# Patient Record
Sex: MALE | Race: WHITE | NOT HISPANIC OR LATINO | Employment: OTHER | URBAN - NONMETROPOLITAN AREA
[De-identification: names, ages, dates, MRNs, and addresses within clinical notes are randomized per-mention and may not be internally consistent; named-entity substitution may affect disease eponyms.]

---

## 2020-04-21 ENCOUNTER — HISTORICAL (OUTPATIENT)
Dept: ADMINISTRATIVE | Facility: HOSPITAL | Age: 60
End: 2020-04-21

## 2020-04-21 LAB
APPEARANCE, UA: CLEAR
BACTERIA SPEC CULT: NEGATIVE /HPF
BILIRUB UR QL STRIP: NEGATIVE MG/DL
BUDDING YEAST: NORMAL /HPF
CASTS, URINE MICROSCOPIC: NEGATIVE /LPF
COLOR UR: YELLOW
EPITHELIAL, URINE MICROSCOPIC: NEGATIVE /HPF
GLUCOSE (UA): NEGATIVE MG/DL
HGB UR QL STRIP: NEGATIVE ERY/UL
KETONES UR QL STRIP: 15 MG/DL
LEUKOCYTE ESTERASE UR QL STRIP: NEGATIVE LEU/UL
NITRITE UR QL STRIP: NEGATIVE MG/DL
PH UR STRIP: 6.5 [PH] (ref 5–7.5)
PROT UR QL STRIP: NEGATIVE MG/DL
RBC #/AREA URNS HPF: NEGATIVE /HPF
SP GR UR STRIP: 1 (ref 1–1.03)
SPERM, URINE MICROSCOPIC: NORMAL /HPF
TYPE OF SPECIMEN  (UA): NORMAL
UNCLASSIFIED CRYSTALS, UA: NORMAL /HPF
UROBILINOGEN UR STRIP-ACNC: NORMAL EU/L
WBC #/AREA URNS HPF: NEGATIVE /HPF

## 2020-07-20 ENCOUNTER — HISTORICAL (OUTPATIENT)
Dept: ADMINISTRATIVE | Facility: HOSPITAL | Age: 60
End: 2020-07-20

## 2020-07-20 LAB
ALBUMIN SERPL BCP-MCNC: 2.3 G/DL (ref 3.5–5)
ALBUMIN/GLOB SERPL ELPH: 0.6 {RATIO} (ref 1.5–2.2)
ALCOHOL (ETHANOL), BLOOD: 9 MG/DL (ref 0–3)
ALP SERPL-CCNC: 509 U/L (ref 50–136)
ALT SERPL W P-5'-P-CCNC: 186 U/L (ref 16–61)
ANION GAP SERPL CALC-SCNC: 16.8 MEQ/L (ref 10–20)
APTT PPP: 25 SEC (ref 23–30.4)
AST SERPL-CCNC: 651 U/L (ref 15–37)
BASOPHILS NFR BLD: 0.1 10 (ref 0–0.1)
BASOPHILS NFR BLD: 0.9 % (ref 0–1.5)
BILIRUB SERPL-MCNC: 7.78 MG/DL (ref 0.2–1)
BUN SERPL-MCNC: 13 MG/DL (ref 7–18)
CALCIUM SERPL-MCNC: 9 MG/DL (ref 8.5–10.1)
CHLORIDE SERPL-SCNC: 94 MMOL/L (ref 98–107)
CO2 SERPL-SCNC: 24 MMOL/L (ref 22–32)
CREAT SERPL-MCNC: 0.6 MG/DL (ref 0.7–1.3)
EGFR: 148 ML/MIN/1.73M
EOSINOPHIL NFR BLD: 0.1 10 (ref 0–0.7)
EOSINOPHIL NFR BLD: 1 % (ref 0–7)
ERYTHROCYTE [DISTWIDTH] IN BLOOD BY AUTOMATED COUNT: 14.6 % (ref 11.5–14.5)
GLOBULIN: 3.7 G/DL (ref 2.3–3.5)
GLUCOSE SERPL-MCNC: 96 MG/DL (ref 70–99)
GRAN #: 8.92 10 (ref 2–7.5)
GRAN%: 0.6 %
GRAN%: 82.1 % (ref 50–80)
HCT VFR BLD AUTO: 32.2 % (ref 43.5–53.7)
HGB BLD-MCNC: 10.6 G/DL (ref 14.1–18.1)
IMMATURE GRANULOCYTES #: 0.06 10
INR PPP: 1.1 (ref 0.9–1.2)
LIPASE SERPL-CCNC: 235 U/L (ref 73–393)
LYMPH #: 1 10 (ref 1–3.5)
LYMPH%: 9.4 % (ref 12–50)
MAGNESIUM SERPL-MCNC: 1.37 MG/DL (ref 1.8–2.4)
MCH RBC QN AUTO: 36.6 PG (ref 27–31)
MCHC RBC AUTO-ENTMCNC: 32.9 G% (ref 32–35)
MCV RBC AUTO: 111 FL (ref 80–97)
MONO #: 0.7 10 (ref 0–0.8)
MONO%: 6 % (ref 0–12)
OSMOC: 263 MOSM/KG (ref 275–295)
PMV BLD AUTO: 10.7 FL (ref 7.4–10.4)
PMV BLD AUTO: 228 10 (ref 142–424)
POTASSIUM SERPL-SCNC: 3.8 MMOL/L (ref 3.5–5.1)
PROT SERPL-MCNC: 6 G/DL (ref 6.4–8.2)
PROTHROMBIN TIME: 11.5 SEC (ref 9.8–12.4)
RBC # BLD AUTO: 2.9 M/UL (ref 4.69–6.13)
SODIUM BLD-SCNC: 131 MMOL/L (ref 136–145)
WBC # BLD AUTO: 10.9 10 (ref 4–10.2)

## 2020-07-29 ENCOUNTER — HOSPITAL ENCOUNTER (EMERGENCY)
Facility: HOSPITAL | Age: 60
Discharge: ANOTHER HOSPITAL | End: 2020-07-30
Attending: EMERGENCY MEDICINE
Payer: MEDICAID

## 2020-07-29 DIAGNOSIS — R17 JAUNDICE: ICD-10-CM

## 2020-07-29 DIAGNOSIS — I95.9 HYPOTENSION, UNSPECIFIED HYPOTENSION TYPE: ICD-10-CM

## 2020-07-29 DIAGNOSIS — R55 SYNCOPE: ICD-10-CM

## 2020-07-29 DIAGNOSIS — K70.10 ACUTE ALCOHOLIC HEPATITIS: Primary | ICD-10-CM

## 2020-07-29 DIAGNOSIS — E87.20 LACTIC ACIDOSIS: ICD-10-CM

## 2020-07-29 LAB
ALBUMIN SERPL BCP-MCNC: 1.7 G/DL (ref 3.5–5.2)
ALP SERPL-CCNC: 587 U/L (ref 55–135)
ALT SERPL W/O P-5'-P-CCNC: 156 U/L (ref 10–44)
AMMONIA PLAS-SCNC: <10 UMOL/L (ref 10–50)
ANION GAP SERPL CALC-SCNC: 19 MMOL/L (ref 8–16)
APTT BLDCRRT: 31.4 SEC (ref 21–32)
AST SERPL-CCNC: 627 U/L (ref 10–40)
BASOPHILS NFR BLD: 0 % (ref 0–1.9)
BILIRUB SERPL-MCNC: 10.4 MG/DL (ref 0.1–1)
BUN SERPL-MCNC: 27 MG/DL (ref 6–20)
CALCIUM SERPL-MCNC: 7.2 MG/DL (ref 8.7–10.5)
CHLORIDE SERPL-SCNC: 83 MMOL/L (ref 95–110)
CO2 SERPL-SCNC: 22 MMOL/L (ref 23–29)
CREAT SERPL-MCNC: 3.2 MG/DL (ref 0.5–1.4)
DIFFERENTIAL METHOD: ABNORMAL
EOSINOPHIL NFR BLD: 0 % (ref 0–8)
ERYTHROCYTE [DISTWIDTH] IN BLOOD BY AUTOMATED COUNT: 16.3 % (ref 11.5–14.5)
EST. GFR  (AFRICAN AMERICAN): 23.6 ML/MIN/1.73 M^2
EST. GFR  (NON AFRICAN AMERICAN): 20.4 ML/MIN/1.73 M^2
ETHANOL SERPL-MCNC: 317 MG/DL
GLUCOSE SERPL-MCNC: 86 MG/DL (ref 70–110)
HCT VFR BLD AUTO: 29.1 % (ref 40–54)
HGB BLD-MCNC: 10.1 G/DL (ref 14–18)
IMM GRANULOCYTES # BLD AUTO: ABNORMAL K/UL (ref 0–0.04)
IMM GRANULOCYTES NFR BLD AUTO: ABNORMAL % (ref 0–0.5)
INR PPP: 1.1 (ref 0.8–1.2)
LACTATE SERPL-SCNC: 4.5 MMOL/L (ref 0.5–2.2)
LYMPHOCYTES NFR BLD: 8 % (ref 18–48)
MCH RBC QN AUTO: 36.9 PG (ref 27–31)
MCHC RBC AUTO-ENTMCNC: 34.7 G/DL (ref 32–36)
MCV RBC AUTO: 106 FL (ref 82–98)
MONOCYTES NFR BLD: 5 % (ref 4–15)
NEUTROPHILS NFR BLD: 62 % (ref 38–73)
NEUTS BAND NFR BLD MANUAL: 25 %
NRBC BLD-RTO: 0 /100 WBC
PLATELET # BLD AUTO: 266 K/UL (ref 150–350)
PMV BLD AUTO: 10.6 FL (ref 9.2–12.9)
POTASSIUM SERPL-SCNC: 3.8 MMOL/L (ref 3.5–5.1)
PROT SERPL-MCNC: 5 G/DL (ref 6–8.4)
PROTHROMBIN TIME: 11.7 SEC (ref 9–12.5)
RBC # BLD AUTO: 2.74 M/UL (ref 4.6–6.2)
SARS-COV-2 RDRP RESP QL NAA+PROBE: NEGATIVE
SODIUM SERPL-SCNC: 124 MMOL/L (ref 136–145)
TROPONIN I SERPL DL<=0.01 NG/ML-MCNC: <0.02 NG/ML (ref 0–0.03)
WBC # BLD AUTO: 16.92 K/UL (ref 3.9–12.7)

## 2020-07-29 PROCEDURE — 82140 ASSAY OF AMMONIA: CPT

## 2020-07-29 PROCEDURE — 27000221 HC OXYGEN, UP TO 24 HOURS

## 2020-07-29 PROCEDURE — 94760 N-INVAS EAR/PLS OXIMETRY 1: CPT

## 2020-07-29 PROCEDURE — 25000003 PHARM REV CODE 250: Performed by: EMERGENCY MEDICINE

## 2020-07-29 PROCEDURE — 80053 COMPREHEN METABOLIC PANEL: CPT

## 2020-07-29 PROCEDURE — 99900031 HC PATIENT EDUCATION (STAT)

## 2020-07-29 PROCEDURE — U0002 COVID-19 LAB TEST NON-CDC: HCPCS

## 2020-07-29 PROCEDURE — 83605 ASSAY OF LACTIC ACID: CPT

## 2020-07-29 PROCEDURE — 87040 BLOOD CULTURE FOR BACTERIA: CPT | Mod: 59

## 2020-07-29 PROCEDURE — 36415 COLL VENOUS BLD VENIPUNCTURE: CPT

## 2020-07-29 PROCEDURE — 85610 PROTHROMBIN TIME: CPT

## 2020-07-29 PROCEDURE — 80320 DRUG SCREEN QUANTALCOHOLS: CPT

## 2020-07-29 PROCEDURE — 96361 HYDRATE IV INFUSION ADD-ON: CPT

## 2020-07-29 PROCEDURE — 84484 ASSAY OF TROPONIN QUANT: CPT

## 2020-07-29 PROCEDURE — 85007 BL SMEAR W/DIFF WBC COUNT: CPT

## 2020-07-29 PROCEDURE — 81000 URINALYSIS NONAUTO W/SCOPE: CPT

## 2020-07-29 PROCEDURE — 85027 COMPLETE CBC AUTOMATED: CPT

## 2020-07-29 PROCEDURE — 85730 THROMBOPLASTIN TIME PARTIAL: CPT

## 2020-07-29 PROCEDURE — 99285 EMERGENCY DEPT VISIT HI MDM: CPT | Mod: 25

## 2020-07-29 PROCEDURE — 99900029 HC O2 SETUP (STAT)

## 2020-07-29 PROCEDURE — 83690 ASSAY OF LIPASE: CPT

## 2020-07-29 RX ORDER — ALPRAZOLAM 1 MG/1
1 TABLET ORAL EVERY 8 HOURS PRN
Status: ON HOLD | COMMUNITY
End: 2020-08-03 | Stop reason: CLARIF

## 2020-07-29 RX ORDER — NOREPINEPHRINE BITARTRATE/D5W 4MG/250ML
0.05 PLASTIC BAG, INJECTION (ML) INTRAVENOUS CONTINUOUS
Status: DISCONTINUED | OUTPATIENT
Start: 2020-07-29 | End: 2020-07-30 | Stop reason: HOSPADM

## 2020-07-29 RX ORDER — LISINOPRIL 20 MG/1
20 TABLET ORAL DAILY
Status: ON HOLD | COMMUNITY
End: 2020-08-09 | Stop reason: HOSPADM

## 2020-07-29 RX ADMIN — SODIUM CHLORIDE 1820 ML: 9 INJECTION, SOLUTION INTRAVENOUS at 10:07

## 2020-07-29 RX ADMIN — SODIUM CHLORIDE 1000 ML: 0.9 INJECTION, SOLUTION INTRAVENOUS at 08:07

## 2020-07-30 ENCOUNTER — HOSPITAL ENCOUNTER (INPATIENT)
Facility: HOSPITAL | Age: 60
LOS: 10 days | Discharge: HOME | DRG: 432 | End: 2020-08-09
Attending: STUDENT IN AN ORGANIZED HEALTH CARE EDUCATION/TRAINING PROGRAM | Admitting: STUDENT IN AN ORGANIZED HEALTH CARE EDUCATION/TRAINING PROGRAM
Payer: MEDICAID

## 2020-07-30 VITALS
HEART RATE: 101 BPM | SYSTOLIC BLOOD PRESSURE: 105 MMHG | TEMPERATURE: 99 F | RESPIRATION RATE: 20 BRPM | WEIGHT: 208 LBS | DIASTOLIC BLOOD PRESSURE: 82 MMHG | OXYGEN SATURATION: 100 %

## 2020-07-30 DIAGNOSIS — K76.82 ACUTE HEPATIC ENCEPHALOPATHY: ICD-10-CM

## 2020-07-30 DIAGNOSIS — D53.9 MACROCYTIC ANEMIA: ICD-10-CM

## 2020-07-30 DIAGNOSIS — I95.9 HYPOTENSION, UNSPECIFIED HYPOTENSION TYPE: ICD-10-CM

## 2020-07-30 DIAGNOSIS — K65.2 SBP (SPONTANEOUS BACTERIAL PERITONITIS): ICD-10-CM

## 2020-07-30 DIAGNOSIS — F10.20 SEVERE ALCOHOL DEPENDENCE: ICD-10-CM

## 2020-07-30 DIAGNOSIS — I10 ESSENTIAL HYPERTENSION: Chronic | ICD-10-CM

## 2020-07-30 DIAGNOSIS — E87.1 HYPONATREMIA: ICD-10-CM

## 2020-07-30 DIAGNOSIS — K70.10 ACUTE ALCOHOLIC HEPATITIS: Primary | ICD-10-CM

## 2020-07-30 DIAGNOSIS — R60.1 ANASARCA: ICD-10-CM

## 2020-07-30 DIAGNOSIS — F10.20 ALCOHOL USE DISORDER, SEVERE, DEPENDENCE: ICD-10-CM

## 2020-07-30 PROBLEM — F10.10 ETOH ABUSE: Status: ACTIVE | Noted: 2020-07-30

## 2020-07-30 PROBLEM — F41.9 ANXIETY: Status: ACTIVE | Noted: 2020-07-30

## 2020-07-30 PROBLEM — K76.7 HEPATORENAL SYNDROME: Status: ACTIVE | Noted: 2020-07-30

## 2020-07-30 LAB
ABO + RH BLD: NORMAL
ALBUMIN SERPL BCP-MCNC: 1.6 G/DL (ref 3.5–5.2)
ALBUMIN SERPL BCP-MCNC: 3.7 G/DL (ref 3.5–5.2)
ALP SERPL-CCNC: 421 U/L (ref 55–135)
ALP SERPL-CCNC: 537 U/L (ref 55–135)
ALT SERPL W/O P-5'-P-CCNC: 108 U/L (ref 10–44)
ALT SERPL W/O P-5'-P-CCNC: 143 U/L (ref 10–44)
AMORPH CRY UR QL COMP ASSIST: ABNORMAL
AMORPH CRY UR QL COMP ASSIST: ABNORMAL
AMPHET+METHAMPHET UR QL: NEGATIVE
ANION GAP SERPL CALC-SCNC: 19 MMOL/L (ref 8–16)
ANION GAP SERPL CALC-SCNC: 23 MMOL/L (ref 8–16)
ANISOCYTOSIS BLD QL SMEAR: SLIGHT
APTT BLDCRRT: 41 SEC (ref 21–32)
APTT BLDCRRT: 47.4 SEC (ref 21–32)
AST SERPL-CCNC: 384 U/L (ref 10–40)
AST SERPL-CCNC: 552 U/L (ref 10–40)
BACTERIA #/AREA URNS AUTO: ABNORMAL /HPF
BACTERIA #/AREA URNS HPF: NEGATIVE /HPF
BARBITURATES UR QL SCN>200 NG/ML: NEGATIVE
BASO STIPL BLD QL SMEAR: ABNORMAL
BASOPHILS # BLD AUTO: 0 K/UL (ref 0–0.2)
BASOPHILS # BLD AUTO: 0.01 K/UL (ref 0–0.2)
BASOPHILS # BLD AUTO: 0.01 K/UL (ref 0–0.2)
BASOPHILS # BLD AUTO: 0.03 K/UL (ref 0–0.2)
BASOPHILS NFR BLD: 0 % (ref 0–1.9)
BASOPHILS NFR BLD: 0.1 % (ref 0–1.9)
BASOPHILS NFR BLD: 0.1 % (ref 0–1.9)
BASOPHILS NFR BLD: 0.2 % (ref 0–1.9)
BENZODIAZ UR QL SCN>200 NG/ML: NEGATIVE
BILIRUB DIRECT SERPL-MCNC: 8 MG/DL (ref 0.1–0.3)
BILIRUB SERPL-MCNC: 10.2 MG/DL (ref 0.1–1)
BILIRUB SERPL-MCNC: 11.1 MG/DL (ref 0.1–1)
BILIRUB UR QL STRIP: ABNORMAL
BILIRUB UR QL STRIP: NEGATIVE
BLD GP AB SCN CELLS X3 SERPL QL: NORMAL
BUN SERPL-MCNC: 29 MG/DL (ref 6–20)
BUN SERPL-MCNC: 30 MG/DL (ref 6–20)
BZE UR QL SCN: NEGATIVE
CA-I BLDV-SCNC: 0.67 MMOL/L (ref 1.06–1.42)
CALCIUM SERPL-MCNC: 6.3 MG/DL (ref 8.7–10.5)
CALCIUM SERPL-MCNC: 6.9 MG/DL (ref 8.7–10.5)
CANNABINOIDS UR QL SCN: NEGATIVE
CHLORIDE SERPL-SCNC: 89 MMOL/L (ref 95–110)
CHLORIDE SERPL-SCNC: 89 MMOL/L (ref 95–110)
CLARITY UR REFRACT.AUTO: ABNORMAL
CLARITY UR: ABNORMAL
CO2 SERPL-SCNC: 17 MMOL/L (ref 23–29)
CO2 SERPL-SCNC: 18 MMOL/L (ref 23–29)
COLOR UR AUTO: ABNORMAL
COLOR UR: YELLOW
CREAT SERPL-MCNC: 2 MG/DL (ref 0.5–1.4)
CREAT SERPL-MCNC: 2.5 MG/DL (ref 0.5–1.4)
CREAT UR-MCNC: 134 MG/DL (ref 23–375)
DIFFERENTIAL METHOD: ABNORMAL
EOSINOPHIL # BLD AUTO: 0 K/UL (ref 0–0.5)
EOSINOPHIL NFR BLD: 0 % (ref 0–8)
EOSINOPHIL NFR BLD: 0.1 % (ref 0–8)
ERYTHROCYTE [DISTWIDTH] IN BLOOD BY AUTOMATED COUNT: 16.2 % (ref 11.5–14.5)
ERYTHROCYTE [DISTWIDTH] IN BLOOD BY AUTOMATED COUNT: 16.6 % (ref 11.5–14.5)
ERYTHROCYTE [DISTWIDTH] IN BLOOD BY AUTOMATED COUNT: 16.6 % (ref 11.5–14.5)
ERYTHROCYTE [DISTWIDTH] IN BLOOD BY AUTOMATED COUNT: 19.8 % (ref 11.5–14.5)
EST. GFR  (AFRICAN AMERICAN): 31.8 ML/MIN/1.73 M^2
EST. GFR  (AFRICAN AMERICAN): 41.6 ML/MIN/1.73 M^2
EST. GFR  (NON AFRICAN AMERICAN): 27.5 ML/MIN/1.73 M^2
EST. GFR  (NON AFRICAN AMERICAN): 36 ML/MIN/1.73 M^2
FIBRINOGEN PPP-MCNC: 489 MG/DL (ref 182–366)
FIBRINOGEN PPP-MCNC: 489 MG/DL (ref 182–366)
GLUCOSE SERPL-MCNC: 128 MG/DL (ref 70–110)
GLUCOSE SERPL-MCNC: 88 MG/DL (ref 70–110)
GLUCOSE UR QL STRIP: NEGATIVE
GLUCOSE UR QL STRIP: NEGATIVE
HCT VFR BLD AUTO: 20.8 % (ref 40–54)
HCT VFR BLD AUTO: 20.9 % (ref 40–54)
HCT VFR BLD AUTO: 24.3 % (ref 40–54)
HCT VFR BLD AUTO: 26.4 % (ref 40–54)
HGB BLD-MCNC: 6.7 G/DL (ref 14–18)
HGB BLD-MCNC: 6.9 G/DL (ref 14–18)
HGB BLD-MCNC: 7.9 G/DL (ref 14–18)
HGB BLD-MCNC: 8.6 G/DL (ref 14–18)
HGB UR QL STRIP: ABNORMAL
HGB UR QL STRIP: NEGATIVE
HOWELL-JOLLY BOD BLD QL SMEAR: ABNORMAL
HYALINE CASTS #/AREA URNS LPF: 21 /LPF
HYALINE CASTS UR QL AUTO: 0 /LPF
HYPOCHROMIA BLD QL SMEAR: ABNORMAL
HYPOCHROMIA BLD QL SMEAR: ABNORMAL
IMM GRANULOCYTES # BLD AUTO: 0.05 K/UL (ref 0–0.04)
IMM GRANULOCYTES # BLD AUTO: 0.07 K/UL (ref 0–0.04)
IMM GRANULOCYTES # BLD AUTO: 0.1 K/UL (ref 0–0.04)
IMM GRANULOCYTES # BLD AUTO: 0.11 K/UL (ref 0–0.04)
IMM GRANULOCYTES NFR BLD AUTO: 0.5 % (ref 0–0.5)
IMM GRANULOCYTES NFR BLD AUTO: 0.6 % (ref 0–0.5)
IMM GRANULOCYTES NFR BLD AUTO: 0.6 % (ref 0–0.5)
IMM GRANULOCYTES NFR BLD AUTO: 0.8 % (ref 0–0.5)
INR PPP: 1.2 (ref 0.8–1.2)
INR PPP: 1.3 (ref 0.8–1.2)
KETONES UR QL STRIP: NEGATIVE
KETONES UR QL STRIP: NEGATIVE
LACTATE SERPL-SCNC: 2.9 MMOL/L (ref 0.5–2.2)
LEUKOCYTE ESTERASE UR QL STRIP: ABNORMAL
LEUKOCYTE ESTERASE UR QL STRIP: NEGATIVE
LIPASE SERPL-CCNC: 114 U/L (ref 23–300)
LIPASE SERPL-CCNC: 56 U/L (ref 4–60)
LYMPHOCYTES # BLD AUTO: 0.3 K/UL (ref 1–4.8)
LYMPHOCYTES # BLD AUTO: 0.4 K/UL (ref 1–4.8)
LYMPHOCYTES # BLD AUTO: 0.4 K/UL (ref 1–4.8)
LYMPHOCYTES # BLD AUTO: 0.5 K/UL (ref 1–4.8)
LYMPHOCYTES NFR BLD: 1.9 % (ref 18–48)
LYMPHOCYTES NFR BLD: 3 % (ref 18–48)
LYMPHOCYTES NFR BLD: 3.1 % (ref 18–48)
LYMPHOCYTES NFR BLD: 4.7 % (ref 18–48)
MAGNESIUM SERPL-MCNC: 1.3 MG/DL (ref 1.6–2.6)
MAGNESIUM SERPL-MCNC: 1.5 MG/DL (ref 1.6–2.6)
MCH RBC QN AUTO: 35.1 PG (ref 27–31)
MCH RBC QN AUTO: 36.4 PG (ref 27–31)
MCH RBC QN AUTO: 36.4 PG (ref 27–31)
MCH RBC QN AUTO: 36.7 PG (ref 27–31)
MCHC RBC AUTO-ENTMCNC: 32.2 G/DL (ref 32–36)
MCHC RBC AUTO-ENTMCNC: 32.5 G/DL (ref 32–36)
MCHC RBC AUTO-ENTMCNC: 32.6 G/DL (ref 32–36)
MCHC RBC AUTO-ENTMCNC: 33 G/DL (ref 32–36)
MCV RBC AUTO: 108 FL (ref 82–98)
MCV RBC AUTO: 111 FL (ref 82–98)
MCV RBC AUTO: 112 FL (ref 82–98)
MCV RBC AUTO: 113 FL (ref 82–98)
METHADONE UR QL SCN>300 NG/ML: NEGATIVE
MICROSCOPIC COMMENT: ABNORMAL
MICROSCOPIC COMMENT: ABNORMAL
MONOCYTES # BLD AUTO: 0.3 K/UL (ref 0.3–1)
MONOCYTES # BLD AUTO: 0.3 K/UL (ref 0.3–1)
MONOCYTES # BLD AUTO: 0.4 K/UL (ref 0.3–1)
MONOCYTES # BLD AUTO: 0.4 K/UL (ref 0.3–1)
MONOCYTES NFR BLD: 2.2 % (ref 4–15)
MONOCYTES NFR BLD: 2.5 % (ref 4–15)
MONOCYTES NFR BLD: 2.7 % (ref 4–15)
MONOCYTES NFR BLD: 3.3 % (ref 4–15)
NEUTROPHILS # BLD AUTO: 11.4 K/UL (ref 1.8–7.7)
NEUTROPHILS # BLD AUTO: 12.2 K/UL (ref 1.8–7.7)
NEUTROPHILS # BLD AUTO: 16.8 K/UL (ref 1.8–7.7)
NEUTROPHILS # BLD AUTO: 9.6 K/UL (ref 1.8–7.7)
NEUTROPHILS NFR BLD: 92 % (ref 38–73)
NEUTROPHILS NFR BLD: 92.9 % (ref 38–73)
NEUTROPHILS NFR BLD: 93.7 % (ref 38–73)
NEUTROPHILS NFR BLD: 95 % (ref 38–73)
NITRITE UR QL STRIP: NEGATIVE
NITRITE UR QL STRIP: POSITIVE
NRBC BLD-RTO: 0 /100 WBC
OB PNL STL: POSITIVE
OPIATES UR QL SCN: NEGATIVE
OVALOCYTES BLD QL SMEAR: ABNORMAL
OVALOCYTES BLD QL SMEAR: ABNORMAL
PAPPENHEIMER BOD BLD QL SMEAR: PRESENT
PCP UR QL SCN>25 NG/ML: NEGATIVE
PH UR STRIP: 6 [PH] (ref 5–8)
PH UR STRIP: 6 [PH] (ref 5–8)
PHOSPHATE SERPL-MCNC: 4.9 MG/DL (ref 2.7–4.5)
PHOSPHATE SERPL-MCNC: 5.4 MG/DL (ref 2.7–4.5)
PLATELET # BLD AUTO: 132 K/UL (ref 150–350)
PLATELET # BLD AUTO: 155 K/UL (ref 150–350)
PLATELET # BLD AUTO: 177 K/UL (ref 150–350)
PLATELET # BLD AUTO: 254 K/UL (ref 150–350)
PLATELET BLD QL SMEAR: ABNORMAL
PMV BLD AUTO: 10.7 FL (ref 9.2–12.9)
PMV BLD AUTO: 10.7 FL (ref 9.2–12.9)
PMV BLD AUTO: 10.9 FL (ref 9.2–12.9)
PMV BLD AUTO: 11 FL (ref 9.2–12.9)
POCT GLUCOSE: 154 MG/DL (ref 70–110)
POCT GLUCOSE: 91 MG/DL (ref 70–110)
POIKILOCYTOSIS BLD QL SMEAR: SLIGHT
POIKILOCYTOSIS BLD QL SMEAR: SLIGHT
POLYCHROMASIA BLD QL SMEAR: ABNORMAL
POTASSIUM SERPL-SCNC: 3.5 MMOL/L (ref 3.5–5.1)
POTASSIUM SERPL-SCNC: 3.6 MMOL/L (ref 3.5–5.1)
PROT SERPL-MCNC: 4.7 G/DL (ref 6–8.4)
PROT SERPL-MCNC: 6 G/DL (ref 6–8.4)
PROT UR QL STRIP: ABNORMAL
PROT UR QL STRIP: ABNORMAL
PROTHROMBIN TIME: 12.8 SEC (ref 9–12.5)
PROTHROMBIN TIME: 13.7 SEC (ref 9–12.5)
RBC # BLD AUTO: 1.84 M/UL (ref 4.6–6.2)
RBC # BLD AUTO: 1.88 M/UL (ref 4.6–6.2)
RBC # BLD AUTO: 2.25 M/UL (ref 4.6–6.2)
RBC # BLD AUTO: 2.36 M/UL (ref 4.6–6.2)
RBC #/AREA URNS AUTO: 2 /HPF (ref 0–4)
RBC #/AREA URNS HPF: 3 /HPF (ref 0–4)
SODIUM SERPL-SCNC: 126 MMOL/L (ref 136–145)
SODIUM SERPL-SCNC: 129 MMOL/L (ref 136–145)
SP GR UR STRIP: 1 (ref 1–1.03)
SP GR UR STRIP: 1.01 (ref 1–1.03)
SQUAMOUS #/AREA URNS AUTO: 2 /HPF
SQUAMOUS #/AREA URNS HPF: 1 /HPF
STOMATOCYTES BLD QL SMEAR: PRESENT
STOMATOCYTES BLD QL SMEAR: PRESENT
TARGETS BLD QL SMEAR: ABNORMAL
TARGETS BLD QL SMEAR: ABNORMAL
TOXIC GRANULES BLD QL SMEAR: PRESENT
TOXICOLOGY INFORMATION: NORMAL
URN SPEC COLLECT METH UR: ABNORMAL
URN SPEC COLLECT METH UR: ABNORMAL
UROBILINOGEN UR STRIP-ACNC: ABNORMAL EU/DL
WBC # BLD AUTO: 10.4 K/UL (ref 3.9–12.7)
WBC # BLD AUTO: 12.19 K/UL (ref 3.9–12.7)
WBC # BLD AUTO: 13.14 K/UL (ref 3.9–12.7)
WBC # BLD AUTO: 17.67 K/UL (ref 3.9–12.7)
WBC #/AREA URNS AUTO: 1 /HPF (ref 0–5)
WBC #/AREA URNS HPF: 8 /HPF (ref 0–5)

## 2020-07-30 PROCEDURE — P9047 ALBUMIN (HUMAN), 25%, 50ML: HCPCS | Mod: JG | Performed by: STUDENT IN AN ORGANIZED HEALTH CARE EDUCATION/TRAINING PROGRAM

## 2020-07-30 PROCEDURE — 25000003 PHARM REV CODE 250: Performed by: STUDENT IN AN ORGANIZED HEALTH CARE EDUCATION/TRAINING PROGRAM

## 2020-07-30 PROCEDURE — C9113 INJ PANTOPRAZOLE SODIUM, VIA: HCPCS | Performed by: STUDENT IN AN ORGANIZED HEALTH CARE EDUCATION/TRAINING PROGRAM

## 2020-07-30 PROCEDURE — 85025 COMPLETE CBC W/AUTO DIFF WBC: CPT | Mod: 91

## 2020-07-30 PROCEDURE — 86850 RBC ANTIBODY SCREEN: CPT

## 2020-07-30 PROCEDURE — 96368 THER/DIAG CONCURRENT INF: CPT

## 2020-07-30 PROCEDURE — 81001 URINALYSIS AUTO W/SCOPE: CPT

## 2020-07-30 PROCEDURE — 63600175 PHARM REV CODE 636 W HCPCS: Performed by: STUDENT IN AN ORGANIZED HEALTH CARE EDUCATION/TRAINING PROGRAM

## 2020-07-30 PROCEDURE — 99291 CRITICAL CARE FIRST HOUR: CPT | Mod: ,,, | Performed by: INTERNAL MEDICINE

## 2020-07-30 PROCEDURE — 80307 DRUG TEST PRSMV CHEM ANLYZR: CPT

## 2020-07-30 PROCEDURE — 84100 ASSAY OF PHOSPHORUS: CPT

## 2020-07-30 PROCEDURE — 25000003 PHARM REV CODE 250: Performed by: EMERGENCY MEDICINE

## 2020-07-30 PROCEDURE — P9016 RBC LEUKOCYTES REDUCED: HCPCS

## 2020-07-30 PROCEDURE — 85610 PROTHROMBIN TIME: CPT | Mod: 91

## 2020-07-30 PROCEDURE — 96366 THER/PROPH/DIAG IV INF ADDON: CPT

## 2020-07-30 PROCEDURE — 86920 COMPATIBILITY TEST SPIN: CPT

## 2020-07-30 PROCEDURE — 83735 ASSAY OF MAGNESIUM: CPT | Mod: 91

## 2020-07-30 PROCEDURE — 63600175 PHARM REV CODE 636 W HCPCS: Mod: JG | Performed by: STUDENT IN AN ORGANIZED HEALTH CARE EDUCATION/TRAINING PROGRAM

## 2020-07-30 PROCEDURE — 99291 PR CRITICAL CARE, E/M 30-74 MINUTES: ICD-10-PCS | Mod: ,,, | Performed by: INTERNAL MEDICINE

## 2020-07-30 PROCEDURE — 80053 COMPREHEN METABOLIC PANEL: CPT

## 2020-07-30 PROCEDURE — 80048 BASIC METABOLIC PNL TOTAL CA: CPT

## 2020-07-30 PROCEDURE — 36430 TRANSFUSION BLD/BLD COMPNT: CPT

## 2020-07-30 PROCEDURE — 82330 ASSAY OF CALCIUM: CPT

## 2020-07-30 PROCEDURE — 36415 COLL VENOUS BLD VENIPUNCTURE: CPT

## 2020-07-30 PROCEDURE — 83605 ASSAY OF LACTIC ACID: CPT

## 2020-07-30 PROCEDURE — 20000000 HC ICU ROOM

## 2020-07-30 PROCEDURE — 96365 THER/PROPH/DIAG IV INF INIT: CPT

## 2020-07-30 PROCEDURE — 83735 ASSAY OF MAGNESIUM: CPT

## 2020-07-30 PROCEDURE — 80076 HEPATIC FUNCTION PANEL: CPT

## 2020-07-30 PROCEDURE — 85384 FIBRINOGEN ACTIVITY: CPT

## 2020-07-30 PROCEDURE — 82272 OCCULT BLD FECES 1-3 TESTS: CPT

## 2020-07-30 PROCEDURE — 84100 ASSAY OF PHOSPHORUS: CPT | Mod: 91

## 2020-07-30 PROCEDURE — 85610 PROTHROMBIN TIME: CPT

## 2020-07-30 PROCEDURE — 85730 THROMBOPLASTIN TIME PARTIAL: CPT

## 2020-07-30 PROCEDURE — 83690 ASSAY OF LIPASE: CPT

## 2020-07-30 PROCEDURE — 96375 TX/PRO/DX INJ NEW DRUG ADDON: CPT

## 2020-07-30 PROCEDURE — 85730 THROMBOPLASTIN TIME PARTIAL: CPT | Mod: 91

## 2020-07-30 PROCEDURE — 63600175 PHARM REV CODE 636 W HCPCS: Performed by: EMERGENCY MEDICINE

## 2020-07-30 RX ORDER — SODIUM CHLORIDE 0.9 % (FLUSH) 0.9 %
10 SYRINGE (ML) INJECTION
Status: DISCONTINUED | OUTPATIENT
Start: 2020-07-30 | End: 2020-08-09 | Stop reason: HOSPADM

## 2020-07-30 RX ORDER — POTASSIUM CHLORIDE 29.8 MG/ML
40 INJECTION INTRAVENOUS ONCE
Status: COMPLETED | OUTPATIENT
Start: 2020-07-30 | End: 2020-07-30

## 2020-07-30 RX ORDER — MIDODRINE HYDROCHLORIDE 5 MG/1
10 TABLET ORAL 3 TIMES DAILY
Status: DISCONTINUED | OUTPATIENT
Start: 2020-07-30 | End: 2020-08-03

## 2020-07-30 RX ORDER — ONDANSETRON 2 MG/ML
4 INJECTION INTRAMUSCULAR; INTRAVENOUS EVERY 8 HOURS PRN
Status: DISCONTINUED | OUTPATIENT
Start: 2020-07-30 | End: 2020-08-09 | Stop reason: HOSPADM

## 2020-07-30 RX ORDER — OCTREOTIDE ACETATE 100 UG/ML
50 INJECTION, SOLUTION INTRAVENOUS; SUBCUTANEOUS ONCE
Status: COMPLETED | OUTPATIENT
Start: 2020-07-30 | End: 2020-07-30

## 2020-07-30 RX ORDER — POTASSIUM CHLORIDE 7.45 MG/ML
80 INJECTION INTRAVENOUS
Status: DISCONTINUED | OUTPATIENT
Start: 2020-07-30 | End: 2020-07-31

## 2020-07-30 RX ORDER — ALBUMIN HUMAN 250 G/1000ML
1 SOLUTION INTRAVENOUS ONCE
Status: DISCONTINUED | OUTPATIENT
Start: 2020-07-30 | End: 2020-07-30

## 2020-07-30 RX ORDER — MAGNESIUM SULFATE HEPTAHYDRATE 40 MG/ML
2 INJECTION, SOLUTION INTRAVENOUS
Status: DISCONTINUED | OUTPATIENT
Start: 2020-07-30 | End: 2020-08-02

## 2020-07-30 RX ORDER — NOREPINEPHRINE BITARTRATE/D5W 4MG/250ML
0.02 PLASTIC BAG, INJECTION (ML) INTRAVENOUS CONTINUOUS
Status: DISCONTINUED | OUTPATIENT
Start: 2020-07-30 | End: 2020-07-31

## 2020-07-30 RX ORDER — METHYLPREDNISOLONE SOD SUCC 125 MG
40 VIAL (EA) INJECTION
Status: COMPLETED | OUTPATIENT
Start: 2020-07-30 | End: 2020-07-30

## 2020-07-30 RX ORDER — POTASSIUM CHLORIDE 7.45 MG/ML
60 INJECTION INTRAVENOUS
Status: DISCONTINUED | OUTPATIENT
Start: 2020-07-30 | End: 2020-07-31

## 2020-07-30 RX ORDER — OCTREOTIDE ACETATE 100 UG/ML
100 INJECTION, SOLUTION INTRAVENOUS; SUBCUTANEOUS 3 TIMES DAILY
Status: DISCONTINUED | OUTPATIENT
Start: 2020-07-30 | End: 2020-07-30

## 2020-07-30 RX ORDER — HYDROCODONE BITARTRATE AND ACETAMINOPHEN 500; 5 MG/1; MG/1
TABLET ORAL
Status: DISCONTINUED | OUTPATIENT
Start: 2020-07-30 | End: 2020-08-03

## 2020-07-30 RX ORDER — PANTOPRAZOLE SODIUM 40 MG/10ML
40 INJECTION, POWDER, LYOPHILIZED, FOR SOLUTION INTRAVENOUS 2 TIMES DAILY
Status: DISCONTINUED | OUTPATIENT
Start: 2020-07-30 | End: 2020-07-30

## 2020-07-30 RX ORDER — CEFTRIAXONE 1 G/1
1 INJECTION, POWDER, FOR SOLUTION INTRAMUSCULAR; INTRAVENOUS DAILY
Status: DISCONTINUED | OUTPATIENT
Start: 2020-07-31 | End: 2020-07-31

## 2020-07-30 RX ORDER — TALC
6 POWDER (GRAM) TOPICAL NIGHTLY PRN
Status: DISCONTINUED | OUTPATIENT
Start: 2020-07-30 | End: 2020-08-09 | Stop reason: HOSPADM

## 2020-07-30 RX ORDER — ACETAMINOPHEN 325 MG/1
650 TABLET ORAL EVERY 4 HOURS PRN
Status: DISCONTINUED | OUTPATIENT
Start: 2020-07-30 | End: 2020-08-03

## 2020-07-30 RX ORDER — PANTOPRAZOLE SODIUM 40 MG/10ML
80 INJECTION, POWDER, LYOPHILIZED, FOR SOLUTION INTRAVENOUS ONCE
Status: COMPLETED | OUTPATIENT
Start: 2020-07-30 | End: 2020-07-30

## 2020-07-30 RX ORDER — ALBUMIN HUMAN 250 G/1000ML
50 SOLUTION INTRAVENOUS ONCE
Status: DISCONTINUED | OUTPATIENT
Start: 2020-07-30 | End: 2020-07-30

## 2020-07-30 RX ORDER — POTASSIUM CHLORIDE 7.45 MG/ML
40 INJECTION INTRAVENOUS
Status: DISCONTINUED | OUTPATIENT
Start: 2020-07-30 | End: 2020-07-31

## 2020-07-30 RX ORDER — ALBUMIN HUMAN 250 G/1000ML
25 SOLUTION INTRAVENOUS 2 TIMES DAILY
Status: DISCONTINUED | OUTPATIENT
Start: 2020-07-31 | End: 2020-07-31

## 2020-07-30 RX ORDER — OCTREOTIDE ACETATE 50 UG/ML
50 INJECTION, SOLUTION INTRAVENOUS; SUBCUTANEOUS DAILY
Status: DISCONTINUED | OUTPATIENT
Start: 2020-07-30 | End: 2020-07-30

## 2020-07-30 RX ORDER — MAGNESIUM SULFATE HEPTAHYDRATE 40 MG/ML
4 INJECTION, SOLUTION INTRAVENOUS
Status: DISCONTINUED | OUTPATIENT
Start: 2020-07-30 | End: 2020-08-02

## 2020-07-30 RX ORDER — ALPRAZOLAM 1 MG/1
1 TABLET ORAL EVERY 8 HOURS PRN
Status: DISCONTINUED | OUTPATIENT
Start: 2020-07-30 | End: 2020-08-03

## 2020-07-30 RX ORDER — ONDANSETRON 8 MG/1
8 TABLET, ORALLY DISINTEGRATING ORAL EVERY 8 HOURS PRN
Status: DISCONTINUED | OUTPATIENT
Start: 2020-07-30 | End: 2020-08-09 | Stop reason: HOSPADM

## 2020-07-30 RX ORDER — CEFTRIAXONE 1 G/1
1 INJECTION, POWDER, FOR SOLUTION INTRAMUSCULAR; INTRAVENOUS
Status: DISCONTINUED | OUTPATIENT
Start: 2020-07-30 | End: 2020-07-30

## 2020-07-30 RX ORDER — ALBUMIN HUMAN 250 G/1000ML
100 SOLUTION INTRAVENOUS ONCE
Status: COMPLETED | OUTPATIENT
Start: 2020-07-30 | End: 2020-07-30

## 2020-07-30 RX ORDER — SODIUM CHLORIDE 9 MG/ML
1000 INJECTION, SOLUTION INTRAVENOUS
Status: COMPLETED | OUTPATIENT
Start: 2020-07-30 | End: 2020-07-30

## 2020-07-30 RX ADMIN — Medication 0.08 MCG/KG/MIN: at 02:07

## 2020-07-30 RX ADMIN — SODIUM CHLORIDE 1000 ML: 9 INJECTION, SOLUTION INTRAVENOUS at 03:07

## 2020-07-30 RX ADMIN — HYDROCORTISONE SODIUM SUCCINATE 100 MG: 100 INJECTION, POWDER, FOR SOLUTION INTRAMUSCULAR; INTRAVENOUS at 02:07

## 2020-07-30 RX ADMIN — CALCIUM GLUCONATE 3 G: 98 INJECTION, SOLUTION INTRAVENOUS at 09:07

## 2020-07-30 RX ADMIN — MIDODRINE HYDROCHLORIDE 10 MG: 5 TABLET ORAL at 09:07

## 2020-07-30 RX ADMIN — ALBUMIN (HUMAN) 12.5 G: 12.5 SOLUTION INTRAVENOUS at 02:07

## 2020-07-30 RX ADMIN — OCTREOTIDE ACETATE 100 MCG: 100 INJECTION, SOLUTION INTRAVENOUS; SUBCUTANEOUS at 02:07

## 2020-07-30 RX ADMIN — Medication 0.05 MCG/KG/MIN: at 06:07

## 2020-07-30 RX ADMIN — PANTOPRAZOLE SODIUM 80 MG: 40 INJECTION, POWDER, LYOPHILIZED, FOR SOLUTION INTRAVENOUS at 06:07

## 2020-07-30 RX ADMIN — OCTREOTIDE ACETATE 50 MCG: 100 INJECTION, SOLUTION INTRAVENOUS; SUBCUTANEOUS at 06:07

## 2020-07-30 RX ADMIN — DEXTROSE MONOHYDRATE 8 MG/HR: 50 INJECTION, SOLUTION INTRAVENOUS at 08:07

## 2020-07-30 RX ADMIN — HYDROCORTISONE SODIUM SUCCINATE 100 MG: 100 INJECTION, POWDER, FOR SOLUTION INTRAMUSCULAR; INTRAVENOUS at 09:07

## 2020-07-30 RX ADMIN — METHYLPREDNISOLONE SODIUM SUCCINATE 40 MG: 125 INJECTION, POWDER, FOR SOLUTION INTRAMUSCULAR; INTRAVENOUS at 05:07

## 2020-07-30 RX ADMIN — OCTREOTIDE ACETATE 50 MCG/HR: 1000 INJECTION, SOLUTION INTRAVENOUS; SUBCUTANEOUS at 08:07

## 2020-07-30 RX ADMIN — MAGNESIUM SULFATE 2 G: 2 INJECTION INTRAVENOUS at 08:07

## 2020-07-30 RX ADMIN — PIPERACILLIN AND TAZOBACTAM 4.5 G: 4; .5 INJECTION, POWDER, LYOPHILIZED, FOR SOLUTION INTRAVENOUS; PARENTERAL at 01:07

## 2020-07-30 RX ADMIN — Medication 0.05 MCG/KG/MIN: at 12:07

## 2020-07-30 RX ADMIN — ALPRAZOLAM 1 MG: 1 TABLET ORAL at 11:07

## 2020-07-30 RX ADMIN — POTASSIUM CHLORIDE 40 MEQ: 400 INJECTION, SOLUTION INTRAVENOUS at 09:07

## 2020-07-30 RX ADMIN — CEFTRIAXONE SODIUM 1 G: 1 INJECTION, POWDER, FOR SOLUTION INTRAMUSCULAR; INTRAVENOUS at 11:07

## 2020-07-30 RX ADMIN — MIDODRINE HYDROCHLORIDE 10 MG: 5 TABLET ORAL at 02:07

## 2020-07-30 RX ADMIN — Medication 0.16 MCG/KG/MIN: at 11:07

## 2020-07-30 NOTE — SUBJECTIVE & OBJECTIVE
Past Medical History:   Diagnosis Date    Hepatitis     Hypertension        No past surgical history on file.    Review of patient's allergies indicates:  No Known Allergies    Family History     None        Tobacco Use    Smoking status: Not on file   Substance and Sexual Activity    Alcohol use: Not on file    Drug use: Not on file    Sexual activity: Not on file      Review of Systems   Constitutional: Positive for appetite change. Negative for chills, diaphoresis and fever.   Respiratory: Negative for cough, chest tightness and shortness of breath.    Cardiovascular: Negative for chest pain, palpitations and leg swelling.   Gastrointestinal: Positive for abdominal distention. Negative for abdominal pain, diarrhea, nausea and vomiting.   Genitourinary: Negative for difficulty urinating.   Neurological: Positive for tremors and weakness. Negative for dizziness, syncope and headaches.   Psychiatric/Behavioral: Negative for hallucinations, self-injury and suicidal ideas. The patient is nervous/anxious.      Objective:     Vital Signs (Most Recent):  Temp: 98.2 °F (36.8 °C) (07/30/20 1021)  Pulse: 107 (07/30/20 1030)  Resp: (!) 31 (07/30/20 1030)  BP: (!) 111/59 (07/30/20 1030)  SpO2: (!) 92 % (07/30/20 1030) Vital Signs (24h Range):  Temp:  [98.2 °F (36.8 °C)-99 °F (37.2 °C)] 98.2 °F (36.8 °C)  Pulse:  [] 107  Resp:  [16-31] 31  SpO2:  [92 %-100 %] 92 %  BP: ()/(34-82) 111/59   Weight: 94.3 kg (207 lb 14.3 oz)  There is no height or weight on file to calculate BMI.    No intake or output data in the 24 hours ending 07/30/20 1205    Physical Exam  Constitutional:       Appearance: Normal appearance.   HENT:      Head: Normocephalic and atraumatic.      Mouth/Throat:      Mouth: Mucous membranes are dry.   Eyes:      General: Scleral icterus present.      Extraocular Movements: Extraocular movements intact.      Pupils: Pupils are equal, round, and reactive to light.   Neck:      Musculoskeletal:  Neck supple.   Cardiovascular:      Rate and Rhythm: Normal rate and regular rhythm.      Pulses: Normal pulses.      Heart sounds: No murmur.   Pulmonary:      Effort: Pulmonary effort is normal.      Breath sounds: No wheezing, rhonchi or rales.   Abdominal:      General: Bowel sounds are normal.      Palpations: Abdomen is soft.      Tenderness: There is no abdominal tenderness. There is no guarding.   Musculoskeletal:         General: No swelling.      Right lower leg: No edema.      Left lower leg: No edema.   Skin:     General: Skin is warm.      Coloration: Skin is jaundiced.      Findings: No erythema or rash.   Neurological:      Mental Status: He is alert. He is disoriented.         Vents:     Lines/Drains/Airways     Central Venous Catheter Line            Percutaneous Central Line Insertion/Assessment - Triple Lumen  right femoral vein;right femoral artery -- days              Significant Labs:    CBC/Anemia Profile:  Recent Labs   Lab 07/29/20 2025 07/30/20  1118   WBC 16.92* 17.67*   HGB 10.1* 8.6*   HCT 29.1* 26.4*    254   * 112*   RDW 16.3* 16.6*        Chemistries:  Recent Labs   Lab 07/29/20 2026   *   K 3.8   CL 83*   CO2 22*   BUN 27*   CREATININE 3.2*   CALCIUM 7.2*   ALBUMIN 1.7*   PROT 5.0*   BILITOT 10.4*   ALKPHOS 587*   *   *       All pertinent labs within the past 24 hours have been reviewed.    Significant Imaging: I have reviewed and interpreted all pertinent imaging results/findings within the past 24 hours.

## 2020-07-30 NOTE — ED PROVIDER NOTES
Encounter Date: 7/29/2020       History     Chief Complaint   Patient presents with    Fatigue     Pt reports severe weakness x 2-3 days after a recent dx of Hep C    Hypotension     75/45 on ED arrival     58 yo male with history of chronic daily ETOH, typically vodka, here via EMS from home with weakness for several days. Reports was recently seen for jaundice and a hepatitis panel was obtained. Acute hepatitis panel was ordered and reported as negative. Uncertain where patient was told he had Hepatitis C. No history of IVDA. No fever. Reports unable to eat because of nausea. Tolerating ETOH, last drink just PTA. Denies abd pain. No diarrhea. No vomiting. No bleeding. No dysuria. No cough. No known sick contacts.         Review of patient's allergies indicates:  No Known Allergies  Past Medical History:   Diagnosis Date    Hepatitis     Hypertension      No past surgical history on file.  No family history on file.  Social History     Tobacco Use    Smoking status: Not on file   Substance Use Topics    Alcohol use: Not on file    Drug use: Not on file     Review of Systems   Constitutional: Positive for fatigue. Negative for fever.   Respiratory: Negative.    Cardiovascular: Negative.    Gastrointestinal: Positive for nausea. Negative for abdominal distention, abdominal pain and diarrhea.   All other systems reviewed and are negative.      Physical Exam     Initial Vitals   BP Pulse Resp Temp SpO2   07/29/20 1950 07/29/20 1950 07/29/20 1950 07/30/20 0236 07/29/20 1950   (!) 75/45 92 16 98.9 °F (37.2 °C) (!) 92 %      MAP       --                Physical Exam    Nursing note and vitals reviewed.  Constitutional: He is not diaphoretic. No distress.   jaundiced   HENT:   Head: Normocephalic and atraumatic.   Mouth/Throat: No oropharyngeal exudate.   Eyes: Conjunctivae are normal. Right eye exhibits no discharge. Left eye exhibits no discharge. Scleral icterus is present.   Neck: Normal range of motion. Neck  supple. No JVD present.   Cardiovascular: Normal rate, regular rhythm, normal heart sounds and intact distal pulses.   Pulmonary/Chest: Breath sounds normal. No stridor. No respiratory distress. He has no wheezes. He has no rales.   Abdominal: Soft. Bowel sounds are normal. He exhibits no distension. There is no abdominal tenderness. There is no rebound.   Musculoskeletal: Normal range of motion. No tenderness or edema.   Neurological: He is alert and oriented to person, place, and time. GCS score is 15. GCS eye subscore is 4. GCS verbal subscore is 5. GCS motor subscore is 6.   Skin: Capillary refill takes less than 2 seconds. No rash noted. No erythema.         ED Course   Central Line    Date/Time: 7/30/2020 1:57 AM  Location procedure was performed: [facility] EMERGENCY DEPARTMENT  Performed by: Chetan Gibbons MD  Consent Done: Emergent Situation  Indications: vascular access and med administration  Anesthesia: local infiltration    Anesthesia:  Local Anesthetic: lidocaine 1% without epinephrine  Anesthetic total: 5 mL  Preparation: skin prepped with ChloraPrep  Skin prep agent dried: skin prep agent completely dried prior to procedure  Sterile barriers: all five maximum sterile barriers used - cap, mask, sterile gown, sterile gloves, and large sterile sheet  Hand hygiene: hand hygiene performed prior to central venous catheter insertion  Location details: right femoral  Site selection rationale: emergent  Catheter type: triple lumen  Catheter size: 7 Fr  Catheter Length: 15cm    Ultrasound guidance: no  Manometry: No   Number of attempts: 1  Complications: none  Post-procedure: line sutured,  chlorhexidine patch,  sterile dressing applied and blood return through all ports        Labs Reviewed   CBC W/ AUTO DIFFERENTIAL - Abnormal; Notable for the following components:       Result Value    WBC 16.92 (*)     RBC 2.74 (*)     Hemoglobin 10.1 (*)     Hematocrit 29.1 (*)     Mean Corpuscular Volume 106 (*)     Mean  Corpuscular Hemoglobin 36.9 (*)     RDW 16.3 (*)     Lymph% 8.0 (*)     All other components within normal limits   COMPREHENSIVE METABOLIC PANEL - Abnormal; Notable for the following components:    Sodium 124 (*)     Chloride 83 (*)     CO2 22 (*)     BUN, Bld 27 (*)     Creatinine 3.2 (*)     Calcium 7.2 (*)     Total Protein 5.0 (*)     Albumin 1.7 (*)     Total Bilirubin 10.4 (*)     Alkaline Phosphatase 587 (*)      (*)      (*)     Anion Gap 19 (*)     eGFR if  23.6 (*)     eGFR if non  20.4 (*)     All other components within normal limits   ALCOHOL,MEDICAL (ETHANOL) - Abnormal; Notable for the following components:    Alcohol, Medical, Serum 317 (*)     All other components within normal limits    Narrative:      Critical result(s) called and verbal readback obtained from Sarahy   in ER by [facility] 07/29/2020 21:02   URINALYSIS, REFLEX TO URINE CULTURE - Abnormal; Notable for the following components:    Appearance, UA Cloudy (*)     Protein, UA 1+ (*)     Bilirubin (UA) 3+ (*)     Nitrite, UA Positive (*)     Urobilinogen, UA 2.0-3.0 (*)     Leukocytes, UA Trace (*)     All other components within normal limits    Narrative:     Specimen Source->Urine   AMMONIA - Abnormal; Notable for the following components:    Ammonia <10 (*)     All other components within normal limits   LACTIC ACID, PLASMA - Abnormal; Notable for the following components:    Lactate (Lactic Acid) 4.5 (*)     All other components within normal limits    Narrative:      Critical result(s) called and verbal readback obtained from Mikey in   ER by [facility] 07/29/2020 22:26   LACTIC ACID, PLASMA - Abnormal; Notable for the following components:    Lactate (Lactic Acid) 2.9 (*)     All other components within normal limits   URINALYSIS MICROSCOPIC - Abnormal; Notable for the following components:    WBC, UA 8 (*)     Hyaline Casts, UA 21 (*)     All other components within normal limits    Narrative:      Specimen Source->Urine   CULTURE, BLOOD   CULTURE, BLOOD   PROTIME-INR   APTT   DRUG SCREEN PANEL, URINE EMERGENCY    Narrative:     Specimen Source->Urine   SARS-COV-2 RNA AMPLIFICATION, QUAL   TROPONIN I   LIPASE     EKG Readings: (Independently Interpreted)   Initial Reading: No STEMI. Rhythm: Normal Sinus Rhythm. Heart Rate: 93. Ectopy: No Ectopy. Clinical Impression: Normal Sinus Rhythm       Imaging Results          X-Ray Chest AP Portable (Final result)  Result time 07/29/20 23:28:12    Final result by Nickolas Badillo MD (07/29/20 23:28:12)                 Impression:      No acute finding detected.      Electronically signed by: Nickolas Badillo MD  Date:    07/29/2020  Time:    23:28             Narrative:    EXAMINATION:  XR CHEST AP PORTABLE    CLINICAL HISTORY:  Syncope and collapse    TECHNIQUE:  Portable AP chest    COMPARISON:  04/21/2020    FINDINGS:  No cardiac silhouette enlargement.  Hypoinflation of the lungs.  Moderate multilevel spondylosis.  Numerous old left-sided rib fractures.                                 Medical Decision Making:   Clinical Tests:   Lab Tests: Ordered and Reviewed  Radiological Study: Ordered and Reviewed  Medical Tests: Ordered and Reviewed  Sepsis Perfusion Assessment: I attest, a sepsis perfusion exam was performed within 6 hours of Septic Shock presentation, following fluid resuscitation.  ED Management:  Very difficulty IV access. Peripheral access via ultrasound possible but staff unable to locate longer gelco or midline kit. With BP worsening, decided to place urgent right femoral TLC for access. Labs demonstrate. Leukocytosis, markedly elevated bili, elevated AST>ALT. Suspected alcoholic hepatitis. Negative viral hepatitis panel on 7/20/20, when CT abd pelvis demonstrated hepatic steatosis. MELD score = 32, with a 52.6% 90 day mortality. Elevated lactic acid, with repeat pending. BP not significantly improved with 30cc IVF bolus. Mentation remains clear.     Discussed  with Dr Wheat, on call for IM, recommends transfer to tertiary care center for further care given acute hepatitis, roro, hypotension. No albumin available at this time. Will continue levophed with target MAP 65.                                  Clinical Impression:       ICD-10-CM ICD-9-CM   1. Acute alcoholic hepatitis  K70.10 571.1   2. Syncope  R55 780.2   3. Jaundice  R17 782.4   4. Hypotension, unspecified hypotension type  I95.9 458.9   5. Lactic acidosis  E87.2 276.2         Disposition:   Disposition: Transferred  Condition: Fair                        Chetan Gibbons MD  07/30/20 7041       Chetan Gibbons MD  07/30/20 6668

## 2020-07-30 NOTE — ED NOTES
titriation of Levophed to 0.06 mcg/kg/min d/t  Bp 79/48 Hr 83.     Ludmila Carlson RN  07/30/20 0110

## 2020-07-30 NOTE — ASSESSMENT & PLAN NOTE
- Serum creatinine 0.6 on 07/20, increased to 3.2 on 07/29  - Suspect MERLY in the setting of chronic liver disease 2/2 ETOH abuse  - Patient currently on Levo, will titrate to maintain MAPs above 70  - SBP prophylaxis not indicated at this time   - Will initiate treatment with midodrine, octreotide and albumin  - Repeat abdominal US pending

## 2020-07-30 NOTE — NURSING
Critical care team called and notified (Dr. Mcadams) pt. Has had black tarry stools. Will collect specimen and send for occult blood.

## 2020-07-30 NOTE — HPI
Patient is a 60 Yo male with PMH of HTN, Hep C, anxiety and chronic daily ETOH abuse who initially presented to an outside facility ([facility]) with a chief compliant of weakness. Patient reports that over the past month he has felt extremely tired and weak. He attributed this to chronic anxiety, for which he takes Xanxax BID. Reportedly patient ran out of his anxiety medication and went to see his PCP for a refill. At that time, his PCP told him to go to the hospital to get checked out.  Patient presented to [facility] and was worked up for acute hepatitis. Initial viral hepatitis panel was negative. Initial labs from [facility] indicate leukocytosis, elevated bilirubin, and elevated liver function test. Patient was also hypotensive, requiring pressors. ED staff at [facility] placed a right femoral central catheter for access.Patient was then transferred to [facility] for further care given acute hepatitis, MERLY and hypotension.

## 2020-07-30 NOTE — ASSESSMENT & PLAN NOTE
- Patient on Xanxax BID for chronic anxiety  - Xanax held, can add PRN in addition to CIWA protocol if necessary

## 2020-07-30 NOTE — PLAN OF CARE
PCP: Dr. Nielsen     Pharmacy: [address] [id number]  [phone]    Payor: None     SW contacted pt wife to complete assessment. Stated pt alert and oriented. Stated no use of DME prior to admit. Denied dialysis and coumadin clinic visits. Stated that she can provide pt with care if/when needed. Stated she will provide transport for pt at discharge.     No further needs at time of call.        07/30/20 1402   Discharge Assessment   Assessment Type Discharge Planning Assessment   Confirmed/corrected address and phone number on facesheet? Yes   Assessment information obtained from? Caregiver   Communicated expected length of stay with patient/caregiver no  (not yet determined)   Prior to hospitilization cognitive status: Alert/Oriented   Prior to hospitalization functional status: Independent   Current cognitive status: Alert/Oriented   Current Functional Status: Independent   Facility Arrived From: home   Lives With spouse   Is patient able to care for self after discharge? Unable to determine at this time (comments)   Who are your caregiver(s) and their phone number(s)? Wife; Dee Dee Salamanca; [phone]   Patient's perception of discharge disposition home or selfcare   Readmission Within the Last 30 Days unable to assess   Patient currently being followed by outpatient case management? Unable to determine (comments)   Patient currently receives any other outside agency services? No   Equipment Currently Used at Home none   Do you have any problems affording any of your prescribed medications? No   Is the patient taking medications as prescribed? yes   Does the patient have transportation home? Yes   Transportation Anticipated family or friend will provide   Dialysis Name and Scheduled days n/a   Does the patient receive services at the Coumadin Clinic? No   Discharge Plan A Home with family   Discharge Plan B Home   DME Needed Upon Discharge  none   Patient/Family in Agreement with Plan  yes       Vanita Rockwell [facility]  Case Management Social Worker   [facility]

## 2020-07-30 NOTE — ASSESSMENT & PLAN NOTE
- Patient has history of hypertension, on home lisinopril 20 mg  - Continue home meds when clinically appropriate

## 2020-07-30 NOTE — ASSESSMENT & PLAN NOTE
- Patient initially presented to outside facility with weakness, fatigue and history ETOH abuse.  - Labs at outside facility notable for leukocytosis, elevated bilirubin, and elevated liver function test  - Patient received 40 mg steroids at outside facility, will initiate stress dose steroids. Hydrocortisone 100 mg q8  - MELD score= 32 ( Cr 3.2, Bili 11.1, Sodium 126)  - Negative viral hepatitis panel on 07/20/20  - Ct Abd/pelvis 07/20 demonstrated hepatic steatosis  - WA protocol initiated, will monitor

## 2020-07-30 NOTE — H&P
[facility]  Critical Care Medicine  History & Physical    Patient Name: Kenneth Pardo  MRN: [id number]  Admission Date: 7/30/2020  Hospital Length of Stay: 0 days  Code Status: Full Code  Attending Physician: Raquel Espinosa MD   Primary Care Provider: Mikala Etienne MD   Principal Problem: Acute alcoholic hepatitis    Subjective:     HPI:  Patient is a 60 Yo male with PMH of HTN, Hep C, anxiety and chronic daily ETOH abuse who initially presented to an outside facility ([facility]) with a chief compliant of weakness. Patient reports that over the past month he has felt extremely tired and weak. He attributed this to chronic anxiety, for which he takes Xanxax BID. Reportedly patient ran out of his anxiety medication and went to see his PCP for a refill. At that time, his PCP told him to go to the hospital to get checked out.  Patient presented to [facility] and was worked up for acute hepatitis. Initial viral hepatitis panel was negative. Initial labs from [facility] indicate leukocytosis, elevated bilirubin, and elevated liver function test. Patient was also hypotensive, requiring pressors. ED staff at [facility] placed a right femoral central catheter for access.Patient was then transferred to [facility] for further care given acute hepatitis, MERLY and hypotension.             Hospital/ICU Course:  Patient seen and examined by myself and MICU team. Patient admits that he has been drinking several glasses of vodka for the past few months to help him sleep. He admits chronic anxiety and depression, unclear duration. He takes Xanax BID as mentioned previously and began to drink more once he ran out of his medication. Patient admits he drinks vodka, usually several glasses every night. Last drink was 07/28 per patient He denies any suicidal ideation or thoughts of self harm. He admits significant weakness, decreased appetite, fatigue and anxiety. Patent denies any recent fever, headache, cough, chest pain,  abdominal pain.  Patient placed on [facility] protocol for acute alcoholic hepatitis.     Past Medical History:   Diagnosis Date    Hepatitis     Hypertension        No past surgical history on file.    Review of patient's allergies indicates:  No Known Allergies    Family History     None        Tobacco Use    Smoking status: Not on file   Substance and Sexual Activity    Alcohol use: Not on file    Drug use: Not on file    Sexual activity: Not on file      Review of Systems   Constitutional: Positive for appetite change. Negative for chills, diaphoresis and fever.   Respiratory: Negative for cough, chest tightness and shortness of breath.    Cardiovascular: Negative for chest pain, palpitations and leg swelling.   Gastrointestinal: Positive for abdominal distention. Negative for abdominal pain, diarrhea, nausea and vomiting.   Genitourinary: Negative for difficulty urinating.   Neurological: Positive for tremors and weakness. Negative for dizziness, syncope and headaches.   Psychiatric/Behavioral: Negative for hallucinations, self-injury and suicidal ideas. The patient is nervous/anxious.      Objective:     Vital Signs (Most Recent):  Temp: 98.2 °F (36.8 °C) (07/30/20 1021)  Pulse: 107 (07/30/20 1030)  Resp: (!) 31 (07/30/20 1030)  BP: (!) 111/59 (07/30/20 1030)  SpO2: (!) 92 % (07/30/20 1030) Vital Signs (24h Range):  Temp:  [98.2 °F (36.8 °C)-99 °F (37.2 °C)] 98.2 °F (36.8 °C)  Pulse:  [] 107  Resp:  [16-31] 31  SpO2:  [92 %-100 %] 92 %  BP: ()/(34-82) 111/59   Weight: 94.3 kg (207 lb 14.3 oz)  There is no height or weight on file to calculate BMI.    No intake or output data in the 24 hours ending 07/30/20 1205    Physical Exam  Constitutional:       Appearance: Normal appearance.   HENT:      Head: Normocephalic and atraumatic.      Mouth/Throat:      Mouth: Mucous membranes are dry.   Eyes:      General: Scleral icterus present.      Extraocular Movements: Extraocular movements intact.       Pupils: Pupils are equal, round, and reactive to light.   Neck:      Musculoskeletal: Neck supple.   Cardiovascular:      Rate and Rhythm: Normal rate and regular rhythm.      Pulses: Normal pulses.      Heart sounds: No murmur.   Pulmonary:      Effort: Pulmonary effort is normal.      Breath sounds: No wheezing, rhonchi or rales.   Abdominal:      General: Bowel sounds are normal.      Palpations: Abdomen is soft.      Tenderness: There is no abdominal tenderness. There is no guarding.   Musculoskeletal:         General: No swelling.      Right lower leg: No edema.      Left lower leg: No edema.   Skin:     General: Skin is warm.      Coloration: Skin is jaundiced.      Findings: No erythema or rash.   Neurological:      Mental Status: He is alert. He is disoriented.         Vents:     Lines/Drains/Airways     Central Venous Catheter Line            Percutaneous Central Line Insertion/Assessment - Triple Lumen  right femoral vein;right femoral artery -- days              Significant Labs:    CBC/Anemia Profile:  Recent Labs   Lab 07/29/20 2025 07/30/20  1118   WBC 16.92* 17.67*   HGB 10.1* 8.6*   HCT 29.1* 26.4*    254   * 112*   RDW 16.3* 16.6*        Chemistries:  Recent Labs   Lab 07/29/20 2026   *   K 3.8   CL 83*   CO2 22*   BUN 27*   CREATININE 3.2*   CALCIUM 7.2*   ALBUMIN 1.7*   PROT 5.0*   BILITOT 10.4*   ALKPHOS 587*   *   *       All pertinent labs within the past 24 hours have been reviewed.    Significant Imaging: I have reviewed and interpreted all pertinent imaging results/findings within the past 24 hours.    Assessment/Plan:     Psychiatric  Anxiety  - Patient on Xanxax BID for chronic anxiety  - Xanax held, can add PRN in addition to CIWA protocol if necessary    ETOH abuse  - Patient has several month history of heavy alcohol use  - , , Alb 1.2 initially. MELD score 32  - Hepatomegaly with steatosis present on initial CT  - Patient counseled on  consequences of alcohol abuse    Cardiac/Vascular  Essential hypertension  - Patient has history of hypertension, on home lisinopril 20 mg  - Continue home meds when clinically appropriate    GI  * Acute alcoholic hepatitis  - Patient initially presented to outside facility with weakness, fatigue and history ETOH abuse.  - Labs at outside facility notable for leukocytosis, elevated bilirubin, and elevated liver function test  - Patient received 40 mg steroids at outside facility, will initiate stress dose steroids. Hydrocortisone 100 mg q8  - MELD score= 32 ( Cr 3.2, Bili 11.1, Sodium 126)  - Negative viral hepatitis panel on 07/20/20  - Ct Abd/pelvis 07/20 demonstrated hepatic steatosis  - CIWA protocol initiated, will monitor    Hepatorenal syndrome  - Serum creatinine 0.6 on 07/20, increased to 3.2 on 07/29  - Suspect MERLY in the setting of chronic liver disease 2/2 ETOH abuse  - Patient currently on Levo, will titrate to maintain MAPs above 70  - SBP prophylaxis not indicated at this time   - Will initiate treatment with midodrine, octreotide and albumin  - Repeat abdominal US pending       Critical secondary to Patient has a condition that poses threat to life and bodily function: alcoholic hepatitis     Critical care was time spent personally by me on the following activities: development of treatment plan with patient or surrogate and bedside caregivers, discussions with consultants, evaluation of patient's response to treatment, examination of patient, ordering and performing treatments and interventions, ordering and review of laboratory studies, ordering and review of radiographic studies, pulse oximetry, re-evaluation of patient's condition. This critical care time did not overlap with that of any other provider or involve time for any procedures.     Lucho Ortiz MD  Critical Care Medicine  [facility]

## 2020-07-30 NOTE — NURSING
Pt. Complained of feeling like he had a full bladder. He was only repeatedly emptying approximately 75 cc at a time of brown urine.   Bladder scanned and measured >950.   Gautam inserted per MD order.   Mr. Pardo states that he has been experiencing this urge with  Inability to void for a few days now.

## 2020-07-30 NOTE — ASSESSMENT & PLAN NOTE
- Patient has several month history of heavy alcohol use  - , , Alb 1.2 initially. MELD score 32  - Hepatomegaly with steatosis present on initial CT  - Patient counseled on consequences of alcohol abuse

## 2020-07-30 NOTE — ED NOTES
Report given Tamra Loza @ [facility]. Wants to be called when pt is departing from facility     Ludmila Carlson RN  07/30/20 0761

## 2020-07-30 NOTE — HOSPITAL COURSE
Patient seen and examined by myself and MICU team. Patient admits that he has been drinking several glasses of vodka for the past few months to help him sleep. He admits chronic anxiety and depression, unclear duration. He takes Xanax BID as mentioned previously and began to drink more once he ran out of his medication. Patient admits he drinks vodka, usually several glasses every night. Last drink was 07/28 per patient He denies any suicidal ideation or thoughts of self harm. He admits significant weakness, decreased appetite, fatigue and anxiety. Patent denies any recent fever, headache, cough, chest pain, abdominal pain.  Patient placed on CIWA protocol for acute alcoholic hepatitis.     Patient noted to have large, black tarry bowel movement on 07/30/20. Hgb trending down from 10.1 on 07/29 -> 8.6 on 07/30 -> 6.9 the evening of 07/30. Patient denied any hematemesis. Patient started on PPI and octreotide gtt, GI consulted for possible  GI bleed. Patient is s/p transfusion of 1 unit PRBCs on 07/30/20. EGD planned for 07/31/20.
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
## 2020-07-30 NOTE — TREATMENT PLAN
Hepatology Treatment Plan    Kenneth Pardo is a 59 y.o. male admitted to hospital 7/30/2020 (Hospital Day: 1) due to Acute alcoholic hepatitis.     Interval History  Notified by MICU of consult. Patient with alcohol use, HTN, HCV presenting with fatigue, transferred from OSH with acute hepatitis. Earlier today, noted to have large melanotic BM and hgb drop from 10.1 yesterday evening -> 8.6 earlier today -> 6.9 tonight. No hematemesis. Patient is A&Ox3 and in no distress. On levophed and dose has decreased since this morning. BUN 30 and Cr 2. Plts 177 and INR 1.3. No prior endoscopies.    Started on octreotide but HRS protocol dose. Not on PPI. On ceftriaxone.    Objective  Temp:  [98.2 °F (36.8 °C)-99 °F (37.2 °C)] 98.2 °F (36.8 °C) (07/30 1021)  Pulse:  [] 90 (07/30 1500)  BP: ()/(34-82) 110/70 (07/30 1500)  Resp:  [16-34] 19 (07/30 1500)  SpO2:  [92 %-100 %] 94 % (07/30 1500)      Laboratory    MELD-Na score: 29 at 7/30/2020  4:26 PM  MELD score: 25 at 7/30/2020  4:26 PM  Calculated from:  Serum Creatinine: 2.0 mg/dL at 7/30/2020  4:26 PM  Serum Sodium: 129 mmol/L at 7/30/2020  4:26 PM  Total Bilirubin: 10.2 mg/dL at 7/30/2020  4:26 PM  INR(ratio): 1.3 at 7/30/2020  4:26 PM  Age: 59 years 11 months    Recent Labs   Lab 07/29/20  2025 07/30/20  1118 07/30/20  1626   HGB 10.1* 8.6* 6.9*       Lab Results   Component Value Date    WBC 13.14 (H) 07/30/2020    HGB 6.9 (L) 07/30/2020    HCT 20.9 (L) 07/30/2020     (H) 07/30/2020     07/30/2020       Lab Results   Component Value Date     (L) 07/30/2020    K 3.6 07/30/2020    CL 89 (L) 07/30/2020    CO2 17 (L) 07/30/2020    BUN 29 (H) 07/30/2020    CREATININE 2.0 (H) 07/30/2020    CALCIUM 6.9 (LL) 07/30/2020    ANIONGAP 23 (H) 07/30/2020    ESTGFRAFRICA 41.6 (A) 07/30/2020    EGFRNONAA 36.0 (A) 07/30/2020       Lab Results   Component Value Date     (H) 07/30/2020     (H) 07/30/2020    ALKPHOS 421 (H) 07/30/2020    BILITOT  10.2 (H) 07/30/2020       Lab Results   Component Value Date    INR 1.3 (H) 07/30/2020    INR 1.2 07/30/2020    INR 1.1 07/29/2020       Plan  Upper GI bleed / Melena - requiring levophed but rate has decreased. Not yet started on PPI or appropriate dose octreotide. DDx PUD, gastritis, esophagitis, PHG or variceal bleed. No hematemesis.    - IVF boluses / pressors to maintain hemodynamics, normalize HR, and normalize BUN  - maintain active type/screen, transfuse for goal Hgb >7 ; do not overtransfuse as this can lead to increased portal pressures and increased risk of rebleed  - PPI 80mg IV bolus, then 8mg/hr gtt  - octreotide 50 mcg IV bolus, then 50mcg/hr gtt  - ceftriaxone 1g IV q24 hrs  - goal INR < 2.0, platelets > 50  - warrants EGD for further evaluation and management as soon as appropriate when adequately resuscitated. Plan for tomorrow.  - Maintain NPO status.   - please notify GI team with any acute clinical changes      Thank you for involving us in the care of Kenneth Pardo. Please call with any additional questions, concerns or changes in the patient's clinical status.    Adis Chou MD  Gastroenterology Fellow, PGY V

## 2020-07-31 ENCOUNTER — ANESTHESIA EVENT (OUTPATIENT)
Dept: ENDOSCOPY | Facility: HOSPITAL | Age: 60
DRG: 432 | End: 2020-07-31
Payer: MEDICAID

## 2020-07-31 ENCOUNTER — ANESTHESIA (OUTPATIENT)
Dept: ENDOSCOPY | Facility: HOSPITAL | Age: 60
DRG: 432 | End: 2020-07-31
Payer: MEDICAID

## 2020-07-31 PROBLEM — K92.2 UPPER GI BLEED: Status: ACTIVE | Noted: 2020-07-31

## 2020-07-31 LAB
ALBUMIN SERPL BCP-MCNC: 2.9 G/DL (ref 3.5–5.2)
ALP SERPL-CCNC: 376 U/L (ref 55–135)
ALT SERPL W/O P-5'-P-CCNC: 94 U/L (ref 10–44)
ANION GAP SERPL CALC-SCNC: 19 MMOL/L (ref 8–16)
APTT BLDCRRT: 44.6 SEC (ref 21–32)
AST SERPL-CCNC: 307 U/L (ref 10–40)
BASOPHILS # BLD AUTO: 0.01 K/UL (ref 0–0.2)
BASOPHILS NFR BLD: 0.1 % (ref 0–1.9)
BILIRUB DIRECT SERPL-MCNC: 8.5 MG/DL (ref 0.1–0.3)
BILIRUB SERPL-MCNC: 11.5 MG/DL (ref 0.1–1)
BUN SERPL-MCNC: 26 MG/DL (ref 6–20)
CA-I BLDV-SCNC: 0.82 MMOL/L (ref 1.06–1.42)
CA-I BLDV-SCNC: 0.96 MMOL/L (ref 1.06–1.42)
CA-I BLDV-SCNC: 1.06 MMOL/L (ref 1.06–1.42)
CA-I BLDV-SCNC: 1.08 MMOL/L (ref 1.06–1.42)
CALCIUM SERPL-MCNC: 7.3 MG/DL (ref 8.7–10.5)
CHLORIDE SERPL-SCNC: 91 MMOL/L (ref 95–110)
CO2 SERPL-SCNC: 20 MMOL/L (ref 23–29)
CREAT SERPL-MCNC: 1.3 MG/DL (ref 0.5–1.4)
DIFFERENTIAL METHOD: ABNORMAL
EOSINOPHIL # BLD AUTO: 0 K/UL (ref 0–0.5)
EOSINOPHIL # BLD AUTO: 0 K/UL (ref 0–0.5)
EOSINOPHIL # BLD AUTO: 0.1 K/UL (ref 0–0.5)
EOSINOPHIL NFR BLD: 0 % (ref 0–8)
EOSINOPHIL NFR BLD: 0.1 % (ref 0–8)
EOSINOPHIL NFR BLD: 0.7 % (ref 0–8)
ERYTHROCYTE [DISTWIDTH] IN BLOOD BY AUTOMATED COUNT: 20.4 % (ref 11.5–14.5)
ERYTHROCYTE [DISTWIDTH] IN BLOOD BY AUTOMATED COUNT: 20.4 % (ref 11.5–14.5)
ERYTHROCYTE [DISTWIDTH] IN BLOOD BY AUTOMATED COUNT: 20.6 % (ref 11.5–14.5)
EST. GFR  (AFRICAN AMERICAN): >60 ML/MIN/1.73 M^2
EST. GFR  (NON AFRICAN AMERICAN): >60 ML/MIN/1.73 M^2
GLUCOSE SERPL-MCNC: 191 MG/DL (ref 70–110)
HCT VFR BLD AUTO: 24 % (ref 40–54)
HCT VFR BLD AUTO: 24 % (ref 40–54)
HCT VFR BLD AUTO: 24.4 % (ref 40–54)
HGB BLD-MCNC: 7.8 G/DL (ref 14–18)
HGB BLD-MCNC: 7.9 G/DL (ref 14–18)
HGB BLD-MCNC: 8 G/DL (ref 14–18)
IMM GRANULOCYTES # BLD AUTO: 0.04 K/UL (ref 0–0.04)
IMM GRANULOCYTES # BLD AUTO: 0.05 K/UL (ref 0–0.04)
IMM GRANULOCYTES # BLD AUTO: 0.06 K/UL (ref 0–0.04)
IMM GRANULOCYTES NFR BLD AUTO: 0.4 % (ref 0–0.5)
IMM GRANULOCYTES NFR BLD AUTO: 0.5 % (ref 0–0.5)
IMM GRANULOCYTES NFR BLD AUTO: 0.6 % (ref 0–0.5)
INR PPP: 1.3 (ref 0.8–1.2)
LYMPHOCYTES # BLD AUTO: 0.4 K/UL (ref 1–4.8)
LYMPHOCYTES # BLD AUTO: 0.7 K/UL (ref 1–4.8)
LYMPHOCYTES # BLD AUTO: 1 K/UL (ref 1–4.8)
LYMPHOCYTES NFR BLD: 4.3 % (ref 18–48)
LYMPHOCYTES NFR BLD: 6.7 % (ref 18–48)
LYMPHOCYTES NFR BLD: 9.2 % (ref 18–48)
MAGNESIUM SERPL-MCNC: 1.9 MG/DL (ref 1.6–2.6)
MCH RBC QN AUTO: 35.1 PG (ref 27–31)
MCH RBC QN AUTO: 35.1 PG (ref 27–31)
MCH RBC QN AUTO: 35.3 PG (ref 27–31)
MCHC RBC AUTO-ENTMCNC: 32.5 G/DL (ref 32–36)
MCHC RBC AUTO-ENTMCNC: 32.8 G/DL (ref 32–36)
MCHC RBC AUTO-ENTMCNC: 32.9 G/DL (ref 32–36)
MCV RBC AUTO: 107 FL (ref 82–98)
MCV RBC AUTO: 107 FL (ref 82–98)
MCV RBC AUTO: 108 FL (ref 82–98)
MONOCYTES # BLD AUTO: 0.3 K/UL (ref 0.3–1)
MONOCYTES # BLD AUTO: 0.3 K/UL (ref 0.3–1)
MONOCYTES # BLD AUTO: 0.4 K/UL (ref 0.3–1)
MONOCYTES NFR BLD: 2.8 % (ref 4–15)
MONOCYTES NFR BLD: 3.1 % (ref 4–15)
MONOCYTES NFR BLD: 3.6 % (ref 4–15)
NEUTROPHILS # BLD AUTO: 9.1 K/UL (ref 1.8–7.7)
NEUTROPHILS # BLD AUTO: 9.4 K/UL (ref 1.8–7.7)
NEUTROPHILS # BLD AUTO: 9.4 K/UL (ref 1.8–7.7)
NEUTROPHILS NFR BLD: 86.7 % (ref 38–73)
NEUTROPHILS NFR BLD: 88.9 % (ref 38–73)
NEUTROPHILS NFR BLD: 92.1 % (ref 38–73)
NRBC BLD-RTO: 0 /100 WBC
PHOSPHATE SERPL-MCNC: 4.6 MG/DL (ref 2.7–4.5)
PLATELET # BLD AUTO: 119 K/UL (ref 150–350)
PLATELET # BLD AUTO: 131 K/UL (ref 150–350)
PLATELET # BLD AUTO: 140 K/UL (ref 150–350)
PMV BLD AUTO: 10.7 FL (ref 9.2–12.9)
PMV BLD AUTO: 10.9 FL (ref 9.2–12.9)
PMV BLD AUTO: 11.1 FL (ref 9.2–12.9)
POTASSIUM SERPL-SCNC: 4 MMOL/L (ref 3.5–5.1)
PROT SERPL-MCNC: 5.3 G/DL (ref 6–8.4)
PROTHROMBIN TIME: 14.2 SEC (ref 9–12.5)
RBC # BLD AUTO: 2.22 M/UL (ref 4.6–6.2)
RBC # BLD AUTO: 2.24 M/UL (ref 4.6–6.2)
RBC # BLD AUTO: 2.28 M/UL (ref 4.6–6.2)
SODIUM SERPL-SCNC: 130 MMOL/L (ref 136–145)
WBC # BLD AUTO: 10.24 K/UL (ref 3.9–12.7)
WBC # BLD AUTO: 10.44 K/UL (ref 3.9–12.7)
WBC # BLD AUTO: 10.51 K/UL (ref 3.9–12.7)

## 2020-07-31 PROCEDURE — 99232 PR SUBSEQUENT HOSPITAL CARE,LEVL II: ICD-10-PCS | Mod: 25,,, | Performed by: INTERNAL MEDICINE

## 2020-07-31 PROCEDURE — P9047 ALBUMIN (HUMAN), 25%, 50ML: HCPCS | Mod: JG | Performed by: STUDENT IN AN ORGANIZED HEALTH CARE EDUCATION/TRAINING PROGRAM

## 2020-07-31 PROCEDURE — 85025 COMPLETE CBC W/AUTO DIFF WBC: CPT | Mod: 91

## 2020-07-31 PROCEDURE — 97165 OT EVAL LOW COMPLEX 30 MIN: CPT

## 2020-07-31 PROCEDURE — 63600175 PHARM REV CODE 636 W HCPCS: Performed by: STUDENT IN AN ORGANIZED HEALTH CARE EDUCATION/TRAINING PROGRAM

## 2020-07-31 PROCEDURE — 83735 ASSAY OF MAGNESIUM: CPT

## 2020-07-31 PROCEDURE — 97161 PT EVAL LOW COMPLEX 20 MIN: CPT

## 2020-07-31 PROCEDURE — 99233 SBSQ HOSP IP/OBS HIGH 50: CPT | Mod: ,,, | Performed by: INTERNAL MEDICINE

## 2020-07-31 PROCEDURE — 82330 ASSAY OF CALCIUM: CPT | Mod: 91

## 2020-07-31 PROCEDURE — 94761 N-INVAS EAR/PLS OXIMETRY MLT: CPT

## 2020-07-31 PROCEDURE — 82330 ASSAY OF CALCIUM: CPT

## 2020-07-31 PROCEDURE — 99232 SBSQ HOSP IP/OBS MODERATE 35: CPT | Mod: 25,,, | Performed by: INTERNAL MEDICINE

## 2020-07-31 PROCEDURE — 37000008 HC ANESTHESIA 1ST 15 MINUTES: Performed by: INTERNAL MEDICINE

## 2020-07-31 PROCEDURE — 25000003 PHARM REV CODE 250: Performed by: NURSE ANESTHETIST, CERTIFIED REGISTERED

## 2020-07-31 PROCEDURE — 37000009 HC ANESTHESIA EA ADD 15 MINS: Performed by: INTERNAL MEDICINE

## 2020-07-31 PROCEDURE — 25000003 PHARM REV CODE 250: Performed by: STUDENT IN AN ORGANIZED HEALTH CARE EDUCATION/TRAINING PROGRAM

## 2020-07-31 PROCEDURE — 80048 BASIC METABOLIC PNL TOTAL CA: CPT

## 2020-07-31 PROCEDURE — 11000001 HC ACUTE MED/SURG PRIVATE ROOM

## 2020-07-31 PROCEDURE — 87040 BLOOD CULTURE FOR BACTERIA: CPT

## 2020-07-31 PROCEDURE — 85610 PROTHROMBIN TIME: CPT

## 2020-07-31 PROCEDURE — D9220A PRA ANESTHESIA: Mod: ANES,,, | Performed by: SURGERY

## 2020-07-31 PROCEDURE — C9113 INJ PANTOPRAZOLE SODIUM, VIA: HCPCS | Performed by: STUDENT IN AN ORGANIZED HEALTH CARE EDUCATION/TRAINING PROGRAM

## 2020-07-31 PROCEDURE — 43235 EGD DIAGNOSTIC BRUSH WASH: CPT | Performed by: INTERNAL MEDICINE

## 2020-07-31 PROCEDURE — 63600175 PHARM REV CODE 636 W HCPCS: Performed by: NURSE ANESTHETIST, CERTIFIED REGISTERED

## 2020-07-31 PROCEDURE — D9220A PRA ANESTHESIA: ICD-10-PCS | Mod: ANES,,, | Performed by: SURGERY

## 2020-07-31 PROCEDURE — 84100 ASSAY OF PHOSPHORUS: CPT

## 2020-07-31 PROCEDURE — 43235 EGD DIAGNOSTIC BRUSH WASH: CPT | Mod: ,,, | Performed by: INTERNAL MEDICINE

## 2020-07-31 PROCEDURE — D9220A PRA ANESTHESIA: Mod: CRNA,,, | Performed by: NURSE ANESTHETIST, CERTIFIED REGISTERED

## 2020-07-31 PROCEDURE — 97535 SELF CARE MNGMENT TRAINING: CPT

## 2020-07-31 PROCEDURE — 85730 THROMBOPLASTIN TIME PARTIAL: CPT

## 2020-07-31 PROCEDURE — 43235 PR EGD, FLEX, DIAGNOSTIC: ICD-10-PCS | Mod: ,,, | Performed by: INTERNAL MEDICINE

## 2020-07-31 PROCEDURE — D9220A PRA ANESTHESIA: ICD-10-PCS | Mod: CRNA,,, | Performed by: NURSE ANESTHETIST, CERTIFIED REGISTERED

## 2020-07-31 PROCEDURE — 80076 HEPATIC FUNCTION PANEL: CPT

## 2020-07-31 PROCEDURE — 99233 PR SUBSEQUENT HOSPITAL CARE,LEVL III: ICD-10-PCS | Mod: ,,, | Performed by: INTERNAL MEDICINE

## 2020-07-31 RX ORDER — PANTOPRAZOLE SODIUM 40 MG/1
40 TABLET, DELAYED RELEASE ORAL DAILY
Status: DISCONTINUED | OUTPATIENT
Start: 2020-07-31 | End: 2020-08-09 | Stop reason: HOSPADM

## 2020-07-31 RX ORDER — LIDOCAINE HYDROCHLORIDE 20 MG/ML
INJECTION INTRAVENOUS
Status: DISCONTINUED | OUTPATIENT
Start: 2020-07-31 | End: 2020-07-31

## 2020-07-31 RX ORDER — CEFTRIAXONE 1 G/1
1 INJECTION, POWDER, FOR SOLUTION INTRAMUSCULAR; INTRAVENOUS DAILY
Status: DISCONTINUED | OUTPATIENT
Start: 2020-07-31 | End: 2020-07-31

## 2020-07-31 RX ORDER — MIDAZOLAM HYDROCHLORIDE 1 MG/ML
INJECTION, SOLUTION INTRAMUSCULAR; INTRAVENOUS
Status: DISCONTINUED | OUTPATIENT
Start: 2020-07-31 | End: 2020-07-31

## 2020-07-31 RX ORDER — PROPOFOL 10 MG/ML
VIAL (ML) INTRAVENOUS
Status: DISCONTINUED | OUTPATIENT
Start: 2020-07-31 | End: 2020-07-31

## 2020-07-31 RX ORDER — KETAMINE HCL IN 0.9 % NACL 50 MG/5 ML
SYRINGE (ML) INTRAVENOUS
Status: DISCONTINUED | OUTPATIENT
Start: 2020-07-31 | End: 2020-07-31

## 2020-07-31 RX ADMIN — PROPOFOL 30 MG: 10 INJECTION, EMULSION INTRAVENOUS at 02:07

## 2020-07-31 RX ADMIN — DEXTROSE MONOHYDRATE 8 MG/HR: 50 INJECTION, SOLUTION INTRAVENOUS at 11:07

## 2020-07-31 RX ADMIN — DEXTROSE MONOHYDRATE 8 MG/HR: 50 INJECTION, SOLUTION INTRAVENOUS at 01:07

## 2020-07-31 RX ADMIN — PANTOPRAZOLE SODIUM 40 MG: 40 TABLET, DELAYED RELEASE ORAL at 05:07

## 2020-07-31 RX ADMIN — SODIUM CHLORIDE, SODIUM GLUCONATE, SODIUM ACETATE, POTASSIUM CHLORIDE, MAGNESIUM CHLORIDE, SODIUM PHOSPHATE, DIBASIC, AND POTASSIUM PHOSPHATE: .53; .5; .37; .037; .03; .012; .00082 INJECTION, SOLUTION INTRAVENOUS at 01:07

## 2020-07-31 RX ADMIN — MIDODRINE HYDROCHLORIDE 10 MG: 5 TABLET ORAL at 10:07

## 2020-07-31 RX ADMIN — HYDROCORTISONE SODIUM SUCCINATE 100 MG: 100 INJECTION, POWDER, FOR SOLUTION INTRAMUSCULAR; INTRAVENOUS at 03:07

## 2020-07-31 RX ADMIN — OCTREOTIDE ACETATE 50 MCG/HR: 1000 INJECTION, SOLUTION INTRAVENOUS; SUBCUTANEOUS at 02:07

## 2020-07-31 RX ADMIN — HYDROCORTISONE SODIUM SUCCINATE 100 MG: 100 INJECTION, POWDER, FOR SOLUTION INTRAMUSCULAR; INTRAVENOUS at 06:07

## 2020-07-31 RX ADMIN — PROPOFOL 20 MG: 10 INJECTION, EMULSION INTRAVENOUS at 02:07

## 2020-07-31 RX ADMIN — MIDODRINE HYDROCHLORIDE 10 MG: 5 TABLET ORAL at 05:07

## 2020-07-31 RX ADMIN — ALBUMIN (HUMAN) 25 G: 12.5 SOLUTION INTRAVENOUS at 08:07

## 2020-07-31 RX ADMIN — DEXTROSE MONOHYDRATE 8 MG/HR: 50 INJECTION, SOLUTION INTRAVENOUS at 06:07

## 2020-07-31 RX ADMIN — MIDODRINE HYDROCHLORIDE 10 MG: 5 TABLET ORAL at 08:07

## 2020-07-31 RX ADMIN — MIDAZOLAM HYDROCHLORIDE 1 MG: 1 INJECTION, SOLUTION INTRAMUSCULAR; INTRAVENOUS at 02:07

## 2020-07-31 RX ADMIN — PROPOFOL 70 MG: 10 INJECTION, EMULSION INTRAVENOUS at 02:07

## 2020-07-31 RX ADMIN — CALCIUM GLUCONATE 3 G: 98 INJECTION, SOLUTION INTRAVENOUS at 06:07

## 2020-07-31 RX ADMIN — PROPOFOL 10 MG: 10 INJECTION, EMULSION INTRAVENOUS at 02:07

## 2020-07-31 RX ADMIN — Medication 10 MG: at 02:07

## 2020-07-31 RX ADMIN — CALCIUM GLUCONATE 1 G: 98 INJECTION, SOLUTION INTRAVENOUS at 05:07

## 2020-07-31 RX ADMIN — CEFTRIAXONE SODIUM 1 G: 1 INJECTION, POWDER, FOR SOLUTION INTRAMUSCULAR; INTRAVENOUS at 08:07

## 2020-07-31 RX ADMIN — OCTREOTIDE ACETATE 50 MCG/HR: 1000 INJECTION, SOLUTION INTRAVENOUS; SUBCUTANEOUS at 11:07

## 2020-07-31 RX ADMIN — CALCIUM GLUCONATE 2 G: 98 INJECTION, SOLUTION INTRAVENOUS at 11:07

## 2020-07-31 RX ADMIN — LIDOCAINE HYDROCHLORIDE 100 MG: 20 INJECTION, SOLUTION INTRAVENOUS at 02:07

## 2020-07-31 NOTE — ANESTHESIA POSTPROCEDURE EVALUATION
Anesthesia Post Evaluation    Patient: Kenneth Pardo    Procedure(s) Performed: Procedure(s) (LRB):  EGD (ESOPHAGOGASTRODUODENOSCOPY) (N/A)    Final Anesthesia Type: general    Patient location during evaluation: PACU  Patient participation: Yes- Able to Participate  Level of consciousness: awake and alert  Post-procedure vital signs: reviewed and stable  Pain management: adequate  Airway patency: patent    PONV status at discharge: No PONV  Anesthetic complications: no      Cardiovascular status: blood pressure returned to baseline  Respiratory status: unassisted and spontaneous ventilation  Hydration status: euvolemic  Follow-up not needed.          Vitals Value Taken Time   /76 07/31/20 1426   Temp 37.1 °C (98.7 °F) 07/31/20 1100   Pulse 80 07/31/20 1427   Resp 20 07/31/20 1427   SpO2 97 % 07/31/20 1427   Vitals shown include unvalidated device data.      No case tracking events are documented in the log.      Pain/Nereida Score: No data recorded

## 2020-07-31 NOTE — ANESTHESIA PREPROCEDURE EVALUATION
[facility]  Anesthesia Pre-Operative Evaluation         Patient Name: Kenneth Pardo  YOB: 1960  MRN: [id number]    SUBJECTIVE:     Pre-operative evaluation for Procedure(s) (LRB):  EGD (ESOPHAGOGASTRODUODENOSCOPY) (N/A)     07/31/2020    Kenneth Pardo is a 59 y.o. male w/ a significant PMHx of HTN, Hep C, anxiety and chronic daily ETOH abuse. Initial labs from [facility] indicate leukocytosis, elevated bilirubin, and elevated liver function test. Patient was also hypotensive, requiring pressors. ED staff at [facility] placed a right femoral central catheter for access.Patient was then transferred to [facility] for further care given acute hepatitis, MERLY and hypotension. No problems with prior anesthesia- heavy EtOH use. Concern for GI bleed given melena. S/p u 1 unit prbc. Weaned off pressors. Drinks 8 oz of vodka daily and smokes 1.5 packs per day of cigarettes. METS<4- reports wife has to help him with activities of daily living.     Patient now presents for the above procedure(s).    LDA:   Percutaneous Central Line Insertion/Assessment - Triple Lumen  right femoral vein;right femoral artery (Active)   Site Assessment No redness;No swelling;No drainage;No warmth 07/31/20 0701   Line Securement Device Secured with sutures 07/31/20 0701   Dressing Type Biopatch in place 07/31/20 1100   Dressing Status Clean;Dry;Intact 07/31/20 1100   Dressing Intervention Integrity maintained 07/31/20 1100   Date on Dressing 07/30/20 07/31/20 0701   Dressing Due to be Changed 08/06/20 07/31/20 0701   Distal Patency/Care flushed w/o difficulty 07/31/20 1100   Medial 1 Patency/Care flushed w/o difficulty 07/31/20 1100   Proximal 1 Patency/Care flushed w/o difficulty 07/31/20 1100   Line Necessity Review Hemodynamic instability 07/31/20 0701   Number of days:             Peripheral IV - Single Lumen 07/30/20 1609 20 G;1 3/4 in Right;Anterior Forearm (Active)   Site Assessment Clean;Dry;Intact;No redness;No  "swelling 07/31/20 1100   Line Status Flushed 07/31/20 1100   Dressing Status Clean;Dry;Intact 07/31/20 1100   Dressing Intervention Integrity maintained 07/31/20 1100   Dressing Change Due 08/03/20 07/31/20 1100   Site Change Due 08/03/20 07/31/20 0701   Reason Not Rotated Not due 07/31/20 1100   Number of days: 0            Urethral Catheter 07/30/20 1720 16 Fr. (Active)   Site Assessment Clean;Intact 07/31/20 1100   Collection Container Urimeter 07/31/20 1100   Securement Method secured to top of thigh w/ adhesive device 07/31/20 1100   Catheter Care Performed yes 07/31/20 1100   Reason for Continuing Urinary Catheterization Critically ill in ICU requiring intensive monitoring 07/31/20 1100   CAUTI Prevention Bundle StatLock in place w 1" slack 07/31/20 0701   Output (mL) 30 mL 07/31/20 1200   Number of days: 0       Prev airway: None documented.    Drips:    norepinephrine bitartrate-D5W Stopped (07/30/20 2100)    octreotide (SANDOSTATIN) infusion 50 mcg/hr (07/31/20 1200)    pantoprozole (PROTONIX) IV infusion 8 mg/hr (07/31/20 1200)       Patient Active Problem List   Diagnosis    Acute alcoholic hepatitis    Essential hypertension    Hepatorenal syndrome    ETOH abuse    Anxiety    Upper GI bleed       Review of patient's allergies indicates:  No Known Allergies    Current Inpatient Medications:   albumin human 25%  25 g Intravenous BID    cefTRIAXone (ROCEPHIN) IVPB  1 g Intravenous Daily    hydrocortisone sodium succinate  100 mg Intravenous Q8H    midodrine  10 mg Oral TID       No current facility-administered medications on file prior to encounter.      Current Outpatient Medications on File Prior to Encounter   Medication Sig Dispense Refill    ALPRAZolam (XANAX) 1 MG tablet Take 1 mg by mouth every 8 (eight) hours as needed for Anxiety.      lisinopriL (PRINIVIL,ZESTRIL) 20 MG tablet Take 20 mg by mouth once daily.         No past surgical history on file.    Social History "     Socioeconomic History    Marital status:      Spouse name: Not on file    Number of children: Not on file    Years of education: Not on file    Highest education level: Not on file   Occupational History    Not on file   Social Needs    Financial resource strain: Not on file    Food insecurity     Worry: Not on file     Inability: Not on file    Transportation needs     Medical: Not on file     Non-medical: Not on file   Tobacco Use    Smoking status: Not on file   Substance and Sexual Activity    Alcohol use: Not on file    Drug use: Not on file    Sexual activity: Not on file   Lifestyle    Physical activity     Days per week: Not on file     Minutes per session: Not on file    Stress: Not on file   Relationships    Social connections     Talks on phone: Not on file     Gets together: Not on file     Attends Muslim service: Not on file     Active member of club or organization: Not on file     Attends meetings of clubs or organizations: Not on file     Relationship status: Not on file   Other Topics Concern    Not on file   Social History Narrative    Not on file       OBJECTIVE:     Vital Signs Range (Last 24H):  Temp:  [36.7 °C (98 °F)-37.1 °C (98.7 °F)]   Pulse:  []   Resp:  [15-32]   BP: ()/(58-86)   SpO2:  [90 %-97 %]       Significant Labs:  Lab Results   Component Value Date    WBC 10.51 07/31/2020    HGB 8.0 (L) 07/31/2020    HCT 24.4 (L) 07/31/2020     (L) 07/31/2020    ALT 94 (H) 07/31/2020     (H) 07/31/2020     (L) 07/31/2020    K 4.0 07/31/2020    CL 91 (L) 07/31/2020    CREATININE 1.3 07/31/2020    BUN 26 (H) 07/31/2020    CO2 20 (L) 07/31/2020    INR 1.3 (H) 07/31/2020       Diagnostic Studies: No relevant studies.    EKG: No results found for this or any previous visit.    ECHOCARDIOGRAM:  TTE:  No results found for this or any previous visit.        ASSESSMENT/PLAN:         Anesthesia Evaluation    I have reviewed the Patient Summary  Reports.    I have reviewed the Nursing Notes. I have reviewed the NPO Status.   I have reviewed the Medications.     Review of Systems  Anesthesia Hx:  No problems with previous Anesthesia    Social:  Alcohol Use    Hematology/Oncology:         -- Anemia:   Cardiovascular:   Hypertension  Functional Capacity very limited / < 3 METS    Renal/:   Chronic Renal Disease    Hepatic/GI:   Liver Disease, Hepatitis    Psych:   Psychiatric History          Physical Exam  General:  Well nourished    Airway/Jaw/Neck:  Airway Findings: Mouth Opening: Normal General Airway Assessment: Adult  Mallampati: IV  TM Distance: Normal, at least 6 cm  Jaw/Neck Findings:     Neck ROM: Normal ROM      Dental:  Dental Findings: In tact, Periodontal disease, Severe    Chest/Lungs:  Chest/Lungs Findings: Clear to auscultation     Heart/Vascular:  Heart Findings: Rate: Normal  Rhythm: Regular Rhythm  Sounds: Normal        Mental Status:  Mental Status Findings:  Cooperative, Alert and Oriented         Anesthesia Plan  Type of Anesthesia, risks & benefits discussed:  Anesthesia Type:  general, MAC  Patient's Preference:   Intra-op Monitoring Plan: standard ASA monitors  Intra-op Monitoring Plan Comments:   Post Op Pain Control Plan: multimodal analgesia and IV/PO Opioids PRN  Post Op Pain Control Plan Comments:   Induction:   IV  Beta Blocker:  Patient is not currently on a Beta-Blocker (No further documentation required).       Informed Consent: Patient understands risks and agrees with Anesthesia plan.  Questions answered. Anesthesia consent signed with patient.  ASA Score: 4     Day of Surgery Review of History & Physical:    H&P update referred to the surgeon.         Ready For Surgery From Anesthesia Perspective.

## 2020-07-31 NOTE — SUBJECTIVE & OBJECTIVE
"Past Medical History:   Diagnosis Date    Hepatitis     Hypertension        No past surgical history on file.    Review of patient's allergies indicates:  No Known Allergies  Family History     None        Tobacco Use    Smoking status: Not on file   Substance and Sexual Activity    Alcohol use: Not on file    Drug use: Not on file    Sexual activity: Not on file     Review of Systems   Constitutional: Positive for appetite change. Negative for chills, diaphoresis and fever.   HENT: Negative for congestion.    Respiratory: Negative for cough, chest tightness and shortness of breath.    Cardiovascular: Negative for chest pain, palpitations and leg swelling.   Gastrointestinal: Positive for abdominal distention and blood in stool (reported "dark brown" stools.). Negative for abdominal pain, diarrhea, nausea and vomiting.   Genitourinary: Negative for difficulty urinating.   Neurological: Positive for weakness. Negative for dizziness, tremors, seizures and syncope.   Psychiatric/Behavioral: Negative for hallucinations, self-injury and suicidal ideas. The patient is nervous/anxious.      Objective:     Vital Signs (Most Recent):  Temp: 98.5 °F (36.9 °C) (07/31/20 0701)  Pulse: 88 (07/31/20 0800)  Resp: (!) 23 (07/31/20 0800)  BP: 139/83 (07/31/20 0800)  SpO2: 96 % (07/31/20 0800) Vital Signs (24h Range):  Temp:  [98 °F (36.7 °C)-98.6 °F (37 °C)] 98.5 °F (36.9 °C)  Pulse:  [] 88  Resp:  [15-34] 23  SpO2:  [90 %-97 %] 96 %  BP: ()/(55-83) 139/83     Weight: 91.5 kg (201 lb 11.5 oz) (07/31/20 0248)  There is no height or weight on file to calculate BMI.      Intake/Output Summary (Last 24 hours) at 7/31/2020 0840  Last data filed at 7/31/2020 0800  Gross per 24 hour   Intake 1199.53 ml   Output 3055 ml   Net -1855.47 ml       Lines/Drains/Airways     Central Venous Catheter Line            Percutaneous Central Line Insertion/Assessment - Triple Lumen  right femoral vein;right femoral artery -- days       "    Drain                 Urethral Catheter 07/30/20 1720 16 Fr. less than 1 day          Peripheral Intravenous Line                 Peripheral IV - Single Lumen 07/30/20 1609 20 G;1 3/4 in Right;Anterior Forearm less than 1 day                Physical Exam  Constitutional:       General: He is not in acute distress.     Appearance: Normal appearance. He is not diaphoretic.   HENT:      Head: Normocephalic and atraumatic.      Mouth/Throat:      Mouth: Mucous membranes are dry.   Eyes:      General: Scleral icterus present.      Extraocular Movements: Extraocular movements intact.      Pupils: Pupils are equal, round, and reactive to light.   Neck:      Musculoskeletal: Neck supple.   Cardiovascular:      Rate and Rhythm: Normal rate and regular rhythm.      Pulses: Normal pulses.      Heart sounds: No murmur.   Pulmonary:      Effort: Pulmonary effort is normal.      Breath sounds: No wheezing, rhonchi or rales.   Abdominal:      General: Bowel sounds are normal.      Palpations: Abdomen is soft.      Tenderness: There is no abdominal tenderness. There is no guarding.   Genitourinary:     Comments:  BRIAN did not show blood or melena.  Musculoskeletal:         General: No swelling.      Right lower leg: No edema.      Left lower leg: No edema.   Skin:     General: Skin is warm.      Coloration: Skin is jaundiced.      Findings: No erythema or rash.   Neurological:      Mental Status: He is alert. He is disoriented.         Significant Labs:  CBC:   Recent Labs   Lab 07/30/20  1810 07/30/20  2306 07/31/20  0244   WBC 12.19 10.40 10.24   HGB 6.7* 7.9* 7.8*   HCT 20.8* 24.3* 24.0*    132* 131*     CMP:   Recent Labs   Lab 07/31/20  0244   *   CALCIUM 7.3*   ALBUMIN 2.9*   PROT 5.3*   *   K 4.0   CO2 20*   CL 91*   BUN 26*   CREATININE 1.3   ALKPHOS 376*   ALT 94*   *   BILITOT 11.5*     Coagulation:   Recent Labs   Lab 07/31/20  0244   INR 1.3*   APTT 44.6*       Significant Imaging:  Imaging  results within the past 24 hours have been reviewed.     US Liver w/ Doppler:  Satisfactory Doppler evaluation of the visualized vascular structures.   Hepatomegaly and hepatic steatosis.   Biliary sludge and gallbladder wall thickening, likely due to underlying hepatic dysfunction.     CT Abdomen Pelvis:  1. Hepatomegaly with steatosis.   2. Colonic diverticulosis.   3. Bowel wall thickening involving ascending colon and transverse colon to a   lesser degree, without adjacent fat stranding, may reflect sequela of chronic   colitis.   4. No free fluid, free air or abscess.

## 2020-07-31 NOTE — PT/OT/SLP EVAL
Physical Therapy Evaluation    Patient Name:  Kenneth Pardo   MRN:  [id number]    Recommendations:     Discharge Recommendations:  home health PT   Discharge Equipment Recommendations: (will determine DME needs closer to discharge.)   Barriers to discharge: None    Assessment:     Kenneth Pardo is a 59 y.o. male admitted with a medical diagnosis of Acute alcoholic hepatitis.  He presents with the following impairments/functional limitations:  impaired endurance, impaired functional mobilty, gait instability, impaired balance pt jovanna treatment fair but had decreased motivation to participate in therapy. Pt will benefit skilled PT 3x/wk to progress physically. Pt should be able to discharge home with HHPT when medically stable.     Rehab Prognosis: Good; patient would benefit from acute skilled PT services to address these deficits and reach maximum level of function.    Recent Surgery: Procedure(s) (LRB):  EGD (ESOPHAGOGASTRODUODENOSCOPY) (N/A) Day of Surgery    Plan:     During this hospitalization, patient to be seen 3 x/week to address the identified rehab impairments via gait training, therapeutic activities and progress toward the following goals:    · Plan of Care Expires:  08/29/20    Subjective     Chief Complaint: pt stated that he felt weak.   Patient/Family Comments/goals:  To get better and go home.   Pain/Comfort:  · Pain Rating 1: 0/10  · Pain Rating Post-Intervention 1: 0/10    Patients cultural, spiritual, Quaker conflicts given the current situation: no    Living Environment:  Pt is unemployed and lives with his wife in mobile home with 3 steps and no handrail to entrance.   Prior to admission, patients level of function was Independent .  Equipment used at home: none.  DME owned (not currently used): none.  Upon discharge, patient will have assistance from wife.    Objective:     Communicated with nurse prior to session.  Patient found supine with telemetry, blood pressure cuff, pulse ox  (continuous), SCD, sims catheter(femoral central line)  upon PT entry to room.    General Precautions: Standard, fall   Orthopedic Precautions:    Braces:       Exams:  · Cognitive Exam:  Patient is oriented to Person, Place, Time and Situation  · RLE ROM: WFL  · RLE Strength: 4/5 for major muscle groups  · LLE ROM: WFL  · LLE Strength: Deficits: 4/5 for major muscle groups    Functional Mobility:  · Bed Mobility:   Pt needed verbal cues for hand placement and sequencing for functional mobility.   · Rolling Right: minimum assistance  · Supine to Sit: minimum assistance  ·   · Transfers:     · Sit to Stand:  minimum assistance with hand-held assist  ·   · Gait: pt received gait training 3 side steps to head of bed with min assist.     Therapeutic Activities and Exercises:   pt received verbal instruction in role of PT and POC. Pt verbally expressed understanding of such.     AM-PAC 6 CLICK MOBILITY  Total Score:17     Patient left supine with all lines intact and call button in reach. RN wanted pt in bed for test.     GOALS:   Multidisciplinary Problems     Physical Therapy Goals        Problem: Physical Therapy Goal    Goal Priority Disciplines Outcome Goal Variances Interventions   Physical Therapy Goal     PT, PT/OT Ongoing, Progressing     Description: Goals to be met by: 20    Patient will increase functional independence with mobility by performin. Supine to sit with Stand-by Assistance -not met  2. Sit to stand transfer with Stand-by Assistance -not met  3. Gait  x 150 feet with Supervision using AD if needed. -not met  4. Ascend/descend 3 stair with no Handrails Stand-by Assistance  -not met                     History:     Past Medical History:   Diagnosis Date    Hepatitis     Hypertension        History reviewed. No pertinent surgical history.    Time Tracking:     PT Received On: 20  PT Start Time: 941     PT Stop Time: 955  PT Total Time (min): 14 min     Billable Minutes:  Evaluation 14 min      Christin Parsons, PT  07/31/2020

## 2020-07-31 NOTE — ASSESSMENT & PLAN NOTE
- Patient initially presented to outside facility with weakness, fatigue and history ETOH abuse.  - Labs at outside facility notable for leukocytosis, elevated bilirubin, and elevated liver function test  - Patient received 40 mg steroids at outside facility, will initiate stress dose steroids. Hydrocortisone 100 mg q8  - MELD score= 32 ( Cr 3.2, Bili 11.1, Sodium 126)  - Negative viral hepatitis panel on 07/20/20  - Ct Abd/pelvis 07/20 demonstrated hepatic steatosis  - WA protocol initiated  - US Abd on 07/30 demonstrates hepatomegaly and steatosis   - Patient started on Ceftriaxone 1g daily for SBP prophylaxis

## 2020-07-31 NOTE — PLAN OF CARE
CMICU DAILY GOALS       A: Awake    RASS: Goal - RASS Goal: 0-->alert and calm  Actual - RASS (Hazel Agitation-Sedation Scale): 0-->alert and calm   Restraint necessity:    B: Breath   SBT: Not intubated   C: Coordinate A & B, analgesics/sedatives   Pain: managed    SAT: Not intubated  D: Delirium   CAM-ICU:    E: Early(intubated/ Progressive (non-intubated) Mobility   MOVE Screen: Pass   Activity: Activity Management: activity adjusted per tolerance, activity clustered for rest period  FAS: Feeding/Nutrition   Diet order: Diet/Nutrition Received: NPO,   Fluid restriction:    T: Thrombus   DVT prophylaxis: VTE Required Core Measure: (SCDs) Sequential compression device initiated/maintained  H: HOB Elevation   Head of Bed (HOB): HOB at 20-30 degrees  U: Ulcer Prophylaxis   GI: yes  G: Glucose control   team needs to put in Accu checks    S: Skin   Bundle compliance: yes   Bathing/Skin Care: bath, complete, bath, chlorhexidine, back care Date:   B: Bowel Function   diarrhea   I: Indwelling Catheters   Gautam necessity:      Urethral Catheter 07/30/20 1720 16 Fr.-Reason for Continuing Urinary Catheterization: Urinary retention, Critically ill in ICU requiring intensive monitoring   CVC necessity: Yes   IPAD offered: Not appropriate  D: De-escalation Antibx   No  Plan for the day   Pt NPO since midnight, plan to have EGD during day shift, 1 unit of blood given over night and replaced multiple electrolytes, WCTM and replace per PRN MAR   Family/Goals of care/Code Status   Code Status: Full Code     No acute events throughout day, VS and assessment per flow sheet, patient progressing towards goals as tolerated, plan of care reviewed with Kenneth Pardo and family, all concerns addressed, will continue to monitor.

## 2020-07-31 NOTE — CONSULTS
"[facility]  Gastroenterology  Consult Note    Patient Name: Kenneth Pardo  MRN: [id number]  Admission Date: 7/30/2020  Hospital Length of Stay: 1 days  Code Status: Full Code   Attending Provider: Alina Forrester MD   Consulting Provider: Du Tabares MD  Primary Care Physician: Mikala Etienne MD  Principal Problem:Acute alcoholic hepatitis    Inpatient consult to Gastroenterology  Consult performed by: Du Tabares MD  Consult ordered by: Lucho Ortiz MD  Reason for consult: melena  Assessment/Recommendations:     58 Yo male with PMH of chronic daily ETOH abuse who presented with a chief compliant of weakness. Found to be severely hypotensive w/ labs/imaging indicating acute alcoholic hepatitis. MELD-Na 25 as of 7/31.    On 7/30, patient noted to have large "dark brown" BM and hgb drop from 10.1 yesterday evening -> 8.6 earlier today -> 6.9 tonight. No hematemesis. No prior endoscopies. Prior colonoscopy reported normal.     - EGD today  - Continue PPI 8mg/hr gtt  - Continue Octreotide 50mcg/hr gtt  - Ceftriaxone 1g IV q24 hrs for SBP PPx  - Goal INR < 2.0, platelets > 50  - Maintain active type/screen, transfuse for goal Hgb >7 ; do not overtransfuse as this can lead to increased portal pressures and increased risk of rebleed  - IVF boluses / pressors to maintain hemodynamics, normalize HR, and normalize BUN  - Please notify GI team with any acute clinical change        Subjective:     HPI:  HPI:  Patient is a 58 Yo male with PMH of HTN, Hep C (unclear), anxiety and chronic daily ETOH abuse who initially presented to an outside facility ([facility]) with a chief compliant of weakness. Patient reports that over the past month he has felt extremely tired and weak. He attributed this to chronic anxiety, for which he takes Xanxax BID. Reportedly patient ran out of his anxiety medication and went to see his PCP for a refill. At that time, his PCP told him to go to the hospital to get " "checked out.  Patient presented to [facility] and was worked up for acute hepatitis. Initial viral hepatitis panel was negative. Initial labs from [facility] indicate leukocytosis, elevated bilirubin, and elevated liver function test. Patient was also hypotensive, requiring pressors. ED staff at [facility] placed a right femoral central catheter for access.Patient was then transferred to [facility] for further care given acute hepatitis, MERLY and hypotension.     Hospital/ICU Course:  Patient admits that he has been drinking several glasses of vodka for the past few months to help him sleep. He admits chronic anxiety and depression, unclear duration. He takes Xanax BID as mentioned previously and began to drink more once he ran out of his medication. Patient admits he drinks vodka, usually several glasses every night. Last drink was 07/28 per patient He denies any suicidal ideation or thoughts of self harm. He admits significant weakness, decreased appetite, fatigue and anxiety. Patent denies any recent fever, headache, cough, chest pain, abdominal pain.  Patient placed on CIWA protocol for acute alcoholic hepatitis.    On 7/30, patient noted to have large "dark brown" BM and hgb drop from 10.1 yesterday evening -> 8.6 earlier today -> 6.9 tonight. No hematemesis. Patient is A&Ox3 and in no distress. BUN 30 and Cr 2. Plts 177 and INR 1.3. No prior endoscopies. Patient states he has had colonoscopy within the past 10 years that he believes was normal. Started on octreotide but HRS protocol dose. Not on PPI. On ceftriaxone.    MELD-Na score: 25 at 7/31/2020  2:44 AM  MELD score: 21 at 7/31/2020  2:44 AM  Calculated from:  Serum Creatinine: 1.3 mg/dL at 7/31/2020  2:44 AM  Serum Sodium: 130 mmol/L at 7/31/2020  2:44 AM  Total Bilirubin: 11.5 mg/dL at 7/31/2020  2:44 AM  INR(ratio): 1.3 at 7/31/2020  2:44 AM  Age: 59 years 11 months      Past Medical History:   Diagnosis Date    Hepatitis     Hypertension        No past " "surgical history on file.    Review of patient's allergies indicates:  No Known Allergies  Family History     None        Tobacco Use    Smoking status: Not on file   Substance and Sexual Activity    Alcohol use: Not on file    Drug use: Not on file    Sexual activity: Not on file     Review of Systems   Constitutional: Positive for appetite change. Negative for chills, diaphoresis and fever.   HENT: Negative for congestion.    Respiratory: Negative for cough, chest tightness and shortness of breath.    Cardiovascular: Negative for chest pain, palpitations and leg swelling.   Gastrointestinal: Positive for abdominal distention and blood in stool (reported "dark brown" stools.). Negative for abdominal pain, diarrhea, nausea and vomiting.   Genitourinary: Negative for difficulty urinating.   Neurological: Positive for weakness. Negative for dizziness, tremors, seizures and syncope.   Psychiatric/Behavioral: Negative for hallucinations, self-injury and suicidal ideas. The patient is nervous/anxious.      Objective:     Vital Signs (Most Recent):  Temp: 98.5 °F (36.9 °C) (07/31/20 0701)  Pulse: 88 (07/31/20 0800)  Resp: (!) 23 (07/31/20 0800)  BP: 139/83 (07/31/20 0800)  SpO2: 96 % (07/31/20 0800) Vital Signs (24h Range):  Temp:  [98 °F (36.7 °C)-98.6 °F (37 °C)] 98.5 °F (36.9 °C)  Pulse:  [] 88  Resp:  [15-34] 23  SpO2:  [90 %-97 %] 96 %  BP: ()/(55-83) 139/83     Weight: 91.5 kg (201 lb 11.5 oz) (07/31/20 0248)  There is no height or weight on file to calculate BMI.      Intake/Output Summary (Last 24 hours) at 7/31/2020 0840  Last data filed at 7/31/2020 0800  Gross per 24 hour   Intake 1199.53 ml   Output 3055 ml   Net -1855.47 ml       Lines/Drains/Airways     Central Venous Catheter Line            Percutaneous Central Line Insertion/Assessment - Triple Lumen  right femoral vein;right femoral artery -- days          Drain                 Urethral Catheter 07/30/20 1720 16 Fr. less than 1 day       "    Peripheral Intravenous Line                 Peripheral IV - Single Lumen 07/30/20 1609 20 G;1 3/4 in Right;Anterior Forearm less than 1 day                Physical Exam  Constitutional:       General: He is not in acute distress.     Appearance: Normal appearance. He is not diaphoretic.   HENT:      Head: Normocephalic and atraumatic.      Mouth/Throat:      Mouth: Mucous membranes are dry.   Eyes:      General: Scleral icterus present.      Extraocular Movements: Extraocular movements intact.      Pupils: Pupils are equal, round, and reactive to light.   Neck:      Musculoskeletal: Neck supple.   Cardiovascular:      Rate and Rhythm: Normal rate and regular rhythm.      Pulses: Normal pulses.      Heart sounds: No murmur.   Pulmonary:      Effort: Pulmonary effort is normal.      Breath sounds: No wheezing, rhonchi or rales.   Abdominal:      General: Bowel sounds are normal.      Palpations: Abdomen is soft.      Tenderness: There is no abdominal tenderness. There is no guarding.   Genitourinary:     Comments:  BRIAN did not show blood or melena.  Musculoskeletal:         General: No swelling.      Right lower leg: No edema.      Left lower leg: No edema.   Skin:     General: Skin is warm.      Coloration: Skin is jaundiced.      Findings: No erythema or rash.   Neurological:      Mental Status: He is alert. He is disoriented.         Significant Labs:  CBC:   Recent Labs   Lab 07/30/20  1810 07/30/20  2306 07/31/20  0244   WBC 12.19 10.40 10.24   HGB 6.7* 7.9* 7.8*   HCT 20.8* 24.3* 24.0*    132* 131*     CMP:   Recent Labs   Lab 07/31/20  0244   *   CALCIUM 7.3*   ALBUMIN 2.9*   PROT 5.3*   *   K 4.0   CO2 20*   CL 91*   BUN 26*   CREATININE 1.3   ALKPHOS 376*   ALT 94*   *   BILITOT 11.5*     Coagulation:   Recent Labs   Lab 07/31/20  0244   INR 1.3*   APTT 44.6*       Significant Imaging:  Imaging results within the past 24 hours have been reviewed.     US Liver w/  "Doppler:  Satisfactory Doppler evaluation of the visualized vascular structures.   Hepatomegaly and hepatic steatosis.   Biliary sludge and gallbladder wall thickening, likely due to underlying hepatic dysfunction.     CT Abdomen Pelvis:  1. Hepatomegaly with steatosis.   2. Colonic diverticulosis.   3. Bowel wall thickening involving ascending colon and transverse colon to a   lesser degree, without adjacent fat stranding, may reflect sequela of chronic   colitis.   4. No free fluid, free air or abscess.       Assessment/Plan:     Upper GI bleed  Patient is a 60 Yo male with PMH of HTN, Hep C (unclear), anxiety and chronic daily ETOH abuse who presented with a chief compliant of weakness.  Found to be hypotensive on admission with leukocytosis, markedly elevated bili, elevated AST>ALT. Negative viral hepatitis panel on 7/20/20, when CT abd pelvis demonstrated hepatic steatosis. Patient started on levophed, initiated treatment with midodrine, octreotide and albumin. MELD-Na 25 as of 7/31.    On 7/30, patient noted to have large "dark brown" BM and hgb drop from 10.1 yesterday evening -> 8.6 earlier today -> 6.9 tonight. No hematemesis. Patient is A&Ox3 and in no distress. BUN 30 and Cr 2. Plts 177 and INR 1.3. No prior endoscopies. Patient states he has had colonoscopy within the past 10 years that he believes was normal. Occasional NSAIDs use reported. Received 1u pRBC on 7/30.    - EGD today  - Continue PPI 8mg/hr gtt  - Continue Octreotide 50mcg/hr gtt  - Ceftriaxone 1g IV q24 hrs for SBP PPx  - Goal INR < 2.0, platelets > 50  - Maintain active type/screen, transfuse for goal Hgb >7 ; do not overtransfuse as this can lead to increased portal pressures and increased risk of rebleed  - IVF boluses / pressors to maintain hemodynamics, normalize HR, and normalize BUN  - Please notify GI team with any acute clinical change        Thank you for your consult. I will follow-up with patient. Please contact us if you have " any additional questions.    Du Tabares MD  Gastroenterology  [facility]

## 2020-07-31 NOTE — TREATMENT PLAN
EGD done today    Impression:           - LA Grade B esophagitis.                         - Portal hypertensive gastropathy.                         - Normal examined duodenum.                         - No specimens collected.   Recommendation:                               - Ok to discontinue octreotide and IV ceftriaxone.                         - Use a proton pump inhibitor PO daily for 8 weeks.                         - Discontinue alcohol consumption.                         - Repeat upper endoscopy in 2 years for                         surveillance.     Please contact us with further questions or concerns.    Faustino Hilario, PGY-VI  Gastroenterology Fellow   [phone]

## 2020-07-31 NOTE — ASSESSMENT & PLAN NOTE
- Patient initially presented to outside facility with weakness, fatigue and history ETOH abuse.  - Labs at outside facility notable for leukocytosis, elevated bilirubin, and elevated liver function test  - Patient received 40 mg steroids at outside facility, will initiate stress dose steroids. Hydrocortisone 100 mg q8  - MELD score= 32 ( Cr 3.2, Bili 11.1, Sodium 126)  - Negative viral hepatitis panel on 07/20/20  - Ct Abd/pelvis 07/20 demonstrated hepatic steatosis  - [facility] protocol initiated  - US Abd on 07/30 demonstrates hepatomegaly and steatosis

## 2020-07-31 NOTE — ASSESSMENT & PLAN NOTE
- Patient noted to have down trending Hgb, from 10.1 the evening prior to admission to 8.6 the day of admission and 6.8 the evening of his admission  - Concern for acute GI bleed in the setting of alcoholic cirrhosis. Patient noted to have black tarry BM.  - Patient received bolus of octreotide and bolus of pantoprazole followed by pantoprazole and octreotide drip  - Patient s/p 1 unit PRBCs on 07/30/20.  - GI following, appreciate recs  - Maintain Goal INR < 2.0, platelets > 50  - Maintain active type/screen, transfuse for goal Hgb >7   - IVF boluses / pressors to maintain hemodynamics, normalize HR, and normalize BUN

## 2020-07-31 NOTE — PROGRESS NOTES
[facility]  Critical Care Medicine  Progress Note    Patient Name: Kenneth Pardo  MRN: [id number]  Admission Date: 7/30/2020  Hospital Length of Stay: 1 days  Code Status: Full Code  Attending Provider: Alina Forrester MD  Primary Care Provider: Mikala Etienne MD   Principal Problem: Acute alcoholic hepatitis    Subjective:     HPI:  Patient is a 60 Yo male with PMH of HTN, Hep C, anxiety and chronic daily ETOH abuse who initially presented to an outside facility ([facility]) with a chief compliant of weakness. Patient reports that over the past month he has felt extremely tired and weak. He attributed this to chronic anxiety, for which he takes Xanxax BID. Reportedly patient ran out of his anxiety medication and went to see his PCP for a refill. At that time, his PCP told him to go to the hospital to get checked out.  Patient presented to [facility] and was worked up for acute hepatitis. Initial viral hepatitis panel was negative. Initial labs from [facility] indicate leukocytosis, elevated bilirubin, and elevated liver function test. Patient was also hypotensive, requiring pressors. ED staff at [facility] placed a right femoral central catheter for access.Patient was then transferred to [facility] for further care given acute hepatitis, MERLY and hypotension.           Hospital/ICU Course:  Patient seen and examined by myself and MICU team. Patient admits that he has been drinking several glasses of vodka for the past few months to help him sleep. He admits chronic anxiety and depression, unclear duration. He takes Xanax BID as mentioned previously and began to drink more once he ran out of his medication. Patient admits he drinks vodka, usually several glasses every night. Last drink was 07/28 per patient He denies any suicidal ideation or thoughts of self harm. He admits significant weakness, decreased appetite, fatigue and anxiety. Patent denies any recent fever, headache, cough, chest pain, abdominal  pain.  Patient placed on CIWA protocol for acute alcoholic hepatitis.     Patient noted to have large, black tarry bowel movement on 07/30/20. Hgb trending down from 10.1 on 07/29 -> 8.6 on 07/30 -> 6.9 the evening of 07/30. Patient denied any hematemesis. Patient started on PPI and octreotide gtt, GI consulted for possible  GI bleed. Patient is s/p transfusion of 1 unit PRBCs on 07/30/20. EGD planned for 07/31/20.    Interval History/Significant Events: Patient resting comfortably in bed. Hemoglobin improved s/p infusion of 1 unit PRBCs. Gautam catheter placed 2/2 high residual volume, now making good urine. Patient made NPO at night for EGD today.     Review of Systems   Constitutional: Negative for chills, diaphoresis and fever.   Respiratory: Negative for cough and shortness of breath.    Cardiovascular: Negative for chest pain and leg swelling.   Gastrointestinal: Negative for abdominal pain, blood in stool, diarrhea, nausea and vomiting.   Genitourinary: Negative for difficulty urinating and urgency.   Musculoskeletal: Negative for myalgias.   Neurological: Negative for dizziness, weakness and headaches.     Objective:     Vital Signs (Most Recent):  Temp: 98.7 °F (37.1 °C) (07/31/20 1100)  Pulse: 81 (07/31/20 1200)  Resp: 19 (07/31/20 1200)  BP: (!) 153/81 (07/31/20 1200)  SpO2: 95 % (07/31/20 1200) Vital Signs (24h Range):  Temp:  [98 °F (36.7 °C)-98.7 °F (37.1 °C)] 98.7 °F (37.1 °C)  Pulse:  [] 81  Resp:  [15-34] 19  SpO2:  [90 %-97 %] 95 %  BP: ()/(55-86) 153/81   Weight: 91.5 kg (201 lb 11.5 oz)  There is no height or weight on file to calculate BMI.      Intake/Output Summary (Last 24 hours) at 7/31/2020 1248  Last data filed at 7/31/2020 1200  Gross per 24 hour   Intake 1419.53 ml   Output 3305 ml   Net -1885.47 ml       Physical Exam  Constitutional:       General: He is not in acute distress.  HENT:      Head: Normocephalic and atraumatic.      Mouth/Throat:      Mouth: Mucous membranes are  moist.      Pharynx: Oropharynx is clear.   Eyes:      General: Scleral icterus present.      Extraocular Movements: Extraocular movements intact.   Neck:      Musculoskeletal: Neck supple.   Cardiovascular:      Rate and Rhythm: Normal rate and regular rhythm.      Pulses: Normal pulses.      Heart sounds: Normal heart sounds. No murmur.   Pulmonary:      Effort: No respiratory distress.      Breath sounds: Normal breath sounds. No wheezing, rhonchi or rales.   Abdominal:      General: Bowel sounds are normal.      Palpations: Abdomen is soft.      Tenderness: There is no abdominal tenderness.   Musculoskeletal:         General: No swelling.   Skin:     General: Skin is warm and dry.      Coloration: Skin is jaundiced.   Neurological:      Mental Status: He is alert and oriented to person, place, and time.         Vents:     Lines/Drains/Airways     Central Venous Catheter Line            Percutaneous Central Line Insertion/Assessment - Triple Lumen  right femoral vein;right femoral artery -- days          Drain                 Urethral Catheter 07/30/20 1720 16 Fr. less than 1 day          Peripheral Intravenous Line                 Peripheral IV - Single Lumen 07/30/20 1609 20 G;1 3/4 in Right;Anterior Forearm less than 1 day              Significant Labs:    CBC/Anemia Profile:  Recent Labs   Lab 07/30/20  1446  07/30/20  2306 07/31/20  0244 07/31/20  1037   WBC  --    < > 10.40 10.24 10.51   HGB  --    < > 7.9* 7.8* 8.0*   HCT  --    < > 24.3* 24.0* 24.4*   PLT  --    < > 132* 131* 140*   MCV  --    < > 108* 108* 107*   RDW  --    < > 19.8* 20.4* 20.6*   OCCULTBLOOD Positive*  --   --   --   --     < > = values in this interval not displayed.        Chemistries:  Recent Labs   Lab 07/30/20  1118 07/30/20  1626 07/31/20  0244   * 129* 130*   K 3.5 3.6 4.0   CL 89* 89* 91*   CO2 18* 17* 20*   BUN 30* 29* 26*   CREATININE 2.5* 2.0* 1.3   CALCIUM 6.3* 6.9* 7.3*   ALBUMIN 1.6* 3.7 2.9*   PROT 4.7* 6.0 5.3*    BILITOT 11.1* 10.2* 11.5*   ALKPHOS 537* 421* 376*   * 108* 94*   * 384* 307*   MG 1.3* 1.5* 1.9   PHOS 5.4* 4.9* 4.6*       All pertinent labs within the past 24 hours have been reviewed.    Significant Imaging:  I have reviewed and interpreted all pertinent imaging results/findings within the past 24 hours.      ABG  No results for input(s): PH, PO2, PCO2, HCO3, BE in the last 168 hours.  Assessment/Plan:     Psychiatric  Anxiety  - Patient on Xanxax BID for chronic anxiety  - Xanax held, can add PRN in addition to CIWA protocol if necessary    ETOH abuse  - Patient has several month history of heavy alcohol use  - , , Alb 1.2 initially. MELD score 32  - Hepatomegaly with steatosis present on initial CT  - Patient counseled on consequences of alcohol abuse    Cardiac/Vascular  Essential hypertension  - Patient has history of hypertension, on home lisinopril 20 mg  - Continue home meds when clinically appropriate    GI  * Acute alcoholic hepatitis  - Patient initially presented to outside facility with weakness, fatigue and history ETOH abuse.  - Labs at outside facility notable for leukocytosis, elevated bilirubin, and elevated liver function test  - Patient received 40 mg steroids at outside facility, will initiate stress dose steroids. Hydrocortisone 100 mg q8  - MELD score= 32 ( Cr 3.2, Bili 11.1, Sodium 126)  - Negative viral hepatitis panel on 07/20/20  - Ct Abd/pelvis 07/20 demonstrated hepatic steatosis  - CIWA protocol initiated  - US Abd on 07/30 demonstrates hepatomegaly and steatosis     Upper GI bleed  - Patient noted to have down trending Hgb, from 10.1 the evening prior to admission to 8.6 the day of admission and 6.8 the evening of his admission  - Concern for acute GI bleed in the setting of alcoholic cirrhosis. Patient noted to have black tarry BM.  - Patient received bolus of octreotide and bolus of pantoprazole followed by pantoprazole and octreotide drip  - Patient  s/p 1 unit PRBCs on 07/30/20.  - GI following, appreciate recs  - Maintain Goal INR < 2.0, platelets > 50  - Maintain active type/screen, transfuse for goal Hgb >7   - IVF boluses / pressors to maintain hemodynamics, normalize HR, and normalize BUN      Hepatorenal syndrome  - Serum creatinine 0.6 on 07/20, increased to 3.2 on 07/29  - Suspect MERLY in the setting of chronic liver disease 2/2 ETOH abuse  - Patient currently on Levo, will titrate to maintain MAPs above 70  - SBP prophylaxis not indicated at this time   - Will initiate treatment with midodrine, octreotide and albumin  - Gautam cathether placed 2/2 high residual volume.   - U/O 1.8 L overnight. Urine dark, tea colored       Critical Care Daily Checklist:    A: Awake: RASS Goal/Actual Goal: RASS Goal: 0-->alert and calm  Actual: Hazel Agitation Sedation Scale (RASS): Alert and calm   B: Spontaneous Breathing Trial Performed?     C: SAT & SBT Coordinated?  n/a                     D: Delirium: CAM-ICU     E: Early Mobility Performed? Yes   F: Feeding Goal:    Status:     Current Diet Order   Procedures    Diet NPO      AS: Analgesia/Sedation n/a   T: Thromboembolic Prophylaxis n/a   H: HOB > 300 Yes   U: Stress Ulcer Prophylaxis (if needed) yes   G: Glucose Control n/a   B: Bowel Function Stool Occurrence: 1   I: Indwelling Catheter (Lines & Gautam) Necessity yes   D: De-escalation of Antimicrobials/Pharmacotherapies n/a    Plan for the day/ETD EGD    Code Status:  Family/Goals of Care: Full Code         Critical secondary to Patient has a condition that poses threat to life and bodily function: acute alcoholic hepatitis     Critical care was time spent personally by me on the following activities: development of treatment plan with patient or surrogate and bedside caregivers, discussions with consultants, evaluation of patient's response to treatment, examination of patient, ordering and performing treatments and interventions, ordering and review of  laboratory studies, ordering and review of radiographic studies, pulse oximetry, re-evaluation of patient's condition. This critical care time did not overlap with that of any other provider or involve time for any procedures.     Lucho Ortiz MD  Critical Care Medicine  [facility]

## 2020-07-31 NOTE — NURSING
GI at bedside for endoscopy.  Patient is stable post procedure with VS WNL on 2 L/NC. Wife updated on patient status.

## 2020-07-31 NOTE — PLAN OF CARE
Problem: Occupational Therapy Goal  Goal: Occupational Therapy Goal  Description: Goals to be met by: 8/14/20     Patient will increase functional independence with ADLs by performing:    UE Dressing with Jennings.  LE Dressing with Jennings.  Grooming while standing at sink with Jennings.  Toileting from toilet with Jennings for hygiene and clothing management.   Bathing from  shower chair/bench with Jennings.  Toilet transfer to toilet with Jennings.  Increased functional strength to WFL for B UE.  Upper extremity exercise program x15 reps per handout, with independence.    Outcome: Ongoing, Progressing

## 2020-07-31 NOTE — SUBJECTIVE & OBJECTIVE
Interval History/Significant Events: Patient resting comfortably in bed. Hemoglobin improved s/p infusion of 1 unit PRBCs. Gautam catheter placed 2/2 high residual volume, now making good urine. Patient made NPO at night for EGD today.     Review of Systems   Constitutional: Negative for chills, diaphoresis and fever.   Respiratory: Negative for cough and shortness of breath.    Cardiovascular: Negative for chest pain and leg swelling.   Gastrointestinal: Negative for abdominal pain, blood in stool, diarrhea, nausea and vomiting.   Genitourinary: Negative for difficulty urinating and urgency.   Musculoskeletal: Negative for myalgias.   Neurological: Negative for dizziness, weakness and headaches.     Objective:     Vital Signs (Most Recent):  Temp: 98.7 °F (37.1 °C) (07/31/20 1100)  Pulse: 81 (07/31/20 1200)  Resp: 19 (07/31/20 1200)  BP: (!) 153/81 (07/31/20 1200)  SpO2: 95 % (07/31/20 1200) Vital Signs (24h Range):  Temp:  [98 °F (36.7 °C)-98.7 °F (37.1 °C)] 98.7 °F (37.1 °C)  Pulse:  [] 81  Resp:  [15-34] 19  SpO2:  [90 %-97 %] 95 %  BP: ()/(55-86) 153/81   Weight: 91.5 kg (201 lb 11.5 oz)  There is no height or weight on file to calculate BMI.      Intake/Output Summary (Last 24 hours) at 7/31/2020 1248  Last data filed at 7/31/2020 1200  Gross per 24 hour   Intake 1419.53 ml   Output 3305 ml   Net -1885.47 ml       Physical Exam  Constitutional:       General: He is not in acute distress.  HENT:      Head: Normocephalic and atraumatic.      Mouth/Throat:      Mouth: Mucous membranes are moist.      Pharynx: Oropharynx is clear.   Eyes:      General: Scleral icterus present.      Extraocular Movements: Extraocular movements intact.   Neck:      Musculoskeletal: Neck supple.   Cardiovascular:      Rate and Rhythm: Normal rate and regular rhythm.      Pulses: Normal pulses.      Heart sounds: Normal heart sounds. No murmur.   Pulmonary:      Effort: No respiratory distress.      Breath sounds: Normal  breath sounds. No wheezing, rhonchi or rales.   Abdominal:      General: Bowel sounds are normal.      Palpations: Abdomen is soft.      Tenderness: There is no abdominal tenderness.   Musculoskeletal:         General: No swelling.   Skin:     General: Skin is warm and dry.      Coloration: Skin is jaundiced.   Neurological:      Mental Status: He is alert and oriented to person, place, and time.         Vents:     Lines/Drains/Airways     Central Venous Catheter Line            Percutaneous Central Line Insertion/Assessment - Triple Lumen  right femoral vein;right femoral artery -- days          Drain                 Urethral Catheter 07/30/20 1720 16 Fr. less than 1 day          Peripheral Intravenous Line                 Peripheral IV - Single Lumen 07/30/20 1609 20 G;1 3/4 in Right;Anterior Forearm less than 1 day              Significant Labs:    CBC/Anemia Profile:  Recent Labs   Lab 07/30/20  1446  07/30/20  2306 07/31/20  0244 07/31/20  1037   WBC  --    < > 10.40 10.24 10.51   HGB  --    < > 7.9* 7.8* 8.0*   HCT  --    < > 24.3* 24.0* 24.4*   PLT  --    < > 132* 131* 140*   MCV  --    < > 108* 108* 107*   RDW  --    < > 19.8* 20.4* 20.6*   OCCULTBLOOD Positive*  --   --   --   --     < > = values in this interval not displayed.        Chemistries:  Recent Labs   Lab 07/30/20  1118 07/30/20  1626 07/31/20  0244   * 129* 130*   K 3.5 3.6 4.0   CL 89* 89* 91*   CO2 18* 17* 20*   BUN 30* 29* 26*   CREATININE 2.5* 2.0* 1.3   CALCIUM 6.3* 6.9* 7.3*   ALBUMIN 1.6* 3.7 2.9*   PROT 4.7* 6.0 5.3*   BILITOT 11.1* 10.2* 11.5*   ALKPHOS 537* 421* 376*   * 108* 94*   * 384* 307*   MG 1.3* 1.5* 1.9   PHOS 5.4* 4.9* 4.6*       All pertinent labs within the past 24 hours have been reviewed.    Significant Imaging:  I have reviewed and interpreted all pertinent imaging results/findings within the past 24 hours.

## 2020-07-31 NOTE — TRANSFER OF CARE
Anesthesia Transfer of Care Note    Patient: Kenneth Pardo    Procedure(s) Performed: Procedure(s) (LRB):  EGD (ESOPHAGOGASTRODUODENOSCOPY) (N/A)    Patient location: ICU    Anesthesia Type: general    Transport from OR: Transported from OR on 2-3 L/min O2 by NC with adequate spontaneous ventilation    Post pain: adequate analgesia    Post assessment: tolerated procedure well and no apparent anesthetic complications    Post vital signs: stable    Level of consciousness: responds to stimulation    Nausea/Vomiting: no nausea/vomiting    Complications: none    Transfer of care protocol was followed      Last vitals:   Visit Vitals  BP (!) 140/72 (BP Location: Left arm, Patient Position: Lying)   Pulse 71   Temp 37.1 °C (98.7 °F) (Axillary)   Resp 16   Wt 91.5 kg (201 lb 11.5 oz)   SpO2 (!) 93%

## 2020-07-31 NOTE — PLAN OF CARE
CMICU DAILY GOALS       A: Awake    RASS: Goal - RASS Goal: 0-->alert and calm  Actual - RASS (Hazel Agitation-Sedation Scale): 0-->alert and calm   Restraint necessity:    B: Breath   SBT: Not intubated   C: Coordinate A & B, analgesics/sedatives   Pain: managed    SAT: Not intubated  D: Delirium   CAM-ICU:    E: Early(intubated/ Progressive (non-intubated) Mobility   MOVE Screen: Pass   Activity: Activity Management: activity clustered for rest period  FAS: Feeding/Nutrition   Diet order: Diet/Nutrition Received: regular,   Fluid restriction:    T: Thrombus   DVT prophylaxis: VTE Required Core Measure: (SCDs) Sequential compression device initiated/maintained  H: HOB Elevation   Head of Bed (HOB): HOB at 20-30 degrees  U: Ulcer Prophylaxis   GI: yes  G: Glucose control   managed    S: Skin   Bundle compliance: yes   Bathing/Skin Care: bath, complete, bath, chlorhexidine, back care Date: [unfilled]  B: Bowel Function   diarrhea   I: Indwelling Catheters   Gautam necessity:      Urethral Catheter 07/30/20 1720 16 Fr.-Reason for Continuing Urinary Catheterization: Critically ill in ICU requiring intensive monitoring   CVC necessity: Yes   IPAD offered: No  D: De-escalation Antibx   Yes  Plan for the day  Monitor and replace electrolytes, EGD, monitor VS.  Family/Goals of care/Code Status   Code Status: Full Code     No acute events throughout day, VS and assessment per flow sheet, patient progressing towards goals as tolerated, plan of care reviewed with Kenneth Pardo and family, all concerns addressed, will continue to monitor.

## 2020-07-31 NOTE — ASSESSMENT & PLAN NOTE
- Serum creatinine 0.6 on 07/20, increased to 3.2 on 07/29  - Suspect MERLY in the setting of chronic liver disease 2/2 ETOH abuse  - Patient currently on Levo, will titrate to maintain MAPs above 70  - SBP prophylaxis not indicated at this time   - Will initiate treatment with midodrine, octreotide and albumin  - Gautam cathether placed 2/2 high residual volume.   - U/O 1.8 L overnight. Urine dark, tea colored

## 2020-07-31 NOTE — ADDENDUM NOTE
Addendum  created 07/31/20 1454 by Marlee Castorena, CRNA    Flowsheet accepted, Intraprocedure Flowsheets edited, Intraprocedure Meds edited

## 2020-07-31 NOTE — HPI
"HPI:  Patient is a 60 Yo male with PMH of HTN, Hep C (unclear), anxiety and chronic daily ETOH abuse who initially presented to an outside facility ([facility]) with a chief compliant of weakness. Patient reports that over the past month he has felt extremely tired and weak. He attributed this to chronic anxiety, for which he takes Xanxax BID. Reportedly patient ran out of his anxiety medication and went to see his PCP for a refill. At that time, his PCP told him to go to the hospital to get checked out.  Patient presented to [facility] and was worked up for acute hepatitis. Initial viral hepatitis panel was negative. Initial labs from [facility] indicate leukocytosis, elevated bilirubin, and elevated liver function test. Patient was also hypotensive, requiring pressors. ED staff at [facility] placed a right femoral central catheter for access.Patient was then transferred to [facility] for further care given acute hepatitis, MERLY and hypotension.     Hospital/ICU Course:  Patient admits that he has been drinking several glasses of vodka for the past few months to help him sleep. He admits chronic anxiety and depression, unclear duration. He takes Xanax BID as mentioned previously and began to drink more once he ran out of his medication. Patient admits he drinks vodka, usually several glasses every night. Last drink was 07/28 per patient He denies any suicidal ideation or thoughts of self harm. He admits significant weakness, decreased appetite, fatigue and anxiety. Patent denies any recent fever, headache, cough, chest pain, abdominal pain.  Patient placed on CIWA protocol for acute alcoholic hepatitis.    On 7/30, patient noted to have large "dark brown" BM and hgb drop from 10.1 yesterday evening -> 8.6 earlier today -> 6.9 tonight. No hematemesis. Patient is A&Ox3 and in no distress. BUN 30 and Cr 2. Plts 177 and INR 1.3. No prior endoscopies. Patient states he has had colonoscopy within the past 10 years that he believes " was normal. Started on octreotide but HRS protocol dose. Not on PPI. On ceftriaxone.    MELD-Na score: 25 at 7/31/2020  2:44 AM  MELD score: 21 at 7/31/2020  2:44 AM  Calculated from:  Serum Creatinine: 1.3 mg/dL at 7/31/2020  2:44 AM  Serum Sodium: 130 mmol/L at 7/31/2020  2:44 AM  Total Bilirubin: 11.5 mg/dL at 7/31/2020  2:44 AM  INR(ratio): 1.3 at 7/31/2020  2:44 AM  Age: 59 years 11 months

## 2020-07-31 NOTE — NURSING
CMICU DAILY GOALS       A: Awake    RASS: Goal -    Actual -     Restraint necessity:    B: Breath   SBT: Not intubated   C: Coordinate A & B, analgesics/sedatives   Pain: no pain     SAT: Not intubated  D: Delirium   CAM-ICU:    E: Early(intubated/ Progressive (non-intubated) Mobility   MOVE Screen: weakness. not found to be safe to get pt out of bed at this time    Activity: Activity Management: activity adjusted per tolerance, activity clustered for rest period  FAS: Feeding/Nutrition   Diet order:  ,   Fluid restriction:    T: Thrombus   DVT prophylaxis:    H: HOB Elevation   Head of Bed (HOB): HOB at 30 degrees  U: Ulcer Prophylaxis   GI: yes  G: Glucose control   managed    S: Skin   Bundle compliance: yes   Bathing/Skin Care: incontinence care Date  B: Bowel Function   diarrhea   I: Indwelling Catheters   Gautam necessity:     CVC necessity: Yes   IPAD offered: No  D: De-escalation Antibx   See md plan of care   Plan for the day   Plan to monitor and trend cbc/bloody bms. Pt. Has been type and screened and plan to administer blood. Pt. Increasingly lethargic. Pt. To be NPO past 0000 for scope tomorrow   Family/Goals of care/Code Status   Code Status: Full Code     No acute events throughout day, VS and assessment per flow sheet, patient progressing towards goals as tolerated, plan of care reviewed with Kenneth Pardo and family, all concerns addressed, will continue to monitor.

## 2020-07-31 NOTE — ASSESSMENT & PLAN NOTE
"Patient is a 58 Yo male with PMH of HTN, Hep C (unclear), anxiety and chronic daily ETOH abuse who presented with a chief compliant of weakness.  Found to be hypotensive on admission with leukocytosis, markedly elevated bili, elevated AST>ALT. Negative viral hepatitis panel on 7/20/20, when CT abd pelvis demonstrated hepatic steatosis. Patient started on levophed, initiated treatment with midodrine, octreotide and albumin. MELD-Na 25 as of 7/31.    On 7/30, patient noted to have large "dark brown" BM and hgb drop from 10.1 yesterday evening -> 8.6 earlier today -> 6.9 tonight. No hematemesis. Patient is A&Ox3 and in no distress. BUN 30 and Cr 2. Plts 177 and INR 1.3. No prior endoscopies. Patient states he has had colonoscopy within the past 10 years that he believes was normal. Occasional NSAIDs use reported. Received 1u pRBC on 7/30.    - EGD today  - Continue PPI 8mg/hr gtt  - Continue Octreotide 50mcg/hr gtt  - Ceftriaxone 1g IV q24 hrs for SBP PPx  - Goal INR < 2.0, platelets > 50  - Maintain active type/screen, transfuse for goal Hgb >7 ; do not overtransfuse as this can lead to increased portal pressures and increased risk of rebleed  - IVF boluses / pressors to maintain hemodynamics, normalize HR, and normalize BUN  - Please notify GI team with any acute clinical change  "

## 2020-07-31 NOTE — PLAN OF CARE
Problem: Physical Therapy Goal  Goal: Physical Therapy Goal  Description: Goals to be met by: 20    Patient will increase functional independence with mobility by performin. Supine to sit with Stand-by Assistance -not met  2. Sit to stand transfer with Stand-by Assistance -not met  3. Gait  x 150 feet with Supervision using AD if needed. -not met  4. Ascend/descend 3 stair with no Handrails Stand-by Assistance  -not met    Outcome: Ongoing, Progressing   Evaluation completed and goals appropriate. Christin Parsons, PT  2020

## 2020-07-31 NOTE — RESIDENT HANDOFF
Handoff     Primary Team: Networked reference to record [facility]  Room Number: 6093/6093 A     Patient Name: Kenneth Pardo MRN: [id number]     Date of Birth: [id number] Allergies: Patient has no known allergies.     Age: 59 y.o. Admit Date: 7/30/2020     Sex: male  BMI: There is no height or weight on file to calculate BMI.     Code Status: Full Code        Illness Level (current clinical status): Watcher - No    Reason for Admission: Acute alcoholic hepatitis    Brief HPI (pertinent PMH and diagnosis or differential diagnosis):   58 Yo male with hx of HTN, Hep C, anxiety and chronic alcohol abuse who was admitted for alcoholic hepatitis and transferred here from an outside facility.      Hospital Course (updated, brief assessment by system or problem, significant events): Patient received empiric therapy for HRS, and placed on CIWA protocol. Patient noted to have black tarry bowel movement on 07/30/20. Hgb trending down from 10.1 on 07/29 -> 8.6 on 07/30 -> 6.9 the evening of 07/30. Patient denied any hematemesis. GI consulted for possible bleed. Patient is s/p transfusion of 1 unit PRBCs on 07/30/20. EGD revealed LA Grade B esophagitis and portal hypertensive gastropathy. H/H now stable at 8/24. Patient is hemodynamically stable and ready for step down.    Tasks (specific, using if-then statements):  Recommend using a proton pump inhibitor PO daily for 8 weeks, repeat upper endoscopy in two years for surveillance. Patient has a sims catheter 2/2 high residual volume on bladder scan.  Patient had GPCs in 1/4 blood cultures, likely a contaminant. Do no suspect infection in this individual. Follow up cultures.    Contingency Plan (special circumstances anticipated and plan): CBC scheduled BID, follow up H/H. Repeat blood cultures ordered. If patient acutely decompensates please re consult MICU     Estimated Discharge Date: tbd    Discharge Disposition: Home or Self Care    Mentored By: Dr Forrester

## 2020-07-31 NOTE — [employer] PATIENT INSTRUCTIONS
Discharge Summary/Instructions after an Endoscopic Procedure  Patient Name: Kenneth Pardo  Patient MRN: [id number]  Patient YOB: 1960 Friday, July 31, 2020  Jhonathan Lieberman MD  RESTRICTIONS:  During your procedure today, you received medications for sedation.  These   medications may affect your judgment, balance and coordination.  Therefore,   for 24 hours, you have the following restrictions:   - DO NOT drive a car, operate machinery, make legal/financial decisions,   sign important papers or drink alcohol.    ACTIVITY:  Today: no heavy lifting, straining or running due to procedural   sedation/anesthesia.  The following day: return to full activity including work.  DIET:  Eat and drink normally unless instructed otherwise.     TREATMENT FOR COMMON SIDE EFFECTS:  - Mild abdominal pain, nausea, belching, bloating or excessive gas:  rest,   eat lightly and use a heating pad.  - Sore Throat: treat with throat lozenges and/or gargle with warm salt   water.  - Because air was used during the procedure, expelling large amounts of air   from your rectum or belching is normal.  - If a bowel prep was taken, you may not have a bowel movement for 1-3 days.    This is normal.  SYMPTOMS TO WATCH FOR AND REPORT TO YOUR PHYSICIAN:  1. Abdominal pain or bloating, other than gas cramps.  2. Chest pain.  3. Back pain.  4. Signs of infection such as: chills or fever occurring within 24 hours   after the procedure.  5. Rectal bleeding, which would show as bright red, maroon, or black stools.   (A tablespoon of blood from the rectum is not serious, especially if   hemorrhoids are present.)  6. Vomiting.  7. Weakness or dizziness.  GO DIRECTLY TO THE NEAREST EMERGENCY ROOM IF YOU HAVE ANY OF THE FOLLOWING:      Difficulty breathing              Chills and/or fever over 101 F   Persistent vomiting and/or vomiting blood   Severe abdominal pain   Severe chest pain   Black, tarry stools   Bleeding- more than one tablespoon   Any other  symptom or condition that you feel may need urgent attention  Your doctor recommends these additional instructions:  If any biopsies were taken, your doctors clinic will contact you in 1 to 2   weeks with any results.  - Return patient to ICU for ongoing care.   - Resume previous diet.   - Ok to discontinue octreotide and IV ceftriaxone.  - Use a proton pump inhibitor PO daily for 8 weeks.   - Discontinue alcohol consumption.   - Repeat upper endoscopy in 2 years for surveillance.   - Refer to Hepatology.   - The findings and recommendations were discussed with the patient.  For questions, problems or results please call your physician - Jhonathan Lieberman MD at Work:  [phone].  [facility] [place], EMERGENCY ROOM PHONE NUMBER: [phone]  IF A COMPLICATION OR EMERGENCY SITUATION ARISES AND YOU ARE UNABLE TO REACH   YOUR PHYSICIAN - GO DIRECTLY TO THE EMERGENCY ROOM.  Jhonathan Lieberman MD  7/31/2020 3:01:34 PM  This report has been verified and signed electronically.  [employer]

## 2020-08-01 PROBLEM — K76.82 ACUTE HEPATIC ENCEPHALOPATHY: Status: ACTIVE | Noted: 2020-08-01

## 2020-08-01 PROBLEM — I95.9 HYPOTENSION: Status: ACTIVE | Noted: 2020-08-01

## 2020-08-01 PROBLEM — F10.20 SEVERE ALCOHOL DEPENDENCE: Status: ACTIVE | Noted: 2020-08-01

## 2020-08-01 PROBLEM — R76.8 POSITIVE HEPATITIS C ANTIBODY TEST: Status: ACTIVE | Noted: 2020-08-01

## 2020-08-01 PROBLEM — D53.9 MACROCYTIC ANEMIA: Status: ACTIVE | Noted: 2020-08-01

## 2020-08-01 PROBLEM — E87.1 HYPONATREMIA: Status: ACTIVE | Noted: 2020-08-01

## 2020-08-01 LAB
ALBUMIN SERPL BCP-MCNC: 2.7 G/DL (ref 3.5–5.2)
ALP SERPL-CCNC: 331 U/L (ref 55–135)
ALT SERPL W/O P-5'-P-CCNC: 77 U/L (ref 10–44)
ANION GAP SERPL CALC-SCNC: 10 MMOL/L (ref 8–16)
APTT BLDCRRT: 37.1 SEC (ref 21–32)
AST SERPL-CCNC: 191 U/L (ref 10–40)
BASOPHILS # BLD AUTO: 0.01 K/UL (ref 0–0.2)
BASOPHILS # BLD AUTO: 0.02 K/UL (ref 0–0.2)
BASOPHILS NFR BLD: 0.1 % (ref 0–1.9)
BASOPHILS NFR BLD: 0.2 % (ref 0–1.9)
BILIRUB DIRECT SERPL-MCNC: 8.7 MG/DL (ref 0.1–0.3)
BILIRUB SERPL-MCNC: 11.5 MG/DL (ref 0.1–1)
BUN SERPL-MCNC: 27 MG/DL (ref 6–20)
CALCIUM SERPL-MCNC: 8.5 MG/DL (ref 8.7–10.5)
CHLORIDE SERPL-SCNC: 94 MMOL/L (ref 95–110)
CO2 SERPL-SCNC: 29 MMOL/L (ref 23–29)
CREAT SERPL-MCNC: 0.8 MG/DL (ref 0.5–1.4)
DIFFERENTIAL METHOD: ABNORMAL
DIFFERENTIAL METHOD: ABNORMAL
EOSINOPHIL # BLD AUTO: 0.1 K/UL (ref 0–0.5)
EOSINOPHIL # BLD AUTO: 0.4 K/UL (ref 0–0.5)
EOSINOPHIL NFR BLD: 0.6 % (ref 0–8)
EOSINOPHIL NFR BLD: 3.7 % (ref 0–8)
ERYTHROCYTE [DISTWIDTH] IN BLOOD BY AUTOMATED COUNT: 19.9 % (ref 11.5–14.5)
ERYTHROCYTE [DISTWIDTH] IN BLOOD BY AUTOMATED COUNT: 20 % (ref 11.5–14.5)
EST. GFR  (AFRICAN AMERICAN): >60 ML/MIN/1.73 M^2
EST. GFR  (NON AFRICAN AMERICAN): >60 ML/MIN/1.73 M^2
GLUCOSE SERPL-MCNC: 147 MG/DL (ref 70–110)
HCT VFR BLD AUTO: 27 % (ref 40–54)
HCT VFR BLD AUTO: 28.1 % (ref 40–54)
HGB BLD-MCNC: 8.7 G/DL (ref 14–18)
HGB BLD-MCNC: 9 G/DL (ref 14–18)
IMM GRANULOCYTES # BLD AUTO: 0.09 K/UL (ref 0–0.04)
IMM GRANULOCYTES # BLD AUTO: 0.11 K/UL (ref 0–0.04)
IMM GRANULOCYTES NFR BLD AUTO: 0.8 % (ref 0–0.5)
IMM GRANULOCYTES NFR BLD AUTO: 1.1 % (ref 0–0.5)
INR PPP: 1.2 (ref 0.8–1.2)
LYMPHOCYTES # BLD AUTO: 1.2 K/UL (ref 1–4.8)
LYMPHOCYTES # BLD AUTO: 1.5 K/UL (ref 1–4.8)
LYMPHOCYTES NFR BLD: 10.5 % (ref 18–48)
LYMPHOCYTES NFR BLD: 14.2 % (ref 18–48)
MAGNESIUM SERPL-MCNC: 1.8 MG/DL (ref 1.6–2.6)
MCH RBC QN AUTO: 34.9 PG (ref 27–31)
MCH RBC QN AUTO: 35.1 PG (ref 27–31)
MCHC RBC AUTO-ENTMCNC: 32 G/DL (ref 32–36)
MCHC RBC AUTO-ENTMCNC: 32.2 G/DL (ref 32–36)
MCV RBC AUTO: 109 FL (ref 82–98)
MCV RBC AUTO: 109 FL (ref 82–98)
MONOCYTES # BLD AUTO: 0.4 K/UL (ref 0.3–1)
MONOCYTES # BLD AUTO: 0.4 K/UL (ref 0.3–1)
MONOCYTES NFR BLD: 3.5 % (ref 4–15)
MONOCYTES NFR BLD: 3.6 % (ref 4–15)
NEUTROPHILS # BLD AUTO: 8 K/UL (ref 1.8–7.7)
NEUTROPHILS # BLD AUTO: 9.4 K/UL (ref 1.8–7.7)
NEUTROPHILS NFR BLD: 77.3 % (ref 38–73)
NEUTROPHILS NFR BLD: 84.4 % (ref 38–73)
NRBC BLD-RTO: 0 /100 WBC
NRBC BLD-RTO: 0 /100 WBC
PHOSPHATE SERPL-MCNC: 1.7 MG/DL (ref 2.7–4.5)
PLATELET # BLD AUTO: 121 K/UL (ref 150–350)
PLATELET # BLD AUTO: 124 K/UL (ref 150–350)
PMV BLD AUTO: 11.1 FL (ref 9.2–12.9)
PMV BLD AUTO: 11.3 FL (ref 9.2–12.9)
POTASSIUM SERPL-SCNC: 3 MMOL/L (ref 3.5–5.1)
PROT SERPL-MCNC: 5 G/DL (ref 6–8.4)
PROTHROMBIN TIME: 13.2 SEC (ref 9–12.5)
RBC # BLD AUTO: 2.48 M/UL (ref 4.6–6.2)
RBC # BLD AUTO: 2.58 M/UL (ref 4.6–6.2)
SODIUM SERPL-SCNC: 133 MMOL/L (ref 136–145)
WBC # BLD AUTO: 10.37 K/UL (ref 3.9–12.7)
WBC # BLD AUTO: 11.11 K/UL (ref 3.9–12.7)

## 2020-08-01 PROCEDURE — 80048 BASIC METABOLIC PNL TOTAL CA: CPT

## 2020-08-01 PROCEDURE — 80076 HEPATIC FUNCTION PANEL: CPT

## 2020-08-01 PROCEDURE — 85025 COMPLETE CBC W/AUTO DIFF WBC: CPT | Mod: 91

## 2020-08-01 PROCEDURE — 11000001 HC ACUTE MED/SURG PRIVATE ROOM

## 2020-08-01 PROCEDURE — 83735 ASSAY OF MAGNESIUM: CPT

## 2020-08-01 PROCEDURE — 80074 ACUTE HEPATITIS PANEL: CPT

## 2020-08-01 PROCEDURE — 25000003 PHARM REV CODE 250: Performed by: STUDENT IN AN ORGANIZED HEALTH CARE EDUCATION/TRAINING PROGRAM

## 2020-08-01 PROCEDURE — 99233 SBSQ HOSP IP/OBS HIGH 50: CPT | Mod: ,,, | Performed by: HOSPITALIST

## 2020-08-01 PROCEDURE — 99222 1ST HOSP IP/OBS MODERATE 55: CPT | Mod: ,,, | Performed by: INTERNAL MEDICINE

## 2020-08-01 PROCEDURE — 84100 ASSAY OF PHOSPHORUS: CPT

## 2020-08-01 PROCEDURE — 25000003 PHARM REV CODE 250: Performed by: HOSPITALIST

## 2020-08-01 PROCEDURE — 99233 PR SUBSEQUENT HOSPITAL CARE,LEVL III: ICD-10-PCS | Mod: ,,, | Performed by: HOSPITALIST

## 2020-08-01 PROCEDURE — 85610 PROTHROMBIN TIME: CPT

## 2020-08-01 PROCEDURE — 36415 COLL VENOUS BLD VENIPUNCTURE: CPT

## 2020-08-01 PROCEDURE — 99222 PR INITIAL HOSPITAL CARE,LEVL II: ICD-10-PCS | Mod: ,,, | Performed by: INTERNAL MEDICINE

## 2020-08-01 PROCEDURE — 85730 THROMBOPLASTIN TIME PARTIAL: CPT

## 2020-08-01 PROCEDURE — 63600175 PHARM REV CODE 636 W HCPCS: Performed by: STUDENT IN AN ORGANIZED HEALTH CARE EDUCATION/TRAINING PROGRAM

## 2020-08-01 RX ORDER — POTASSIUM CHLORIDE 20 MEQ/1
40 TABLET, EXTENDED RELEASE ORAL ONCE
Status: COMPLETED | OUTPATIENT
Start: 2020-08-01 | End: 2020-08-01

## 2020-08-01 RX ORDER — SODIUM,POTASSIUM PHOSPHATES 280-250MG
2 POWDER IN PACKET (EA) ORAL
Status: COMPLETED | OUTPATIENT
Start: 2020-08-01 | End: 2020-08-02

## 2020-08-01 RX ORDER — LACTULOSE 10 G/15ML
15 SOLUTION ORAL 3 TIMES DAILY
Status: DISCONTINUED | OUTPATIENT
Start: 2020-08-01 | End: 2020-08-02

## 2020-08-01 RX ORDER — TRAZODONE HYDROCHLORIDE 50 MG/1
50 TABLET ORAL NIGHTLY
Status: DISCONTINUED | OUTPATIENT
Start: 2020-08-01 | End: 2020-08-02

## 2020-08-01 RX ORDER — THIAMINE HCL 100 MG
100 TABLET ORAL DAILY
Status: DISCONTINUED | OUTPATIENT
Start: 2020-08-02 | End: 2020-08-09 | Stop reason: HOSPADM

## 2020-08-01 RX ORDER — FOLIC ACID 1 MG/1
1 TABLET ORAL DAILY
Status: DISCONTINUED | OUTPATIENT
Start: 2020-08-02 | End: 2020-08-09 | Stop reason: HOSPADM

## 2020-08-01 RX ADMIN — POTASSIUM & SODIUM PHOSPHATES POWDER PACK 280-160-250 MG 2 PACKET: 280-160-250 PACK at 05:08

## 2020-08-01 RX ADMIN — LACTULOSE 15 G: 20 SOLUTION ORAL at 08:08

## 2020-08-01 RX ADMIN — ALPRAZOLAM 1 MG: 1 TABLET ORAL at 08:08

## 2020-08-01 RX ADMIN — MIDODRINE HYDROCHLORIDE 10 MG: 5 TABLET ORAL at 08:08

## 2020-08-01 RX ADMIN — LACTULOSE 15 G: 20 SOLUTION ORAL at 03:08

## 2020-08-01 RX ADMIN — MIDODRINE HYDROCHLORIDE 10 MG: 5 TABLET ORAL at 02:08

## 2020-08-01 RX ADMIN — POTASSIUM CHLORIDE 40 MEQ: 1500 TABLET, EXTENDED RELEASE ORAL at 12:08

## 2020-08-01 RX ADMIN — SODIUM PHOSPHATE, MONOBASIC, MONOHYDRATE 20.01 MMOL: 276; 142 INJECTION, SOLUTION INTRAVENOUS at 12:08

## 2020-08-01 RX ADMIN — MAGNESIUM SULFATE 2 G: 2 INJECTION INTRAVENOUS at 12:08

## 2020-08-01 RX ADMIN — POTASSIUM & SODIUM PHOSPHATES POWDER PACK 280-160-250 MG 2 PACKET: 280-160-250 PACK at 08:08

## 2020-08-01 RX ADMIN — POTASSIUM CHLORIDE 40 MEQ: 20 TABLET, EXTENDED RELEASE ORAL at 02:08

## 2020-08-01 RX ADMIN — PANTOPRAZOLE SODIUM 40 MG: 40 TABLET, DELAYED RELEASE ORAL at 08:08

## 2020-08-01 RX ADMIN — TRAZODONE HYDROCHLORIDE 50 MG: 50 TABLET ORAL at 10:08

## 2020-08-01 NOTE — PLAN OF CARE
Pt is AAOx4. Pt remain free from fall and injuries. VS remain stable. Questions and concerns voiced and answered. Pt had total of five(5) black tarry stool, able to ambulate to BR with assist. Gautam intact with michelle color urine in  bag. Medication compliance. Call light in reach. Bed in low locked position. Side rails x2. Belongings at bedside.

## 2020-08-01 NOTE — CONSULTS
[facility]  Hepatology  Consult Note    Patient Name: Kenneth Pardo  MRN: [id number]  Admission Date: 7/30/2020  Hospital Length of Stay: 2 days  Code Status: Full Code   Attending Provider: Naresh Franks MD   Consulting Provider: Latha Cheng MD  Primary Care Physician: Mikala Etienne MD  Principal Problem:Acute alcoholic hepatitis    Inpatient consult to Hepatology  Consult performed by: Latha Cheng MD  Consult ordered by: Lucho Ortiz MD        Subjective:     HPI: Kenneth Pardo is a 59 y.o. male with history of hypertension, depression and anxiety who presented as a transfer from [facility] where who presented for fatigue and decreased p.o. intake for a month.  He went to his PCP and was diagnosed with depression food which he was given medications but those did not help.  He continued to feel weak to the point were his wife had to help him go to the bathroom.  He also noticed yellow discoloration of his skin for 1-2 weeks for which he went to the doctor and was told to go checked out of the hospital.  He also reported a history of black stools.  Upon admission to OSH he was found to be hypotensive with elevated liver enzymes and bilirubin.  His Hgb level on admission was 10 which later decreased to 6.7.  T bili 10.4, , .  He was admitted to the ICU for vasopressors support.  GI was consulted and performed an EGD that showed portal hypertensive gastropathy and grade B esophagitis, no varices.  The patient was transferred out of the ICU for further care.  Hepatology was consulted for alcoholic hepatitis.    The patient reports that he has a longstanding history of alcohol use for many years.  He has been drinking 2-3 glasses of vodka per day up until 2 months ago when he started to cut down and decrease his intake to 1 glass of vodka a day.  He attempted alcohol rehab years ago but relapsed again.  No history of DUIs or loss of job due to alcohol.  He  recently lost his job because of COVID.  He reports using marijuana occasionally and a distant history of occasional cocaine use.  No history of IVDU.        Past Medical History:   Diagnosis Date    Hepatitis     Hypertension        History reviewed. No pertinent surgical history.    History reviewed. No pertinent family history.    Social History     Socioeconomic History    Marital status:      Spouse name: Not on file    Number of children: Not on file    Years of education: Not on file    Highest education level: Not on file   Occupational History    Not on file   Social Needs    Financial resource strain: Not on file    Food insecurity     Worry: Not on file     Inability: Not on file    Transportation needs     Medical: Not on file     Non-medical: Not on file   Tobacco Use    Smoking status: Not on file   Substance and Sexual Activity    Alcohol use: Not on file    Drug use: Not on file    Sexual activity: Not on file   Lifestyle    Physical activity     Days per week: Not on file     Minutes per session: Not on file    Stress: Not on file   Relationships    Social connections     Talks on phone: Not on file     Gets together: Not on file     Attends Buddhist service: Not on file     Active member of club or organization: Not on file     Attends meetings of clubs or organizations: Not on file     Relationship status: Not on file   Other Topics Concern    Not on file   Social History Narrative    Not on file       No current facility-administered medications on file prior to encounter.      Current Outpatient Medications on File Prior to Encounter   Medication Sig Dispense Refill    ALPRAZolam (XANAX) 1 MG tablet Take 1 mg by mouth every 8 (eight) hours as needed for Anxiety.      lisinopriL (PRINIVIL,ZESTRIL) 20 MG tablet Take 20 mg by mouth once daily.         Review of patient's allergies indicates:  No Known Allergies    Review of Systems   Constitutional: Positive for  malaise/fatigue. Negative for chills and fever.   HENT: Negative.    Eyes: Negative.    Respiratory: Negative.    Cardiovascular: Negative.    Gastrointestinal: Positive for melena, nausea and vomiting. Negative for constipation and diarrhea.   Genitourinary: Positive for frequency and urgency.   Musculoskeletal: Negative.    Skin:        jaundice   Neurological: Negative.    Endo/Heme/Allergies: Negative.    Psychiatric/Behavioral: Positive for depression and substance abuse.        Objective:     Vitals:    08/01/20 0813   BP: (!) 151/85   Pulse: 71   Resp: 18   Temp: 97.9 °F (36.6 °C)         Constitutional:  not in acute distress  HENT: Head: Normal, normocephalic, atraumatic.  Eyes: conjunctiva clear and sclera icteric  Cardiovascular: regular rate and rhythm and no murmur  Respiratory: normal chest expansion & respiratory effort   and no accessory muscle use  GI: firm, nontender, distended, hepatomegaly noted  Musculoskeletal: no muscular tenderness noted  Skin: jaundice present  Neurological: alert, oriented x3  Psychiatric: mood and affect are within normal limits, pt is a good historian; no memory problems were noted    Significant Labs:  Recent Labs   Lab 07/31/20  1037 07/31/20  1506 08/01/20  0525   HGB 8.0* 7.9* 8.7*       Lab Results   Component Value Date    WBC 11.11 08/01/2020    HGB 8.7 (L) 08/01/2020    HCT 27.0 (L) 08/01/2020     (H) 08/01/2020     (L) 08/01/2020       Lab Results   Component Value Date     (L) 08/01/2020    K 3.0 (L) 08/01/2020    CL 94 (L) 08/01/2020    CO2 29 08/01/2020    BUN 27 (H) 08/01/2020    CREATININE 0.8 08/01/2020    CALCIUM 8.5 (L) 08/01/2020    ANIONGAP 10 08/01/2020    ESTGFRAFRICA >60.0 08/01/2020    EGFRNONAA >60.0 08/01/2020       Lab Results   Component Value Date    ALT 77 (H) 08/01/2020     (H) 08/01/2020    ALKPHOS 331 (H) 08/01/2020    BILITOT 11.5 (H) 08/01/2020       Lab Results   Component Value Date    INR 1.2 08/01/2020     INR 1.3 (H) 07/31/2020    INR 1.3 (H) 07/30/2020       Significant Imaging:  Reviewed pertinent radiology findings.       Assessment/Plan:     Kenneth Pardo is a 59 y.o. male with history of hypertension, depression and anxiety who is admitted for GI bleeding, shock and alcoholic hepatitis.    Problem List:  1. Alcoholic hepatitis  2. Portal hypertensive gastropathy  3. Alcohol abuse disorder      Assessment:  Liver enzyme elevation and history consistent with acute alcoholic hepatitis.  No cirrhosis on imaging.  LFTs improving during the hospital course so far.  Patient will need to stop alcohol intake to prevent further liver injury.  Likely will recover from current episode.  There is a concern for history of HCV which we will work up further.    Recommendations:  - Recommend supportive care for alcoholic hepatitis at this time.  We will not initiate steroids.  - Please obtain viral hepatitis panel.  - Dictation psychiatry consult for alcohol cessation.    Thank you for involving us in the care of Kenneth Pardo. Please call with any additional questions, concerns or changes in the patient's clinical status. We will continue to follow.    Latha Cheng MD  Gastroenterology & Hepatology Fellow PGY IV   [facility]

## 2020-08-01 NOTE — NURSING
Pt arrived to room  With nurse from [facility]. VSS. Denies pain but endorses abdominal  Cramping, assisted to bathroom. Gautam intact.

## 2020-08-01 NOTE — ED NOTES
Per lab @ [facility] , pt. Aerobic Blood cx from 7/29/2020 @ 2238 positive for gram rods.  Dr. Gibbons notified and states pt. Is already admitted and at [facility].     Marguerite Powell RN  08/01/20 7431

## 2020-08-01 NOTE — NURSING
Wife (Dee Dee), updated Pt's statue. Pt had a black tarry stool with Bright red blood pre rectum at this time.

## 2020-08-02 LAB
ALBUMIN SERPL BCP-MCNC: 2.4 G/DL (ref 3.5–5.2)
ALP SERPL-CCNC: 300 U/L (ref 55–135)
ALT SERPL W/O P-5'-P-CCNC: 76 U/L (ref 10–44)
AMMONIA PLAS-SCNC: 36 UMOL/L (ref 10–50)
ANION GAP SERPL CALC-SCNC: 7 MMOL/L (ref 8–16)
APTT BLDCRRT: 35.5 SEC (ref 21–32)
AST SERPL-CCNC: 200 U/L (ref 10–40)
BACTERIA BLD CULT: ABNORMAL
BASOPHILS # BLD AUTO: 0.02 K/UL (ref 0–0.2)
BASOPHILS NFR BLD: 0.2 % (ref 0–1.9)
BILIRUB DIRECT SERPL-MCNC: 10.1 MG/DL (ref 0.1–0.3)
BILIRUB SERPL-MCNC: 14.2 MG/DL (ref 0.1–1)
BUN SERPL-MCNC: 20 MG/DL (ref 6–20)
CALCIUM SERPL-MCNC: 8.2 MG/DL (ref 8.7–10.5)
CHLORIDE SERPL-SCNC: 98 MMOL/L (ref 95–110)
CO2 SERPL-SCNC: 31 MMOL/L (ref 23–29)
CREAT SERPL-MCNC: 0.7 MG/DL (ref 0.5–1.4)
DIFFERENTIAL METHOD: ABNORMAL
EOSINOPHIL # BLD AUTO: 0.5 K/UL (ref 0–0.5)
EOSINOPHIL NFR BLD: 4.7 % (ref 0–8)
ERYTHROCYTE [DISTWIDTH] IN BLOOD BY AUTOMATED COUNT: 19.5 % (ref 11.5–14.5)
EST. GFR  (AFRICAN AMERICAN): >60 ML/MIN/1.73 M^2
EST. GFR  (NON AFRICAN AMERICAN): >60 ML/MIN/1.73 M^2
GLUCOSE SERPL-MCNC: 106 MG/DL (ref 70–110)
HCT VFR BLD AUTO: 27.6 % (ref 40–54)
HGB BLD-MCNC: 9 G/DL (ref 14–18)
IMM GRANULOCYTES # BLD AUTO: 0.55 K/UL (ref 0–0.04)
IMM GRANULOCYTES NFR BLD AUTO: 5 % (ref 0–0.5)
INR PPP: 1.3 (ref 0.8–1.2)
LYMPHOCYTES # BLD AUTO: 1.8 K/UL (ref 1–4.8)
LYMPHOCYTES NFR BLD: 16.5 % (ref 18–48)
MAGNESIUM SERPL-MCNC: 1.7 MG/DL (ref 1.6–2.6)
MCH RBC QN AUTO: 35.9 PG (ref 27–31)
MCHC RBC AUTO-ENTMCNC: 32.6 G/DL (ref 32–36)
MCV RBC AUTO: 110 FL (ref 82–98)
MONOCYTES # BLD AUTO: 0.5 K/UL (ref 0.3–1)
MONOCYTES NFR BLD: 4.5 % (ref 4–15)
NEUTROPHILS # BLD AUTO: 7.7 K/UL (ref 1.8–7.7)
NEUTROPHILS NFR BLD: 69.1 % (ref 38–73)
NRBC BLD-RTO: 1 /100 WBC
PHOSPHATE SERPL-MCNC: 2.1 MG/DL (ref 2.7–4.5)
PLATELET # BLD AUTO: 108 K/UL (ref 150–350)
PMV BLD AUTO: 11.9 FL (ref 9.2–12.9)
POTASSIUM SERPL-SCNC: 3.2 MMOL/L (ref 3.5–5.1)
PREALB SERPL-MCNC: 7 MG/DL (ref 20–43)
PROT SERPL-MCNC: 4.6 G/DL (ref 6–8.4)
PROTHROMBIN TIME: 13.8 SEC (ref 9–12.5)
RBC # BLD AUTO: 2.51 M/UL (ref 4.6–6.2)
SODIUM SERPL-SCNC: 136 MMOL/L (ref 136–145)
WBC # BLD AUTO: 11.06 K/UL (ref 3.9–12.7)

## 2020-08-02 PROCEDURE — 25000003 PHARM REV CODE 250: Performed by: HOSPITALIST

## 2020-08-02 PROCEDURE — 97110 THERAPEUTIC EXERCISES: CPT

## 2020-08-02 PROCEDURE — 85610 PROTHROMBIN TIME: CPT

## 2020-08-02 PROCEDURE — 80048 BASIC METABOLIC PNL TOTAL CA: CPT

## 2020-08-02 PROCEDURE — 85027 COMPLETE CBC AUTOMATED: CPT

## 2020-08-02 PROCEDURE — 83735 ASSAY OF MAGNESIUM: CPT

## 2020-08-02 PROCEDURE — 82140 ASSAY OF AMMONIA: CPT

## 2020-08-02 PROCEDURE — 99233 SBSQ HOSP IP/OBS HIGH 50: CPT | Mod: ,,, | Performed by: HOSPITALIST

## 2020-08-02 PROCEDURE — 11000001 HC ACUTE MED/SURG PRIVATE ROOM

## 2020-08-02 PROCEDURE — 99233 PR SUBSEQUENT HOSPITAL CARE,LEVL III: ICD-10-PCS | Mod: ,,, | Performed by: HOSPITALIST

## 2020-08-02 PROCEDURE — 80076 HEPATIC FUNCTION PANEL: CPT

## 2020-08-02 PROCEDURE — 84100 ASSAY OF PHOSPHORUS: CPT

## 2020-08-02 PROCEDURE — 25000003 PHARM REV CODE 250: Performed by: STUDENT IN AN ORGANIZED HEALTH CARE EDUCATION/TRAINING PROGRAM

## 2020-08-02 PROCEDURE — 84134 ASSAY OF PREALBUMIN: CPT

## 2020-08-02 PROCEDURE — 85730 THROMBOPLASTIN TIME PARTIAL: CPT

## 2020-08-02 PROCEDURE — 85007 BL SMEAR W/DIFF WBC COUNT: CPT

## 2020-08-02 RX ORDER — POTASSIUM CHLORIDE 20 MEQ/1
40 TABLET, EXTENDED RELEASE ORAL ONCE
Status: COMPLETED | OUTPATIENT
Start: 2020-08-02 | End: 2020-08-02

## 2020-08-02 RX ORDER — ALPRAZOLAM 1 MG/1
1 TABLET ORAL ONCE
Status: COMPLETED | OUTPATIENT
Start: 2020-08-02 | End: 2020-08-02

## 2020-08-02 RX ORDER — TRAZODONE HYDROCHLORIDE 100 MG/1
100 TABLET ORAL NIGHTLY
Status: DISCONTINUED | OUTPATIENT
Start: 2020-08-02 | End: 2020-08-09 | Stop reason: HOSPADM

## 2020-08-02 RX ADMIN — RIFAXIMIN 550 MG: 550 TABLET ORAL at 02:08

## 2020-08-02 RX ADMIN — POTASSIUM & SODIUM PHOSPHATES POWDER PACK 280-160-250 MG 2 PACKET: 280-160-250 PACK at 11:08

## 2020-08-02 RX ADMIN — MIDODRINE HYDROCHLORIDE 10 MG: 5 TABLET ORAL at 02:08

## 2020-08-02 RX ADMIN — Medication 100 MG: at 08:08

## 2020-08-02 RX ADMIN — POTASSIUM & SODIUM PHOSPHATES POWDER PACK 280-160-250 MG 2 PACKET: 280-160-250 PACK at 05:08

## 2020-08-02 RX ADMIN — POTASSIUM & SODIUM PHOSPHATES POWDER PACK 280-160-250 MG 2 PACKET: 280-160-250 PACK at 06:08

## 2020-08-02 RX ADMIN — MIDODRINE HYDROCHLORIDE 10 MG: 5 TABLET ORAL at 08:08

## 2020-08-02 RX ADMIN — RIFAXIMIN 550 MG: 550 TABLET ORAL at 08:08

## 2020-08-02 RX ADMIN — ALPRAZOLAM 1 MG: 1 TABLET ORAL at 02:08

## 2020-08-02 RX ADMIN — ALPRAZOLAM 1 MG: 1 TABLET ORAL at 08:08

## 2020-08-02 RX ADMIN — POTASSIUM CHLORIDE 40 MEQ: 1500 TABLET, EXTENDED RELEASE ORAL at 02:08

## 2020-08-02 RX ADMIN — FOLIC ACID 1 MG: 1 TABLET ORAL at 08:08

## 2020-08-02 RX ADMIN — PANTOPRAZOLE SODIUM 40 MG: 40 TABLET, DELAYED RELEASE ORAL at 08:08

## 2020-08-02 RX ADMIN — TRAZODONE HYDROCHLORIDE 100 MG: 100 TABLET ORAL at 08:08

## 2020-08-02 RX ADMIN — LACTULOSE 15 G: 20 SOLUTION ORAL at 08:08

## 2020-08-02 NOTE — PT/OT/SLP PROGRESS
Occupational Therapy   Treatment    Name: Kenneth Pardo  MRN: [id number]  Admitting Diagnosis:  Acute alcoholic hepatitis  2 Days Post-Op    Recommendations:     Discharge Recommendations: home health OT  Discharge Equipment Recommendations:  walker, rolling  Barriers to discharge:  None    Assessment:     Kenneth Pardo is a 59 y.o. male with a medical diagnosis of Acute alcoholic hepatitis.  He presents with limited motivation for activities on this date citing lack of sleep last night.  Pt continues to be at high fall risk. Performance deficits affecting function are weakness, impaired endurance, impaired self care skills, impaired functional mobilty, impaired balance, impaired cognition, decreased upper extremity function, decreased lower extremity function, decreased safety awareness.     Rehab Prognosis:  Fair; patient would benefit from acute skilled OT services to address these deficits and reach maximum level of function.       Plan:     Patient to be seen 3 x/week to address the above listed problems via self-care/home management, therapeutic activities, therapeutic exercises  · Plan of Care Expires: 08/03/20  · Plan of Care Reviewed with: patient    Subjective     Pain/Comfort:  · Pain Rating 1: 0/10  · Pain Rating Post-Intervention 1: 0/10    Objective:     Communicated with: RN prior to session.  Patient found supine with telemetry(femoral line) upon OT entry to room. No family present.    General Precautions: Standard, fall, aspiration   Orthopedic Precautions:N/A   Braces: N/A     Occupational Performance:     Bed Mobility:    · Patient completed Supine to Sit with stand by assistance  · Patient completed Sit to Supine with stand by assistance     Functional Mobility/Transfers:  · Patient completed Sit <> Stand Transfer with stand by assistance  with  no assistive device   · Functional Mobility: SBA-CGA to take ~ 3 steps along side of bed    [facility] 6 Click ADL: 13    Treatment & Education:  Pt performed  AROM exercises with BUE in 3 planes x 10 reps each while seated EOB.  Pt declined donning socks on this date.  Pt had no further questions & when asked whether there were any concerns pt reported none.      Patient left supine with all lines intact, call button in reach, bed alarm on and RN notifiedEducation:      GOALS:   Multidisciplinary Problems     Occupational Therapy Goals        Problem: Occupational Therapy Goal    Goal Priority Disciplines Outcome Interventions   Occupational Therapy Goal     OT, PT/OT Ongoing, Progressing    Description: Goals to be met by: 8/14/20     Patient will increase functional independence with ADLs by performing:    UE Dressing with [place].  LE Dressing with [place].  Grooming while standing at sink with [place].  Toileting from toilet with [place] for hygiene and clothing management.   Bathing from  shower chair/bench with [place].  Toilet transfer to toilet with [place].  Increased functional strength to WFL for B UE.  Upper extremity exercise program x15 reps per handout, with independence.                     Time Tracking:     OT Date of Treatment: 08/02/20  OT Start Time: 1023  OT Stop Time: 1043  OT Total Time (min): 20 min    Billable Minutes:Therapeutic Exercise 20    ANG Mario  8/2/2020

## 2020-08-02 NOTE — PLAN OF CARE
Problem: Occupational Therapy Goal  Goal: Occupational Therapy Goal  Description: Goals to be met by: 8/14/20     Patient will increase functional independence with ADLs by performing:    UE Dressing with [place].  LE Dressing with [place].  Grooming while standing at sink with [place].  Toileting from toilet with [place] for hygiene and clothing management.   Bathing from  shower chair/bench with [place].  Toilet transfer to toilet with [place].  Increased functional strength to WFL for B UE.  Upper extremity exercise program x15 reps per handout, with independence.    Outcome: Ongoing, Progressing     Goals remain appropriate

## 2020-08-02 NOTE — PROGRESS NOTES
Progress Note   Hospital Medicine         Patient Name: Kenneth Pardo  MRN:  [id number]  [facility] Medicine Team: [facility] MED L Naresh Franks MD  Date of Admission:  7/30/2020     Length of Stay:  LOS: 3 days   Expected Discharge Date: 8/3/2020  Principal Problem:  Acute alcoholic hepatitis       Subjective:     Interval History/Overnight Events:  Patient had 14 BMs over night; HE improved; LFTs slightly up from yesterday, will continue to monitor; PT/OT consult; will plan for addiction psych to see patient in the AM; will likely need AA as outpatient     Review of Systems   Constitutional: Negative for chills, fatigue, fever.   HENT: Negative for sore throat, trouble swallowing.    Eyes: Negative for photophobia, visual disturbance.   Respiratory: Negative for cough, shortness of breath.    Cardiovascular: Negative for chest pain, palpitations, leg swelling.   Gastrointestinal: Negative for abdominal pain, constipation, diarrhea, nausea, vomiting.   Endocrine: Negative for cold intolerance, heat intolerance.   Genitourinary: Negative for dysuria, frequency.   Musculoskeletal: Negative for arthralgias, myalgias.   Skin: Negative for rash, wound, erythema   Neurological: Negative for dizziness, syncope, weakness, light-headedness.   Psychiatric/Behavioral: Negative for confusion, hallucinations, anxiety  All other systems reviewed and are negative.    Objective:     Temp:  [97.5 °F (36.4 °C)-98.1 °F (36.7 °C)]   Pulse:  [74-83]   Resp:  [16-28]   BP: (119-139)/(73-85)   SpO2:  [94 %-97 %]       Physical Exam:  Constitutional: appears weak and ill  Head: Normocephalic and atraumatic.   Mouth/Throat: Oropharynx is clear and moist.   Eyes: EOM are normal. Pupils are equal, round, and reactive to light. positive scleral icterus.   Neck: Normal range of motion. Neck supple.   Cardiovascular: Normal rate and regular rhythm.  No murmur heard.  Pulmonary/Chest: Effort normal and breath sounds normal. No respiratory distress.  No wheezes, rales, or rhonchi  Abdominal: Soft. Bowel sounds are normal.  No distension or tenderness  Musculoskeletal: Normal range of motion. No edema.   Neurological: Alert and oriented to person, place, and time.   Skin: Skin is warm and dry.   Psychiatric: Normal mood and affect. Behavior is normal.     Recent Labs   Lab 07/31/20  0244 07/31/20  1037 07/31/20  1506 08/01/20  0525 08/01/20  1414 08/02/20  1012   WBC 10.24 10.51 10.44 11.11 10.37 11.06   HGB 7.8* 8.0* 7.9* 8.7* 9.0* 9.0*   HCT 24.0* 24.4* 24.0* 27.0* 28.1* 27.6*   * 140* 119* 124* 121* 108*     Recent Labs   Lab 07/31/20  0244 08/01/20  0525 08/02/20  1100   * 133* 136   K 4.0 3.0* 3.2*   CL 91* 94* 98   CO2 20* 29 31*   BUN 26* 27* 20   CREATININE 1.3 0.8 0.7   * 147* 106   CALCIUM 7.3* 8.5* 8.2*   MG 1.9 1.8 1.7   PHOS 4.6* 1.7* 2.1*     Recent Labs   Lab 07/31/20  0244 08/01/20  0525 08/02/20  1100   ALKPHOS 376* 331* 300*   ALT 94* 77* 76*   * 191* 200*   ALBUMIN 2.9* 2.7* 2.4*   PROT 5.3* 5.0* 4.6*   BILITOT 11.5* 11.5* 14.2*   INR 1.3* 1.2 1.3*     Recent Labs   Lab 07/30/20  1128 07/30/20  1638   POCTGLUCOSE 154* 91        folic acid  1 mg Oral Daily    midodrine  10 mg Oral TID    pantoprazole  40 mg Oral Daily    potassium, sodium phosphates  2 packet Oral QID (AC & HS)    rifAXImin  550 mg Oral BID    thiamine  100 mg Oral Daily    traZODone  100 mg Oral QHS       Assessment and Plan     Mr. Kenneth Pardo is a 59 y.o. male who presented to [facility] on 7/30/2020 with     Hospital Course:    Mr. Kenneth Pardo was admitted to Hospital Medicine for management of     Active Hospital Problems    Diagnosis  POA    *Acute alcoholic hepatitis [K70.10]  Yes    Acute hepatic encephalopathy [K72.00]  Yes    Positive hepatitis C antibody test [R76.8]  Yes    Macrocytic anemia [D53.9]  Yes    Severe alcohol dependence [F10.20]  Yes    Hypotension [I95.9]  Yes    Hyponatremia [E87.1]  Yes    Upper GI bleed  [K92.2]  Yes    Essential hypertension [I10]  Yes     Chronic    Hepatorenal syndrome [K76.7]  Yes    ETOH abuse [F10.10]  Yes      Resolved Hospital Problems   No resolved problems to display.     # Acute Alcohol hepatitis without ascites  # Severe alcohol dependence   # Positive Hep C antibody   MELD-Na score: 20 at 8/2/2020 11:00 AM  MELD score: 19 at 8/2/2020 11:00 AM  Calculated from:  Serum Creatinine: 0.7 mg/dL (Rounded to 1 mg/dL) at 8/2/2020 11:00 AM  Serum Sodium: 136 mmol/L at 8/2/2020 11:00 AM  Total Bilirubin: 14.2 mg/dL at 8/2/2020 11:00 AM  INR(ratio): 1.3 at 8/2/2020 11:00 AM  Age: 59 years 11 months  - patient has a long h/o alcohol use  - newly positive hep c antibody, viral load pending  - hepatology consulted  - U/S with liver reviewed   - planning for addiction psych consult Monday  - LFTs are improving    # MERLY  # Hepatorenal Syndrome  - resolved    # Acute hepatic encephalopathy  - started on lactulose to have 3 to 4 BMs daily  - holding lactulose today and starting rifaximin due to 14 BMs     # Hyponatremia  - fluid restrict     # Hypotension  - started on midodrine    # Gastrointestinal hemorrhage with melena  - EGD showed esophagitis and portal hypertensive gastropathy     # Macrocytic anemia  # Thrombocytopenia  - folic acid    # Hypokalemia/Hypophosphatemia  - replace     Diet:  Low sodium  GI PPx:    DVT PPx:    Goals of Care:  full      Disposition:  Likely Tuesday      Naresh Franks MD  Medical Director [facility] Medicine  Spectra:  [phone]  Pager: [phone]

## 2020-08-02 NOTE — PROGRESS NOTES
Progress Note   Hospital Medicine         Patient Name: Kenneth Pardo  MRN:  [id number]  [facility] Medicine Team: [facility] L Naresh Franks MD  Date of Admission:  7/30/2020     Length of Stay:  LOS: 2 days   Expected Discharge Date: 8/3/2020  Principal Problem:  Acute alcoholic hepatitis       Subjective:     Interval History/Overnight Events:  Patient stepped down from the ICU onto IML; mother at the bedside; patient states he has been having trouble sleeping and episodes of confusion and weakness and has positive asterixis, will start patient on lactulose; spoke in depth to patient about what a MELD score is and what his pertains to him; planning to get addiction psych eval on Monday and likely d/c then;   - patient also apparently had a positive hep C antibody at OSH, patient states no known h/o hep c, checking viral load;     Review of Systems   Constitutional: Negative for chills, fatigue, fever.   HENT: Negative for sore throat, trouble swallowing.    Eyes: Negative for photophobia, visual disturbance.   Respiratory: Negative for cough, shortness of breath.    Cardiovascular: Negative for chest pain, palpitations, leg swelling.   Gastrointestinal: Negative for abdominal pain, constipation, diarrhea, nausea, vomiting.   Endocrine: Negative for cold intolerance, heat intolerance.   Genitourinary: Negative for dysuria, frequency.   Musculoskeletal: Negative for arthralgias, myalgias.   Skin: Negative for rash, wound, erythema   Neurological: Negative for dizziness, syncope, weakness, light-headedness.   Psychiatric/Behavioral: Negative for confusion, hallucinations, anxiety  All other systems reviewed and are negative.    Objective:     Temp:  [96.7 °F (35.9 °C)-98.4 °F (36.9 °C)]   Pulse:  [71-83]   Resp:  [18-20]   BP: (119-156)/(74-85)   SpO2:  [92 %-96 %]       Physical Exam:  Constitutional: appears weak and ill  Head: Normocephalic and atraumatic.   Mouth/Throat: Oropharynx is clear and moist.   Eyes: EOM  are normal. Pupils are equal, round, and reactive to light. positive scleral icterus.   Neck: Normal range of motion. Neck supple.   Cardiovascular: Normal rate and regular rhythm.  No murmur heard.  Pulmonary/Chest: Effort normal and breath sounds normal. No respiratory distress. No wheezes, rales, or rhonchi  Abdominal: Soft. Bowel sounds are normal.  No distension or tenderness  Musculoskeletal: Normal range of motion. No edema.   Neurological: Alert and oriented to person, place, and time.   Skin: Skin is warm and dry.   Psychiatric: Normal mood and affect. Behavior is normal.     Recent Labs   Lab 07/30/20  2306 07/31/20  0244 07/31/20  1037 07/31/20  1506 08/01/20  0525 08/01/20  1414   WBC 10.40 10.24 10.51 10.44 11.11 10.37   HGB 7.9* 7.8* 8.0* 7.9* 8.7* 9.0*   HCT 24.3* 24.0* 24.4* 24.0* 27.0* 28.1*   * 131* 140* 119* 124* 121*     Recent Labs   Lab 07/30/20  1626 07/31/20  0244 08/01/20  0525   * 130* 133*   K 3.6 4.0 3.0*   CL 89* 91* 94*   CO2 17* 20* 29   BUN 29* 26* 27*   CREATININE 2.0* 1.3 0.8   GLU 88 191* 147*   CALCIUM 6.9* 7.3* 8.5*   MG 1.5* 1.9 1.8   PHOS 4.9* 4.6* 1.7*     Recent Labs   Lab 07/30/20  1626 07/31/20  0244 08/01/20  0525   ALKPHOS 421* 376* 331*   * 94* 77*   * 307* 191*   ALBUMIN 3.7 2.9* 2.7*   PROT 6.0 5.3* 5.0*   BILITOT 10.2* 11.5* 11.5*   INR 1.3* 1.3* 1.2     Recent Labs   Lab 07/30/20  1128 07/30/20  1638   POCTGLUCOSE 154* 91        lactulose  15 g Oral TID    midodrine  10 mg Oral TID    pantoprazole  40 mg Oral Daily    potassium, sodium phosphates  2 packet Oral QID (AC & HS)       Assessment and Plan     Mr. Kenneth Pardo is a 59 y.o. male who presented to [facility] on 7/30/2020 with     Hospital Course:    Mr. Kenneth Pardo was admitted to Hospital Medicine for management of     Active Hospital Problems    Diagnosis  POA    *Acute alcoholic hepatitis [K70.10]  Yes    Acute hepatic encephalopathy [K72.00]  Yes    Positive hepatitis C  antibody test [R76.8]  Yes    Macrocytic anemia [D53.9]  Yes    Severe alcohol dependence [F10.20]  Yes    Hypotension [I95.9]  Yes    Hyponatremia [E87.1]  Yes    Upper GI bleed [K92.2]  Yes    Essential hypertension [I10]  Yes     Chronic    Hepatorenal syndrome [K76.7]  Yes    ETOH abuse [F10.10]  Yes      Resolved Hospital Problems   No resolved problems to display.     # Acute Alcohol hepatitis without ascites  # Severe alcohol dependence   # Positive Hep C antibody   MELD-Na score: 21 at 8/1/2020  5:25 AM  MELD score: 18 at 8/1/2020  5:25 AM  Calculated from:  Serum Creatinine: 0.8 mg/dL (Rounded to 1 mg/dL) at 8/1/2020  5:25 AM  Serum Sodium: 133 mmol/L at 8/1/2020  5:25 AM  Total Bilirubin: 11.5 mg/dL at 8/1/2020  5:25 AM  INR(ratio): 1.2 at 8/1/2020  5:25 AM  Age: 59 years 11 months  - patient has a long h/o alcohol use  - newly positive hep c antibody, viral load pending  - hepatology consulted  - U/S with liver reviewed   - planning for addiction psych consult Monday  - LFTs are improving    # MERLY  # Hepatorenal Syndrome  - resolved    # Acute hepatic encephalopathy  - started on lactulose to have 3 to 4 BMs daily     # Hyponatremia  - fluid restrict     # Hypotension  - started on midodrine    # Gastrointestinal hemorrhage with melena  - EGD showed esophagitis and portal hypertensive gastropathy     # Macrocytic anemia  # Thrombocytopenia  - folic acid    # Hypokalemia/Hypophosphatemia  - replace     Diet:  Low sodium  GI PPx:    DVT PPx:    Goals of Care:  full      Disposition:  Monday     Naresh Franks MD  Medical Director [facility] Medicine  Spectra:  [phone]  Pager: [phone]

## 2020-08-02 NOTE — NURSING
Pt sims was removed on day shift, pt has not voided yet. Bladder scan showed 30cc. MD updated and informed. Will continue to monitor.

## 2020-08-03 DIAGNOSIS — K70.10 ACUTE ALCOHOLIC HEPATITIS: Primary | ICD-10-CM

## 2020-08-03 LAB
ALBUMIN SERPL BCP-MCNC: 2.3 G/DL (ref 3.5–5.2)
ALP SERPL-CCNC: 302 U/L (ref 55–135)
ALT SERPL W/O P-5'-P-CCNC: 77 U/L (ref 10–44)
ANION GAP SERPL CALC-SCNC: 10 MMOL/L (ref 8–16)
ANISOCYTOSIS BLD QL SMEAR: SLIGHT
ANISOCYTOSIS BLD QL SMEAR: SLIGHT
APTT BLDCRRT: 36 SEC (ref 21–32)
AST SERPL-CCNC: 197 U/L (ref 10–40)
BASO STIPL BLD QL SMEAR: ABNORMAL
BASO STIPL BLD QL SMEAR: ABNORMAL
BASOPHILS # BLD AUTO: ABNORMAL K/UL (ref 0–0.2)
BASOPHILS NFR BLD: 0 % (ref 0–1.9)
BASOPHILS NFR BLD: 0 % (ref 0–1.9)
BILIRUB DIRECT SERPL-MCNC: 11.2 MG/DL (ref 0.1–0.3)
BILIRUB SERPL-MCNC: 15.6 MG/DL (ref 0.1–1)
BLD PROD TYP BPU: NORMAL
BLD PROD TYP BPU: NORMAL
BLOOD UNIT EXPIRATION DATE: NORMAL
BLOOD UNIT EXPIRATION DATE: NORMAL
BLOOD UNIT TYPE CODE: 6200
BLOOD UNIT TYPE CODE: 6200
BLOOD UNIT TYPE: NORMAL
BLOOD UNIT TYPE: NORMAL
BUN SERPL-MCNC: 13 MG/DL (ref 6–20)
CALCIUM SERPL-MCNC: 8.1 MG/DL (ref 8.7–10.5)
CHLORIDE SERPL-SCNC: 98 MMOL/L (ref 95–110)
CO2 SERPL-SCNC: 29 MMOL/L (ref 23–29)
CODING SYSTEM: NORMAL
CODING SYSTEM: NORMAL
CREAT SERPL-MCNC: 0.6 MG/DL (ref 0.5–1.4)
DIFFERENTIAL METHOD: ABNORMAL
DIFFERENTIAL METHOD: ABNORMAL
DISPENSE STATUS: NORMAL
DISPENSE STATUS: NORMAL
DOHLE BOD BLD QL SMEAR: PRESENT
EOSINOPHIL # BLD AUTO: ABNORMAL K/UL (ref 0–0.5)
EOSINOPHIL NFR BLD: 3 % (ref 0–8)
EOSINOPHIL NFR BLD: 3 % (ref 0–8)
ERYTHROCYTE [DISTWIDTH] IN BLOOD BY AUTOMATED COUNT: 19.8 % (ref 11.5–14.5)
ERYTHROCYTE [DISTWIDTH] IN BLOOD BY AUTOMATED COUNT: 20 % (ref 11.5–14.5)
EST. GFR  (AFRICAN AMERICAN): >60 ML/MIN/1.73 M^2
EST. GFR  (NON AFRICAN AMERICAN): >60 ML/MIN/1.73 M^2
GLUCOSE SERPL-MCNC: 89 MG/DL (ref 70–110)
HAV IGM SERPL QL IA: NEGATIVE
HBV CORE IGM SERPL QL IA: NEGATIVE
HBV SURFACE AG SERPL QL IA: NEGATIVE
HCT VFR BLD AUTO: 27.6 % (ref 40–54)
HCT VFR BLD AUTO: 31.4 % (ref 40–54)
HCV AB SERPL QL IA: NEGATIVE
HGB BLD-MCNC: 8.9 G/DL (ref 14–18)
HGB BLD-MCNC: 9.7 G/DL (ref 14–18)
HOWELL-JOLLY BOD BLD QL SMEAR: ABNORMAL
HYPOCHROMIA BLD QL SMEAR: ABNORMAL
IMM GRANULOCYTES # BLD AUTO: ABNORMAL K/UL (ref 0–0.04)
IMM GRANULOCYTES # BLD AUTO: ABNORMAL K/UL (ref 0–0.04)
IMM GRANULOCYTES NFR BLD AUTO: ABNORMAL % (ref 0–0.5)
IMM GRANULOCYTES NFR BLD AUTO: ABNORMAL % (ref 0–0.5)
INR PPP: 1.3 (ref 0.8–1.2)
LYMPHOCYTES # BLD AUTO: ABNORMAL K/UL (ref 1–4.8)
LYMPHOCYTES NFR BLD: 15 % (ref 18–48)
LYMPHOCYTES NFR BLD: 17 % (ref 18–48)
MAGNESIUM SERPL-MCNC: 1.6 MG/DL (ref 1.6–2.6)
MCH RBC QN AUTO: 34.8 PG (ref 27–31)
MCH RBC QN AUTO: 36.2 PG (ref 27–31)
MCHC RBC AUTO-ENTMCNC: 30.9 G/DL (ref 32–36)
MCHC RBC AUTO-ENTMCNC: 32.2 G/DL (ref 32–36)
MCV RBC AUTO: 112 FL (ref 82–98)
MCV RBC AUTO: 113 FL (ref 82–98)
METAMYELOCYTES NFR BLD MANUAL: 1 %
METAMYELOCYTES NFR BLD MANUAL: 2 %
MONOCYTES # BLD AUTO: ABNORMAL K/UL (ref 0.3–1)
MONOCYTES NFR BLD: 3 % (ref 4–15)
MONOCYTES NFR BLD: 3 % (ref 4–15)
MYELOCYTES NFR BLD MANUAL: 1 %
NEUTROPHILS NFR BLD: 70 % (ref 38–73)
NEUTROPHILS NFR BLD: 77 % (ref 38–73)
NEUTS BAND NFR BLD MANUAL: 5 %
NRBC BLD-RTO: 3 /100 WBC
NRBC BLD-RTO: 3 /100 WBC
NUM UNITS TRANS PACKED RBC: NORMAL
NUM UNITS TRANS PACKED RBC: NORMAL
OVALOCYTES BLD QL SMEAR: ABNORMAL
PHOSPHATE SERPL-MCNC: 2.5 MG/DL (ref 2.7–4.5)
PLATELET # BLD AUTO: 106 K/UL (ref 150–350)
PLATELET # BLD AUTO: 122 K/UL (ref 150–350)
PLATELET BLD QL SMEAR: ABNORMAL
PLATELET BLD QL SMEAR: ABNORMAL
PMV BLD AUTO: 11.5 FL (ref 9.2–12.9)
PMV BLD AUTO: 11.9 FL (ref 9.2–12.9)
POIKILOCYTOSIS BLD QL SMEAR: SLIGHT
POLYCHROMASIA BLD QL SMEAR: ABNORMAL
POLYCHROMASIA BLD QL SMEAR: ABNORMAL
POTASSIUM SERPL-SCNC: 3.5 MMOL/L (ref 3.5–5.1)
PROT SERPL-MCNC: 4.6 G/DL (ref 6–8.4)
PROTHROMBIN TIME: 13.7 SEC (ref 9–12.5)
RBC # BLD AUTO: 2.46 M/UL (ref 4.6–6.2)
RBC # BLD AUTO: 2.79 M/UL (ref 4.6–6.2)
SODIUM SERPL-SCNC: 137 MMOL/L (ref 136–145)
TOXIC GRANULES BLD QL SMEAR: PRESENT
TOXIC GRANULES BLD QL SMEAR: PRESENT
WBC # BLD AUTO: 10.98 K/UL (ref 3.9–12.7)
WBC # BLD AUTO: 11.55 K/UL (ref 3.9–12.7)

## 2020-08-03 PROCEDURE — 83735 ASSAY OF MAGNESIUM: CPT

## 2020-08-03 PROCEDURE — 84100 ASSAY OF PHOSPHORUS: CPT

## 2020-08-03 PROCEDURE — 99233 SBSQ HOSP IP/OBS HIGH 50: CPT | Mod: ,,, | Performed by: INTERNAL MEDICINE

## 2020-08-03 PROCEDURE — 99233 PR SUBSEQUENT HOSPITAL CARE,LEVL III: ICD-10-PCS | Mod: ,,, | Performed by: INTERNAL MEDICINE

## 2020-08-03 PROCEDURE — 80076 HEPATIC FUNCTION PANEL: CPT

## 2020-08-03 PROCEDURE — 99233 PR SUBSEQUENT HOSPITAL CARE,LEVL III: ICD-10-PCS | Mod: ,,, | Performed by: HOSPITALIST

## 2020-08-03 PROCEDURE — 99232 SBSQ HOSP IP/OBS MODERATE 35: CPT | Mod: AF,HB,, | Performed by: PSYCHIATRY & NEUROLOGY

## 2020-08-03 PROCEDURE — 27000221 HC OXYGEN, UP TO 24 HOURS

## 2020-08-03 PROCEDURE — 25000003 PHARM REV CODE 250: Performed by: HOSPITALIST

## 2020-08-03 PROCEDURE — 99233 SBSQ HOSP IP/OBS HIGH 50: CPT | Mod: ,,, | Performed by: HOSPITALIST

## 2020-08-03 PROCEDURE — 80048 BASIC METABOLIC PNL TOTAL CA: CPT

## 2020-08-03 PROCEDURE — 11000001 HC ACUTE MED/SURG PRIVATE ROOM

## 2020-08-03 PROCEDURE — 85027 COMPLETE CBC AUTOMATED: CPT

## 2020-08-03 PROCEDURE — 94761 N-INVAS EAR/PLS OXIMETRY MLT: CPT

## 2020-08-03 PROCEDURE — 85730 THROMBOPLASTIN TIME PARTIAL: CPT

## 2020-08-03 PROCEDURE — 25000003 PHARM REV CODE 250: Performed by: STUDENT IN AN ORGANIZED HEALTH CARE EDUCATION/TRAINING PROGRAM

## 2020-08-03 PROCEDURE — 99232 PR SUBSEQUENT HOSPITAL CARE,LEVL II: ICD-10-PCS | Mod: AF,HB,, | Performed by: PSYCHIATRY & NEUROLOGY

## 2020-08-03 PROCEDURE — 85610 PROTHROMBIN TIME: CPT

## 2020-08-03 PROCEDURE — 85007 BL SMEAR W/DIFF WBC COUNT: CPT

## 2020-08-03 RX ORDER — LIDOCAINE 50 MG/G
2 PATCH TOPICAL
Status: DISCONTINUED | OUTPATIENT
Start: 2020-08-03 | End: 2020-08-05

## 2020-08-03 RX ORDER — ACETAMINOPHEN 325 MG/1
325 TABLET ORAL EVERY 4 HOURS PRN
Status: DISCONTINUED | OUTPATIENT
Start: 2020-08-03 | End: 2020-08-09 | Stop reason: HOSPADM

## 2020-08-03 RX ORDER — MIDODRINE HYDROCHLORIDE 2.5 MG/1
2.5 TABLET ORAL 3 TIMES DAILY
Status: DISCONTINUED | OUTPATIENT
Start: 2020-08-03 | End: 2020-08-04

## 2020-08-03 RX ORDER — LORAZEPAM/0.9% SODIUM CHLORIDE 100MG/0.1L
2 PLASTIC BAG, INJECTION (ML) INTRAVENOUS ONCE
Status: COMPLETED | OUTPATIENT
Start: 2020-08-03 | End: 2020-08-03

## 2020-08-03 RX ADMIN — MAGNESIUM SULFATE IN WATER 2 G: 40 INJECTION, SOLUTION INTRAVENOUS at 08:08

## 2020-08-03 RX ADMIN — Medication 6 MG: at 08:08

## 2020-08-03 RX ADMIN — MIDODRINE HYDROCHLORIDE 10 MG: 5 TABLET ORAL at 08:08

## 2020-08-03 RX ADMIN — RIFAXIMIN 550 MG: 550 TABLET ORAL at 08:08

## 2020-08-03 RX ADMIN — LIDOCAINE 2 PATCH: 50 PATCH TOPICAL at 05:08

## 2020-08-03 RX ADMIN — MIDODRINE HYDROCHLORIDE 2.5 MG: 2.5 TABLET ORAL at 02:08

## 2020-08-03 RX ADMIN — FOLIC ACID 1 MG: 1 TABLET ORAL at 08:08

## 2020-08-03 RX ADMIN — TRAZODONE HYDROCHLORIDE 100 MG: 100 TABLET ORAL at 08:08

## 2020-08-03 RX ADMIN — Medication 100 MG: at 08:08

## 2020-08-03 RX ADMIN — PANTOPRAZOLE SODIUM 40 MG: 40 TABLET, DELAYED RELEASE ORAL at 08:08

## 2020-08-03 RX ADMIN — MIDODRINE HYDROCHLORIDE 2.5 MG: 2.5 TABLET ORAL at 08:08

## 2020-08-03 NOTE — PHARMACY MED REC
"Admission Medication Reconciliation - Pharmacy Consult Note    The home medication history was taken by Melania Spann, Pharmacy Tech.     You may go to "Admission" then "Reconcile Home Medications" tabs to review and/or act upon these items.      No issues noted with the medication reconciliation.      Quita Delatorre, PharmD, BCPS  [id number]                .    .          "

## 2020-08-03 NOTE — PLAN OF CARE
Patient aaox3, amb, vss, right fem tria lysis cath  dose not give blood return, changed to lab draw, POC explained , patient verbalized understanding, bed low call light in reach, will monitor.

## 2020-08-03 NOTE — HPI
ADDICTION CONSULT INITIAL EVALUATION     DEPARTMENT:  Psychiatry  SITE: [facility], [address]    DATE OF ADMISSION: 7/30/2020 10:16 AM  LENGTH OF STAY: 4 days    EXAMINING PRACTITIONER: Rene Corbin    CONSULT REQUESTED BY: Naresh Franks MD      SUBJECTIVE     CHIEF COMPLAINT  Kenneth Pardo is a 59 y.o. male who is seen today for an initial psychiatric evaluation by the addiction psychiatry consult service.  Kenneth Pardo presents with the chief complaint of: pre-transplant evaluation      HISTORY OF PRESENT ILLNESS    Per Primary MD:  Patient is a 58 Yo male with PMH of HTN, Hep C, anxiety and chronic daily ETOH abuse who initially presented to an outside facility ([facility]) with a chief compliant of weakness. Patient reports that over the past month he has felt extremely tired and weak. He attributed this to chronic anxiety, for which he takes Xanxax BID. Reportedly patient ran out of his anxiety medication and went to see his PCP for a refill. At that time, his PCP told him to go to the hospital to get checked out.  Patient presented to [facility] and was worked up for acute hepatitis. Initial viral hepatitis panel was negative. Initial labs from [facility] indicate leukocytosis, elevated bilirubin, and elevated liver function test. Patient was also hypotensive, requiring pressors. ED staff at [facility] placed a right femoral central catheter for access.Patient was then transferred to [facility] for further care given acute hepatitis, MERLY and hypotension.    Per Addiction Psych MD:  Pt reports 20 year hx of EtOH abuse that initially started with beer but progressed to hard liquor, most recently vodka daily.  Up until 1 year ago, he was drinking up to 1 pint vodka/day.  He has since decreased this amount to 6-8 ounces/day.  He reports taking xanax daily as well for his anxiety as prescribed by his PCP Dr. Nielsen (No xanax Rx found on ).  He endorses occasionally taking xanax and EtOH at the same  "time.  He reports last EtOH use was 2 weeks ago (however he presented to ED with ).      Hx of Rehab at [place] 4 years ago with subsequent 1.5 years of sobriety afterwards. He feels guilty about not staying sober and endorses depressive symptoms of anhedonia, depressed mood, decreased energy, poor sleep.  Denies worrying or anxiety symptoms.  Denies SI/HI/AVH.  Denies hx of SI or SA.     He was previously working in construction where he denied ever showing up to work intoxicated or drinking on the job.  Work was a major source of stress for him as he feels he is overworked and held to higher standards than his superiors despite earning less and holding a lower ranked position.  He reports taking morning xanax to hold him over during his work until he could get home to drink.  He would routinely drink until he fell asleep.  He endorses poor sleep without EtOH.  He initially resorted occasional concurrent use of xanax and EtOH but later retracted.  He understands the risks of such behavior.  He denies SI or intent to harm himself.  He is currently open to going to rehab for his EtOH use and considers this hospitalization an "eye opening experience."              COLLATERAL Dee Dee Pardo [phone]  ***  Called x3, no answer    SUBSTANCE ABUSE HISTORY  Substance(s) of Choice: EtOH   Substances Used: THC, benzos, EtOH  History of IVDU?: denies  Use of Alcohol: yes  Average Consumption: vodka up to 6-8 oz/day (previous up to 1 pint/day prior to 1 year ago  Last Drink: reports 2 weeks ago; however,  on ED presentation  Use of Medications for Alcohol/Opioid Use Disorder: denies  History of Complicated Withdrawal: denies  History of Detox: denies  Rehab History: [place] 3-4 years ago  AA/NA involvement: denies  Tobacco: denies  Spouse/Partner Consumption: denies  Patient Aware of Biomedical Complications: yes    DSM-5 SUBSTANCE USE DISORDER CRITERIA   Mild (1-3), Moderate (4-5), Severe (?6)  1. Often " take in larger amounts or over a longer period of time than was intended.  2. Persistent desire or unsuccessful efforts to cut down or control use.  3. Great deal of time spent in activities necessary to obtain substance, use, or recover from effects.  4. Craving/strong desire for substance or urge to use.  5. Use resulting in failure to fulfill major role obligations at home, work or school.  6. Social, occupational, recreational activities decreased because of use.  7. Continued use despite having persistent or recurrent social or interpersonal problems cause or exaserbated by the substance.  8. Recurrent use in situations in which it is physically hazardous.  9. Use despite physical or psychological problems that are likely to have been caused or exacerbated by the substance.  10. Tolerance, as defined by either of the following.   A. A need for markedly increased amounts of substance to achieve intoxication or desired effect. -OR-    B. A markedly diminished effect with continued use of the same amount of substance.  11. Withdrawal, as manifested by the following.   A. The characteristic withdrawal syndrome for substance. -AND-   B. Substance is taken to relieve or avoid withdrawal symptoms.    ARE THE CRITERIA MET FOR DSM-5 SUBSTANCE USE DISORDER:   Meets criteria for severe EtOH use disorder      TRANSPLANT EVALUATION  Date first informed of impending organ failure - this hospitalization  When was the subject of transplantation first broached - this hospitalization  Pt made the following lifestyle adjustments and how - EtOH cessation for remainder of life  Does the patient accept/understand the connection between substance use and subsequent organ failure - yes  Date of last use of substances - 7/29  Understands need for lifetime medication - yes  Understands need for lifetime sobriety - yes  View of AA/NA - could be beneficial  Social support - reports great support from wife      PSYCHIATRIC  "HISTORY  Reported Diagnose(s): reports anxiety  Previous Medication Trials: denies, Reports xanax Rx from PCP (no Rx on  search)  Previous Psychiatric Hospitalizations: denies  Outpatient Psychiatrist?: denies      SUICIDE/HOMICIDE RISK ASSESSMENT  Current/active suicidal ideation/plan/intent: denies  Previous suicide attempts: denies  Current/active homicidal ideation/plan/intent: denies      HISTORY OF TRAUMA, ABUSE & VIOLENCE  Trauma: denies  Physical Abuse: denies  Sexual Abuse: denies  Violent Conduct: denies    Access to Gun: denies       FAMILY PSYCHIATRIC HISTORY   denies      SOCIAL HISTORY  Lives with wife who is very supportive.  Formerly employed as .        PAST MEDICAL HISTORY   HTN      PSYCHOSOCIAL FACTORS  Stressors (Psychosocial and Environmental): health, occupational and drug and alcohol.       PSYCHIATRIC ROS  Endorses symtpoms of depression which may be confounded by substance abuse.  Endorses anhedonia, decreased energy, fatigue, poor sleep, guilt of EtOH use.  Symptom onset "a few months ago." and progressively worsening.      Denies symptoms of anxiety, PTSD, bipolar disorder of schizophrenia.       MEDICAL ROS    Complete review of systems performed covering Constitutional, Eyes, ENT/Mouth, Cardiovascular, Respiratory, Gastrointestinal, Genitourinary, Musculoskeletal, Skin, Neurologic, Endocrine, Heme/Lymph, and Allergy/Immune.     Complete review of systems was negative with the exception of the following positive symptoms: Stomach cramps, generalized fatigue.       ALLERGIES  Patient has no known allergies.      MEDICATIONS    Psychotropics:  none    Infusions:    "

## 2020-08-03 NOTE — DISCHARGE INSTRUCTIONS
Cirrhosis/ Liver disease  Counseling  - strict abstinence of alcohol use  - compliance with lactulose and rifaximin if you have developed hepatic encephalopathy (confusion due to high ammonia level)  - avoid non-steroidal anti-inflammatory drugs (NSAIDs) such as ibuprofen, Motrin, naprosyn, Alleve due to the risk of kidney damage  - can take acetaminophen (Tylenol), no more than 2000 mg per day  - low sodium (salt) 2 gram per day diet  - 2 Liters per day fluid restriction   - nutrition: 25-30kcal (calorie per body body weight in kilogram) per day  - no need to restrict protein in diet  - high protein diet: 120 grams per day to prevent muscle mass loss. Recommended at least 1 protein shake daily using Premier Protein shakes    - avoid fasting  - Late night snack with 50 gram of complex carbohydrates and protein  - resistance exercises for muscle strength  - avoid raw seafoods due to the risk of fatal Vibrio vulnificus infection                MENTAL HEALTH/ADDICTIVE DISORDERS  REFERRAL RECOMMENDATIONS    I. AA ([phone]) [email or web address] or NA ([phone])    II. [facility] Int[facility] [phone], option 2    III. Other Places for Outpatient Addictive Disorders and Mental Health Treatment in [facility]:    [facility] ([place], [place]; accepts Medicaid, commercial insurance) [phone]    [facility] ([place]) [phone], [address] [facility] Mental Health [phone]; Crisis [phone] - Call for options A-E:    ? [facility], [address] [facility] [address]    ? [facility], [address] [place] [address]    ? [facility], [address] [place] [address],    ? [facility], 2nd floor [address] [place] [address]    ? [facility], [address] , [place] [address]    ? [facility], [place] [address]    [facility] [place], [address] [facility], [phone]    [facility], [place] [phone]    [facility] (Mental & General), [address] [facility], [phone]    [facility] (Mental & General Health, not only HIV+, [address] [facility] locations) [phone]    [facility], [address] [place], [place], [address], [phone]     [facility], [address] [facility] [place], [phone], [phone]    [facility] (Methadone Maintenance)    ? [address] [place] [address], [place] [address], [phone]    ? [place], [place] [address] [phone]    IV. Addiction and Mental Health Treatment in Other [facility]:    [facility], [address] [facility] [place], [phone]    [facility], [phone], [address] [facility], [address], [phone]    [facility]. [address] [place] [address], [phone]    [facility], [address] [place] [address], [phone]    [facility]    ? [facility], [address] [facility], [phone] ([facility])    ? [facility], [address] [facility], [phone] ([facility])    ? [facility], [address], [place], [phone] ([place], [facility])    ? [facility], [address] [place], [phone] ([facility])    [facility] Hotline [phone], [phone]    ? [facility], [address] [facility]    ? [facility], [address] [facility], [address], [place]    ? [facility], [address] [place]    ? [facility], [address] [facility] [address] [place]    ? [facility], [address], [place]    [facility], [address], [place] [phone]    [facility], [address] [facility] [phone]    [facility], [address] [facility], [phone]    [facility], [address] [facility], [phone]    V. Residential Addictive Disorders Treatment (call every day until you get in):    [facility] [address] [facility], [phone]    [facility] (men only) [address] [facility], [phone]    [facility], [address] [facility], mens program [phone], womens program [phone]    [facility], [address] [facility], [phone]    [facility] (Female only) [address] [facility], [phone]    [facility] (IOP, residential, low cost, MCaid) [address] [facility], [place], [phone]    [facility] (Men only, [phone]), [address] [facility], [place]    [facility] (Pregnant/women with or without children, [phone])    [facility] (Women only), [address] [facility], [phone]    [facility] (men only)[facility] [phone]    VI. Inpatient Rehabs (out of area)    [facility], [place], [place], [phone]     [facility], [phone]    [facility], [phone]    [place], [place] ([phone])    [place] ([facility]) [phone]    VII. In case of suicidal thinking, call the COPE LINE [phone] / [phone]

## 2020-08-03 NOTE — PLAN OF CARE
08/03/20 0831   Post-Acute Status   Post-Acute Authorization Other   Other Status No Post-Acute Service Needs

## 2020-08-03 NOTE — CONSULTS
[facility]  Psychiatry  Consult Note    Patient Name: Kenneth Pardo  MRN: [id number]   Code Status: Full Code  Admission Date: 7/30/2020  Hospital Length of Stay: 4 days  Attending Physician: Naresh Franks MD  Primary Care Provider: Mikala Etienne MD    Current Legal Status: Uncontested    Patient information was obtained from patient and ER records.   Inpatient consult to Psychiatry  Consult performed by: Rene Corbin MD  Consult ordered by: Naresh Franks MD        Principal Problem:Acute alcoholic hepatitis    Chief Complaint:  Acute alcoholic hepatitis     HPI:   ADDICTION CONSULT INITIAL EVALUATION     DEPARTMENT:  Psychiatry  SITE: [facility] [address]    DATE OF ADMISSION: 7/30/2020 10:16 AM  LENGTH OF STAY: 4 days    EXAMINING PRACTITIONER: Rene Corbin    CONSULT REQUESTED BY: Naresh Franks MD      CHIEF COMPLAINT  Kenneth Pardo is a 59 y.o. male who is seen today for an initial psychiatric evaluation by the addiction psychiatry consult service.  Kenneth Pardo presents with the chief complaint of: pre-transplant evaluation      HISTORY OF PRESENT ILLNESS    Per Primary MD:  Patient is a 60 Yo male with PMH of HTN, Hep C, anxiety and chronic daily ETOH abuse who initially presented to an outside facility ([facility]) with a chief compliant of weakness. Patient reports that over the past month he has felt extremely tired and weak. He attributed this to chronic anxiety, for which he takes Xanxax BID. Reportedly patient ran out of his anxiety medication and went to see his PCP for a refill. At that time, his PCP told him to go to the hospital to get checked out.  Patient presented to [facility] and was worked up for acute hepatitis. Initial viral hepatitis panel was negative. Initial labs from [facility] indicate leukocytosis, elevated bilirubin, and elevated liver function test. Patient was also hypotensive, requiring pressors. ED staff at [facility] placed a  "right femoral central catheter for access.Patient was then transferred to [facility] for further care given acute hepatitis, MERLY and hypotension.    Per Addiction Psych MD:  Pt reports 20 year hx of EtOH abuse that initially started with beer but progressed to hard liquor, most recently vodka daily.  Up until 1 year ago, he was drinking up to 1 pint vodka/day.  He has since decreased this amount to 6-8 ounces/day.  He reports taking xanax daily as well for his anxiety as prescribed by his PCP Dr. Nielsen (No xanax Rx found on ).  He endorses occasionally taking xanax and EtOH at the same time.  He reports last EtOH use was 2 weeks ago (however he presented to ED with ).      Hx of Rehab at [facility] 4 years ago with subsequent 1.5 years of sobriety afterwards. He feels guilty about not staying sober and endorses depressive symptoms of anhedonia, depressed mood, decreased energy, poor sleep.  Denies worrying or anxiety symptoms.  Denies SI/HI/AVH.  Denies hx of SI or SA.     He was previously working in construction where he denied ever showing up to work intoxicated or drinking on the job.  Work was a major source of stress for him as he feels he is overworked and held to higher standards than his superiors despite earning less and holding a lower ranked position.  He reports taking morning xanax to hold him over during his work until he could get home to drink.  He would routinely drink until he fell asleep.  He endorses poor sleep without EtOH.  He initially resorted occasional concurrent use of xanax and EtOH but later retracted.  He understands the risks of such behavior.  He denies SI or intent to harm himself.  He is currently open to going to rehab for his EtOH use and considers this hospitalization an "eye opening experience."              TERESSA Pardo [phone]  Called x3, sent to voicemail each time    SUBSTANCE ABUSE HISTORY  Substance(s) of Choice: EtOH   Substances Used: THC, " benzos, EtOH  History of IVDU?: denies  Use of Alcohol: yes  Average Consumption: vodka up to 6-8 oz/day (previous up to 1 pint/day prior to 1 year ago  Last Drink: reports 2 weeks ago; however,  on ED presentation  Use of Medications for Alcohol/Opioid Use Disorder: denies  History of Complicated Withdrawal: denies  History of Detox: denies  Rehab History: [facility] 3-4 years ago  AA/NA involvement: denies  Tobacco: denies  Spouse/Partner Consumption: denies  Patient Aware of Biomedical Complications: yes    DSM-5 SUBSTANCE USE DISORDER CRITERIA   Mild (1-3), Moderate (4-5), Severe (?6)  1. Often take in larger amounts or over a longer period of time than was intended.  2. Persistent desire or unsuccessful efforts to cut down or control use.  3. Great deal of time spent in activities necessary to obtain substance, use, or recover from effects.  4. Craving/strong desire for substance or urge to use.  5. Use resulting in failure to fulfill major role obligations at home, work or school.  6. Social, occupational, recreational activities decreased because of use.  7. Continued use despite having persistent or recurrent social or interpersonal problems cause or exaserbated by the substance.  8. Recurrent use in situations in which it is physically hazardous.  9. Use despite physical or psychological problems that are likely to have been caused or exacerbated by the substance.  10. Tolerance, as defined by either of the following.   A. A need for markedly increased amounts of substance to achieve intoxication or desired effect. -OR-    B. A markedly diminished effect with continued use of the same amount of substance.  11. Withdrawal, as manifested by the following.   A. The characteristic withdrawal syndrome for substance. -AND-   B. Substance is taken to relieve or avoid withdrawal symptoms.    ARE THE CRITERIA MET FOR DSM-5 SUBSTANCE USE DISORDER:   Meets criteria for severe EtOH use disorder      TRANSPLANT  "EVALUATION  Date first informed of impending organ failure - this hospitalization  When was the subject of transplantation first broached - this hospitalization  Pt made the following lifestyle adjustments and how - EtOH cessation for remainder of life  Does the patient accept/understand the connection between substance use and subsequent organ failure - yes  Date of last use of substances - 7/29  Understands need for lifetime medication - yes  Understands need for lifetime sobriety - yes  View of AA/NA - could be beneficial  Social support - reports great support from wife      PSYCHIATRIC HISTORY  Reported Diagnose(s): reports anxiety  Previous Medication Trials: denies, Reports xanax Rx from PCP (no Rx on  search)  Previous Psychiatric Hospitalizations: denies  Outpatient Psychiatrist?: denies      SUICIDE/HOMICIDE RISK ASSESSMENT  Current/active suicidal ideation/plan/intent: denies  Previous suicide attempts: denies  Current/active homicidal ideation/plan/intent: denies      HISTORY OF TRAUMA, ABUSE & VIOLENCE  Trauma: denies  Physical Abuse: denies  Sexual Abuse: denies  Violent Conduct: denies    Access to Gun: denies       FAMILY PSYCHIATRIC HISTORY   denies      SOCIAL HISTORY  Lives with wife who is very supportive.  Formerly employed as .        PAST MEDICAL HISTORY   HTN      PSYCHOSOCIAL FACTORS  Stressors (Psychosocial and Environmental): health, occupational and drug and alcohol.       PSYCHIATRIC ROS  Endorses symtpoms of depression which may be confounded by substance abuse.  Endorses anhedonia, decreased energy, fatigue, poor sleep, guilt of EtOH use.  Symptom onset "a few months ago." and progressively worsening.      Denies symptoms of anxiety, PTSD, bipolar disorder of schizophrenia.       MEDICAL ROS    Complete review of systems performed covering Constitutional, Eyes, ENT/Mouth, Cardiovascular, Respiratory, Gastrointestinal, Genitourinary, Musculoskeletal, Skin, " Neurologic, Endocrine, Heme/Lymph, and Allergy/Immune.     Complete review of systems was negative with the exception of the following positive symptoms: Stomach cramps, generalized fatigue.       ALLERGIES  Patient has no known allergies.      MEDICATIONS    Psychotropics:  none    Infusions:      Hospital Course: No notes on file    Infusions:       Scheduled:   folic acid  1 mg Oral Daily    lidocaine  2 patch Transdermal Q24H    midodrine  2.5 mg Oral TID    pantoprazole  40 mg Oral Daily    rifAXImin  550 mg Oral BID    thiamine  100 mg Oral Daily    traZODone  100 mg Oral QHS        PRN:  acetaminophen, melatonin, ondansetron, ondansetron, sodium chloride 0.9%, sodium chloride 0.9%     Home Medications:          Prior to Admission medications    Medication Sig Start Date End Date Taking? Authorizing Provider   ALPRAZolam (XANAX) 1 MG tablet Take 1 mg by mouth every 8 (eight) hours as needed for Anxiety.     Yes Historical Provider, MD   lisinopriL (PRINIVIL,ZESTRIL) 20 MG tablet Take 20 mg by mouth once daily.     Yes Historical Provider, MD            OBJECTIVE:      EXAMINATION     Vitals          Vitals:     08/03/20 0337 08/03/20 0832 08/03/20 1136 08/03/20 1301   BP: 127/85 120/87 139/76 139/76   BP Location: Left arm Left arm Left arm     Patient Position: Lying Lying Lying     Pulse: 92 91 86 86   Resp: 16 16 16 16   Temp: 98.6 °F (37 °C) 97.4 °F (36.3 °C) 96.2 °F (35.7 °C) 96.2 °F (35.7 °C)   TempSrc: Oral Oral Oral     SpO2: 97% 96% (!) 94% 95%   Weight:           Height:                   PAIN   1/10     CONSTITUTIONAL  General Appearance and Manner: tired, otherwise appropriate     MUSCULOSKELETAL  Abnormal Involuntary Movements: no  Gait and Station: normal     PSYCHIATRIC   Orientation: AAOx3  Speech: normal rate, rhythm, tone  Language: Normal  Mood: down  Affect: constricted  Thought Process: linear, goal oriented.  No AVH.  Organized  Associations: intact  Thought Content: No  SI/HI/AVH.  Memory: 3/3, 2/3  Attention and Concentration: intact  Fund of Knowledge: intact  Insight: fair  Judgment: fair      Assessment/Plan:     ETOH abuse  ASSESSMENT:      DIAGNOSES & PROBLEMS     EtOH Use Disorder, severe  Unspecified Depression vs SIMD  -R/o Adjustment Disorder with depressed mood           STRENGTHS AND LIABILITIES   Strength: Patient accepts guidance/feedback, Strength: Patient is expressive/articulate., Strength: Patient is motivated for change., Strength: Patient has positive support network., Strength: Patient has reasonable judgment.  Liabilities: poor coping skills, pt is dependent        MOTIVATION TO PURSUE RECOVERY: moderate     ACCEPTANCE OF ADDICTION: fair     ABILITY TO ADHERE TO TREATMENT PLAN: questionable        PLAN:      -Avoid benzodiazepine.  Pt is high risk for Benzo abuse.  Stop and scheduled or prn benzos.  -Recommend PRN vistaril 50mg q6h for breakthrough anxiety.     -Recommend PEth Alcohol test for further evaluation of recent EtOH abuse.          TRANSPLANT SUITABILITY  From a psychiatric perspective, this patient presents as a High risk for transplant.  Pt has provided inaccuracies regarding home medication prescriptions (xanax) and EtOH Intake.  Although pt requests desire for rehab at this time, his motivation is questionable.          MANAGEMENT PLAN, TREATMENT GOALS, THERAPEUTIC TECHNIQUES/APPROACHES & CLINICAL REASONING        · Patient counseled on abstinence from alcohol and substances of abuse (illicit and prescription).  · Additional workup planned to address substance use disorder, in order to guide and refine ongoing management options, includes serial alcohol and drug laboratory testing (e.g. PETH, urine toxicology).  · Relapse prevention and motivational interviewing provided.  · Education provided on 12 step recovery programs.        PRESCRIPTION DRUG MANAGEMENT  - The risks and benefits of medication were discussed with this patient.  - Possible  expectable adverse effects of any current or proposed individual psychotropic agents were discussed with this patient.  - Counseling was provided on the importance of full compliance with medication regimens.        In cases of emergency, daily coverage provided by Acute/ER Psych MD, NP, or SW, with contact numbers located in [facility] On Call Schedule     Case discussed with staff addiction psychiatrist: Vidhya Kim MD        Labs/Imaging/Studies:   Recent Results[]Expand by Default         Recent Results (from the past 24 hour(s))   Hepatic function panel     Collection Time: 08/03/20  3:50 AM   Result Value Ref Range     Total Protein 4.6 (L) 6.0 - 8.4 g/dL     Albumin 2.3 (L) 3.5 - 5.2 g/dL     Total Bilirubin 15.6 (H) 0.1 - 1.0 mg/dL     Bilirubin, Direct 11.2 (H) 0.1 - 0.3 mg/dL      (H) 10 - 40 U/L     ALT 77 (H) 10 - 44 U/L     Alkaline Phosphatase 302 (H) 55 - 135 U/L   Magnesium     Collection Time: 08/03/20  3:50 AM   Result Value Ref Range     Magnesium 1.6 1.6 - 2.6 mg/dL   Phosphorus     Collection Time: 08/03/20  3:50 AM   Result Value Ref Range     Phosphorus 2.5 (L) 2.7 - 4.5 mg/dL   Protime-INR     Collection Time: 08/03/20  3:50 AM   Result Value Ref Range     Prothrombin Time 13.7 (H) 9.0 - 12.5 sec     INR 1.3 (H) 0.8 - 1.2   APTT     Collection Time: 08/03/20  3:50 AM   Result Value Ref Range     aPTT 36.0 (H) 21.0 - 32.0 sec   Basic metabolic panel     Collection Time: 08/03/20  3:50 AM   Result Value Ref Range     Sodium 137 136 - 145 mmol/L     Potassium 3.5 3.5 - 5.1 mmol/L     Chloride 98 95 - 110 mmol/L     CO2 29 23 - 29 mmol/L     Glucose 89 70 - 110 mg/dL     BUN, Bld 13 6 - 20 mg/dL     Creatinine 0.6 0.5 - 1.4 mg/dL     Calcium 8.1 (L) 8.7 - 10.5 mg/dL     Anion Gap 10 8 - 16 mmol/L     eGFR if African American >60.0 >60 mL/min/1.73 m^2     eGFR if non African American >60.0 >60 mL/min/1.73 m^2   CBC auto differential     Collection Time: 08/03/20  3:50 AM    Result Value Ref Range     WBC 10.98 3.90 - 12.70 K/uL     RBC 2.46 (L) 4.60 - 6.20 M/uL     Hemoglobin 8.9 (L) 14.0 - 18.0 g/dL     Hematocrit 27.6 (L) 40.0 - 54.0 %     Mean Corpuscular Volume 112 (H) 82 - 98 fL     Mean Corpuscular Hemoglobin 36.2 (H) 27.0 - 31.0 pg     Mean Corpuscular Hemoglobin Conc 32.2 32.0 - 36.0 g/dL     RDW 19.8 (H) 11.5 - 14.5 %     Platelets 106 (L) 150 - 350 K/uL     MPV 11.9 9.2 - 12.9 fL     Immature Granulocytes CANCELED 0.0 - 0.5 %     Immature Grans (Abs) CANCELED 0.00 - 0.04 K/uL     Lymph # CANCELED 1.0 - 4.8 K/uL     Mono # CANCELED 0.3 - 1.0 K/uL     Eos # CANCELED 0.0 - 0.5 K/uL     Baso # CANCELED 0.00 - 0.20 K/uL     nRBC 3 (A) 0 /100 WBC     Gran% 77.0 (H) 38.0 - 73.0 %     Lymph% 15.0 (L) 18.0 - 48.0 %     Mono% 3.0 (L) 4.0 - 15.0 %     Eosinophil% 3.0 0.0 - 8.0 %     Basophil% 0.0 0.0 - 1.9 %     Metamyelocytes 1.0 %     Myelocytes 1.0 %     Platelet Estimate Decreased (A)       Aniso Slight       Poly Occasional       Basophilic Stippling Occasional       Garsia-Jolly Bodies Occasional       Toxic Granulation Present       Differential Method Manual     CBC auto differential     Collection Time: 08/03/20  1:55 PM   Result Value Ref Range     WBC 11.55 3.90 - 12.70 K/uL     RBC 2.79 (L) 4.60 - 6.20 M/uL     Hemoglobin 9.7 (L) 14.0 - 18.0 g/dL     Hematocrit 31.4 (L) 40.0 - 54.0 %     Mean Corpuscular Volume 113 (H) 82 - 98 fL     Mean Corpuscular Hemoglobin 34.8 (H) 27.0 - 31.0 pg     Mean Corpuscular Hemoglobin Conc 30.9 (L) 32.0 - 36.0 g/dL     RDW 20.0 (H) 11.5 - 14.5 %     Platelets 122 (L) 150 - 350 K/uL     MPV 11.5 9.2 - 12.9 fL                  Total Time:  60 minutes      Rene Corbin MD   Psychiatry PGY-2  [facility]

## 2020-08-03 NOTE — SUBJECTIVE & OBJECTIVE
Infusions:       Scheduled:   folic acid  1 mg Oral Daily    lidocaine  2 patch Transdermal Q24H    midodrine  2.5 mg Oral TID    pantoprazole  40 mg Oral Daily    rifAXImin  550 mg Oral BID    thiamine  100 mg Oral Daily    traZODone  100 mg Oral QHS        PRN:  acetaminophen, melatonin, ondansetron, ondansetron, sodium chloride 0.9%, sodium chloride 0.9%     Home Medications:          Prior to Admission medications    Medication Sig Start Date End Date Taking? Authorizing Provider   ALPRAZolam (XANAX) 1 MG tablet Take 1 mg by mouth every 8 (eight) hours as needed for Anxiety.     Yes Historical Provider, MD   lisinopriL (PRINIVIL,ZESTRIL) 20 MG tablet Take 20 mg by mouth once daily.     Yes Historical Provider, MD            OBJECTIVE:      EXAMINATION     Vitals          Vitals:     08/03/20 0337 08/03/20 0832 08/03/20 1136 08/03/20 1301   BP: 127/85 120/87 139/76 139/76   BP Location: Left arm Left arm Left arm     Patient Position: Lying Lying Lying     Pulse: 92 91 86 86   Resp: 16 16 16 16   Temp: 98.6 °F (37 °C) 97.4 °F (36.3 °C) 96.2 °F (35.7 °C) 96.2 °F (35.7 °C)   TempSrc: Oral Oral Oral     SpO2: 97% 96% (!) 94% 95%   Weight:           Height:                   PAIN   1/10     CONSTITUTIONAL  General Appearance and Manner: tired, otherwise appropriate     MUSCULOSKELETAL  Abnormal Involuntary Movements: no  Gait and Station: normal     PSYCHIATRIC   Orientation: AAOx3  Speech: normal rate, rhythm, tone  Language: Normal  Mood: down  Affect: constricted  Thought Process: linear, goal oriented.  No AVH.  Organized  Associations: intact  Thought Content: No SI/HI/AVH.  Memory: 3/3, 2/3  Attention and Concentration: intact  Fund of Knowledge: intact  Insight: fair  Judgment: fair

## 2020-08-03 NOTE — PROGRESS NOTES
Hepatology Progress Note    Kenneth Pardo is a 59 y.o. male admitted to hospital 7/30/2020 (Hospital Day: 5) due to Acute alcoholic hepatitis.     Interval History  NAEON.  Patient feeling well today.  No complaints on rounds.  Remains jaundiced.  Counseled extensively on EtOH abstinence.    Objective  Temp:  [96.2 °F (35.7 °C)-98.6 °F (37 °C)] 96.2 °F (35.7 °C) (08/03 1136)  Pulse:  [75-94] 86 (08/03 1136)  BP: (120-139)/(71-87) 139/76 (08/03 1136)  Resp:  [16-18] 16 (08/03 1136)  SpO2:  [94 %-97 %] 94 % (08/03 1136)    Gen: NAD, lying comfortably  HENT: NCAT, oropharynx clear  Eyes: icteric sclerae, EOMI grossly  Neck: supple, no visible masses/goiter  Cardiac: RRR, no M/R/G, S1/S2 present  Lungs: CTAB, no crackles, no wheezes  Abd: soft, NT/ND, normoactive BS  Ext: no LE edema, warm, well perfused  Skin: jaundice, skin intact on exposed body parts, no visible rashes, lesions  Neuro: A&Ox4, neuro exam grossly intact, moves all extremities  Psych: appropriate mood, affect      Laboratory    Recent Labs   Lab 08/01/20  1414 08/02/20  1012 08/03/20  0350   HGB 9.0* 9.0* 8.9*       Lab Results   Component Value Date    WBC 10.98 08/03/2020    HGB 8.9 (L) 08/03/2020    HCT 27.6 (L) 08/03/2020     (H) 08/03/2020     (L) 08/03/2020       Lab Results   Component Value Date     08/03/2020    K 3.5 08/03/2020    CL 98 08/03/2020    CO2 29 08/03/2020    BUN 13 08/03/2020    CREATININE 0.6 08/03/2020    CALCIUM 8.1 (L) 08/03/2020    ANIONGAP 10 08/03/2020    ESTGFRAFRICA >60.0 08/03/2020    EGFRNONAA >60.0 08/03/2020       Lab Results   Component Value Date    ALT 77 (H) 08/03/2020     (H) 08/03/2020    ALKPHOS 302 (H) 08/03/2020    BILITOT 15.6 (H) 08/03/2020       Lab Results   Component Value Date    INR 1.3 (H) 08/03/2020    INR 1.3 (H) 08/02/2020    INR 1.2 08/01/2020       Plan  -continue supportive care  -Consult addiction psych  -counseled patient on EtOH abstinence as well as participation  in rehab/12step programs after discharge.  -consult social work for medicaid   - Plan of care was discussed with primary team.  - We will continue to follow.    Thank you for involving us in the care of Kenneth Pardo. Please call with any additional questions, concerns or changes in the patient's clinical status.    David Muñiz MD  Gastroenterology Fellow

## 2020-08-03 NOTE — PHYSICIAN QUERY
"PT Name: Kenneth Pardo  MR #: [id number]     Perfusion Diagnosis Clarification     CDS/: Ophelia Salamanca RN, CDS               Contact information: [email or web address]  This form is a permanent document in the medical record.     Query Date: August 3, 2020    By submitting this query, we are merely seeking further clarification of documentation.  Please utilize your independent clinical judgment when addressing the question(s) below.  The Medical Record contains the following:  Indicators Supporting Clinical Findings Location in Medical Record   x Acute Illness (e.g. AMI, Sepsis, etc.) Acute alcoholic hepatitis    HRS    Upper GI bleed   H&P 7/30    H&P 7/30    GI Consult 7/31   x Vital Signs  Pulse:  [] 107  BP: ()/(34-82) 111/59   H&P 7/30   x Acidosis documented Lactic acidosis   H&P 7/30   x ABGs/Labs On 7/30, patient noted to have large "dark brown" BM and hgb drop from 10.1 yesterday evening -> 8.6 earlier today -> 6.9 tonight   GI Consult 7/31   x Hypotension or Low Blood Pressure documented Hypotension/Shock    Patient was also hypotensive, requiring pressors   H&P 7/30    H&P 7/30    Altered Mental Status or Confusion      Diaphoresis, Cold Extremities or Cyanosis      Oliguria     x Medication/Treatment:  -Vasopressors  -Inotropic Drugs  -IV Fluids   -IV Antibiotics  -Cardiac Assist Devices  -Hemodynamic Monitoring  -Blood/Blood Products Received 1u pRBC    IVF boluses / pressors to maintain hemodynamics, normalize HR, and normalize BUN GI Consult 7/31    GI Consult 7/31   x Other initially presented to an outside facility ([facility]) with a chief compliant of weakness. Patient reports that over the past month he has felt extremely tired and weak H&P 7/30     Provider, please specify diagnosis or diagnoses associated with above clinical findings.  [  x  ] Hemorrhagic Shock   [    ] Hypovolemic Shock   [    ] Other Shock (please specify): __________   [    ] Shock, Unspecified   [    ] " Other Condition (please specify): _________   [  ] Clinically Undetermined     Present on admission (POA) status:   [   ] Yes (Y)                [  ] Clinically Undetermined (W)           [   ] No (N)                  [   ] Documentation insufficient to determine if condition is POA (U)   Please document in your progress notes daily for the duration of treatment until resolved and include in your discharge summary.

## 2020-08-03 NOTE — ASSESSMENT & PLAN NOTE
ASSESSMENT:      DIAGNOSES & PROBLEMS     EtOH Use Disorder, severe  Unspecified Depression vs SIMD  -R/o Adjustment Disorder with depressed mood           STRENGTHS AND LIABILITIES   Strength: Patient accepts guidance/feedback, Strength: Patient is expressive/articulate., Strength: Patient is motivated for change., Strength: Patient has positive support network., Strength: Patient has reasonable judgment.        MOTIVATION TO PURSUE RECOVERY: moderate     ACCEPTANCE OF ADDICTION: fair     ABILITY TO ADHERE TO TREATMENT PLAN: questionable        PLAN:      Avoid benzodiazepine.  Pt is high risk for Benzo abuse.  Stop and scheduled or prn benzos.    Recommend PEth Alcohol test for further evaluation of recent EtOH abuse.          TRANSPLANT SUITABILITY  From a psychiatric perspective, this patient presents as a High risk for transplant.  Pt has provided inaccuracies regarding home medication prescriptions (xanax) and EtOH Intake.  Although pt requests desire for rehab at this time, his motivation is questionable.          MANAGEMENT PLAN, TREATMENT GOALS, THERAPEUTIC TECHNIQUES/APPROACHES & CLINICAL REASONING        · Patient counseled on abstinence from alcohol and substances of abuse (illicit and prescription).  · Additional workup planned to address substance use disorder, in order to guide and refine ongoing management options, includes serial alcohol and drug laboratory testing (e.g. PETH, urine toxicology).  · Relapse prevention and motivational interviewing provided.  · Education provided on 12 step recovery programs.        PRESCRIPTION DRUG MANAGEMENT  - The risks and benefits of medication were discussed with this patient.  - Possible expectable adverse effects of any current or proposed individual psychotropic agents were discussed with this patient.  - Counseling was provided on the importance of full compliance with medication regimens.        In cases of emergency, daily coverage provided by Acute/ER  Psych MD, NP, or SW, with contact numbers located in [facility] On Call Schedule     Case discussed with staff addiction psychiatrist: Vidhya Kim MD        Labs/Imaging/Studies:   Recent Results[]Expand by Default         Recent Results (from the past 24 hour(s))   Hepatic function panel     Collection Time: 08/03/20  3:50 AM   Result Value Ref Range     Total Protein 4.6 (L) 6.0 - 8.4 g/dL     Albumin 2.3 (L) 3.5 - 5.2 g/dL     Total Bilirubin 15.6 (H) 0.1 - 1.0 mg/dL     Bilirubin, Direct 11.2 (H) 0.1 - 0.3 mg/dL      (H) 10 - 40 U/L     ALT 77 (H) 10 - 44 U/L     Alkaline Phosphatase 302 (H) 55 - 135 U/L   Magnesium     Collection Time: 08/03/20  3:50 AM   Result Value Ref Range     Magnesium 1.6 1.6 - 2.6 mg/dL   Phosphorus     Collection Time: 08/03/20  3:50 AM   Result Value Ref Range     Phosphorus 2.5 (L) 2.7 - 4.5 mg/dL   Protime-INR     Collection Time: 08/03/20  3:50 AM   Result Value Ref Range     Prothrombin Time 13.7 (H) 9.0 - 12.5 sec     INR 1.3 (H) 0.8 - 1.2   APTT     Collection Time: 08/03/20  3:50 AM   Result Value Ref Range     aPTT 36.0 (H) 21.0 - 32.0 sec   Basic metabolic panel     Collection Time: 08/03/20  3:50 AM   Result Value Ref Range     Sodium 137 136 - 145 mmol/L     Potassium 3.5 3.5 - 5.1 mmol/L     Chloride 98 95 - 110 mmol/L     CO2 29 23 - 29 mmol/L     Glucose 89 70 - 110 mg/dL     BUN, Bld 13 6 - 20 mg/dL     Creatinine 0.6 0.5 - 1.4 mg/dL     Calcium 8.1 (L) 8.7 - 10.5 mg/dL     Anion Gap 10 8 - 16 mmol/L     eGFR if African American >60.0 >60 mL/min/1.73 m^2     eGFR if non African American >60.0 >60 mL/min/1.73 m^2   CBC auto differential     Collection Time: 08/03/20  3:50 AM   Result Value Ref Range     WBC 10.98 3.90 - 12.70 K/uL     RBC 2.46 (L) 4.60 - 6.20 M/uL     Hemoglobin 8.9 (L) 14.0 - 18.0 g/dL     Hematocrit 27.6 (L) 40.0 - 54.0 %     Mean Corpuscular Volume 112 (H) 82 - 98 fL     Mean Corpuscular Hemoglobin 36.2 (H) 27.0 - 31.0 pg     Mean  Corpuscular Hemoglobin Conc 32.2 32.0 - 36.0 g/dL     RDW 19.8 (H) 11.5 - 14.5 %     Platelets 106 (L) 150 - 350 K/uL     MPV 11.9 9.2 - 12.9 fL     Immature Granulocytes CANCELED 0.0 - 0.5 %     Immature Grans (Abs) CANCELED 0.00 - 0.04 K/uL     Lymph # CANCELED 1.0 - 4.8 K/uL     Mono # CANCELED 0.3 - 1.0 K/uL     Eos # CANCELED 0.0 - 0.5 K/uL     Baso # CANCELED 0.00 - 0.20 K/uL     nRBC 3 (A) 0 /100 WBC     Gran% 77.0 (H) 38.0 - 73.0 %     Lymph% 15.0 (L) 18.0 - 48.0 %     Mono% 3.0 (L) 4.0 - 15.0 %     Eosinophil% 3.0 0.0 - 8.0 %     Basophil% 0.0 0.0 - 1.9 %     Metamyelocytes 1.0 %     Myelocytes 1.0 %     Platelet Estimate Decreased (A)       Aniso Slight       Poly Occasional       Basophilic Stippling Occasional       Garsia-Jolly Bodies Occasional       Toxic Granulation Present       Differential Method Manual     CBC auto differential     Collection Time: 08/03/20  1:55 PM   Result Value Ref Range     WBC 11.55 3.90 - 12.70 K/uL     RBC 2.79 (L) 4.60 - 6.20 M/uL     Hemoglobin 9.7 (L) 14.0 - 18.0 g/dL     Hematocrit 31.4 (L) 40.0 - 54.0 %     Mean Corpuscular Volume 113 (H) 82 - 98 fL     Mean Corpuscular Hemoglobin 34.8 (H) 27.0 - 31.0 pg     Mean Corpuscular Hemoglobin Conc 30.9 (L) 32.0 - 36.0 g/dL     RDW 20.0 (H) 11.5 - 14.5 %     Platelets 122 (L) 150 - 350 K/uL     MPV 11.5 9.2 - 12.9 fL

## 2020-08-04 LAB
ALBUMIN SERPL BCP-MCNC: 2.2 G/DL (ref 3.5–5.2)
ALP SERPL-CCNC: 324 U/L (ref 55–135)
ALT SERPL W/O P-5'-P-CCNC: 79 U/L (ref 10–44)
ANION GAP SERPL CALC-SCNC: 10 MMOL/L (ref 8–16)
ANISOCYTOSIS BLD QL SMEAR: SLIGHT
ANISOCYTOSIS BLD QL SMEAR: SLIGHT
AST SERPL-CCNC: 194 U/L (ref 10–40)
BACTERIA BLD CULT: NORMAL
BASO STIPL BLD QL SMEAR: ABNORMAL
BASOPHILS # BLD AUTO: 0.03 K/UL (ref 0–0.2)
BASOPHILS # BLD AUTO: 0.04 K/UL (ref 0–0.2)
BASOPHILS NFR BLD: 0.3 % (ref 0–1.9)
BASOPHILS NFR BLD: 0.3 % (ref 0–1.9)
BILIRUB DIRECT SERPL-MCNC: 12.7 MG/DL (ref 0.1–0.3)
BILIRUB SERPL-MCNC: 18.2 MG/DL (ref 0.1–1)
BILIRUB UR QL STRIP: ABNORMAL
BUN SERPL-MCNC: 10 MG/DL (ref 6–20)
CALCIUM SERPL-MCNC: 8 MG/DL (ref 8.7–10.5)
CHLORIDE SERPL-SCNC: 97 MMOL/L (ref 95–110)
CLARITY UR REFRACT.AUTO: ABNORMAL
CO2 SERPL-SCNC: 25 MMOL/L (ref 23–29)
COLOR UR AUTO: ABNORMAL
CREAT SERPL-MCNC: 0.5 MG/DL (ref 0.5–1.4)
DIFFERENTIAL METHOD: ABNORMAL
DIFFERENTIAL METHOD: ABNORMAL
DOHLE BOD BLD QL SMEAR: PRESENT
EOSINOPHIL # BLD AUTO: 0.2 K/UL (ref 0–0.5)
EOSINOPHIL # BLD AUTO: 0.3 K/UL (ref 0–0.5)
EOSINOPHIL NFR BLD: 1.7 % (ref 0–8)
EOSINOPHIL NFR BLD: 2.6 % (ref 0–8)
ERYTHROCYTE [DISTWIDTH] IN BLOOD BY AUTOMATED COUNT: 20.3 % (ref 11.5–14.5)
ERYTHROCYTE [DISTWIDTH] IN BLOOD BY AUTOMATED COUNT: 21 % (ref 11.5–14.5)
EST. GFR  (AFRICAN AMERICAN): >60 ML/MIN/1.73 M^2
EST. GFR  (NON AFRICAN AMERICAN): >60 ML/MIN/1.73 M^2
GLUCOSE SERPL-MCNC: 77 MG/DL (ref 70–110)
GLUCOSE UR QL STRIP: NEGATIVE
HCT VFR BLD AUTO: 29.8 % (ref 40–54)
HCT VFR BLD AUTO: 31 % (ref 40–54)
HGB BLD-MCNC: 9.4 G/DL (ref 14–18)
HGB BLD-MCNC: 9.7 G/DL (ref 14–18)
HGB UR QL STRIP: NEGATIVE
HYPOCHROMIA BLD QL SMEAR: ABNORMAL
HYPOCHROMIA BLD QL SMEAR: ABNORMAL
IMM GRANULOCYTES # BLD AUTO: 0.41 K/UL (ref 0–0.04)
IMM GRANULOCYTES # BLD AUTO: 0.42 K/UL (ref 0–0.04)
IMM GRANULOCYTES NFR BLD AUTO: 3.1 % (ref 0–0.5)
IMM GRANULOCYTES NFR BLD AUTO: 3.4 % (ref 0–0.5)
INR PPP: 1.3 (ref 0.8–1.2)
KETONES UR QL STRIP: NEGATIVE
LACTATE SERPL-SCNC: 1.3 MMOL/L (ref 0.5–2.2)
LEUKOCYTE ESTERASE UR QL STRIP: NEGATIVE
LYMPHOCYTES # BLD AUTO: 1.6 K/UL (ref 1–4.8)
LYMPHOCYTES # BLD AUTO: 1.7 K/UL (ref 1–4.8)
LYMPHOCYTES NFR BLD: 11.8 % (ref 18–48)
LYMPHOCYTES NFR BLD: 13.9 % (ref 18–48)
MAGNESIUM SERPL-MCNC: 1.5 MG/DL (ref 1.6–2.6)
MCH RBC QN AUTO: 35.5 PG (ref 27–31)
MCH RBC QN AUTO: 36 PG (ref 27–31)
MCHC RBC AUTO-ENTMCNC: 31.3 G/DL (ref 32–36)
MCHC RBC AUTO-ENTMCNC: 31.5 G/DL (ref 32–36)
MCV RBC AUTO: 114 FL (ref 82–98)
MCV RBC AUTO: 114 FL (ref 82–98)
MONOCYTES # BLD AUTO: 0.6 K/UL (ref 0.3–1)
MONOCYTES # BLD AUTO: 0.6 K/UL (ref 0.3–1)
MONOCYTES NFR BLD: 4.6 % (ref 4–15)
MONOCYTES NFR BLD: 5.1 % (ref 4–15)
NEUTROPHILS # BLD AUTO: 10.5 K/UL (ref 1.8–7.7)
NEUTROPHILS # BLD AUTO: 8.9 K/UL (ref 1.8–7.7)
NEUTROPHILS NFR BLD: 74.7 % (ref 38–73)
NEUTROPHILS NFR BLD: 78.5 % (ref 38–73)
NITRITE UR QL STRIP: NEGATIVE
NRBC BLD-RTO: 1 /100 WBC
NRBC BLD-RTO: 2 /100 WBC
OVALOCYTES BLD QL SMEAR: ABNORMAL
PH UR STRIP: 6 [PH] (ref 5–8)
PHOSPHATE SERPL-MCNC: 2.3 MG/DL (ref 2.7–4.5)
PLATELET # BLD AUTO: 126 K/UL (ref 150–350)
PLATELET # BLD AUTO: 143 K/UL (ref 150–350)
PLATELET BLD QL SMEAR: ABNORMAL
PLATELET BLD QL SMEAR: ABNORMAL
PMV BLD AUTO: 10.8 FL (ref 9.2–12.9)
PMV BLD AUTO: 11 FL (ref 9.2–12.9)
POIKILOCYTOSIS BLD QL SMEAR: SLIGHT
POLYCHROMASIA BLD QL SMEAR: ABNORMAL
POLYCHROMASIA BLD QL SMEAR: ABNORMAL
POTASSIUM SERPL-SCNC: 3.9 MMOL/L (ref 3.5–5.1)
PROT SERPL-MCNC: 4.5 G/DL (ref 6–8.4)
PROT UR QL STRIP: NEGATIVE
PROTHROMBIN TIME: 14.3 SEC (ref 9–12.5)
RBC # BLD AUTO: 2.61 M/UL (ref 4.6–6.2)
RBC # BLD AUTO: 2.73 M/UL (ref 4.6–6.2)
SODIUM SERPL-SCNC: 132 MMOL/L (ref 136–145)
SP GR UR STRIP: 1.01 (ref 1–1.03)
TOXIC GRANULES BLD QL SMEAR: ABNORMAL
URN SPEC COLLECT METH UR: ABNORMAL
WBC # BLD AUTO: 11.89 K/UL (ref 3.9–12.7)
WBC # BLD AUTO: 13.39 K/UL (ref 3.9–12.7)
WBC TOXIC VACUOLES BLD QL SMEAR: PRESENT

## 2020-08-04 PROCEDURE — 84100 ASSAY OF PHOSPHORUS: CPT

## 2020-08-04 PROCEDURE — 36415 COLL VENOUS BLD VENIPUNCTURE: CPT

## 2020-08-04 PROCEDURE — 11000001 HC ACUTE MED/SURG PRIVATE ROOM

## 2020-08-04 PROCEDURE — 83605 ASSAY OF LACTIC ACID: CPT

## 2020-08-04 PROCEDURE — 25000003 PHARM REV CODE 250: Performed by: STUDENT IN AN ORGANIZED HEALTH CARE EDUCATION/TRAINING PROGRAM

## 2020-08-04 PROCEDURE — 80048 BASIC METABOLIC PNL TOTAL CA: CPT

## 2020-08-04 PROCEDURE — 83735 ASSAY OF MAGNESIUM: CPT

## 2020-08-04 PROCEDURE — 81003 URINALYSIS AUTO W/O SCOPE: CPT

## 2020-08-04 PROCEDURE — 25000003 PHARM REV CODE 250: Performed by: HOSPITALIST

## 2020-08-04 PROCEDURE — 85610 PROTHROMBIN TIME: CPT

## 2020-08-04 PROCEDURE — 99233 SBSQ HOSP IP/OBS HIGH 50: CPT | Mod: ,,, | Performed by: HOSPITALIST

## 2020-08-04 PROCEDURE — 80076 HEPATIC FUNCTION PANEL: CPT

## 2020-08-04 PROCEDURE — 94761 N-INVAS EAR/PLS OXIMETRY MLT: CPT

## 2020-08-04 PROCEDURE — 97116 GAIT TRAINING THERAPY: CPT

## 2020-08-04 PROCEDURE — 87040 BLOOD CULTURE FOR BACTERIA: CPT | Mod: 59

## 2020-08-04 PROCEDURE — 85025 COMPLETE CBC W/AUTO DIFF WBC: CPT

## 2020-08-04 PROCEDURE — 99233 PR SUBSEQUENT HOSPITAL CARE,LEVL III: ICD-10-PCS | Mod: ,,, | Performed by: HOSPITALIST

## 2020-08-04 RX ORDER — FUROSEMIDE 40 MG/1
40 TABLET ORAL DAILY
Status: DISCONTINUED | OUTPATIENT
Start: 2020-08-04 | End: 2020-08-04

## 2020-08-04 RX ORDER — LORAZEPAM 0.5 MG/1
0.5 TABLET ORAL EVERY 12 HOURS PRN
Status: DISCONTINUED | OUTPATIENT
Start: 2020-08-04 | End: 2020-08-09 | Stop reason: HOSPADM

## 2020-08-04 RX ORDER — HYDROXYZINE PAMOATE 25 MG/1
50 CAPSULE ORAL EVERY 6 HOURS PRN
Status: DISCONTINUED | OUTPATIENT
Start: 2020-08-04 | End: 2020-08-09 | Stop reason: HOSPADM

## 2020-08-04 RX ORDER — LACTULOSE 10 G/15ML
5 SOLUTION ORAL 2 TIMES DAILY
Status: DISCONTINUED | OUTPATIENT
Start: 2020-08-04 | End: 2020-08-07

## 2020-08-04 RX ORDER — PRAMIPEXOLE DIHYDROCHLORIDE 0.12 MG/1
0.12 TABLET ORAL NIGHTLY
Status: DISCONTINUED | OUTPATIENT
Start: 2020-08-04 | End: 2020-08-05

## 2020-08-04 RX ORDER — SPIRONOLACTONE 100 MG/1
100 TABLET, FILM COATED ORAL DAILY
Status: DISCONTINUED | OUTPATIENT
Start: 2020-08-04 | End: 2020-08-04

## 2020-08-04 RX ADMIN — SPIRONOLACTONE 100 MG: 100 TABLET ORAL at 11:08

## 2020-08-04 RX ADMIN — PANTOPRAZOLE SODIUM 40 MG: 40 TABLET, DELAYED RELEASE ORAL at 08:08

## 2020-08-04 RX ADMIN — FUROSEMIDE 40 MG: 40 TABLET ORAL at 11:08

## 2020-08-04 RX ADMIN — TRAZODONE HYDROCHLORIDE 100 MG: 100 TABLET ORAL at 08:08

## 2020-08-04 RX ADMIN — LORAZEPAM 0.5 MG: 0.5 TABLET ORAL at 07:08

## 2020-08-04 RX ADMIN — LACTULOSE 5 G: 20 SOLUTION ORAL at 11:08

## 2020-08-04 RX ADMIN — Medication 6 MG: at 08:08

## 2020-08-04 RX ADMIN — SODIUM CHLORIDE 1000 ML: 0.9 INJECTION, SOLUTION INTRAVENOUS at 05:08

## 2020-08-04 RX ADMIN — PRAMIPEXOLE DIHYDROCHLORIDE 0.12 MG: 0.12 TABLET ORAL at 10:08

## 2020-08-04 RX ADMIN — FOLIC ACID 1 MG: 1 TABLET ORAL at 08:08

## 2020-08-04 RX ADMIN — RIFAXIMIN 550 MG: 550 TABLET ORAL at 08:08

## 2020-08-04 RX ADMIN — HYDROXYZINE PAMOATE 50 MG: 25 CAPSULE ORAL at 10:08

## 2020-08-04 RX ADMIN — Medication 100 MG: at 08:08

## 2020-08-04 NOTE — PLAN OF CARE
Pt would benefit from continued skilled acute PT services to improve functional mobility. POC is appropriate and pt should continue towards current goals set to progress to PLOF.     Problem: Physical Therapy Goal  Goal: Physical Therapy Goal  Description: Goals to be met by: 20    Patient will increase functional independence with mobility by performin. Supine to sit with Stand-by Assistance -not met  2. Sit to stand transfer with Stand-by Assistance -not met  3. Gait  x 150 feet with Supervision using AD if needed. -not met  4. Ascend/descend 3 stair with no Handrails Stand-by Assistance  -not met    Outcome: Ongoing, Progressing

## 2020-08-04 NOTE — PT/OT/SLP PROGRESS
"Physical Therapy Treatment    Patient Name:  Kenneth Pardo   MRN:  [id number]    Recommendations:     Discharge Recommendations:  home health PT   Discharge Equipment Recommendations: walker, rolling   Barriers to discharge: decreased functional mobility requiring increased assist      Assessment:     Kenneth Pardo is a 59 y.o. male admitted with a medical diagnosis of Acute alcoholic hepatitis.  He presents with the following impairments/functional limitations:  weakness, gait instability, impaired endurance, impaired balance, decreased lower extremity function, decreased safety awareness, impaired self care skills, impaired functional mobilty. Pt tolerated session fairly on this date focusing on gait training and increasing activity tolerance. Pt required occasional cues d/t poor safety awareness. pt safe to mobilize with nursing utilizing RW ordered to room.  Pt would benefit from continued skilled acute PT 3x/wk to improve functional mobility.  Recommending pt receive PT services in HHPT setting following discharge from hospital once medically cleared.     Rehab Prognosis: Fair; patient would benefit from acute skilled PT services to address these deficits and reach maximum level of function.    Recent Surgery: Procedure(s) (LRB):  EGD (ESOPHAGOGASTRODUODENOSCOPY) (N/A) 4 Days Post-Op    Plan:     During this hospitalization, patient to be seen 3 x/week to address the identified rehab impairments via gait training, therapeutic activities, therapeutic exercises, neuromuscular re-education and progress toward the following goals:    · Plan of Care Expires:  08/29/20    Subjective     Chief Complaint: generalized fatigue   Patient/Family Comments/goals: "We can't go too far." -referenced to gait training   Pain/Comfort:  · Pain Rating 1: 0/10      Objective:     Communicated with NSG prior to session.  Patient found supine with   upon PT entry to room.     General Precautions: Standard, fall   Orthopedic " Precautions:N/A   Braces: N/A     Functional Mobility:  · Bed Mobility:     · Rolling Left:  stand by assistance  · Scooting: stand by assistance  · Supine to Sit: stand by assistance  · Transfers:     · Sit to Stand:  stand by assistance with rolling walker and without rolling walker  · Gait: 90ft CGA w/RW demonstrating decreased lilliam and required Min VC to increase safety awareness   · Balance: Sitting: SPV     Standing: CGA      AM-PAC 6 CLICK MOBILITY  Turning over in bed (including adjusting bedclothes, sheets and blankets)?: 3  Sitting down on and standing up from a chair with arms (e.g., wheelchair, bedside commode, etc.): 3  Moving from lying on back to sitting on the side of the bed?: 3  Moving to and from a bed to a chair (including a wheelchair)?: 3  Need to walk in hospital room?: 3  Climbing 3-5 steps with a railing?: 2  Basic Mobility Total Score: 17       Therapeutic Activities and Exercises:  - EOB Sittinmins SPV    - Pt educated on:   -PT roles, expectations, and POC    -Safety with mobility   -Benefits of OOB activities to increase strength and functional mobility    -Performing ther ex for increasing LE ROM and strength   -Discharge recommendations     Patient left up in chair with call button in reach..    GOALS:   Multidisciplinary Problems     Physical Therapy Goals        Problem: Physical Therapy Goal    Goal Priority Disciplines Outcome Goal Variances Interventions   Physical Therapy Goal     PT, PT/OT Ongoing, Progressing     Description: Goals to be met by: 20    Patient will increase functional independence with mobility by performin. Supine to sit with Stand-by Assistance -not met  2. Sit to stand transfer with Stand-by Assistance -not met  3. Gait  x 150 feet with Supervision using AD if needed. -not met  4. Ascend/descend 3 stair with no Handrails Stand-by Assistance  -not met                     Time Tracking:     PT Received On: 20  PT Start Time: 808     PT  Stop Time: 0831  PT Total Time (min): 23 min     Billable Minutes: Gait Training 23    Treatment Type: Treatment  PT/PTA: PT     PTA Visit Number: 0     Stevan Lloyd, PT  08/04/2020

## 2020-08-04 NOTE — PT/OT/SLP PROGRESS
"Occupational Therapy      Patient Name:  Kenneth Pardo   MRN:  [id number]    Patient not seen today secondary to pt feeling weak at 1st attempt. Pt requesting hold due to "not feeling well" at 2nd attempt. Will follow-up 8/5/2020.    Ernestine Bey OT  8/4/2020  "

## 2020-08-04 NOTE — PLAN OF CARE
Spoke with pt's wife medicaid connor has not been filed yet will do so today. As of today pt has no insurance. This will be  barrier to d/c arranging  services.       08/04/20 1449   Discharge Reassessment   Assessment Type Discharge Planning Reassessment   Provided patient/caregiver education on the expected discharge date and the discharge plan Yes   Do you have any problems affording any of your prescribed medications? No   Discharge Plan A Home   Discharge Plan B Home   DME Needed Upon Discharge  none   Anticipated Discharge Disposition Home   Can the patient/caregiver answer the patient profile reliably? Yes, cognitively intact   How does the patient rate their overall health at the present time? Fair   Describe the patient's ability to walk at the present time. Minor restrictions or changes   How often would a person be available to care for the patient? Occasionally   Number of comorbid conditions (as recorded on the chart) Three   Post-Acute Status   Post-Acute Authorization [facility]   Discharge Delays None known at this time   Suzanne Lopez, MSN  Case Management  Ext [phone]

## 2020-08-04 NOTE — PROGRESS NOTES
Progress Note   Hospital Medicine         Patient Name: Kenneth aPrdo  MRN:  [id number]  [facility] Medicine Team: [facility] L Naresh Franks MD  Date of Admission:  7/30/2020     Length of Stay:  LOS: 4 days   Expected Discharge Date: 8/5/2020  Principal Problem:  Acute alcoholic hepatitis       Subjective:     Interval History/Overnight Events:  Patient doing ok today, having some soreness in his back from laying in bed; told patient to get OOB and have told nursing and PT to help; addiction psych evaluated patient, appreciate recs; possible dc tomorrow with HH    Review of Systems   Constitutional: Negative for chills, fatigue, fever.   HENT: Negative for sore throat, trouble swallowing.    Eyes: Negative for photophobia, visual disturbance.   Respiratory: Negative for cough, shortness of breath.    Cardiovascular: Negative for chest pain, palpitations, leg swelling.   Gastrointestinal: Negative for abdominal pain, constipation, diarrhea, nausea, vomiting.   Endocrine: Negative for cold intolerance, heat intolerance.   Genitourinary: Negative for dysuria, frequency.   Musculoskeletal: Negative for arthralgias, myalgias.   Skin: Negative for rash, wound, erythema   Neurological: Negative for dizziness, syncope, weakness, light-headedness.   Psychiatric/Behavioral: Negative for confusion, hallucinations, anxiety  All other systems reviewed and are negative.    Objective:     Temp:  [96.2 °F (35.7 °C)-98.7 °F (37.1 °C)]   Pulse:  [86-95]   Resp:  [16-18]   BP: (120-139)/(76-87)   SpO2:  [93 %-97 %]       Physical Exam:  Constitutional: appears weak and ill  Head: Normocephalic and atraumatic.   Mouth/Throat: Oropharynx is clear and moist.   Eyes: EOM are normal. Pupils are equal, round, and reactive to light. positive scleral icterus.   Neck: Normal range of motion. Neck supple.   Cardiovascular: Normal rate and regular rhythm.  No murmur heard.  Pulmonary/Chest: Effort normal and breath sounds normal. No respiratory  distress. No wheezes, rales, or rhonchi  Abdominal: Soft. Bowel sounds are normal.  No distension or tenderness  Musculoskeletal: Normal range of motion. No edema.   Neurological: Alert and oriented to person, place, and time.   Skin: Skin is warm and dry.   Psychiatric: Normal mood and affect. Behavior is normal.     Recent Labs   Lab 07/31/20  1506 08/01/20  0525 08/01/20  1414 08/02/20  1012 08/03/20  0350 08/03/20  1355   WBC 10.44 11.11 10.37 11.06 10.98 11.55   HGB 7.9* 8.7* 9.0* 9.0* 8.9* 9.7*   HCT 24.0* 27.0* 28.1* 27.6* 27.6* 31.4*   * 124* 121* 108* 106* 122*     Recent Labs   Lab 08/01/20  0525 08/02/20  1100 08/03/20  0350   * 136 137   K 3.0* 3.2* 3.5   CL 94* 98 98   CO2 29 31* 29   BUN 27* 20 13   CREATININE 0.8 0.7 0.6   * 106 89   CALCIUM 8.5* 8.2* 8.1*   MG 1.8 1.7 1.6   PHOS 1.7* 2.1* 2.5*     Recent Labs   Lab 08/01/20  0525 08/02/20  1100 08/03/20  0350   ALKPHOS 331* 300* 302*   ALT 77* 76* 77*   * 200* 197*   ALBUMIN 2.7* 2.4* 2.3*   PROT 5.0* 4.6* 4.6*   BILITOT 11.5* 14.2* 15.6*   INR 1.2 1.3* 1.3*     Recent Labs   Lab 07/30/20  1128 07/30/20  1638   POCTGLUCOSE 154* 91        folic acid  1 mg Oral Daily    lidocaine  2 patch Transdermal Q24H    midodrine  2.5 mg Oral TID    pantoprazole  40 mg Oral Daily    rifAXImin  550 mg Oral BID    thiamine  100 mg Oral Daily    traZODone  100 mg Oral QHS       Assessment and Plan     Mr. Kenneth Pardo is a 59 y.o. male who presented to [facility] on 7/30/2020 with     Hospital Course:    Mr. Kenneth Pardo was admitted to Hospital Medicine for management of     Active Hospital Problems    Diagnosis  POA    *Acute alcoholic hepatitis [K70.10]  Yes    Acute hepatic encephalopathy [K72.00]  Yes    Positive hepatitis C antibody test [R76.8]  Yes    Macrocytic anemia [D53.9]  Yes    Severe alcohol dependence [F10.20]  Yes    Hypotension [I95.9]  Yes    Hyponatremia [E87.1]  Yes    Upper GI bleed [K92.2]  Yes     Essential hypertension [I10]  Yes     Chronic    Hepatorenal syndrome [K76.7]  Yes    ETOH abuse [F10.10]  Yes      Resolved Hospital Problems   No resolved problems to display.     # Acute Alcohol hepatitis without ascites  # Severe alcohol dependence   # Positive Hep C antibody   MELD-Na score: 20 at 8/3/2020  3:50 AM  MELD score: 20 at 8/3/2020  3:50 AM  Calculated from:  Serum Creatinine: 0.6 mg/dL (Rounded to 1 mg/dL) at 8/3/2020  3:50 AM  Serum Sodium: 137 mmol/L at 8/3/2020  3:50 AM  Total Bilirubin: 15.6 mg/dL at 8/3/2020  3:50 AM  INR(ratio): 1.3 at 8/3/2020  3:50 AM  Age: 59 years 11 months  - patient has a long h/o alcohol use  - newly positive hep c antibody, viral load pending  - hepatology consulted  - U/S with liver reviewed   - Addiction psych consult appreciated   -    # MERLY  # Hepatorenal Syndrome  - resolved    # Acute hepatic encephalopathy  - started on lactulose to have 3 to 4 BMs daily  - holding lactulose today and starting rifaximin due to 14 BMs     # Hyponatremia  - fluid restrict     # Hypotension  - started on midodrine    # Gastrointestinal hemorrhage with melena  - EGD showed esophagitis and portal hypertensive gastropathy     # Macrocytic anemia  # Thrombocytopenia  - folic acid    # Hypokalemia/Hypophosphatemia  - replace     Diet:  Low sodium  GI PPx:    DVT PPx:    Goals of Care:  full      Disposition:  Likely Tuesday      Naresh Franks MD  Medical Director [facility] Medicine  Spectra:  [phone]  Pager: [phone]

## 2020-08-05 LAB
ALBUMIN SERPL BCP-MCNC: 2 G/DL (ref 3.5–5.2)
ALP SERPL-CCNC: 342 U/L (ref 55–135)
ALT SERPL W/O P-5'-P-CCNC: 74 U/L (ref 10–44)
ANION GAP SERPL CALC-SCNC: 9 MMOL/L (ref 8–16)
ANISOCYTOSIS BLD QL SMEAR: SLIGHT
APPEARANCE FLD: CLEAR
AST SERPL-CCNC: 177 U/L (ref 10–40)
BACTERIA BLD CULT: NORMAL
BASO STIPL BLD QL SMEAR: ABNORMAL
BASOPHILS # BLD AUTO: 0.02 K/UL (ref 0–0.2)
BASOPHILS # BLD AUTO: 0.02 K/UL (ref 0–0.2)
BASOPHILS NFR BLD: 0.1 % (ref 0–1.9)
BASOPHILS NFR BLD: 0.1 % (ref 0–1.9)
BILIRUB DIRECT SERPL-MCNC: >14 MG/DL (ref 0.1–0.3)
BILIRUB SERPL-MCNC: 19.9 MG/DL (ref 0.1–1)
BODY FLD TYPE: NORMAL
BUN SERPL-MCNC: 10 MG/DL (ref 6–20)
CALCIUM SERPL-MCNC: 7.9 MG/DL (ref 8.7–10.5)
CHLORIDE SERPL-SCNC: 97 MMOL/L (ref 95–110)
CO2 SERPL-SCNC: 26 MMOL/L (ref 23–29)
COLOR FLD: YELLOW
CREAT SERPL-MCNC: 0.6 MG/DL (ref 0.5–1.4)
DIFFERENTIAL METHOD: ABNORMAL
DIFFERENTIAL METHOD: ABNORMAL
DOHLE BOD BLD QL SMEAR: PRESENT
EOSINOPHIL # BLD AUTO: 0.2 K/UL (ref 0–0.5)
EOSINOPHIL # BLD AUTO: 0.2 K/UL (ref 0–0.5)
EOSINOPHIL NFR BLD: 1.4 % (ref 0–8)
EOSINOPHIL NFR BLD: 1.5 % (ref 0–8)
ERYTHROCYTE [DISTWIDTH] IN BLOOD BY AUTOMATED COUNT: 20.7 % (ref 11.5–14.5)
ERYTHROCYTE [DISTWIDTH] IN BLOOD BY AUTOMATED COUNT: 21.2 % (ref 11.5–14.5)
EST. GFR  (AFRICAN AMERICAN): >60 ML/MIN/1.73 M^2
EST. GFR  (NON AFRICAN AMERICAN): >60 ML/MIN/1.73 M^2
GLUCOSE SERPL-MCNC: 67 MG/DL (ref 70–110)
HCT VFR BLD AUTO: 28.8 % (ref 40–54)
HCT VFR BLD AUTO: 30 % (ref 40–54)
HGB BLD-MCNC: 9 G/DL (ref 14–18)
HGB BLD-MCNC: 9.6 G/DL (ref 14–18)
IMM GRANULOCYTES # BLD AUTO: 0.19 K/UL (ref 0–0.04)
IMM GRANULOCYTES # BLD AUTO: 0.22 K/UL (ref 0–0.04)
IMM GRANULOCYTES NFR BLD AUTO: 1.3 % (ref 0–0.5)
IMM GRANULOCYTES NFR BLD AUTO: 1.6 % (ref 0–0.5)
INR PPP: 1.3 (ref 0.8–1.2)
LYMPHOCYTES # BLD AUTO: 1.2 K/UL (ref 1–4.8)
LYMPHOCYTES # BLD AUTO: 1.5 K/UL (ref 1–4.8)
LYMPHOCYTES NFR BLD: 10.6 % (ref 18–48)
LYMPHOCYTES NFR BLD: 8.2 % (ref 18–48)
LYMPHOCYTES NFR FLD MANUAL: 6 %
MAGNESIUM SERPL-MCNC: 1.3 MG/DL (ref 1.6–2.6)
MCH RBC QN AUTO: 35.2 PG (ref 27–31)
MCH RBC QN AUTO: 35.7 PG (ref 27–31)
MCHC RBC AUTO-ENTMCNC: 31.3 G/DL (ref 32–36)
MCHC RBC AUTO-ENTMCNC: 32 G/DL (ref 32–36)
MCV RBC AUTO: 112 FL (ref 82–98)
MCV RBC AUTO: 113 FL (ref 82–98)
MONOCYTES # BLD AUTO: 0.5 K/UL (ref 0.3–1)
MONOCYTES # BLD AUTO: 0.6 K/UL (ref 0.3–1)
MONOCYTES NFR BLD: 3.3 % (ref 4–15)
MONOCYTES NFR BLD: 3.9 % (ref 4–15)
MONOS+MACROS NFR FLD MANUAL: 7 %
NEUTROPHILS # BLD AUTO: 11.4 K/UL (ref 1.8–7.7)
NEUTROPHILS # BLD AUTO: 12 K/UL (ref 1.8–7.7)
NEUTROPHILS NFR BLD: 82.9 % (ref 38–73)
NEUTROPHILS NFR BLD: 85.1 % (ref 38–73)
NEUTROPHILS NFR FLD MANUAL: 87 %
NRBC BLD-RTO: 0 /100 WBC
NRBC BLD-RTO: 0 /100 WBC
OVALOCYTES BLD QL SMEAR: ABNORMAL
PHOSPHATE SERPL-MCNC: 2.7 MG/DL (ref 2.7–4.5)
PLATELET # BLD AUTO: 143 K/UL (ref 150–350)
PLATELET # BLD AUTO: 152 K/UL (ref 150–350)
PLATELET BLD QL SMEAR: ABNORMAL
PMV BLD AUTO: 10.6 FL (ref 9.2–12.9)
PMV BLD AUTO: 11 FL (ref 9.2–12.9)
POIKILOCYTOSIS BLD QL SMEAR: SLIGHT
POLYCHROMASIA BLD QL SMEAR: ABNORMAL
POTASSIUM SERPL-SCNC: 3.8 MMOL/L (ref 3.5–5.1)
PROT SERPL-MCNC: 4.5 G/DL (ref 6–8.4)
PROTHROMBIN TIME: 13.8 SEC (ref 9–12.5)
RBC # BLD AUTO: 2.56 M/UL (ref 4.6–6.2)
RBC # BLD AUTO: 2.69 M/UL (ref 4.6–6.2)
SODIUM SERPL-SCNC: 132 MMOL/L (ref 136–145)
TARGETS BLD QL SMEAR: ABNORMAL
TOXIC GRANULES BLD QL SMEAR: PRESENT
WBC # BLD AUTO: 13.74 K/UL (ref 3.9–12.7)
WBC # BLD AUTO: 14.14 K/UL (ref 3.9–12.7)
WBC # FLD: 279 /CU MM

## 2020-08-05 PROCEDURE — 99233 SBSQ HOSP IP/OBS HIGH 50: CPT | Mod: ,,, | Performed by: HOSPITALIST

## 2020-08-05 PROCEDURE — 25000003 PHARM REV CODE 250: Performed by: STUDENT IN AN ORGANIZED HEALTH CARE EDUCATION/TRAINING PROGRAM

## 2020-08-05 PROCEDURE — 36415 COLL VENOUS BLD VENIPUNCTURE: CPT

## 2020-08-05 PROCEDURE — 99233 PR SUBSEQUENT HOSPITAL CARE,LEVL III: ICD-10-PCS | Mod: ,,, | Performed by: HOSPITALIST

## 2020-08-05 PROCEDURE — 11000001 HC ACUTE MED/SURG PRIVATE ROOM

## 2020-08-05 PROCEDURE — 85610 PROTHROMBIN TIME: CPT

## 2020-08-05 PROCEDURE — 80048 BASIC METABOLIC PNL TOTAL CA: CPT

## 2020-08-05 PROCEDURE — 87075 CULTR BACTERIA EXCEPT BLOOD: CPT

## 2020-08-05 PROCEDURE — 84100 ASSAY OF PHOSPHORUS: CPT

## 2020-08-05 PROCEDURE — 80076 HEPATIC FUNCTION PANEL: CPT

## 2020-08-05 PROCEDURE — 83735 ASSAY OF MAGNESIUM: CPT

## 2020-08-05 PROCEDURE — 87070 CULTURE OTHR SPECIMN AEROBIC: CPT

## 2020-08-05 PROCEDURE — 85025 COMPLETE CBC W/AUTO DIFF WBC: CPT | Mod: 91

## 2020-08-05 PROCEDURE — 89051 BODY FLUID CELL COUNT: CPT

## 2020-08-05 PROCEDURE — 25000003 PHARM REV CODE 250: Performed by: HOSPITALIST

## 2020-08-05 RX ORDER — ROPINIROLE 0.25 MG/1
0.25 TABLET, FILM COATED ORAL NIGHTLY
Status: DISCONTINUED | OUTPATIENT
Start: 2020-08-05 | End: 2020-08-09 | Stop reason: HOSPADM

## 2020-08-05 RX ORDER — MIDODRINE HYDROCHLORIDE 5 MG/1
5 TABLET ORAL ONCE
Status: COMPLETED | OUTPATIENT
Start: 2020-08-06 | End: 2020-08-05

## 2020-08-05 RX ORDER — FUROSEMIDE 20 MG/1
20 TABLET ORAL DAILY
Status: DISCONTINUED | OUTPATIENT
Start: 2020-08-06 | End: 2020-08-06

## 2020-08-05 RX ORDER — LORAZEPAM/0.9% SODIUM CHLORIDE 100MG/0.1L
2 PLASTIC BAG, INJECTION (ML) INTRAVENOUS
Status: COMPLETED | OUTPATIENT
Start: 2020-08-05 | End: 2020-08-05

## 2020-08-05 RX ORDER — SPIRONOLACTONE 25 MG/1
50 TABLET ORAL DAILY
Status: DISCONTINUED | OUTPATIENT
Start: 2020-08-06 | End: 2020-08-06

## 2020-08-05 RX ORDER — ESCITALOPRAM OXALATE 5 MG/1
5 TABLET ORAL DAILY
Status: DISCONTINUED | OUTPATIENT
Start: 2020-08-05 | End: 2020-08-09 | Stop reason: HOSPADM

## 2020-08-05 RX ADMIN — MAGNESIUM SULFATE 2 G: 2 INJECTION INTRAVENOUS at 12:08

## 2020-08-05 RX ADMIN — ROPINIROLE HYDROCHLORIDE 0.25 MG: 0.25 TABLET, FILM COATED ORAL at 08:08

## 2020-08-05 RX ADMIN — ESCITALOPRAM 5 MG: 5 TABLET, FILM COATED ORAL at 12:08

## 2020-08-05 RX ADMIN — LACTULOSE 5 G: 20 SOLUTION ORAL at 03:08

## 2020-08-05 RX ADMIN — FOLIC ACID 1 MG: 1 TABLET ORAL at 09:08

## 2020-08-05 RX ADMIN — PANTOPRAZOLE SODIUM 40 MG: 40 TABLET, DELAYED RELEASE ORAL at 09:08

## 2020-08-05 RX ADMIN — LACTULOSE 5 G: 20 SOLUTION ORAL at 09:08

## 2020-08-05 RX ADMIN — TRAZODONE HYDROCHLORIDE 100 MG: 100 TABLET ORAL at 08:08

## 2020-08-05 RX ADMIN — MIDODRINE HYDROCHLORIDE 5 MG: 5 TABLET ORAL at 11:08

## 2020-08-05 RX ADMIN — MAGNESIUM SULFATE 2 G: 2 INJECTION INTRAVENOUS at 03:08

## 2020-08-05 RX ADMIN — Medication 100 MG: at 09:08

## 2020-08-05 NOTE — H&P
Inpatient Radiology Pre-procedure Note    History of Present Illness:  Kenneth Pardo is a 59 y.o. male who presents for ultrasound guided paracentesis.  Admission H&P reviewed.  Past Medical History:   Diagnosis Date    Hepatitis     Hypertension      Past Surgical History:   Procedure Laterality Date    ESOPHAGOGASTRODUODENOSCOPY N/A 7/31/2020    Procedure: EGD (ESOPHAGOGASTRODUODENOSCOPY);  Surgeon: Jhonathan Lieberman MD;  Location: [address]);  Service: Endoscopy;  Laterality: N/A;       Review of Systems:   As documented in primary team H&P    Home Meds:   Prior to Admission medications    Medication Sig Start Date End Date Taking? Authorizing Provider   lisinopriL (PRINIVIL,ZESTRIL) 20 MG tablet Take 20 mg by mouth once daily.   Yes Historical Provider, MD     Scheduled Meds:    escitalopram oxalate  5 mg Oral Daily    folic acid  1 mg Oral Daily    lactulose  5 g Oral BID    magnesium sulfate  2 g Intravenous Q2H    pantoprazole  40 mg Oral Daily    rOPINIRole  0.25 mg Oral QHS    thiamine  100 mg Oral Daily    traZODone  100 mg Oral QHS     Continuous Infusions:   PRN Meds:acetaminophen, hydrOXYzine pamoate, LORazepam, melatonin, ondansetron, ondansetron, sodium chloride 0.9%, sodium chloride 0.9%  Anticoagulants/Antiplatelets: no anticoagulation    Allergies: Review of patient's allergies indicates:  No Known Allergies  Sedation Hx: have not been any systemic reactions    Vitals:  Temp: 98.3 °F (36.8 °C) (08/05/20 1200)  Pulse: 97 (08/05/20 1345)  Resp: 16 (08/05/20 1345)  BP: 103/70 (08/05/20 1345)  SpO2: 95 % (08/05/20 1345)     Physical Exam:  ASA: 3  Mallampati: n/a    General: no acute distress  Mental Status: alert and oriented to person, place and time  HEENT: normocephalic, atraumatic  Chest: unlabored breathing  Heart: regular heart rate  Abdomen: distended  Extremity: moves all extremities    Plan: ultrasound guided paracentesis  Sedation Plan: local    AMARJIT Walker,  P  Interventional Radiology  [phone] clinic

## 2020-08-05 NOTE — PROGRESS NOTES
Progress Note   Hospital Medicine         Patient Name: Kenneth Pardo  MRN:  [id number]  [facility] Medicine Team: [facility] Naresh Franks MD  Date of Admission:  7/30/2020     Length of Stay:  LOS: 5 days   Expected Discharge Date: 8/6/2020  Principal Problem:  Acute alcoholic hepatitis       Subjective:     Interval History/Overnight Events:  Patient's LFTs rising today, blood cultures from a few days ago, reviewed, seem to be contaminate, repeat blood cultures ordered today and infectious work up; will try to get IR paracentesis as well; will monitor here for 2 more days; having a lot of anxiety, will start SSRI in the AM    Review of Systems   Constitutional: Negative for chills, fatigue, fever.   HENT: Negative for sore throat, trouble swallowing.    Eyes: Negative for photophobia, visual disturbance.   Respiratory: Negative for cough, shortness of breath.    Cardiovascular: Negative for chest pain, palpitations, leg swelling.   Gastrointestinal: Negative for abdominal pain, constipation, diarrhea, nausea, vomiting.   Endocrine: Negative for cold intolerance, heat intolerance.   Genitourinary: Negative for dysuria, frequency.   Musculoskeletal: Negative for arthralgias, myalgias.   Skin: Negative for rash, wound, erythema   Neurological: Negative for dizziness, syncope, weakness, light-headedness.   Psychiatric/Behavioral: Negative for confusion, hallucinations, anxiety  All other systems reviewed and are negative.    Objective:     Temp:  [96.1 °F (35.6 °C)-98.4 °F (36.9 °C)]   Pulse:  []   Resp:  [16-18]   BP: (111-133)/(69-89)   SpO2:  [91 %-97 %]       Physical Exam:  Constitutional: appears weak and ill  Head: Normocephalic and atraumatic.   Mouth/Throat: Oropharynx is clear and moist.   Eyes: EOM are normal. Pupils are equal, round, and reactive to light. positive scleral icterus.   Neck: Normal range of motion. Neck supple.   Cardiovascular: Normal rate and regular rhythm.  No murmur  heard.  Pulmonary/Chest: Effort normal and breath sounds normal. No respiratory distress. No wheezes, rales, or rhonchi  Abdominal: Soft. Bowel sounds are normal.  No distension or tenderness  Musculoskeletal: Normal range of motion. No edema.   Neurological: Alert and oriented to person, place, and time.   Skin: Skin is warm and dry.   Psychiatric: Normal mood and affect. Behavior is normal.     Recent Labs   Lab 08/01/20  1414 08/02/20  1012 08/03/20  0350 08/03/20  1355 08/04/20  0524 08/04/20  1356   WBC 10.37 11.06 10.98 11.55 11.89 13.39*   HGB 9.0* 9.0* 8.9* 9.7* 9.4* 9.7*   HCT 28.1* 27.6* 27.6* 31.4* 29.8* 31.0*   * 108* 106* 122* 126* 143*     Recent Labs   Lab 08/02/20  1100 08/03/20  0350 08/04/20  0524    137 132*   K 3.2* 3.5 3.9   CL 98 98 97   CO2 31* 29 25   BUN 20 13 10   CREATININE 0.7 0.6 0.5    89 77   CALCIUM 8.2* 8.1* 8.0*   MG 1.7 1.6 1.5*   PHOS 2.1* 2.5* 2.3*     Recent Labs   Lab 08/02/20  1100 08/03/20  0350 08/04/20  0524   ALKPHOS 300* 302* 324*   ALT 76* 77* 79*   * 197* 194*   ALBUMIN 2.4* 2.3* 2.2*   PROT 4.6* 4.6* 4.5*   BILITOT 14.2* 15.6* 18.2*   INR 1.3* 1.3* 1.3*     Recent Labs   Lab 07/30/20  1128 07/30/20  1638   POCTGLUCOSE 154* 91        folic acid  1 mg Oral Daily    lactulose  5 g Oral BID    lidocaine  2 patch Transdermal Q24H    pantoprazole  40 mg Oral Daily    pramipexole  0.125 mg Oral QHS    thiamine  100 mg Oral Daily    traZODone  100 mg Oral QHS       Assessment and Plan     Mr. Kenneth Pardo is a 59 y.o. male who presented to [facility] on 7/30/2020 with     Hospital Course:    Mr. Kenneth Pardo was admitted to Hospital Medicine for management of     Active Hospital Problems    Diagnosis  POA    *Acute alcoholic hepatitis [K70.10]  Yes    Acute hepatic encephalopathy [K72.00]  Yes    Positive hepatitis C antibody test [R76.8]  Yes    Macrocytic anemia [D53.9]  Yes    Severe alcohol dependence [F10.20]  Yes    Hypotension  [I95.9]  Yes    Hyponatremia [E87.1]  Yes    Upper GI bleed [K92.2]  Yes    Essential hypertension [I10]  Yes     Chronic    Hepatorenal syndrome [K76.7]  Yes    ETOH abuse [F10.10]  Yes      Resolved Hospital Problems   No resolved problems to display.     # Acute Alcohol hepatitis without ascites  # Severe alcohol dependence   # Positive Hep C antibody   MELD-Na score: 23 at 8/4/2020  5:24 AM  MELD score: 20 at 8/4/2020  5:24 AM  Calculated from:  Serum Creatinine: 0.5 mg/dL (Rounded to 1 mg/dL) at 8/4/2020  5:24 AM  Serum Sodium: 132 mmol/L at 8/4/2020  5:24 AM  Total Bilirubin: 18.2 mg/dL at 8/4/2020  5:24 AM  INR(ratio): 1.3 at 8/4/2020  5:24 AM  Age: 59 years 11 months  - patient has a long h/o alcohol use  - newly positive hep c antibody, viral load pending  - hepatology consulted  - U/S with liver reviewed   - Addiction psych consult appreciated   - LFTs rising    # MERLY  # Hepatorenal Syndrome  - resolved    # Acute hepatic encephalopathy  - started low dose lactulose     # Hyponatremia  - fluid restrict     # Hypotension  - started on midodrine    # Gastrointestinal hemorrhage with melena  - EGD showed esophagitis and portal hypertensive gastropathy     # Macrocytic anemia  # Thrombocytopenia  - folic acid    # Hypokalemia/Hypophosphatemia  - replace     Diet:  Low sodium  GI PPx:    DVT PPx:    Goals of Care:  full      Disposition:  Likely Thursday       Naresh Franks MD  Medical Director [facility] Medicine  Spectra:  [phone]  Pager: [phone]

## 2020-08-05 NOTE — PROCEDURES
Radiology Post-Procedure Note    Pre Op Diagnosis: Ascites  Post Op Diagnosis: Same    Procedure: Ultrasound Guided Paracentesis    Procedure performed by: Pravin RILEY, Eryn     Written Informed Consent Obtained: Yes  Specimen Removed: YES clear yellow  Estimated Blood Loss: Minimal    Findings:   Successful paracentesis.  Albumin administered PRN per protocol.    Patient tolerated procedure well.    Eryn Costello, APRN, FNP  Interventional Radiology  [phone] clinic

## 2020-08-05 NOTE — PT/OT/SLP PROGRESS
Occupational Therapy      Patient Name:  Kenneth Pardo   MRN:  [id number]    Patient not seen today secondary to Patient fatigue, Unavailable (Comment)(Pt at IR 1st attempt and BP was 93/64 and severly fatigued for the second attempt.). Will follow-up tomorrow.    Mikey Fry, OT  8/5/2020

## 2020-08-05 NOTE — NURSING
Paracentesis completed, patient tolerated well.  No acute distress noted.  750 ml removed from RQ, Mepore dressing applied, C/D/I.  Labs collected and sent. Report called and given to Finesse CONTEH for patient returning to room 629.  Pt discharged via hospital bed with patient escort service.

## 2020-08-05 NOTE — NURSING
Pt arrived to MPU room 3, for Paracentesis.  No acute distress noted.  Orders and labs reviewed on chart.  Awaiting consent.

## 2020-08-05 NOTE — NURSING
Medications are pending and late due to pt at IR at this time and being off the unit.  Awaiting to receive patient back to unit after receiving report from IR.  Removal of 750 ccs with dressing to right quadrant.

## 2020-08-05 NOTE — PLAN OF CARE
"Pt alert and oriented x 4, flat affect, isn't very interactive when speaking to pt. When asking questions, pt would often answer with one word answers or not answer the question at all.  He is agreeable to plan of care and medications, and did not complain about any pain or discomfort for the entire day until end of shift. Pt began feeling very anxious and showed me his empty bottle of Xanax 1 mg that was prescribed and was his regular medication prior to being admitted into hospital.  Primary attending notified of pt's anxiety and his feeling that he's going to "lose it." Primary attending advised that we are holding high dosage of antianxiety at this time due to his hepatic encephalopathy.  Anxiety was still addressed and night nurse was made aware.    "

## 2020-08-05 NOTE — PT/OT/SLP PROGRESS
Physical Therapy       [id number]     Pt not treated on this date but remains appropriate for current POC. Therapy will follow up as able.     Jesús Lloyd, PT, DPT  8/5/2020

## 2020-08-05 NOTE — PLAN OF CARE
08/05/20 0854   Post-Acute Status   Post-Acute Authorization Other   Other Status No Post-Acute Service Needs

## 2020-08-06 PROBLEM — K65.2 SBP (SPONTANEOUS BACTERIAL PERITONITIS): Status: ACTIVE | Noted: 2020-08-06

## 2020-08-06 PROBLEM — F10.20 ALCOHOL USE DISORDER, SEVERE, DEPENDENCE: Status: ACTIVE | Noted: 2020-07-30

## 2020-08-06 LAB
ALBUMIN SERPL BCP-MCNC: 1.9 G/DL (ref 3.5–5.2)
ALP SERPL-CCNC: 352 U/L (ref 55–135)
ALT SERPL W/O P-5'-P-CCNC: 70 U/L (ref 10–44)
ANION GAP SERPL CALC-SCNC: 12 MMOL/L (ref 8–16)
ANISOCYTOSIS BLD QL SMEAR: SLIGHT
AST SERPL-CCNC: 163 U/L (ref 10–40)
BASOPHILS # BLD AUTO: 0.02 K/UL (ref 0–0.2)
BASOPHILS NFR BLD: 0.1 % (ref 0–1.9)
BILIRUB DIRECT SERPL-MCNC: >14 MG/DL (ref 0.1–0.3)
BILIRUB SERPL-MCNC: 19.9 MG/DL (ref 0.1–1)
BUN SERPL-MCNC: 9 MG/DL (ref 6–20)
CALCIUM SERPL-MCNC: 7.9 MG/DL (ref 8.7–10.5)
CHLORIDE SERPL-SCNC: 98 MMOL/L (ref 95–110)
CO2 SERPL-SCNC: 21 MMOL/L (ref 23–29)
CREAT SERPL-MCNC: 0.5 MG/DL (ref 0.5–1.4)
DIFFERENTIAL METHOD: ABNORMAL
EOSINOPHIL # BLD AUTO: 0.2 K/UL (ref 0–0.5)
EOSINOPHIL NFR BLD: 1.6 % (ref 0–8)
ERYTHROCYTE [DISTWIDTH] IN BLOOD BY AUTOMATED COUNT: 21.2 % (ref 11.5–14.5)
EST. GFR  (AFRICAN AMERICAN): >60 ML/MIN/1.73 M^2
EST. GFR  (NON AFRICAN AMERICAN): >60 ML/MIN/1.73 M^2
GLUCOSE SERPL-MCNC: 54 MG/DL (ref 70–110)
HCT VFR BLD AUTO: 28.1 % (ref 40–54)
HGB BLD-MCNC: 8.8 G/DL (ref 14–18)
HYPOCHROMIA BLD QL SMEAR: ABNORMAL
IMM GRANULOCYTES # BLD AUTO: 0.16 K/UL (ref 0–0.04)
IMM GRANULOCYTES NFR BLD AUTO: 1.2 % (ref 0–0.5)
INR PPP: 1.2 (ref 0.8–1.2)
LYMPHOCYTES # BLD AUTO: 1.2 K/UL (ref 1–4.8)
LYMPHOCYTES NFR BLD: 8.6 % (ref 18–48)
MAGNESIUM SERPL-MCNC: 1.7 MG/DL (ref 1.6–2.6)
MCH RBC QN AUTO: 35.2 PG (ref 27–31)
MCHC RBC AUTO-ENTMCNC: 31.3 G/DL (ref 32–36)
MCV RBC AUTO: 112 FL (ref 82–98)
MONOCYTES # BLD AUTO: 0.5 K/UL (ref 0.3–1)
MONOCYTES NFR BLD: 3.8 % (ref 4–15)
NEUTROPHILS # BLD AUTO: 11.7 K/UL (ref 1.8–7.7)
NEUTROPHILS NFR BLD: 84.7 % (ref 38–73)
NRBC BLD-RTO: 0 /100 WBC
OVALOCYTES BLD QL SMEAR: ABNORMAL
PHOSPHATE SERPL-MCNC: 2.7 MG/DL (ref 2.7–4.5)
PLATELET # BLD AUTO: 158 K/UL (ref 150–350)
PMV BLD AUTO: 10.6 FL (ref 9.2–12.9)
POCT GLUCOSE: 77 MG/DL (ref 70–110)
POIKILOCYTOSIS BLD QL SMEAR: SLIGHT
POLYCHROMASIA BLD QL SMEAR: ABNORMAL
POTASSIUM SERPL-SCNC: 3.7 MMOL/L (ref 3.5–5.1)
PROT SERPL-MCNC: 4.4 G/DL (ref 6–8.4)
PROTHROMBIN TIME: 13.3 SEC (ref 9–12.5)
RBC # BLD AUTO: 2.5 M/UL (ref 4.6–6.2)
SODIUM SERPL-SCNC: 131 MMOL/L (ref 136–145)
WBC # BLD AUTO: 13.8 K/UL (ref 3.9–12.7)

## 2020-08-06 PROCEDURE — 25000003 PHARM REV CODE 250: Performed by: INTERNAL MEDICINE

## 2020-08-06 PROCEDURE — 85025 COMPLETE CBC W/AUTO DIFF WBC: CPT

## 2020-08-06 PROCEDURE — 94761 N-INVAS EAR/PLS OXIMETRY MLT: CPT

## 2020-08-06 PROCEDURE — 83735 ASSAY OF MAGNESIUM: CPT

## 2020-08-06 PROCEDURE — 25000003 PHARM REV CODE 250: Performed by: HOSPITALIST

## 2020-08-06 PROCEDURE — 25000003 PHARM REV CODE 250: Performed by: STUDENT IN AN ORGANIZED HEALTH CARE EDUCATION/TRAINING PROGRAM

## 2020-08-06 PROCEDURE — 36415 COLL VENOUS BLD VENIPUNCTURE: CPT

## 2020-08-06 PROCEDURE — 11000001 HC ACUTE MED/SURG PRIVATE ROOM

## 2020-08-06 PROCEDURE — P9047 ALBUMIN (HUMAN), 25%, 50ML: HCPCS | Mod: JG | Performed by: HOSPITALIST

## 2020-08-06 PROCEDURE — 99232 SBSQ HOSP IP/OBS MODERATE 35: CPT | Mod: ,,, | Performed by: HOSPITALIST

## 2020-08-06 PROCEDURE — 80048 BASIC METABOLIC PNL TOTAL CA: CPT

## 2020-08-06 PROCEDURE — 63600175 PHARM REV CODE 636 W HCPCS: Mod: JG | Performed by: HOSPITALIST

## 2020-08-06 PROCEDURE — 99232 PR SUBSEQUENT HOSPITAL CARE,LEVL II: ICD-10-PCS | Mod: ,,, | Performed by: HOSPITALIST

## 2020-08-06 PROCEDURE — 85610 PROTHROMBIN TIME: CPT

## 2020-08-06 PROCEDURE — 97116 GAIT TRAINING THERAPY: CPT

## 2020-08-06 PROCEDURE — 80076 HEPATIC FUNCTION PANEL: CPT

## 2020-08-06 PROCEDURE — 84100 ASSAY OF PHOSPHORUS: CPT

## 2020-08-06 RX ORDER — ALBUMIN HUMAN 250 G/1000ML
25 SOLUTION INTRAVENOUS 4 TIMES DAILY
Status: COMPLETED | OUTPATIENT
Start: 2020-08-08 | End: 2020-08-08

## 2020-08-06 RX ORDER — MIDODRINE HYDROCHLORIDE 5 MG/1
10 TABLET ORAL EVERY 8 HOURS
Status: DISCONTINUED | OUTPATIENT
Start: 2020-08-06 | End: 2020-08-06

## 2020-08-06 RX ORDER — MIDODRINE HYDROCHLORIDE 5 MG/1
5 TABLET ORAL ONCE
Status: COMPLETED | OUTPATIENT
Start: 2020-08-06 | End: 2020-08-06

## 2020-08-06 RX ORDER — MIDODRINE HYDROCHLORIDE 5 MG/1
10 TABLET ORAL EVERY 8 HOURS
Status: DISCONTINUED | OUTPATIENT
Start: 2020-08-06 | End: 2020-08-07

## 2020-08-06 RX ORDER — ALBUMIN HUMAN 250 G/1000ML
50 SOLUTION INTRAVENOUS 3 TIMES DAILY
Status: COMPLETED | OUTPATIENT
Start: 2020-08-06 | End: 2020-08-06

## 2020-08-06 RX ADMIN — FOLIC ACID 1 MG: 1 TABLET ORAL at 09:08

## 2020-08-06 RX ADMIN — ESCITALOPRAM 5 MG: 5 TABLET, FILM COATED ORAL at 09:08

## 2020-08-06 RX ADMIN — LORAZEPAM 0.5 MG: 0.5 TABLET ORAL at 03:08

## 2020-08-06 RX ADMIN — LACTULOSE 5 G: 20 SOLUTION ORAL at 09:08

## 2020-08-06 RX ADMIN — ALBUMIN (HUMAN) 50 G: 12.5 SOLUTION INTRAVENOUS at 09:08

## 2020-08-06 RX ADMIN — LACTULOSE 5 G: 20 SOLUTION ORAL at 03:08

## 2020-08-06 RX ADMIN — Medication 100 MG: at 09:08

## 2020-08-06 RX ADMIN — ALBUMIN (HUMAN) 50 G: 12.5 SOLUTION INTRAVENOUS at 03:08

## 2020-08-06 RX ADMIN — MIDODRINE HYDROCHLORIDE 10 MG: 5 TABLET ORAL at 09:08

## 2020-08-06 RX ADMIN — TRAZODONE HYDROCHLORIDE 100 MG: 100 TABLET ORAL at 08:08

## 2020-08-06 RX ADMIN — CEFTRIAXONE 2 G: 2 INJECTION, SOLUTION INTRAVENOUS at 09:08

## 2020-08-06 RX ADMIN — ALBUMIN (HUMAN) 50 G: 12.5 SOLUTION INTRAVENOUS at 08:08

## 2020-08-06 RX ADMIN — ROPINIROLE HYDROCHLORIDE 0.25 MG: 0.25 TABLET, FILM COATED ORAL at 08:08

## 2020-08-06 RX ADMIN — MIDODRINE HYDROCHLORIDE 10 MG: 5 TABLET ORAL at 03:08

## 2020-08-06 RX ADMIN — PANTOPRAZOLE SODIUM 40 MG: 40 TABLET, DELAYED RELEASE ORAL at 09:08

## 2020-08-06 RX ADMIN — MIDODRINE HYDROCHLORIDE 5 MG: 5 TABLET ORAL at 04:08

## 2020-08-06 NOTE — PLAN OF CARE
Pt when medically ready will go home to self care. CM will attempt to secure HH with medicaid. 2nd option would be outpatient therapy.     08/06/20 4988   Discharge Reassessment   Assessment Type Discharge Planning Reassessment   Provided patient/caregiver education on the expected discharge date and the discharge plan Yes   Do you have any problems affording any of your prescribed medications? No   Discharge Plan A Home   Discharge Plan B Home;Home Health   DME Needed Upon Discharge  walker, rolling   Anticipated Discharge Disposition Home-Health   Can the patient/caregiver answer the patient profile reliably? Yes, cognitively intact   How does the patient rate their overall health at the present time? Fair   Describe the patient's ability to walk at the present time. Walks with the help of equipment   How often would a person be available to care for the patient? Often   Number of comorbid conditions (as recorded on the chart) Two   Post-Acute Status   Post-Acute Authorization HME;Home Health   Home Health Status Awaiting Internal Medical Clearance   Discharge Delays None known at this time   Suzanne Lopez, MSN  Case Management  Ext [phone]

## 2020-08-06 NOTE — PLAN OF CARE
Pt alert and oriented x 3-4 with periods of forgetfulness.  Pt vitals remained on hypotensive side, but it may be because he didn't want to eat today and only took a few spoons of food for each meal with maybe a cup of water.  Pt may need to work with Dietitian, but he has no appetite.  Pt did have one bowel movement today and had an incontinent episode.  Pt had paracentesis today down in IR and 750 ccs were removed from right quadrant.  Otherwise, pt has not complained about pain and any other issues other than feeling weak.  Pt's wife Dee Dee would like an update from primary team.

## 2020-08-06 NOTE — PT/OT/SLP PROGRESS
Occupational Therapy      Patient Name:  Kenneth Pardo   MRN:  [id number]    Patient not seen today secondary to Patient fatigue. Will follow-up tomorow.    ANG Carter  8/6/2020

## 2020-08-06 NOTE — NURSING
Pt BP 88/50, MD contacted @ 8959. New order, one time does of midodrine given per EMAR. Will continue to monitor.

## 2020-08-06 NOTE — NURSING
Pt BP 78/52, asymptomatic. Contacted MD and informed. Awaiting new orders, will continue to monitor.

## 2020-08-06 NOTE — PT/OT/SLP PROGRESS
"Physical Therapy Treatment    Patient Name:  Kenneth Pardo   MRN:  [id number]    Recommendations:     Discharge Recommendations:  home health PT   Discharge Equipment Recommendations: walker, rolling   Barriers to discharge: decreased activity tolerance     Assessment:     Kenneth Pardo is a 59 y.o. male admitted with a medical diagnosis of SBP (spontaneous bacterial peritonitis).  He presents with the following impairments/functional limitations:  weakness, impaired balance, impaired endurance, impaired self care skills, impaired functional mobilty, gait instability, decreased lower extremity function. Pt tolerated session fairly on this date presenting w/generalized fatigue requesting to keep gait training within room. Pt would benefit from continued skilled acute PT 3x/wk to improve functional mobility.  Recommending pt receive PT services in HHPT setting following discharge from hospital once medically cleared.     Rehab Prognosis: Fair; patient would benefit from acute skilled PT services to address these deficits and reach maximum level of function.    Recent Surgery: Procedure(s) (LRB):  EGD (ESOPHAGOGASTRODUODENOSCOPY) (N/A) 6 Days Post-Op    Plan:     During this hospitalization, patient to be seen 3 x/week to address the identified rehab impairments via gait training, therapeutic activities, therapeutic exercises, neuromuscular re-education and progress toward the following goals:    · Plan of Care Expires:  08/29/20    Subjective     Chief Complaint: generalized fatigue   Patient/Family Comments/goals: "I don't want to go out in the [place] today."  Pain/Comfort:  · Pain Rating 1: 0/10      Objective:     Communicated with NSG prior to session.  Patient found supine with   upon PT entry to room.     General Precautions: Standard, fall   Orthopedic Precautions:N/A   Braces: N/A     Functional Mobility:  · Bed Mobility:     · Scooting: stand by assistance  · Supine to Sit: stand by assistance  · Sit to Supine: " stand by assistance  · Transfers:     · Sit to Stand:  stand by assistance with rolling walker  · Gait: 22ft SBA w/RW increased distance deferred by pt  · Balance: SBA      AM-PAC 6 CLICK MOBILITY  Turning over in bed (including adjusting bedclothes, sheets and blankets)?: 3  Sitting down on and standing up from a chair with arms (e.g., wheelchair, bedside commode, etc.): 3  Moving from lying on back to sitting on the side of the bed?: 3  Moving to and from a bed to a chair (including a wheelchair)?: 3  Need to walk in hospital room?: 3  Climbing 3-5 steps with a railing?: 2  Basic Mobility Total Score: 17       Therapeutic Activities and Exercises:   - Pt educated on:   -PT roles, expectations, and POC    -Safety with mobility   -Benefits of OOB activities to increase strength and functional mobility    -Performing ther ex for increasing LE ROM and strength   -Discharge recommendations     Patient left HOB elevated with call button in reach..    GOALS:   Multidisciplinary Problems     Physical Therapy Goals        Problem: Physical Therapy Goal    Goal Priority Disciplines Outcome Goal Variances Interventions   Physical Therapy Goal     PT, PT/OT Ongoing, Progressing     Description: Goals to be met by: 20    Patient will increase functional independence with mobility by performin. Supine to sit with Stand-by Assistance -not met  2. Sit to stand transfer with Stand-by Assistance -not met  3. Gait  x 150 feet with Supervision using AD if needed. -not met  4. Ascend/descend 3 stair with no Handrails Stand-by Assistance  -not met                     Time Tracking:     PT Received On: 20  PT Start Time: 911     PT Stop Time: 927  PT Total Time (min): 16 min     Billable Minutes: Gait Training 16    Treatment Type: Treatment  PT/PTA: PT     PTA Visit Number: 0     Stevan Lloyd, PT  2020

## 2020-08-06 NOTE — PROGRESS NOTES
Progress Note   Hospital Medicine         Patient Name: Kenneth Pardo  MRN:  [id number]  [facility] Medicine [facility]: [facility] Naresh Franks MD  Date of Admission:  7/30/2020     Length of Stay:  LOS: 6 days   Expected Discharge Date: 8/6/2020  Principal Problem:  Acute alcoholic hepatitis       Subjective:     Interval History/Overnight Events:  Patient's LFTs rising today, went for IR paracentesis today with 750 cc out and negative for SBP; patient is now medicaid pending; all infectious work up as been negative; needs outpatient AA; patient has a flat affect, however is AAO times 4; having 4 BMs daily     Review of Systems   Constitutional: Negative for chills, fatigue, fever.   HENT: Negative for sore throat, trouble swallowing.    Eyes: Negative for photophobia, visual disturbance.   Respiratory: Negative for cough, shortness of breath.    Cardiovascular: Negative for chest pain, palpitations, leg swelling.   Gastrointestinal: Negative for abdominal pain, constipation, diarrhea, nausea, vomiting.   Endocrine: Negative for cold intolerance, heat intolerance.   Genitourinary: Negative for dysuria, frequency.   Musculoskeletal: Negative for arthralgias, myalgias.   Skin: Negative for rash, wound, erythema   Neurological: Negative for dizziness, syncope, weakness, light-headedness.   Psychiatric/Behavioral: Negative for confusion, hallucinations, anxiety  All other systems reviewed and are negative.    Objective:     Temp:  [97.8 °F (36.6 °C)-98.8 °F (37.1 °C)]   Pulse:  [88-98]   Resp:  [16-18]   BP: ()/(60-84)   SpO2:  [95 %-98 %]       Physical Exam:  Constitutional: appears weak and ill  Head: Normocephalic and atraumatic.   Mouth/Throat: Oropharynx is clear and moist.   Eyes: EOM are normal. Pupils are equal, round, and reactive to light. positive scleral icterus.   Neck: Normal range of motion. Neck supple.   Cardiovascular: Normal rate and regular rhythm.  No murmur heard.  Pulmonary/Chest: Effort  normal and breath sounds normal. No respiratory distress. No wheezes, rales, or rhonchi  Abdominal: Soft. Bowel sounds are normal.  No distension or tenderness  Musculoskeletal: Normal range of motion. No edema.   Neurological: Alert and oriented to person, place, and time.   Skin: Skin is warm and dry.   Psychiatric: Normal mood and affect. Behavior is normal.     Recent Labs   Lab 08/03/20  0350 08/03/20  1355 08/04/20  0524 08/04/20  1356 08/05/20  0621 08/05/20  1652   WBC 10.98 11.55 11.89 13.39* 13.74* 14.14*   HGB 8.9* 9.7* 9.4* 9.7* 9.6* 9.0*   HCT 27.6* 31.4* 29.8* 31.0* 30.0* 28.8*   * 122* 126* 143* 152 143*     Recent Labs   Lab 08/03/20  0350 08/04/20  0524 08/05/20  0621    132* 132*   K 3.5 3.9 3.8   CL 98 97 97   CO2 29 25 26   BUN 13 10 10   CREATININE 0.6 0.5 0.6   GLU 89 77 67*   CALCIUM 8.1* 8.0* 7.9*   MG 1.6 1.5* 1.3*   PHOS 2.5* 2.3* 2.7     Recent Labs   Lab 08/03/20  0350 08/04/20  0524 08/05/20  0621   ALKPHOS 302* 324* 342*   ALT 77* 79* 74*   * 194* 177*   ALBUMIN 2.3* 2.2* 2.0*   PROT 4.6* 4.5* 4.5*   BILITOT 15.6* 18.2* 19.9*   INR 1.3* 1.3* 1.3*     Recent Labs   Lab 07/30/20  1128 07/30/20  1638   POCTGLUCOSE 154* 91        escitalopram oxalate  5 mg Oral Daily    folic acid  1 mg Oral Daily    lactulose  5 g Oral BID    pantoprazole  40 mg Oral Daily    rOPINIRole  0.25 mg Oral QHS    thiamine  100 mg Oral Daily    traZODone  100 mg Oral QHS       Assessment and Plan     Mr. Kenneth Pardo is a 59 y.o. male who presented to [facility] on 7/30/2020 with     Hospital Course:    Mr. Kenneth Pardo was admitted to Hospital Medicine for management of     Active Hospital Problems    Diagnosis  POA    *Acute alcoholic hepatitis [K70.10]  Yes    Acute hepatic encephalopathy [K72.00]  Yes    Positive hepatitis C antibody test [R76.8]  Yes    Macrocytic anemia [D53.9]  Yes    Severe alcohol dependence [F10.20]  Yes    Hypotension [I95.9]  Yes    Hyponatremia  [E87.1]  Yes    Upper GI bleed [K92.2]  Yes    Essential hypertension [I10]  Yes     Chronic    Hepatorenal syndrome [K76.7]  Yes    ETOH abuse [F10.10]  Yes      Resolved Hospital Problems   No resolved problems to display.     # Acute Alcohol hepatitis with ascites  # Severe alcohol dependence   # Positive Hep C antibody   MELD-Na score: 24 at 8/5/2020  6:21 AM  MELD score: 21 at 8/5/2020  6:21 AM  Calculated from:  Serum Creatinine: 0.6 mg/dL (Rounded to 1 mg/dL) at 8/5/2020  6:21 AM  Serum Sodium: 132 mmol/L at 8/5/2020  6:21 AM  Total Bilirubin: 19.9 mg/dL at 8/5/2020  6:21 AM  INR(ratio): 1.3 at 8/5/2020  6:21 AM  Age: 59 years 11 months  - patient has a long h/o alcohol use  - newly positive hep c antibody, viral load pending  - hepatology consulted  - U/S with liver reviewed   - Addiction psych consult appreciated   - LFTs rising  - IR paracentesis with 750 cc removed on 8/5 and negative for SBP  - start lasix 20 mg and aldactone 50    # MERLY  # Hepatorenal Syndrome  - resolved    # Acute hepatic encephalopathy  - started low dose lactulose     # Hyponatremia  - fluid restrict     # Hypotension  - started on midodrine    # Gastrointestinal hemorrhage with melena  - EGD showed esophagitis and portal hypertensive gastropathy     # Macrocytic anemia  # Thrombocytopenia  - folic acid    # Hypokalemia/Hypophosphatemia  - replace     Diet:  Low sodium  GI PPx:    DVT PPx:    Goals of Care:  full      Disposition:  Likely Thursday       Naresh Franks MD  Medical Director [facility] Medicine  Spectra:  [phone]  Pager: [phone]

## 2020-08-06 NOTE — NURSING
Lab informed this nurse that CBC was clotted and needed to be reordered. Order put in for STAT. Will inform oncoming nurse.

## 2020-08-06 NOTE — PLAN OF CARE
08/06/20 0833   Post-Acute Status   Post-Acute Authorization Other   Other Status No Post-Acute Service Needs

## 2020-08-07 PROBLEM — R60.1 ANASARCA: Status: ACTIVE | Noted: 2020-08-07

## 2020-08-07 LAB
ALBUMIN SERPL BCP-MCNC: 3.1 G/DL (ref 3.5–5.2)
ALP SERPL-CCNC: 277 U/L (ref 55–135)
ALT SERPL W/O P-5'-P-CCNC: 51 U/L (ref 10–44)
ANION GAP SERPL CALC-SCNC: 9 MMOL/L (ref 8–16)
AST SERPL-CCNC: 99 U/L (ref 10–40)
BASOPHILS # BLD AUTO: 0.02 K/UL (ref 0–0.2)
BASOPHILS NFR BLD: 0.2 % (ref 0–1.9)
BILIRUB SERPL-MCNC: 21.9 MG/DL (ref 0.1–1)
BUN SERPL-MCNC: 7 MG/DL (ref 6–20)
CALCIUM SERPL-MCNC: 8.6 MG/DL (ref 8.7–10.5)
CHLORIDE SERPL-SCNC: 99 MMOL/L (ref 95–110)
CO2 SERPL-SCNC: 26 MMOL/L (ref 23–29)
CREAT SERPL-MCNC: 0.6 MG/DL (ref 0.5–1.4)
DIFFERENTIAL METHOD: ABNORMAL
EOSINOPHIL # BLD AUTO: 0.2 K/UL (ref 0–0.5)
EOSINOPHIL NFR BLD: 1.9 % (ref 0–8)
ERYTHROCYTE [DISTWIDTH] IN BLOOD BY AUTOMATED COUNT: 20.9 % (ref 11.5–14.5)
EST. GFR  (AFRICAN AMERICAN): >60 ML/MIN/1.73 M^2
EST. GFR  (NON AFRICAN AMERICAN): >60 ML/MIN/1.73 M^2
GLUCOSE SERPL-MCNC: 86 MG/DL (ref 70–110)
HCT VFR BLD AUTO: 26.6 % (ref 40–54)
HGB BLD-MCNC: 8.5 G/DL (ref 14–18)
IMM GRANULOCYTES # BLD AUTO: 0.1 K/UL (ref 0–0.04)
IMM GRANULOCYTES NFR BLD AUTO: 0.9 % (ref 0–0.5)
INR PPP: 1.3 (ref 0.8–1.2)
LYMPHOCYTES # BLD AUTO: 1.2 K/UL (ref 1–4.8)
LYMPHOCYTES NFR BLD: 9.9 % (ref 18–48)
MAGNESIUM SERPL-MCNC: 1.6 MG/DL (ref 1.6–2.6)
MCH RBC QN AUTO: 35.6 PG (ref 27–31)
MCHC RBC AUTO-ENTMCNC: 32 G/DL (ref 32–36)
MCV RBC AUTO: 111 FL (ref 82–98)
MONOCYTES # BLD AUTO: 0.4 K/UL (ref 0.3–1)
MONOCYTES NFR BLD: 3.5 % (ref 4–15)
NEUTROPHILS # BLD AUTO: 9.8 K/UL (ref 1.8–7.7)
NEUTROPHILS NFR BLD: 83.6 % (ref 38–73)
NRBC BLD-RTO: 0 /100 WBC
PHOSPHATE SERPL-MCNC: 2.1 MG/DL (ref 2.7–4.5)
PLATELET # BLD AUTO: 190 K/UL (ref 150–350)
PMV BLD AUTO: 11 FL (ref 9.2–12.9)
POTASSIUM SERPL-SCNC: 3.3 MMOL/L (ref 3.5–5.1)
PROT SERPL-MCNC: 5 G/DL (ref 6–8.4)
PROTHROMBIN TIME: 14.6 SEC (ref 9–12.5)
RBC # BLD AUTO: 2.39 M/UL (ref 4.6–6.2)
SODIUM SERPL-SCNC: 134 MMOL/L (ref 136–145)
WBC # BLD AUTO: 11.71 K/UL (ref 3.9–12.7)

## 2020-08-07 PROCEDURE — 85610 PROTHROMBIN TIME: CPT

## 2020-08-07 PROCEDURE — 83735 ASSAY OF MAGNESIUM: CPT

## 2020-08-07 PROCEDURE — 80053 COMPREHEN METABOLIC PANEL: CPT

## 2020-08-07 PROCEDURE — 97535 SELF CARE MNGMENT TRAINING: CPT

## 2020-08-07 PROCEDURE — 25000003 PHARM REV CODE 250: Performed by: HOSPITALIST

## 2020-08-07 PROCEDURE — 97803 MED NUTRITION INDIV SUBSEQ: CPT

## 2020-08-07 PROCEDURE — 25000003 PHARM REV CODE 250: Performed by: STUDENT IN AN ORGANIZED HEALTH CARE EDUCATION/TRAINING PROGRAM

## 2020-08-07 PROCEDURE — 85025 COMPLETE CBC W/AUTO DIFF WBC: CPT

## 2020-08-07 PROCEDURE — 99232 SBSQ HOSP IP/OBS MODERATE 35: CPT | Mod: ,,, | Performed by: HOSPITALIST

## 2020-08-07 PROCEDURE — 84100 ASSAY OF PHOSPHORUS: CPT

## 2020-08-07 PROCEDURE — 63600175 PHARM REV CODE 636 W HCPCS: Performed by: HOSPITALIST

## 2020-08-07 PROCEDURE — 11000001 HC ACUTE MED/SURG PRIVATE ROOM

## 2020-08-07 PROCEDURE — 94761 N-INVAS EAR/PLS OXIMETRY MLT: CPT

## 2020-08-07 PROCEDURE — 99232 PR SUBSEQUENT HOSPITAL CARE,LEVL II: ICD-10-PCS | Mod: ,,, | Performed by: HOSPITALIST

## 2020-08-07 PROCEDURE — 36415 COLL VENOUS BLD VENIPUNCTURE: CPT

## 2020-08-07 RX ORDER — POTASSIUM CHLORIDE 20 MEQ/1
40 TABLET, EXTENDED RELEASE ORAL ONCE
Status: COMPLETED | OUTPATIENT
Start: 2020-08-07 | End: 2020-08-07

## 2020-08-07 RX ORDER — SODIUM,POTASSIUM PHOSPHATES 280-250MG
1 POWDER IN PACKET (EA) ORAL
Status: COMPLETED | OUTPATIENT
Start: 2020-08-07 | End: 2020-08-07

## 2020-08-07 RX ORDER — FUROSEMIDE 10 MG/ML
40 INJECTION INTRAMUSCULAR; INTRAVENOUS
Status: DISCONTINUED | OUTPATIENT
Start: 2020-08-07 | End: 2020-08-08

## 2020-08-07 RX ORDER — LACTULOSE 10 G/15ML
10 SOLUTION ORAL 2 TIMES DAILY
Status: DISCONTINUED | OUTPATIENT
Start: 2020-08-07 | End: 2020-08-09 | Stop reason: HOSPADM

## 2020-08-07 RX ORDER — LORAZEPAM/0.9% SODIUM CHLORIDE 100MG/0.1L
2 PLASTIC BAG, INJECTION (ML) INTRAVENOUS ONCE
Status: COMPLETED | OUTPATIENT
Start: 2020-08-07 | End: 2020-08-07

## 2020-08-07 RX ORDER — SPIRONOLACTONE 100 MG/1
100 TABLET, FILM COATED ORAL DAILY
Status: DISCONTINUED | OUTPATIENT
Start: 2020-08-07 | End: 2020-08-08

## 2020-08-07 RX ORDER — MIDODRINE HYDROCHLORIDE 5 MG/1
15 TABLET ORAL EVERY 8 HOURS
Status: DISCONTINUED | OUTPATIENT
Start: 2020-08-07 | End: 2020-08-08

## 2020-08-07 RX ADMIN — MIDODRINE HYDROCHLORIDE 15 MG: 5 TABLET ORAL at 02:08

## 2020-08-07 RX ADMIN — PANTOPRAZOLE SODIUM 40 MG: 40 TABLET, DELAYED RELEASE ORAL at 09:08

## 2020-08-07 RX ADMIN — POTASSIUM & SODIUM PHOSPHATES POWDER PACK 280-160-250 MG 1 PACKET: 280-160-250 PACK at 05:08

## 2020-08-07 RX ADMIN — ESCITALOPRAM 5 MG: 5 TABLET, FILM COATED ORAL at 09:08

## 2020-08-07 RX ADMIN — ROPINIROLE HYDROCHLORIDE 0.25 MG: 0.25 TABLET, FILM COATED ORAL at 09:08

## 2020-08-07 RX ADMIN — LACTULOSE 10 G: 20 SOLUTION ORAL at 09:08

## 2020-08-07 RX ADMIN — FUROSEMIDE 40 MG: 10 INJECTION, SOLUTION INTRAMUSCULAR; INTRAVENOUS at 09:08

## 2020-08-07 RX ADMIN — FOLIC ACID 1 MG: 1 TABLET ORAL at 09:08

## 2020-08-07 RX ADMIN — MIDODRINE HYDROCHLORIDE 10 MG: 5 TABLET ORAL at 05:08

## 2020-08-07 RX ADMIN — MIDODRINE HYDROCHLORIDE 15 MG: 5 TABLET ORAL at 09:08

## 2020-08-07 RX ADMIN — MAGNESIUM SULFATE 2 G: 2 INJECTION INTRAVENOUS at 09:08

## 2020-08-07 RX ADMIN — POTASSIUM & SODIUM PHOSPHATES POWDER PACK 280-160-250 MG 1 PACKET: 280-160-250 PACK at 09:08

## 2020-08-07 RX ADMIN — POTASSIUM & SODIUM PHOSPHATES POWDER PACK 280-160-250 MG 1 PACKET: 280-160-250 PACK at 11:08

## 2020-08-07 RX ADMIN — CEFTRIAXONE 2 G: 2 INJECTION, SOLUTION INTRAVENOUS at 09:08

## 2020-08-07 RX ADMIN — Medication 100 MG: at 09:08

## 2020-08-07 RX ADMIN — SPIRONOLACTONE 100 MG: 100 TABLET ORAL at 09:08

## 2020-08-07 RX ADMIN — POTASSIUM CHLORIDE 40 MEQ: 1500 TABLET, EXTENDED RELEASE ORAL at 09:08

## 2020-08-07 RX ADMIN — TRAZODONE HYDROCHLORIDE 100 MG: 100 TABLET ORAL at 09:08

## 2020-08-07 NOTE — PROGRESS NOTES
"[facility]  Adult Nutrition  Progress Note    SUMMARY       Recommendations    1. Change diet to a low sodium diet   2. Add Boost Pudding TID   3. If po intake does not improve, add Boost Plus TID   4. RD to follow    Goals: pt will meet 75% of EEN and EPN by RD follow up  Nutrition Goal Status: new  Communication of RD Recs: other (comment)    Reason for Assessment    Reason For Assessment: length of stay  Diagnosis: other (see comments)(Spontaneous Bacterial Peritonitis)  Relevant Medical History: Hep C, ETOH, Hypotension,hypostremia  Interdisciplinary Rounds: did not attend  General Information Comments: Pt was lying in bed. Very tired and slow to answer questions. Pt has a poor appetite- only eating 10% of meals. He states he has had a poor po intake for 2 weeks now. His weight currently remains stable- his usual body is around 200lbs. Pt declined an NFPE- he was very cold and had blankets up to his neck and did not want me to move them. Will continue to watch his po intake and wt but pt is at risk for malnutrition due to 2 weeks of poor po intake and his ETOH hx. RD will contiue to follow.  Nutrition Discharge Planning: regular diet    Nutrition Risk Screen    Nutrition Risk Screen: no indicators present    Nutrition/Diet History    Spiritual, Cultural Beliefs, Anglican Practices, Values that Affect Care: no    Anthropometrics    Temp: 98.1 °F (36.7 °C)  Height: 5' 3" (160 cm)  Height (inches): 63 in  Weight Method: Standard Scale  Weight: 93.2 kg (205 lb 8 oz)  Weight (lb): 205.5 lb  Ideal Body Weight (IBW), Male: 124 lb  % Ideal Body Weight, Male (lb): 165.35 %  BMI (Calculated): 36.4       Lab/Procedures/Meds    Pertinent Labs Reviewed: reviewed  Pertinent Labs Comments: Alb 3.1, Na 134, K 3.3  Pertinent Medications Reviewed: reviewed  Pertinent Medications Comments: Folic Acid, Lactulose    Physical Findings/Assessment         Estimated/Assessed Needs    Weight Used For Calorie " Calculations: 93.2 kg (205 lb 7.5 oz)  Energy Calorie Requirements (kcal): 1982(1.2 activity factor)  Energy Need Method: [place]  Protein Requirements:  g  Weight Used For Protein Calculations: 93.2 kg (205 lb 7.5 oz)     Estimated Fluid Requirement Method: RDA Method  RDA Method (mL): 1982         Nutrition Prescription Ordered    Current Diet Order: Regular    Evaluation of Received Nutrient/Fluid Intake    I/O: -4.1 l since admit  Comments: LBM 8/6  % Intake of Estimated Energy Needs: 0 - 25 %  % Meal Intake: 0 - 25 %    Nutrition Risk    Level of Risk/Frequency of Follow-up: low(1xweek)     Assessment and Plan    Nutrition Problem  Inadequate Oral Intake    Related to (etiology):   ETOH abuse  No appetite    Signs and Symptoms (as evidenced by):   PO intake of 10%    Interventions/Recommendations (treatment strategy):  Collaboration with other providers  [employer]- Boost Pudding  Modified diet- low sodium    Nutrition Diagnosis Status:   New        Monitor and Evaluation    Food and Nutrient Intake: energy intake  Food and Nutrient Adminstration: diet order  Anthropometric Measurements: weight change  Biochemical Data, Medical Tests and Procedures: electrolyte and renal panel, gastrointestinal profile, glucose/endocrine profile, inflammatory profile, lipid profile  Nutrition-Focused Physical Findings: overall appearance     Malnutrition Assessment                Nutrition Follow-Up    RD Follow-up?: Yes

## 2020-08-07 NOTE — PLAN OF CARE
Recommendations     1. Change diet to a low sodium diet   2. Add Boost Pudding TID   3. If po intake does not improve, add Boost Plus TID   4. RD to follow     Goals: pt will meet 75% of EEN and EPN by RD follow up  Nutrition Goal Status: new    Problem: Oral Intake Inadequate  Goal: Improved Oral Intake  Outcome: Ongoing, Progressing

## 2020-08-07 NOTE — PROGRESS NOTES
Progress Note  Hospital Medicine  [facility]       Patient Name: Kenneth Pardo  MRN:  [id number]  Hospital Medicine Team: [facility] [place] HOSP MED L Aicha Rojas MD  Date of Admission:  7/30/2020     Length of Stay:  LOS: 7 days   Expected Discharge Date: 8/9/2020  Principal Problem:  SBP (spontaneous bacterial peritonitis)     Subjective:     Interval History/Overnight Events:    Doing well but feeling tired   SBP noted on ascites studies on 8/5 and treatment started on 8/6 with IV CTX and albumin   LFTs stable today      [START ON 8/8/2020] albumin human 25%  25 g Intravenous QID    cefTRIAXone (ROCEPHIN) IVPB  2 g Intravenous Q24H    escitalopram oxalate  5 mg Oral Daily    folic acid  1 mg Oral Daily    lactulose  5 g Oral BID    midodrine  10 mg Oral Q8H    pantoprazole  40 mg Oral Daily    rOPINIRole  0.25 mg Oral QHS    thiamine  100 mg Oral Daily    traZODone  100 mg Oral QHS           acetaminophen, hydrOXYzine pamoate, LORazepam, melatonin, ondansetron, ondansetron, sodium chloride 0.9%, sodium chloride 0.9%    Review of Systems   Constitutional: Negative for chills, fatigue, fever.   HENT: Negative for sore throat, trouble swallowing.    Eyes: Negative for photophobia, visual disturbance.   Respiratory: Negative for cough, shortness of breath.    Cardiovascular: Negative for chest pain, palpitations, leg swelling.   Gastrointestinal: Negative for abdominal pain, constipation, diarrhea, nausea, vomiting.   Endocrine: Negative for cold intolerance, heat intolerance.   Genitourinary: Negative for dysuria, frequency.   Musculoskeletal: Negative for arthralgias, myalgias.   Skin: Negative for rash, wound, erythema   Neurological: Negative for dizziness, syncope, weakness, light-headedness.   Psychiatric/Behavioral: Negative for confusion, hallucinations, anxiety  All other systems reviewed and are negative.    Objective:     Temp:  [97.3 °F (36.3 °C)-98.4 °F (36.9 °C)]    Pulse:  [80-90]   Resp:  [16-20]   BP: ()/(50-69)   SpO2:  [92 %-96 %]     Vitals:  Temp:  [97.3 °F (36.3 °C)-98.4 °F (36.9 °C)]   Pulse:  [80-90]   Resp:  [16-20]   BP: ()/(50-69)   SpO2:  [92 %-96 %]   I/O's:  No intake or output data in the 24 hours ending 08/06/20 2236   Weight change:    Body mass index is 36.4 kg/m².       Physical Exam:  Constitutional: chronically ill looking,  non-distressed, not diaphoretic.   HENT: NC/AT, external ears normal, oropharynx clear, MMM w/o exudates.   Eyes: PERRL, EOMI, conjunctiva normal, no discharge b/l, +scleral icterus   Neck: normal ROM, supple  CV: RRR, no m/r/g, no carotid bruits, +2 peripheral pulses.  Pulmonary/Chest wall: Breathing comfortably w/o distress, CTAB, no w/r/r, no crackles.  Decreased breath sounds at lung bases  GI: Soft, non-tender, (+) BS, (+) BM   +distended  Musculoskeletal: Normal ROM, no atrophy,  + pitting edema in LE bilaterally   Neurological: AAO x 4, CN II-XI in tact, nl sensation, nl strength/tone  No asterixis   Skin: warm, dry   +pallor, jaundice  Psych: normal mood and affect, normal behavior, thought content and judgement.    Labs:    Chemistries:   Recent Labs   Lab 08/04/20  0524 08/05/20  0621 08/06/20  0453   * 132* 131*   K 3.9 3.8 3.7   CL 97 97 98   CO2 25 26 21*   BUN 10 10 9   CREATININE 0.5 0.6 0.5   CALCIUM 8.0* 7.9* 7.9*   PROT 4.5* 4.5* 4.4*   BILITOT 18.2* 19.9* 19.9*   ALKPHOS 324* 342* 352*   ALT 79* 74* 70*   * 177* 163*   MG 1.5* 1.3* 1.7   PHOS 2.3* 2.7 2.7        WBC:   Recent Labs   Lab 08/04/20  0524 08/04/20  1356 08/05/20  0621 08/05/20  1652 08/06/20  0723   WBC 11.89 13.39* 13.74* 14.14* 13.80*     Bands:     CBC/Anemia Labs: Coags:    Recent Labs   Lab 08/05/20  0621 08/05/20  1652 08/06/20  0723   WBC 13.74* 14.14* 13.80*   HGB 9.6* 9.0* 8.8*   HCT 30.0* 28.8* 28.1*    143* 158   * 113* 112*   RDW 20.7* 21.2* 21.2*    Recent Labs   Lab 08/01/20  0525 08/02/20  1100  08/03/20  0350 08/04/20  0524 08/05/20  0621 08/06/20  0453   INR 1.2 1.3* 1.3* 1.3* 1.3* 1.2   APTT 37.1* 35.5* 36.0*  --   --   --         POCT Glucose: HbA1c:    Recent Labs   Lab 08/05/20  2133   POCTGLUCOSE 77    No results found for: HGBA1C     Diagnostic Results:        Assessment and Plan     Hospital Course:    Mr. Kenneth Pardo was admitted to Hospital Medicine for management of  SBP (spontaneous bacterial peritonitis)     Active Hospital Problems    Diagnosis  POA    *SBP (spontaneous bacterial peritonitis) [K65.2]  Yes    Acute hepatic encephalopathy [K72.00]  Yes    Positive hepatitis C antibody test [R76.8]  Yes    Macrocytic anemia [D53.9]  Yes    Severe alcohol dependence [F10.20]  Yes    Hypotension [I95.9]  Yes    Hyponatremia [E87.1]  Yes    Upper GI bleed [K92.2]  Yes    Acute alcoholic hepatitis [K70.10]  Yes    Essential hypertension [I10]  Yes     Chronic    Hepatorenal syndrome [K76.7]  Yes    Alcohol use disorder, severe, dependence [F10.20]  Yes      Resolved Hospital Problems   No resolved problems to display.       # Acute Alcohol hepatitis with ascites  # Severe alcohol dependence   # Positive Hep C antibody   # SBP    MELD-Na score: 24 at 8/6/2020  4:53 AM  MELD score: 20 at 8/6/2020  4:53 AM  Calculated from:  Serum Creatinine: 0.5 mg/dL (Rounded to 1 mg/dL) at 8/6/2020  4:53 AM  Serum Sodium: 131 mmol/L at 8/6/2020  4:53 AM  Total Bilirubin: 19.9 mg/dL at 8/6/2020  4:53 AM  INR(ratio): 1.2 at 8/6/2020  4:53 AM  Age: 59 years 11 months     - patient has a long h/o alcohol use  - newly positive hep c antibody, viral load pending  - hepatology consulted  - U/S with liver reviewed   - Addiction psych consult appreciated   - LFTs rising  - IR paracentesis with 750 cc removed on 8/5 and negative for SBP  - start lasix 20 mg and aldactone 50  - SBP noted on ascites studies on 8/5 and treatment started on 8/6 with IV CTX and albumin      # MERLY  # Hepatorenal Syndrome  -  resolved     # Acute hepatic encephalopathy  - started low dose lactulose      # Hyponatremia  - fluid restrict      # Hypotension  - started on midodrine     # Gastrointestinal hemorrhage with melena  - EGD showed esophagitis and portal hypertensive gastropathy      # Macrocytic anemia  # Thrombocytopenia  - folic acid     # Hypokalemia/Hypophosphatemia  - replace       Diet:  Low Na with fluid restriction   GI PPx:  PO PPI  DVT PPx:  SCDs due to coagulopathy   Goals of Care:  Full     High Risk Conditions:  decomp liver disease, SBP    Disposition:    TBD      Signing Physician:     Aicha Rojas MD  Department of Hospital Medicine   [facility]  Pager [phone]  Spectra [phone]  8/6/2020

## 2020-08-07 NOTE — PT/OT/SLP PROGRESS
Occupational Therapy   Treatment    Name: Kenneth Pardo  MRN: [id number]  Admitting Diagnosis:  SBP (spontaneous bacterial peritonitis)  7 Days Post-Op    Recommendations:     Discharge Recommendations: home health OT  Discharge Equipment Recommendations:  walker, rolling  Barriers to discharge:  None    Assessment:     Kenneth Pardo is a 59 y.o. male with a medical diagnosis of SBP (spontaneous bacterial peritonitis).  Performance deficits affecting function are weakness, impaired endurance, impaired self care skills, impaired functional mobilty, gait instability, impaired balance, decreased lower extremity function. Patient tolerated treatment session, but was fatigued and was only able to tolerate toileting and needing to return to bedside chair during OT session (declining further activity). Patient would benefit from continued skilled acute OT 3x/wk to improve functional mobility, increase independence with ADLs, and address established goals. Recommending HHOT once medically appropriate for discharge to increase maximal independence, reduce burden of care, and ensure safety.     Rehab Prognosis:  Fair; patient would benefit from acute skilled OT services to address these deficits and reach maximum level of function.       Plan:     Patient to be seen 3 x/week to address the above listed problems via self-care/home management, therapeutic activities, therapeutic exercises  · Plan of Care Expires: 08/03/20  · Plan of Care Reviewed with: patient    Subjective     Pain/Comfort:  · Pain Rating 1: 0/10  · Pain Rating Post-Intervention 1: 0/10    Objective:     Communicated with: DARIEL prior to session.  Patient found up in chair with telemetry(femoral line) upon OT entry to room.    General Precautions: Standard, fall   Orthopedic Precautions:N/A   Braces: N/A     Occupational Performance:     Functional Mobility/Transfers:  · Patient completed Sit <> Stand Transfer with stand by assistance  with  no assistive device    · Patient completed Toilet Transfer bed<>toilet with functional ambulation technique with stand by assistance with  no AD    Activities of Daily Living:  · Toileting: stand by assistance      [facility] 6 Click ADL: 14    Treatment & Education:  Role of OT and POC  Safety  ADL retraining  Functional mobility training    Patient left up in chair with call button in reach and all needs met. Education:      GOALS:   Multidisciplinary Problems     Occupational Therapy Goals        Problem: Occupational Therapy Goal    Goal Priority Disciplines Outcome Interventions   Occupational Therapy Goal     OT, PT/OT Ongoing, Progressing    Description: Goals to be met by: 8/14/20     Patient will increase functional independence with ADLs by performing:    UE Dressing with [place].  LE Dressing with [place].  Grooming while standing at sink with [place].  Toileting from toilet with [place] for hygiene and clothing management.   Bathing from  shower chair/bench with [place].  Toilet transfer to toilet with [place].  Increased functional strength to WFL for B UE.  Upper extremity exercise program x15 reps per handout, with independence.                     Time Tracking:     OT Date of Treatment: 08/07/20  OT Start Time: 1029  OT Stop Time: 1038  OT Total Time (min): 9 min    Billable Minutes:Self Care/Home Management 9    ANG Rod  8/7/2020

## 2020-08-07 NOTE — PLAN OF CARE
Problem: Adult Inpatient Plan of Care  Goal: Plan of Care Review  Outcome: Ongoing, Progressing  Goal: Optimal Comfort and Wellbeing  Outcome: Ongoing, Progressing  Goal: Rounds/Family Conference  Outcome: Ongoing, Progressing     Problem: Fall Injury Risk  Goal: Absence of Fall and Fall-Related Injury  Outcome: Ongoing, Progressing     Problem: Skin Injury Risk Increased  Goal: Skin Health and Integrity  Outcome: Ongoing, Progressing     Problem: Infection  Goal: Infection Symptom Resolution  Outcome: Ongoing, Progressing       Patient aaox4, vitals stable. Visi in place. Right femoral trialysis intact, no difficulty flushing. Free from falls and injuries during shift. No concerns or requests voiced. Bed in low position with side rails up x2 and call light within reach. Will continue to monitor.

## 2020-08-07 NOTE — PLAN OF CARE
Problem: Occupational Therapy Goal  Goal: Occupational Therapy Goal  Description: Goals to be met by: 8/14/20     Patient will increase functional independence with ADLs by performing:    UE Dressing with [place].  LE Dressing with [place].  Grooming while standing at sink with [place].  Toileting from toilet with [place] for hygiene and clothing management.   Bathing from  shower chair/bench with [place].  Toilet transfer to toilet with [place].  Increased functional strength to WFL for B UE.  Upper extremity exercise program x15 reps per handout, with independence.    Outcome: Ongoing, Progressing   Patient's goals are appropriate.   ANG Rod  8/7/2020

## 2020-08-07 NOTE — PLAN OF CARE
08/07/20 0854   Post-Acute Status   Post-Acute Authorization Other   Other Status No Post-Acute Service Needs

## 2020-08-08 LAB
ALBUMIN SERPL BCP-MCNC: 2.5 G/DL (ref 3.5–5.2)
ALP SERPL-CCNC: 261 U/L (ref 55–135)
ALT SERPL W/O P-5'-P-CCNC: 47 U/L (ref 10–44)
ANION GAP SERPL CALC-SCNC: 9 MMOL/L (ref 8–16)
AST SERPL-CCNC: 94 U/L (ref 10–40)
BACTERIA SPEC AEROBE CULT: NO GROWTH
BASOPHILS # BLD AUTO: 0.04 K/UL (ref 0–0.2)
BASOPHILS NFR BLD: 0.4 % (ref 0–1.9)
BILIRUB SERPL-MCNC: 21.1 MG/DL (ref 0.1–1)
BUN SERPL-MCNC: 6 MG/DL (ref 6–20)
CALCIUM SERPL-MCNC: 8.2 MG/DL (ref 8.7–10.5)
CHLORIDE SERPL-SCNC: 97 MMOL/L (ref 95–110)
CO2 SERPL-SCNC: 26 MMOL/L (ref 23–29)
CREAT SERPL-MCNC: 0.6 MG/DL (ref 0.5–1.4)
DIFFERENTIAL METHOD: ABNORMAL
EOSINOPHIL # BLD AUTO: 0.2 K/UL (ref 0–0.5)
EOSINOPHIL NFR BLD: 2.1 % (ref 0–8)
ERYTHROCYTE [DISTWIDTH] IN BLOOD BY AUTOMATED COUNT: 20.8 % (ref 11.5–14.5)
EST. GFR  (AFRICAN AMERICAN): >60 ML/MIN/1.73 M^2
EST. GFR  (NON AFRICAN AMERICAN): >60 ML/MIN/1.73 M^2
GLUCOSE SERPL-MCNC: 86 MG/DL (ref 70–110)
HCT VFR BLD AUTO: 26.4 % (ref 40–54)
HGB BLD-MCNC: 8.3 G/DL (ref 14–18)
IMM GRANULOCYTES # BLD AUTO: 0.07 K/UL (ref 0–0.04)
IMM GRANULOCYTES NFR BLD AUTO: 0.6 % (ref 0–0.5)
INR PPP: 1.3 (ref 0.8–1.2)
LYMPHOCYTES # BLD AUTO: 1.4 K/UL (ref 1–4.8)
LYMPHOCYTES NFR BLD: 12.3 % (ref 18–48)
MAGNESIUM SERPL-MCNC: 1.5 MG/DL (ref 1.6–2.6)
MCH RBC QN AUTO: 35.3 PG (ref 27–31)
MCHC RBC AUTO-ENTMCNC: 31.4 G/DL (ref 32–36)
MCV RBC AUTO: 112 FL (ref 82–98)
MONOCYTES # BLD AUTO: 0.6 K/UL (ref 0.3–1)
MONOCYTES NFR BLD: 5.5 % (ref 4–15)
NEUTROPHILS # BLD AUTO: 8.9 K/UL (ref 1.8–7.7)
NEUTROPHILS NFR BLD: 79.1 % (ref 38–73)
NRBC BLD-RTO: 0 /100 WBC
PHOSPHATE SERPL-MCNC: 2.8 MG/DL (ref 2.7–4.5)
PLATELET # BLD AUTO: 202 K/UL (ref 150–350)
PMV BLD AUTO: 11.1 FL (ref 9.2–12.9)
POTASSIUM SERPL-SCNC: 3.2 MMOL/L (ref 3.5–5.1)
PROT SERPL-MCNC: 4.5 G/DL (ref 6–8.4)
PROTHROMBIN TIME: 14.4 SEC (ref 9–12.5)
RBC # BLD AUTO: 2.35 M/UL (ref 4.6–6.2)
SODIUM SERPL-SCNC: 132 MMOL/L (ref 136–145)
WBC # BLD AUTO: 11.19 K/UL (ref 3.9–12.7)

## 2020-08-08 PROCEDURE — 36415 COLL VENOUS BLD VENIPUNCTURE: CPT

## 2020-08-08 PROCEDURE — 99232 SBSQ HOSP IP/OBS MODERATE 35: CPT | Mod: ,,, | Performed by: HOSPITALIST

## 2020-08-08 PROCEDURE — 80053 COMPREHEN METABOLIC PANEL: CPT

## 2020-08-08 PROCEDURE — 85610 PROTHROMBIN TIME: CPT

## 2020-08-08 PROCEDURE — 85025 COMPLETE CBC W/AUTO DIFF WBC: CPT

## 2020-08-08 PROCEDURE — 99232 PR SUBSEQUENT HOSPITAL CARE,LEVL II: ICD-10-PCS | Mod: ,,, | Performed by: HOSPITALIST

## 2020-08-08 PROCEDURE — 83735 ASSAY OF MAGNESIUM: CPT

## 2020-08-08 PROCEDURE — 25000003 PHARM REV CODE 250: Performed by: HOSPITALIST

## 2020-08-08 PROCEDURE — 11000001 HC ACUTE MED/SURG PRIVATE ROOM

## 2020-08-08 PROCEDURE — 84100 ASSAY OF PHOSPHORUS: CPT

## 2020-08-08 PROCEDURE — P9047 ALBUMIN (HUMAN), 25%, 50ML: HCPCS | Mod: JG | Performed by: HOSPITALIST

## 2020-08-08 PROCEDURE — 25000003 PHARM REV CODE 250: Performed by: STUDENT IN AN ORGANIZED HEALTH CARE EDUCATION/TRAINING PROGRAM

## 2020-08-08 PROCEDURE — 63600175 PHARM REV CODE 636 W HCPCS: Mod: JG | Performed by: HOSPITALIST

## 2020-08-08 RX ORDER — MIDODRINE HYDROCHLORIDE 5 MG/1
15 TABLET ORAL EVERY 8 HOURS
Status: DISCONTINUED | OUTPATIENT
Start: 2020-08-08 | End: 2020-08-09 | Stop reason: HOSPADM

## 2020-08-08 RX ADMIN — LACTULOSE 10 G: 20 SOLUTION ORAL at 09:08

## 2020-08-08 RX ADMIN — TRAZODONE HYDROCHLORIDE 100 MG: 100 TABLET ORAL at 08:08

## 2020-08-08 RX ADMIN — CEFTRIAXONE 2 G: 2 INJECTION, SOLUTION INTRAVENOUS at 10:08

## 2020-08-08 RX ADMIN — PANTOPRAZOLE SODIUM 40 MG: 40 TABLET, DELAYED RELEASE ORAL at 09:08

## 2020-08-08 RX ADMIN — Medication 100 MG: at 09:08

## 2020-08-08 RX ADMIN — ALBUMIN (HUMAN) 25 G: 12.5 SOLUTION INTRAVENOUS at 04:08

## 2020-08-08 RX ADMIN — ALBUMIN (HUMAN) 25 G: 12.5 SOLUTION INTRAVENOUS at 08:08

## 2020-08-08 RX ADMIN — MIDODRINE HYDROCHLORIDE 15 MG: 5 TABLET ORAL at 05:08

## 2020-08-08 RX ADMIN — ESCITALOPRAM 5 MG: 5 TABLET, FILM COATED ORAL at 09:08

## 2020-08-08 RX ADMIN — LACTULOSE 10 G: 20 SOLUTION ORAL at 04:08

## 2020-08-08 RX ADMIN — FUROSEMIDE 40 MG: 10 INJECTION, SOLUTION INTRAMUSCULAR; INTRAVENOUS at 11:08

## 2020-08-08 RX ADMIN — FOLIC ACID 1 MG: 1 TABLET ORAL at 09:08

## 2020-08-08 RX ADMIN — MIDODRINE HYDROCHLORIDE 15 MG: 5 TABLET ORAL at 01:08

## 2020-08-08 RX ADMIN — ALBUMIN (HUMAN) 25 G: 12.5 SOLUTION INTRAVENOUS at 09:08

## 2020-08-08 RX ADMIN — ALBUMIN (HUMAN) 25 G: 12.5 SOLUTION INTRAVENOUS at 01:08

## 2020-08-08 RX ADMIN — ROPINIROLE HYDROCHLORIDE 0.25 MG: 0.25 TABLET, FILM COATED ORAL at 08:08

## 2020-08-08 RX ADMIN — SPIRONOLACTONE 100 MG: 100 TABLET ORAL at 09:08

## 2020-08-08 RX ADMIN — MIDODRINE HYDROCHLORIDE 15 MG: 5 TABLET ORAL at 09:08

## 2020-08-08 NOTE — PROGRESS NOTES
Progress Note  Hospital Medicine  [facility]       Patient Name: Kenneth Pardo  MRN:  [id number]  [place] [facility] [place] [place] Aicha Rojas MD  Date of Admission:  7/30/2020     Length of Stay:  LOS: 8 days   Expected Discharge Date: 8/9/2020  Principal Problem:  SBP (spontaneous bacterial peritonitis)     Subjective:     Interval History/Overnight Events:    Patient continues to note fatigue and diffuse weakness   Treating SBP  Labs relatively stable   Started on diuresis for anasarca        [START ON 8/8/2020] albumin human 25%  25 g Intravenous QID    cefTRIAXone (ROCEPHIN) IVPB  2 g Intravenous Q24H    escitalopram oxalate  5 mg Oral Daily    folic acid  1 mg Oral Daily    furosemide (LASIX) IV  40 mg Intravenous Q12H    lactulose  10 g Oral BID    midodrine  15 mg Oral Q8H    pantoprazole  40 mg Oral Daily    potassium, sodium phosphates  1 packet Oral QID (AC & HS)    rOPINIRole  0.25 mg Oral QHS    spironolactone  100 mg Oral Daily    thiamine  100 mg Oral Daily    traZODone  100 mg Oral QHS           acetaminophen, hydrOXYzine pamoate, LORazepam, melatonin, ondansetron, ondansetron, sodium chloride 0.9%, sodium chloride 0.9%    Review of Systems   Constitutional: Negative for chills, fatigue, fever.   HENT: Negative for sore throat, trouble swallowing.    Eyes: Negative for photophobia, visual disturbance.   Respiratory: Negative for cough, shortness of breath.    Cardiovascular: Negative for chest pain, palpitations, leg swelling.   Gastrointestinal: Negative for abdominal pain, constipation, diarrhea, nausea, vomiting.   Endocrine: Negative for cold intolerance, heat intolerance.   Genitourinary: Negative for dysuria, frequency.   Musculoskeletal: Negative for arthralgias, myalgias.   Skin: Negative for rash, wound, erythema   Neurological: Negative for dizziness, syncope, weakness, light-headedness.   Psychiatric/Behavioral: Negative for  confusion, hallucinations, anxiety  All other systems reviewed and are negative.    Objective:     Temp:  [97.9 °F (36.6 °C)-98.1 °F (36.7 °C)]   Pulse:  [79-95]   Resp:  [14-20]   BP: (104-125)/(60-82)   SpO2:  [95 %-99 %]     Vitals:  Temp:  [97.9 °F (36.6 °C)-98.1 °F (36.7 °C)]   Pulse:  [79-95]   Resp:  [14-20]   BP: (104-125)/(60-82)   SpO2:  [95 %-99 %]   I/O's:    Intake/Output Summary (Last 24 hours) at 8/7/2020 1933  Last data filed at 8/7/2020 0500  Gross per 24 hour   Intake 50 ml   Output 250 ml   Net -200 ml      Weight change:    Body mass index is 36.4 kg/m².       Physical Exam:  Constitutional: chronically ill looking,  non-distressed, not diaphoretic.   HENT: NC/AT, external ears normal, oropharynx clear, MMM w/o exudates.   Eyes: PERRL, EOMI, conjunctiva normal, no discharge b/l, +scleral icterus   Neck: normal ROM, supple  CV: RRR, no m/r/g, no carotid bruits, +2 peripheral pulses.  Pulmonary/Chest wall: Breathing comfortably w/o distress, CTAB, no w/r/r, no crackles.  Decreased breath sounds at lung bases  GI: Soft, non-tender, (+) BS, (+) BM   +distended  Musculoskeletal: Normal ROM, no atrophy,  + pitting edema in LE bilaterally   Neurological: AAO x 4, CN II-XI in tact, nl sensation, nl strength/tone  No asterixis   Skin: warm, dry   +pallor, jaundice  Psych: normal mood and affect, normal behavior, thought content and judgement.    Labs:    Chemistries:   Recent Labs   Lab 08/05/20  0621 08/06/20  0453 08/07/20  0404   * 131* 134*   K 3.8 3.7 3.3*   CL 97 98 99   CO2 26 21* 26   BUN 10 9 7   CREATININE 0.6 0.5 0.6   CALCIUM 7.9* 7.9* 8.6*   PROT 4.5* 4.4* 5.0*   BILITOT 19.9* 19.9* 21.9*   ALKPHOS 342* 352* 277*   ALT 74* 70* 51*   * 163* 99*   MG 1.3* 1.7 1.6   PHOS 2.7 2.7 2.1*        WBC:   Recent Labs   Lab 08/04/20  1356 08/05/20  0621 08/05/20  1652 08/06/20  0723 08/07/20  0404   WBC 13.39* 13.74* 14.14* 13.80* 11.71     Bands:     CBC/Anemia Labs: Coags:    Recent Labs    Lab 08/05/20  1652 08/06/20  0723 08/07/20  0404   WBC 14.14* 13.80* 11.71   HGB 9.0* 8.8* 8.5*   HCT 28.8* 28.1* 26.6*   * 158 190   * 112* 111*   RDW 21.2* 21.2* 20.9*    Recent Labs   Lab 08/01/20  0525 08/02/20  1100 08/03/20  0350  08/05/20  0621 08/06/20  0453 08/07/20  0404   INR 1.2 1.3* 1.3*   < > 1.3* 1.2 1.3*   APTT 37.1* 35.5* 36.0*  --   --   --   --     < > = values in this interval not displayed.        POCT Glucose: HbA1c:    Recent Labs   Lab 08/05/20  2133   POCTGLUCOSE 77    No results found for: HGBA1C     Diagnostic Results:        Assessment and Plan     Hospital Course:    Mr. Kenneth Pardo was admitted to Hospital Medicine for management of  SBP (spontaneous bacterial peritonitis)     Active Hospital Problems    Diagnosis  POA    *SBP (spontaneous bacterial peritonitis) [K65.2]  Yes    Acute hepatic encephalopathy [K72.00]  Yes    Macrocytic anemia [D53.9]  Yes    Severe alcohol dependence [F10.20]  Yes    Hypotension [I95.9]  Yes    Hyponatremia [E87.1]  Yes    Upper GI bleed [K92.2]  Yes    Acute alcoholic hepatitis [K70.10]  Yes    Essential hypertension [I10]  Yes     Chronic    Hepatorenal syndrome [K76.7]  Yes    Alcohol use disorder, severe, dependence [F10.20]  Yes      Resolved Hospital Problems   No resolved problems to display.       # Acute Alcohol hepatitis with ascites  # Severe alcohol dependence   # Positive Hep C antibody   # SBP    MELD-Na score: 23 at 8/7/2020  4:04 AM  MELD score: 21 at 8/7/2020  4:04 AM  Calculated from:  Serum Creatinine: 0.6 mg/dL (Rounded to 1 mg/dL) at 8/7/2020  4:04 AM  Serum Sodium: 134 mmol/L at 8/7/2020  4:04 AM  Total Bilirubin: 21.9 mg/dL at 8/7/2020  4:04 AM  INR(ratio): 1.3 at 8/7/2020  4:04 AM  Age: 59 years 11 months     - patient has a long h/o alcohol use  - newly positive hep c antibody, viral load pending  - hepatology consulted  - U/S with liver reviewed   - Addiction psych consult appreciated   - LFTs  rising  - IR paracentesis with 750 cc removed on 8/5 and negative for SBP  - SBP noted on ascites studies on 8/5 and treatment started on 8/6 with IV CTX and albumin   - diuresis started with IV lasix on 8/7, Cr wnl . Patient with significant volume overload      # MERLY  # Hepatorenal Syndrome  - resolved     # Acute hepatic encephalopathy  - started low dose lactulose      # Hyponatremia  - fluid restrict      # Hypotension  - started on midodrine     # Gastrointestinal hemorrhage with melena  - EGD showed esophagitis and portal hypertensive gastropathy      # Macrocytic anemia  # Thrombocytopenia  - folic acid     # Hypokalemia/Hypophosphatemia  - replace       Diet:  Low Na with fluid restriction   GI PPx:  PO PPI  DVT PPx:  SCDs due to coagulopathy   Goals of Care:  Full     High Risk Conditions:  decomp liver disease, SBP    Disposition:    TBD  8/10      Signing Physician:     Aicha Rojas MD  Department of Hospital Medicine   [facility]  Pager [phone] [phone]  8/7/2020

## 2020-08-08 NOTE — PLAN OF CARE
Patient hospitalized for spontaneous bacterium peritonitis; history of alcohol and hepatic problems. Seriously jaundiced.

## 2020-08-08 NOTE — PLAN OF CARE
Problem: Adult Inpatient Plan of Care  Goal: Plan of Care Review  Outcome: Ongoing, Progressing  Goal: Patient-Specific Goal (Individualization)  Outcome: Ongoing, Progressing  Goal: Absence of Hospital-Acquired Illness or Injury  Outcome: Ongoing, Progressing  Goal: Optimal Comfort and Wellbeing  Outcome: Ongoing, Progressing  Goal: Readiness for Transition of Care  Outcome: Ongoing, Progressing  Goal: Rounds/Family Conference  Outcome: Ongoing, Progressing     Problem: Fall Injury Risk  Goal: Absence of Fall and Fall-Related Injury  Outcome: Ongoing, Progressing     Problem: Skin Injury Risk Increased  Goal: Skin Health and Integrity  Outcome: Ongoing, Progressing     Problem: Infection  Goal: Infection Symptom Resolution  Outcome: Ongoing, Progressing     Problem: Oral Intake Inadequate  Goal: Improved Oral Intake  Outcome: Ongoing, Progressing     Plan of care reviewed with patient. Pt verbalized understanding. Visi montioring in place . Vs remained stable throughout shift. Pt remains free of falls or injuries. I will continue to monitor

## 2020-08-08 NOTE — PROGRESS NOTES
Progress Note  Hospital Medicine  [facility]       Patient Name: Kenneth Pardo  MRN:  [id number]  Hospital Medicine Team: [facility] HOSP MED L Aicha Rojas MD  Date of Admission:  7/30/2020     Length of Stay:  LOS: 9 days   Expected Discharge Date: 8/9/2020  Principal Problem:  SBP (spontaneous bacterial peritonitis)     Subjective:     Interval History/Overnight Events:    Patient continues to note fatigue and diffuse weakness , however no acute issues.  Overall stable clinical condition   Was started on diuretics given anasarca 2/2 liver disease. Now with Low BP, so diuretics were held further . Was overall asymptomatic low BP.  On midodrine 15 mg TID. Will plan to redose diuretics based on BP  Tentative d/c on 8/9        albumin human 25%  25 g Intravenous QID    cefTRIAXone (ROCEPHIN) IVPB  2 g Intravenous Q24H    escitalopram oxalate  5 mg Oral Daily    folic acid  1 mg Oral Daily    lactulose  10 g Oral BID    midodrine  15 mg Oral Q8H    pantoprazole  40 mg Oral Daily    rOPINIRole  0.25 mg Oral QHS    thiamine  100 mg Oral Daily    traZODone  100 mg Oral QHS           acetaminophen, hydrOXYzine pamoate, LORazepam, melatonin, ondansetron, ondansetron, sodium chloride 0.9%, sodium chloride 0.9%    Review of Systems   Constitutional: +atigue,.   HENT: Negative for sore throat, trouble swallowing.    Eyes: Negative for photophobia, visual disturbance.   Respiratory: Negative for cough, shortness of breath.    Cardiovascular: Negative for chest pain, palpitations, + leg swelling.   Gastrointestinal: Negative for abdominal pain, constipation, diarrhea, nausea, vomiting.   Endocrine: Negative for cold intolerance, heat intolerance.   Genitourinary: Negative for dysuria, frequency.   Musculoskeletal: Negative for arthralgias, myalgias.   Skin: Negative for rash, wound, erythema   Neurological: Negative for dizziness, syncope, , light-headedness.  +weakness  Psychiatric/Behavioral: Negative for confusion, hallucinations, anxiety  All other systems reviewed and are negative.    Objective:     Temp:  [96.6 °F (35.9 °C)-98 °F (36.7 °C)]   Pulse:  [77-90]   Resp:  [16-20]   BP: ()/(49-68)   SpO2:  [92 %-98 %]         Weight change:    Body mass index is 36.4 kg/m².       Physical Exam:  Constitutional: chronically ill looking,  non-distressed, not diaphoretic.   HENT: NC/AT, external ears normal, oropharynx clear, MMM w/o exudates.   Eyes: PERRL, EOMI, conjunctiva normal, no discharge b/l, +scleral icterus   Neck: normal ROM, supple  CV: RRR, no m/r/g, no carotid bruits, +2 peripheral pulses.  Pulmonary/Chest wall: Breathing comfortably w/o distress, CTAB, no w/r/r, no crackles.  Decreased breath sounds at lung bases  GI: Soft, non-tender, (+) BS, (+) BM   +distended  Musculoskeletal: Normal ROM, no atrophy,  + pitting edema in LE bilaterally   Neurological: AAO x 4, CN II-XI in tact, nl sensation, nl strength/tone  No asterixis   Skin: warm, dry   +pallor, jaundice  Psych: normal mood and affect, normal behavior, thought content and judgement.    Labs:    Chemistries:   Recent Labs   Lab 08/06/20  0453 08/07/20  0404 08/08/20  0423   * 134* 132*   K 3.7 3.3* 3.2*   CL 98 99 97   CO2 21* 26 26   BUN 9 7 6   CREATININE 0.5 0.6 0.6   CALCIUM 7.9* 8.6* 8.2*   PROT 4.4* 5.0* 4.5*   BILITOT 19.9* 21.9* 21.1*   ALKPHOS 352* 277* 261*   ALT 70* 51* 47*   * 99* 94*   MG 1.7 1.6 1.5*   PHOS 2.7 2.1* 2.8        WBC:   Recent Labs   Lab 08/05/20  0621 08/05/20  1652 08/06/20  0723 08/07/20  0404 08/08/20  0423   WBC 13.74* 14.14* 13.80* 11.71 11.19     Bands:     CBC/Anemia Labs: Coags:    Recent Labs   Lab 08/06/20  0723 08/07/20  0404 08/08/20  0423   WBC 13.80* 11.71 11.19   HGB 8.8* 8.5* 8.3*   HCT 28.1* 26.6* 26.4*    190 202   * 111* 112*   RDW 21.2* 20.9* 20.8*    Recent Labs   Lab 08/02/20  1100 08/03/20  0350  08/06/20  0453  08/07/20  0404 08/08/20  0423   INR 1.3* 1.3*   < > 1.2 1.3* 1.3*   APTT 35.5* 36.0*  --   --   --   --     < > = values in this interval not displayed.        POCT Glucose: HbA1c:    Recent Labs   Lab 08/05/20  2133   POCTGLUCOSE 77    No results found for: HGBA1C     Diagnostic Results:        Assessment and Plan     Hospital Course:    Mr. Kenneth Pardo was admitted to Hospital Medicine for management of  SBP (spontaneous bacterial peritonitis)     Active Hospital Problems    Diagnosis  POA    *SBP (spontaneous bacterial peritonitis) [K65.2]  Yes    Anasarca [R60.1]  Yes    Acute hepatic encephalopathy [K72.00]  Yes    Macrocytic anemia [D53.9]  Yes    Severe alcohol dependence [F10.20]  Yes    Hypotension [I95.9]  Yes    Hyponatremia [E87.1]  Yes    Upper GI bleed [K92.2]  Yes    Acute alcoholic hepatitis [K70.10]  Yes    Essential hypertension [I10]  Yes     Chronic    Hepatorenal syndrome [K76.7]  Yes    Alcohol use disorder, severe, dependence [F10.20]  Yes      Resolved Hospital Problems   No resolved problems to display.       # Acute Alcohol hepatitis with ascites  # Severe alcohol dependence   # SBP    MELD-Na score: 24 at 8/8/2020  4:23 AM  MELD score: 21 at 8/8/2020  4:23 AM  Calculated from:  Serum Creatinine: 0.6 mg/dL (Rounded to 1 mg/dL) at 8/8/2020  4:23 AM  Serum Sodium: 132 mmol/L at 8/8/2020  4:23 AM  Total Bilirubin: 21.1 mg/dL at 8/8/2020  4:23 AM  INR(ratio): 1.3 at 8/8/2020  4:23 AM  Age: 59 years 11 months     - patient has a long h/o alcohol use . Concern for ETOH hepatitis and then later diagnosed SBP  - viral hepatitis studies negative --> * negative for Hep C, correction to prior documentation   - hepatology consulted  - U/S with liver reviewed   - Addiction psych consult appreciated   - LFTs rising with T bili up to 21 throughout hospitalization , now stable   - IR paracentesis with 750 cc removed on 8/5 and negative for SBP  - SBP noted on ascites studies (overall 242 PMNs  with leukocytosis and decompensated liver disease- concerning of diagnosis of SBP) on 8/5 and treatment started on 8/6 with IV CTX and albumin   - Was started on diuretics given anasarca 2/2 liver disease. Now with Low BP, so diuretics were held further . On midodrine 15 mg TID. Will plan to redose diuretics based on BP     # MERLY  # Hepatorenal Syndrome  - resolved     # Acute hepatic encephalopathy  - started low dose lactulose      # Hyponatremia  - fluid restrict      # Hypotension  - started on midodrine     # Gastrointestinal hemorrhage with melena  - EGD showed esophagitis and portal hypertensive gastropathy      # Macrocytic anemia  # Thrombocytopenia  -  2/2 liver disease   -stable  Monitor         Diet:  Low Na with fluid restriction   GI PPx:  PO PPI  DVT PPx:  SCDs due to coagulopathy   Goals of Care:  Full     High Risk Conditions:  decomp liver disease, SBP    Disposition:    D/c home 8/9 with etoh rehab/ meetings and outpatient hepatology follow up       Signing Physician:     Aicha Rojas MD  Department of [facility]   [facility]  Pager [phone]  [facility] [id number]  8/8/2020

## 2020-08-09 VITALS
SYSTOLIC BLOOD PRESSURE: 102 MMHG | WEIGHT: 205.5 LBS | HEIGHT: 63 IN | HEART RATE: 78 BPM | OXYGEN SATURATION: 97 % | RESPIRATION RATE: 16 BRPM | BODY MASS INDEX: 36.41 KG/M2 | TEMPERATURE: 98 F | DIASTOLIC BLOOD PRESSURE: 63 MMHG

## 2020-08-09 PROBLEM — D62 ACUTE BLOOD LOSS ANEMIA: Status: ACTIVE | Noted: 2020-08-09

## 2020-08-09 PROBLEM — K20.80 ESOPHAGITIS, LOS ANGELES GRADE B: Status: ACTIVE | Noted: 2020-08-09

## 2020-08-09 PROBLEM — I10 ESSENTIAL HYPERTENSION: Chronic | Status: RESOLVED | Noted: 2020-07-30 | Resolved: 2020-08-09

## 2020-08-09 PROBLEM — K76.7 HEPATORENAL SYNDROME: Status: RESOLVED | Noted: 2020-07-30 | Resolved: 2020-08-09

## 2020-08-09 PROBLEM — K31.89 PORTAL HYPERTENSIVE GASTROPATHY: Status: ACTIVE | Noted: 2020-08-09

## 2020-08-09 PROBLEM — K92.2 UPPER GI BLEED: Status: RESOLVED | Noted: 2020-07-31 | Resolved: 2020-08-09

## 2020-08-09 PROBLEM — K76.6 PORTAL HYPERTENSIVE GASTROPATHY: Status: ACTIVE | Noted: 2020-08-09

## 2020-08-09 LAB
ALBUMIN SERPL BCP-MCNC: 3.2 G/DL (ref 3.5–5.2)
ALP SERPL-CCNC: 224 U/L (ref 55–135)
ALT SERPL W/O P-5'-P-CCNC: 38 U/L (ref 10–44)
ANION GAP SERPL CALC-SCNC: 9 MMOL/L (ref 8–16)
ANISOCYTOSIS BLD QL SMEAR: SLIGHT
AST SERPL-CCNC: 71 U/L (ref 10–40)
BACTERIA BLD CULT: NORMAL
BACTERIA BLD CULT: NORMAL
BASOPHILS # BLD AUTO: 0.05 K/UL (ref 0–0.2)
BASOPHILS NFR BLD: 0.5 % (ref 0–1.9)
BILIRUB SERPL-MCNC: 21.2 MG/DL (ref 0.1–1)
BUN SERPL-MCNC: 6 MG/DL (ref 6–20)
CALCIUM SERPL-MCNC: 8.8 MG/DL (ref 8.7–10.5)
CHLORIDE SERPL-SCNC: 100 MMOL/L (ref 95–110)
CO2 SERPL-SCNC: 27 MMOL/L (ref 23–29)
CREAT SERPL-MCNC: 0.6 MG/DL (ref 0.5–1.4)
DIFFERENTIAL METHOD: ABNORMAL
EOSINOPHIL # BLD AUTO: 0.2 K/UL (ref 0–0.5)
EOSINOPHIL NFR BLD: 2.2 % (ref 0–8)
ERYTHROCYTE [DISTWIDTH] IN BLOOD BY AUTOMATED COUNT: 20.8 % (ref 11.5–14.5)
EST. GFR  (AFRICAN AMERICAN): >60 ML/MIN/1.73 M^2
EST. GFR  (NON AFRICAN AMERICAN): >60 ML/MIN/1.73 M^2
GLUCOSE SERPL-MCNC: 80 MG/DL (ref 70–110)
HCT VFR BLD AUTO: 24.5 % (ref 40–54)
HGB BLD-MCNC: 8.1 G/DL (ref 14–18)
HYPOCHROMIA BLD QL SMEAR: ABNORMAL
IMM GRANULOCYTES # BLD AUTO: 0.08 K/UL (ref 0–0.04)
IMM GRANULOCYTES NFR BLD AUTO: 0.8 % (ref 0–0.5)
INR PPP: 2.3 (ref 0.8–1.2)
LYMPHOCYTES # BLD AUTO: 1.3 K/UL (ref 1–4.8)
LYMPHOCYTES NFR BLD: 13.2 % (ref 18–48)
MAGNESIUM SERPL-MCNC: 1.4 MG/DL (ref 1.6–2.6)
MCH RBC QN AUTO: 36 PG (ref 27–31)
MCHC RBC AUTO-ENTMCNC: 33.1 G/DL (ref 32–36)
MCV RBC AUTO: 109 FL (ref 82–98)
MONOCYTES # BLD AUTO: 0.5 K/UL (ref 0.3–1)
MONOCYTES NFR BLD: 5.6 % (ref 4–15)
NEUTROPHILS # BLD AUTO: 7.5 K/UL (ref 1.8–7.7)
NEUTROPHILS NFR BLD: 77.7 % (ref 38–73)
NRBC BLD-RTO: 0 /100 WBC
OVALOCYTES BLD QL SMEAR: ABNORMAL
PHOSPHATE SERPL-MCNC: 2.8 MG/DL (ref 2.7–4.5)
PLATELET # BLD AUTO: 201 K/UL (ref 150–350)
PLATELET BLD QL SMEAR: ABNORMAL
PMV BLD AUTO: 11 FL (ref 9.2–12.9)
POIKILOCYTOSIS BLD QL SMEAR: SLIGHT
POTASSIUM SERPL-SCNC: 3.1 MMOL/L (ref 3.5–5.1)
PROT SERPL-MCNC: 4.9 G/DL (ref 6–8.4)
PROTHROMBIN TIME: 24.8 SEC (ref 9–12.5)
RBC # BLD AUTO: 2.25 M/UL (ref 4.6–6.2)
SODIUM SERPL-SCNC: 136 MMOL/L (ref 136–145)
WBC # BLD AUTO: 9.7 K/UL (ref 3.9–12.7)

## 2020-08-09 PROCEDURE — 80053 COMPREHEN METABOLIC PANEL: CPT

## 2020-08-09 PROCEDURE — 25000003 PHARM REV CODE 250: Performed by: HOSPITALIST

## 2020-08-09 PROCEDURE — 25000003 PHARM REV CODE 250: Performed by: STUDENT IN AN ORGANIZED HEALTH CARE EDUCATION/TRAINING PROGRAM

## 2020-08-09 PROCEDURE — 85025 COMPLETE CBC W/AUTO DIFF WBC: CPT

## 2020-08-09 PROCEDURE — 36415 COLL VENOUS BLD VENIPUNCTURE: CPT

## 2020-08-09 PROCEDURE — 99239 PR HOSPITAL DISCHARGE DAY,>30 MIN: ICD-10-PCS | Mod: ,,, | Performed by: HOSPITALIST

## 2020-08-09 PROCEDURE — 99239 HOSP IP/OBS DSCHRG MGMT >30: CPT | Mod: ,,, | Performed by: HOSPITALIST

## 2020-08-09 PROCEDURE — 83735 ASSAY OF MAGNESIUM: CPT

## 2020-08-09 PROCEDURE — 63600175 PHARM REV CODE 636 W HCPCS: Performed by: HOSPITALIST

## 2020-08-09 PROCEDURE — 84100 ASSAY OF PHOSPHORUS: CPT

## 2020-08-09 PROCEDURE — 85610 PROTHROMBIN TIME: CPT

## 2020-08-09 RX ORDER — MIDODRINE HYDROCHLORIDE 5 MG/1
15 TABLET ORAL EVERY 8 HOURS
Qty: 270 TABLET | Refills: 2 | Status: SHIPPED | OUTPATIENT
Start: 2020-08-09 | End: 2020-12-07 | Stop reason: SDUPTHER

## 2020-08-09 RX ORDER — LACTULOSE 10 G/15ML
10 SOLUTION ORAL; RECTAL 2 TIMES DAILY
Qty: 900 ML | Refills: 2 | Status: SHIPPED | OUTPATIENT
Start: 2020-08-09 | End: 2020-08-09

## 2020-08-09 RX ORDER — FUROSEMIDE 20 MG/1
20 TABLET ORAL DAILY
Qty: 30 TABLET | Refills: 2 | Status: SHIPPED | OUTPATIENT
Start: 2020-08-10 | End: 2020-08-09

## 2020-08-09 RX ORDER — MIDODRINE HYDROCHLORIDE 5 MG/1
15 TABLET ORAL EVERY 8 HOURS
Qty: 45 TABLET | Refills: 0 | Status: SHIPPED | OUTPATIENT
Start: 2020-08-09 | End: 2020-08-14

## 2020-08-09 RX ORDER — LANOLIN ALCOHOL/MO/W.PET/CERES
100 CREAM (GRAM) TOPICAL DAILY
Qty: 90 TABLET | Refills: 2 | Status: SHIPPED | OUTPATIENT
Start: 2020-08-10 | End: 2020-08-09 | Stop reason: HOSPADM

## 2020-08-09 RX ORDER — SPIRONOLACTONE 50 MG/1
50 TABLET, FILM COATED ORAL DAILY
Qty: 30 TABLET | Refills: 2 | Status: ON HOLD | OUTPATIENT
Start: 2020-08-10 | End: 2020-09-18 | Stop reason: HOSPADM

## 2020-08-09 RX ORDER — TRAZODONE HYDROCHLORIDE 100 MG/1
100 TABLET ORAL NIGHTLY
Qty: 30 TABLET | Refills: 2 | Status: SHIPPED | OUTPATIENT
Start: 2020-08-09 | End: 2020-11-09 | Stop reason: SDUPTHER

## 2020-08-09 RX ORDER — MIDODRINE HYDROCHLORIDE 5 MG/1
15 TABLET ORAL EVERY 8 HOURS
Qty: 270 TABLET | Refills: 2 | Status: SHIPPED | OUTPATIENT
Start: 2020-08-09 | End: 2020-08-09

## 2020-08-09 RX ORDER — MIDODRINE HYDROCHLORIDE 10 MG/1
15 TABLET ORAL 3 TIMES DAILY
Qty: 23 TABLET | Refills: 0 | Status: SHIPPED | OUTPATIENT
Start: 2020-08-09 | End: 2020-08-14

## 2020-08-09 RX ORDER — CIPROFLOXACIN 500 MG/1
TABLET ORAL
Qty: 60 TABLET | Refills: 2 | Status: ON HOLD | OUTPATIENT
Start: 2020-08-10 | End: 2020-09-18 | Stop reason: SDUPTHER

## 2020-08-09 RX ORDER — SPIRONOLACTONE 50 MG/1
50 TABLET, FILM COATED ORAL DAILY
Qty: 30 TABLET | Refills: 2 | Status: SHIPPED | OUTPATIENT
Start: 2020-08-10 | End: 2020-08-09

## 2020-08-09 RX ORDER — ROPINIROLE 0.25 MG/1
0.25 TABLET, FILM COATED ORAL NIGHTLY
Qty: 30 TABLET | Refills: 2 | Status: SHIPPED | OUTPATIENT
Start: 2020-08-09 | End: 2020-08-09

## 2020-08-09 RX ORDER — FOLIC ACID 1 MG/1
1 TABLET ORAL DAILY
Qty: 90 TABLET | Refills: 3 | Status: SHIPPED | OUTPATIENT
Start: 2020-08-10 | End: 2020-08-09 | Stop reason: HOSPADM

## 2020-08-09 RX ORDER — ESCITALOPRAM OXALATE 5 MG/1
5 TABLET ORAL DAILY
Qty: 30 TABLET | Refills: 2 | Status: SHIPPED | OUTPATIENT
Start: 2020-08-09 | End: 2020-08-09

## 2020-08-09 RX ORDER — FUROSEMIDE 20 MG/1
20 TABLET ORAL DAILY
Status: DISCONTINUED | OUTPATIENT
Start: 2020-08-09 | End: 2020-08-09 | Stop reason: HOSPADM

## 2020-08-09 RX ORDER — TRAZODONE HYDROCHLORIDE 100 MG/1
100 TABLET ORAL NIGHTLY
Qty: 30 TABLET | Refills: 2 | Status: SHIPPED | OUTPATIENT
Start: 2020-08-09 | End: 2020-08-09

## 2020-08-09 RX ORDER — POTASSIUM CHLORIDE 20 MEQ/1
40 TABLET, EXTENDED RELEASE ORAL ONCE
Status: COMPLETED | OUTPATIENT
Start: 2020-08-09 | End: 2020-08-09

## 2020-08-09 RX ORDER — ROPINIROLE 0.25 MG/1
0.25 TABLET, FILM COATED ORAL NIGHTLY
Qty: 30 TABLET | Refills: 2 | Status: SHIPPED | OUTPATIENT
Start: 2020-08-09 | End: 2020-12-07 | Stop reason: SDUPTHER

## 2020-08-09 RX ORDER — LACTULOSE 10 G/15ML
10 SOLUTION ORAL; RECTAL 2 TIMES DAILY
Qty: 900 ML | Refills: 2 | Status: ON HOLD | OUTPATIENT
Start: 2020-08-09 | End: 2020-09-18

## 2020-08-09 RX ORDER — SPIRONOLACTONE 25 MG/1
50 TABLET ORAL DAILY
Status: DISCONTINUED | OUTPATIENT
Start: 2020-08-09 | End: 2020-08-09 | Stop reason: HOSPADM

## 2020-08-09 RX ORDER — ESCITALOPRAM OXALATE 5 MG/1
5 TABLET ORAL DAILY
Qty: 30 TABLET | Refills: 2 | Status: SHIPPED | OUTPATIENT
Start: 2020-08-09 | End: 2020-12-07 | Stop reason: SDUPTHER

## 2020-08-09 RX ORDER — FUROSEMIDE 20 MG/1
20 TABLET ORAL DAILY
Qty: 30 TABLET | Refills: 2 | Status: ON HOLD | OUTPATIENT
Start: 2020-08-10 | End: 2020-09-18 | Stop reason: HOSPADM

## 2020-08-09 RX ORDER — OMEPRAZOLE 40 MG/1
40 CAPSULE, DELAYED RELEASE ORAL DAILY
Qty: 30 CAPSULE | Refills: 1 | Status: ON HOLD | OUTPATIENT
Start: 2020-08-09 | End: 2020-09-10

## 2020-08-09 RX ADMIN — ESCITALOPRAM 5 MG: 5 TABLET, FILM COATED ORAL at 09:08

## 2020-08-09 RX ADMIN — PANTOPRAZOLE SODIUM 40 MG: 40 TABLET, DELAYED RELEASE ORAL at 09:08

## 2020-08-09 RX ADMIN — POTASSIUM CHLORIDE 40 MEQ: 1500 TABLET, EXTENDED RELEASE ORAL at 09:08

## 2020-08-09 RX ADMIN — FOLIC ACID 1 MG: 1 TABLET ORAL at 09:08

## 2020-08-09 RX ADMIN — LACTULOSE 10 G: 20 SOLUTION ORAL at 03:08

## 2020-08-09 RX ADMIN — CEFTRIAXONE 2 G: 2 INJECTION, SOLUTION INTRAVENOUS at 09:08

## 2020-08-09 RX ADMIN — Medication 100 MG: at 09:08

## 2020-08-09 RX ADMIN — MIDODRINE HYDROCHLORIDE 15 MG: 5 TABLET ORAL at 05:08

## 2020-08-09 RX ADMIN — FUROSEMIDE 20 MG: 20 TABLET ORAL at 09:08

## 2020-08-09 RX ADMIN — MIDODRINE HYDROCHLORIDE 15 MG: 5 TABLET ORAL at 03:08

## 2020-08-09 RX ADMIN — HYDROXYZINE PAMOATE 50 MG: 25 CAPSULE ORAL at 12:08

## 2020-08-09 RX ADMIN — LACTULOSE 10 G: 20 SOLUTION ORAL at 09:08

## 2020-08-09 RX ADMIN — SPIRONOLACTONE 50 MG: 25 TABLET ORAL at 09:08

## 2020-08-09 NOTE — NURSING
Per MD order, PICC line was removed (by charge nurse) from upper right leg. Appropriate sterile procedure used; pressure applied, appropriate dressing. Patient rested, observed, for 20 minutes before being discharged.

## 2020-08-09 NOTE — PLAN OF CARE
Patient admitted with spontaneous bacterial peritonitis and history of hypertension, anxiety, alcoholic hepatitis, GI bleed. He has been through a regimen of albumin.

## 2020-08-09 NOTE — DISCHARGE SUMMARY
DISCHARGE SUMMARY  Hospital Medicine    Team: [facility] HOSP MED L    Patient Name: Kenneth Pardo  YOB: 1960    Admit Date: 7/30/2020    Discharge Date: 08/09/2020    Discharge Attending Physician: Aicha Rojas MD     Chief Complaint: SBP/ UGIB/ Decompensated liver disease     Princilpal Diagnoses:  Active Hospital Problems    Diagnosis  POA    *SBP (spontaneous bacterial peritonitis) [K65.2]  Yes    Esophagitis, [place] grade B [K20.8]  Yes    Portal hypertensive gastropathy [K76.6, K31.89]  Yes    Acute blood loss anemia [D62]  Yes    Anasarca [R60.1]  Yes    Acute hepatic encephalopathy [K72.00]  Yes    Macrocytic anemia [D53.9]  Yes    Severe alcohol dependence [F10.20]  Yes    Hypotension [I95.9]  Yes    Hyponatremia [E87.1]  Yes    Upper GI bleed [K92.2]  Yes    Acute alcoholic hepatitis [K70.10]  Yes    Alcohol use disorder, severe, dependence [F10.20]  Yes      Resolved Hospital Problems    Diagnosis Date Resolved POA    Essential hypertension [I10] 08/09/2020 Yes     Chronic    Hepatorenal syndrome [K76.7] 08/09/2020 Yes       Discharged Condition: Admit problems have stabilized     HOSPITAL COURSE:      Initial Presentation:    As per admitting provider, ICU admission on 7/30      60 Yo male with PMH of HTN, anxiety and chronic daily ETOH abuse who initially presented to an outside facility ([facility]) with a chief compliant of weakness. Patient reports that over the past month he has felt extremely tired and weak. He attributed this to chronic anxiety, for which he takes Xanxax BID. Reportedly patient ran out of his anxiety medication and went to see his PCP for a refill. At that time, his PCP told him to go to the hospital to get checked out.  Patient presented to [facility] and was worked up for acute hepatitis. Initial viral hepatitis panel was negative. Initial labs from [facility] indicate leukocytosis, elevated bilirubin, and elevated liver function test. Patient was  "also hypotensive, requiring pressors. ED staff at [facility] placed a right femoral central catheter for access.Patient was then transferred to [facility] for further care given acute hepatitis, MERLY and hypotension.    Course of Principle Problem for Admission:    # Acute Alcohol hepatitis with ascites  # Severe alcohol dependence   # SBP     MELD-Na score: 27 at 8/9/2020  3:45 AM  MELD score: 27 at 8/9/2020  3:45 AM  Calculated from:  Serum Creatinine: 0.6 mg/dL (Rounded to 1 mg/dL) at 8/9/2020  3:45 AM  Serum Sodium: 136 mmol/L at 8/9/2020  3:45 AM  Total Bilirubin: 21.2 mg/dL at 8/9/2020  3:45 AM  INR(ratio): 2.3 at 8/9/2020  3:45 AM  Age: 59 years 11 months     - patient has a long h/o alcohol use . Concern for ETOH hepatitis and then later diagnosed SBP  - viral hepatitis studies negative --> * negative for Hep C, correction to prior documentation   - hepatology consulted- per their eval "Liver enzyme elevation and history consistent with acute alcoholic hepatitis.  No cirrhosis on imaging.  LFTs improving during the hospital course so far.  Patient will need to stop alcohol intake to prevent further liver injury.  Likely will recover from current episode."  - U/S with liver reviewed - no noted cirrhosis, showed Hepatomegaly and hepatic steatosis.  - Addiction psych consult appreciated - high risk  Counseling given   - LFTs rising with T bili up to 21 throughout hospitalization , now stable   - IR paracentesis with 750 cc removed on 8/5 and negative for SBP  - SBP noted on ascites studies (overall 242 PMNs with leukocytosis and decompensated liver disease- concerning of diagnosis of SBP) on 8/5 and treatment started on 8/6 with IV CTX and albumin   - On midodrine 15 mg TID with stable BP  - low dose diuretics started       On the day of d/c, patient was doing well with no acute issues. Labs stable, hbg stable.  BP improved.       Physical Exam:  Constitutional: chronically ill looking,  non-distressed, not " "diaphoretic.   HENT: NC/AT, external ears normal, oropharynx clear, MMM w/o exudates.   Eyes: PERRL, EOMI, conjunctiva normal, no discharge b/l, +scleral icterus   Neck: normal ROM, supple  CV: RRR, no m/r/g, no carotid bruits, +2 peripheral pulses.  Pulmonary/Chest wall: Breathing comfortably w/o distress, CTAB, no w/r/r, no crackles.  Decreased breath sounds at lung bases  GI: Soft, non-tender, (+) BS, (+) BM   +distended  Musculoskeletal: Normal ROM, no atrophy,  + pitting edema in LE bilaterally   Neurological: AAO x 4, CN II-XI in tact, nl sensation, nl strength/tone  No asterixis   Skin: warm, dry   +pallor, jaundice  Psych: normal mood and affect, normal behavior, thought content and judgement.    Other Medical Problems Addressed in the Hospital:    Upper GI bleed  Acute blood loss anemia  Esophagitis, [place] grade B  Portal hypertensive gastropathy  - On 7/30, patient noted to have large "dark brown" BM and hgb drop from 10.1 yesterday evening -> 8.6 earlier today -> 6.9 tonight. No hematemesis. Patient is A&Ox3 and in no distress. BUN 30 and Cr 2. Plts 177 and INR 1.3. No prior endoscopies  - s/p IV PPI and octreotide ggt   - s/p PRBC transfusions   - GI was consulted   - EGD on 7/31 showed LA Grade B esophagitis.Portal hypertensive gastropathy  - cont PO PPI   - hbg stabilized with no further bleeding     # MERLY  -Cr 3.2 on admit. MERLY likely due to bleeding and hemorrhagic shock presentation .   - resolved  Cr now wnl    # Acute hepatic encephalopathy  - started low dose lactulose   - mental status improved      # Hyponatremia  - fluid restrict      # Hypotension  - started on midodrine 15 mg TID with improvement in blood pressure     # Gastrointestinal hemorrhage with melena  - EGD showed esophagitis and portal hypertensive gastropathy      # Macrocytic anemia  # Thrombocytopenia  -  2/2 liver disease   -stable  Monitor       CONSULTS: GI, hepatology, ICU, psychiatry     PROCEDURES: EGD, paracentesis "     Labs:    Chemistries:   Recent Labs   Lab 08/07/20  0404 08/08/20  0423 08/09/20  0345   * 132* 136   K 3.3* 3.2* 3.1*   CL 99 97 100   CO2 26 26 27   BUN 7 6 6   CREATININE 0.6 0.6 0.6   CALCIUM 8.6* 8.2* 8.8   PROT 5.0* 4.5* 4.9*   BILITOT 21.9* 21.1* 21.2*   ALKPHOS 277* 261* 224*   ALT 51* 47* 38   AST 99* 94* 71*   MG 1.6 1.5* 1.4*   PHOS 2.1* 2.8 2.8        WBC:   Recent Labs   Lab 08/05/20  1652 08/06/20  0723 08/07/20  0404 08/08/20  0423 08/09/20  0345   WBC 14.14* 13.80* 11.71 11.19 9.70     Bands:     CBC/Anemia Labs: Coags:    Recent Labs   Lab 08/07/20  0404 08/08/20  0423 08/09/20  0345   WBC 11.71 11.19 9.70   HGB 8.5* 8.3* 8.1*   HCT 26.6* 26.4* 24.5*    202 201   * 112* 109*   RDW 20.9* 20.8* 20.8*    Recent Labs   Lab 08/03/20  0350  08/07/20  0404 08/08/20  0423 08/09/20  0345   INR 1.3*   < > 1.3* 1.3* 2.3*   APTT 36.0*  --   --   --   --     < > = values in this interval not displayed.        Diagnostic Results:    EGD 7/31/20    Impression:      - LA Grade B esophagitis.                         - Portal hypertensive gastropathy.                         - Normal examined duodenum.                         - No specimens collected.       Disposition:  Home  - did not qualify for home health 2/2 lack of insurance (Medicaid pending at this time). Outpatient PT OT referral     Future Scheduled Appointments:  No future appointments.    Follow-up Plans from This Hospitalization:  Hepatology     Discharge Medication List:       Kenneth Pardo   Home Medication Instructions QIAN:[id number]    Printed on:08/09/20 0959   Medication Information                      ciprofloxacin HCl (CIPRO) 500 MG tablet  Take 500mg twice on 8/10 and then daily after that             escitalopram oxalate (LEXAPRO) 5 MG Tab  Take 1 tablet (5 mg total) by mouth once daily.             folic acid (FOLVITE) 1 MG tablet  Take 1 tablet (1 mg total) by mouth once daily.             furosemide (LASIX) 20 MG  tablet  Take 1 tablet (20 mg total) by mouth once daily.             lactulose (CHRONULAC) 20 gram/30 mL Soln  Take 15 mLs (10 g total) by mouth 2 (two) times a day. Take enough to have 3 BMs per day             midodrine (PROAMATINE) 5 MG Tab  Take 3 tablets (15 mg total) by mouth every 8 (eight) hours.             omeprazole (PRILOSEC) 40 MG capsule  Take 1 capsule (40 mg total) by mouth once daily.             rOPINIRole (REQUIP) 0.25 MG tablet  Take 1 tablet (0.25 mg total) by mouth every evening.             spironolactone (ALDACTONE) 50 MG tablet  Take 1 tablet (50 mg total) by mouth once daily.             thiamine 100 MG tablet  Take 1 tablet (100 mg total) by mouth once daily.             traZODone (DESYREL) 100 MG tablet  Take 1 tablet (100 mg total) by mouth every evening.                   At the time of discharge patient was told to take all medications as prescribed, to keep all followup appointments, and to call their primary care physician or return to the emergency room if they have any worsening or concerning symptoms.    Time spent on the discharge of the patient including review of hospital course with the patient. reviewing discharge medications and arranging follow-up care 45 minutes.  Patient was seen and examined on the date of discharge and determined to be suitable for discharge.        Signing Physician:  Aicha Rojas MD

## 2020-08-09 NOTE — PLAN OF CARE
Problem: Adult Inpatient Plan of Care  Goal: Plan of Care Review  Outcome: Ongoing, Progressing  Goal: Patient-Specific Goal (Individualization)  Outcome: Ongoing, Progressing  Goal: Absence of Hospital-Acquired Illness or Injury  Outcome: Ongoing, Progressing  Goal: Optimal Comfort and Wellbeing  Outcome: Ongoing, Progressing  Goal: Readiness for Transition of Care  Outcome: Ongoing, Progressing  Goal: Rounds/Family Conference  Outcome: Ongoing, Progressing     Problem: Fall Injury Risk  Goal: Absence of Fall and Fall-Related Injury  Outcome: Ongoing, Progressing     Problem: Skin Injury Risk Increased  Goal: Skin Health and Integrity  Outcome: Ongoing, Progressing     Problem: Infection  Goal: Infection Symptom Resolution  Outcome: Ongoing, Progressing     Problem: Oral Intake Inadequate  Goal: Improved Oral Intake  Outcome: Ongoing, Progressing     Plan of care reviewed with patient. Pt verbalized understanding.  Pt 's vitals signs remains stable. Pt is jaundiced. Pt is free of falls and injuries. Bed in lowest position and call light within reach . I will continue to monitor

## 2020-08-10 NOTE — PHYSICIAN QUERY
PT Name: Kenneth Pardo  MR #: [id number]     Diagnosis Clarification      CDS/: Ophelia Salamanca RN, CDS               Contact information: [email or web address]      Withdrawing query while awaiting further needed information (anaerobic cultures).       This form is a permanent document in the medical record.     Query Date: August 10, 2020    Dear Provider,  By submitting this query, we are merely seeking further clarification of documentation.  Please utilize your independent clinical judgment when addressing the question(s) below.     The medical record contains the following:    Supporting Clinical Information Location in Medical Record   Patient's LFTs rising today, went for IR paracentesis today with 750 cc out and negative for SBP; patient is now medicaid pending; all infectious work up as been negative    Fluid Color: Yellow  Fluid Appearance: Clear  Body Fluid Type: Ascites  WBC, Body Fluid: 279  Segs, Fluid: 87  Lymphs, Fluid: 6  Monocytes/Macrophages, Fluid: 7    SBP noted on ascites studies on 8/5 and treatment started on 8/6 with IV CTX and albumin    Treating SBP     Specimen Information: Abdomen; Body Fluid  Component 5d ago  Aerobic Bacterial Culture No growth     Specimen Information: Abdomen; Body Fluid  Component 5d ago  Anaerobic Culture Culture in progress     IR paracentesis with 750 cc removed on 8/5 and negative for SBP  - SBP noted on ascites studies (overall 242 PMNs with leukocytosis and decompensated liver disease- concerning of diagnosis of SBP) on 8/5 and treatment started on 8/6 with IV CTX      HM PN 8/5           Labs 8/5                 HM PN 8/6       HM PN 8/7     Labs 8/5         Labs 8/5         DC Summary 8/9     Please clarify if the _SBP_ diagnosis has been:    [  ] Ruled In   [  ] Ruled Out   [  ] Other/Clarification of findings (please specify)_______________    [   ] Clinically undetermined     Present on admission (POA) status:   [   ] Yes (Y)                           [  ] Clinically Undetermined (W)  [   ] No (N)                            [   ] Documentation insufficient to determine if condition is POA (U)       Please document in your progress notes daily for the duration of treatment, until resolved, and include in your discharge summary.

## 2020-08-11 ENCOUNTER — PATIENT OUTREACH (OUTPATIENT)
Dept: ADMINISTRATIVE | Facility: CLINIC | Age: 60
End: 2020-08-11

## 2020-08-11 NOTE — PATIENT INSTRUCTIONS
Discharge Instructions for Hepatitis C  You have been diagnosed with hepatitis C. This is an inflammation of the liver caused by a viral infection. Hepatitis C can get worse and damage your liver without your knowing it. Stay in regular contact with your healthcare provider and healthcare team. They can watch your condition and monitor for any complications. In addition, there are now very good treatments for Hepatitis C. Here's what you can do to stay healthier and prevent its spread.  Home care  · Avoid putting stress on your liver:  ¨ Dont drink alcohol. Alcohol consumption increases the risk of developing cirrhosis.  ¨ Ask your healthcare provider about which medicines, including over-the-counter ones, you should not take.  ¨ Lose weight if you are overweight, especially if your medical tests showed that you have a fatty liver.  · Eat a balanced diet.   · Take medicines prescribed by your healthcare provider to try to get rid of the virus:  ¨ To help your liver work better, you may be given specific medicines.  ¨ In some cases, you will also take ribavirin (antiviral medicine) by mouth twice a day.  ¨ It is extremely important that you are compliant with the prescribed medicines. If you have any questions about how to take the medicines, call your healthcare provider to clarify.   Prevention  Tips to prevent spreading hepatitis C:  · Cover all skin breaks and sores by yourself. If you need help, the person treating you should wear latex gloves.  · Use condoms during sex if you have multiple sexual partners.  · Dont donate blood, plasma, sperm, body organs, or other body tissue.  · Dont share needles.  · Dont share razors, toothbrushes, manicure tools, or other personal items.  Follow-up care  Make a follow-up appointment as directed by our staff.  When to call your healthcare provider  Call your healthcare provider right away if you have any of the following:  · Flulike problems (fatigue, nausea, vomiting,  diarrhea, or sore muscles and joints)  · Swelling in your belly or tenderness in the upper right part of the belly  · Yellowing of your skin or eyes (jaundice)  · Itching  · Dark urine  · Black, tarry, or red stools or vomiting blood  · Confusion or trouble concentrating  · Trouble sleeping  · Painful red rash on your legs  · Nerve damage  · Mental confusion  · Kidney problems     © 3442-0800 [employer]. [address] [place] [address], [place] [address]. All rights reserved. This information is not intended as a substitute for professional medical care. Always follow your healthcare professional's instructions.

## 2020-08-12 ENCOUNTER — TELEPHONE (OUTPATIENT)
Dept: HEPATOLOGY | Facility: CLINIC | Age: 60
End: 2020-08-12

## 2020-08-12 LAB — BACTERIA SPEC ANAEROBE CULT: NORMAL

## 2020-08-12 NOTE — PHYSICIAN QUERY
PT Name: Kenneth Pardo  MR #: [id number]     Diagnosis Clarification      CDS/: Ophelia Salamanca RN, CDS               Contact information: [email or web address]    This form is a permanent document in the medical record.     Query Date: August 12, 2020    Dear Provider,  By submitting this query, we are merely seeking further clarification of documentation.  Please utilize your independent clinical judgment when addressing the question(s) below.     The medical record contains the following:    Supporting Clinical Information Location in Medical Record   Patient's LFTs rising today, went for IR paracentesis today with 750 cc out and negative for SBP; patient is now medicaid pending; all infectious work up as been negative     Fluid Color: Yellow  Fluid Appearance: Clear  Body Fluid Type: Ascites  WBC, Body Fluid: 279  Segs, Fluid: 87  Lymphs, Fluid: 6  Monocytes/Macrophages, Fluid: 7     SBP noted on ascites studies on 8/5 and treatment started on 8/6 with IV CTX and albumin     Treating SBP      Specimen Information:    Abdomen; Body Fluid  Component            5d ago  Aerobic Bacterial Culture        No growth      Specimen Information: Abdomen; Body Fluid  Component 7d ago  Anaerobic Culture No anaerobes isolated     IR paracentesis with 750 cc removed on 8/5 and negative for SBP  - SBP noted on ascites studies (overall 242 PMNs with leukocytosis and decompensated liver disease- concerning of diagnosis of SBP) on 8/5 and treatment started on 8/6 with IV CTX  HM PN 8/5               Labs 8/5                      HM PN 8/6         HM PN 8/7     Labs 8/5            Labs 8/5            DC Summary 8/9     Please clarify if the __SBP__ diagnosis has been:    [ X ] Ruled In   [  ] Ruled Out   [  ] Other/Clarification of findings (please specify)_______________    [   ] Clinically undetermined       Present on admission (POA) status:   [   ] Yes (Y)                          [  ] Clinically Undetermined (W)  [   ] No  (N)                            [   ] Documentation insufficient to determine if condition is POA (U)       Please document in your progress notes daily for the duration of treatment, until resolved, and include in your discharge summary.

## 2020-08-12 NOTE — TELEPHONE ENCOUNTER
----- Message from Cindy Mike sent at 8/11/2020  3:38 PM CDT -----  Contact: [facility]   Pharmacy is calling to clarify an RX.  RX name:  spironolactone (ALDACTONE) 50 MG tablet & lisinopril  What do they need to clarify:  medication interaction and verify pt is on these meds  Comments:

## 2020-08-17 ENCOUNTER — NURSE TRIAGE (OUTPATIENT)
Dept: ADMINISTRATIVE | Facility: CLINIC | Age: 60
End: 2020-08-17

## 2020-08-17 ENCOUNTER — TELEPHONE (OUTPATIENT)
Dept: HEPATOLOGY | Facility: CLINIC | Age: 60
End: 2020-08-17

## 2020-08-17 NOTE — TELEPHONE ENCOUNTER
Pt reports hiccups >24 hours. Given advice per protocol and advised to be seen w/in 24 hours. He verbalizes understanding and will call back with any questions/concerns.     Reason for Disposition   [1] Hiccups present > 24 hours AND [2] nearly continuous    Additional Information   Negative: Patient sounds very sick or weak to the triager   Negative: [1] Hiccups present > 3 hours AND [2] severe AND [3] no improvement using hiccup treatment per protocol    Protocols used: [id number]-JA

## 2020-08-17 NOTE — TELEPHONE ENCOUNTER
MA called pt and scheduled his referral appt in our clinic informed him I will mail out an appt reminder.

## 2020-08-17 NOTE — TELEPHONE ENCOUNTER
----- Message from Estefany Mackay MA sent at 8/11/2020  9:57 AM CDT -----  New patient seen in hospital for acute alcoholic hepatitis   Can we set him up in clinic in about 4-6 weeks.  He will be discharged tomorrow.   Thanks   Kaila

## 2020-08-24 ENCOUNTER — TELEPHONE (OUTPATIENT)
Dept: HEPATOLOGY | Facility: HOSPITAL | Age: 60
End: 2020-08-24

## 2020-08-24 ENCOUNTER — DOCUMENTATION ONLY (OUTPATIENT)
Dept: TRANSPLANT | Facility: CLINIC | Age: 60
End: 2020-08-24

## 2020-08-24 NOTE — LETTER
August 24, 2020    Kenneth Pardo  [address] [place] [id number]      Dear Kenneth Pardo:    Your doctor has referred you to the [facility]. We are sending this letter to remind you to make an appointment with us to complete the referral process.     Please call us at [phone] to schedule an appointment. We look forward to seeing you soon.     If you received a call and have been scheduled, please disregard this letter.       Sincerely,        [facility]   [address] [place] [id number]  [phone]

## 2020-08-24 NOTE — TELEPHONE ENCOUNTER
I was reached out by patient's wife regarding increasing lower extremity edema.  Wife states that patient has been developing worsening ankle edema.  Patient was recently discharged from a team on 08/09/2020 with low-dose diuretics.  Patient has not yet seen hepatology in clinic.  Patient does not have a primary care doctor.    Explained to the wife that I am unable to make significant changes in patient's plan of care as he needs to be seen by provider in clinic.  I suggested doubling up on his diuretics- for total of 40 mg of Lasix daily and 100 mg of spironolactone daily.  I suggested being seen by primary care provider as well as reaching out to hepatology clinic to get a sooner appointment, patient is currently scheduled in hepatology Clinic for 9/8/20.  I suggested being seen in emergency room if volume overload comes a significant issue    Patient's note was created using MModal Dictation.  Any errors in syntax may not have been identified and edited on initial review prior to signing this note.    Signing Physician:     Aicha Rojas MD  Department of Hospital Medicine   [facility]  Pager [phone]  Spectra [phone]  8/24/2020

## 2020-08-24 NOTE — NURSING
Pt records reviewed.   Pt will be referred to Hepatology.  Acute alcoholic hepatitis  Initial referral received  from the workque.   Referring Provider/diagnosis        Referral letter sent to patient.

## 2020-09-08 ENCOUNTER — OFFICE VISIT (OUTPATIENT)
Dept: HEPATOLOGY | Facility: CLINIC | Age: 60
DRG: 441 | End: 2020-09-08
Payer: MEDICAID

## 2020-09-08 ENCOUNTER — HOSPITAL ENCOUNTER (EMERGENCY)
Facility: HOSPITAL | Age: 60
Discharge: ANOTHER HOSPITAL | End: 2020-09-09
Attending: EMERGENCY MEDICINE
Payer: MEDICAID

## 2020-09-08 VITALS
BODY MASS INDEX: 34.55 KG/M2 | RESPIRATION RATE: 16 BRPM | TEMPERATURE: 99 F | DIASTOLIC BLOOD PRESSURE: 82 MMHG | HEIGHT: 63 IN | OXYGEN SATURATION: 99 % | HEART RATE: 98 BPM | SYSTOLIC BLOOD PRESSURE: 110 MMHG | WEIGHT: 195 LBS

## 2020-09-08 VITALS
OXYGEN SATURATION: 98 % | HEIGHT: 63 IN | RESPIRATION RATE: 19 BRPM | DIASTOLIC BLOOD PRESSURE: 65 MMHG | WEIGHT: 205 LBS | HEART RATE: 99 BPM | SYSTOLIC BLOOD PRESSURE: 104 MMHG | BODY MASS INDEX: 36.32 KG/M2

## 2020-09-08 DIAGNOSIS — K21.9 GASTROESOPHAGEAL REFLUX DISEASE, ESOPHAGITIS PRESENCE NOT SPECIFIED: Primary | ICD-10-CM

## 2020-09-08 DIAGNOSIS — E87.1 HYPONATREMIA: ICD-10-CM

## 2020-09-08 DIAGNOSIS — K76.7 HEPATORENAL SYNDROME: ICD-10-CM

## 2020-09-08 DIAGNOSIS — A41.9 SEPSIS, DUE TO UNSPECIFIED ORGANISM, UNSPECIFIED WHETHER ACUTE ORGAN DYSFUNCTION PRESENT: Primary | ICD-10-CM

## 2020-09-08 DIAGNOSIS — K70.10 ACUTE ALCOHOLIC HEPATITIS: ICD-10-CM

## 2020-09-08 LAB
ALBUMIN SERPL BCP-MCNC: 2.2 G/DL (ref 3.5–5.2)
ALP SERPL-CCNC: 351 U/L (ref 55–135)
ALT SERPL W/O P-5'-P-CCNC: 39 U/L (ref 10–44)
AMMONIA PLAS-SCNC: 36 UMOL/L (ref 10–50)
ANION GAP SERPL CALC-SCNC: 8 MMOL/L (ref 8–16)
APTT BLDCRRT: 30.5 SEC (ref 21–32)
AST SERPL-CCNC: 56 U/L (ref 10–40)
BACTERIA #/AREA URNS HPF: NEGATIVE /HPF
BASOPHILS # BLD AUTO: 0.05 K/UL (ref 0–0.2)
BASOPHILS NFR BLD: 0.2 % (ref 0–1.9)
BILIRUB SERPL-MCNC: 3.7 MG/DL (ref 0.1–1)
BILIRUB UR QL STRIP: ABNORMAL
BUN SERPL-MCNC: 37 MG/DL (ref 6–20)
CALCIUM SERPL-MCNC: 8.6 MG/DL (ref 8.7–10.5)
CHLORIDE SERPL-SCNC: 90 MMOL/L (ref 95–110)
CLARITY UR: ABNORMAL
CO2 SERPL-SCNC: 24 MMOL/L (ref 23–29)
COLOR UR: YELLOW
CREAT SERPL-MCNC: 2.2 MG/DL (ref 0.5–1.4)
DIFFERENTIAL METHOD: ABNORMAL
EOSINOPHIL # BLD AUTO: 0.2 K/UL (ref 0–0.5)
EOSINOPHIL NFR BLD: 0.8 % (ref 0–8)
ERYTHROCYTE [DISTWIDTH] IN BLOOD BY AUTOMATED COUNT: 13.4 % (ref 11.5–14.5)
EST. GFR  (AFRICAN AMERICAN): 36.8 ML/MIN/1.73 M^2
EST. GFR  (NON AFRICAN AMERICAN): 31.8 ML/MIN/1.73 M^2
GLUCOSE SERPL-MCNC: 108 MG/DL (ref 70–110)
GLUCOSE UR QL STRIP: NEGATIVE
HCT VFR BLD AUTO: 41.1 % (ref 40–54)
HGB BLD-MCNC: 13.8 G/DL (ref 14–18)
HGB UR QL STRIP: NEGATIVE
HYALINE CASTS #/AREA URNS LPF: 10 /LPF
IMM GRANULOCYTES # BLD AUTO: 0.2 K/UL (ref 0–0.04)
IMM GRANULOCYTES NFR BLD AUTO: 0.8 % (ref 0–0.5)
INR PPP: 1.3 (ref 0.8–1.2)
KETONES UR QL STRIP: NEGATIVE
LACTATE SERPL-SCNC: 3 MMOL/L (ref 0.5–2.2)
LEUKOCYTE ESTERASE UR QL STRIP: ABNORMAL
LIPASE SERPL-CCNC: 288 U/L (ref 23–300)
LYMPHOCYTES # BLD AUTO: 1.6 K/UL (ref 1–4.8)
LYMPHOCYTES NFR BLD: 6.1 % (ref 18–48)
MCH RBC QN AUTO: 33.7 PG (ref 27–31)
MCHC RBC AUTO-ENTMCNC: 33.6 G/DL (ref 32–36)
MCV RBC AUTO: 101 FL (ref 82–98)
MICROSCOPIC COMMENT: ABNORMAL
MONOCYTES # BLD AUTO: 1.3 K/UL (ref 0.3–1)
MONOCYTES NFR BLD: 4.8 % (ref 4–15)
NEUTROPHILS # BLD AUTO: 23 K/UL (ref 1.8–7.7)
NEUTROPHILS NFR BLD: 87.3 % (ref 38–73)
NITRITE UR QL STRIP: NEGATIVE
NRBC BLD-RTO: 0 /100 WBC
NT-PROBNP: 248 PG/ML (ref 5–900)
PH UR STRIP: 5 [PH] (ref 5–8)
PLATELET # BLD AUTO: 326 K/UL (ref 150–350)
PMV BLD AUTO: 9.4 FL (ref 9.2–12.9)
POTASSIUM SERPL-SCNC: 5.2 MMOL/L (ref 3.5–5.1)
PROT SERPL-MCNC: 6.4 G/DL (ref 6–8.4)
PROT UR QL STRIP: ABNORMAL
PROTHROMBIN TIME: 12.8 SEC (ref 9–12.5)
RBC # BLD AUTO: 4.09 M/UL (ref 4.6–6.2)
RBC #/AREA URNS HPF: 26 /HPF (ref 0–4)
SARS-COV-2 RDRP RESP QL NAA+PROBE: NEGATIVE
SODIUM SERPL-SCNC: 122 MMOL/L (ref 136–145)
SP GR UR STRIP: 1.02 (ref 1–1.03)
SQUAMOUS #/AREA URNS HPF: 5 /HPF
TROPONIN I SERPL DL<=0.01 NG/ML-MCNC: <0.02 NG/ML (ref 0–0.03)
URN SPEC COLLECT METH UR: ABNORMAL
UROBILINOGEN UR STRIP-ACNC: 1 EU/DL
WBC # BLD AUTO: 26.39 K/UL (ref 3.9–12.7)
WBC #/AREA URNS HPF: 16 /HPF (ref 0–5)

## 2020-09-08 PROCEDURE — 99215 PR OFFICE/OUTPT VISIT, EST, LEVL V, 40-54 MIN: ICD-10-PCS | Mod: S$PBB,,, | Performed by: STUDENT IN AN ORGANIZED HEALTH CARE EDUCATION/TRAINING PROGRAM

## 2020-09-08 PROCEDURE — 82140 ASSAY OF AMMONIA: CPT

## 2020-09-08 PROCEDURE — 96366 THER/PROPH/DIAG IV INF ADDON: CPT

## 2020-09-08 PROCEDURE — U0002 COVID-19 LAB TEST NON-CDC: HCPCS

## 2020-09-08 PROCEDURE — 99285 EMERGENCY DEPT VISIT HI MDM: CPT | Mod: 25,27

## 2020-09-08 PROCEDURE — 85730 THROMBOPLASTIN TIME PARTIAL: CPT

## 2020-09-08 PROCEDURE — 83605 ASSAY OF LACTIC ACID: CPT

## 2020-09-08 PROCEDURE — 96365 THER/PROPH/DIAG IV INF INIT: CPT

## 2020-09-08 PROCEDURE — 63600175 PHARM REV CODE 636 W HCPCS: Performed by: EMERGENCY MEDICINE

## 2020-09-08 PROCEDURE — 36415 COLL VENOUS BLD VENIPUNCTURE: CPT

## 2020-09-08 PROCEDURE — 25000003 PHARM REV CODE 250: Performed by: EMERGENCY MEDICINE

## 2020-09-08 PROCEDURE — 99214 OFFICE O/P EST MOD 30 MIN: CPT | Mod: PBBFAC,25 | Performed by: STUDENT IN AN ORGANIZED HEALTH CARE EDUCATION/TRAINING PROGRAM

## 2020-09-08 PROCEDURE — 84484 ASSAY OF TROPONIN QUANT: CPT

## 2020-09-08 PROCEDURE — 83880 ASSAY OF NATRIURETIC PEPTIDE: CPT

## 2020-09-08 PROCEDURE — 99999 PR PBB SHADOW E&M-EST. PATIENT-LVL IV: ICD-10-PCS | Mod: PBBFAC,,, | Performed by: STUDENT IN AN ORGANIZED HEALTH CARE EDUCATION/TRAINING PROGRAM

## 2020-09-08 PROCEDURE — 99215 OFFICE O/P EST HI 40 MIN: CPT | Mod: S$PBB,,, | Performed by: STUDENT IN AN ORGANIZED HEALTH CARE EDUCATION/TRAINING PROGRAM

## 2020-09-08 PROCEDURE — 80053 COMPREHEN METABOLIC PANEL: CPT | Mod: 91

## 2020-09-08 PROCEDURE — 83690 ASSAY OF LIPASE: CPT

## 2020-09-08 PROCEDURE — 87040 BLOOD CULTURE FOR BACTERIA: CPT

## 2020-09-08 PROCEDURE — 87088 URINE BACTERIA CULTURE: CPT

## 2020-09-08 PROCEDURE — 85025 COMPLETE CBC W/AUTO DIFF WBC: CPT | Mod: 91

## 2020-09-08 PROCEDURE — 99999 PR PBB SHADOW E&M-EST. PATIENT-LVL IV: CPT | Mod: PBBFAC,,, | Performed by: STUDENT IN AN ORGANIZED HEALTH CARE EDUCATION/TRAINING PROGRAM

## 2020-09-08 PROCEDURE — 81000 URINALYSIS NONAUTO W/SCOPE: CPT

## 2020-09-08 PROCEDURE — 85610 PROTHROMBIN TIME: CPT | Mod: 91

## 2020-09-08 PROCEDURE — 87086 URINE CULTURE/COLONY COUNT: CPT

## 2020-09-08 RX ORDER — OMEPRAZOLE 40 MG/1
40 CAPSULE, DELAYED RELEASE ORAL EVERY MORNING
Qty: 30 CAPSULE | Refills: 11 | Status: SHIPPED | OUTPATIENT
Start: 2020-09-08 | End: 2021-02-01 | Stop reason: SDUPTHER

## 2020-09-08 RX ORDER — FLUOXETINE HYDROCHLORIDE 20 MG/1
20 CAPSULE ORAL DAILY
Status: ON HOLD | COMMUNITY
Start: 2020-06-01 | End: 2020-09-11 | Stop reason: CLARIF

## 2020-09-08 RX ORDER — ONDANSETRON 4 MG/1
4 TABLET, FILM COATED ORAL EVERY 8 HOURS PRN
Status: ON HOLD | COMMUNITY
Start: 2020-07-21 | End: 2020-09-30

## 2020-09-08 RX ADMIN — SODIUM CHLORIDE 1000 ML: 0.9 INJECTION, SOLUTION INTRAVENOUS at 07:09

## 2020-09-08 RX ADMIN — CEFTRIAXONE 2 G: 2 INJECTION, SOLUTION INTRAVENOUS at 07:09

## 2020-09-08 NOTE — PROGRESS NOTES
Hepatology Consult Note    Referring provider: Dr. Aicha Rojas  PCP: Mikala Etienne MD    Chief complaint: hospital follow up, alcoholic hepatitis    HPI:  Kenneth Pardo is a 60 y.o. male who was referred to Hepatology Clinic for consultation after recent hospitalization for alcoholic hepatitis.    In July 2020 he presented to OSH with weakness.  He was found to be hypotensive requiring vasopressors and also had elevated LFTs.  He was transferred to [facility] after which he developed melena and worsening anemia.  EGD showed LA grade B esophagitis and portal hypertensive gastropathy.  He had ascites requiring paracentesis.  Studies were negative for SBP.  He was started on Lasix and spironolactone prior to discharge.  He also developed encephalopathy for which he was prescribed lactulose.    Prior to hospitalization he was drinking 1/5 gallon of vodka daily.  He had been drinking daily for several years but said that his alcohol intake increased significantly when he was laid off in April.  He attempted rehab for alcohol several years ago but started drinking again afterwards.  He has never had legal issues related to alcohol.    Since discharge he has noticed increasing lower extremity edema despite compliance with diuretics and sodium restricted diet.  He also reports compliance with lactulose and has had no encephalopathy recently. He denies recent hematemesis, coffee ground emesis, melena, hematochezia.      Past Medical History:   Diagnosis Date    Hepatitis     Hypertension        Past Surgical History:   Procedure Laterality Date    ESOPHAGOGASTRODUODENOSCOPY N/A 7/31/2020    Procedure: EGD (ESOPHAGOGASTRODUODENOSCOPY);  Surgeon: Jhonathan Lieberman MD;  Location: [address];  Service: Endoscopy;  Laterality: N/A;       No family history on file.    Social History     Socioeconomic History    Marital status:      Spouse name: Not on file    Number of children: Not on file    Years of  education: Not on file    Highest education level: Not on file   Occupational History    Not on file   Social Needs    Financial resource strain: Not on file    Food insecurity     Worry: Not on file     Inability: Not on file    Transportation needs     Medical: Not on file     Non-medical: Not on file   Tobacco Use    Smoking status: Never Smoker    Smokeless tobacco: Never Used   Substance and Sexual Activity    Alcohol use: Not on file    Drug use: Not on file    Sexual activity: Not on file   Lifestyle    Physical activity     Days per week: Not on file     Minutes per session: Not on file    Stress: Not on file   Relationships    Social connections     Talks on phone: Not on file     Gets together: Not on file     Attends Mandaen service: Not on file     Active member of club or organization: Not on file     Attends meetings of clubs or organizations: Not on file     Relationship status: Not on file   Other Topics Concern    Not on file   Social History Narrative    Not on file       Current Outpatient Medications   Medication Sig Dispense Refill    ciprofloxacin HCl (CIPRO) 500 MG tablet Take 1 tablet (500 mg) by mouth twice daily on 8/10 and then take 1 tablet daily thereafter 60 tablet 2    escitalopram oxalate (LEXAPRO) 5 MG Tab Take 1 tablet (5 mg total) by mouth once daily. 30 tablet 2    furosemide (LASIX) 20 MG tablet Take 1 tablet (20 mg total) by mouth once daily. 30 tablet 2    lactulose (CHRONULAC) 10 gram/15 mL solution Take 15 mLs (10 g total) by mouth 2 (two) times a day. Take enough to have 3 bowel movements per day 900 mL 2    midodrine (PROAMATINE) 5 MG Tab Take 3 tablets (15 mg total) by mouth every 8 (eight) hours. 270 tablet 2    rOPINIRole (REQUIP) 0.25 MG tablet Take 1 tablet (0.25 mg total) by mouth every evening. 30 tablet 2    spironolactone (ALDACTONE) 50 MG tablet Take 1 tablet (50 mg total) by mouth once daily. 30 tablet 2    traZODone (DESYREL) 100 MG  "tablet Take 1 tablet (100 mg total) by mouth every evening. 30 tablet 2    omeprazole (PRILOSEC) 40 MG capsule Take 1 capsule (40 mg total) by mouth once daily. (Patient not taking: Reported on 8/11/2020) 30 capsule 1     No current facility-administered medications for this visit.        Review of patient's allergies indicates:  No Known Allergies    Review of Systems   Constitutional: Negative for chills, fever and weight loss.   HENT: Negative for congestion and sore throat.    Eyes: Negative for blurred vision and double vision.   Respiratory: Negative for cough, sputum production and shortness of breath.    Cardiovascular: Positive for leg swelling. Negative for chest pain and orthopnea.   Gastrointestinal: Negative for abdominal pain, blood in stool, constipation, diarrhea, melena, nausea and vomiting.   Genitourinary: Negative for dysuria and hematuria.   Musculoskeletal: Positive for back pain. Negative for falls, joint pain and myalgias.   Skin: Negative for itching and rash.   Neurological: Negative for tingling, focal weakness, seizures and headaches.   Endo/Heme/Allergies: Does not bruise/bleed easily.       Vitals:    09/08/20 1421   BP: 104/65   Pulse: 99   Resp: 19   SpO2: 98%   Weight: 93 kg (205 lb)   Height: 5' 3" (1.6 m)       Physical Exam  Vitals signs reviewed.   Constitutional:       General: He is not in acute distress.  Eyes:      General: Scleral icterus present.      Extraocular Movements: Extraocular movements intact.      Conjunctiva/sclera: Conjunctivae normal.   Cardiovascular:      Rate and Rhythm: Normal rate and regular rhythm.   Pulmonary:      Effort: Pulmonary effort is normal.      Breath sounds: Normal breath sounds. No wheezing, rhonchi or rales.   Abdominal:      General: Bowel sounds are normal. There is distension.      Palpations: Abdomen is soft.      Tenderness: There is no abdominal tenderness.   Musculoskeletal:         General: No swelling or deformity.      Right " lower leg: Edema present.      Left lower leg: Edema present.   Skin:     General: Skin is warm and dry.      Coloration: Skin is not jaundiced.   Neurological:      General: No focal deficit present.      Mental Status: He is alert and oriented to person, place, and time.   Psychiatric:         Mood and Affect: Mood normal.         Behavior: Behavior normal.         LABS: I personally reviewed pertinent laboratory findings.    Lab Results   Component Value Date    WBC 9.70 08/09/2020    HGB 8.1 (L) 08/09/2020    HCT 24.5 (L) 08/09/2020     (H) 08/09/2020     08/09/2020       Lab Results   Component Value Date     08/09/2020    K 3.1 (L) 08/09/2020     08/09/2020    CO2 27 08/09/2020    BUN 6 08/09/2020    CREATININE 0.6 08/09/2020    CALCIUM 8.8 08/09/2020    ANIONGAP 9 08/09/2020    ESTGFRAFRICA >60.0 08/09/2020    EGFRNONAA >60.0 08/09/2020       Lab Results   Component Value Date    ALT 38 08/09/2020    AST 71 (H) 08/09/2020    ALKPHOS 224 (H) 08/09/2020    BILITOT 21.2 (H) 08/09/2020       Lab Results   Component Value Date    HEPAIGM Negative 08/01/2020    HEPBIGM Negative 08/01/2020    HEPCAB Negative 08/01/2020       No results found for: ROXANE, MITOAB, SMOOTHMUSCAB, IGG, CERULOPLSM     MELD-Na score: 27 at 8/9/2020  3:45 AM  MELD score: 27 at 8/9/2020  3:45 AM  Calculated from:  Serum Creatinine: 0.6 mg/dL (Rounded to 1 mg/dL) at 8/9/2020  3:45 AM  Serum Sodium: 136 mmol/L at 8/9/2020  3:45 AM  Total Bilirubin: 21.2 mg/dL at 8/9/2020  3:45 AM  INR(ratio): 2.3 at 8/9/2020  3:45 AM  Age: 59 years 11 months     IMAGING: I personally reviewed imaging studies.  No results found for this or any previous visit.    Assessment:  60 y.o. male who was referred to Hepatology Clinic for consultation after recent hospitalization for alcoholic hepatitis.  No clear cirrhosis on imaging though he does appear to have portal hypertension given ascites and portal hypertensive gastropathy.    1. Acute  alcoholic hepatitis        Recommendations:  - Alcoholic hepatitis / Alcohol use disorder: Patient congratulated on alcohol cessation and counseled on continued abstinence. Last drink 07/2020.  - Ascites/Edema: LVP as needed. 2 gm Na diet. Continue Lasix 20 mg daily and Aldactone 50 mg daily. Will plan to increase if renal function stable.  - Encephalopathy: Continue lactulose. Titrate to maintain 3-4 bowel movements per day..  - Variceal screening: EGD in 07/2020 showed no varices. Repeat EGD in 1 year if he's found to have cirrhosis.  - HCC screening: US in 07/2020 without HCC. Repeat in 6 months.  - Immunizations: Recommend HAV and HBV vaccinations if not immune..  - Transplant candidacy: Repeat MELD labs today. Patient says that he is now insured. Will refer for transplant evaluation if MELD >15    Return to clinic in 1 month.    A total of 40 minutes were spent face-to-face with the patient during this encounter, and over half of that time was spent on counseling and coordination of care.  We discussed in depth the nature of his liver disease and the management plan in details. I also educated him about lifestyle modifications which may stop or slow progression of liver disease. I have provided him with an opportunity to ask questions and have all questions answered to his satisfaction.    Mainor Madison MD  Staff Physician  Hepatology and Liver Transplant  [facility]  [facility] [place]

## 2020-09-08 NOTE — LETTER
September 8, 2020      Aicha Rojas MD  [address]  [facility] [address]           [facility] Trans[facility] 1st Fl  [address]  [facility] [address]  Phone: [phone]  Fax: [phone]          Patient: Kenneth Pardo   MR Number: [id number]   YOB: 1960   Date of Visit: 9/8/2020       Dear Dr. Aicha Rojas:    Thank you for referring Kenneth Pardo to me for evaluation. Attached you will find relevant portions of my assessment and plan of care.    If you have questions, please do not hesitate to call me. I look forward to following Kenneth Pardo along with you.    Sincerely,    Mainor Madison MD    Enclosure  CC:  No Recipients    If you would like to receive this communication electronically, please contact [email or web address] or [phone] to request more information on [employer] access.    For providers and/or their staff who would like to refer a patient to [facility], please contact us through our one-stop-shop provider referral line, [facility], at [phone].    If you feel you have received this communication in error or would no longer like to receive these types of communications, please e-mail [email or web address]

## 2020-09-09 ENCOUNTER — HOSPITAL ENCOUNTER (INPATIENT)
Facility: HOSPITAL | Age: 60
LOS: 9 days | Discharge: HOME | DRG: 441 | End: 2020-09-18
Attending: HOSPITALIST | Admitting: HOSPITALIST
Payer: MEDICAID

## 2020-09-09 DIAGNOSIS — Z12.11 COLON CANCER SCREENING: ICD-10-CM

## 2020-09-09 DIAGNOSIS — R07.9 CHEST PAIN: ICD-10-CM

## 2020-09-09 DIAGNOSIS — K76.82 HEPATIC ENCEPHALOPATHY: ICD-10-CM

## 2020-09-09 DIAGNOSIS — Z01.818 PRE-TRANSPLANT EVALUATION FOR LIVER TRANSPLANT: ICD-10-CM

## 2020-09-09 DIAGNOSIS — N17.9 AKI (ACUTE KIDNEY INJURY): ICD-10-CM

## 2020-09-09 DIAGNOSIS — I95.9 HYPOTENSION, UNSPECIFIED HYPOTENSION TYPE: ICD-10-CM

## 2020-09-09 DIAGNOSIS — K76.7 HEPATORENAL SYNDROME: ICD-10-CM

## 2020-09-09 DIAGNOSIS — K74.69 DECOMPENSATED LIVER DISEASE: Primary | ICD-10-CM

## 2020-09-09 DIAGNOSIS — K70.9 ALCOHOLIC LIVER DISEASE: ICD-10-CM

## 2020-09-09 DIAGNOSIS — R60.1 ANASARCA: ICD-10-CM

## 2020-09-09 DIAGNOSIS — K70.31 ASCITES DUE TO ALCOHOLIC CIRRHOSIS: ICD-10-CM

## 2020-09-09 DIAGNOSIS — E87.1 HYPONATREMIA: ICD-10-CM

## 2020-09-09 PROBLEM — G25.81 RESTLESS LEG: Status: ACTIVE | Noted: 2020-09-09

## 2020-09-09 PROBLEM — F41.8 DEPRESSION WITH ANXIETY: Status: ACTIVE | Noted: 2020-09-09

## 2020-09-09 PROBLEM — R29.6 FREQUENT FALLS: Status: ACTIVE | Noted: 2020-09-09

## 2020-09-09 PROBLEM — G47.00 INSOMNIA: Status: ACTIVE | Noted: 2020-09-09

## 2020-09-09 PROBLEM — E87.5 HYPERKALEMIA: Status: ACTIVE | Noted: 2020-09-09

## 2020-09-09 PROBLEM — A41.9 SEPSIS, UNSPECIFIED ORGANISM: Status: ACTIVE | Noted: 2020-09-09

## 2020-09-09 PROBLEM — R53.81 DEBILITY: Status: ACTIVE | Noted: 2020-09-09

## 2020-09-09 LAB
ABO + RH BLD: NORMAL
ALBUMIN SERPL BCP-MCNC: 2.2 G/DL (ref 3.5–5.2)
ALP SERPL-CCNC: 359 U/L (ref 55–135)
ALT SERPL W/O P-5'-P-CCNC: 32 U/L (ref 10–44)
ANION GAP SERPL CALC-SCNC: 12 MMOL/L (ref 8–16)
ANION GAP SERPL CALC-SCNC: 12 MMOL/L (ref 8–16)
APPEARANCE FLD: NORMAL
AST SERPL-CCNC: 55 U/L (ref 10–40)
BASOPHILS # BLD AUTO: 0.06 K/UL (ref 0–0.2)
BASOPHILS NFR BLD: 0.2 % (ref 0–1.9)
BILIRUB SERPL-MCNC: 4.1 MG/DL (ref 0.1–1)
BLD GP AB SCN CELLS X3 SERPL QL: NORMAL
BODY FLD TYPE: NORMAL
BUN SERPL-MCNC: 40 MG/DL (ref 6–20)
BUN SERPL-MCNC: 42 MG/DL (ref 6–20)
CALCIUM SERPL-MCNC: 8.1 MG/DL (ref 8.7–10.5)
CALCIUM SERPL-MCNC: 8.3 MG/DL (ref 8.7–10.5)
CHLORIDE SERPL-SCNC: 89 MMOL/L (ref 95–110)
CHLORIDE SERPL-SCNC: 92 MMOL/L (ref 95–110)
CO2 SERPL-SCNC: 16 MMOL/L (ref 23–29)
CO2 SERPL-SCNC: 21 MMOL/L (ref 23–29)
COLOR FLD: YELLOW
CREAT SERPL-MCNC: 2 MG/DL (ref 0.5–1.4)
CREAT SERPL-MCNC: 2 MG/DL (ref 0.5–1.4)
DIFFERENTIAL METHOD: ABNORMAL
EOSINOPHIL # BLD AUTO: 0.5 K/UL (ref 0–0.5)
EOSINOPHIL NFR BLD: 1.6 % (ref 0–8)
ERYTHROCYTE [DISTWIDTH] IN BLOOD BY AUTOMATED COUNT: 13.7 % (ref 11.5–14.5)
EST. GFR  (AFRICAN AMERICAN): 41.3 ML/MIN/1.73 M^2
EST. GFR  (AFRICAN AMERICAN): 41.3 ML/MIN/1.73 M^2
EST. GFR  (NON AFRICAN AMERICAN): 35.7 ML/MIN/1.73 M^2
EST. GFR  (NON AFRICAN AMERICAN): 35.7 ML/MIN/1.73 M^2
GLUCOSE SERPL-MCNC: 127 MG/DL (ref 70–110)
GLUCOSE SERPL-MCNC: 85 MG/DL (ref 70–110)
HCT VFR BLD AUTO: 39.8 % (ref 40–54)
HGB BLD-MCNC: 13.4 G/DL (ref 14–18)
IMM GRANULOCYTES # BLD AUTO: 0.25 K/UL (ref 0–0.04)
IMM GRANULOCYTES NFR BLD AUTO: 0.9 % (ref 0–0.5)
INR PPP: 1.2 (ref 0.8–1.2)
LACTATE SERPL-SCNC: 2.4 MMOL/L (ref 0.5–2.2)
LYMPHOCYTES # BLD AUTO: 1.8 K/UL (ref 1–4.8)
LYMPHOCYTES NFR BLD: 6.2 % (ref 18–48)
LYMPHOCYTES NFR FLD MANUAL: 15 %
MAGNESIUM SERPL-MCNC: 2 MG/DL (ref 1.6–2.6)
MCH RBC QN AUTO: 35 PG (ref 27–31)
MCHC RBC AUTO-ENTMCNC: 33.7 G/DL (ref 32–36)
MCV RBC AUTO: 104 FL (ref 82–98)
MESOTHL CELL NFR FLD MANUAL: 7 %
MONOCYTES # BLD AUTO: 1.4 K/UL (ref 0.3–1)
MONOCYTES NFR BLD: 5 % (ref 4–15)
MONOS+MACROS NFR FLD MANUAL: 9 %
NEUTROPHILS # BLD AUTO: 25 K/UL (ref 1.8–7.7)
NEUTROPHILS NFR BLD: 86.1 % (ref 38–73)
NEUTROPHILS NFR FLD MANUAL: 69 %
NRBC BLD-RTO: 0 /100 WBC
OSMOLALITY SERPL: 277 MOSM/KG (ref 280–300)
OSMOLALITY UR: 455 MOSM/KG (ref 50–1200)
PHOSPHATE SERPL-MCNC: 4.8 MG/DL (ref 2.7–4.5)
PLATELET # BLD AUTO: 283 K/UL (ref 150–350)
PMV BLD AUTO: 9.4 FL (ref 9.2–12.9)
POTASSIUM SERPL-SCNC: 4.7 MMOL/L (ref 3.5–5.1)
POTASSIUM SERPL-SCNC: 5.9 MMOL/L (ref 3.5–5.1)
PROCALCITONIN SERPL IA-MCNC: 1.46 NG/ML
PROT SERPL-MCNC: 6 G/DL (ref 6–8.4)
PROTHROMBIN TIME: 13.3 SEC (ref 9–12.5)
RBC # BLD AUTO: 3.83 M/UL (ref 4.6–6.2)
SODIUM SERPL-SCNC: 120 MMOL/L (ref 136–145)
SODIUM SERPL-SCNC: 122 MMOL/L (ref 136–145)
SODIUM UR-SCNC: <20 MMOL/L (ref 20–250)
T4 FREE SERPL-MCNC: 0.95 NG/DL (ref 0.71–1.51)
TSH SERPL DL<=0.005 MIU/L-ACNC: 0.74 UIU/ML (ref 0.4–4)
WBC # BLD AUTO: 28.98 K/UL (ref 3.9–12.7)
WBC # FLD: 71 /CU MM

## 2020-09-09 PROCEDURE — 80053 COMPREHEN METABOLIC PANEL: CPT

## 2020-09-09 PROCEDURE — 99223 PR INITIAL HOSPITAL CARE,LEVL III: ICD-10-PCS | Mod: ,,, | Performed by: HOSPITALIST

## 2020-09-09 PROCEDURE — 84145 PROCALCITONIN (PCT): CPT

## 2020-09-09 PROCEDURE — 85025 COMPLETE CBC W/AUTO DIFF WBC: CPT

## 2020-09-09 PROCEDURE — 25000003 PHARM REV CODE 250: Performed by: HOSPITALIST

## 2020-09-09 PROCEDURE — 36415 COLL VENOUS BLD VENIPUNCTURE: CPT

## 2020-09-09 PROCEDURE — 80048 BASIC METABOLIC PNL TOTAL CA: CPT

## 2020-09-09 PROCEDURE — 86850 RBC ANTIBODY SCREEN: CPT

## 2020-09-09 PROCEDURE — 80321 ALCOHOLS BIOMARKERS 1OR 2: CPT

## 2020-09-09 PROCEDURE — 97165 OT EVAL LOW COMPLEX 30 MIN: CPT

## 2020-09-09 PROCEDURE — 83735 ASSAY OF MAGNESIUM: CPT

## 2020-09-09 PROCEDURE — 97161 PT EVAL LOW COMPLEX 20 MIN: CPT

## 2020-09-09 PROCEDURE — 99232 PR SUBSEQUENT HOSPITAL CARE,LEVL II: ICD-10-PCS | Mod: ,,, | Performed by: INTERNAL MEDICINE

## 2020-09-09 PROCEDURE — 83935 ASSAY OF URINE OSMOLALITY: CPT

## 2020-09-09 PROCEDURE — 97116 GAIT TRAINING THERAPY: CPT

## 2020-09-09 PROCEDURE — P9047 ALBUMIN (HUMAN), 25%, 50ML: HCPCS | Mod: JG | Performed by: HOSPITALIST

## 2020-09-09 PROCEDURE — 85610 PROTHROMBIN TIME: CPT

## 2020-09-09 PROCEDURE — 99232 SBSQ HOSP IP/OBS MODERATE 35: CPT | Mod: ,,, | Performed by: INTERNAL MEDICINE

## 2020-09-09 PROCEDURE — 63600175 PHARM REV CODE 636 W HCPCS: Mod: JG | Performed by: HOSPITALIST

## 2020-09-09 PROCEDURE — 84100 ASSAY OF PHOSPHORUS: CPT

## 2020-09-09 PROCEDURE — 84439 ASSAY OF FREE THYROXINE: CPT

## 2020-09-09 PROCEDURE — 20600001 HC STEP DOWN PRIVATE ROOM

## 2020-09-09 PROCEDURE — 83930 ASSAY OF BLOOD OSMOLALITY: CPT

## 2020-09-09 PROCEDURE — 84443 ASSAY THYROID STIM HORMONE: CPT

## 2020-09-09 PROCEDURE — 89051 BODY FLUID CELL COUNT: CPT

## 2020-09-09 PROCEDURE — 87070 CULTURE OTHR SPECIMN AEROBIC: CPT

## 2020-09-09 PROCEDURE — 84300 ASSAY OF URINE SODIUM: CPT

## 2020-09-09 PROCEDURE — 83605 ASSAY OF LACTIC ACID: CPT

## 2020-09-09 PROCEDURE — 99223 1ST HOSP IP/OBS HIGH 75: CPT | Mod: ,,, | Performed by: HOSPITALIST

## 2020-09-09 RX ORDER — IPRATROPIUM BROMIDE AND ALBUTEROL SULFATE 2.5; .5 MG/3ML; MG/3ML
3 SOLUTION RESPIRATORY (INHALATION) EVERY 4 HOURS PRN
Status: DISCONTINUED | OUTPATIENT
Start: 2020-09-09 | End: 2020-09-18 | Stop reason: HOSPADM

## 2020-09-09 RX ORDER — CEFEPIME HYDROCHLORIDE 2 G/1
2 INJECTION, POWDER, FOR SOLUTION INTRAVENOUS
Status: DISCONTINUED | OUTPATIENT
Start: 2020-09-09 | End: 2020-09-10

## 2020-09-09 RX ORDER — TRAZODONE HYDROCHLORIDE 50 MG/1
50 TABLET ORAL NIGHTLY PRN
Status: DISCONTINUED | OUTPATIENT
Start: 2020-09-09 | End: 2020-09-18 | Stop reason: HOSPADM

## 2020-09-09 RX ORDER — SODIUM CHLORIDE 0.9 % (FLUSH) 0.9 %
10 SYRINGE (ML) INJECTION
Status: DISCONTINUED | OUTPATIENT
Start: 2020-09-09 | End: 2020-09-18 | Stop reason: HOSPADM

## 2020-09-09 RX ORDER — LIDOCAINE 50 MG/G
1 PATCH TOPICAL
Status: DISCONTINUED | OUTPATIENT
Start: 2020-09-09 | End: 2020-09-16

## 2020-09-09 RX ORDER — LACTULOSE 10 G/15ML
10 SOLUTION ORAL 2 TIMES DAILY
Status: DISCONTINUED | OUTPATIENT
Start: 2020-09-09 | End: 2020-09-10

## 2020-09-09 RX ORDER — ACETAMINOPHEN 500 MG
500 TABLET ORAL EVERY 6 HOURS PRN
Status: DISCONTINUED | OUTPATIENT
Start: 2020-09-09 | End: 2020-09-18 | Stop reason: HOSPADM

## 2020-09-09 RX ORDER — ROPINIROLE 0.25 MG/1
0.25 TABLET, FILM COATED ORAL NIGHTLY
Status: DISCONTINUED | OUTPATIENT
Start: 2020-09-09 | End: 2020-09-18 | Stop reason: HOSPADM

## 2020-09-09 RX ORDER — PANTOPRAZOLE SODIUM 40 MG/1
40 TABLET, DELAYED RELEASE ORAL DAILY
Status: DISCONTINUED | OUTPATIENT
Start: 2020-09-09 | End: 2020-09-18 | Stop reason: HOSPADM

## 2020-09-09 RX ORDER — ONDANSETRON 2 MG/ML
4 INJECTION INTRAMUSCULAR; INTRAVENOUS EVERY 8 HOURS PRN
Status: DISCONTINUED | OUTPATIENT
Start: 2020-09-09 | End: 2020-09-18 | Stop reason: HOSPADM

## 2020-09-09 RX ORDER — IBUPROFEN 200 MG
24 TABLET ORAL
Status: DISCONTINUED | OUTPATIENT
Start: 2020-09-09 | End: 2020-09-18 | Stop reason: HOSPADM

## 2020-09-09 RX ORDER — MIDODRINE HYDROCHLORIDE 5 MG/1
15 TABLET ORAL EVERY 8 HOURS
Status: DISCONTINUED | OUTPATIENT
Start: 2020-09-09 | End: 2020-09-18 | Stop reason: HOSPADM

## 2020-09-09 RX ORDER — FLUOXETINE HYDROCHLORIDE 20 MG/1
20 CAPSULE ORAL DAILY
Status: DISCONTINUED | OUTPATIENT
Start: 2020-09-09 | End: 2020-09-09

## 2020-09-09 RX ORDER — IBUPROFEN 200 MG
16 TABLET ORAL
Status: DISCONTINUED | OUTPATIENT
Start: 2020-09-09 | End: 2020-09-18 | Stop reason: HOSPADM

## 2020-09-09 RX ORDER — CEFTRIAXONE 1 G/1
1 INJECTION, POWDER, FOR SOLUTION INTRAMUSCULAR; INTRAVENOUS
Status: DISCONTINUED | OUTPATIENT
Start: 2020-09-09 | End: 2020-09-09

## 2020-09-09 RX ORDER — ESCITALOPRAM OXALATE 5 MG/1
5 TABLET ORAL DAILY
Status: DISCONTINUED | OUTPATIENT
Start: 2020-09-09 | End: 2020-09-10

## 2020-09-09 RX ORDER — OCTREOTIDE ACETATE 100 UG/ML
100 INJECTION, SOLUTION INTRAVENOUS; SUBCUTANEOUS EVERY 8 HOURS
Status: DISCONTINUED | OUTPATIENT
Start: 2020-09-09 | End: 2020-09-11

## 2020-09-09 RX ORDER — GLUCAGON 1 MG
1 KIT INJECTION
Status: DISCONTINUED | OUTPATIENT
Start: 2020-09-09 | End: 2020-09-18 | Stop reason: HOSPADM

## 2020-09-09 RX ORDER — ALBUMIN HUMAN 250 G/1000ML
125 SOLUTION INTRAVENOUS ONCE
Status: DISCONTINUED | OUTPATIENT
Start: 2020-09-09 | End: 2020-09-10

## 2020-09-09 RX ADMIN — PANTOPRAZOLE SODIUM 40 MG: 40 TABLET, DELAYED RELEASE ORAL at 09:09

## 2020-09-09 RX ADMIN — LIDOCAINE 1 PATCH: 50 PATCH TOPICAL at 01:09

## 2020-09-09 RX ADMIN — SODIUM ZIRCONIUM CYCLOSILICATE 10 G: 10 POWDER, FOR SUSPENSION ORAL at 09:09

## 2020-09-09 RX ADMIN — ESCITALOPRAM 5 MG: 5 TABLET, FILM COATED ORAL at 09:09

## 2020-09-09 RX ADMIN — MIDODRINE HYDROCHLORIDE 15 MG: 5 TABLET ORAL at 05:09

## 2020-09-09 RX ADMIN — LACTULOSE 10 G: 20 SOLUTION ORAL at 09:09

## 2020-09-09 RX ADMIN — ROPINIROLE HYDROCHLORIDE 0.25 MG: 0.25 TABLET, FILM COATED ORAL at 09:09

## 2020-09-09 RX ADMIN — ACETAMINOPHEN 500 MG: 500 TABLET ORAL at 11:09

## 2020-09-09 RX ADMIN — SODIUM ZIRCONIUM CYCLOSILICATE 10 G: 10 POWDER, FOR SUSPENSION ORAL at 03:09

## 2020-09-09 RX ADMIN — OCTREOTIDE ACETATE 100 MCG: 100 INJECTION, SOLUTION INTRAVENOUS; SUBCUTANEOUS at 09:09

## 2020-09-09 RX ADMIN — SODIUM CHLORIDE 500 ML: 0.9 INJECTION, SOLUTION INTRAVENOUS at 03:09

## 2020-09-09 RX ADMIN — MIDODRINE HYDROCHLORIDE 15 MG: 5 TABLET ORAL at 01:09

## 2020-09-09 RX ADMIN — CEFEPIME 2 G: 2 INJECTION, POWDER, FOR SOLUTION INTRAVENOUS at 04:09

## 2020-09-09 RX ADMIN — OCTREOTIDE ACETATE 100 MCG: 100 INJECTION, SOLUTION INTRAVENOUS; SUBCUTANEOUS at 01:09

## 2020-09-09 RX ADMIN — ROPINIROLE HYDROCHLORIDE 0.25 MG: 0.25 TABLET, FILM COATED ORAL at 03:09

## 2020-09-09 RX ADMIN — MIDODRINE HYDROCHLORIDE 15 MG: 5 TABLET ORAL at 09:09

## 2020-09-09 NOTE — CONSULTS
[facility]  Hepatology  Consult Note    Patient Name: Kenneth Pardo  MRN: [id number]  Admission Date: 9/9/2020  Hospital Length of Stay: 0 days  Attending Provider: Aicha Rojas MD   Primary Care Physician: Mikala Etienne MD  Principal Problem:Hyponatremia    Inpatient consult to Hepatology  Consult performed by: Adis Chou MD  Consult ordered by: Richmond Arteaga DO        Subjective:     Transplant status: No    HPI:  Mr. Pardo is a 59yo M w/PMH of HTN, depression, anxiety, alcohol use with recent admissions for alcoholic hepatitis who presents from clinic with abnormal labs including leukocytosis, hyponatremia and MERLY. Hepatology consulted for decompensated liver disease.    Patient admitted 7/30 - 8/9 with alcoholic hepatitis. He was transferred to [facility] for management and had drop in Hgb with melena. EGD 7/31 with grade B esophagitis and portal hypertensive gastropathy. Paracentesis negative for SBP and discharged on Lasix / Spironolactone, as well as lactulose for HE. He was seen in Hepatology clinic yesterday (Dr. Madison) and labs revealed WBC 28K, Na 120 and Cr 1.9 so he was admitted for further workup.  He denies further overt bleeding since discharge. No confusion and has been compliant with lactulose. Also compliant with diuretics but notes mild LE swelling.  Patient quit drinking >1 month ago when he first found out about liver disease. Previously drinking 2-3 glasses of vodka for several years. Went to alcohol rehab years ago, but relapsed. No prior DUIs or job losses due to alcohol. Recently unemployed due to COVID after which he was drinking more. Occasional marijuana use and remote cocaine use.   Today, vitals normal. A&Ox2. Na 120 after receiving 500cc NS bolus. Cr 2. AST 55, ALT 32, Tbili 4, INR 1.2, Plts 283.    Review of Systems   Constitutional: Positive for fatigue. Negative for chills and fever.   HENT: Negative for nosebleeds and trouble swallowing.     Respiratory: Negative for cough and shortness of breath.    Cardiovascular: Negative for chest pain and leg swelling.   Gastrointestinal: Negative for abdominal distention, abdominal pain, blood in stool, constipation, diarrhea, nausea and vomiting.   Genitourinary: Negative for dysuria and hematuria.   Musculoskeletal: Negative for arthralgias.   Skin: Negative for pallor.   Neurological: Negative for light-headedness and headaches.   Hematological: Does not bruise/bleed easily.   Psychiatric/Behavioral: Negative for behavioral problems and confusion.       Past Medical History:   Diagnosis Date    Hepatitis     Hypertension     Liver failure        Past Surgical History:   Procedure Laterality Date    ESOPHAGOGASTRODUODENOSCOPY N/A 2020    Procedure: EGD (ESOPHAGOGASTRODUODENOSCOPY);  Surgeon: Jhonathan Lieberman MD;  Location: [address]);  Service: Endoscopy;  Laterality: N/A;       Family history of liver disease: No    Review of patient's allergies indicates:  No Known Allergies    Tobacco Use    Smoking status: Never Smoker    Smokeless tobacco: Never Used   Substance and Sexual Activity    Alcohol use: Not Currently     Frequency: Never    Drug use: Not on file    Sexual activity: Not on file       Medications Prior to Admission   Medication Sig Dispense Refill Last Dose    ciprofloxacin HCl (CIPRO) 500 MG tablet Take 1 tablet (500 mg) by mouth twice daily on 8/10 and then take 1 tablet daily thereafter 60 tablet 2     escitalopram oxalate (LEXAPRO) 5 MG Tab Take 1 tablet (5 mg total) by mouth once daily. 30 tablet 2     FLUoxetine 20 MG capsule Take 20 mg by mouth once daily.       furosemide (LASIX) 20 MG tablet Take 1 tablet (20 mg total) by mouth once daily. 30 tablet 2     [] lactulose (CHRONULAC) 10 gram/15 mL solution Take 15 mLs (10 g total) by mouth 2 (two) times a day. Take enough to have 3 bowel movements per day 900 mL 2     midodrine (PROAMATINE) 5 MG Tab Take 3  tablets (15 mg total) by mouth every 8 (eight) hours. 270 tablet 2     omeprazole (PRILOSEC) 40 MG capsule Take 1 capsule (40 mg total) by mouth once daily. (Patient not taking: Reported on 8/11/2020) 30 capsule 1     omeprazole (PRILOSEC) 40 MG capsule Take 1 capsule (40 mg total) by mouth every morning. 30 capsule 11     ondansetron (ZOFRAN) 4 MG tablet Take 4 mg by mouth every 8 (eight) hours as needed.       rOPINIRole (REQUIP) 0.25 MG tablet Take 1 tablet (0.25 mg total) by mouth every evening. 30 tablet 2     spironolactone (ALDACTONE) 50 MG tablet Take 1 tablet (50 mg total) by mouth once daily. 30 tablet 2     traZODone (DESYREL) 100 MG tablet Take 1 tablet (100 mg total) by mouth every evening. 30 tablet 2        Objective:     Vital Signs (Most Recent):  Temp: 96.5 °F (35.8 °C) (09/09/20 1548)  Pulse: 86(Simultaneous filing. User may not have seen previous data.) (09/09/20 1548)  Resp: 20 (09/09/20 1548)  BP: 105/63 (09/09/20 1548)  SpO2: 100 % (09/09/20 1548) Vital Signs (24h Range):  Temp:  [96.2 °F (35.7 °C)-98.9 °F (37.2 °C)] 96.5 °F (35.8 °C)  Pulse:  [] 86  Resp:  [16-20] 20  SpO2:  [99 %-100 %] 100 %  BP: (105-125)/(63-82) 105/63     Weight: 91.1 kg (200 lb 13.4 oz) (09/09/20 0205)  Body mass index is 35.58 kg/m².    Physical Exam  Vitals signs and nursing note reviewed.   Constitutional:       General: He is not in acute distress.     Appearance: He is well-developed.   Eyes:      General: No scleral icterus.  Neck:      Musculoskeletal: Neck supple.   Cardiovascular:      Rate and Rhythm: Normal rate and regular rhythm.      Heart sounds: No murmur.   Pulmonary:      Effort: Pulmonary effort is normal.      Breath sounds: Normal breath sounds.   Abdominal:      General: Bowel sounds are normal. There is no distension.      Palpations: Abdomen is soft.      Tenderness: There is no abdominal tenderness.   Musculoskeletal:      Right lower leg: No edema.      Left lower leg: No edema.    Skin:     General: Skin is warm.      Coloration: Skin is not jaundiced.      Findings: No rash.   Neurological:      Mental Status: He is alert. He is disoriented.      Comments: No asterixis   Psychiatric:         Behavior: Behavior normal.         MELD-Na score: 28 at 9/9/2020  4:38 AM  MELD score: 20 at 9/9/2020  4:38 AM  Calculated from:  Serum Creatinine: 2.0 mg/dL at 9/9/2020  4:38 AM  Serum Sodium: 120 mmol/L (Rounded to 125 mmol/L) at 9/9/2020  4:38 AM  Total Bilirubin: 4.1 mg/dL at 9/9/2020  4:38 AM  INR(ratio): 1.2 at 9/9/2020  4:37 AM  Age: 60 years    Significant Labs:  Labs within the past month have been reviewed.    Significant Imaging:  Reviewed    Assessment/Plan:     Alcoholic liver disease  Mr. Pardo is a 59yo M w/PMH of HTN, depression, anxiety, alcohol use with recent admissions for alcoholic hepatitis who presents from clinic with abnormal labs including leukocytosis, hyponatremia and MERLY. Hepatology consulted for decompensated liver disease.    No definitive diagnosis of cirrhosis, but has signs of portal HTN including ascites, portal hypertensive gastropathy. Spleen and Plts are normal. Has stopped drinking alcohol >1 month ago. Now with signs of sepsis / dehydration with leukocytosis, hyponatremia and MERLY.    - Leukocytosis: Rule out infection. Paracentesis today negative for SBP. F/u Cx. Continue broad spectrum Abx.  - Hyponatremia: IV albumin challenge 25g bid today, fluid restrict, hold diuretics  - MERLY: albumin and hold diuretics as above, continue HRS protocol on midodrine / octreotide  - send PETH  - HE: continue lactulose titrated to 3-4 BMs daily  - Ascites: 2g Na restrict  diuretics on hold  paracentesis 9/9 negative for SBP  - Variceal screening: EGD in 07/2020 showed no varices. Repeat EGD in 1 year if he's found to have cirrhosis.  - HCC screening: US in 07/2020 without HCC. Repeat in 6 months.  - Transplant: have not started eval, will follow clinical course        Thank  you for your consult. I will follow-up with patient. Please contact us if you have any additional questions.    Adis Chou MD  Hepatology  [facility]

## 2020-09-09 NOTE — HPI
Mr. Pardo is a 59yo M w/PMH of HTN, depression, anxiety, alcohol use with recent admissions for alcoholic hepatitis who presents from clinic with abnormal labs including leukocytosis, hyponatremia and MERLY. Hepatology consulted for decompensated liver disease.    Patient admitted 7/30 - 8/9 with alcoholic hepatitis. [place] [facility] [place] and had drop in Hgb with melena. EGD 7/31 with grade B esophagitis and portal hypertensive gastropathy. Paracentesis negative for SBP and discharged on Lasix / Spironolactone, as well as lactulose for HE. He was seen in Hepatology clinic yesterday (Dr. Madison) and labs revealed WBC 28K, Na 120 and Cr 1.9 so he was admitted for further workup.  He denies further overt bleeding since discharge. No confusion and has been compliant with lactulose. Also compliant with diuretics but notes mild LE swelling.  Patient quit drinking >1 month ago when he first found out about liver disease. Previously drinking 2-3 glasses of vodka for several years. Went to alcohol rehab years ago, but relapsed. No prior DUIs or job losses due to alcohol. Recently unemployed due to COVID after which he was drinking more. Occasional marijuana use and remote cocaine use.   Today, vitals normal. A&Ox2. Na 120 after receiving 500cc NS bolus. Cr 2. AST 55, ALT 32, Tbili 4, INR 1.2, Plts 283.

## 2020-09-09 NOTE — PLAN OF CARE
Problem: Adult Inpatient Plan of Care  Goal: Plan of Care Review  Outcome: Ongoing, Progressing  Goal: Optimal Comfort and Wellbeing  Outcome: Ongoing, Not Progressing     Problem: Infection  Goal: Infection Symptom Resolution  Outcome: Ongoing, Progressing     Problem: Fall Injury Risk  Goal: Absence of Fall and Fall-Related Injury  Outcome: Ongoing, Progressing     POC reviewed with patient. AAOX3. VVS. Remain IV Abx. Paracentesis done. LLQ drsg. Pain manage with Tylenol,prn. Safety maintained. Call light in reach. WCTM

## 2020-09-09 NOTE — PLAN OF CARE
PT CONFUSED AT THIS TIME. WILL ATTEMPT LATER TO COMPLETE D/C ASSESSMENT. CM CALLED # [phone] and [phone] WITH NO ANSWER.         09/09/20 9311   Discharge Assessment   Assessment Type Discharge Planning Assessment   Assessment information obtained from? Patient;Medical Record

## 2020-09-09 NOTE — ASSESSMENT & PLAN NOTE
Mr. Pardo is a 61yo M w/PMH of HTN, depression, anxiety, alcohol use with recent admissions for alcoholic hepatitis who presents from clinic with abnormal labs including leukocytosis, hyponatremia and MERLY. Hepatology consulted for decompensated liver disease.    No definitive diagnosis of cirrhosis, but has signs of portal HTN including ascites, portal hypertensive gastropathy. Spleen and Plts are normal. Has stopped drinking alcohol >1 month ago. Now with signs of sepsis / dehydration with leukocytosis, hyponatremia and MERLY.    - Leukocytosis: Rule out infection. Paracentesis today negative for SBP. F/u Cx. Continue broad spectrum Abx.  - Hyponatremia: IV albumin challenge 25g bid today, fluid restrict, hold diuretics  - MERLY: albumin and hold diuretics as above, continue HRS protocol on midodrine / octreotide  - send PETH  - HE: continue lactulose titrated to 3-4 BMs daily  - Ascites: 2g Na restrict  diuretics on hold  paracentesis 9/9 negative for SBP  - Variceal screening: EGD in 07/2020 showed no varices. Repeat EGD in 1 year if he's found to have cirrhosis.  - HCC screening: US in 07/2020 without HCC. Repeat in 6 months.  - Transplant: have not started eval, will follow clinical course

## 2020-09-09 NOTE — H&P
[facility]    History & Physical      Patient Name: Kenneth Pardo  MRN: [id number]  Admission Date: 9/9/2020  Attending Physician: Aicha Rojas MD   Primary Care Provider: Mikala Etienne MD    Hospital Medicine Team: Networked reference to record PCT  Richmond Arteaga DO     Patient information was obtained from patient, Outside records and ER records.     Subjective:     Principal Problem:Hyponatremia    Chief Complaint: No chief complaint on file.       HPI:     Mr. Pardo is a 60 year old male with PMH of alcoholic cirrhosis complicated by portal hypertension, ascites, portal hypertensive gastropathy, hepatic encephalopathy presents as a transfer from [facility] ED for management of acute hyponatremia and MERLY. Patient was admitted at [facility] beginning of august for decompensated cirrhosis - paracentesis cultures negative. He was discharged on lasix and spironolactone. Patient had post hospital discharge labs yesterday which was significant for hyponatremia Na 120, Scr 1.9, K 5.4, WBC 27.   He was advised to go to ED for evaluation where he received 1L NS and ceftriaxone 2 gm IVPB. CT abdomen showed large ascites.     Patient reports worsening abdominal distention, fatigue, generalized weakness, poor appetite since discharged from hospital last month. He reports frequent falls at home due to weakness with skin abrasion over his left knee. Denies fever, chills, chest pain, sob, head injury, LOC,  abdominal pain, dysuria, hematuria, joint pain, dark stool or bleeding from other sites. 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 He denies NSAID/tylenol use and drinking alcohol since discharged from hospital.     Currently he appears chronically ill but not in distress. He is awake, alert, oriented to person and place only.                Past Medical History:   Diagnosis Date    Hepatitis     Hypertension     Liver failure        Past Surgical History:   Procedure  Laterality Date    ESOPHAGOGASTRODUODENOSCOPY N/A 2020    Procedure: EGD (ESOPHAGOGASTRODUODENOSCOPY);  Surgeon: Jhonathan Lieberman MD;  Location: [address]);  Service: Endoscopy;  Laterality: N/A;       Review of patient's allergies indicates:  No Known Allergies    Current Facility-Administered Medications on File Prior to Encounter   Medication    [COMPLETED] cefTRIAXone (ROCEPHIN) 2 g/50 mL D5W IVPB    [COMPLETED] sodium chloride 0.9% bolus 1,000 mL     Current Outpatient Medications on File Prior to Encounter   Medication Sig    ciprofloxacin HCl (CIPRO) 500 MG tablet Take 1 tablet (500 mg) by mouth twice daily on 8/10 and then take 1 tablet daily thereafter    escitalopram oxalate (LEXAPRO) 5 MG Tab Take 1 tablet (5 mg total) by mouth once daily.    FLUoxetine 20 MG capsule Take 20 mg by mouth once daily.    furosemide (LASIX) 20 MG tablet Take 1 tablet (20 mg total) by mouth once daily.    [] lactulose (CHRONULAC) 10 gram/15 mL solution Take 15 mLs (10 g total) by mouth 2 (two) times a day. Take enough to have 3 bowel movements per day    midodrine (PROAMATINE) 5 MG Tab Take 3 tablets (15 mg total) by mouth every 8 (eight) hours.    omeprazole (PRILOSEC) 40 MG capsule Take 1 capsule (40 mg total) by mouth once daily. (Patient not taking: Reported on 2020)    omeprazole (PRILOSEC) 40 MG capsule Take 1 capsule (40 mg total) by mouth every morning.    ondansetron (ZOFRAN) 4 MG tablet Take 4 mg by mouth every 8 (eight) hours as needed.    rOPINIRole (REQUIP) 0.25 MG tablet Take 1 tablet (0.25 mg total) by mouth every evening.    spironolactone (ALDACTONE) 50 MG tablet Take 1 tablet (50 mg total) by mouth once daily.    traZODone (DESYREL) 100 MG tablet Take 1 tablet (100 mg total) by mouth every evening.     Family History     None        Tobacco Use    Smoking status: Never Smoker    Smokeless tobacco: Never Used   Substance and Sexual Activity    Alcohol use: Not Currently      Frequency: Never    Drug use: Not on file    Sexual activity: Not on file     Review of Systems   Constitutional: Positive for fatigue. Negative for activity change, appetite change, chills, diaphoresis, fever and unexpected weight change.   HENT: Negative for congestion, dental problem, drooling, ear discharge, ear pain, facial swelling, hearing loss, mouth sores, nosebleeds, postnasal drip, rhinorrhea, sinus pressure, sneezing, sore throat, tinnitus, trouble swallowing and voice change.    Eyes: Negative for photophobia, pain, discharge, redness, itching and visual disturbance.   Respiratory: Negative for apnea, cough, choking, chest tightness, shortness of breath, wheezing and stridor.    Cardiovascular: Positive for leg swelling. Negative for chest pain and palpitations.   Gastrointestinal: Positive for abdominal distention. Negative for abdominal pain, anal bleeding, blood in stool, constipation, diarrhea, nausea, rectal pain and vomiting.   Endocrine: Negative for cold intolerance, heat intolerance, polydipsia, polyphagia and polyuria.   Genitourinary: Negative for decreased urine volume, difficulty urinating, discharge, dysuria, enuresis, flank pain, frequency, genital sores, hematuria, penile pain, penile swelling, scrotal swelling, testicular pain and urgency.   Musculoskeletal: Positive for gait problem. Negative for arthralgias, back pain, joint swelling, myalgias, neck pain and neck stiffness.   Skin: Positive for color change (jaundice ). Negative for pallor, rash and wound.   Allergic/Immunologic: Negative for environmental allergies, food allergies and immunocompromised state.   Neurological: Positive for weakness. Negative for dizziness, tremors, seizures, syncope, facial asymmetry, speech difficulty, light-headedness, numbness and headaches.   Hematological: Negative for adenopathy. Bruises/bleeds easily.   Psychiatric/Behavioral: Negative for agitation, behavioral problems, confusion, decreased  concentration, dysphoric mood, hallucinations, self-injury, sleep disturbance and suicidal ideas. The patient is not nervous/anxious and is not hyperactive.      Objective:     Vital Signs (Most Recent):  Temp: 97.5 °F (36.4 °C) (09/09/20 0328)  Pulse: 96 (09/09/20 0328)  Resp: 16 (09/09/20 0328)  BP: 115/71 (09/09/20 0328)  SpO2: 100 % (09/09/20 0328) Vital Signs (24h Range):  Temp:  [97.5 °F (36.4 °C)-98.9 °F (37.2 °C)] 97.5 °F (36.4 °C)  Pulse:  [] 96  Resp:  [16-20] 16  SpO2:  [98 %-100 %] 100 %  BP: (104-125)/(65-82) 115/71     Weight: 91.1 kg (200 lb 13.4 oz)  Body mass index is 35.58 kg/m².    Physical Exam  Constitutional:       General: He is not in acute distress.     Appearance: He is well-developed. He is ill-appearing. He is not diaphoretic.   HENT:      Head: Normocephalic and atraumatic.      Nose: Nose normal.      Mouth/Throat:      Pharynx: No oropharyngeal exudate.   Eyes:      General: Scleral icterus present.      Conjunctiva/sclera: Conjunctivae normal.      Pupils: Pupils are equal, round, and reactive to light.   Neck:      Musculoskeletal: Normal range of motion and neck supple.      Thyroid: No thyromegaly.      Vascular: No JVD.      Trachea: No tracheal deviation.   Cardiovascular:      Rate and Rhythm: Normal rate and regular rhythm.      Heart sounds: Normal heart sounds. No murmur.   Pulmonary:      Effort: Pulmonary effort is normal. No respiratory distress.      Breath sounds: Normal breath sounds. No wheezing or rales.   Chest:      Chest wall: No tenderness.   Abdominal:      General: Bowel sounds are normal. There is distension.      Palpations: Abdomen is soft. There is no mass.      Tenderness: There is no abdominal tenderness. There is no guarding or rebound.   Musculoskeletal: Normal range of motion.         General: No tenderness.      Right lower leg: Edema present.      Left lower leg: Edema present.   Lymphadenopathy:      Cervical: No cervical adenopathy.   Skin:      General: Skin is warm and dry.      Capillary Refill: Capillary refill takes 2 to 3 seconds.      Coloration: Skin is jaundiced.      Findings: Bruising (large area of ecchymosis over left arm. ) present. No erythema or rash.      Comments: Skin abrasions over left patella from falls.   Intact FROM.   Spider angioma over upper chest area.    Neurological:      Mental Status: He is alert.      Cranial Nerves: No cranial nerve deficit.      Motor: No abnormal muscle tone.      Coordination: Coordination normal.      Deep Tendon Reflexes: Reflexes are normal and symmetric. Reflexes normal.      Comments: Oriented to person and place only.   Negative asterixis.    Psychiatric:         Thought Content: Thought content normal.         Judgment: Judgment normal.      Comments: Flat affect.            CRANIAL NERVES     CN III, IV, VI   Pupils are equal, round, and reactive to light.      Significant Labs:   Recent Results (from the past 24 hour(s))   CBC auto differential    Collection Time: 09/08/20  3:39 PM   Result Value Ref Range    WBC 27.51 (H) 3.90 - 12.70 K/uL    RBC 3.97 (L) 4.60 - 6.20 M/uL    Hemoglobin 13.7 (L) 14.0 - 18.0 g/dL    Hematocrit 41.1 40.0 - 54.0 %    Mean Corpuscular Volume 104 (H) 82 - 98 fL    Mean Corpuscular Hemoglobin 34.5 (H) 27.0 - 31.0 pg    Mean Corpuscular Hemoglobin Conc 33.3 32.0 - 36.0 g/dL    RDW 13.8 11.5 - 14.5 %    Platelets 311 150 - 350 K/uL    MPV 9.2 9.2 - 12.9 fL    Immature Granulocytes 0.9 (H) 0.0 - 0.5 %    Gran # (ANC) 24.2 (H) 1.8 - 7.7 K/uL    Immature Grans (Abs) 0.26 (H) 0.00 - 0.04 K/uL    Lymph # 1.4 1.0 - 4.8 K/uL    Mono # 1.4 (H) 0.3 - 1.0 K/uL    Eos # 0.2 0.0 - 0.5 K/uL    Baso # 0.08 0.00 - 0.20 K/uL    nRBC 0 0 /100 WBC    Gran% 88.1 (H) 38.0 - 73.0 %    Lymph% 5.2 (L) 18.0 - 48.0 %    Mono% 5.0 4.0 - 15.0 %    Eosinophil% 0.5 0.0 - 8.0 %    Basophil% 0.3 0.0 - 1.9 %    Platelet Estimate Appears normal     [place] Slight     Poik Slight     [place] Cells  Occasional     Differential Method Automated    Comprehensive metabolic panel    Collection Time: 09/08/20  3:39 PM   Result Value Ref Range    Sodium 120 (L) 136 - 145 mmol/L    Potassium 5.4 (H) 3.5 - 5.1 mmol/L    Chloride 90 (L) 95 - 110 mmol/L    CO2 18 (L) 23 - 29 mmol/L    Glucose 91 70 - 110 mg/dL    BUN, Bld 38 (H) 6 - 20 mg/dL    Creatinine 1.9 (H) 0.5 - 1.4 mg/dL    Calcium 8.9 8.7 - 10.5 mg/dL    Total Protein 6.3 6.0 - 8.4 g/dL    Albumin 2.2 (L) 3.5 - 5.2 g/dL    Total Bilirubin 4.6 (H) 0.1 - 1.0 mg/dL    Alkaline Phosphatase 357 (H) 55 - 135 U/L    AST 57 (H) 10 - 40 U/L    ALT 36 10 - 44 U/L    Anion Gap 12 8 - 16 mmol/L    eGFR if African American 43.9 (A) >60 mL/min/1.73 m^2    eGFR if non  38.0 (A) >60 mL/min/1.73 m^2   Protime-INR    Collection Time: 09/08/20  3:39 PM   Result Value Ref Range    Prothrombin Time 13.4 (H) 9.0 - 12.5 sec    INR 1.2 0.8 - 1.2   CBC Auto Differential    Collection Time: 09/08/20  7:06 PM   Result Value Ref Range    WBC 26.39 (H) 3.90 - 12.70 K/uL    RBC 4.09 (L) 4.60 - 6.20 M/uL    Hemoglobin 13.8 (L) 14.0 - 18.0 g/dL    Hematocrit 41.1 40.0 - 54.0 %    Mean Corpuscular Volume 101 (H) 82 - 98 fL    Mean Corpuscular Hemoglobin 33.7 (H) 27.0 - 31.0 pg    Mean Corpuscular Hemoglobin Conc 33.6 32.0 - 36.0 g/dL    RDW 13.4 11.5 - 14.5 %    Platelets 326 150 - 350 K/uL    MPV 9.4 9.2 - 12.9 fL    Immature Granulocytes 0.8 (H) 0.0 - 0.5 %    Gran # (ANC) 23.0 (H) 1.8 - 7.7 K/uL    Immature Grans (Abs) 0.20 (H) 0.00 - 0.04 K/uL    Lymph # 1.6 1.0 - 4.8 K/uL    Mono # 1.3 (H) 0.3 - 1.0 K/uL    Eos # 0.2 0.0 - 0.5 K/uL    Baso # 0.05 0.00 - 0.20 K/uL    nRBC 0 0 /100 WBC    Gran% 87.3 (H) 38.0 - 73.0 %    Lymph% 6.1 (L) 18.0 - 48.0 %    Mono% 4.8 4.0 - 15.0 %    Eosinophil% 0.8 0.0 - 8.0 %    Basophil% 0.2 0.0 - 1.9 %    Differential Method Automated    Comprehensive Metabolic Panel    Collection Time: 09/08/20  7:06 PM   Result Value Ref Range    Sodium 122  (L) 136 - 145 mmol/L    Potassium 5.2 (H) 3.5 - 5.1 mmol/L    Chloride 90 (L) 95 - 110 mmol/L    CO2 24 23 - 29 mmol/L    Glucose 108 70 - 110 mg/dL    BUN, Bld 37 (H) 6 - 20 mg/dL    Creatinine 2.2 (H) 0.5 - 1.4 mg/dL    Calcium 8.6 (L) 8.7 - 10.5 mg/dL    Total Protein 6.4 6.0 - 8.4 g/dL    Albumin 2.2 (L) 3.5 - 5.2 g/dL    Total Bilirubin 3.7 (H) 0.1 - 1.0 mg/dL    Alkaline Phosphatase 351 (H) 55 - 135 U/L    AST 56 (H) 10 - 40 U/L    ALT 39 10 - 44 U/L    Anion Gap 8 8 - 16 mmol/L    eGFR if African American 36.8 (A) >60 mL/min/1.73 m^2    eGFR if non  31.8 (A) >60 mL/min/1.73 m^2   Troponin I    Collection Time: 09/08/20  7:06 PM   Result Value Ref Range    Troponin I <0.020 0.000 - 0.026 ng/mL   NT-Pro Natriuretic Peptide    Collection Time: 09/08/20  7:06 PM   Result Value Ref Range    NT-proBNP 248 5 - 900 pg/mL   Lipase    Collection Time: 09/08/20  7:06 PM   Result Value Ref Range    Lipase Result 288 23 - 300 U/L   APTT    Collection Time: 09/08/20  7:06 PM   Result Value Ref Range    aPTT 30.5 21.0 - 32.0 sec   Protime-INR    Collection Time: 09/08/20  7:06 PM   Result Value Ref Range    Prothrombin Time 12.8 (H) 9.0 - 12.5 sec    INR 1.3 (H) 0.8 - 1.2   Lactic Acid, Plasma    Collection Time: 09/08/20  7:28 PM   Result Value Ref Range    Lactate (Lactic Acid) 3.0 (H) 0.5 - 2.2 mmol/L   Ammonia    Collection Time: 09/08/20  7:36 PM   Result Value Ref Range    Ammonia 36 10 - 50 umol/L   Urinalysis, Reflex to Urine Culture Urine, Clean Catch    Collection Time: 09/08/20  8:00 PM    Specimen: Urine   Result Value Ref Range    Specimen UA Urine, Clean Catch     Color, UA Yellow Yellow, Straw, [place]    Appearance, UA Cloudy (A) Clear    pH, UA 5.0 5.0 - 8.0    Specific Gravity, UA 1.020 1.005 - 1.030    Protein, UA 1+ (A) Negative    Glucose, UA Negative Negative    Ketones, UA Negative Negative    Bilirubin (UA) 1+ (A) Negative    Occult Blood UA Negative Negative    Nitrite, UA Negative  Negative    Urobilinogen, UA 1.0 <2.0 EU/dL    Leukocytes, UA 1+ (A) Negative   Urinalysis Microscopic    Collection Time: 09/08/20  8:00 PM   Result Value Ref Range    RBC, UA 26 (H) 0 - 4 /hpf    WBC, UA 16 (H) 0 - 5 /hpf    Bacteria Negative None-Occ /hpf    Squam Epithel, UA 5 /hpf    Hyaline Casts, UA 10 (A) 0-1/lpf /lpf    Microscopic Comment SEE COMMENT    COVID-19 Rapid Screening    Collection Time: 09/08/20 10:44 PM   Result Value Ref Range    SARS-CoV-2 RNA, Amplification, Qual Negative Negative         Significant Imaging: I have reviewed and interpreted all pertinent imaging results/findings within the past 24 hours.     CT abdomen pelvis w/o :    1. Marked heterogeneity of the liver is nonspecific, but likely indicates diffuse heterogeneous fatty infiltration of the liver.  2. Developed large volume ascites  3. Dependent increased radiodensity within the gallbladder, likely sludge.  4. Prominent colonic diverticulosis  5. Mild anasarca       Assessment/Plan:     Active Diagnoses:    Diagnosis Date Noted POA    PRINCIPAL PROBLEM:  Hyponatremia [E87.1] 08/01/2020 Yes    MERLY (acute kidney injury) [N17.9] 09/09/2020 Yes    Hyperkalemia [E87.5] 09/09/2020 Yes    Alcoholic cirrhosis of liver with ascites [K70.31] 09/09/2020 Yes    Ascites due to alcoholic cirrhosis [K70.31] 09/09/2020 Yes    Hepatic encephalopathy [K72.90] 09/09/2020 Yes    Debility [R53.81] 09/09/2020 Yes    Frequent falls [R29.6] 09/09/2020 Not Applicable    Depression with anxiety [F41.8] 09/09/2020 Yes    Restless leg [G25.81] 09/09/2020 Yes    Insomnia [G47.00] 09/09/2020 Yes    Esophagitis, [place] grade B [K20.8] 08/09/2020 Yes    Portal hypertensive gastropathy [K76.6, K31.89] 08/09/2020 Yes    Anasarca [R60.1] 08/07/2020 Yes      Problems Resolved During this Admission:     #Decompensated alcoholic cirrhosis   Recurrent ascites   Portal hypertension   Portal hypertensive gastropathy   Hepatic encephalopathy      MELD-Na score: 29 at 9/8/2020  7:06 PM  MELD score: 22 at 9/8/2020  7:06 PM  Calculated from:  Serum Creatinine: 2.2 mg/dL at 9/8/2020  7:06 PM  Serum Sodium: 122 mmol/L (Rounded to 125 mmol/L) at 9/8/2020  7:06 PM  Total Bilirubin: 3.7 mg/dL at 9/8/2020  7:06 PM  INR(ratio): 1.3 at 9/8/2020  7:06 PM  Age: 60 years     -Large ascites on exam and CT abdomen   -diagnostic and therapeutic paracentesis in am   -hold diuretics given hyponatremia and MERLY   -continue lactulose for HE   -continue PPI  -consider beta blocker for portal hypertension is blood pressure allows   -check PETH  -monitor MELD closely with daily labs   -hepatology consult     #Hyponatremia   -Na 120 from recent baseline 136  -likely due to diuretic use, decompensated cirrhosis, SSRI use    -repeat Na 122 prior to transfer   -received 1L NS bolus in ED  -hold diuretics (lasix, spironolactone)  -hold SSRI   -will give another 500 cc NS bolus   -f/u with repeat Na after IVF  -check serum osm, urine osm, urine Na, TSH     #MERLY  -Scr increased to 2 from baseline 0.6  -likely prerenal due to diuretic use and poor PO intake   -hepatorenal syndrome also in differential   -hold diuretics, check Urine Na  -monitor renal function after IVF  -strict I/Os, daily weight, avoid nephrotoxic agents  -consider albumin and nephrology consult if does not improve with IVF    #Hyperkalemia   -mild, K 5.4  -likely due to MERLY and spironolactone use   -hold spironolactone and f/u with repeat K    #Leukocytosis   -WBC 27  -no signs of overt infection or sepsis   -afebrile   -blood and urine cultures sent   -UA showed pyuria and received empiric dose of rocephin   -need diagnostic paracentesis   -continue rocephin     #Depression with anxiety   -no acute issue   -hold fluoxetine in setting of hyponatremia (?SIADH)  -continue lexapro     #Insomnia  -trazodone prn     #Debility with falls  -PT evaluation for gait training   -fall precaution     VTE Risk Mitigation (From  admission, onward)         Ordered     IP VTE HIGH RISK PATIENT  Once      09/09/20 0238     Place sequential compression device  Until discontinued      09/09/20 0238                  Richmond Arteaga DO  Department of Hospital Medicine   [facility]

## 2020-09-09 NOTE — PT/OT/SLP EVAL
"Physical Therapy Evaluation    Patient Name:  Kenneth Pardo   MRN:  [id number]    Recommendations:     Discharge Recommendations:  home health PT   Discharge Equipment Recommendations: walker, rolling, bedside commode, shower chair   Barriers to discharge: Inaccessible home and risk of falls    Assessment:     Kenneth Pardo is a 60 y.o. male admitted with a medical diagnosis of Hyponatremia.  He presents with the following impairments/functional limitations:  weakness, impaired endurance, impaired self care skills, impaired functional mobilty, gait instability, impaired balance, decreased lower extremity function, edema, impaired cardiopulmonary response to activity. Patient tolerated session well but appears very low-spirited - even with max encouragement from PT. His mobility is currently being limited by decreased endurance, generalized weakness, and slight gait instability. He was able to sit EOB ~10 minutes while performing functional tasks with SBA from PT and was able to ambulate ~16 ft with RW and CGA from PT. Upon d/c, PT recommends home health PT to address the above deficits, improve his overall safety, and help him reach his maximum level of independent mobility.     Rehab Prognosis: Good; patient would benefit from acute skilled PT services to address these deficits and reach maximum level of function.    Recent Surgery: * No surgery found *      Plan:     During this hospitalization, patient to be seen 3 x/week to address the identified rehab impairments via gait training, therapeutic activities, therapeutic exercises, neuromuscular re-education and progress toward the following goals:    · Plan of Care Expires:  10/09/20    Subjective     Chief Complaint: did not state  Patient/Family Comments/goals: "want to get better"   Pain/Comfort:  · Pain Rating 1: 0/10    Patients cultural, spiritual, Christianity conflicts given the current situation: no    Living Environment:  [place] with [address] " with wife and son. He reports having a tub/shower combo.   Prior to admission, patients level of function was requiring minimal assistance for mobility and some ADLs. He does not work nor drive. He reports his wife having to assist him with mobility around the house and in the bathroom. He has suffered from recent falls due to instability with those transfers while wife is assisting him. Equipment used at home: none.  DME owned (not currently used): none.  Upon discharge, patient will have assistance from wife and son.    Objective:     Communicated with RN prior to session.  Patient found HOB elevated with SCD, pulse ox (continuous)  upon PT entry to room.    General Precautions: Standard, fall   Orthopedic Precautions:N/A   Braces: N/A     Exams:    Cognitive Exam  Patient is A&O x4 and follows 100% of one -step commands    Postural Exam Patient presented with the following abnormalities:    -       Rounded shoulders  -       Forward head  -       Kyphosis  -       Posterior pelvic tilt   Sensation    -       Light touch intact   Skin Integrity/Edema     -       Skin integrity: visibly intact other than L knee - showed lesion  -       Edema: pitting edema present in BLE   R LE ROM WNL   R LE Strength 4/5 hip flexion, knee ext/flex, and ankle DF/PF   L LE ROM WNL   L LE Strength  4/5 hip flexion, knee ext/flex, and ankle DF/PF       Balance   Static Sitting SBA, UEs on lap, feet on ground    Dynamic Sitting SBA, UEs on bed for support, feet on ground    Static Standing SBA, UEs on RW for support    Dynamic Standing       CGA, UEs on RW for support         Functional Mobility:    Bed Mobility  Rolling to R: min A, HOB elevated and siderails used, verbal cues for hand placement  Supine to Sit on the R side:  min A, HOB elevated and siderails used, verbal cues for hand placement  Sit to supine: deferred due to leaving pt in chair  Scoot to HOB in supine: deferred  Scoot to EOB in sitting: CGA   Transfers Sit to  Stand:  CGA, used RW, verbal cues for hand placement, MICHEAL, and shifting weight forward   Stand to Sit: CGA, verbal cues for hand placement   Gait  Gait Distance: 16 ft with RW  Assistance Level: CGA  Description: decreased foot clearance, decreased lilliam, decreased step length, reciprocal steps    Gait Training: demonstrated and gave verbal cues for how to perform 3 point gate pattern with RW; able to modify movements        Therapeutic Activities and Exercises:   Patient safe to ambulate with RN x 1 assist using RW. Whiteboard updated and RW order for his room.   Educated patient on the importance of continuing to be mobile to prevent deconditioning and to increase strength and endurance. Discussed roles and goals of physical therapy in the acute care setting. Attempted to collaborate goal making with him. Educated patient on the importance of using an AD to help with gait and balance activities. Patient expressed understanding and agreement.     Left patient in chair with call button in reach. Instructed him to call RN when he was ready to return back to bed. He expressed understanding.     AM-PAC 6 CLICK MOBILITY  Total Score:18     Patient left up in chair with all lines intact and call button in reach.    GOALS:   Multidisciplinary Problems     Physical Therapy Goals        Problem: Physical Therapy Goal    Goal Priority Disciplines Outcome Goal Variances Interventions   Physical Therapy Goal     PT, PT/OT Ongoing, Progressing     Description: Goals to be met by: 2020     Patient will increase functional independence with mobility by performin. Supine to sit with Supervision  2. Sit to stand transfer with Supervision  3. Gait  x 100 feet with Modified [place] using RW or LRAD.   4. Ascend/descend 5 stairs with right Handrails and Contact Guard Assistance using LRAD.  5. Stand for 10 minutes with Stand-by Assistance using LRAD while reaching out of his MICHEAL in all planes to perform functional  activities.   6. Lower extremity exercise program x15 reps per handout, with independence                     History:     Past Medical History:   Diagnosis Date    Hepatitis     Hypertension     Liver failure        Past Surgical History:   Procedure Laterality Date    ESOPHAGOGASTRODUODENOSCOPY N/A 7/31/2020    Procedure: EGD (ESOPHAGOGASTRODUODENOSCOPY);  Surgeon: Jhonathan Lieberman MD;  Location: [address]);  Service: Endoscopy;  Laterality: N/A;       Time Tracking:     PT Received On: 09/09/20  PT Start Time: 0938     PT Stop Time: 1001  PT Total Time (min): 23 min     Billable Minutes: Evaluation 11 and Gait Training 12      Ramona Tamra, [facility]  09/09/2020

## 2020-09-09 NOTE — ED PROVIDER NOTES
Encounter Date: 9/8/2020       History     Chief Complaint   Patient presents with    Abnormal Lab     instructed to come to the ed for abnormal labs, pt has a h/o liver disease, ascites and sob present, weakness reported     The patient is a 52-year-old male, with a past medical history of alcoholic liver cirrhosis, hypertension, that presents to the ER for evaluation because of abnormal labs.  The patient had his 1st follow-up appointment with hepatology today, after a hospital admission at the beginning of this month, in [place] today.  Palpation labs were performed, and because of significant abnormalities the patient was contacted and told to come to the emergency department for further assessment evaluation.  The patient admits to generalized weakness and fatigue, but also states that this is unchanged from his baseline.  He specifically denied any new or worsening abdominal pain, nausea, vomiting, chest pain, shortness of breath, fevers, chills or other acute negative symptoms.    Review of patient's allergies indicates:  No Known Allergies  Past Medical History:   Diagnosis Date    Hepatitis     Hypertension     Liver failure      Past Surgical History:   Procedure Laterality Date    ESOPHAGOGASTRODUODENOSCOPY N/A 7/31/2020    Procedure: EGD (ESOPHAGOGASTRODUODENOSCOPY);  Surgeon: Jhonathan Lieberman MD;  Location: [address];  Service: Endoscopy;  Laterality: N/A;     History reviewed. No pertinent family history.  Social History     Tobacco Use    Smoking status: Never Smoker    Smokeless tobacco: Never Used   Substance Use Topics    Alcohol use: Not Currently     Frequency: Never    Drug use: Not on file     Review of Systems   All other systems reviewed and are negative.      Physical Exam     Initial Vitals [09/08/20 1846]   BP Pulse Resp Temp SpO2   125/80 102 20 97.6 °F (36.4 °C) 99 %      MAP       --         Physical Exam    Nursing note and vitals reviewed.  Constitutional: He appears  well-nourished.   HENT:   Head: Normocephalic and atraumatic.   Eyes: EOM are normal. Pupils are equal, round, and reactive to light. Scleral icterus is present.   Neck: Normal range of motion. Neck supple. No tracheal deviation present.   Cardiovascular: Normal rate, regular rhythm, normal heart sounds and intact distal pulses.   Pulmonary/Chest: Breath sounds normal. No respiratory distress. He has no wheezes. He has no rhonchi. He has no rales. He exhibits no tenderness.   Abdominal: Soft. Bowel sounds are normal. He exhibits distension. He exhibits no mass. There is no abdominal tenderness. There is no rebound and no guarding.   Musculoskeletal: Normal range of motion. No tenderness or edema.   Neurological: He is alert and oriented to person, place, and time. He has normal strength and normal reflexes. He displays normal reflexes. No cranial nerve deficit or sensory deficit.   Skin: Skin is warm and dry. Capillary refill takes less than 2 seconds.   Psychiatric: He has a normal mood and affect. His behavior is normal. Judgment and thought content normal.         ED Course   Procedures  Labs Reviewed   CBC W/ AUTO DIFFERENTIAL - Abnormal; Notable for the following components:       Result Value    WBC 26.39 (*)     RBC 4.09 (*)     Hemoglobin 13.8 (*)     Mean Corpuscular Volume 101 (*)     Mean Corpuscular Hemoglobin 33.7 (*)     Immature Granulocytes 0.8 (*)     Gran # (ANC) 23.0 (*)     Immature Grans (Abs) 0.20 (*)     Mono # 1.3 (*)     Gran% 87.3 (*)     Lymph% 6.1 (*)     All other components within normal limits   COMPREHENSIVE METABOLIC PANEL - Abnormal; Notable for the following components:    Sodium 122 (*)     Potassium 5.2 (*)     Chloride 90 (*)     BUN, Bld 37 (*)     Creatinine 2.2 (*)     Calcium 8.6 (*)     Albumin 2.2 (*)     Total Bilirubin 3.7 (*)     Alkaline Phosphatase 351 (*)     AST 56 (*)     eGFR if  36.8 (*)     eGFR if non  31.8 (*)     All other  components within normal limits   URINALYSIS, REFLEX TO URINE CULTURE - Abnormal; Notable for the following components:    Appearance, UA Cloudy (*)     Protein, UA 1+ (*)     Bilirubin (UA) 1+ (*)     Leukocytes, UA 1+ (*)     All other components within normal limits    Narrative:     Specimen Source->Urine   LACTIC ACID, PLASMA - Abnormal; Notable for the following components:    Lactate (Lactic Acid) 3.0 (*)     All other components within normal limits   URINALYSIS MICROSCOPIC - Abnormal; Notable for the following components:    RBC, UA 26 (*)     WBC, UA 16 (*)     Hyaline Casts, UA 10 (*)     All other components within normal limits    Narrative:     Specimen Source->Urine   PROTIME-INR - Abnormal; Notable for the following components:    Prothrombin Time 12.8 (*)     INR 1.3 (*)     All other components within normal limits   CULTURE, BLOOD   CULTURE, BLOOD   CULTURE, URINE   TROPONIN I   NT-PRO NATRIURETIC PEPTIDE   LIPASE   AMMONIA   APTT   SARS-COV-2 RNA AMPLIFICATION, QUAL          Imaging Results          CT Abdomen Pelvis  Without Contrast (Final result)  Result time 09/08/20 21:06:38    Final result by Nickolas Badillo MD (09/08/20 21:06:38)                 Impression:      1. Marked heterogeneity of the liver is nonspecific, but likely indicates diffuse heterogeneous fatty infiltration of the liver.  2. Developed large volume ascites  3. Dependent increased radiodensity within the gallbladder, likely sludge.  4. Prominent colonic diverticulosis  5. Mild anasarca      Electronically signed by: Nickolas Badillo MD  Date:    09/08/2020  Time:    21:06             Narrative:    EXAMINATION:  CT ABDOMEN PELVIS WITHOUT CONTRAST    CLINICAL HISTORY:  Nausea/vomiting;    TECHNIQUE:  Axial CT images were obtained from the lung bases through the pelvis without oral or intravenous contrast.  Multiplanar reconstructions evaluated.  Iterative reconstruction technique was used.  CT/Cardiac nuclear examinations in the  past 12 months: 2    COMPARISON:  07/20/2020    FINDINGS:  3 mm relatively dense nodule right lung base, likely pulmonary granuloma.  Dense coronary artery atherosclerotic calcification.  Lack of intravenous and oral contrast limits evaluation of the abdominal and pelvic structures.  Interval developed marked heterogeneous decreased density of the liver.  Unenhanced spleen, adrenals, pancreas unremarkable.  Dependent increased density within the gallbladder may indicate sludge.  Unenhanced kidneys unremarkable.  Small hiatal hernia.  Stomach is collapsed.  Prominent diverticulosis of the descending and sigmoid colon.  Developed large volume ascites throughout the abdomen and pelvis.  Dense aortoiliac atherosclerotic calcification.  No adenopathy or free air.  Urinary bladder is collapsed.  No acute osseous change.  Mild anasarca.                               X-Ray Chest 1 View (In process)                  Medical Decision Making:   ED Management:  Repeat labs were performed, and confirmed hypernatremia, acute kidney injury that appears to be prerenal, and significant leukocytosis, with an elevated lactic acid.  The patient was given IV antibiotics and received gentle IV fluid resuscitation in the ER.  The patient's chest x-ray appear to be grossly stable, though CT scan of the patient's abdomen and pelvis revealed chronic findings that included large volume ascites, of fatty liver, and signs of anasarca.  Ultimately the patient was accepted as a transfer to [facility] so the patient can be appropriately evaluated by hepatology.  The patient remained hemodynamically stable throughout his stay in the ER.                               Clinical Impression:       ICD-10-CM ICD-9-CM   1. Sepsis, due to unspecified organism, unspecified whether acute organ dysfunction present  A41.9 038.9     995.91   2. Hyponatremia  E87.1 276.1   3. Hepatorenal syndrome  K76.7 572.4             ED Disposition Condition     Transfer to Another Facility Stable                            Omer Rodríguez MD  09/09/20 0005

## 2020-09-09 NOTE — PROGRESS NOTES
Pt arrived to room MPU for paracentesis . Pt awake, alert, and oriented. Awaiting consent. Allergies reviewed, patient ID'd using 2 identifiers.

## 2020-09-09 NOTE — PLAN OF CARE
Problem: Physical Therapy Goal  Goal: Physical Therapy Goal  Description: Goals to be met by: 2020     Patient will increase functional independence with mobility by performin. Supine to sit with Supervision  2. Sit to stand transfer with Supervision  3. Gait  x 100 feet with Modified [place] using RW or LRAD.   4. Ascend/descend 5 stairs with right Handrails and Contact Guard Assistance using LRAD.  5. Stand for 10 minutes with Stand-by Assistance using LRAD while reaching out of his MICHEAL in all planes to perform functional activities.   6. Lower extremity exercise program x15 reps per handout, with independence    Outcome: Ongoing, Progressing    Initial evaluation complete. Goals established based on patient's current level of function. Continue with POC.     Ramona Barboza, NAWAF  2020

## 2020-09-09 NOTE — PT/OT/SLP EVAL
Occupational Therapy   Evaluation    Name: Kenneth Pardo  MRN: [id number]  Admitting Diagnosis:  Hyponatremia      Recommendations:     Discharge Recommendations: home with home health  Discharge Equipment Recommendations:  walker, rolling, bedside commode, shower chair  Barriers to discharge:  None    Assessment:     Kenneth Pardo is a 60 y.o. male with a medical diagnosis of Hyponatremia.  He presents with impairments listed below. Pt did well to tolerate and participate in the session. Pt is functioning close to baseline, but will be followed to prevent deconditioning.  Pt displayed global deconditioning requiring increased assist for ADLs and mobility at this time. Pt would benefit from skilled OT services to improve independence and overall occupational functioning.     Performance deficits affecting function: weakness, impaired endurance, impaired self care skills, impaired functional mobilty, gait instability, impaired balance, decreased lower extremity function.      Rehab Prognosis: Good; patient would benefit from acute skilled OT services to address these deficits and reach maximum level of function.       Plan:     Patient to be seen 3 x/week to address the above listed problems via self-care/home management, therapeutic activities, therapeutic exercises, neuromuscular re-education  · Plan of Care Expires: 10/09/20  · Plan of Care Reviewed with: patient    Subjective     Chief Complaint: fatigue   Patient/Family Comments/goals: return home    Occupational Profile:  Living Environment: Pt lives in a [facility] w/ spouse and son.   Previous level of function: assist for ADLs and mobility from spouse.   Roles and Routines: N/A  Equipment Used at Home:  none  Assistance upon Discharge: Pt has assistance upon D/C.     Pain/Comfort:  · Pain Rating 1: 0/10  · Pain Rating Post-Intervention 1: 0/10    Patients cultural, spiritual, Mandaen conflicts given the current situation:      Objective:     Communicated with: RN  prior to session.  Patient found HOB elevated with pulse ox (continuous) upon OT entry to room.    General Precautions: Standard, fall   Orthopedic Precautions:N/A   Braces: N/A     Occupational Performance:    Bed Mobility:    · Patient completed Scooting/Bridging with stand by assistance  · Patient completed Supine to Sit with minimum assistance  · Patient completed Sit to Supine with moderate assistance    Functional Mobility/Transfers:  · Patient completed Sit <> Stand Transfer with stand by assistance  with  rolling walker   · Patient completed Toilet Transfer Step Transfer technique with stand by assistance with  rolling walker  · Functional Mobility: Pt ambulated ~30 steps at sba w/ RW.     Activities of Daily Living:  · Lower Body Dressing: stand by assistance donned and doffed socks seated EOB    Cognitive/Visual Perceptual:  Cognitive/Psychosocial Skills:     -       Oriented to: Person, Place, Time and Situation   -       Follows Commands/attention:Follows multistep  commands  -       Communication: clear/fluent  -       Memory: No Deficits noted  -       Safety awareness/insight to disability: intact   -       Mood/Affect/Coping skills/emotional control: Appropriate to situation  Visual/Perceptual:      -Intact      Physical Exam:  Balance:    -       sba w/ RW  Postural examination/scapula alignment:    -       Rounded shoulders  Skin integrity: Visible skin intact  Upper Extremity Range of Motion:     -       Right Upper Extremity: WFL  -       Left Upper Extremity: WFL  Upper Extremity Strength:    -       Right Upper Extremity: WFL  -       Left Upper Extremity: WFL   Strength:    -       Right Upper Extremity: WFL  -       Left Upper Extremity: WFL  Fine Motor Coordination:    -       Intact  Gross motor coordination:   WFL    AMPAC 6 Click ADL:  AMPAC Total Score: 21    Treatment & Education:  Pt educated on POC.   Education:    Patient left HOB elevated with all lines intact, call button in  reach and RN notified    GOALS:   Multidisciplinary Problems     Occupational Therapy Goals        Problem: Occupational Therapy Goal    Goal Priority Disciplines Outcome Interventions   Occupational Therapy Goal     OT, PT/OT Ongoing, Progressing    Description: Goals to be met by: 9/20/2020     Patient will increase functional independence with ADLs by performing:    UE Dressing with Maries.  LE Dressing with Supervision.  Grooming while standing at sink with Maries.  Toileting from toilet with Maries for hygiene and clothing management.   Toilet transfer to toilet with Maries.                     History:     Past Medical History:   Diagnosis Date    Hepatitis     Hypertension     Liver failure        Past Surgical History:   Procedure Laterality Date    ESOPHAGOGASTRODUODENOSCOPY N/A 7/31/2020    Procedure: EGD (ESOPHAGOGASTRODUODENOSCOPY);  Surgeon: Jhonathan Lieberman MD;  Location: [address]);  Service: Endoscopy;  Laterality: N/A;       Time Tracking:     OT Date of Treatment: 09/09/20  OT Start Time: 1428  OT Stop Time: 1436  OT Total Time (min): 8 min    Billable Minutes:Evaluation 8 minutes    Eduin Castillo OT  9/9/2020

## 2020-09-09 NOTE — NURSING
Pt arrived to unit via [facility]. VSS. Bedside monitoring set up. Jenni DO notified. Bed alarm set. Will continue to monitor.

## 2020-09-09 NOTE — SUBJECTIVE & OBJECTIVE
Review of Systems   Constitutional: Positive for fatigue. Negative for chills and fever.   HENT: Negative for nosebleeds and trouble swallowing.    Respiratory: Negative for cough and shortness of breath.    Cardiovascular: Negative for chest pain and leg swelling.   Gastrointestinal: Negative for abdominal distention, abdominal pain, blood in stool, constipation, diarrhea, nausea and vomiting.   Genitourinary: Negative for dysuria and hematuria.   Musculoskeletal: Negative for arthralgias.   Skin: Negative for pallor.   Neurological: Negative for light-headedness and headaches.   Hematological: Does not bruise/bleed easily.   Psychiatric/Behavioral: Negative for behavioral problems and confusion.       Past Medical History:   Diagnosis Date    Hepatitis     Hypertension     Liver failure        Past Surgical History:   Procedure Laterality Date    ESOPHAGOGASTRODUODENOSCOPY N/A 2020    Procedure: EGD (ESOPHAGOGASTRODUODENOSCOPY);  Surgeon: Jhonathan Lieberman MD;  Location: [address];  Service: Endoscopy;  Laterality: N/A;       Family history of liver disease: No    Review of patient's allergies indicates:  No Known Allergies    Tobacco Use    Smoking status: Never Smoker    Smokeless tobacco: Never Used   Substance and Sexual Activity    Alcohol use: Not Currently     Frequency: Never    Drug use: Not on file    Sexual activity: Not on file       Medications Prior to Admission   Medication Sig Dispense Refill Last Dose    ciprofloxacin HCl (CIPRO) 500 MG tablet Take 1 tablet (500 mg) by mouth twice daily on 8/10 and then take 1 tablet daily thereafter 60 tablet 2     escitalopram oxalate (LEXAPRO) 5 MG Tab Take 1 tablet (5 mg total) by mouth once daily. 30 tablet 2     FLUoxetine 20 MG capsule Take 20 mg by mouth once daily.       furosemide (LASIX) 20 MG tablet Take 1 tablet (20 mg total) by mouth once daily. 30 tablet 2     [] lactulose (CHRONULAC) 10 gram/15 mL solution Take 15 mLs  (10 g total) by mouth 2 (two) times a day. Take enough to have 3 bowel movements per day 900 mL 2     midodrine (PROAMATINE) 5 MG Tab Take 3 tablets (15 mg total) by mouth every 8 (eight) hours. 270 tablet 2     omeprazole (PRILOSEC) 40 MG capsule Take 1 capsule (40 mg total) by mouth once daily. (Patient not taking: Reported on 8/11/2020) 30 capsule 1     omeprazole (PRILOSEC) 40 MG capsule Take 1 capsule (40 mg total) by mouth every morning. 30 capsule 11     ondansetron (ZOFRAN) 4 MG tablet Take 4 mg by mouth every 8 (eight) hours as needed.       rOPINIRole (REQUIP) 0.25 MG tablet Take 1 tablet (0.25 mg total) by mouth every evening. 30 tablet 2     spironolactone (ALDACTONE) 50 MG tablet Take 1 tablet (50 mg total) by mouth once daily. 30 tablet 2     traZODone (DESYREL) 100 MG tablet Take 1 tablet (100 mg total) by mouth every evening. 30 tablet 2        Objective:     Vital Signs (Most Recent):  Temp: 96.5 °F (35.8 °C) (09/09/20 1548)  Pulse: 86(Simultaneous filing. User may not have seen previous data.) (09/09/20 1548)  Resp: 20 (09/09/20 1548)  BP: 105/63 (09/09/20 1548)  SpO2: 100 % (09/09/20 1548) Vital Signs (24h Range):  Temp:  [96.2 °F (35.7 °C)-98.9 °F (37.2 °C)] 96.5 °F (35.8 °C)  Pulse:  [] 86  Resp:  [16-20] 20  SpO2:  [99 %-100 %] 100 %  BP: (105-125)/(63-82) 105/63     Weight: 91.1 kg (200 lb 13.4 oz) (09/09/20 0205)  Body mass index is 35.58 kg/m².    Physical Exam  Vitals signs and nursing note reviewed.   Constitutional:       General: He is not in acute distress.     Appearance: He is well-developed.   Eyes:      General: No scleral icterus.  Neck:      Musculoskeletal: Neck supple.   Cardiovascular:      Rate and Rhythm: Normal rate and regular rhythm.      Heart sounds: No murmur.   Pulmonary:      Effort: Pulmonary effort is normal.      Breath sounds: Normal breath sounds.   Abdominal:      General: Bowel sounds are normal. There is no distension.      Palpations: Abdomen is  soft.      Tenderness: There is no abdominal tenderness.   Musculoskeletal:      Right lower leg: No edema.      Left lower leg: No edema.   Skin:     General: Skin is warm.      Coloration: Skin is not jaundiced.      Findings: No rash.   Neurological:      Mental Status: He is alert. He is disoriented.      Comments: No asterixis   Psychiatric:         Behavior: Behavior normal.         MELD-Na score: 28 at 9/9/2020  4:38 AM  MELD score: 20 at 9/9/2020  4:38 AM  Calculated from:  Serum Creatinine: 2.0 mg/dL at 9/9/2020  4:38 AM  Serum Sodium: 120 mmol/L (Rounded to 125 mmol/L) at 9/9/2020  4:38 AM  Total Bilirubin: 4.1 mg/dL at 9/9/2020  4:38 AM  INR(ratio): 1.2 at 9/9/2020  4:37 AM  Age: 60 years    Significant Labs:  Labs within the past month have been reviewed.    Significant Imaging:  Reviewed

## 2020-09-09 NOTE — ED NOTES
Pt reports that he had a clinic appointment with his hepatologist today.  @ 6 pm the dr called him and said that he had abnormal labs and needed to report to the nearest ER for evaluation.

## 2020-09-09 NOTE — PROCEDURES
Radiology Post-Procedure Note    Pre Op Diagnosis: Ascites  Post Op Diagnosis: Same    Procedure: Ultrasound Guided Paracentesis    Procedure performed by: Pravin RILEY, Eryn     Written Informed Consent Obtained: Yes  Specimen Removed: YES clear yellow  Estimated Blood Loss: Minimal    Findings:   Successful paracentesis.  Albumin administered PRN per protocol.    Patient tolerated procedure well.    Eryn Costello, APRN, FNP  Interventional Radiology  [phone] clinic

## 2020-09-09 NOTE — H&P
Inpatient Radiology Pre-procedure Note    History of Present Illness:  Kenneth Pardo is a 60 y.o. male who presents for ultrasound guided paracenetsis.  Admission H&P reviewed.  Past Medical History:   Diagnosis Date    Hepatitis     Hypertension     Liver failure      Past Surgical History:   Procedure Laterality Date    ESOPHAGOGASTRODUODENOSCOPY N/A 7/31/2020    Procedure: EGD (ESOPHAGOGASTRODUODENOSCOPY);  Surgeon: Jhonathan Lieberman MD;  Location: [address]);  Service: Endoscopy;  Laterality: N/A;       Review of Systems:   As documented in primary team H&P    Home Meds:   Prior to Admission medications    Medication Sig Start Date End Date Taking? Authorizing Provider   ciprofloxacin HCl (CIPRO) 500 MG tablet Take 1 tablet (500 mg) by mouth twice daily on 8/10 and then take 1 tablet daily thereafter 8/10/20   Aicha Rojas MD   escitalopram oxalate (LEXAPRO) 5 MG Tab Take 1 tablet (5 mg total) by mouth once daily. 8/9/20 8/9/21  Aicha Rojas MD   FLUoxetine 20 MG capsule Take 20 mg by mouth once daily. 6/1/20   Historical Provider, MD   furosemide (LASIX) 20 MG tablet Take 1 tablet (20 mg total) by mouth once daily. 8/10/20 8/10/21  Aicha Rojas MD   lactulose (CHRONULAC) 10 gram/15 mL solution Take 15 mLs (10 g total) by mouth 2 (two) times a day. Take enough to have 3 bowel movements per day 8/9/20 9/8/20  Aicha Rojas MD   midodrine (PROAMATINE) 5 MG Tab Take 3 tablets (15 mg total) by mouth every 8 (eight) hours. 8/9/20 9/8/20  Aicha Rojas MD   omeprazole (PRILOSEC) 40 MG capsule Take 1 capsule (40 mg total) by mouth once daily.  Patient not taking: Reported on 8/11/2020 8/9/20 8/9/21  Aicha Rojas MD   omeprazole (PRILOSEC) 40 MG capsule Take 1 capsule (40 mg total) by mouth every morning. 9/8/20 9/8/21  Mainor Madison MD   ondansetron (ZOFRAN) 4 MG tablet Take 4 mg by mouth every 8 (eight) hours as needed. 7/21/20   Historical Provider, MD   rOPINIRole (REQUIP)  0.25 MG tablet Take 1 tablet (0.25 mg total) by mouth every evening. 8/9/20 8/9/21  Aicha Rojas MD   spironolactone (ALDACTONE) 50 MG tablet Take 1 tablet (50 mg total) by mouth once daily. 8/10/20 8/10/21  Aicha Rojas MD   traZODone (DESYREL) 100 MG tablet Take 1 tablet (100 mg total) by mouth every evening. 8/9/20 8/9/21  Aicha Rojas MD     Scheduled Meds:    albumin human 25%  125 g Intravenous Once    escitalopram oxalate  5 mg Oral Daily    lactulose  10 g Oral BID    lidocaine  1 patch Transdermal Q24H    midodrine  15 mg Oral Q8H    octreotide  100 mcg Subcutaneous Q8H    pantoprazole  40 mg Oral Daily    rOPINIRole  0.25 mg Oral QHS    sodium zirconium cyclosilicate  10 g Oral TID     Continuous Infusions:   PRN Meds:acetaminophen, albuterol-ipratropium, dextrose 50%, dextrose 50%, glucagon (human recombinant), glucose, glucose, influenza, ondansetron, sodium chloride 0.9%, traZODone  Anticoagulants/Antiplatelets: no anticoagulation    Allergies: Review of patient's allergies indicates:  No Known Allergies  Sedation Hx: have not been any systemic reactions    Vitals:  Temp: 97.9 °F (36.6 °C) (09/09/20 1124)  Pulse: 93 (09/09/20 1124)  Resp: 20 (09/09/20 1124)  BP: 120/65 (09/09/20 1124)  SpO2: 100 % (09/09/20 1124)     Physical Exam:  ASA: 3  Mallampati: n/a    General: no acute distress  Mental Status: alert and oriented to person, place and time  HEENT: normocephalic, atraumatic  Chest: unlabored breathing  Heart: regular heart rate  Abdomen: distended  Extremity: moves all extremities    Plan: ultrasound guided paracentesis  Sedation Plan: local    AMARJIT Walker, NANCYP  Interventional Radiology  [phone] [facility]

## 2020-09-09 NOTE — PLAN OF CARE
(Physician in Lead of Transfers)   Outside Transfer Acceptance Note / Regional Referral Center      Upon patient arrival to floor, please call extension [phone] (if no answer, this will flip to a beeper, so enter your call back number) for Hospital Medicine admit team assignment and for additional admit orders for the patient.  Do not page the attending physician associated with the patient on arrival (this physician may not be on duty at the time of arrival).  Rather, always call [phone] to reach the triage physician for orders and team assignment.      Transferring Physician: Dr. Rodríguez    Accepting Physician: Armida Horn MD    Date of Acceptance: 09/09/2020    Transferring Facility: [place]    Reason for Transfer: needs hepatology    Report from Transferring Physician/Hospital course: The patient is a 59 y/o male with PMH of alcoholic liver cirrhosis and HTN who presented for abnormal labs after his follow up appointment with hepatology. He had had generalized weakness and fatigue. CT abdomen showed: 1. Marked heterogeneity of the liver is nonspecific, but likely indicates diffuse heterogeneous fatty infiltration of the liver.  2. Developed large volume ascites  3. Dependent increased radiodensity within the gallbladder, likely sludge.  4. Prominent colonic diverticulosis  5. Mild anasarca    WBC 26K; INR 1.3; normal platelets; AST ALT 30s-50s; ammonia 36, LA 3.0, bili improved to 3.7; creatinine 2.2 from normal baseline; sodium 122. He was given some IVF. He has been on lasix and spironolactone. The case was discussed with hepatology and they would like him transferred for evaluation.         Labs & Radiographs: see EPIC      To Do List:   1) Telemetry  2) Hepatology consult       Armida Horn MD  Hospital Medicine Staff

## 2020-09-09 NOTE — ED NOTES
[facility] from transfer center called.  Pt accepted by Dr Horn, going to [facility] room 6362.  Call report @ [phone].  [facility] will arrange transport, which should arrive within the hour.

## 2020-09-09 NOTE — PROGRESS NOTES
Paracentesis complete, 6400 MLs removed. Specimen sent to lab. 200 ml Albumin administered per protocol. Dressing applied to left abd puncture site, dressing clean dry and intact.  Pt denies pain and discomfort. Pt to room by transport. Report called to floor YADY Schultz.

## 2020-09-09 NOTE — PLAN OF CARE
No acute events since arrival to unit. VSS. Bedside monitoring in use. Pt resting. Bed alarm set. No needs at this time. Call bell in reach. Will continue to monitor.

## 2020-09-10 LAB
ALBUMIN SERPL BCP-MCNC: 2.8 G/DL (ref 3.5–5.2)
ALP SERPL-CCNC: 229 U/L (ref 55–135)
ALT SERPL W/O P-5'-P-CCNC: 23 U/L (ref 10–44)
ANION GAP SERPL CALC-SCNC: 9 MMOL/L (ref 8–16)
AST SERPL-CCNC: 42 U/L (ref 10–40)
BACTERIA UR CULT: ABNORMAL
BASOPHILS # BLD AUTO: 0.05 K/UL (ref 0–0.2)
BASOPHILS NFR BLD: 0.3 % (ref 0–1.9)
BILIRUB SERPL-MCNC: 3 MG/DL (ref 0.1–1)
BUN SERPL-MCNC: 37 MG/DL (ref 6–20)
CALCIUM SERPL-MCNC: 8.1 MG/DL (ref 8.7–10.5)
CHLORIDE SERPL-SCNC: 94 MMOL/L (ref 95–110)
CO2 SERPL-SCNC: 21 MMOL/L (ref 23–29)
CREAT SERPL-MCNC: 1.7 MG/DL (ref 0.5–1.4)
DIFFERENTIAL METHOD: ABNORMAL
EOSINOPHIL # BLD AUTO: 1 K/UL (ref 0–0.5)
EOSINOPHIL NFR BLD: 5.2 % (ref 0–8)
ERYTHROCYTE [DISTWIDTH] IN BLOOD BY AUTOMATED COUNT: 13.5 % (ref 11.5–14.5)
EST. GFR  (AFRICAN AMERICAN): 50.3 ML/MIN/1.73 M^2
EST. GFR  (NON AFRICAN AMERICAN): 43.5 ML/MIN/1.73 M^2
GLUCOSE SERPL-MCNC: 120 MG/DL (ref 70–110)
HBV CORE AB SERPL QL IA: NEGATIVE
HBV SURFACE AB SER-ACNC: NEGATIVE M[IU]/ML
HBV SURFACE AG SERPL QL IA: NEGATIVE
HCT VFR BLD AUTO: 33.6 % (ref 40–54)
HCV AB SERPL QL IA: NEGATIVE
HEPATITIS A ANTIBODY, IGG: NEGATIVE
HGB BLD-MCNC: 11.4 G/DL (ref 14–18)
HIV 1+2 AB+HIV1 P24 AG SERPL QL IA: NEGATIVE
IMM GRANULOCYTES # BLD AUTO: 0.12 K/UL (ref 0–0.04)
IMM GRANULOCYTES NFR BLD AUTO: 0.6 % (ref 0–0.5)
INR PPP: 1.3 (ref 0.8–1.2)
LACTATE SERPL-SCNC: 1.8 MMOL/L (ref 0.5–2.2)
LYMPHOCYTES # BLD AUTO: 2.3 K/UL (ref 1–4.8)
LYMPHOCYTES NFR BLD: 11.9 % (ref 18–48)
MAGNESIUM SERPL-MCNC: 2 MG/DL (ref 1.6–2.6)
MCH RBC QN AUTO: 34.4 PG (ref 27–31)
MCHC RBC AUTO-ENTMCNC: 33.9 G/DL (ref 32–36)
MCV RBC AUTO: 102 FL (ref 82–98)
MONOCYTES # BLD AUTO: 0.8 K/UL (ref 0.3–1)
MONOCYTES NFR BLD: 4.1 % (ref 4–15)
NEUTROPHILS # BLD AUTO: 14.8 K/UL (ref 1.8–7.7)
NEUTROPHILS NFR BLD: 77.9 % (ref 38–73)
NRBC BLD-RTO: 0 /100 WBC
PHOSPHATE SERPL-MCNC: 4.1 MG/DL (ref 2.7–4.5)
PLATELET # BLD AUTO: 203 K/UL (ref 150–350)
PMV BLD AUTO: 9.6 FL (ref 9.2–12.9)
POTASSIUM SERPL-SCNC: 4 MMOL/L (ref 3.5–5.1)
PROT SERPL-MCNC: 5.2 G/DL (ref 6–8.4)
PROTHROMBIN TIME: 14.7 SEC (ref 9–12.5)
RBC # BLD AUTO: 3.31 M/UL (ref 4.6–6.2)
SODIUM SERPL-SCNC: 124 MMOL/L (ref 136–145)
WBC # BLD AUTO: 19.06 K/UL (ref 3.9–12.7)

## 2020-09-10 PROCEDURE — 63600175 PHARM REV CODE 636 W HCPCS: Performed by: HOSPITALIST

## 2020-09-10 PROCEDURE — 86682 HELMINTH ANTIBODY: CPT

## 2020-09-10 PROCEDURE — 25000003 PHARM REV CODE 250: Performed by: HOSPITALIST

## 2020-09-10 PROCEDURE — 86592 SYPHILIS TEST NON-TREP QUAL: CPT

## 2020-09-10 PROCEDURE — 36415 COLL VENOUS BLD VENIPUNCTURE: CPT

## 2020-09-10 PROCEDURE — 86706 HEP B SURFACE ANTIBODY: CPT

## 2020-09-10 PROCEDURE — 20600001 HC STEP DOWN PRIVATE ROOM

## 2020-09-10 PROCEDURE — 84100 ASSAY OF PHOSPHORUS: CPT

## 2020-09-10 PROCEDURE — 80053 COMPREHEN METABOLIC PANEL: CPT

## 2020-09-10 PROCEDURE — 85610 PROTHROMBIN TIME: CPT

## 2020-09-10 PROCEDURE — 86803 HEPATITIS C AB TEST: CPT

## 2020-09-10 PROCEDURE — 86787 VARICELLA-ZOSTER ANTIBODY: CPT

## 2020-09-10 PROCEDURE — 86665 EPSTEIN-BARR CAPSID VCA: CPT

## 2020-09-10 PROCEDURE — 85025 COMPLETE CBC W/AUTO DIFF WBC: CPT

## 2020-09-10 PROCEDURE — 99233 SBSQ HOSP IP/OBS HIGH 50: CPT | Mod: ,,, | Performed by: HOSPITALIST

## 2020-09-10 PROCEDURE — 83735 ASSAY OF MAGNESIUM: CPT

## 2020-09-10 PROCEDURE — 99233 PR SUBSEQUENT HOSPITAL CARE,LEVL III: ICD-10-PCS | Mod: ,,, | Performed by: HOSPITALIST

## 2020-09-10 PROCEDURE — 86703 HIV-1/HIV-2 1 RESULT ANTBDY: CPT

## 2020-09-10 PROCEDURE — 86480 TB TEST CELL IMMUN MEASURE: CPT

## 2020-09-10 PROCEDURE — 86704 HEP B CORE ANTIBODY TOTAL: CPT

## 2020-09-10 PROCEDURE — 83605 ASSAY OF LACTIC ACID: CPT

## 2020-09-10 PROCEDURE — 86790 VIRUS ANTIBODY NOS: CPT

## 2020-09-10 PROCEDURE — 86644 CMV ANTIBODY: CPT

## 2020-09-10 PROCEDURE — 87340 HEPATITIS B SURFACE AG IA: CPT

## 2020-09-10 PROCEDURE — P9047 ALBUMIN (HUMAN), 25%, 50ML: HCPCS | Mod: JG | Performed by: HOSPITALIST

## 2020-09-10 RX ORDER — CIPROFLOXACIN 500 MG/1
500 TABLET ORAL EVERY 24 HOURS
Status: DISCONTINUED | OUTPATIENT
Start: 2020-09-11 | End: 2020-09-18 | Stop reason: HOSPADM

## 2020-09-10 RX ORDER — LIDOCAINE HYDROCHLORIDE 10 MG/ML
INJECTION INFILTRATION; PERINEURAL
Status: DISPENSED
Start: 2020-09-10 | End: 2020-09-11

## 2020-09-10 RX ORDER — LACTULOSE 10 G/15ML
20 SOLUTION ORAL 2 TIMES DAILY
Status: DISCONTINUED | OUTPATIENT
Start: 2020-09-10 | End: 2020-09-18 | Stop reason: HOSPADM

## 2020-09-10 RX ORDER — ENOXAPARIN SODIUM 100 MG/ML
40 INJECTION SUBCUTANEOUS EVERY 24 HOURS
Status: DISCONTINUED | OUTPATIENT
Start: 2020-09-10 | End: 2020-09-13

## 2020-09-10 RX ORDER — GADOBUTROL 604.72 MG/ML
10 INJECTION INTRAVENOUS
Status: COMPLETED | OUTPATIENT
Start: 2020-09-11 | End: 2020-09-10

## 2020-09-10 RX ORDER — ESCITALOPRAM OXALATE 5 MG/1
5 TABLET ORAL DAILY
Status: DISCONTINUED | OUTPATIENT
Start: 2020-09-11 | End: 2020-09-18 | Stop reason: HOSPADM

## 2020-09-10 RX ORDER — ALBUMIN HUMAN 250 G/1000ML
25 SOLUTION INTRAVENOUS 2 TIMES DAILY
Status: DISCONTINUED | OUTPATIENT
Start: 2020-09-10 | End: 2020-09-11

## 2020-09-10 RX ADMIN — ALBUMIN (HUMAN) 25 G: 12.5 SOLUTION INTRAVENOUS at 09:09

## 2020-09-10 RX ADMIN — MIDODRINE HYDROCHLORIDE 15 MG: 5 TABLET ORAL at 09:09

## 2020-09-10 RX ADMIN — CEFEPIME 2 G: 2 INJECTION, POWDER, FOR SOLUTION INTRAVENOUS at 05:09

## 2020-09-10 RX ADMIN — MIDODRINE HYDROCHLORIDE 15 MG: 5 TABLET ORAL at 05:09

## 2020-09-10 RX ADMIN — OCTREOTIDE ACETATE 100 MCG: 100 INJECTION, SOLUTION INTRAVENOUS; SUBCUTANEOUS at 02:09

## 2020-09-10 RX ADMIN — MIDODRINE HYDROCHLORIDE 15 MG: 5 TABLET ORAL at 02:09

## 2020-09-10 RX ADMIN — GADOBUTROL 10 ML: 604.72 INJECTION INTRAVENOUS at 11:09

## 2020-09-10 RX ADMIN — OCTREOTIDE ACETATE 100 MCG: 100 INJECTION, SOLUTION INTRAVENOUS; SUBCUTANEOUS at 05:09

## 2020-09-10 RX ADMIN — ROPINIROLE HYDROCHLORIDE 0.25 MG: 0.25 TABLET, FILM COATED ORAL at 09:09

## 2020-09-10 RX ADMIN — LACTULOSE 20 G: 20 SOLUTION ORAL at 08:09

## 2020-09-10 RX ADMIN — PANTOPRAZOLE SODIUM 40 MG: 40 TABLET, DELAYED RELEASE ORAL at 08:09

## 2020-09-10 RX ADMIN — ESCITALOPRAM 5 MG: 5 TABLET, FILM COATED ORAL at 08:09

## 2020-09-10 RX ADMIN — ENOXAPARIN SODIUM 40 MG: 40 INJECTION SUBCUTANEOUS at 05:09

## 2020-09-10 RX ADMIN — OCTREOTIDE ACETATE 100 MCG: 100 INJECTION, SOLUTION INTRAVENOUS; SUBCUTANEOUS at 09:09

## 2020-09-10 RX ADMIN — LACTULOSE 20 G: 20 SOLUTION ORAL at 05:09

## 2020-09-10 NOTE — PHARMACY MED REC
"Admission Medication Reconciliation - Pharmacy Consult Note    The home medication history was taken by Melania Spann, Pharmacy Tech.     You may go to "Admission" then "Reconcile Home Medications" tabs to review and/or act upon these items.      No issues noted with the medication reconciliation.    Quita Delatorre, PharmD, BCPS  [id number]          .    .          "

## 2020-09-10 NOTE — PLAN OF CARE
HOSPITAL MEDICINE PLAN OF CARE    Patient admitted over night. Chart reviewed. Patient seen and evaluated on rounds today.  Pt with acute on chronic low back pain , stated from laying in bed  Denies abd pain or nausea, no fevers of chills     WBC 26  With MERLY with Cr up to 2. K+ 5.9 --> 4.7 with meds   Concern for SBP  S/p LVP today , ascitic fluid without WBC elevation     MELD-Na score: 28 at 9/9/2020  4:29 PM  MELD score: 20 at 9/9/2020  4:29 PM  Calculated from:  Serum Creatinine: 2.0 mg/dL at 9/9/2020  4:29 PM  Serum Sodium: 122 mmol/L (Rounded to 125 mmol/L) at 9/9/2020  4:29 PM  Total Bilirubin: 4.1 mg/dL at 9/9/2020  4:38 AM  INR(ratio): 1.2 at 9/9/2020  4:37 AM  Age: 60 years     Plan :  - f/u cx- blood and ascitic fluid . Has had hx of SBP  - IV cefepime at this time, until cx result  - HRS meds -with IV albumin, octreotide, and midodrine  - monitor K + and Cr. redose sodium zirconium cyclosilicate if K+ increases  - mental status at baseline - cont po lactulose   - hepatology following       Signing Physician:     Aicha Rojas MD  Department of Hospital Medicine   [facility]  Pager [phone]  Spectra [phone]  9/9/2020

## 2020-09-10 NOTE — PLAN OF CARE
Mikala Etienne MD   [address] [facility]*       [facility] [address] [place] [address] 97[facility]  [address] [facility] [address]  Phone: [phone] Fax: [phone]     Payor: MEDICAID / Plan: [facility] ([facility]) / Product Type: Managed Medicaid /       09/10/20 1440   Discharge Assessment   Assessment Type Discharge Planning Assessment   Assessment information obtained from? Medical Record   Prior to hospitilization cognitive status: Alert/Oriented   Prior to hospitalization functional status: Independent   Current cognitive status: Unable to Assess   Current Functional Status: Assistive Equipment;Needs Assistance   Facility Arrived From: Transfer from [facility].   Lives With spouse;child(butch), adult   Able to Return to Prior Arrangements yes   Is patient able to care for self after discharge? Unable to determine at this time (comments)   Who are your caregiver(s) and their phone number(s)? Dee Dee Edvin (spouse) [phone]   Readmission Within the Last 30 Days no previous admission in last 30 days   Patient currently being followed by outpatient case management? No   Patient currently receives any other outside agency services? No   Equipment Currently Used at Home none   Does the patient receive services at the [facility]? No   Discharge Plan A Home with family   Discharge Plan B Home with family   DME Needed Upon Discharge  other (see comments)  (TBD)

## 2020-09-10 NOTE — PLAN OF CARE
Problem: Adult Inpatient Plan of Care  Goal: Plan of Care Review  Outcome: Ongoing, Progressing  Goal: Optimal Comfort and Wellbeing  Outcome: Ongoing, Progressing     Problem: Fall Injury Risk  Goal: Absence of Fall and Fall-Related Injury  Outcome: Ongoing, Progressing     POC reviewed with patient. AAOX4. VVS. BM X3. Up in chair tolerated well. Safety maintained. No acute changes. No complaint of discomfort/pain. Call light in reach. [facility]

## 2020-09-10 NOTE — PLAN OF CARE
Problem: Adult Inpatient Plan of Care  Goal: Plan of Care Review  Outcome: Ongoing, Progressing   Plan of care reviewed with patient. AAOx4. VSS.  No acute events this shift. Bed In lowest position, call light within reach,monitoring ongoing

## 2020-09-10 NOTE — PLAN OF CARE
09/10/20 1445   Post-Acute Status   Post-Acute Authorization Other   Other Status No Post-Acute Service Needs   Discharge Delays None known at this time

## 2020-09-11 ENCOUNTER — DOCUMENTATION ONLY (OUTPATIENT)
Dept: PHARMACY | Facility: HOSPITAL | Age: 60
End: 2020-09-11

## 2020-09-11 ENCOUNTER — TELEPHONE (OUTPATIENT)
Dept: TRANSPLANT | Facility: CLINIC | Age: 60
End: 2020-09-11

## 2020-09-11 LAB
ALBUMIN SERPL BCP-MCNC: 3.1 G/DL (ref 3.5–5.2)
ALP SERPL-CCNC: 185 U/L (ref 55–135)
ALT SERPL W/O P-5'-P-CCNC: 16 U/L (ref 10–44)
ANION GAP SERPL CALC-SCNC: 11 MMOL/L (ref 8–16)
AST SERPL-CCNC: 30 U/L (ref 10–40)
BASOPHILS # BLD AUTO: 0.09 K/UL (ref 0–0.2)
BASOPHILS NFR BLD: 0.5 % (ref 0–1.9)
BILIRUB SERPL-MCNC: 2.5 MG/DL (ref 0.1–1)
BUN SERPL-MCNC: 32 MG/DL (ref 6–20)
CALCIUM SERPL-MCNC: 7.9 MG/DL (ref 8.7–10.5)
CHLORIDE SERPL-SCNC: 96 MMOL/L (ref 95–110)
CMV IGG SERPL QL IA: NORMAL
CO2 SERPL-SCNC: 19 MMOL/L (ref 23–29)
COMPLEXED PSA SERPL-MCNC: 0.04 NG/ML (ref 0–4)
CREAT SERPL-MCNC: 1.5 MG/DL (ref 0.5–1.4)
DIFFERENTIAL METHOD: ABNORMAL
EOSINOPHIL # BLD AUTO: 1.1 K/UL (ref 0–0.5)
EOSINOPHIL NFR BLD: 5.7 % (ref 0–8)
ERYTHROCYTE [DISTWIDTH] IN BLOOD BY AUTOMATED COUNT: 13.6 % (ref 11.5–14.5)
EST. GFR  (AFRICAN AMERICAN): 58.5 ML/MIN/1.73 M^2
EST. GFR  (NON AFRICAN AMERICAN): 50.6 ML/MIN/1.73 M^2
GAMMA INTERFERON BACKGROUND BLD IA-ACNC: 0.01 IU/ML
GLUCOSE SERPL-MCNC: 118 MG/DL (ref 70–110)
HCT VFR BLD AUTO: 33.7 % (ref 40–54)
HGB BLD-MCNC: 11 G/DL (ref 14–18)
IMM GRANULOCYTES # BLD AUTO: 0.11 K/UL (ref 0–0.04)
IMM GRANULOCYTES NFR BLD AUTO: 0.6 % (ref 0–0.5)
INR PPP: 1.3 (ref 0.8–1.2)
LYMPHOCYTES # BLD AUTO: 2.5 K/UL (ref 1–4.8)
LYMPHOCYTES NFR BLD: 12.8 % (ref 18–48)
M TB IFN-G CD4+ BCKGRND COR BLD-ACNC: 0.01 IU/ML
MAGNESIUM SERPL-MCNC: 2.1 MG/DL (ref 1.6–2.6)
MCH RBC QN AUTO: 34 PG (ref 27–31)
MCHC RBC AUTO-ENTMCNC: 32.6 G/DL (ref 32–36)
MCV RBC AUTO: 104 FL (ref 82–98)
MITOGEN IGNF BCKGRD COR BLD-ACNC: 6.04 IU/ML
MONOCYTES # BLD AUTO: 0.9 K/UL (ref 0.3–1)
MONOCYTES NFR BLD: 4.7 % (ref 4–15)
NEUTROPHILS # BLD AUTO: 15.1 K/UL (ref 1.8–7.7)
NEUTROPHILS NFR BLD: 75.7 % (ref 38–73)
NRBC BLD-RTO: 0 /100 WBC
PHOSPHATE SERPL-MCNC: 3 MG/DL (ref 2.7–4.5)
PLATELET # BLD AUTO: 198 K/UL (ref 150–350)
PMV BLD AUTO: 9.7 FL (ref 9.2–12.9)
POTASSIUM SERPL-SCNC: 3.4 MMOL/L (ref 3.5–5.1)
PROT SERPL-MCNC: 5.1 G/DL (ref 6–8.4)
PROTHROMBIN TIME: 14.4 SEC (ref 9–12.5)
RBC # BLD AUTO: 3.24 M/UL (ref 4.6–6.2)
RPR SER QL: NORMAL
SODIUM SERPL-SCNC: 126 MMOL/L (ref 136–145)
STRONGYLOIDES ANTIBODY IGG: NEGATIVE
TB GOLD PLUS: NEGATIVE
TB2 - NIL: 0.01 IU/ML
WBC # BLD AUTO: 19.92 K/UL (ref 3.9–12.7)

## 2020-09-11 PROCEDURE — 25500020 PHARM REV CODE 255: Performed by: HOSPITALIST

## 2020-09-11 PROCEDURE — 99233 PR SUBSEQUENT HOSPITAL CARE,LEVL III: ICD-10-PCS | Mod: NTX,,, | Performed by: HOSPITALIST

## 2020-09-11 PROCEDURE — 84100 ASSAY OF PHOSPHORUS: CPT

## 2020-09-11 PROCEDURE — 85025 COMPLETE CBC W/AUTO DIFF WBC: CPT

## 2020-09-11 PROCEDURE — 63600175 PHARM REV CODE 636 W HCPCS: Performed by: HOSPITALIST

## 2020-09-11 PROCEDURE — 99233 SBSQ HOSP IP/OBS HIGH 50: CPT | Mod: NTX,,, | Performed by: HOSPITALIST

## 2020-09-11 PROCEDURE — 82105 ALPHA-FETOPROTEIN SERUM: CPT

## 2020-09-11 PROCEDURE — 97802 MEDICAL NUTRITION INDIV IN: CPT

## 2020-09-11 PROCEDURE — 25000003 PHARM REV CODE 250: Performed by: HOSPITALIST

## 2020-09-11 PROCEDURE — 20600001 HC STEP DOWN PRIVATE ROOM: Mod: NTX

## 2020-09-11 PROCEDURE — 83735 ASSAY OF MAGNESIUM: CPT

## 2020-09-11 PROCEDURE — 85610 PROTHROMBIN TIME: CPT

## 2020-09-11 PROCEDURE — A9585 GADOBUTROL INJECTION: HCPCS | Performed by: HOSPITALIST

## 2020-09-11 PROCEDURE — 97535 SELF CARE MNGMENT TRAINING: CPT

## 2020-09-11 PROCEDURE — 80053 COMPREHEN METABOLIC PANEL: CPT

## 2020-09-11 PROCEDURE — P9047 ALBUMIN (HUMAN), 25%, 50ML: HCPCS | Mod: JG | Performed by: HOSPITALIST

## 2020-09-11 PROCEDURE — 63600175 PHARM REV CODE 636 W HCPCS: Mod: NTX | Performed by: HOSPITALIST

## 2020-09-11 PROCEDURE — 25000003 PHARM REV CODE 250: Mod: NTX | Performed by: HOSPITALIST

## 2020-09-11 PROCEDURE — 84153 ASSAY OF PSA TOTAL: CPT

## 2020-09-11 RX ORDER — POTASSIUM CHLORIDE 20 MEQ/1
40 TABLET, EXTENDED RELEASE ORAL ONCE
Status: COMPLETED | OUTPATIENT
Start: 2020-09-11 | End: 2020-09-11

## 2020-09-11 RX ADMIN — LACTULOSE 20 G: 20 SOLUTION ORAL at 04:09

## 2020-09-11 RX ADMIN — MIDODRINE HYDROCHLORIDE 15 MG: 5 TABLET ORAL at 08:09

## 2020-09-11 RX ADMIN — ALBUMIN (HUMAN) 25 G: 12.5 SOLUTION INTRAVENOUS at 08:09

## 2020-09-11 RX ADMIN — ENOXAPARIN SODIUM 40 MG: 40 INJECTION SUBCUTANEOUS at 04:09

## 2020-09-11 RX ADMIN — MIDODRINE HYDROCHLORIDE 15 MG: 5 TABLET ORAL at 05:09

## 2020-09-11 RX ADMIN — ROPINIROLE HYDROCHLORIDE 0.25 MG: 0.25 TABLET, FILM COATED ORAL at 08:09

## 2020-09-11 RX ADMIN — POTASSIUM CHLORIDE 40 MEQ: 1500 TABLET, EXTENDED RELEASE ORAL at 08:09

## 2020-09-11 RX ADMIN — ESCITALOPRAM OXALATE 5 MG: 5 TABLET, FILM COATED ORAL at 08:09

## 2020-09-11 RX ADMIN — MIDODRINE HYDROCHLORIDE 15 MG: 5 TABLET ORAL at 02:09

## 2020-09-11 RX ADMIN — OCTREOTIDE ACETATE 100 MCG: 100 INJECTION, SOLUTION INTRAVENOUS; SUBCUTANEOUS at 05:09

## 2020-09-11 RX ADMIN — PANTOPRAZOLE SODIUM 40 MG: 40 TABLET, DELAYED RELEASE ORAL at 08:09

## 2020-09-11 RX ADMIN — LACTULOSE 20 G: 20 SOLUTION ORAL at 08:09

## 2020-09-11 RX ADMIN — CIPROFLOXACIN 500 MG: 500 TABLET, FILM COATED ORAL at 08:09

## 2020-09-11 NOTE — PLAN OF CARE
Recommendations     1. Continue current Low sodium diet, fluid restriction per MD.   2. Add Boost Plus ONS BID to aid in caloric intake.   3. RD to monitor & follow-up.     Goals: Meet % EEN, EPN by RD f/u date  Nutrition Goal Status: new  Communication of RD Recs: reviewed with RN

## 2020-09-11 NOTE — SUBJECTIVE & OBJECTIVE
Review of Systems   Constitutional: Positive for fatigue. Negative for chills and fever.   HENT: Negative for nosebleeds and trouble swallowing.    Respiratory: Negative for cough and shortness of breath.    Cardiovascular: Negative for chest pain and leg swelling.   Gastrointestinal: Negative for abdominal distention, abdominal pain, blood in stool, constipation, diarrhea, nausea and vomiting.   Genitourinary: Negative for dysuria and hematuria.   Musculoskeletal: Negative for arthralgias.   Skin: Negative for pallor.   Neurological: Negative for light-headedness and headaches.   Hematological: Does not bruise/bleed easily.   Psychiatric/Behavioral: Negative for behavioral problems and confusion.       Past Medical History:   Diagnosis Date    Hepatitis     Hypertension     Liver failure        Past Surgical History:   Procedure Laterality Date    ESOPHAGOGASTRODUODENOSCOPY N/A 2020    Procedure: EGD (ESOPHAGOGASTRODUODENOSCOPY);  Surgeon: Jhonathan Lieberman MD;  Location: [address];  Service: Endoscopy;  Laterality: N/A;       Family history of liver disease: No    Review of patient's allergies indicates:  No Known Allergies      Tobacco Use    Smoking status: Never Smoker    Smokeless tobacco: Never Used   Substance and Sexual Activity    Alcohol use: Not Currently     Frequency: Never    Drug use: Not on file    Sexual activity: Not on file       Medications Prior to Admission   Medication Sig Dispense Refill Last Dose    ciprofloxacin HCl (CIPRO) 500 MG tablet Take 1 tablet (500 mg) by mouth twice daily on 8/10 and then take 1 tablet daily thereafter 60 tablet 2     escitalopram oxalate (LEXAPRO) 5 MG Tab Take 1 tablet (5 mg total) by mouth once daily. 30 tablet 2     furosemide (LASIX) 20 MG tablet Take 1 tablet (20 mg total) by mouth once daily. 30 tablet 2     [] lactulose (CHRONULAC) 10 gram/15 mL solution Take 15 mLs (10 g total) by mouth 2 (two) times a day. Take enough to have  3 bowel movements per day 900 mL 2     midodrine (PROAMATINE) 5 MG Tab Take 3 tablets (15 mg total) by mouth every 8 (eight) hours. 270 tablet 2     omeprazole (PRILOSEC) 40 MG capsule Take 1 capsule (40 mg total) by mouth every morning. 30 capsule 11     rOPINIRole (REQUIP) 0.25 MG tablet Take 1 tablet (0.25 mg total) by mouth every evening. 30 tablet 2     spironolactone (ALDACTONE) 50 MG tablet Take 1 tablet (50 mg total) by mouth once daily. 30 tablet 2     traZODone (DESYREL) 100 MG tablet Take 1 tablet (100 mg total) by mouth every evening. 30 tablet 2     ondansetron (ZOFRAN) 4 MG tablet Take 4 mg by mouth every 8 (eight) hours as needed.          Objective:     Vital Signs (Most Recent):  Temp: 98.2 °F (36.8 °C) (09/11/20 1536)  Pulse: 84 (09/11/20 1536)  Resp: 18 (09/11/20 1536)  BP: 110/66 (09/11/20 1536)  SpO2: 100 % (09/11/20 1536) Vital Signs (24h Range):  Temp:  [97.7 °F (36.5 °C)-98.5 °F (36.9 °C)] 98.2 °F (36.8 °C)  Pulse:  [72-85] 84  Resp:  [17-20] 18  SpO2:  [98 %-100 %] 100 %  BP: ()/(61-74) 110/66     Weight: 91.1 kg (200 lb 13.4 oz) (09/11/20 1200)  Body mass index is 35.58 kg/m².    Physical Exam  Vitals signs and nursing note reviewed.   Constitutional:       General: He is not in acute distress.     Appearance: He is well-developed.   Eyes:      General: No scleral icterus.  Neck:      Musculoskeletal: Neck supple.   Cardiovascular:      Rate and Rhythm: Normal rate and regular rhythm.      Heart sounds: No murmur.   Pulmonary:      Effort: Pulmonary effort is normal.      Breath sounds: Normal breath sounds.   Abdominal:      General: Bowel sounds are normal. There is no distension.      Palpations: Abdomen is soft.      Tenderness: There is no abdominal tenderness.   Musculoskeletal:      Right lower leg: No edema.      Left lower leg: No edema.   Skin:     General: Skin is warm.      Coloration: Skin is not jaundiced.      Findings: No rash.   Neurological:      Mental Status:  He is alert and oriented to person, place, and time. Mental status is at baseline.      Comments: No asterixis.    Psychiatric:         Behavior: Behavior normal.         MELD-Na score: 25 at 9/11/2020  5:23 AM  MELD score: 17 at 9/11/2020  5:23 AM  Calculated from:  Serum Creatinine: 1.5 mg/dL at 9/11/2020  5:23 AM  Serum Sodium: 126 mmol/L at 9/11/2020  5:23 AM  Total Bilirubin: 2.5 mg/dL at 9/11/2020  5:23 AM  INR(ratio): 1.3 at 9/11/2020  5:23 AM  Age: 60 years    Significant Labs:  Labs within the past month have been reviewed.    Significant Imaging:  Reviewed

## 2020-09-11 NOTE — NURSING
MRI complete, patient tolerated MRI well, heart rate 87, O2 sat. 96% on RA, patient assisted back to stretcher without difficulty, telemetry monitor reapplied, patient awaiting transport back to his room.

## 2020-09-11 NOTE — TELEPHONE ENCOUNTER
PRE EDUCATION TEACHING NOTE    Kenneth Pardo was seen in hospital today.  Handbook on pre-liver transplant information (see outline below) was given to the patient and time was allowed for questions.  Patient's wife taught separately due to time constraints..  Informed consent signed and written information given on selection criteria.      LIVER TRANSPLANT WORK-UP EDUCATION  I. NATIONAL REGISTRY LISTING  A. Information for listing  B. Regions  C. Per UNOS, can be listed at more than one center  II. SURGERY  A. Length  B. Complications: bleeding, infection  C. Central lines, drains, Gautam catheter, incision, endotracheal tube, NG tube  D. Transfusions, cell saver  III. SHORT TERM RECOVERY  A. ICU, PICU, Hospital stay  IV. LONG TERM RECOVERY  A. Labs at home  B. Clinic visits  C. Complications: infection, rejection, readmissions  D. Normal immunity and immunosuppression  E. Incidence of re-admit in 1st year  V. REJECTION  A. Incidence  B. Treatment: Solumedrol, Prograf, Thymoglobulin (actions and side effects)  VI. IMMUNOSUPPRESSIVES  A. Prednisone  B. Imuran/Cellcept  C. Cyclosporin/Prograf  D. Rapamune  E. Need for lifetime compliance  F. Actions and side effects  G. Costs  VII. RECURRENCE OF VIRAL HEPATITIS

## 2020-09-11 NOTE — TELEPHONE ENCOUNTER
PRE EDUCATION TEACHING NOTE  Patient is in hospital.  Wife and caregiver not able to be present.  The education below was with patient's wife via telephone.  Patient not available at this time.      LIVER TRANSPLANT WORK-UP EDUCATION  I. NATIONAL REGISTRY LISTING  A. Information for listing  B. Regions  C. Per UNOS, can be listed at more than one center  II. SURGERY  A. Length  B. Complications: bleeding, infection  C. Central lines, drains, Gautam catheter, incision, endotracheal tube, NG tube  D. Transfusions, cell saver  III. SHORT TERM RECOVERY  A. ICU, PICU, Hospital stay  IV. LONG TERM RECOVERY  A. Labs at home  B. Clinic visits  C. Complications: infection, rejection, readmissions  D. Normal immunity and immunosuppression  E. Incidence of re-admit in 1st year  V. REJECTION  A. Incidence  B. Treatment: Solumedrol, Prograf, Thymoglobulin (actions and side effects)  VI. IMMUNOSUPPRESSIVES  A. Prednisone  B. Imuran/Cellcept  C. Cyclosporin/Prograf  D. Rapamune  E. Need for lifetime compliance  F. Actions and side effects  G. Costs  VII. RECURRENCE OF VIRAL HEPATITIS

## 2020-09-11 NOTE — NURSING
patient arrived for MRI, patient assisted to MRI table without difficulty, MRI safe cardiac monitor and O2 monitor applied, heart rate 86, O2 sat. 99% on RA, no acute distress noted at this time, will continue to monitor closely.

## 2020-09-11 NOTE — PLAN OF CARE
09/11/20 1515   Discharge Reassessment   Assessment Type Discharge Planning Reassessment   Discharge Plan A Home with family   Discharge Plan B Home with family   DME Needed Upon Discharge  other (see comments)  (TBD)   Anticipated Discharge Disposition Home   Post-Acute Status   Post-Acute Authorization Other   Other Status No Post-Acute Service Needs

## 2020-09-11 NOTE — CONSULTS
"  [facility]  Adult Nutrition  Consult Note    SUMMARY     Recommendations    1. Continue current Low sodium diet, fluid restriction per MD.   2. Add Boost Plus ONS BID to aid in caloric intake.   3. RD to monitor & follow-up.    Goals: Meet % EEN, EPN by RD f/u date  Nutrition Goal Status: new  Communication of RD Recs: reviewed with RN    Reason for Assessment    Reason For Assessment: consult  Diagnosis: other (see comments)(Hyponatremia)  Relevant Medical History: Etoh abuse  Interdisciplinary Rounds: did not attend    General Information Comments: Pt seemed very frustrated at time of visit. Pt reports fair appetite, consuming 25-50% of meals, tolerating diet. Pt drinks Boost at home, will honor. Paracentesis complete yesterday - 6.4L removed. Pt reports fair appetite PTA & UBW of 203#. Chart review confirms weight history. Pt w/ no indicators of malnutrition, however at risk if PO intake remains poor. Low sodium diet education complete - pt verbalized understanding & paperwork provided.  Nutrition Discharge Planning: Adequate PO intake    Nutrition/Diet History    Patient Reported Diet/Restrictions/Preferences: low salt  Spiritual, Cultural Beliefs, Methodist Practices, Values that Affect Care: no  Factors Affecting Nutritional Intake: decreased appetite    Anthropometrics    Temp: 98.5 °F (36.9 °C)  Height Method: Stated  Height: 5' 3" (160 cm)  Height (inches): 63 in  Weight Method: Bed Scale  Weight: 91.1 kg (200 lb 13.4 oz)  Weight (lb): 200.84 lb  Ideal Body Weight (IBW), Male: 124 lb  % Ideal Body Weight, Male (lb): 161.97 %  BMI (Calculated): 35.6  BMI Grade: 35 - 39.9 - obesity - grade II  Usual Body Weight (UBW), k.3 kg  % Usual Body Weight: 98.91  % Weight Change From Usual Weight: -1.3 %    Lab/Procedures/Meds    Pertinent Labs Reviewed: reviewed  Pertinent Labs Comments: Na 126, BUN 32, Creat 1.5, GFR 50.6, Bili 2.5  Pertinent Medications Reviewed: reviewed  Pertinent " Medications Comments: Lactulose    Estimated/Assessed Needs    Weight Used For Calorie Calculations: 91.1 kg (200 lb 13.4 oz)     Energy Calorie Requirements (kcal): 2033 kcal/d  Energy Need Method: [place] Je[place](1.25 PAL)     Protein Requirements:  g/d (1-1.2 g/kg)  Weight Used For Protein Calculations: 91.1 kg (200 lb 13.4 oz)     Estimated Fluid Requirement Method: other (see comments)(Per MD or 1 mL/kcal)  RDA Method (mL): 2033    Nutrition Prescription Ordered    Current Diet Order: Low Na  Nutrition Order Comments: 1000 mL FR    Evaluation of Received Nutrient/Fluid Intake    Comments: LBM: 9/10    Tolerance: tolerating    Nutrition Risk    Level of Risk/Frequency of Follow-up: (1x/week)     Assessment and Plan    Nutrition Problem  Increased nutrient needs    Related to (etiology):   Physiological causes    Signs and Symptoms (as evidenced by):   Liver tx work-up    Interventions(treatment strategy):  Collaboration of nutrition care w/ other providers  [facility]    Nutrition Diagnosis Status:   New     Monitor and Evaluation    Food and Nutrient Intake: energy intake, food and beverage intake  Food and Nutrient Adminstration: diet order  Physical Activity and Function: nutrition-related ADLs and IADLs  Anthropometric Measurements: weight, weight change  Biochemical Data, Medical Tests and Procedures: inflammatory profile, lipid profile, glucose/endocrine profile, gastrointestinal profile, electrolyte and renal panel  Nutrition-Focused Physical Findings: overall appearance     Nutrition Follow-Up    RD Follow-up?: Yes

## 2020-09-11 NOTE — PROGRESS NOTES
Transplant Recipient Adult Psychosocial Assessment - Inpatient Evaluation     Pt did not have a caregiver present with him in the hospital. MAXIMO contacted the pt's wife ([phone]) on speaker phone for this assessment and provided her with caregiver education over the phone.     Kenneth Pardo  [address] [facility] [address]  Telephone Information:   Mobile [phone]   Home  [phone] (home)  Work  There is no work phone number on file.  E-mail  [email or web address]    Sex: male  YOB: 1960  Age: 60 y.o.    Encounter Date: 2020  [place] Citizen: yes  Primary Language: English   Needed: no    Emergency Contact:  Name: Dee Dee Pardo  Relationship: wife  Address:[address] [place] [id number]  Phone Numbers:  [phone] (mobile)    Family/Social Support:   Number of dependents/: Pt does not have any dependents in the home. Pt has two dogs in the home.  Marital history: Pt has been  to his wife for 30+ years.  Other family dynamics: Pt's parents are . Pt has 1 sister and 2 brothers. Pt has has 1 adult son that lives in the home with him.     Household Composition:  Name: Kenneth  Age: 60  Relationship: patient  Does person drive? no    Name: Dee Dee Pardo   Age: 59  Relationship: wife  Does person drive? yes    Name: Blanco Pardo   Age: 24  Relationship: son  Does person drive? no    Do you and your caregivers have access to reliable transportation? No - Pt's wife reports that she has a vehicle, but stated that it is not reliable. She did report that the patient's friend BOAZ or the pt's sister Leonela could transport the patient or patient's wife at any time.     PRIMARY CAREGIVER: Dee Dee Pardo, pt's wife, ([phone]) will be the primary caregiver. CONFIRMED      provided in-depth information to patient and caregiver regarding pre- and post-transplant caregiver role.   strongly encourages patient and caregiver to have  concrete plan regarding post-transplant care giving, including back-up caregiver(s) to ensure care giving needs are met as needed.    Patient and Caregiver states understanding all aspects of caregiver role/commitment and is able/willing/committed to being caregiver to the fullest extent necessary.    Patient and Caregiver verbalizes understanding of the education provided today and caregiver responsibilities.         remains available. Patient and Caregiver agree to contact  in a timely manner if concerns arise.      Able to take time off work without financial concerns: Pt's wife reports that she is unemployed and takes care of the patient. She also reports that their son, Blanco, is also unemployed but is currently looking for a job.    Additional Significant Others who will Assist with Transplant:  Name: Blanco Pardo ([phone]) CONFIRMED  Age: 24  City: [place] State: [place]  Relationship: son  Does person drive? no    Name: Puneet Nobles ([phone]) - Unconfirmed, SW left voicemail  Age: unknown  [place]  Relationship: friend  Does person drive? yes     Name: Leonela Hutson ([phone]) - Unconfirmed, SW left voicemail  Age: unknown  City: [place] State: [place]  Relationship: Sister  Does person drive? Yes       Living Will: no  Healthcare Power of : no  Advance Directives on file: <<no information> per medical record.  Verbally reviewed LW/HCPA information.   provided patient with copy of LW/HCPA documents and provided education on completion of forms.    Living Donors: No.    Highest Education Level: High School (9-12) or GED  Reading Ability: 12th grade  Reports difficulty with: hearing; pt reports hearing difficulties with his left ear at times.  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    Spouse/Significant Other Employment: Pt's wife is unemployed and stays home to provide care for her . Pt's son is also unemployed and currently looking for a job.     Disabled: no    Monthly Income:  Pt's wife reports that her and the pt currently do not have any income. Pt was on unemployment but stopped receiving benefits once his medical condition declined a couple weeks ago. She reports that she is currently in the process of signing the pt up for disability.     Able to afford all costs now and if transplanted, including medications: no  Patient verbalizes understanding of personal responsibilities related to transplant costs and the importance of having a financial plan to ensure that patients transplant costs are fully covered.      provided fundraising information/education.  Patient and Caregiver verbalizes understanding.   remains available.    Insurance:   Payor/Plan Subscr  Sex Relation Sub. Ins. ID Effective Group Num   1. MEDICAID - [place]* RONDA DAMON 1960 Male  [id number]* 20 01                                   [address]     Primary Insurance (for UNOS reporting): Public Insurance - Medicaid  Secondary Insurance (for UNOS reporting): None  Patient and Caregiver verbalizes clear understanding that patient may experience difficulty obtaining and/or be denied insurance coverage post-surgery. This includes and is not limited to disability insurance, life insurance, health insurance, burial insurance, long term care insurance, and other insurances.    Patient and Caregiver also reports understanding that future health concerns related to or unrelated to transplantation may not be covered by patient's insurance.  Resources and information provided and reviewed.      Patient and Caregiver provides verbal permission to release any necessary information to outside resources for patient care and discharge planning.  Resources  and information provided are reviewed.      Dialysis Adherence:  Patient reports none.     Infusion Service: patient utilizing? no  Home Health: patient utilizing? no  DME: no  Pulmonary/Cardiac Rehab: no   ADLS:  Pt reports that he is not independent in the home and his wife helps him with getting around and with other daily tasks such as dressing.    Adherence:   Pt reports that he does not have any difficulty making his medical appointments or taking his medications.  Adherence education and counseling provided.     Per History Section:  Past Medical History:   Diagnosis Date    Hepatitis     Hypertension     Liver failure      Social History     Tobacco Use    Smoking status: Never Smoker    Smokeless tobacco: Never Used   Substance Use Topics    Alcohol use: Not Currently     Frequency: Never     Social History     Substance and Sexual Activity   Drug Use Not on file     Social History     Substance and Sexual Activity   Sexual Activity Not on file       Per Today's Psychosocial:  Tobacco: none, patient denies any use. Pt reports his last use was when learning of his ESLD 4-5 months ago. Pt would smoke more than 1ppd before quitting.   Alcohol: none, patient denies any use. Pt reports that his last ETOH use was 4-5 months when learning of his ESLD. Pt reports that he was drinking around 1 pint of alcohol per night. Pt reports that after learning of his ESLD he stopped smoking and drinking and has not had any use since then. Pt reports that he has had no difficulty with quitting. Pt reports that he has gone to [facility] in [place], [place] to seek substance abuse treatment and plans on going again once discharging. SW provided the pt with a list of virtual IOP programs that are based out of [place], [place] as well as some virtual AA meeting resources. Pt expresses interest in completing substance abuse treatment to help with life long sobriety.   Illicit Drugs/Non-prescribed Medications:  "none, patient denies any use. Pt reports illicit drug use when he was younger, but not recently. Last use over a decade ago.     Patient and Caregiver states clear understanding of the potential impact of substance use as it relates to transplant candidacy and is aware of possible random substance screening.  Substance abstinence/cessation counseling, education and resources provided and reviewed.     Arrests/DWI/Treatment/Rehab: patient denies    Psychiatric History:    Mental Health: Pt reports that he has ongoing issues with anxiety.   Psychiatrist/Counselor: none  Medications:  Pt reports that he used to take Xanax, but now he takes another medication to help with anxiety. Pt was unsure of the name of the medication. "I take whatever the doctor gives me and it helps."  Suicide/Homicide Issues: Pt denies any SI/HI past or present.  Safety at home: Pt reports that he lives in a safe environment at home.     Knowledge: Patient and Caregiver states having clear understanding and realistic expectations regarding the potential risks and potential benefits of organ transplantation and organ donation, agrees to discuss with health care team members and support system members and to utilize available resources and express questions and/or concerns in order to further facilitate the pt informed decision-making.  Resources and information provided and reviewed.     Patient and Caregiver is aware of [facility] affiliation and/or partnership with agencies in home health care, LTAC, SNF, [facility], and other hospitals and clinics.    Understanding: Patient and Caregiver reports having a clear understanding of the many lifetime commitments involved with being a transplant recipient, including costs, compliance, medications, lab work, procedures, appointments, concrete and financial planning, preparedness, timely and appropriate communication of concerns, abstinence (ETOH, tobacco, illicit non-prescribed drugs), adherence to all " "health care team recommendations, support system and caregiver involvement, appropriate and timely resource utilization and follow-through, mental health counseling as needed/recommended, and patient and caregiver responsibilities.  Social Service Handbook, resources and detailed educational information provided and reviewed.  Educational information provided.    Patient and Caregiver also reports current and expected compliance with health care regime and states having a clear understanding of the importance of compliance.      Patient and Caregiver reports a clear understanding that risks and benefits may be involved with organ transplantation and with organ donation.      Patient and Caregiver also reports clear understanding that psychosocial risk factors may affect patient, and include but are not limited to feelings of depression, generalized anxiety, anxiety regarding dependence on others, post traumatic stress disorder, feelings of guilt and other emotional and/or mental concerns, and/or exacerbation of existing mental health concerns.  Detailed resources provided and discussed.     Patient and Caregiver agrees to access appropriate resources in a timely manner as needed and/or as recommended, and to communicate concerns appropriately.  Patient and Caregiver also reports a clear understanding of treatment options available.      reviewed education, provided additional information, and answered questions.    Feelings or Concerns: Pt reports "that he is tired of waiting" and stated that he wants things to just get better.    Coping: Identify Patient & Caregiver Strategies to [place]:   1. Currently & Pre-transplant - Pt reports that talking with his son and wife helps him with coping.    2. At the time of surgery - Pt reports that at the time of surgery he will utilize support from his son and wife to cope.   3. During post-Transplant & Recovery Period - Pt reports that he will watch television to " help with coping during recovery. He also reports that he will continue to spend time with his wife and son to assist with coping.    Goals: Start playing guitar again. Pt reports that he uses to play all the time but has not gotten to for years.  Patient referred to Vocational Rehabilitation.    Interview Behavior: Patient presents as alert and oriented x 4, communicative, cooperative and asking and answering questions appropriately. Pt was observed sitting up in the bed. Throughout the assessment, pt would repeat phrases to the . Pt kept reporting to SW that he just wants to get out of the hospital and that stated that this place is driving him crazy. Pt reported that he is very dependent on his wife as she is the one that takes care of him and handles all the finances in the home at this time. Pt's wife presented oriented to SW's questions over the phone and was communicative by asking and answering questions appropriately.      Transplant Social Work - Candidacy  Assessment/Plan:     Psychosocial Suitability: Patient presents as a high risk candidate for liver transplant at this time due to the following psychosocial risk factors: ETOH use and cigarette smoking less than 1 year ago; no income; and pt's primary caregiver without reliable transportation.  Pt reports that his last ETOH use was 4-5 months when learning of his ESLD. Pt reports that he was drinking around 1 pint of alcohol per night. Pt reports that after learning of his ESLD he stopped smoking and drinking and has not had any use since then. Pt reports that he has gone to [facility] in [place], [place] to seek substance abuse treatment and plans on going again once discharging. MAXIMO provided the pt with a list of virtual IOP programs that are based out of [place], [place] as well as some virtual AA meeting resources. Pt expresses interest in completing substance abuse treatment to help with life long sobriety. Pt's wife  reports that she has a vehicle, but stated that it is not reliable. She did report that the patient's friend BOAZ or the pt's sister Leonela, who both live near by, can transport the patient or patient's wife at any time. SW encouraged the pt and pt's wife to complete the pt's application for SSD and offered any assistance with applying. Protective factors includes a good support system and an adequate caregiver plan; adequate insurance coverage; and a willingness to complete substance abuse treatment and attend AA meetings.    Recommendations/Additional Comments:   -local lodging   -fundraising  -enrollment in IOP  -AA meeting attendance   -random PET testing  -finish signing up for SSD    Nathan Powell LMSW

## 2020-09-11 NOTE — NURSING
PHARM.D. PRE-TRANSPLANT NOTE:    This patient's medication therapy was evaluated as part of his pre-transplant evaluation.      The following general pharmacologic concerns were noted: currently hospitalized on HRS protocol; should resume home fluoxetine, lexapro and trazodone post transplant to prevent withdrawal.     The following concerns for post-operative pain management were noted: N/A    The following pharmacologic concerns related to HCV therapy were noted: N/a      This patient's medication profile was reviewed for considerations for DAA Hepatitis C therapy:    [x]  No current inducers of CYP 3A4 or PGP  [x]  No amiodarone on this patient's EMR profile in the last 24 months  [x]  No past or current atrial fibrillation on this patient's EMR profile       No current facility-administered medications for this visit.      No current outpatient medications on file.     Facility-Administered Medications Ordered in Other Visits   Medication Dose Route Frequency Provider Last Rate Last Dose    acetaminophen tablet 500 mg  500 mg Oral Q6H PRN Arup K. Jenni, DO   500 mg at 09/09/20 1126    albumin human 25% bottle 25 g  25 g Intravenous BID Naresh Franks MD   25 g at 09/11/20 0820    albuterol-ipratropium 2.5 mg-0.5 mg/3 mL nebulizer solution 3 mL  3 mL Nebulization Q4H PRN Arup K. Jenni, DO        ciprofloxacin HCl tablet 500 mg  500 mg Oral Q24H Naresh Franks MD   500 mg at 09/11/20 0830    dextrose 50% injection 12.5 g  12.5 g Intravenous PRN Arup K. Jenni, DO        dextrose 50% injection 25 g  25 g Intravenous PRN Arup K. Jenni, DO        enoxaparin injection 40 mg  40 mg Subcutaneous Q24H Naresh Franks MD   40 mg at 09/10/20 1701    escitalopram oxalate tablet 5 mg  5 mg Oral Daily Naresh Franks MD   5 mg at 09/11/20 0829    glucagon (human recombinant) injection 1 mg  1 mg Intramuscular PRN Arup K. Jenni, DO        glucose chewable tablet 16 g  16 g Oral PRN Arup KAracelis Jenni, DO        glucose chewable  tablet 24 g  24 g Oral PRN Arup K. Jenni, DO        influenza (QUADRIVALENT PF) vaccine 0.5 mL  0.5 mL Intramuscular vaccine x 1 dose Armida Horn MD        lactulose 20 gram/30 mL solution Soln 20 g  20 g Oral BID Naresh Franks MD   20 g at 09/11/20 0820    lidocaine 5 % patch 1 patch  1 patch Transdermal Q24H Aicha Rojas MD   1 patch at 09/09/20 1318    midodrine tablet 15 mg  15 mg Oral Q8H Arup K. Jenni, DO   15 mg at 09/11/20 0518    octreotide injection 100 mcg  100 mcg Subcutaneous Q8H Aicha Rojas MD   100 mcg at 09/11/20 0519    ondansetron injection 4 mg  4 mg Intravenous Q8H PRN Arup K. Jenni, DO        pantoprazole EC tablet 40 mg  40 mg Oral Daily Arup K. Jenni, DO   40 mg at 09/11/20 0820    rOPINIRole tablet 0.25 mg  0.25 mg Oral QHS Arup K. Jenni, DO   0.25 mg at 09/10/20 2129    sodium chloride 0.9% flush 10 mL  10 mL Intravenous PRN Arup K. Jenni, DO        traZODone tablet 50 mg  50 mg Oral Nightly PRN Arup K. Jenni, DO             Currently Mr. Pardo is responsible for preparing / administering this patient's medications on a daily basis.  I am available for consultation and can be contacted, as needed by the other members of the Liver Transplant team.

## 2020-09-11 NOTE — PT/OT/SLP PROGRESS
Occupational Therapy   Treatment    Name: Kenneth Pardo  MRN: [id number]  Admitting Diagnosis:  Hyponatremia       Recommendations:     Discharge Recommendations: home health OT  Discharge Equipment Recommendations:  walker, rolling, bedside commode, shower chair  Barriers to discharge:  None    Assessment:     Kenneth Pardo is a 60 y.o. male with a medical diagnosis of Hyponatremia.  He presents with the erformance deficits affecting function: weakness, impaired self care skills, impaired balance, decreased safety awareness, edema. Pt tolerated session fair this date with generalized weakness and decreased safety awareness being his main functional limitations. Pt with with flat affect requiring therapeutic listening throughout the session due to readiness to return home. Pt down about prolonged hospital stay requiring motivation to participate. Pt performing well as noted in his ability to complete standing ADLs at sink, transfers, and mobility with SBA and no AD. OT POC remains appropriate for patient on this date. Pt will continue to benefit from skilled OT to address the deficits affecting his occupational performance.     Rehab Prognosis:  Good; patient would benefit from acute skilled OT services to address these deficits and reach maximum level of function.       Plan:     Patient to be seen 3 x/week to address the above listed problems via self-care/home management, therapeutic activities, therapeutic exercises, neuromuscular re-education  · Plan of Care Expires: 10/09/20  · Plan of Care Reviewed with: patient    Subjective     Pain/Comfort:  · Pain Rating 1: 0/10  · Pain Rating Post-Intervention 1: 0/10    Objective:     Communicated with:RN prior to session.  Patient found sitting EOB with pulse ox (continuous) upon OT entry to room.    General Precautions: Standard, fall   Orthopedic Precautions:N/A   Braces: N/A     Occupational Performance:     Bed Mobility:    · Patient completed Scooting/Bridging with  supervision  · Patient completed Sit to Supine with supervision     Functional Mobility/Transfers:  · Patient completed Sit <> Stand Transfer from bed with stand by assistance  with  no assistive device   · Functional Mobility: Pt ambulated short household distance with SBA and no AD to prepare for household mobility to complete occupations of choice.  · No LOB noted  · No RBs required    Activities of Daily Living:  · Grooming: stand by assistance in standing to comb hair, wash face, and complete dental hygiene       AMPA 6 Click ADL: 22    Treatment & Education:  - Role of OT/ OT POC  - Self care safety/ independence  - Functional transfer/ mobility safety  - Bed mobility safety  - Importance of sitting EOB or in chair during meals   - Energy conservation techniques such as pacing and taking rest breaks as needed  - Suggested word search to occupy mind during stay to lift pt's mood  - Therapeutic listening provided     Patient left HOB elevated with all lines intact, call button in reach and RN notifiedEducation:      GOALS:   Multidisciplinary Problems     Occupational Therapy Goals        Problem: Occupational Therapy Goal    Goal Priority Disciplines Outcome Interventions   Occupational Therapy Goal     OT, PT/OT Ongoing, Progressing    Description: Goals to be met by: 9/20/2020     Patient will increase functional independence with ADLs by performing:    UE Dressing with [place].  LE Dressing with Supervision.  Grooming while standing at sink with [place].  Toileting from toilet with [place] for hygiene and clothing management.   Toilet transfer to toilet with [place].                     Time Tracking:     OT Date of Treatment: 09/11/20  OT Start Time: 1017  OT Stop Time: 1036  OT Total Time (min): 19 min    Billable Minutes:Self Care/Home Management 15    Chelsey Pires OT  9/11/2020

## 2020-09-11 NOTE — PROGRESS NOTES
Progress Note   Hospital Medicine         Patient Name: Kenneth Pardo  MRN:  [id number]  [facility] Medicine Team: [facility]OSP MED L Naresh Franks MD  Date of Admission:  9/9/2020     Length of Stay:  LOS: 1 day   Expected Discharge Date: 9/15/2020  Principal Problem:  Hyponatremia       Subjective:     Interval History/Overnight Events:  Patient doing ok today, 6.4L removed yesterday and negative for SBP; all cultures are negative and will stop IV abx for now and monitor;   - patient has been approved for liver transplant evaluation, multiple tests and consults have been ordered  - spoke with patient's wife on the phone today and updated her on the plan  - also told patient to enroll in AA if patient gets discharged and told wife as well;     Review of Systems   Constitutional: Negative for chills, fatigue, fever.   HENT: Negative for sore throat, trouble swallowing.    Eyes: Negative for photophobia, visual disturbance.   Respiratory: Negative for cough, shortness of breath.    Cardiovascular: Negative for chest pain, palpitations, leg swelling.   Gastrointestinal: Negative for abdominal pain, constipation, diarrhea, nausea, vomiting.   Endocrine: Negative for cold intolerance, heat intolerance.   Genitourinary: Negative for dysuria, frequency.   Musculoskeletal: Negative for arthralgias, myalgias.   Skin: Negative for rash, wound, erythema   Neurological: Negative for dizziness, syncope, weakness, light-headedness.   Psychiatric/Behavioral: Negative for confusion, hallucinations, anxiety  All other systems reviewed and are negative.    Objective:     Temp:  [97.7 °F (36.5 °C)-98.4 °F (36.9 °C)]   Pulse:  [75-90]   Resp:  [16-20]   BP: ()/(60-74)   SpO2:  [96 %-99 %]       Physical Exam:  Constitutional: appears weak and ill  Head: Normocephalic and atraumatic.   Mouth/Throat: Oropharynx is clear and moist.   Eyes: EOM are normal. Pupils are equal, round, and reactive to light. positive scleral icterus.   Neck:  Normal range of motion. Neck supple.   Cardiovascular: Normal rate and regular rhythm.  No murmur heard.  Pulmonary/Chest: Effort normal and breath sounds normal. No respiratory distress. No wheezes, rales, or rhonchi  Abdominal: Soft. Bowel sounds are normal.  positive distension, no tenderness  Musculoskeletal: Normal range of motion. No edema.   Neurological: Alert and oriented to person, place, and time.   Skin: Skin is warm and dry.   Psychiatric: Normal mood and affect. Behavior is normal.     Recent Labs   Lab 09/08/20  1539 09/08/20 1906 09/09/20 0438 09/10/20  0441   WBC 27.51* 26.39* 28.98* 19.06*   HGB 13.7* 13.8* 13.4* 11.4*   HCT 41.1 41.1 39.8* 33.6*    326 283 203     Recent Labs   Lab 09/09/20 0438 09/09/20  1629 09/10/20  0441   * 122* 124*   K 5.9* 4.7 4.0   CL 92* 89* 94*   CO2 16* 21* 21*   BUN 40* 42* 37*   CREATININE 2.0* 2.0* 1.7*   GLU 85 127* 120*   CALCIUM 8.3* 8.1* 8.1*   MG 2.0  --  2.0   PHOS 4.8*  --  4.1     Recent Labs   Lab 09/08/20 1906 09/09/20  0437 09/09/20 0438 09/10/20  0441   ALKPHOS 351*  --  359* 229*   ALT 39  --  32 23   AST 56*  --  55* 42*   ALBUMIN 2.2*  --  2.2* 2.8*   PROT 6.4  --  6.0 5.2*   BILITOT 3.7*  --  4.1* 3.0*   INR 1.3* 1.2  --  1.3*     No results for input(s): POCTGLUCOSE in the last 168 hours.     albumin human 25%  25 g Intravenous BID    [START ON 9/11/2020] ciprofloxacin HCl  500 mg Oral Q24H    enoxaparin  40 mg Subcutaneous Q24H    escitalopram oxalate  5 mg Oral Daily    lactulose  20 g Oral BID    lidocaine  1 patch Transdermal Q24H    lidocaine HCL 10 mg/ml (1%)        midodrine  15 mg Oral Q8H    octreotide  100 mcg Subcutaneous Q8H    pantoprazole  40 mg Oral Daily    rOPINIRole  0.25 mg Oral QHS       Assessment and Plan     Mr. Kenneth Pardo is a 60 y.o. male who presented to [facility] on 9/9/2020 with     Hospital Course:    Mr. Kenneth Pardo was admitted to Hospital Medicine for management of     Active Hospital  Problems    Diagnosis  POA    *Hyponatremia [E87.1]  Yes    Hepatorenal syndrome [K76.7]  Yes    Hyperkalemia [E87.5]  Yes    Alcoholic liver disease [K70.9]  Yes    Ascites due to alcoholic cirrhosis [K70.31]  Yes    Hepatic encephalopathy [K72.90]  Yes    Debility [R53.81]  Yes    Frequent falls [R29.6]  Not Applicable    Depression with anxiety [F41.8]  Yes    Restless leg [G25.81]  Yes    Insomnia [G47.00]  Yes    Sepsis, unspecified organism [A41.9]  Yes    Decompensated liver disease [K74.69]  Yes    Esophagitis, [place] grade B [K20.8]  Yes    Portal hypertensive gastropathy [K76.6, K31.89]  Yes    Anasarca [R60.1]  Yes    Hypotension [I95.9]  Yes      Resolved Hospital Problems   No resolved problems to display.     #Decompensated alcoholic cirrhosis   Recurrent ascites   Portal hypertension   Portal hypertensive gastropathy   Hepatic encephalopathy    MELD-Na score: 27 at 9/10/2020  4:41 AM  MELD score: 19 at 9/10/2020  4:41 AM  Calculated from:  Serum Creatinine: 1.7 mg/dL at 9/10/2020  4:41 AM  Serum Sodium: 124 mmol/L (Rounded to 125 mmol/L) at 9/10/2020  4:41 AM  Total Bilirubin: 3.0 mg/dL at 9/10/2020  4:41 AM  INR(ratio): 1.3 at 9/10/2020  4:41 AM  Age: 60 years    -Large ascites on exam and CT abdomen   -diagnostic and therapeutic paracentesis in am   -hold diuretics given hyponatremia and MERLY   -continue lactulose for HE   -continue PPI   -PETH pending  -hepatology consult   - IR paracentesis with 6.4L removed and negative for SBP on 9/9  - approved for inpatient liver transplant evaluation      #Hyponatremia   -Na 120 from recent baseline 136  -likely due to diuretic use, decompensated cirrhosis, SSRI use    -repeat Na 122 prior to transfer   -received 1L NS bolus in ED  -hold diuretics (lasix, spironolactone)  - hold SSRI  - improving with fluid restriction 1L     #MERLY  # Hepatorenal syndrome   -improving, cont midodrine, albumin and octreotide        #Leukocytosis   -WBC  27  -no signs of overt infection or sepsis   -afebrile   -blood and urine cultures negative  -UA showed pyuria and received empiric dose of rocephin   - negative for SBP  - improving, stopping IV abx     #Depression with anxiety   -no acute issue   -hold fluoxetine in setting of hyponatremia (?SIADH)  -continue lexapro      #Insomnia  -trazodone prn      #Debility with falls  -PT evaluation for gait training   -fall precaution      # Hypotension  - midodrine     Naresh Franks MD  Medical Director [facility] Medicine  Spectra:  [phone]  Pager: [phone]

## 2020-09-11 NOTE — HPI
Mr. Pardo is a 59yo M w/PMH of HTN, depression, anxiety, alcohol use with recent admissions for alcoholic hepatitis who presents from clinic with abnormal labs including leukocytosis, hyponatremia and MERLY.  Transplant Surgery consulted for liver transplant surgery evaluation.     Patient admitted 7/30 - 8/9 with alcoholic hepatitis. He was transferred to [facility] [place] [facility] and had drop in Hgb with melena. EGD 7/31 with grade B esophagitis and portal hypertensive gastropathy. Paracentesis negative for SBP and discharged on Lasix / Spironolactone, as well as lactulose for HE. He was seen in Hepatology clinic by Dr. Madison and labs revealed WBC 28K, Na 120 and Cr 1.9 so he was admitted for further workup. He denies further overt bleeding since discharge. No confusion and has been compliant with lactulose. Also compliant with diuretics but notes mild LE swelling.  Patient quit drinking >1 month ago when he first found out about liver disease. Previously drinking 2-3 glasses of vodka for several years. Went to alcohol rehab years ago, but relapsed. No prior DUIs or job losses due to alcohol. Recently unemployed due to COVID after which he was drinking more. Occasional marijuana use and remote cocaine use.       No abdominal surgeries. +Ascites. Remote hx of SBP. Hx of hyponatremia, MERLY, HRS

## 2020-09-11 NOTE — PLAN OF CARE
Problem: Occupational Therapy Goal  Goal: Occupational Therapy Goal  Description: Goals to be met by: 9/20/2020     Patient will increase functional independence with ADLs by performing:    UE Dressing with [place].  LE Dressing with Supervision.  Grooming while standing at sink with [place].  Toileting from toilet with [place] for hygiene and clothing management.   Toilet transfer to toilet with [place].    Outcome: Ongoing, Progressing     OT POC remains appropriate for patient on this date. Pt will continue to benefit from skilled OT to address the deficits affecting his occupational performance.       Chelsey Pires OTR/L  9/11/20

## 2020-09-11 NOTE — CONSULTS
[facility]  Liver Transplant  Consult Note    Patient Name: Kenneth Pardo  MRN: [id number]  Admission Date: 9/9/2020  Hospital Length of Stay: 2 days  Code Status: Full Code  Attending Provider: Naresh Franks MD  Primary Care Provider: Mikala Etienne MD    Consults  Subjective:     History of Present Illness:  Mr. Pardo is a 61yo M w/PMH of HTN, depression, anxiety, alcohol use with recent admissions for alcoholic hepatitis who presents from clinic with abnormal labs including leukocytosis, hyponatremia and MERLY.  Transplant Surgery consulted for liver transplant surgery evaluation.     Patient admitted 7/30 - 8/9 with alcoholic hepatitis. He was transferred to [facility] for management and had drop in Hgb with melena. EGD 7/31 with grade B esophagitis and portal hypertensive gastropathy. Paracentesis negative for SBP and discharged on Lasix / Spironolactone, as well as lactulose for HE. He was seen in Hepatology clinic by Dr. Madison and labs revealed WBC 28K, Na 120 and Cr 1.9 so he was admitted for further workup. He denies further overt bleeding since discharge. No confusion and has been compliant with lactulose. Also compliant with diuretics but notes mild LE swelling.  Patient quit drinking >1 month ago when he first found out about liver disease. Previously drinking 2-3 glasses of vodka for several years. Went to alcohol rehab years ago, but relapsed. No prior DUIs or job losses due to alcohol. Recently unemployed due to COVID after which he was drinking more. Occasional marijuana use and remote cocaine use.       No abdominal surgeries. +Ascites. Remote hx of SBP. Hx of hyponatremia, MERYL, HRS    Review of Systems   Constitutional: Positive for fatigue. Negative for chills and fever.   HENT: Negative for nosebleeds and trouble swallowing.    Respiratory: Negative for cough and shortness of breath.    Cardiovascular: Negative for chest pain and leg swelling.   Gastrointestinal: Negative for  abdominal distention, abdominal pain, blood in stool, constipation, diarrhea, nausea and vomiting.   Genitourinary: Negative for dysuria and hematuria.   Musculoskeletal: Negative for arthralgias.   Skin: Negative for pallor.   Neurological: Negative for light-headedness and headaches.   Hematological: Does not bruise/bleed easily.   Psychiatric/Behavioral: Negative for behavioral problems and confusion.       Past Medical History:   Diagnosis Date    Hepatitis     Hypertension     Liver failure        Past Surgical History:   Procedure Laterality Date    ESOPHAGOGASTRODUODENOSCOPY N/A 2020    Procedure: EGD (ESOPHAGOGASTRODUODENOSCOPY);  Surgeon: Jhonathan Lieberman MD;  Location: [address];  Service: Endoscopy;  Laterality: N/A;       Family history of liver disease: No    Review of patient's allergies indicates:  No Known Allergies      Tobacco Use    Smoking status: Never Smoker    Smokeless tobacco: Never Used   Substance and Sexual Activity    Alcohol use: Not Currently     Frequency: Never    Drug use: Not on file    Sexual activity: Not on file       Medications Prior to Admission   Medication Sig Dispense Refill Last Dose    ciprofloxacin HCl (CIPRO) 500 MG tablet Take 1 tablet (500 mg) by mouth twice daily on 8/10 and then take 1 tablet daily thereafter 60 tablet 2     escitalopram oxalate (LEXAPRO) 5 MG Tab Take 1 tablet (5 mg total) by mouth once daily. 30 tablet 2     furosemide (LASIX) 20 MG tablet Take 1 tablet (20 mg total) by mouth once daily. 30 tablet 2     [] lactulose (CHRONULAC) 10 gram/15 mL solution Take 15 mLs (10 g total) by mouth 2 (two) times a day. Take enough to have 3 bowel movements per day 900 mL 2     midodrine (PROAMATINE) 5 MG Tab Take 3 tablets (15 mg total) by mouth every 8 (eight) hours. 270 tablet 2     omeprazole (PRILOSEC) 40 MG capsule Take 1 capsule (40 mg total) by mouth every morning. 30 capsule 11     rOPINIRole (REQUIP) 0.25 MG tablet Take  1 tablet (0.25 mg total) by mouth every evening. 30 tablet 2     spironolactone (ALDACTONE) 50 MG tablet Take 1 tablet (50 mg total) by mouth once daily. 30 tablet 2     traZODone (DESYREL) 100 MG tablet Take 1 tablet (100 mg total) by mouth every evening. 30 tablet 2     ondansetron (ZOFRAN) 4 MG tablet Take 4 mg by mouth every 8 (eight) hours as needed.          Objective:     Vital Signs (Most Recent):  Temp: 98.2 °F (36.8 °C) (09/11/20 1536)  Pulse: 84 (09/11/20 1536)  Resp: 18 (09/11/20 1536)  BP: 110/66 (09/11/20 1536)  SpO2: 100 % (09/11/20 1536) Vital Signs (24h Range):  Temp:  [97.7 °F (36.5 °C)-98.5 °F (36.9 °C)] 98.2 °F (36.8 °C)  Pulse:  [72-85] 84  Resp:  [17-20] 18  SpO2:  [98 %-100 %] 100 %  BP: ()/(61-74) 110/66     Weight: 91.1 kg (200 lb 13.4 oz) (09/11/20 1200)  Body mass index is 35.58 kg/m².    Physical Exam  Vitals signs and nursing note reviewed.   Constitutional:       General: He is not in acute distress.     Appearance: He is well-developed.   Eyes:      General: No scleral icterus.  Neck:      Musculoskeletal: Neck supple.   Cardiovascular:      Rate and Rhythm: Normal rate and regular rhythm.      Heart sounds: No murmur.   Pulmonary:      Effort: Pulmonary effort is normal.      Breath sounds: Normal breath sounds.   Abdominal:      General: Bowel sounds are normal. There is no distension.      Palpations: Abdomen is soft.      Tenderness: There is no abdominal tenderness.   Musculoskeletal:      Right lower leg: No edema.      Left lower leg: No edema.   Skin:     General: Skin is warm.      Coloration: Skin is not jaundiced.      Findings: No rash.   Neurological:      Mental Status: He is alert and oriented to person, place, and time. Mental status is at baseline.      Comments: No asterixis.    Psychiatric:         Behavior: Behavior normal.         MELD-Na score: 25 at 9/11/2020  5:23 AM  MELD score: 17 at 9/11/2020  5:23 AM  Calculated from:  Serum Creatinine: 1.5 mg/dL at  9/11/2020  5:23 AM  Serum Sodium: 126 mmol/L at 9/11/2020  5:23 AM  Total Bilirubin: 2.5 mg/dL at 9/11/2020  5:23 AM  INR(ratio): 1.3 at 9/11/2020  5:23 AM  Age: 60 years    Significant Labs:  Labs within the past month have been reviewed.    Significant Imaging:  Reviewed    Assessment/Plan:     No new Assessment & Plan notes have been filed under this hospital service since the last note was generated.  Service: Transplant Liver      Transplant Candidacy: Patient is a 60 y.o. year old male with alcoholic liver disease being evaluated for possible OLT.  In my opinion, he is a suitable liver transplant candidate.  He will be presented to selection committee after all tests and evaluations are complete.      UNOS Patient Status  Functional Status: 80% - Normal activity with effort: some symptoms of disease  Physical Capacity: No Limitations    Counseling: I discussed with the patient the benefits of liver transplantation.  We discussed the evaluation and listing procedures.  We discussed the MELD system and the associated waiting times.  We discussed national and center specific survival results.  We discussed the option of being multiply listed in different OPOs.   We discussed the risks, benefits and potential complications related to surgery including the risks related to anesthesia, bleeding, infection, primary non-function of the allograft, the risk of reoperation as well as the risk of death.  We discussed the typical post-operative course, length of hospitalization, the long-term use of immunosuppressive therapy as well as the need for long-term routine follow-up.    PHS: I discussed the use of organs from donors with PHS increased-risk behavior, including the testing protocols utilized, as well as data from the literature regarding the likelihood of transmission of hepatitis or HIV.  The patient is willing to consider such grafts.  DCD: I discussed the use of organs recovered by donation after cardiac death  (DCD), including slightly decreased graft survival and greater risk of arterial and biliary complications. The potential advantage to the recipient is possibly receiving a transplant sooner by accepting such an organ. The patient is willing to consider such grafts.  HBcAb: I discussed the use of organs from donors with HBcAb in conjunction with long term use of HBV antiviral drugs, such as lamivudine. The small but measurable risk of hepatitis B seroconversion was discussed as well as the potentially life long need to continue antiviral drugs. The patient is willing to consider such grafts.  HCV Non-viremic recipient: I discussed the use of HCV-positive organs in naive recipients, including the risk of viral transmission to the patients or others, potential insurance barriers for antiviral medication coverage, risk for fibrosing cholestatic hepatitis, death or graft loss. The potential advantage to the recipient is the possibility of receiving a transplant sooner with decreased mortality risk by accepting such an organ. The patient is willing to consider such grafts.  LDLT: I discussed the nature of living donor liver transplant, including donor risks and more frequent recipient complications. The patient is willing to consider such grafts.    Natalee Beyer MD  Liver Transplant  [facility]

## 2020-09-11 NOTE — PLAN OF CARE
Problem: Adult Inpatient Plan of Care  Goal: Plan of Care Review  Outcome: Ongoing, Not Progressing  Goal: Optimal Comfort and Wellbeing  Outcome: Ongoing, Progressing     Problem: Fall Injury Risk  Goal: Absence of Fall and Fall-Related Injury  Outcome: Ongoing, Progressing     POC reviewed with patient. AAOX3. VVS. Up in chair tolerated well. No acute changes. BM X3. No complaint of discomfort or pain. Call light in reach. WCTM

## 2020-09-12 LAB
AFP SERPL-MCNC: 1.8 NG/ML (ref 0–8.4)
ALBUMIN SERPL BCP-MCNC: 2.9 G/DL (ref 3.5–5.2)
ALP SERPL-CCNC: 198 U/L (ref 55–135)
ALT SERPL W/O P-5'-P-CCNC: 17 U/L (ref 10–44)
ANION GAP SERPL CALC-SCNC: 12 MMOL/L (ref 8–16)
ASCENDING AORTA: 2.91 CM
AST SERPL-CCNC: 31 U/L (ref 10–40)
BASOPHILS # BLD AUTO: 0.12 K/UL (ref 0–0.2)
BASOPHILS NFR BLD: 0.5 % (ref 0–1.9)
BILIRUB SERPL-MCNC: 2.4 MG/DL (ref 0.1–1)
BSA FOR ECHO PROCEDURE: 2.01 M2
BUN SERPL-MCNC: 27 MG/DL (ref 6–20)
CALCIUM SERPL-MCNC: 8.1 MG/DL (ref 8.7–10.5)
CHLORIDE SERPL-SCNC: 98 MMOL/L (ref 95–110)
CO2 SERPL-SCNC: 21 MMOL/L (ref 23–29)
CREAT SERPL-MCNC: 1.5 MG/DL (ref 0.5–1.4)
CV ECHO LV RWT: 0.52 CM
DIFFERENTIAL METHOD: ABNORMAL
DOP CALC LVOT AREA: 3.7 CM2
DOP CALC LVOT DIAMETER: 2.16 CM
EBV VCA IGG SER QL IA: POSITIVE
ECHO LV POSTERIOR WALL: 0.93 CM (ref 0.6–1.1)
EOSINOPHIL # BLD AUTO: 1.3 K/UL (ref 0–0.5)
EOSINOPHIL NFR BLD: 5.3 % (ref 0–8)
ERYTHROCYTE [DISTWIDTH] IN BLOOD BY AUTOMATED COUNT: 13.7 % (ref 11.5–14.5)
EST. GFR  (AFRICAN AMERICAN): 58.5 ML/MIN/1.73 M^2
EST. GFR  (NON AFRICAN AMERICAN): 50.6 ML/MIN/1.73 M^2
FRACTIONAL SHORTENING: 36 % (ref 28–44)
GLUCOSE SERPL-MCNC: 123 MG/DL (ref 70–110)
HCT VFR BLD AUTO: 35.6 % (ref 40–54)
HGB BLD-MCNC: 11.4 G/DL (ref 14–18)
IMM GRANULOCYTES # BLD AUTO: 0.16 K/UL (ref 0–0.04)
IMM GRANULOCYTES NFR BLD AUTO: 0.6 % (ref 0–0.5)
INR PPP: 1.3 (ref 0.8–1.2)
INTERVENTRICULAR SEPTUM: 1.08 CM (ref 0.6–1.1)
LACTATE SERPL-SCNC: 2.8 MMOL/L (ref 0.5–2.2)
LEFT ATRIUM SIZE: 3.25 CM
LEFT INTERNAL DIMENSION IN SYSTOLE: 2.28 CM (ref 2.1–4)
LEFT VENTRICLE DIASTOLIC VOLUME INDEX: 27.32 ML/M2
LEFT VENTRICLE DIASTOLIC VOLUME: 52.95 ML
LEFT VENTRICLE MASS INDEX: 55 G/M2
LEFT VENTRICLE SYSTOLIC VOLUME INDEX: 9.2 ML/M2
LEFT VENTRICLE SYSTOLIC VOLUME: 17.79 ML
LEFT VENTRICULAR INTERNAL DIMENSION IN DIASTOLE: 3.56 CM (ref 3.5–6)
LEFT VENTRICULAR MASS: 106.84 G
LYMPHOCYTES # BLD AUTO: 2.2 K/UL (ref 1–4.8)
LYMPHOCYTES NFR BLD: 8.8 % (ref 18–48)
MAGNESIUM SERPL-MCNC: 2.2 MG/DL (ref 1.6–2.6)
MCH RBC QN AUTO: 33.9 PG (ref 27–31)
MCHC RBC AUTO-ENTMCNC: 32 G/DL (ref 32–36)
MCV RBC AUTO: 106 FL (ref 82–98)
MONOCYTES # BLD AUTO: 1.4 K/UL (ref 0.3–1)
MONOCYTES NFR BLD: 5.7 % (ref 4–15)
NEUTROPHILS # BLD AUTO: 19.6 K/UL (ref 1.8–7.7)
NEUTROPHILS NFR BLD: 79.1 % (ref 38–73)
NRBC BLD-RTO: 0 /100 WBC
PHOSPHATE SERPL-MCNC: 2.5 MG/DL (ref 2.7–4.5)
PLATELET # BLD AUTO: 190 K/UL (ref 150–350)
PMV BLD AUTO: 10 FL (ref 9.2–12.9)
POCT GLUCOSE: 125 MG/DL (ref 70–110)
POTASSIUM SERPL-SCNC: 4.1 MMOL/L (ref 3.5–5.1)
PROT SERPL-MCNC: 4.9 G/DL (ref 6–8.4)
PROTHROMBIN TIME: 13.8 SEC (ref 9–12.5)
RBC # BLD AUTO: 3.36 M/UL (ref 4.6–6.2)
SINUS: 2.61 CM
SODIUM SERPL-SCNC: 131 MMOL/L (ref 136–145)
STJ: 2.34 CM
WBC # BLD AUTO: 24.83 K/UL (ref 3.9–12.7)

## 2020-09-12 PROCEDURE — 36415 COLL VENOUS BLD VENIPUNCTURE: CPT | Mod: NTX

## 2020-09-12 PROCEDURE — 25000003 PHARM REV CODE 250: Mod: NTX | Performed by: HOSPITALIST

## 2020-09-12 PROCEDURE — 20600001 HC STEP DOWN PRIVATE ROOM: Mod: NTX

## 2020-09-12 PROCEDURE — 83605 ASSAY OF LACTIC ACID: CPT | Mod: NTX

## 2020-09-12 PROCEDURE — 83735 ASSAY OF MAGNESIUM: CPT | Mod: NTX

## 2020-09-12 PROCEDURE — 84100 ASSAY OF PHOSPHORUS: CPT | Mod: NTX

## 2020-09-12 PROCEDURE — 85025 COMPLETE CBC W/AUTO DIFF WBC: CPT | Mod: NTX

## 2020-09-12 PROCEDURE — 99233 PR SUBSEQUENT HOSPITAL CARE,LEVL III: ICD-10-PCS | Mod: NTX,,, | Performed by: HOSPITALIST

## 2020-09-12 PROCEDURE — 87449 NOS EACH ORGANISM AG IA: CPT | Mod: NTX

## 2020-09-12 PROCEDURE — 87324 CLOSTRIDIUM AG IA: CPT | Mod: NTX

## 2020-09-12 PROCEDURE — 85610 PROTHROMBIN TIME: CPT | Mod: NTX

## 2020-09-12 PROCEDURE — 80053 COMPREHEN METABOLIC PANEL: CPT | Mod: NTX

## 2020-09-12 PROCEDURE — 63600175 PHARM REV CODE 636 W HCPCS: Mod: NTX | Performed by: HOSPITALIST

## 2020-09-12 PROCEDURE — 87040 BLOOD CULTURE FOR BACTERIA: CPT | Mod: 59,NTX

## 2020-09-12 PROCEDURE — 99233 SBSQ HOSP IP/OBS HIGH 50: CPT | Mod: NTX,,, | Performed by: HOSPITALIST

## 2020-09-12 RX ADMIN — LIDOCAINE 1 PATCH: 50 PATCH TOPICAL at 12:09

## 2020-09-12 RX ADMIN — CIPROFLOXACIN 500 MG: 500 TABLET, FILM COATED ORAL at 08:09

## 2020-09-12 RX ADMIN — PANTOPRAZOLE SODIUM 40 MG: 40 TABLET, DELAYED RELEASE ORAL at 08:09

## 2020-09-12 RX ADMIN — ENOXAPARIN SODIUM 40 MG: 40 INJECTION SUBCUTANEOUS at 04:09

## 2020-09-12 RX ADMIN — LACTULOSE 20 G: 20 SOLUTION ORAL at 08:09

## 2020-09-12 RX ADMIN — MIDODRINE HYDROCHLORIDE 15 MG: 5 TABLET ORAL at 08:09

## 2020-09-12 RX ADMIN — ROPINIROLE HYDROCHLORIDE 0.25 MG: 0.25 TABLET, FILM COATED ORAL at 08:09

## 2020-09-12 RX ADMIN — MIDODRINE HYDROCHLORIDE 15 MG: 5 TABLET ORAL at 05:09

## 2020-09-12 RX ADMIN — ESCITALOPRAM OXALATE 5 MG: 5 TABLET, FILM COATED ORAL at 08:09

## 2020-09-12 RX ADMIN — MIDODRINE HYDROCHLORIDE 15 MG: 5 TABLET ORAL at 02:09

## 2020-09-12 NOTE — PLAN OF CARE
Problem: Adult Inpatient Plan of Care  Goal: Plan of Care Review  Outcome: Ongoing, Progressing  Goal: Optimal Comfort and Wellbeing  Outcome: Ongoing, Progressing     Problem: Fall Injury Risk  Goal: Absence of Fall and Fall-Related Injury  Outcome: Ongoing, Progressing     POC reviewed with patient. AAOX4. VVS. Special contact isolation r/o c-diff. Stool collect. Up ambulated to BR with walker. Safety maintained. Call light in reach. [facility]

## 2020-09-12 NOTE — PROGRESS NOTES
Progress Note   Hospital Medicine         Patient Name: Kenneth Pardo  MRN:  [id number]  [facility] Medicine Team: [facility] MED L Naresh Franks MD  Date of Admission:  9/9/2020     Length of Stay:  LOS: 2 days   Expected Discharge Date: 9/15/2020  Principal Problem:  Hyponatremia       Subjective:     Interval History/Overnight Events:  Patient doing ok today, wants to go home, MELD and sodium is improving; sister at the bedside and states patient seems much more alert and physically seems stronger; under going liver transplant evaluation; had a recent C-scope; 2d echo pending;   - will need outpatient weekly IR paracentesis to be set up as can not tolerate diuretics currently; needs AA and continued behavioral health management;   - possible d/c tomorrow with completion of of transplant evaluation as an outpatient;       Review of Systems   Constitutional: Negative for chills, fatigue, fever.   HENT: Negative for sore throat, trouble swallowing.    Eyes: Negative for photophobia, visual disturbance.   Respiratory: Negative for cough, shortness of breath.    Cardiovascular: Negative for chest pain, palpitations, leg swelling.   Gastrointestinal: Negative for abdominal pain, constipation, diarrhea, nausea, vomiting.   Endocrine: Negative for cold intolerance, heat intolerance.   Genitourinary: Negative for dysuria, frequency.   Musculoskeletal: Negative for arthralgias, myalgias.   Skin: Negative for rash, wound, erythema   Neurological: Negative for dizziness, syncope, weakness, light-headedness.   Psychiatric/Behavioral: Negative for confusion, hallucinations, anxiety  All other systems reviewed and are negative.    Objective:     Temp:  [97.7 °F (36.5 °C)-98.5 °F (36.9 °C)]   Pulse:  [72-84]   Resp:  [18-20]   BP: ()/(61-68)   SpO2:  [99 %-100 %]       Physical Exam:  Constitutional: appears weak and ill  Head: Normocephalic and atraumatic.   Mouth/Throat: Oropharynx is clear and moist.   Eyes: EOM are normal.  Pupils are equal, round, and reactive to light. positive scleral icterus.   Neck: Normal range of motion. Neck supple.   Cardiovascular: Normal rate and regular rhythm.  No murmur heard.  Pulmonary/Chest: Effort normal and breath sounds normal. No respiratory distress. No wheezes, rales, or rhonchi  Abdominal: Soft. Bowel sounds are normal.  positive distension, no tenderness  Musculoskeletal: Normal range of motion. No edema.   Neurological: Alert and oriented to person, place, and time.   Skin: Skin is warm and dry.   Psychiatric: Normal mood and affect. Behavior is normal.     Recent Labs   Lab 09/08/20  1539 09/08/20  1906 09/09/20  0438 09/10/20  0441 09/11/20  0523   WBC 27.51* 26.39* 28.98* 19.06* 19.92*   HGB 13.7* 13.8* 13.4* 11.4* 11.0*   HCT 41.1 41.1 39.8* 33.6* 33.7*    326 283 203 198     Recent Labs   Lab 09/09/20  0438 09/09/20  1629 09/10/20  0441 09/11/20  0523   * 122* 124* 126*   K 5.9* 4.7 4.0 3.4*   CL 92* 89* 94* 96   CO2 16* 21* 21* 19*   BUN 40* 42* 37* 32*   CREATININE 2.0* 2.0* 1.7* 1.5*   GLU 85 127* 120* 118*   CALCIUM 8.3* 8.1* 8.1* 7.9*   MG 2.0  --  2.0 2.1   PHOS 4.8*  --  4.1 3.0     Recent Labs   Lab 09/09/20  0437 09/09/20  0438 09/10/20  0441 09/11/20  0523   ALKPHOS  --  359* 229* 185*   ALT  --  32 23 16   AST  --  55* 42* 30   ALBUMIN  --  2.2* 2.8* 3.1*   PROT  --  6.0 5.2* 5.1*   BILITOT  --  4.1* 3.0* 2.5*   INR 1.2  --  1.3* 1.3*     No results for input(s): POCTGLUCOSE in the last 168 hours.     ciprofloxacin HCl  500 mg Oral Q24H    enoxaparin  40 mg Subcutaneous Q24H    escitalopram oxalate  5 mg Oral Daily    lactulose  20 g Oral BID    lidocaine  1 patch Transdermal Q24H    midodrine  15 mg Oral Q8H    pantoprazole  40 mg Oral Daily    rOPINIRole  0.25 mg Oral QHS       Assessment and Plan     Mr. Kenneth Pardo is a 60 y.o. male who presented to [facility] on 9/9/2020 with     Hospital Course:    Mr. Kenneth Pardo was admitted to [facility] Medicine  for management of     Active Hospital Problems    Diagnosis  POA    *Hyponatremia [E87.1]  Yes    Hepatorenal syndrome [K76.7]  Yes    Hyperkalemia [E87.5]  Yes    Alcoholic liver disease [K70.9]  Yes    Ascites due to alcoholic cirrhosis [K70.31]  Yes    Hepatic encephalopathy [K72.90]  Yes    Debility [R53.81]  Yes    Frequent falls [R29.6]  Not Applicable    Depression with anxiety [F41.8]  Yes    Restless leg [G25.81]  Yes    Insomnia [G47.00]  Yes    Sepsis, unspecified organism [A41.9]  Yes    Decompensated liver disease [K74.69]  Yes    Esophagitis, [place] grade B [K20.8]  Yes    Portal hypertensive gastropathy [K76.6, K31.89]  Yes    Anasarca [R60.1]  Yes    Hypotension [I95.9]  Yes      Resolved Hospital Problems   No resolved problems to display.     #Decompensated alcoholic cirrhosis   Recurrent ascites   Portal hypertension   Portal hypertensive gastropathy   Hepatic encephalopathy    MELD-Na score: 25 at 9/11/2020  5:23 AM  MELD score: 17 at 9/11/2020  5:23 AM  Calculated from:  Serum Creatinine: 1.5 mg/dL at 9/11/2020  5:23 AM  Serum Sodium: 126 mmol/L at 9/11/2020  5:23 AM  Total Bilirubin: 2.5 mg/dL at 9/11/2020  5:23 AM  INR(ratio): 1.3 at 9/11/2020  5:23 AM  Age: 60 years    -Large ascites on exam and CT abdomen   -diagnostic and therapeutic paracentesis in am   -hold diuretics given hyponatremia and MERLY   -continue lactulose for HE   -continue PPI   -PETH pending  -hepatology consult   - IR paracentesis with 6.4L removed and negative for SBP on 9/9  - approved for inpatient liver transplant evaluation      #Hyponatremia   -Na 120 from recent baseline 136 in ED  -likely due to diuretic use, decompensated cirrhosis, SSRI use    -repeat Na 122 prior to transfer   -received 1L NS bolus in ED  -hold diuretics (lasix, spironolactone)  - hold SSRI  - improving with fluid restriction 1L     #MERLY  # Hepatorenal syndrome   -improving, cont midodrine, albumin and  octreotide        #Leukocytosis   -WBC 27  -no signs of overt infection or sepsis   -afebrile   -blood and urine cultures negative  -UA showed pyuria and received empiric dose of rocephin   - negative for SBP  - improving, stopping IV abx     #Depression with anxiety   -no acute issue   -hold fluoxetine in setting of hyponatremia (?SIADH)  -continue lexapro      #Insomnia  -trazodone prn      #Debility with falls  -PT evaluation for gait training   -fall precaution      # Hypotension  - midodrine     Naresh Franks MD  Medical Director [facility] Medicine  Spectra:  [phone]  Pager: [phone]

## 2020-09-13 PROBLEM — I82.401 RIGHT LEG DVT: Status: ACTIVE | Noted: 2020-09-13

## 2020-09-13 LAB
ALBUMIN SERPL BCP-MCNC: 2.7 G/DL (ref 3.5–5.2)
ALP SERPL-CCNC: 202 U/L (ref 55–135)
ALT SERPL W/O P-5'-P-CCNC: 17 U/L (ref 10–44)
ANION GAP SERPL CALC-SCNC: 9 MMOL/L (ref 8–16)
AST SERPL-CCNC: 24 U/L (ref 10–40)
BASOPHILS # BLD AUTO: 0.21 K/UL (ref 0–0.2)
BASOPHILS NFR BLD: 0.9 % (ref 0–1.9)
BILIRUB SERPL-MCNC: 2.4 MG/DL (ref 0.1–1)
BUN SERPL-MCNC: 31 MG/DL (ref 6–20)
C DIFF GDH STL QL: NEGATIVE
C DIFF TOX A+B STL QL IA: NEGATIVE
CALCIUM SERPL-MCNC: 8 MG/DL (ref 8.7–10.5)
CHLORIDE SERPL-SCNC: 97 MMOL/L (ref 95–110)
CO2 SERPL-SCNC: 23 MMOL/L (ref 23–29)
CREAT SERPL-MCNC: 1.7 MG/DL (ref 0.5–1.4)
DIFFERENTIAL METHOD: ABNORMAL
EOSINOPHIL # BLD AUTO: 1.2 K/UL (ref 0–0.5)
EOSINOPHIL NFR BLD: 4.9 % (ref 0–8)
ERYTHROCYTE [DISTWIDTH] IN BLOOD BY AUTOMATED COUNT: 14.3 % (ref 11.5–14.5)
EST. GFR  (AFRICAN AMERICAN): 50.3 ML/MIN/1.73 M^2
EST. GFR  (NON AFRICAN AMERICAN): 43.5 ML/MIN/1.73 M^2
GLUCOSE SERPL-MCNC: 108 MG/DL (ref 70–110)
HCT VFR BLD AUTO: 38.4 % (ref 40–54)
HGB BLD-MCNC: 11.9 G/DL (ref 14–18)
IMM GRANULOCYTES # BLD AUTO: 0.23 K/UL (ref 0–0.04)
IMM GRANULOCYTES NFR BLD AUTO: 1 % (ref 0–0.5)
INR PPP: 1.3 (ref 0.8–1.2)
LYMPHOCYTES # BLD AUTO: 2 K/UL (ref 1–4.8)
LYMPHOCYTES NFR BLD: 8.2 % (ref 18–48)
MAGNESIUM SERPL-MCNC: 2.1 MG/DL (ref 1.6–2.6)
MCH RBC QN AUTO: 34.7 PG (ref 27–31)
MCHC RBC AUTO-ENTMCNC: 31 G/DL (ref 32–36)
MCV RBC AUTO: 112 FL (ref 82–98)
MONOCYTES # BLD AUTO: 1.3 K/UL (ref 0.3–1)
MONOCYTES NFR BLD: 5.4 % (ref 4–15)
NEUTROPHILS # BLD AUTO: 19 K/UL (ref 1.8–7.7)
NEUTROPHILS NFR BLD: 79.6 % (ref 38–73)
NRBC BLD-RTO: 0 /100 WBC
PHOSPHATE SERPL-MCNC: 2.8 MG/DL (ref 2.7–4.5)
PLATELET # BLD AUTO: 225 K/UL (ref 150–350)
PMV BLD AUTO: 10.3 FL (ref 9.2–12.9)
POCT GLUCOSE: 142 MG/DL (ref 70–110)
POTASSIUM SERPL-SCNC: 4.1 MMOL/L (ref 3.5–5.1)
PROT SERPL-MCNC: 4.9 G/DL (ref 6–8.4)
PROTHROMBIN TIME: 13.8 SEC (ref 9–12.5)
RBC # BLD AUTO: 3.43 M/UL (ref 4.6–6.2)
SODIUM SERPL-SCNC: 129 MMOL/L (ref 136–145)
WBC # BLD AUTO: 23.9 K/UL (ref 3.9–12.7)

## 2020-09-13 PROCEDURE — P9047 ALBUMIN (HUMAN), 25%, 50ML: HCPCS | Mod: JG,NTX | Performed by: HOSPITALIST

## 2020-09-13 PROCEDURE — 85610 PROTHROMBIN TIME: CPT | Mod: NTX

## 2020-09-13 PROCEDURE — 25000003 PHARM REV CODE 250: Mod: NTX | Performed by: HOSPITALIST

## 2020-09-13 PROCEDURE — 85025 COMPLETE CBC W/AUTO DIFF WBC: CPT | Mod: NTX

## 2020-09-13 PROCEDURE — 20600001 HC STEP DOWN PRIVATE ROOM: Mod: NTX

## 2020-09-13 PROCEDURE — 80053 COMPREHEN METABOLIC PANEL: CPT | Mod: NTX

## 2020-09-13 PROCEDURE — 63600175 PHARM REV CODE 636 W HCPCS: Mod: JG,NTX | Performed by: HOSPITALIST

## 2020-09-13 PROCEDURE — 99233 SBSQ HOSP IP/OBS HIGH 50: CPT | Mod: NTX,,, | Performed by: HOSPITALIST

## 2020-09-13 PROCEDURE — 36415 COLL VENOUS BLD VENIPUNCTURE: CPT | Mod: NTX

## 2020-09-13 PROCEDURE — 84100 ASSAY OF PHOSPHORUS: CPT | Mod: NTX

## 2020-09-13 PROCEDURE — 83735 ASSAY OF MAGNESIUM: CPT | Mod: NTX

## 2020-09-13 PROCEDURE — 99233 PR SUBSEQUENT HOSPITAL CARE,LEVL III: ICD-10-PCS | Mod: NTX,,, | Performed by: HOSPITALIST

## 2020-09-13 RX ORDER — ALBUMIN HUMAN 250 G/1000ML
25 SOLUTION INTRAVENOUS 2 TIMES DAILY
Status: COMPLETED | OUTPATIENT
Start: 2020-09-13 | End: 2020-09-13

## 2020-09-13 RX ADMIN — ESCITALOPRAM OXALATE 5 MG: 5 TABLET, FILM COATED ORAL at 10:09

## 2020-09-13 RX ADMIN — APIXABAN 10 MG: 5 TABLET, FILM COATED ORAL at 09:09

## 2020-09-13 RX ADMIN — PANTOPRAZOLE SODIUM 40 MG: 40 TABLET, DELAYED RELEASE ORAL at 10:09

## 2020-09-13 RX ADMIN — ROPINIROLE HYDROCHLORIDE 0.25 MG: 0.25 TABLET, FILM COATED ORAL at 09:09

## 2020-09-13 RX ADMIN — CIPROFLOXACIN 500 MG: 500 TABLET, FILM COATED ORAL at 10:09

## 2020-09-13 RX ADMIN — MIDODRINE HYDROCHLORIDE 15 MG: 5 TABLET ORAL at 01:09

## 2020-09-13 RX ADMIN — LACTULOSE 20 G: 20 SOLUTION ORAL at 04:09

## 2020-09-13 RX ADMIN — ALBUMIN (HUMAN) 25 G: 12.5 SOLUTION INTRAVENOUS at 09:09

## 2020-09-13 RX ADMIN — LACTULOSE 20 G: 20 SOLUTION ORAL at 10:09

## 2020-09-13 RX ADMIN — LIDOCAINE 1 PATCH: 50 PATCH TOPICAL at 01:09

## 2020-09-13 RX ADMIN — MIDODRINE HYDROCHLORIDE 15 MG: 5 TABLET ORAL at 09:09

## 2020-09-13 RX ADMIN — ALBUMIN (HUMAN) 25 G: 12.5 SOLUTION INTRAVENOUS at 10:09

## 2020-09-13 RX ADMIN — MIDODRINE HYDROCHLORIDE 15 MG: 5 TABLET ORAL at 06:09

## 2020-09-13 RX ADMIN — APIXABAN 10 MG: 5 TABLET, FILM COATED ORAL at 11:09

## 2020-09-13 NOTE — PLAN OF CARE
Pt remained AAO x 4 throughout shift.  NSR on the monitor.  No complaints of pain.  Reinforced fluid restriction.  Still awaiting C-Diff results.  Called and spoke with lab around 0400, and was advised that they now do them in batch testing and they have not ran a batch yet, will run around 0500 or 0800.  Will continue to monitor.

## 2020-09-13 NOTE — PROGRESS NOTES
Progress Note   Hospital Medicine         Patient Name: Kenneth Pardo  MRN:  [id number]  [facility] Medicine Team: [facility] MED L Naresh Franks MD  Date of Admission:  9/9/2020     Length of Stay:  LOS: 4 days   Expected Discharge Date: 9/15/2020  Principal Problem:  Hyponatremia       Subjective:     Interval History/Overnight Events:  Patient feeling better today, leukocytosis improving, infectious work up is negative, C. Diff pending; will start eliquis for DVT and plan for IR paracentesis and echo stress and ID eval tomorrow        Review of Systems   Constitutional: Negative for chills, fatigue, fever.   HENT: Negative for sore throat, trouble swallowing.    Eyes: Negative for photophobia, visual disturbance.   Respiratory: Negative for cough, shortness of breath.    Cardiovascular: Negative for chest pain, palpitations, leg swelling.   Gastrointestinal: Negative for abdominal pain, constipation, diarrhea, nausea, vomiting.   Endocrine: Negative for cold intolerance, heat intolerance.   Genitourinary: Negative for dysuria, frequency.   Musculoskeletal: Negative for arthralgias, myalgias.   Skin: Negative for rash, wound, erythema   Neurological: Negative for dizziness, syncope, weakness, light-headedness.   Psychiatric/Behavioral: Negative for confusion, hallucinations, anxiety  All other systems reviewed and are negative.    Objective:     Temp:  [97.8 °F (36.6 °C)-98.4 °F (36.9 °C)]   Pulse:  [71-88]   Resp:  [16-20]   BP: ()/(57-76)   SpO2:  [96 %-100 %]       Physical Exam:  Constitutional: appears weak and ill  Head: Normocephalic and atraumatic.   Mouth/Throat: Oropharynx is clear and moist.   Eyes: EOM are normal. Pupils are equal, round, and reactive to light. positive scleral icterus.   Neck: Normal range of motion. Neck supple.   Cardiovascular: Normal rate and regular rhythm.  No murmur heard.  Pulmonary/Chest: Effort normal and breath sounds normal. No respiratory distress. No wheezes, rales, or  rhonchi  Abdominal: Soft. Bowel sounds are normal.  positive distension, no tenderness  Musculoskeletal: Normal range of motion. No edema.   Neurological: Alert and oriented to person, place, and time.   Skin: Skin is warm and dry.   Psychiatric: Normal mood and affect. Behavior is normal.     Recent Labs   Lab 09/08/20  1906 09/09/20 0438 09/10/20  0441 09/11/20 0523 09/12/20 0624 09/13/20  0457   WBC 26.39* 28.98* 19.06* 19.92* 24.83* 23.90*   HGB 13.8* 13.4* 11.4* 11.0* 11.4* 11.9*   HCT 41.1 39.8* 33.6* 33.7* 35.6* 38.4*    283 203 198 190 225     Recent Labs   Lab 09/11/20 0523 09/12/20 0624 09/13/20 0457   * 131* 129*   K 3.4* 4.1 4.1   CL 96 98 97   CO2 19* 21* 23   BUN 32* 27* 31*   CREATININE 1.5* 1.5* 1.7*   * 123* 108   CALCIUM 7.9* 8.1* 8.0*   MG 2.1 2.2 2.1   PHOS 3.0 2.5* 2.8     Recent Labs   Lab 09/11/20 0523 09/12/20 0624 09/13/20 0457   ALKPHOS 185* 198* 202*   ALT 16 17 17   AST 30 31 24   ALBUMIN 3.1* 2.9* 2.7*   PROT 5.1* 4.9* 4.9*   BILITOT 2.5* 2.4* 2.4*   INR 1.3* 1.3* 1.3*     Recent Labs   Lab 09/12/20  2151   POCTGLUCOSE 125*        albumin human 25%  25 g Intravenous BID    apixaban  10 mg Oral BID    ciprofloxacin HCl  500 mg Oral Q24H    escitalopram oxalate  5 mg Oral Daily    lactulose  20 g Oral BID    lidocaine  1 patch Transdermal Q24H    midodrine  15 mg Oral Q8H    pantoprazole  40 mg Oral Daily    rOPINIRole  0.25 mg Oral QHS       Assessment and Plan     Mr. Kenneth Pardo is a 60 y.o. male who presented to [facility] on 9/9/2020 with     Hospital Course:    Mr. Kenneth aPrdo was admitted to Hospital Medicine for management of     Active Hospital Problems    Diagnosis  POA    *Hyponatremia [E87.1]  Yes    Right leg DVT [I82.401]  Yes    Hepatorenal syndrome [K76.7]  Yes    Hyperkalemia [E87.5]  Yes    Alcoholic liver disease [K70.9]  Yes    Ascites due to alcoholic cirrhosis [K70.31]  Yes    Hepatic encephalopathy [K72.90]  Yes     Debility [R53.81]  Yes    Frequent falls [R29.6]  Not Applicable    Depression with anxiety [F41.8]  Yes    Restless leg [G25.81]  Yes    Insomnia [G47.00]  Yes    Sepsis, unspecified organism [A41.9]  Yes    Decompensated liver disease [K74.69]  Yes    Esophagitis, [place] grade B [K20.8]  Yes    Portal hypertensive gastropathy [K76.6, K31.89]  Yes    Anasarca [R60.1]  Yes    Hypotension [I95.9]  Yes      Resolved Hospital Problems   No resolved problems to display.     #Decompensated alcoholic cirrhosis   Recurrent ascites   Portal hypertension   Portal hypertensive gastropathy   Hepatic encephalopathy    MELD-Na score: 24 at 9/13/2020  4:57 AM  MELD score: 18 at 9/13/2020  4:57 AM  Calculated from:  Serum Creatinine: 1.7 mg/dL at 9/13/2020  4:57 AM  Serum Sodium: 129 mmol/L at 9/13/2020  4:57 AM  Total Bilirubin: 2.4 mg/dL at 9/13/2020  4:57 AM  INR(ratio): 1.3 at 9/13/2020  4:57 AM  Age: 60 years    -Large ascites on exam and CT abdomen   -diagnostic and therapeutic paracentesis in am   -hold diuretics given hyponatremia and MERLY   -continue lactulose for HE   -continue PPI   -PETH pending  -hepatology consult   - IR paracentesis with 6.4L removed and negative for SBP on 9/9  - approved for inpatient liver transplant evaluation     # Right Leg DVT  - seen on U/S; starting on eliquis today      #Hyponatremia   -Na 120 from recent baseline 136 in ED  -likely due to diuretic use, decompensated cirrhosis, SSRI use    -repeat Na 122 prior to transfer   -received 1L NS bolus in ED  -hold diuretics (lasix, spironolactone)  - hold SSRI  - improving with fluid restriction 1L     #MERLY  # Hepatorenal syndrome   -improving, cont midodrine, albumin and octreotide        #Leukocytosis   -WBC 27  -no signs of overt infection or sepsis   -afebrile   -blood and urine cultures negative  -UA showed pyuria and received empiric dose of rocephin   - negative for SBP  - doing infectious work up including C.  diff     #Depression with anxiety   -no acute issue   -hold fluoxetine in setting of hyponatremia (?SIADH)  -continue lexapro      #Insomnia  -trazodone prn      #Debility with falls  -PT evaluation for gait training   -fall precaution      # Hypotension  - midodrine     Disposition : tomorrow     Naresh Franks MD  Medical Director [facility] Medicine  Spectra:  [phone]  Pager: [phone]

## 2020-09-13 NOTE — PROGRESS NOTES
Progress Note   Hospital Medicine         Patient Name: Kenneth Pardo  MRN:  [id number]  [facility] Medicine Team: [facility]OS[facility] MED L Naresh Franks MD  Date of Admission:  9/9/2020     Length of Stay:  LOS: 4 days   Expected Discharge Date: 9/15/2020  Principal Problem:  Hyponatremia       Subjective:     Interval History/Overnight Events:  Patient feeling better today, MELD improving, however leukocytosis is rising, will do infectious work up;   - patient has right leg swelling and ordered a RLE U/S and found to have a DVT, patient with recent h/o GI bleed with ICU admission, will need to discuss with team about IVC filter placement instead of anti-coagulation       Review of Systems   Constitutional: Negative for chills, fatigue, fever.   HENT: Negative for sore throat, trouble swallowing.    Eyes: Negative for photophobia, visual disturbance.   Respiratory: Negative for cough, shortness of breath.    Cardiovascular: Negative for chest pain, palpitations, leg swelling.   Gastrointestinal: Negative for abdominal pain, constipation, diarrhea, nausea, vomiting.   Endocrine: Negative for cold intolerance, heat intolerance.   Genitourinary: Negative for dysuria, frequency.   Musculoskeletal: Negative for arthralgias, myalgias.   Skin: Negative for rash, wound, erythema   Neurological: Negative for dizziness, syncope, weakness, light-headedness.   Psychiatric/Behavioral: Negative for confusion, hallucinations, anxiety  All other systems reviewed and are negative.    Objective:     Temp:  [97.1 °F (36.2 °C)-98.9 °F (37.2 °C)]   Pulse:  []   Resp:  [16-18]   BP: ()/(55-76)   SpO2:  [99 %-100 %]       Physical Exam:  Constitutional: appears weak and ill  Head: Normocephalic and atraumatic.   Mouth/Throat: Oropharynx is clear and moist.   Eyes: EOM are normal. Pupils are equal, round, and reactive to light. positive scleral icterus.   Neck: Normal range of motion. Neck supple.   Cardiovascular: Normal rate and regular  rhythm.  No murmur heard.  Pulmonary/Chest: Effort normal and breath sounds normal. No respiratory distress. No wheezes, rales, or rhonchi  Abdominal: Soft. Bowel sounds are normal.  positive distension, no tenderness  Musculoskeletal: Normal range of motion. No edema.   Neurological: Alert and oriented to person, place, and time.   Skin: Skin is warm and dry.   Psychiatric: Normal mood and affect. Behavior is normal.     Recent Labs   Lab 09/08/20  1539 09/08/20  1906 09/09/20  0438 09/10/20  0441 09/11/20  0523 09/12/20  0624   WBC 27.51* 26.39* 28.98* 19.06* 19.92* 24.83*   HGB 13.7* 13.8* 13.4* 11.4* 11.0* 11.4*   HCT 41.1 41.1 39.8* 33.6* 33.7* 35.6*    326 283 203 198 190     Recent Labs   Lab 09/10/20  0441 09/11/20  0523 09/12/20  0624   * 126* 131*   K 4.0 3.4* 4.1   CL 94* 96 98   CO2 21* 19* 21*   BUN 37* 32* 27*   CREATININE 1.7* 1.5* 1.5*   * 118* 123*   CALCIUM 8.1* 7.9* 8.1*   MG 2.0 2.1 2.2   PHOS 4.1 3.0 2.5*     Recent Labs   Lab 09/10/20  0441 09/11/20  0523 09/12/20  0624   ALKPHOS 229* 185* 198*   ALT 23 16 17   AST 42* 30 31   ALBUMIN 2.8* 3.1* 2.9*   PROT 5.2* 5.1* 4.9*   BILITOT 3.0* 2.5* 2.4*   INR 1.3* 1.3* 1.3*     Recent Labs   Lab 09/12/20  2151   POCTGLUCOSE 125*        ciprofloxacin HCl  500 mg Oral Q24H    enoxaparin  40 mg Subcutaneous Q24H    escitalopram oxalate  5 mg Oral Daily    lactulose  20 g Oral BID    lidocaine  1 patch Transdermal Q24H    midodrine  15 mg Oral Q8H    pantoprazole  40 mg Oral Daily    rOPINIRole  0.25 mg Oral QHS       Assessment and Plan     Mr. Kenneth Pardo is a 60 y.o. male who presented to [facility] on 9/9/2020 with     Hospital Course:    Mr. Kenneth Pardo was admitted to Hospital Medicine for management of     Active Hospital Problems    Diagnosis  POA    *Hyponatremia [E87.1]  Yes    Right leg DVT [I82.401]  Yes    Hepatorenal syndrome [K76.7]  Yes    Hyperkalemia [E87.5]  Yes    Alcoholic liver disease [K70.9]  Yes     Ascites due to alcoholic cirrhosis [K70.31]  Yes    Hepatic encephalopathy [K72.90]  Yes    Debility [R53.81]  Yes    Frequent falls [R29.6]  Not Applicable    Depression with anxiety [F41.8]  Yes    Restless leg [G25.81]  Yes    Insomnia [G47.00]  Yes    Sepsis, unspecified organism [A41.9]  Yes    Decompensated liver disease [K74.69]  Yes    Esophagitis, [place] grade B [K20.8]  Yes    Portal hypertensive gastropathy [K76.6, K31.89]  Yes    Anasarca [R60.1]  Yes    Hypotension [I95.9]  Yes      Resolved Hospital Problems   No resolved problems to display.     #Decompensated alcoholic cirrhosis   Recurrent ascites   Portal hypertension   Portal hypertensive gastropathy   Hepatic encephalopathy    MELD-Na score: 22 at 9/12/2020  6:24 AM  MELD score: 17 at 9/12/2020  6:24 AM  Calculated from:  Serum Creatinine: 1.5 mg/dL at 9/12/2020  6:24 AM  Serum Sodium: 131 mmol/L at 9/12/2020  6:24 AM  Total Bilirubin: 2.4 mg/dL at 9/12/2020  6:24 AM  INR(ratio): 1.3 at 9/12/2020  6:24 AM  Age: 60 years    -Large ascites on exam and CT abdomen   -diagnostic and therapeutic paracentesis in am   -hold diuretics given hyponatremia and MERLY   -continue lactulose for HE   -continue PPI   -PETH pending  -hepatology consult   - IR paracentesis with 6.4L removed and negative for SBP on 9/9  - approved for inpatient liver transplant evaluation     # Right Leg DVT  - seen on U/S; recent major GI bleed, need to discuss with team about placing IVC filter      #Hyponatremia   -Na 120 from recent baseline 136 in ED  -likely due to diuretic use, decompensated cirrhosis, SSRI use    -repeat Na 122 prior to transfer   -received 1L NS bolus in ED  -hold diuretics (lasix, spironolactone)  - hold SSRI  - improving with fluid restriction 1L     #MERLY  # Hepatorenal syndrome   -improving, cont midodrine, albumin and octreotide        #Leukocytosis   -WBC 27  -no signs of overt infection or sepsis   -afebrile   -blood and urine  cultures negative  -UA showed pyuria and received empiric dose of rocephin   - negative for SBP  - doing infectious work up including C. diff     #Depression with anxiety   -no acute issue   -hold fluoxetine in setting of hyponatremia (?SIADH)  -continue lexapro      #Insomnia  -trazodone prn      #Debility with falls  -PT evaluation for gait training   -fall precaution      # Hypotension  - midodrine     Naresh Franks MD  Medical Director [facility] Medicine  Spectra:  [phone]  Pager: [phone]

## 2020-09-14 ENCOUNTER — TELEPHONE (OUTPATIENT)
Dept: TRANSPLANT | Facility: CLINIC | Age: 60
End: 2020-09-14

## 2020-09-14 PROBLEM — D72.829 LEUKOCYTOSIS: Status: ACTIVE | Noted: 2020-09-14

## 2020-09-14 PROBLEM — N17.9 AKI (ACUTE KIDNEY INJURY): Status: ACTIVE | Noted: 2020-09-14

## 2020-09-14 LAB
ALBUMIN SERPL BCP-MCNC: 3.4 G/DL (ref 3.5–5.2)
ALP SERPL-CCNC: 195 U/L (ref 55–135)
ALT SERPL W/O P-5'-P-CCNC: 12 U/L (ref 10–44)
ANION GAP SERPL CALC-SCNC: 11 MMOL/L (ref 8–16)
APPEARANCE FLD: NORMAL
AST SERPL-CCNC: 24 U/L (ref 10–40)
BACTERIA #/AREA URNS AUTO: ABNORMAL /HPF
BACTERIA BLD CULT: NORMAL
BACTERIA BLD CULT: NORMAL
BACTERIA FLD CULT: NORMAL
BASOPHILS # BLD AUTO: 0.14 K/UL (ref 0–0.2)
BASOPHILS NFR BLD: 0.6 % (ref 0–1.9)
BILIRUB SERPL-MCNC: 2.3 MG/DL (ref 0.1–1)
BILIRUB UR QL STRIP: NEGATIVE
BODY FLD TYPE: NORMAL
BUN SERPL-MCNC: 34 MG/DL (ref 6–20)
CALCIUM SERPL-MCNC: 8.4 MG/DL (ref 8.7–10.5)
CHLORIDE SERPL-SCNC: 99 MMOL/L (ref 95–110)
CLARITY UR REFRACT.AUTO: ABNORMAL
CO2 SERPL-SCNC: 21 MMOL/L (ref 23–29)
COLOR FLD: YELLOW
COLOR UR AUTO: ABNORMAL
CREAT SERPL-MCNC: 1.9 MG/DL (ref 0.5–1.4)
CREAT UR-MCNC: 151 MG/DL (ref 23–375)
CREAT UR-MCNC: 151 MG/DL (ref 23–375)
DIFFERENTIAL METHOD: ABNORMAL
EOSINOPHIL # BLD AUTO: 1.1 K/UL (ref 0–0.5)
EOSINOPHIL NFR BLD: 4.7 % (ref 0–8)
EOSINOPHIL URNS QL WRIGHT STN: NORMAL
ERYTHROCYTE [DISTWIDTH] IN BLOOD BY AUTOMATED COUNT: 14.3 % (ref 11.5–14.5)
EST. GFR  (AFRICAN AMERICAN): 43.9 ML/MIN/1.73 M^2
EST. GFR  (NON AFRICAN AMERICAN): 38 ML/MIN/1.73 M^2
GLUCOSE SERPL-MCNC: 101 MG/DL (ref 70–110)
GLUCOSE UR QL STRIP: NEGATIVE
HCT VFR BLD AUTO: 35.6 % (ref 40–54)
HGB BLD-MCNC: 11.7 G/DL (ref 14–18)
HGB UR QL STRIP: NEGATIVE
HYALINE CASTS UR QL AUTO: 7 /LPF
IMM GRANULOCYTES # BLD AUTO: 0.18 K/UL (ref 0–0.04)
IMM GRANULOCYTES NFR BLD AUTO: 0.8 % (ref 0–0.5)
INR PPP: 1.5 (ref 0.8–1.2)
KETONES UR QL STRIP: NEGATIVE
LEUKOCYTE ESTERASE UR QL STRIP: ABNORMAL
LYMPHOCYTES # BLD AUTO: 2.2 K/UL (ref 1–4.8)
LYMPHOCYTES NFR BLD: 9.5 % (ref 18–48)
LYMPHOCYTES NFR FLD MANUAL: 25 %
MAGNESIUM SERPL-MCNC: 2.1 MG/DL (ref 1.6–2.6)
MCH RBC QN AUTO: 34.3 PG (ref 27–31)
MCHC RBC AUTO-ENTMCNC: 32.9 G/DL (ref 32–36)
MCV RBC AUTO: 104 FL (ref 82–98)
MESOTHL CELL NFR FLD MANUAL: 6 %
MICROSCOPIC COMMENT: ABNORMAL
MONOCYTES # BLD AUTO: 1.1 K/UL (ref 0.3–1)
MONOCYTES NFR BLD: 4.7 % (ref 4–15)
MONOS+MACROS NFR FLD MANUAL: 4 %
NEUTROPHILS # BLD AUTO: 18.6 K/UL (ref 1.8–7.7)
NEUTROPHILS NFR BLD: 79.7 % (ref 38–73)
NEUTROPHILS NFR FLD MANUAL: 65 %
NITRITE UR QL STRIP: NEGATIVE
NRBC BLD-RTO: 0 /100 WBC
PH UR STRIP: 5 [PH] (ref 5–8)
PHOSPHATIDYLETHANOL (PETH): NEGATIVE NG/ML
PLATELET # BLD AUTO: 197 K/UL (ref 150–350)
PMV BLD AUTO: 10.3 FL (ref 9.2–12.9)
POCT GLUCOSE: 124 MG/DL (ref 70–110)
POCT GLUCOSE: 126 MG/DL (ref 70–110)
POCT GLUCOSE: 130 MG/DL (ref 70–110)
POCT GLUCOSE: 363 MG/DL (ref 70–110)
POTASSIUM SERPL-SCNC: 3.9 MMOL/L (ref 3.5–5.1)
PROT SERPL-MCNC: 5.4 G/DL (ref 6–8.4)
PROT UR QL STRIP: NEGATIVE
PROT UR-MCNC: 46 MG/DL (ref 0–15)
PROT/CREAT UR: 0.3 MG/G{CREAT} (ref 0–0.2)
PROTHROMBIN TIME: 15.8 SEC (ref 9–12.5)
RBC # BLD AUTO: 3.41 M/UL (ref 4.6–6.2)
RBC #/AREA URNS AUTO: 3 /HPF (ref 0–4)
SODIUM SERPL-SCNC: 131 MMOL/L (ref 136–145)
SODIUM UR-SCNC: <20 MMOL/L (ref 20–250)
SP GR UR STRIP: 1.02 (ref 1–1.03)
SQUAMOUS #/AREA URNS AUTO: 1 /HPF
URN SPEC COLLECT METH UR: ABNORMAL
UUN UR-MCNC: 806 MG/DL (ref 140–1050)
VARICELLA INTERPRETATION: POSITIVE
VARICELLA ZOSTER IGG: 2.63 ISR (ref 0–0.9)
WBC # BLD AUTO: 23.3 K/UL (ref 3.9–12.7)
WBC # FLD: 101 /CU MM
WBC #/AREA URNS AUTO: 4 /HPF (ref 0–5)

## 2020-09-14 PROCEDURE — 63600175 PHARM REV CODE 636 W HCPCS: Mod: SL,NTX | Performed by: HOSPITALIST

## 2020-09-14 PROCEDURE — 80053 COMPREHEN METABOLIC PANEL: CPT | Mod: NTX

## 2020-09-14 PROCEDURE — 89051 BODY FLUID CELL COUNT: CPT | Mod: NTX

## 2020-09-14 PROCEDURE — 84156 ASSAY OF PROTEIN URINE: CPT | Mod: NTX

## 2020-09-14 PROCEDURE — 90739 HEPB VACC 2/4 DOSE ADULT IM: CPT | Mod: NTX | Performed by: PHYSICIAN ASSISTANT

## 2020-09-14 PROCEDURE — P9047 ALBUMIN (HUMAN), 25%, 50ML: HCPCS | Mod: JG,NTX | Performed by: HOSPITALIST

## 2020-09-14 PROCEDURE — 99233 SBSQ HOSP IP/OBS HIGH 50: CPT | Mod: NTX,,, | Performed by: INTERNAL MEDICINE

## 2020-09-14 PROCEDURE — 90472 IMMUNIZATION ADMIN EACH ADD: CPT | Mod: VFC,NTX | Performed by: PHYSICIAN ASSISTANT

## 2020-09-14 PROCEDURE — 87070 CULTURE OTHR SPECIMN AEROBIC: CPT | Mod: NTX

## 2020-09-14 PROCEDURE — 87205 SMEAR GRAM STAIN: CPT | Mod: NTX

## 2020-09-14 PROCEDURE — 36415 COLL VENOUS BLD VENIPUNCTURE: CPT | Mod: NTX

## 2020-09-14 PROCEDURE — 20600001 HC STEP DOWN PRIVATE ROOM: Mod: NTX

## 2020-09-14 PROCEDURE — 90686 IIV4 VACC NO PRSV 0.5 ML IM: CPT | Mod: SL,NTX | Performed by: HOSPITALIST

## 2020-09-14 PROCEDURE — 99233 PR SUBSEQUENT HOSPITAL CARE,LEVL III: ICD-10-PCS | Mod: NTX,,, | Performed by: HOSPITALIST

## 2020-09-14 PROCEDURE — 81001 URINALYSIS AUTO W/SCOPE: CPT | Mod: NTX

## 2020-09-14 PROCEDURE — 90471 IMMUNIZATION ADMIN: CPT | Mod: VFC,NTX | Performed by: PHYSICIAN ASSISTANT

## 2020-09-14 PROCEDURE — 99233 PR SUBSEQUENT HOSPITAL CARE,LEVL III: ICD-10-PCS | Mod: NTX,,, | Performed by: INTERNAL MEDICINE

## 2020-09-14 PROCEDURE — 84300 ASSAY OF URINE SODIUM: CPT | Mod: NTX

## 2020-09-14 PROCEDURE — 99233 SBSQ HOSP IP/OBS HIGH 50: CPT | Mod: NTX,,, | Performed by: PHYSICIAN ASSISTANT

## 2020-09-14 PROCEDURE — 83735 ASSAY OF MAGNESIUM: CPT | Mod: NTX

## 2020-09-14 PROCEDURE — 90715 TDAP VACCINE 7 YRS/> IM: CPT | Mod: SL,NTX | Performed by: PHYSICIAN ASSISTANT

## 2020-09-14 PROCEDURE — 90471 IMMUNIZATION ADMIN: CPT | Mod: VFC,NTX | Performed by: HOSPITALIST

## 2020-09-14 PROCEDURE — 63600175 PHARM REV CODE 636 W HCPCS: Mod: NTX | Performed by: PHYSICIAN ASSISTANT

## 2020-09-14 PROCEDURE — 63600175 PHARM REV CODE 636 W HCPCS: Mod: NTX | Performed by: HOSPITALIST

## 2020-09-14 PROCEDURE — 87075 CULTR BACTERIA EXCEPT BLOOD: CPT | Mod: NTX

## 2020-09-14 PROCEDURE — 25000003 PHARM REV CODE 250: Mod: NTX | Performed by: HOSPITALIST

## 2020-09-14 PROCEDURE — 90633 HEPA VACC PED/ADOL 2 DOSE IM: CPT | Mod: NTX | Performed by: PHYSICIAN ASSISTANT

## 2020-09-14 PROCEDURE — 99233 PR SUBSEQUENT HOSPITAL CARE,LEVL III: ICD-10-PCS | Mod: NTX,,, | Performed by: PHYSICIAN ASSISTANT

## 2020-09-14 PROCEDURE — 85610 PROTHROMBIN TIME: CPT | Mod: NTX

## 2020-09-14 PROCEDURE — 84540 ASSAY OF URINE/UREA-N: CPT | Mod: NTX

## 2020-09-14 PROCEDURE — 90472 IMMUNIZATION ADMIN EACH ADD: CPT | Mod: VFC,NTX | Performed by: HOSPITALIST

## 2020-09-14 PROCEDURE — 90670 PCV13 VACCINE IM: CPT | Mod: SL,NTX | Performed by: PHYSICIAN ASSISTANT

## 2020-09-14 PROCEDURE — 97116 GAIT TRAINING THERAPY: CPT | Mod: NTX

## 2020-09-14 PROCEDURE — 85025 COMPLETE CBC W/AUTO DIFF WBC: CPT | Mod: NTX

## 2020-09-14 PROCEDURE — 99233 SBSQ HOSP IP/OBS HIGH 50: CPT | Mod: NTX,,, | Performed by: HOSPITALIST

## 2020-09-14 RX ORDER — ALBUMIN HUMAN 250 G/1000ML
50 SOLUTION INTRAVENOUS ONCE
Status: COMPLETED | OUTPATIENT
Start: 2020-09-14 | End: 2020-09-14

## 2020-09-14 RX ORDER — ALBUMIN HUMAN 250 G/1000ML
25 SOLUTION INTRAVENOUS 3 TIMES DAILY
Status: COMPLETED | OUTPATIENT
Start: 2020-09-14 | End: 2020-09-15

## 2020-09-14 RX ORDER — OCTREOTIDE ACETATE 100 UG/ML
100 INJECTION, SOLUTION INTRAVENOUS; SUBCUTANEOUS EVERY 8 HOURS
Status: DISCONTINUED | OUTPATIENT
Start: 2020-09-14 | End: 2020-09-18 | Stop reason: HOSPADM

## 2020-09-14 RX ADMIN — OCTREOTIDE ACETATE 100 MCG: 100 INJECTION, SOLUTION INTRAVENOUS; SUBCUTANEOUS at 11:09

## 2020-09-14 RX ADMIN — APIXABAN 10 MG: 5 TABLET, FILM COATED ORAL at 09:09

## 2020-09-14 RX ADMIN — MIDODRINE HYDROCHLORIDE 15 MG: 5 TABLET ORAL at 05:09

## 2020-09-14 RX ADMIN — INFLUENZA VIRUS VACCINE 0.5 ML: 15; 15; 15; 15 SUSPENSION INTRAMUSCULAR at 05:09

## 2020-09-14 RX ADMIN — HEPATITIS B VACCINE (RECOMBINANT) ADJUVANTED 0.5 ML: 20 INJECTION, SOLUTION INTRAMUSCULAR at 09:09

## 2020-09-14 RX ADMIN — LACTULOSE 20 G: 20 SOLUTION ORAL at 09:09

## 2020-09-14 RX ADMIN — PANTOPRAZOLE SODIUM 40 MG: 40 TABLET, DELAYED RELEASE ORAL at 09:09

## 2020-09-14 RX ADMIN — MIDODRINE HYDROCHLORIDE 15 MG: 5 TABLET ORAL at 09:09

## 2020-09-14 RX ADMIN — ALBUMIN (HUMAN) 25 G: 12.5 SOLUTION INTRAVENOUS at 11:09

## 2020-09-14 RX ADMIN — PNEUMOCOCCAL 13-VALENT CONJUGATE VACCINE 0.5 ML: 2.2; 2.2; 2.2; 2.2; 2.2; 4.4; 2.2; 2.2; 2.2; 2.2; 2.2; 2.2; 2.2 INJECTION, SUSPENSION INTRAMUSCULAR at 06:09

## 2020-09-14 RX ADMIN — MIDODRINE HYDROCHLORIDE 15 MG: 5 TABLET ORAL at 02:09

## 2020-09-14 RX ADMIN — ROPINIROLE HYDROCHLORIDE 0.25 MG: 0.25 TABLET, FILM COATED ORAL at 09:09

## 2020-09-14 RX ADMIN — CLOSTRIDIUM TETANI TOXOID ANTIGEN (FORMALDEHYDE INACTIVATED), CORYNEBACTERIUM DIPHTHERIAE TOXOID ANTIGEN (FORMALDEHYDE INACTIVATED), BORDETELLA PERTUSSIS TOXOID ANTIGEN (GLUTARALDEHYDE INACTIVATED), BORDETELLA PERTUSSIS FILAMENTOUS HEMAGGLUTININ ANTIGEN (FORMALDEHYDE INACTIVATED), BORDETELLA PERTUSSIS PERTACTIN ANTIGEN, AND BORDETELLA PERTUSSIS FIMBRIAE 2/3 ANTIGEN 0.5 ML: 5; 2; 2.5; 5; 3; 5 INJECTION, SUSPENSION INTRAMUSCULAR at 06:09

## 2020-09-14 RX ADMIN — HEPATITIS A VACCINE 1440 UNITS: 720 INJECTION, SUSPENSION INTRAMUSCULAR at 09:09

## 2020-09-14 RX ADMIN — CIPROFLOXACIN 500 MG: 500 TABLET, FILM COATED ORAL at 09:09

## 2020-09-14 RX ADMIN — ALBUMIN HUMAN 50 G: 0.25 SOLUTION INTRAVENOUS at 12:09

## 2020-09-14 RX ADMIN — ESCITALOPRAM OXALATE 5 MG: 5 TABLET, FILM COATED ORAL at 09:09

## 2020-09-14 RX ADMIN — LACTULOSE 20 G: 20 SOLUTION ORAL at 04:09

## 2020-09-14 NOTE — PLAN OF CARE
Pt remained AAOx4 during shift.  No complaints of pain.  NSR on the monitor.  Pt did not want to have the Visi monitor applied because he is already on tele and it would be more wires.  Up independently to the restroom with an even steady gait.  Para and stress echo ordered for today.  Will continue to monitor.

## 2020-09-14 NOTE — CONSULTS
[facility]  Infectious Disease  Consult Note    Patient Name: Kenneth Pardo  MRN: [id number]  Admission Date: 9/9/2020  Hospital Length of Stay: 5 days  Attending Physician: Naresh Franks MD  Primary Care Provider: Mikala Etienne MD     Isolation Status: No active isolations        Inpatient consult to Infectious Diseases  Consult performed by: Jalen Smith PA-C  Consult ordered by: Naresh Franks MD      ID consult received. Chart being reviewed. Full consult note with recommendations to follow.      Thank you,  Jalen Smith PA-C  [id number]

## 2020-09-14 NOTE — CONSULTS
Pre Transplant Infectious Diseases Consult  Liver Transplant Recipient Evaluation    Requesting Physician: Dr. Franks    Reason for Visit:  Pre Transplant Evaluation    Organ:  Liver    Etiology of Liver Disease:  Alcoholic cirrhosis    History of Prior Transplant:  No    Currently taking immunosuppressants/steroids:  No    History of Splenectomy:  No    Infectious History:  Current/recent infections or currently taking antibiotics?  Yes - currently on cipro for Sbp prophylaxsis  History of recurrent infections (sinuses, throat, bladder/kidneys, intestines, skin, dental, lung, catheter (HD/PD) related, or peritonitis/SBP)?  No  Any major hospitalizations due to infection?  No  If diabetic, history of diabetic foot infection/osteomyelitis?  No  History of shingles?  No  History of STDs (syphilis, viral hepatitis, HIV)?  No  Exposure to TB or ever had a positive TB skin test?  No  History of residence in coccidioides endemic areas ([facility]?  No  Any foreign travel?  No  Any associated illness?  No    Social/Environmental:  Occupational:  Construction  Animal exposures (dogs, cats, farm animals, bird cages, fish tanks):  Yes - 2 dogs  Hobbies (gardening, hike, fish/hunting, etc): Guitar  Consumption of raw/undercooked meat or seafood?  No  Any injectable or smoked recreational drug use?  No    Immunization History:  Childhood vaccines:  Yes  Last Flu shot: unkown  Tetanus/TDAP:[place]  Hepatitis A:[place]  Hepatitis B:[place]  Prevnar-13:[place]  Pneumovax-23:[place]  Shingles (Zostavax/Shingrix):[place]  Meningococcal:  Other:     Serologies:  CMV IgG Interpretation   Date Value Ref Range Status   09/10/2020 Non-Reactive Non-Reactive Final     Hepatitis A Antibody IgG   Date Value Ref Range Status   09/10/2020 Negative  Final     Hep B Core Total Ab   Date Value Ref Range Status   09/10/2020 Negative  Final     Hep B S Ab   Date Value Ref Range Status   09/10/2020 Negative  Final     Hepatitis B Surface Ag   Date  Value Ref Range Status   09/10/2020 Negative Negative Final     HIV 1/2 Ag/Ab   Date Value Ref Range Status   09/10/2020 Negative Negative Final     TB Gold Plus   Date Value Ref Range Status   09/10/2020 Negative  Final     Comment:     The Nil tube value is used to determine if the patient has a  preexisting immune response which could cause a false-positive  reading on the test.  In order for a test to be valid, the   NIL tube must have a value of less than or equal to 8.0 IU/mL.  The mitogen control tube is used to assure the patient has a   healthy immune status and also serves as a control for correct  blood handling and incubation.  It is used to detect false-  negative readings.  The mitogen tube must have a gamma   interferon value of greater than or equal to 0.5 IU/mL higher  that the value of the Nil tube.  The TB antigen tubes are coated with the M. tuberculosis   specific antigens. For a test to be considered positive,the  TB antigen tube values minus the Nil tube value must be   greater than or equal to 0.35 IU/mL.  Diagnosing or excluding tuberculosis disease, and assessing  the probability of LTNI, requires a combination of   epidemiological, historical, medical, and diagnostic findings  that should be taken into account when interpreting   Quantiferon-TB Gold Plus results.       RPR   Date Value Ref Range Status   09/10/2020 Non-reactive Non-reactive Final     Strongyloides Ab IgG   Date Value Ref Range Status   09/10/2020 Negative Negative Final     Comment:     No detectable levels of IgG antibodies to Strongyloides.  Repeat testing in 1-2 weeks if clinically indicated.  Test Performed by:  [facility]– [place]  [address] [place] [address]  : Abel Olivia M.D. Ph.D.; CLIA# [id number]          Review of Systems   Constitution: Positive for malaise/fatigue. Negative for chills, decreased appetite, diaphoresis, fever, night sweats,  weight gain and weight loss.   HENT: Negative for congestion, ear pain, hearing loss, sore throat, stridor and tinnitus.    Eyes: Negative for blurred vision, double vision and redness.   Cardiovascular: Negative for chest pain, leg swelling and palpitations.   Respiratory: Negative for cough, hemoptysis, shortness of breath, sputum production and wheezing.    Hematologic/Lymphatic: Negative for adenopathy. Does not bruise/bleed easily.   Skin: Positive for color change. Negative for dry skin, itching, rash and suspicious lesions.   Musculoskeletal: Negative for arthritis, back pain, joint pain, joint swelling, myalgias and neck pain.   Gastrointestinal: Positive for diarrhea. Negative for abdominal pain, constipation, heartburn, nausea and vomiting.   Genitourinary: Negative for bladder incontinence, dysuria, flank pain, frequency, hesitancy, incomplete emptying and urgency.   Neurological: Positive for weakness. Negative for dizziness, focal weakness, headaches, loss of balance and numbness.   Psychiatric/Behavioral: Negative for depression and memory loss. The patient does not have insomnia and is not nervous/anxious.      Physical Exam  Constitutional:       Appearance: Normal appearance. He is well-developed. He is ill-appearing. He is not diaphoretic.   HENT:      Head: Normocephalic and atraumatic.      Mouth/Throat:      Mouth: No lacerations or oral lesions.      Dentition: Normal dentition. Does not have dentures. No dental caries or dental abscesses.      Pharynx: Uvula midline.      Comments: Poor dentition  Eyes:      General: Lids are normal. Scleral icterus present.      Conjunctiva/sclera: Conjunctivae normal.      Pupils: Pupils are equal, round, and reactive to light.   Neck:      Musculoskeletal: Neck supple.   Cardiovascular:      Rate and Rhythm: Normal rate and regular rhythm.      Heart sounds: No murmur. No friction rub. No gallop.    Pulmonary:      Effort: Pulmonary effort is normal. No  respiratory distress.      Breath sounds: Normal breath sounds. No decreased breath sounds, wheezing, rhonchi or rales.   Abdominal:      General: Bowel sounds are normal. There is distension.      Palpations: Abdomen is soft. There is no mass.      Tenderness: There is no abdominal tenderness. There is no guarding or rebound.   Lymphadenopathy:      Head:      Right side of head: No submental, submandibular, tonsillar, preauricular, posterior auricular or occipital adenopathy.      Left side of head: No submental, submandibular, tonsillar, preauricular, posterior auricular or occipital adenopathy.      Cervical: No cervical adenopathy.      Upper Body:      Right upper body: No supraclavicular or epitrochlear adenopathy.      Left upper body: No supraclavicular or epitrochlear adenopathy.   Skin:     General: Skin is warm and dry.      Coloration: Skin is jaundiced. Skin is not pale.      Findings: Bruising present. No erythema, lesion or rash.   Neurological:      Mental Status: He is alert and oriented to person, place, and time.      Cranial Nerves: No cranial nerve deficit.   Psychiatric:         Behavior: Behavior normal.              Counseling:   I discussed with the patient the risk for increased susceptibility to infections following transplantation including increased risk for infection right after transplant and if rejection should occur.  The patient has been counseled on the importance of vaccinations including but not limited to a yearly flu vaccine. Patient was also instructed to encourage that family/caretakers receive their flu vaccine yearly. The patient was encouraged to contact us about any problems that may develop after immunizations and possible side effects were reviewed.     Specific guidance has been provided to the patient regarding the patient's occupation, hobbies and activities to avoid future infectious complications. These include but are not limited to: avoiding raw/undercooked  meats and seafood, avoiding unpasteurized milk/cheeses, proper (hand) hygiene, contact with animals and appropriate vaccination of animals, use of mosquito/tick precautions, avoiding walking barefoot, avoiding sick contacts, and seeking medical advice prior to foreign travel (specifically developing countries).     Transplant Candidacy: Based on available information, there are no identified significant barriers to transplantation from an infectious disease standpoint pending acceptable serologies and subject to recommendations below.     Final determination of transplant candidacy will be made once evaluation is complete and reviewed by the Transplant Selection Committee.      ID recommendations:    Pt w/ persistently elevated white count. Afebrile Cdiff negative.  Paracentesis w/o SBP.  US LE w/ DVT    Recommend below immunizations  - Influenza annually  - Tdap today with booster every 10 years  - Prevnar 13 today followed by Pneumovax in 8 weeks  - Hepatitis A vaccine today and in 6 months  - Hepatitis B vaccine (Heplisav) today and in one month  - Shingrix (two doses at 0 and 2-6 months)

## 2020-09-14 NOTE — TELEPHONE ENCOUNTER
"Patient had told physician he had a colonoscopy with past 5 years "somewhere in [place]".  Contacted patient's wife who stated it was done at [facility].  Requested Medical Asst. Work on obtaining results.   "

## 2020-09-14 NOTE — PT/OT/SLP PROGRESS
Physical Therapy Treatment    Patient Name:  Kenneth Pardo   MRN:  [id number]    Recommendations:     Discharge Recommendations:  home health PT   Discharge Equipment Recommendations: walker, rolling, bedside commode, shower chair   Barriers to discharge: Decreased caregiver support and risk of falls    Assessment:     Kenneth Pardo is a 60 y.o. male admitted with a medical diagnosis of Hyponatremia.  He presents with the following impairments/functional limitations:  weakness, impaired balance, decreased safety awareness, impaired endurance, edema, impaired cardiopulmonary response to activity, gait instability, decreased lower extremity function, impaired functional mobilty. Patient tolerated session fairly well. He expresses a lack of interest and participation in therapy services. His mobility is being limited by decreased endurance, generalized weakness, and slight instability with gait and balance activities. He was able to ambulate ~120 ft with no AD and CGA, and he was able to ascend/descend 7 steps with R HR and CGA. He appears to be performing near his baseline function. Upon d/c, PT still recommends home health PT to address the above deficits, improve his overall safety, and help him reach his maximum level of independent mobility.     Rehab Prognosis: Good; patient would benefit from acute skilled PT services to address these deficits and reach maximum level of function.    Recent Surgery: * No surgery found *      Plan:     During this hospitalization, patient to be seen 3 x/week to address the identified rehab impairments via gait training, therapeutic activities, therapeutic exercises, neuromuscular re-education and progress toward the following goals:    · Plan of Care Expires:  10/09/20    Subjective     Chief Complaint: said he was feeling anxious and he needed to speak to his wife  Patient/Family Comments/goals: to return home   Pain/Comfort:  · Pain Rating 1: 0/10      Objective:     Communicated  with RN prior to session.  Patient found HOB elevated with SCD, pulse ox (continuous) upon PT entry to room.     General Precautions: Standard, fall   Orthopedic Precautions:N/A   Braces: N/A     Functional Mobility:    Bed Mobility  Rolling to R: SBA, HOB elevated and used siderails   Supine to Sit on the R side:  SBA, HOB elevated and used siderails  Sit to supine: SBA  Scoot to HOB in supine: SBA  Scoot to EOB in sitting: supervision   Transfers Sit to Stand:  CGA, no AD, verbal cues for hand placement    Gait  Gait Distance: ~120 ft with no AD  Assistance Level: CGA  Description: reciprocal steps, occasionally used counters/surfaces for support, slightly unstable, decreased foot clearance, decreased lilliam, decreased step length, demonstrated mild waddling gait pattern    Gait Training: verbal and manual cues for posture, pt able to modify movements from cues and commands    Stairs Ascended/descended: 7 stairs   Quality:   Ascending - reciprocal steps, appropriate foot clearance  Descending - step to pattern, leading with the R  Level of assist: CGA  Rails used: R HR - used both hands to straddle     Stair Training: verbal cues for straddling handrail with both hands to increase stability, verbal cues for pt to take his time and perform step-to pattern         AM-PAC 6 CLICK MOBILITY  Turning over in bed (including adjusting bedclothes, sheets and blankets)?: 4  Sitting down on and standing up from a chair with arms (e.g., wheelchair, bedside commode, etc.): 3  Moving from lying on back to sitting on the side of the bed?: 4  Moving to and from a bed to a chair (including a wheelchair)?: 3  Need to walk in hospital room?: 3  Climbing 3-5 steps with a railing?: 3  Basic Mobility Total Score: 20       Therapeutic Activities and Exercises:   Patient safe to ambulate with RN x 1 assist. Whiteboard updated.   Educated patient on the importance of continuing to be mobile to increase endurance and strength and to  prevent deconditioning. Reminded patient of the goals and roles of physical therapy in the acute care setting. Patient expressed understanding and agreement.     During the session, pt expressed he needed to call his wife. Assisted pt with the room telephone and notified RN after session was over.     Patient left HOB elevated with all lines intact and call button in reach..    GOALS:   Multidisciplinary Problems     Physical Therapy Goals        Problem: Physical Therapy Goal    Goal Priority Disciplines Outcome Goal Variances Interventions   Physical Therapy Goal     PT, PT/OT Ongoing, Progressing     Description: Goals to be met by: 2020     Patient will increase functional independence with mobility by performin. Supine to sit with Supervision  2. Sit to stand transfer with Supervision  3. Gait  x 100 feet with Modified [place] using RW or LRAD.   4. Ascend/descend 5 stairs with right Handrails and Contact Guard Assistance using LRAD. -MET ()  Ascend/descend 5 stairs with right Handrails and Supervision using LRAD.  5. Stand for 10 minutes with Stand-by Assistance using LRAD while reaching out of his MICHEAL in all planes to perform functional activities.   6. Lower extremity exercise program x15 reps per handout, with independence                     Time Tracking:     PT Received On: 20  PT Start Time: 938     PT Stop Time: 949  PT Total Time (min): 11 min     Billable Minutes: Gait Training 11    Treatment Type: Treatment  PT/PTA: PT     PTA Visit Number: 0     Rmaona Barboza RUST  2020

## 2020-09-14 NOTE — ASSESSMENT & PLAN NOTE
- S/p 2 large volume paracenteses (9/9 net - 6.4 L's, 9/14 net -6.8 L's)   - Management per primary

## 2020-09-14 NOTE — PLAN OF CARE
Problem: Physical Therapy Goal  Goal: Physical Therapy Goal  Description: Goals to be met by: 2020     Patient will increase functional independence with mobility by performin. Supine to sit with Supervision  2. Sit to stand transfer with Supervision  3. Gait  x 100 feet with Modified [place] using RW or LRAD.   4. Ascend/descend 5 stairs with right Handrails and Contact Guard Assistance using LRAD. -MET ()  Ascend/descend 5 stairs with right Handrails and Supervision using LRAD.  5. Stand for 10 minutes with Stand-by Assistance using LRAD while reaching out of his MICHEAL in all planes to perform functional activities.   6. Lower extremity exercise program x15 reps per handout, with independence    Outcome: Ongoing, Progressing    Goals updated to reflect patient progress. Continue with POC.     Ramona Barboza, NAWAF  2020

## 2020-09-14 NOTE — PROGRESS NOTES
Paracentesis complete. 6200mLs peritoneal fluid drained. Pt tolerated well. Dressing to Right abd clean, dry, and intact. Albumin 25% given 200 mLs. Specimens sent per lab order. Report called to floor nurse.

## 2020-09-14 NOTE — SUBJECTIVE & OBJECTIVE
Past Medical History:   Diagnosis Date    Hepatitis     Hypertension     Liver failure        Past Surgical History:   Procedure Laterality Date    ESOPHAGOGASTRODUODENOSCOPY N/A 7/31/2020    Procedure: EGD (ESOPHAGOGASTRODUODENOSCOPY);  Surgeon: Jhonathan Lieberman MD;  Location: [address];  Service: Endoscopy;  Laterality: N/A;       Review of patient's allergies indicates:  No Known Allergies  Current Facility-Administered Medications   Medication Frequency    acetaminophen tablet 500 mg Q6H PRN    albuterol-ipratropium 2.5 mg-0.5 mg/3 mL nebulizer solution 3 mL Q4H PRN    apixaban tablet 10 mg BID    ciprofloxacin HCl tablet 500 mg Q24H    dextrose 50% injection 12.5 g PRN    dextrose 50% injection 25 g PRN    escitalopram oxalate tablet 5 mg Daily    glucagon (human recombinant) injection 1 mg PRN    glucose chewable tablet 16 g PRN    glucose chewable tablet 24 g PRN    influenza (QUADRIVALENT PF) vaccine 0.5 mL vaccine x 1 dose    lactulose 20 gram/30 mL solution Soln 20 g BID    lidocaine 5 % patch 1 patch Q24H    midodrine tablet 15 mg Q8H    ondansetron injection 4 mg Q8H PRN    pantoprazole EC tablet 40 mg Daily    rOPINIRole tablet 0.25 mg QHS    sodium chloride 0.9% flush 10 mL PRN    traZODone tablet 50 mg Nightly PRN     Family History     None        Tobacco Use    Smoking status: Never Smoker    Smokeless tobacco: Never Used   Substance and Sexual Activity    Alcohol use: Not Currently     Frequency: Never    Drug use: Not on file    Sexual activity: Not on file     Review of Systems   Constitutional: Negative for chills and fever.   HENT: Negative for sore throat.    Respiratory: Negative for cough and shortness of breath.    Cardiovascular: Negative for chest pain and palpitations.   Gastrointestinal: Negative for abdominal distention (s/p para), nausea and vomiting.   Genitourinary: Negative for dysuria, frequency and urgency.   Skin: Positive for pallor. Negative for  rash.   Neurological: Negative for dizziness and headaches.   Psychiatric/Behavioral: Negative for agitation and confusion.     Objective:     Vital Signs (Most Recent):  Temp: 97.9 °F (36.6 °C) (09/14/20 0759)  Pulse: 73 (09/14/20 1523)  Resp: 17 (09/14/20 1432)  BP: 101/71 (09/14/20 1432)  SpO2: 98 % (09/14/20 1432)  O2 Device (Oxygen Therapy): room air (09/14/20 1301) Vital Signs (24h Range):  Temp:  [97.9 °F (36.6 °C)-98.9 °F (37.2 °C)] 97.9 °F (36.6 °C)  Pulse:  [] 73  Resp:  [16-18] 17  SpO2:  [97 %-100 %] 98 %  BP: ()/(55-77) 101/71     Weight: 84.5 kg (186 lb 2.9 oz) (09/14/20 0550)  Body mass index is 32.98 kg/m².  Body surface area is 1.94 meters squared.    I/O last 3 completed shifts:  In: 1690 [P.O.:1690]  Out: 300 [Urine:300]    Physical Exam  Vitals signs and nursing note reviewed.   Constitutional:       Appearance: He is obese. He is not ill-appearing or toxic-appearing.   Eyes:      General: No scleral icterus.  Cardiovascular:      Rate and Rhythm: Normal rate and regular rhythm.      Pulses: Normal pulses.           Radial pulses are 2+ on the right side and 2+ on the left side.   Pulmonary:      Effort: Pulmonary effort is normal. No respiratory distress.      Breath sounds: No wheezing or rales.   Abdominal:      General: Abdomen is flat. Bowel sounds are normal. There is no distension.      Tenderness: There is no abdominal tenderness. There is no guarding.   Musculoskeletal:         General: No swelling or deformity.   Skin:     General: Skin is warm and dry.      Coloration: Skin is pale.      Findings: Bruising (to bilateral UE's) present. No lesion.      Comments: Multiple bruises to bilateral UE's  No evidence of palmar erythema or spider angiomas   Paracentesis dressing in place to left flank- CDI   Neurological:      Mental Status: He is alert and oriented to person, place, and time.   Psychiatric:         Mood and Affect: Mood normal.         Behavior: Behavior normal.          Significant Labs:  All labs within the past 24 hours have been reviewed.

## 2020-09-14 NOTE — PROGRESS NOTES
Progress Note   Hospital Medicine         Patient Name: Kenneth Pardo  MRN:  [id number]  [facility] Medicine Team: [facility] HOSP MED L Naresh Franks MD  Date of Admission:  9/9/2020     Length of Stay:  LOS: 5 days   Expected Discharge Date: 9/15/2020  Principal Problem:  Hyponatremia       Subjective:     Interval History/Overnight Events:  Patient feeling better today, however Cr continues to rise, nephrology consulted and renal U/S ordered  - went for IR paracentesis with 6.2 L removed and negative for SBP  - echo stress in AM  - plan for d/c tomorrow        Review of Systems   Constitutional: Negative for chills, fatigue, fever.   HENT: Negative for sore throat, trouble swallowing.    Eyes: Negative for photophobia, visual disturbance.   Respiratory: Negative for cough, shortness of breath.    Cardiovascular: Negative for chest pain, palpitations, leg swelling.   Gastrointestinal: Negative for abdominal pain, constipation, diarrhea, nausea, vomiting.   Endocrine: Negative for cold intolerance, heat intolerance.   Genitourinary: Negative for dysuria, frequency.   Musculoskeletal: Negative for arthralgias, myalgias.   Skin: Negative for rash, wound, erythema   Neurological: Negative for dizziness, syncope, weakness, light-headedness.   Psychiatric/Behavioral: Negative for confusion, hallucinations, anxiety  All other systems reviewed and are negative.    Objective:     Temp:  [97.9 °F (36.6 °C)-98.4 °F (36.9 °C)]   Pulse:  []   Resp:  [15-18]   BP: ()/(55-77)   SpO2:  [96 %-100 %]       Physical Exam:  Constitutional: appears weak and ill  Head: Normocephalic and atraumatic.   Mouth/Throat: Oropharynx is clear and moist.   Eyes: EOM are normal. Pupils are equal, round, and reactive to light. positive scleral icterus.   Neck: Normal range of motion. Neck supple.   Cardiovascular: Normal rate and regular rhythm.  No murmur heard.  Pulmonary/Chest: Effort normal and breath sounds normal. No respiratory distress.  No wheezes, rales, or rhonchi  Abdominal: Soft. Bowel sounds are normal.  positive distension, no tenderness  Musculoskeletal: Normal range of motion. No edema.   Neurological: Alert and oriented to person, place, and time.   Skin: Skin is warm and dry.   Psychiatric: Normal mood and affect. Behavior is normal.     Recent Labs   Lab 09/09/20  0438 09/10/20  0441 09/11/20  0523 09/12/20  0624 09/13/20  0457 09/14/20  0515   WBC 28.98* 19.06* 19.92* 24.83* 23.90* 23.30*   HGB 13.4* 11.4* 11.0* 11.4* 11.9* 11.7*   HCT 39.8* 33.6* 33.7* 35.6* 38.4* 35.6*    203 198 190 225 197     Recent Labs   Lab 09/11/20  0523 09/12/20  0624 09/13/20  0457 09/14/20  0515   * 131* 129* 131*   K 3.4* 4.1 4.1 3.9   CL 96 98 97 99   CO2 19* 21* 23 21*   BUN 32* 27* 31* 34*   CREATININE 1.5* 1.5* 1.7* 1.9*   * 123* 108 101   CALCIUM 7.9* 8.1* 8.0* 8.4*   MG 2.1 2.2 2.1 2.1   PHOS 3.0 2.5* 2.8  --      Recent Labs   Lab 09/12/20  0624 09/13/20  0457 09/14/20  0515   ALKPHOS 198* 202* 195*   ALT 17 17 12   AST 31 24 24   ALBUMIN 2.9* 2.7* 3.4*   PROT 4.9* 4.9* 5.4*   BILITOT 2.4* 2.4* 2.3*   INR 1.3* 1.3* 1.5*     Recent Labs   Lab 09/12/20 2151 09/13/20 2128 09/14/20  0830 09/14/20  1429   POCTGLUCOSE 125* 142* 126* 124*        apixaban  10 mg Oral BID    ciprofloxacin HCl  500 mg Oral Q24H    escitalopram oxalate  5 mg Oral Daily    hepatitis A virus vaccine (PF)  1,440 Units Intramuscular Once    lactulose  20 g Oral BID    lidocaine  1 patch Transdermal Q24H    midodrine  15 mg Oral Q8H    pantoprazole  40 mg Oral Daily    rOPINIRole  0.25 mg Oral QHS    DIPH,PERTUS (ADACEL),TETANUS PF VAC (ADULT)  0.5 mL Intramuscular Once       Assessment and Plan     Mr. Kenneth Pardo is a 60 y.o. male who presented to [facility] on 9/9/2020 with     Hospital Course:    Mr. Kenneth Pardo was admitted to Hospital Medicine for management of     Active Hospital Problems    Diagnosis  POA    *Hyponatremia [E87.1]  Yes     MERLY (acute kidney injury) [N17.9]  Unknown    Leukocytosis [D72.829]  Unknown    Pre-transplant evaluation for liver transplant [Z01.818]  Not Applicable    Right leg DVT [I82.401]  Yes    Hepatorenal syndrome [K76.7]  Yes    Hyperkalemia [E87.5]  Yes    Alcoholic liver disease [K70.9]  Yes    Ascites due to alcoholic cirrhosis [K70.31]  Yes    Hepatic encephalopathy [K72.90]  Yes    Debility [R53.81]  Yes    Frequent falls [R29.6]  Not Applicable    Depression with anxiety [F41.8]  Yes    Restless leg [G25.81]  Yes    Insomnia [G47.00]  Yes    Sepsis, unspecified organism [A41.9]  Yes    Decompensated liver disease [K74.69]  Yes    Esophagitis, [employer] grade B [K20.8]  Yes    Portal hypertensive gastropathy [K76.6, K31.89]  Yes    Anasarca [R60.1]  Yes    Hypotension [I95.9]  Yes      Resolved Hospital Problems   No resolved problems to display.     #Decompensated alcoholic cirrhosis   Recurrent ascites   Portal hypertension   Portal hypertensive gastropathy   Hepatic encephalopathy    MELD-Na score: 24 at 9/14/2020  5:15 AM  MELD score: 20 at 9/14/2020  5:15 AM  Calculated from:  Serum Creatinine: 1.9 mg/dL at 9/14/2020  5:15 AM  Serum Sodium: 131 mmol/L at 9/14/2020  5:15 AM  Total Bilirubin: 2.3 mg/dL at 9/14/2020  5:15 AM  INR(ratio): 1.5 at 9/14/2020  5:15 AM  Age: 60 years 1 month    -Large ascites on exam and CT abdomen   -diagnostic and therapeutic paracentesis in am   -hold diuretics given hyponatremia and MERLY   -continue lactulose for HE   -continue PPI   -PETH pending  -hepatology consult   - IR paracentesis with 6.4L removed and negative for SBP on 9/9  - approved for inpatient liver transplant evaluation  - IR paracentesis with 6.2L removed on 9/14 and negative for SBP       # Right Leg DVT  - seen on U/S; starting on eliquis today      #Hyponatremia   -Na 120 from recent baseline 136 in ED  -likely due to diuretic use, decompensated cirrhosis, SSRI use    -repeat Na 122 prior to  transfer   -received 1L NS bolus in ED  -hold diuretics (lasix, spironolactone)  - hold SSRI  - improving with fluid restriction 1L     #MERLY  # Hepatorenal syndrome   - rising Cr, renal U/S ordered and nephrology consulted;   - urine studies         #Leukocytosis   -WBC 23  -no signs of overt infection or sepsis   -afebrile   -blood and urine cultures negative  -UA showed pyuria and received empiric dose of rocephin   - negative for SBP  - C. Diff negative as well     #Depression with anxiety   -no acute issue   -hold fluoxetine in setting of hyponatremia (?SIADH)  -continue lexapro      #Insomnia  -trazodone prn      #Debility with falls  -PT evaluation for gait training   -fall precaution      # Hypotension  - midodrine     Disposition : tomorrow     Naresh Franks MD  Medical Director [facility] Medicine  Spectra:  [phone]  Pager: [phone]

## 2020-09-14 NOTE — PLAN OF CARE
AAO x 4. VSS. Visi monitor applied. No complaints of pain. Pt went down for an US of the kidneys today and to paracentesis where they removed 6.2 L of fluid. No acute changes throughout the shift. Pt safety maintained. Will continue to monitor.

## 2020-09-14 NOTE — HPI
The pt is a 60 year old male with alcoholic cirrhosis c/b portal hypertension, ascites, portal hypertensive gastropathy and hepatic encephalopathy that initially presented as a transfer from [facility] for management of acute hyponatremia and concurrent MERLY. This was believed to be 2/2 his Lasix and Aldactone therapy; as such these were held with improvement in his initial hyponatremia and improvement in his MERLY. Nephrology was consulted as the patient has begun to have an increase in his serum Cr for the past 48 hours despite holding his home diuretics. Of note, the patient has now had 2 large volume paracenteses (9/9 removed 6.4 L's of fluid and returned cultures negative, 9/14 removed 6.8 L's of fluid). In addition, presenting with significant leukocytosis and currently on SBP prophylaxis with daily cipro. C diff negative. CONS on urine culture, but patient asymptomatic. Blood cultures NGTD. Ascitic fluid cultures pending at time of consult.

## 2020-09-14 NOTE — PROCEDURES
Radiology Post-Procedure Note    Pre Op Diagnosis: Ascites  Post Op Diagnosis: Same    Procedure: Ultrasound Guided Paracentesis    Procedure performed by: Pravin RILEY, Eryn     Written Informed Consent Obtained: Yes  Specimen Removed: YES clear yellow  Estimated Blood Loss: Minimal    Findings:   Successful paracentesis.  Albumin administered PRN per protocol.    Patient tolerated procedure well.    Eryn Costello, APRN, FNP  Interventional Radiology  [phone] clinic

## 2020-09-14 NOTE — CONSULTS
[facility]  Nephrology  Consult Note    Patient Name: Kenneth Pardo  MRN: [id number]  Admission Date: 9/9/2020  Hospital Length of Stay: 5 days  Attending Provider: Naresh Franks MD   Primary Care Physician: Mikala Etienne MD  Principal Problem:Hyponatremia    Inpatient consult to Nephrology  Consult performed by: Pia Vargas DO  Consult ordered by: Naresh Franks MD        Subjective:     HPI: The pt is a 60 year old male with alcoholic cirrhosis c/b portal hypertension, ascites, portal hypertensive gastropathy and hepatic encephalopathy that initially presented as a transfer from [facility] for management of acute hyponatremia and concurrent MERLY. This was believed to be 2/2 his Lasix and Aldactone therapy; as such these were held with improvement in his initial hyponatremia and improvement in his MERLY. Nephrology was consulted as the patient has begun to have an increase in his serum Cr for the past 48 hours despite holding his home diuretics. Of note, the patient has now had 2 large volume paracenteses (9/9 removed 6.4 L's of fluid and returned cultures negative, 9/14 removed 6.8 L's of fluid). In addition, presenting with significant leukocytosis and currently on SBP prophylaxis with daily cipro. C diff negative. CONS on urine culture, but patient asymptomatic. Blood cultures NGTD. Ascitic fluid cultures pending at time of consult.         Past Medical History:   Diagnosis Date    Hepatitis     Hypertension     Liver failure        Past Surgical History:   Procedure Laterality Date    ESOPHAGOGASTRODUODENOSCOPY N/A 7/31/2020    Procedure: EGD (ESOPHAGOGASTRODUODENOSCOPY);  Surgeon: Jhonathan Lieberman MD;  Location: [address];  Service: Endoscopy;  Laterality: N/A;       Review of patient's allergies indicates:  No Known Allergies  Current Facility-Administered Medications   Medication Frequency    acetaminophen tablet 500 mg Q6H PRN    albuterol-ipratropium 2.5 mg-0.5 mg/3  mL nebulizer solution 3 mL Q4H PRN    apixaban tablet 10 mg BID    ciprofloxacin HCl tablet 500 mg Q24H    dextrose 50% injection 12.5 g PRN    dextrose 50% injection 25 g PRN    escitalopram oxalate tablet 5 mg Daily    glucagon (human recombinant) injection 1 mg PRN    glucose chewable tablet 16 g PRN    glucose chewable tablet 24 g PRN    influenza (QUADRIVALENT PF) vaccine 0.5 mL vaccine x 1 dose    lactulose 20 gram/30 mL solution Soln 20 g BID    lidocaine 5 % patch 1 patch Q24H    midodrine tablet 15 mg Q8H    ondansetron injection 4 mg Q8H PRN    pantoprazole EC tablet 40 mg Daily    rOPINIRole tablet 0.25 mg QHS    sodium chloride 0.9% flush 10 mL PRN    traZODone tablet 50 mg Nightly PRN     Family History     None        Tobacco Use    Smoking status: Never Smoker    Smokeless tobacco: Never Used   Substance and Sexual Activity    Alcohol use: Not Currently     Frequency: Never    Drug use: Not on file    Sexual activity: Not on file     Review of Systems   Constitutional: Negative for chills and fever.   HENT: Negative for sore throat.    Respiratory: Negative for cough and shortness of breath.    Cardiovascular: Negative for chest pain and palpitations.   Gastrointestinal: Negative for abdominal distention (s/p para), nausea and vomiting.   Genitourinary: Negative for dysuria, frequency and urgency.   Skin: Positive for pallor. Negative for rash.   Neurological: Negative for dizziness and headaches.   Psychiatric/Behavioral: Negative for agitation and confusion.     Objective:     Vital Signs (Most Recent):  Temp: 97.9 °F (36.6 °C) (09/14/20 0759)  Pulse: 73 (09/14/20 1523)  Resp: 17 (09/14/20 1432)  BP: 101/71 (09/14/20 1432)  SpO2: 98 % (09/14/20 1432)  O2 Device (Oxygen Therapy): room air (09/14/20 1301) Vital Signs (24h Range):  Temp:  [97.9 °F (36.6 °C)-98.9 °F (37.2 °C)] 97.9 °F (36.6 °C)  Pulse:  [] 73  Resp:  [16-18] 17  SpO2:  [97 %-100 %] 98 %  BP: ()/(55-77)  101/71     Weight: 84.5 kg (186 lb 2.9 oz) (09/14/20 0550)  Body mass index is 32.98 kg/m².  Body surface area is 1.94 meters squared.    I/O last 3 completed shifts:  In: 1690 [P.O.:1690]  Out: 300 [Urine:300]    Physical Exam  Vitals signs and nursing note reviewed.   Constitutional:       Appearance: He is obese. He is not ill-appearing or toxic-appearing.   Eyes:      General: No scleral icterus.  Cardiovascular:      Rate and Rhythm: Normal rate and regular rhythm.      Pulses: Normal pulses.           Radial pulses are 2+ on the right side and 2+ on the left side.   Pulmonary:      Effort: Pulmonary effort is normal. No respiratory distress.      Breath sounds: No wheezing or rales.   Abdominal:      General: Abdomen is flat. Bowel sounds are normal. There is no distension.      Tenderness: There is no abdominal tenderness. There is no guarding.   Musculoskeletal:         General: No swelling or deformity.   Skin:     General: Skin is warm and dry.      Coloration: Skin is pale.      Findings: Bruising (to bilateral UE's) present. No lesion.      Comments: Multiple bruises to bilateral UE's  No evidence of palmar erythema or spider angiomas   Paracentesis dressing in place to left flank- CDI   Neurological:      Mental Status: He is alert and oriented to person, place, and time.   Psychiatric:         Mood and Affect: Mood normal.         Behavior: Behavior normal.         Significant Labs:  All labs within the past 24 hours have been reviewed.    Assessment/Plan:     Leukocytosis  - Workup per primary     MERLY (acute kidney injury)  59 yo male with alcoholic cirrhosis c/b portal hypertension, ascites, portal hypertensive gastropathy and hepatic encephalopathy that initially presented as a transfer from [facility] for management of acute hyponatremia and concurrent MERLY. This was believed to be 2/2 his home diuretic therapy; as such these were held with improvement in his initial hyponatremia and improvement in  his MERLY. Nephrology now consulted as the patient has begun to have an increase in his serum Cr for the past 48 hours despite holding home diuretics.    DDX includes but is not limited to: HRS Type 1 vs Ischemic ATN vs Sepsis (CONS on urine culture though patient asymptomatic)  Diagnostics: Leukocytosis of 23.3 with left shift, Na 131, HCO3 21, BUN 34, Cr 1.9, UA on 9/8 with 1+ protein/bili/leuks/26 RBCs/16 WBCs/10 hyaline casts, Urine Na <20, Urine Osm 455, Urine Cx with CONS  Imaging: US retroperitoneum on 9/14 with trace amount of ascites noted in the pelvis, but otherwise unremarkable sonographic appearance of the kidneys    Plan:  - Renally dose all medications  - Avoid nephrotoxic medications  - Strict intake and output   - Daily labs; will trend   - Would initiate triple therapy for HRS: albumin + midodrine + octreotide   - Will collect urine sample to spin and assess for casts     Decompensated liver disease  - Management per primary     Depression with anxiety  - Continue Lexapro    Ascites due to alcoholic cirrhosis  - S/p 2 large volume paracenteses (9/9 net - 6.4 L's, 9/14 net -6.8 L's)   - Management per primary      Thank you for your consult. I will follow-up with patient. Please contact us if you have any additional questions.    Pia Vargas,   Nephrology  [facility]

## 2020-09-14 NOTE — H&P
Inpatient Radiology Pre-procedure Note    History of Present Illness:  Kenneth Pardo is a 60 y.o. male who presents for ultrasound guided paracentesis.  Admission H&P reviewed.  Past Medical History:   Diagnosis Date    Hepatitis     Hypertension     Liver failure      Past Surgical History:   Procedure Laterality Date    ESOPHAGOGASTRODUODENOSCOPY N/A 7/31/2020    Procedure: EGD (ESOPHAGOGASTRODUODENOSCOPY);  Surgeon: Jhonathan Lieberman MD;  Location: [address];  Service: Endoscopy;  Laterality: N/A;       Review of Systems:   As documented in primary team H&P    Home Meds:   Prior to Admission medications    Medication Sig Start Date End Date Taking? Authorizing Provider   ciprofloxacin HCl (CIPRO) 500 MG tablet Take 1 tablet (500 mg) by mouth twice daily on 8/10 and then take 1 tablet daily thereafter 8/10/20  Yes Aicha Rojas MD   escitalopram oxalate (LEXAPRO) 5 MG Tab Take 1 tablet (5 mg total) by mouth once daily. 8/9/20 8/9/21 Yes Aicha Rjoas MD   furosemide (LASIX) 20 MG tablet Take 1 tablet (20 mg total) by mouth once daily. 8/10/20 8/10/21 Yes Aicha Rojas MD   midodrine (PROAMATINE) 5 MG Tab Take 3 tablets (15 mg total) by mouth every 8 (eight) hours. 8/9/20 9/10/20 Yes Aicha Rojas MD   omeprazole (PRILOSEC) 40 MG capsule Take 1 capsule (40 mg total) by mouth every morning. 9/8/20 9/8/21 Yes Mainor Madison MD   rOPINIRole (REQUIP) 0.25 MG tablet Take 1 tablet (0.25 mg total) by mouth every evening. 8/9/20 8/9/21 Yes Aicha Rojas MD   spironolactone (ALDACTONE) 50 MG tablet Take 1 tablet (50 mg total) by mouth once daily. 8/10/20 8/10/21 Yes Aicha Rojas MD   traZODone (DESYREL) 100 MG tablet Take 1 tablet (100 mg total) by mouth every evening. 8/9/20 8/9/21 Yes Aicha Rojas MD   apixaban (ELIQUIS) 5 mg Tab Take 2 tablets by mouth twice daily until 9/20, then take 1 tablet by mouth daily twice daily. 9/14/20   Naresh Franks MD   ondansetron (ZOFRAN) 4  MG tablet Take 4 mg by mouth every 8 (eight) hours as needed. 7/21/20   Historical Provider     Scheduled Meds:    apixaban  10 mg Oral BID    ciprofloxacin HCl  500 mg Oral Q24H    escitalopram oxalate  5 mg Oral Daily    lactulose  20 g Oral BID    lidocaine  1 patch Transdermal Q24H    midodrine  15 mg Oral Q8H    pantoprazole  40 mg Oral Daily    rOPINIRole  0.25 mg Oral QHS     Continuous Infusions:   PRN Meds:acetaminophen, albuterol-ipratropium, dextrose 50%, dextrose 50%, glucagon (human recombinant), glucose, glucose, influenza, ondansetron, sodium chloride 0.9%, traZODone  Anticoagulants/Antiplatelets: apixaban    Allergies: Review of patient's allergies indicates:  No Known Allergies  Sedation Hx: have not been any systemic reactions    Vitals:  Temp: 97.9 °F (36.6 °C) (09/14/20 0759)  Pulse: 80 (09/14/20 1121)  Resp: 16 (09/14/20 1100)  BP: 109/77 (09/14/20 1100)  SpO2: 98 % (09/14/20 1100)     Physical Exam:  ASA: 3  Mallampati: n/a    General: no acute distress  Mental Status: alert and oriented to person, place and time  HEENT: normocephalic, atraumatic  Chest: unlabored breathing  Heart: regular heart rate  Abdomen: distended  Extremity: moves all extremities    Plan: ultrasound guided paracentesis  Sedation Plan: local    AMARJIT Walker, NANCYP  Interventional Radiology  [phone] [facility]

## 2020-09-14 NOTE — ASSESSMENT & PLAN NOTE
59 yo male with alcoholic cirrhosis c/b portal hypertension, ascites, portal hypertensive gastropathy and hepatic encephalopathy that initially presented as a transfer from [facility] for management of acute hyponatremia and concurrent MERLY. This was believed to be 2/2 his home diuretic therapy; as such these were held with improvement in his initial hyponatremia and improvement in his MERLY. Nephrology now consulted as the patient has begun to have an increase in his serum Cr for the past 48 hours despite holding home diuretics.    DDX includes but is not limited to: HRS Type 1 vs Ischemic ATN vs Sepsis (CONS on urine culture though patient asymptomatic)  Diagnostics: Leukocytosis of 23.3 with left shift, Na 131, HCO3 21, BUN 34, Cr 1.9, UA on 9/8 with 1+ protein/bili/leuks/26 RBCs/16 WBCs/10 hyaline casts, Urine Na <20, Urine Osm 455, Urine Cx with CONS  Imaging: US retroperitoneum on 9/14 with trace amount of ascites noted in the pelvis, but otherwise unremarkable sonographic appearance of the kidneys    Plan:  - Renally dose all medications  - Avoid nephrotoxic medications  - Strict intake and output   - Daily labs; will trend   - Would initiate triple therapy for HRS: albumin + midodrine + octreotide   - Will collect urine sample to spin and assess for casts

## 2020-09-15 LAB
ALBUMIN SERPL BCP-MCNC: 3.7 G/DL (ref 3.5–5.2)
ALP SERPL-CCNC: 204 U/L (ref 55–135)
ALT SERPL W/O P-5'-P-CCNC: 13 U/L (ref 10–44)
ANION GAP SERPL CALC-SCNC: 10 MMOL/L (ref 8–16)
APTT BLDCRRT: 61.8 SEC (ref 21–32)
ASCENDING AORTA: 2.9 CM
AST SERPL-CCNC: 27 U/L (ref 10–40)
BASOPHILS # BLD AUTO: 0.12 K/UL (ref 0–0.2)
BASOPHILS NFR BLD: 0.5 % (ref 0–1.9)
BILIRUB SERPL-MCNC: 2.1 MG/DL (ref 0.1–1)
BSA FOR ECHO PROCEDURE: 1.88 M2
BUN SERPL-MCNC: 30 MG/DL (ref 6–20)
CALCIUM SERPL-MCNC: 8.5 MG/DL (ref 8.7–10.5)
CHLORIDE SERPL-SCNC: 98 MMOL/L (ref 95–110)
CO2 SERPL-SCNC: 22 MMOL/L (ref 23–29)
CREAT SERPL-MCNC: 1.8 MG/DL (ref 0.5–1.4)
CV ECHO LV RWT: 0.29 CM
CV STRESS BASE HR: 67 BPM
DIASTOLIC BLOOD PRESSURE: 71 MMHG
DIFFERENTIAL METHOD: ABNORMAL
DOP CALC LVOT AREA: 3.4 CM2
DOP CALC LVOT DIAMETER: 2.09 CM
DOP CALC LVOT PEAK VEL: 1.08 M/S
DOP CALC LVOT STROKE VOLUME: 63.4 CM3
DOP CALCLVOT PEAK VEL VTI: 18.49 CM
E WAVE DECELERATION TIME: 234.44 MSEC
E/A RATIO: 0.96
E/E' RATIO: 5.3 M/S
ECHO LV POSTERIOR WALL: 0.65 CM (ref 0.6–1.1)
EOSINOPHIL # BLD AUTO: 1.1 K/UL (ref 0–0.5)
EOSINOPHIL NFR BLD: 4.8 % (ref 0–8)
ERYTHROCYTE [DISTWIDTH] IN BLOOD BY AUTOMATED COUNT: 14.2 % (ref 11.5–14.5)
EST. GFR  (AFRICAN AMERICAN): 46.9 ML/MIN/1.73 M^2
EST. GFR  (NON AFRICAN AMERICAN): 40.6 ML/MIN/1.73 M^2
FRACTIONAL SHORTENING: 33 % (ref 28–44)
GLUCOSE SERPL-MCNC: 163 MG/DL (ref 70–110)
HCT VFR BLD AUTO: 36.6 % (ref 40–54)
HGB BLD-MCNC: 11.9 G/DL (ref 14–18)
IMM GRANULOCYTES # BLD AUTO: 0.18 K/UL (ref 0–0.04)
IMM GRANULOCYTES NFR BLD AUTO: 0.8 % (ref 0–0.5)
INR PPP: 1.6 (ref 0.8–1.2)
INTERVENTRICULAR SEPTUM: 0.66 CM (ref 0.6–1.1)
IVRT: 111.32 MSEC
LA MAJOR: 4.56 CM
LA MINOR: 4.57 CM
LA WIDTH: 3.36 CM
LEFT ATRIUM SIZE: 2.74 CM
LEFT ATRIUM VOLUME INDEX: 19.6 ML/M2
LEFT ATRIUM VOLUME: 35.72 CM3
LEFT INTERNAL DIMENSION IN SYSTOLE: 2.98 CM (ref 2.1–4)
LEFT VENTRICLE DIASTOLIC VOLUME INDEX: 49.63 ML/M2
LEFT VENTRICLE DIASTOLIC VOLUME: 90.68 ML
LEFT VENTRICLE MASS INDEX: 47 G/M2
LEFT VENTRICLE SYSTOLIC VOLUME INDEX: 18.8 ML/M2
LEFT VENTRICLE SYSTOLIC VOLUME: 34.31 ML
LEFT VENTRICULAR INTERNAL DIMENSION IN DIASTOLE: 4.46 CM (ref 3.5–6)
LEFT VENTRICULAR MASS: 86.62 G
LV LATERAL E/E' RATIO: 5.3 M/S
LV SEPTAL E/E' RATIO: 5.3 M/S
LYMPHOCYTES # BLD AUTO: 1.9 K/UL (ref 1–4.8)
LYMPHOCYTES NFR BLD: 8.4 % (ref 18–48)
MAGNESIUM SERPL-MCNC: 2.1 MG/DL (ref 1.6–2.6)
MCH RBC QN AUTO: 34.7 PG (ref 27–31)
MCHC RBC AUTO-ENTMCNC: 32.5 G/DL (ref 32–36)
MCV RBC AUTO: 107 FL (ref 82–98)
MONOCYTES # BLD AUTO: 1.1 K/UL (ref 0.3–1)
MONOCYTES NFR BLD: 4.7 % (ref 4–15)
MV PEAK A VEL: 0.55 M/S
MV PEAK E VEL: 0.53 M/S
MV STENOSIS PRESSURE HALF TIME: 67.99 MS
MV VALVE AREA P 1/2 METHOD: 3.24 CM2
NEUTROPHILS # BLD AUTO: 18.5 K/UL (ref 1.8–7.7)
NEUTROPHILS NFR BLD: 80.8 % (ref 38–73)
NRBC BLD-RTO: 0 /100 WBC
OHS CV CPX 1 MINUTE RECOVERY HEART RATE: 136 BPM
OHS CV CPX 85 PERCENT MAX PREDICTED HEART RATE MALE: 138
OHS CV CPX MAX PREDICTED HEART RATE: 162
OHS CV CPX PATIENT IS FEMALE: 0
OHS CV CPX PATIENT IS MALE: 1
OHS CV CPX PEAK DIASTOLIC BLOOD PRESSURE: 55 MMHG
OHS CV CPX PEAK HEAR RATE: 137 BPM
OHS CV CPX PEAK RATE PRESSURE PRODUCT: NORMAL
OHS CV CPX PEAK SYSTOLIC BLOOD PRESSURE: 100 MMHG
OHS CV CPX PERCENT MAX PREDICTED HEART RATE ACHIEVED: 85
OHS CV CPX RATE PRESSURE PRODUCT PRESENTING: 7839
PISA TR MAX VEL: 1.96 M/S
PLATELET # BLD AUTO: 155 K/UL (ref 150–350)
PMV BLD AUTO: 10.5 FL (ref 9.2–12.9)
POCT GLUCOSE: 137 MG/DL (ref 70–110)
POCT GLUCOSE: 167 MG/DL (ref 70–110)
POCT GLUCOSE: 173 MG/DL (ref 70–110)
POTASSIUM SERPL-SCNC: 4.2 MMOL/L (ref 3.5–5.1)
PROT SERPL-MCNC: 5.3 G/DL (ref 6–8.4)
PROTHROMBIN TIME: 17.5 SEC (ref 9–12.5)
PULM VEIN S/D RATIO: 1.66
PV PEAK D VEL: 0.44 M/S
PV PEAK S VEL: 0.73 M/S
RA MAJOR: 4.54 CM
RA PRESSURE: 3 MMHG
RA WIDTH: 3.02 CM
RBC # BLD AUTO: 3.43 M/UL (ref 4.6–6.2)
RIGHT VENTRICULAR END-DIASTOLIC DIMENSION: 2.45 CM
RV TISSUE DOPPLER FREE WALL SYSTOLIC VELOCITY 1 (APICAL 4 CHAMBER VIEW): 16.8 CM/S
SINUS: 2.77 CM
SODIUM SERPL-SCNC: 130 MMOL/L (ref 136–145)
STJ: 2.35 CM
SYSTOLIC BLOOD PRESSURE: 117 MMHG
TDI LATERAL: 0.1 M/S
TDI SEPTAL: 0.1 M/S
TDI: 0.1 M/S
TR MAX PG: 15 MMHG
TRICUSPID ANNULAR PLANE SYSTOLIC EXCURSION: 2.33 CM
TV REST PULMONARY ARTERY PRESSURE: 18 MMHG
WBC # BLD AUTO: 22.91 K/UL (ref 3.9–12.7)

## 2020-09-15 PROCEDURE — 63600175 PHARM REV CODE 636 W HCPCS: Mod: NTX | Performed by: HOSPITALIST

## 2020-09-15 PROCEDURE — 25000003 PHARM REV CODE 250: Mod: NTX | Performed by: HOSPITALIST

## 2020-09-15 PROCEDURE — 83735 ASSAY OF MAGNESIUM: CPT | Mod: NTX

## 2020-09-15 PROCEDURE — 36415 COLL VENOUS BLD VENIPUNCTURE: CPT | Mod: NTX

## 2020-09-15 PROCEDURE — 85610 PROTHROMBIN TIME: CPT | Mod: NTX

## 2020-09-15 PROCEDURE — 99233 PR SUBSEQUENT HOSPITAL CARE,LEVL III: ICD-10-PCS | Mod: NTX,,, | Performed by: HOSPITALIST

## 2020-09-15 PROCEDURE — 99232 PR SUBSEQUENT HOSPITAL CARE,LEVL II: ICD-10-PCS | Mod: NTX,,, | Performed by: INTERNAL MEDICINE

## 2020-09-15 PROCEDURE — 63600150 PHARM REV CODE 636: Mod: NTX | Performed by: HOSPITALIST

## 2020-09-15 PROCEDURE — 99232 SBSQ HOSP IP/OBS MODERATE 35: CPT | Mod: NTX,,, | Performed by: INTERNAL MEDICINE

## 2020-09-15 PROCEDURE — P9047 ALBUMIN (HUMAN), 25%, 50ML: HCPCS | Mod: JG,NTX | Performed by: HOSPITALIST

## 2020-09-15 PROCEDURE — 85025 COMPLETE CBC W/AUTO DIFF WBC: CPT | Mod: NTX

## 2020-09-15 PROCEDURE — 85730 THROMBOPLASTIN TIME PARTIAL: CPT | Mod: NTX

## 2020-09-15 PROCEDURE — 20600001 HC STEP DOWN PRIVATE ROOM: Mod: NTX

## 2020-09-15 PROCEDURE — 80053 COMPREHEN METABOLIC PANEL: CPT | Mod: NTX

## 2020-09-15 PROCEDURE — 99233 SBSQ HOSP IP/OBS HIGH 50: CPT | Mod: NTX,,, | Performed by: HOSPITALIST

## 2020-09-15 RX ORDER — HEPARIN SODIUM,PORCINE/D5W 25000/250
18 INTRAVENOUS SOLUTION INTRAVENOUS CONTINUOUS
Status: DISCONTINUED | OUTPATIENT
Start: 2020-09-15 | End: 2020-09-17

## 2020-09-15 RX ORDER — DOBUTAMINE HYDROCHLORIDE 100 MG/100ML
30 INJECTION INTRAVENOUS
Status: COMPLETED | OUTPATIENT
Start: 2020-09-15 | End: 2020-09-15

## 2020-09-15 RX ORDER — ATROPINE SULFATE 0.1 MG/ML
0.1 INJECTION INTRAVENOUS
Status: COMPLETED | OUTPATIENT
Start: 2020-09-15 | End: 2020-09-15

## 2020-09-15 RX ADMIN — HEPARIN SODIUM AND DEXTROSE 18 UNITS/KG/HR: 10000; 5 INJECTION INTRAVENOUS at 09:09

## 2020-09-15 RX ADMIN — ROPINIROLE HYDROCHLORIDE 0.25 MG: 0.25 TABLET, FILM COATED ORAL at 09:09

## 2020-09-15 RX ADMIN — MIDODRINE HYDROCHLORIDE 15 MG: 5 TABLET ORAL at 09:09

## 2020-09-15 RX ADMIN — OCTREOTIDE ACETATE 100 MCG: 100 INJECTION, SOLUTION INTRAVENOUS; SUBCUTANEOUS at 09:09

## 2020-09-15 RX ADMIN — ESCITALOPRAM OXALATE 5 MG: 5 TABLET, FILM COATED ORAL at 09:09

## 2020-09-15 RX ADMIN — ATROPINE SULFATE 0.1 MG: 0.1 INJECTION PARENTERAL at 02:09

## 2020-09-15 RX ADMIN — DOBUTAMINE IN DEXTROSE 30 MCG/KG/MIN: 100 INJECTION, SOLUTION INTRAVENOUS at 01:09

## 2020-09-15 RX ADMIN — HUMAN ALBUMIN MICROSPHERES AND PERFLUTREN 0.66 MG: 10; .22 INJECTION, SOLUTION INTRAVENOUS at 01:09

## 2020-09-15 RX ADMIN — ALBUMIN (HUMAN) 25 G: 12.5 SOLUTION INTRAVENOUS at 09:09

## 2020-09-15 RX ADMIN — MIDODRINE HYDROCHLORIDE 15 MG: 5 TABLET ORAL at 06:09

## 2020-09-15 RX ADMIN — LACTULOSE 20 G: 20 SOLUTION ORAL at 03:09

## 2020-09-15 RX ADMIN — OCTREOTIDE ACETATE 100 MCG: 100 INJECTION, SOLUTION INTRAVENOUS; SUBCUTANEOUS at 03:09

## 2020-09-15 RX ADMIN — OCTREOTIDE ACETATE 100 MCG: 100 INJECTION, SOLUTION INTRAVENOUS; SUBCUTANEOUS at 06:09

## 2020-09-15 RX ADMIN — CIPROFLOXACIN 500 MG: 500 TABLET, FILM COATED ORAL at 09:09

## 2020-09-15 RX ADMIN — ALBUMIN (HUMAN) 25 G: 12.5 SOLUTION INTRAVENOUS at 03:09

## 2020-09-15 RX ADMIN — PANTOPRAZOLE SODIUM 40 MG: 40 TABLET, DELAYED RELEASE ORAL at 09:09

## 2020-09-15 RX ADMIN — TRAZODONE HYDROCHLORIDE 50 MG: 50 TABLET ORAL at 10:09

## 2020-09-15 RX ADMIN — MIDODRINE HYDROCHLORIDE 15 MG: 5 TABLET ORAL at 03:09

## 2020-09-15 RX ADMIN — LACTULOSE 20 G: 20 SOLUTION ORAL at 09:09

## 2020-09-15 RX ADMIN — APIXABAN 10 MG: 5 TABLET, FILM COATED ORAL at 09:09

## 2020-09-15 NOTE — PLAN OF CARE
09/15/20 1848   Discharge Reassessment   Assessment Type Discharge Planning Reassessment   Discharge Plan A Home with family   Discharge Plan B Home with family   DME Needed Upon Discharge  other (see comments)  (TBD)   Anticipated Discharge Disposition Home   Post-Acute Status   Post-Acute Authorization Other   Other Status No Post-Acute Service Needs   Discharge Delays None known at this time

## 2020-09-15 NOTE — ASSESSMENT & PLAN NOTE
Mr. Pardo is a 61yo M w/PMH of HTN, depression, anxiety, alcohol use with recent admissions for alcoholic hepatitis who presents from clinic with abnormal labs including leukocytosis, hyponatremia and MERLY. Hepatology consulted for decompensated liver disease.    No definitive diagnosis of cirrhosis, but has signs of portal HTN including ascites, portal hypertensive gastropathy. Spleen and Plts are normal. Has stopped drinking alcohol >1 month ago with slow improvement in bilirubin. INR relatively stable. Mild elevation of crt since admission and is currently on HRS therapy.    - leukocytosis. infx workup negative. May be related to alc hep. monitor  - Hyponatremia: resolved and stabilized around 130  - MERLY: on HRS therapy, monitor  - HE: continue lactulose titrated to 3-4 BMs daily. Alert and oriented  - Ascites: 2g Na restrict  diuretics on hold  paracentesis 9/15 negative for SBP  - Variceal screening: EGD in 07/2020 showed no varices.   - HCC screening: US in 07/2020 without HCC. Repeat in 6 months.  - Transplant: completed evaluation inpatient, will discuss in conference on 9/16

## 2020-09-15 NOTE — ASSESSMENT & PLAN NOTE
Mr. Pardo is a 61yo M w/PMH of HTN, depression, anxiety, alcohol use with recent admissions for alcoholic hepatitis who presents from clinic with abnormal labs including leukocytosis, hyponatremia and MERLY. Hepatology consulted for decompensated liver disease.    No definitive diagnosis of cirrhosis, but has signs of portal HTN including ascites, portal hypertensive gastropathy. Spleen and Plts are normal. Has stopped drinking alcohol >1 month ago with slow improvement in bilirubin which may indicate slowly improving with abstinence. INR relatively stable. Mild elevation of crt since admission and is currently on HRS therapy.    - leukocytosis. infx workup negative. May be related to alc hep. monitor  - Hyponatremia: resolved and stabilized around 130  - MERLY: on HRS therapy, monitor  - HE: continue lactulose titrated to 3-4 BMs daily. Alert and oriented  - Ascites: 2g Na restrict  diuretics on hold  paracentesis 9/15 negative for SBP  - Variceal screening: EGD in 07/2020 showed no varices.   - HCC screening: US in 07/2020 without HCC. Repeat in 6 months.  - Transplant: completed evaluation inpatient, will discuss in conference on 9/16.

## 2020-09-15 NOTE — PHYSICIAN QUERY
PT Name: Kenneth Pardo  MR #: [id number]     Etiology of Condition Clarification      CDS: Keyla Jaeger RN, CCDS         Contact information :ext [phone] [email or web address]     This form is a permanent document in the medical record.     Query Date: September 15, 2020    By submitting this query, we are merely seeking further clarification of documentation.  Please utilize your independent clinical judgment when addressing the question(s) below.     The Medical Record contains the following:    Clinical Information Location in Medical Record      transfer from [facility] ED for management of acute hyponatremia and MERLY.   MERLY (acute kidney injury)  MERLY  -Scr increased to 2 from baseline 0.6  -likely prerenal due to diuretic use and poor PO intake   -hepatorenal syndrome also in differential     #Decompensated alcoholic cirrhosis   Recurrent ascites   Portal hypertension   Portal hypertensive gastropathy   Hepatic encephalopathy     MERLY due to HRS vs. ATN, will examine the urine under microscope, low urine sodium favor HRS, continue IV albumin/midodrine, start octreotide, fortunately he is not oliguric and his MERLY is moderate, we will monitor.     Urine Hyaline casts 7  Urine sodium > 20   H&P 9/9/20                            Nephrology consult 9/14/20          Lab 9/14/20  Lab 9/9/20 and 9/14/20       Please document your best medical opinion regarding the etiology of MERLY (acute kidney injury)?    [  x ] Hepatorenal syndrome   [   ] ATN   [   ] Other etiology (please specify):___________________   [  ] Clinically Undetermined           Please document in your progress notes daily for the duration of treatment, until resolved, and include in your discharge summary.

## 2020-09-15 NOTE — PROGRESS NOTES
[facility]  Nephrology  Progress Note    Patient Name: Kenneth Pardo  MRN: [id number]  Admission Date: 9/9/2020  Hospital Length of Stay: 6 days  Attending Provider: Naresh Franks MD   Primary Care Physician: Mikala Etienne MD  Principal Problem:Hyponatremia    Subjective:     HPI: The pt is a 60 year old male with alcoholic cirrhosis c/b portal hypertension, ascites, portal hypertensive gastropathy and hepatic encephalopathy that initially presented as a transfer from [facility] for management of acute hyponatremia and concurrent MERLY. This was believed to be 2/2 his Lasix and Aldactone therapy; as such these were held with improvement in his initial hyponatremia and improvement in his MERLY. Nephrology was consulted as the patient has begun to have an increase in his serum Cr for the past 48 hours despite holding his home diuretics. Of note, the patient has now had 2 large volume paracenteses (9/9 removed 6.4 L's of fluid and returned cultures negative, 9/14 removed 6.8 L's of fluid). In addition, presenting with significant leukocytosis and currently on SBP prophylaxis with daily cipro. C diff negative. CONS on urine culture, but patient asymptomatic. Blood cultures NGTD. Ascitic fluid cultures pending at time of consult.         Interval History:     Patient continues to urinate- patient reporting multiple voids that are undocumented. Urine sample provided today. Will spin and evaluate. Will monitor closely on albumin/octreotide/midodrine for 1 day further. Patient otherwise feels well and belly is much improved s/p para with IR on 9/14. BP's have been stable.     Objective:     Vital Signs (Most Recent):  Temp: 98.6 °F (37 °C) (09/15/20 1127)  Pulse: 69 (09/15/20 1127)  Resp: 17 (09/15/20 1127)  BP: (!) 140/85 (09/15/20 1127)  SpO2: 99 % (09/15/20 1127)  O2 Device (Oxygen Therapy): room air (09/15/20 1127) Vital Signs (24h Range):  Temp:  [97.8 °F (36.6 °C)-98.8 °F (37.1 °C)] 98.6 °F (37  °C)  Pulse:  [] 69  Resp:  [15-17] 17  SpO2:  [96 %-99 %] 99 %  BP: (101-140)/(71-85) 140/85     Weight: 79.5 kg (175 lb 2.5 oz) (09/15/20 0646)  Body mass index is 31.03 kg/m².  Body surface area is 1.88 meters squared.    I/O last 3 completed shifts:  In: 1665 [P.O.:1565; I.V.:100]  Out: 6900 [Urine:100; Other:6800]    Physical Exam  Vitals signs and nursing note reviewed.   Constitutional:       Appearance: He is obese. He is not ill-appearing or toxic-appearing.   Eyes:      General: No scleral icterus.  Cardiovascular:      Rate and Rhythm: Normal rate and regular rhythm.      Pulses: Normal pulses.           Radial pulses are 2+ on the right side and 2+ on the left side.   Pulmonary:      Effort: Pulmonary effort is normal. No respiratory distress.      Breath sounds: No wheezing or rales.   Abdominal:      General: Abdomen is flat. Bowel sounds are normal. There is no distension.      Tenderness: There is no abdominal tenderness. There is no guarding.   Musculoskeletal:         General: No swelling or deformity.   Skin:     General: Skin is warm and dry.      Coloration: Skin is pale.      Findings: Bruising (to bilateral UE's) present. No lesion.      Comments: Multiple bruises to bilateral UE's  No evidence of palmar erythema or spider angiomas   Paracentesis dressing in place to left flank- CDI   Neurological:      Mental Status: He is alert and oriented to person, place, and time.   Psychiatric:         Mood and Affect: Mood normal.         Behavior: Behavior normal.         Significant Labs:  All labs within the past 24 hours have been reviewed.    Assessment/Plan:     Leukocytosis  - Workup per primary     MERLY (acute kidney injury)  59 yo male with alcoholic cirrhosis c/b portal hypertension, ascites, portal hypertensive gastropathy and hepatic encephalopathy that initially presented as a transfer from [facility] for management of acute hyponatremia and concurrent MERLY. This was believed to be 2/2  his home diuretic therapy; as such these were held with improvement in his initial hyponatremia and improvement in his MERLY. Nephrology now consulted as the patient has begun to have an increase in his serum Cr for the past 48 hours despite holding home diuretics.    DDX includes but is not limited to: HRS Type 1 vs Ischemic ATN vs Sepsis (CONS on urine culture though patient asymptomatic)  Diagnostics: Leukocytosis of 23.3 with left shift, Na 131, HCO3 21, BUN 34, Cr 1.9, UA on 9/8 with 1+ protein/bili/leuks/26 RBCs/16 WBCs/10 hyaline casts, Urine Na <20, Urine Osm 455, Urine Cx with CONS, UA on 9/14 with trace leuks/3 RBC's/4 WBC's/7 hyaline casts, Urine Na repeat <20, Protein Cr ration 0.3, Hogan stain negative  Imaging: US retroperitoneum on 9/14 with trace amount of ascites noted in the pelvis, but otherwise unremarkable sonographic appearance of the kidneys    Plan:  - Renally dose all medications  - Avoid nephrotoxic medications  - Strict intake and output   - Daily labs; will trend   - Continue triple therapy for HRS: albumin + midodrine + octreotide; will monitor for 1 more day and re-evaluate tomorrow with labs   - Urine sample collected- will spin and assess for casts     Decompensated liver disease  - Management per primary     Depression with anxiety  - Continue Lexapro    Ascites due to alcoholic cirrhosis  - S/p 2 large volume paracenteses (9/9 net - 6.4 L's, 9/14 net -6.8 L's)   - Management per primary        Thank you for your consult. I will follow-up with patient. Please contact us if you have any additional questions.    Pia Vargas, DO  Nephrology  [facility]

## 2020-09-15 NOTE — NURSING NOTE
Patient verified by 2 identifiers and allergies reviewed.  20 g IV in place to GEMINI.  Denies previous reactions to blood transfusions & DSE consent obtained.  3cc Optison administered, echo images obtained, DSE testing completed.  Pt tolerated testing well. IV flushed post testing.  Post study discharge instructions reviewed with patient, patient verbalized understanding.  Patient transferred back to room via stretcher accompanied by escort in stable condition.

## 2020-09-15 NOTE — PROGRESS NOTES
[facility]  Hepatology  Progress Note    Patient Name: Kenneth Pardo  MRN: [id number]  Admission Date: 9/9/2020  Hospital Length of Stay: 6 days  Attending Provider: Naresh Franks MD   Primary Care Physician: Mikaal Etienne MD  Principal Problem:Hyponatremia    Subjective:     Transplant status: No    HPI: Mr. Pardo is a 61yo M w/PMH of HTN, depression, anxiety, alcohol use with recent admissions for alcoholic hepatitis who presents from clinic with abnormal labs including leukocytosis, hyponatremia and MERLY. Hepatology consulted for decompensated liver disease.    Patient admitted 7/30 - 8/9 with alcoholic hepatitis. He was transferred to [facility] for management and had drop in Hgb with melena. EGD 7/31 with grade B esophagitis and portal hypertensive gastropathy. Paracentesis negative for SBP and discharged on Lasix / Spironolactone, as well as lactulose for HE. He was seen in Hepatology clinic yesterday (Dr. Madison) and labs revealed WBC 28K, Na 120 and Cr 1.9 so he was admitted for further workup.  He denies further overt bleeding since discharge. No confusion and has been compliant with lactulose. Also compliant with diuretics but notes mild LE swelling.  Patient quit drinking >1 month ago when he first found out about liver disease. Previously drinking 2-3 glasses of vodka for several years. Went to alcohol rehab years ago, but relapsed. No prior DUIs or job losses due to alcohol. Recently unemployed due to COVID after which he was drinking more. Occasional marijuana use and remote cocaine use.   Today, vitals normal. A&Ox2. Na 120 after receiving 500cc NS bolus. Cr 2. AST 55, ALT 32, Tbili 4, INR 1.2, Plts 283.    Interval History:   - reports feeling well in AM, denies complaints  - denies abdominal pain, no n/v  - tolerating PO  - ambulatory without assistance  - on HRS therapy with nephrology, crt stable 1.8 (renata 1.5 on 9/12), 100 + 5x umeasured urination/24h    Current  Facility-Administered Medications   Medication    acetaminophen tablet 500 mg    albumin human 25% bottle 25 g    albuterol-ipratropium 2.5 mg-0.5 mg/3 mL nebulizer solution 3 mL    apixaban tablet 10 mg    ciprofloxacin HCl tablet 500 mg    dextrose 50% injection 12.5 g    dextrose 50% injection 25 g    escitalopram oxalate tablet 5 mg    glucagon (human recombinant) injection 1 mg    glucose chewable tablet 16 g    glucose chewable tablet 24 g    lactulose 20 gram/30 mL solution Soln 20 g    lidocaine 5 % patch 1 patch    midodrine tablet 15 mg    octreotide injection 100 mcg    ondansetron injection 4 mg    pantoprazole EC tablet 40 mg    rOPINIRole tablet 0.25 mg    sodium chloride 0.9% flush 10 mL    traZODone tablet 50 mg       Objective:     Vital Signs (Most Recent):  Temp: 98.6 °F (37 °C) (09/15/20 1127)  Pulse: 69 (09/15/20 1127)  Resp: 17 (09/15/20 1127)  BP: (!) 140/85 (09/15/20 1127)  SpO2: 99 % (09/15/20 1127) Vital Signs (24h Range):  Temp:  [97.8 °F (36.6 °C)-98.8 °F (37.1 °C)] 98.6 °F (37 °C)  Pulse:  [] 69  Resp:  [15-17] 17  SpO2:  [96 %-99 %] 99 %  BP: (101-140)/(71-85) 140/85     Weight: 79.5 kg (175 lb 2.5 oz) (09/15/20 0646)  Body mass index is 31.03 kg/m².    Physical Exam  Vitals signs and nursing note reviewed.   Constitutional:       General: He is not in acute distress.     Appearance: He is not diaphoretic.   HENT:      Head: Normocephalic and atraumatic.   Eyes:      General: Scleral icterus present.   Pulmonary:      Effort: Pulmonary effort is normal. No respiratory distress.   Abdominal:      General: There is no distension.      Palpations: Abdomen is soft.      Tenderness: There is no abdominal tenderness.      Comments: Mild distension, soft, non-tender   Musculoskeletal:         General: No tenderness.   Skin:     General: Skin is warm.      Capillary Refill: Capillary refill takes less than 2 seconds.   Neurological:      Mental Status: He is alert and  oriented to person, place, and time.         MELD-Na score: 25 at 9/15/2020  6:54 AM  MELD score: 20 at 9/15/2020  6:54 AM  Calculated from:  Serum Creatinine: 1.8 mg/dL at 9/15/2020  6:54 AM  Serum Sodium: 130 mmol/L at 9/15/2020  6:54 AM  Total Bilirubin: 2.1 mg/dL at 9/15/2020  6:54 AM  INR(ratio): 1.6 at 9/15/2020  6:54 AM  Age: 60 years 1 month    Significant Labs:  Labs within the past month have been reviewed.    Significant Imaging:  Labs: Reviewed    Assessment/Plan:     Alcoholic liver disease  Mr. Pardo is a 61yo M w/PMH of HTN, depression, anxiety, alcohol use with recent admissions for alcoholic hepatitis who presents from clinic with abnormal labs including leukocytosis, hyponatremia and MERLY. Hepatology consulted for decompensated liver disease.    No definitive diagnosis of cirrhosis, but has signs of portal HTN including ascites, portal hypertensive gastropathy. Spleen and Plts are normal. Has stopped drinking alcohol >1 month ago with slow improvement in bilirubin. INR relatively stable. Mild elevation of crt since admission and is currently on HRS therapy.    - leukocytosis. infx workup negative. May be related to alc hep. monitor  - Hyponatremia: resolved and stabilized around 130  - MERLY: on HRS therapy, monitor  - RLE DVT: on AC  - HE: continue lactulose titrated to 3-4 BMs daily. Alert and oriented  - Ascites: 2g Na restrict  diuretics on hold  paracentesis 9/15 negative for SBP  - Variceal screening: EGD in 07/2020 showed no varices.   - HCC screening: US in 07/2020 without HCC. Repeat in 6 months.  - Transplant: completed evaluation inpatient, will discuss in conference on 9/16. Will need new colonoscopy (last 2009) will arrange with GI        Thank you for your consult. I will follow-up with patient. Please contact us if you have any additional questions.    Som Marcus MD  Hepatology  [facility]

## 2020-09-15 NOTE — PLAN OF CARE
Problem: Adult Inpatient Plan of Care  Goal: Plan of Care Review  Outcome: Ongoing, Progressing  Goal: Optimal Comfort and Wellbeing  Outcome: Ongoing, Progressing     Problem: Glycemic Control Impaired (Sepsis/Septic Shock)  Goal: Blood Glucose Level Within Desired Range  Outcome: Ongoing, Progressing  Intervention: Optimize Glycemic Control  Flowsheets (Taken 9/15/2020 3296)  Glycemic Management: blood glucose monitoring     Problem: Infection  Goal: Infection Symptom Resolution  Outcome: Ongoing, Progressing     Problem: Fall Injury Risk  Goal: Absence of Fall and Fall-Related Injury  Outcome: Ongoing, Progressing

## 2020-09-15 NOTE — PLAN OF CARE
Problem: Adult Inpatient Plan of Care  Goal: Plan of Care Review  Outcome: Ongoing, Progressing  Goal: Optimal Comfort and Wellbeing  Outcome: Ongoing, Progressing     Problem: Glycemic Control Impaired (Sepsis/Septic Shock)  Goal: Blood Glucose Level Within Desired Range  Outcome: Ongoing, Progressing  Intervention: Optimize Glycemic Control  Flowsheets (Taken 9/15/2020 2271)  Glycemic Management: blood glucose monitoring     Problem: Infection  Goal: Infection Symptom Resolution  Outcome: Ongoing, Progressing     Problem: Fall Injury Risk  Goal: Absence of Fall and Fall-Related Injury  Outcome: Ongoing, Progressing     POC reviewed patient. AAOX4. VVS. Blood glucose monitor. Echo-stress test done. Strict I&O. Safety maintained. Call light in reach. WCTM

## 2020-09-15 NOTE — PLAN OF CARE
Pt remained AAOx4 overnight.  HS blood glucose was elevated in the 300's (pt had been eating candy).  Call the on call to advise since pt has no sliding scale insulin.  On call did not order any because pt hasn't been this high before.  No complaints of pain.  Will continue to monitor.

## 2020-09-15 NOTE — SUBJECTIVE & OBJECTIVE
Interval History:     Patient continues to urinate- patient reporting multiple voids that are undocumented. Urine sample provided today. Will spin and evaluate. Will monitor closely on albumin/octreotide/midodrine for 1 day further. Patient otherwise feels well and belly is much improved s/p para with IR on 9/14. BP's have been stable.     Objective:     Vital Signs (Most Recent):  Temp: 98.6 °F (37 °C) (09/15/20 1127)  Pulse: 69 (09/15/20 1127)  Resp: 17 (09/15/20 1127)  BP: (!) 140/85 (09/15/20 1127)  SpO2: 99 % (09/15/20 1127)  O2 Device (Oxygen Therapy): room air (09/15/20 1127) Vital Signs (24h Range):  Temp:  [97.8 °F (36.6 °C)-98.8 °F (37.1 °C)] 98.6 °F (37 °C)  Pulse:  [] 69  Resp:  [15-17] 17  SpO2:  [96 %-99 %] 99 %  BP: (101-140)/(71-85) 140/85     Weight: 79.5 kg (175 lb 2.5 oz) (09/15/20 0646)  Body mass index is 31.03 kg/m².  Body surface area is 1.88 meters squared.    I/O last 3 completed shifts:  In: 1665 [P.O.:1565; I.V.:100]  Out: 6900 [Urine:100; Other:6800]    Physical Exam  Vitals signs and nursing note reviewed.   Constitutional:       Appearance: He is obese. He is not ill-appearing or toxic-appearing.   Eyes:      General: No scleral icterus.  Cardiovascular:      Rate and Rhythm: Normal rate and regular rhythm.      Pulses: Normal pulses.           Radial pulses are 2+ on the right side and 2+ on the left side.   Pulmonary:      Effort: Pulmonary effort is normal. No respiratory distress.      Breath sounds: No wheezing or rales.   Abdominal:      General: Abdomen is flat. Bowel sounds are normal. There is no distension.      Tenderness: There is no abdominal tenderness. There is no guarding.   Musculoskeletal:         General: No swelling or deformity.   Skin:     General: Skin is warm and dry.      Coloration: Skin is pale.      Findings: Bruising (to bilateral UE's) present. No lesion.      Comments: Multiple bruises to bilateral UE's  No evidence of palmar erythema or spider  angiomas   Paracentesis dressing in place to left flank- CDI   Neurological:      Mental Status: He is alert and oriented to person, place, and time.   Psychiatric:         Mood and Affect: Mood normal.         Behavior: Behavior normal.         Significant Labs:  All labs within the past 24 hours have been reviewed.

## 2020-09-15 NOTE — SUBJECTIVE & OBJECTIVE
Interval History:   - reports feeling well in AM, denies complaints  - denies abdominal pain, no n/v  - tolerating PO  - ambulatory without assistance  - on HRS therapy with nephrology, crt stable 1.8 (renata 1.5 on 9/12), 100 + 5x umeasured urination/24h    Current Facility-Administered Medications   Medication    acetaminophen tablet 500 mg    albumin human 25% bottle 25 g    albuterol-ipratropium 2.5 mg-0.5 mg/3 mL nebulizer solution 3 mL    apixaban tablet 10 mg    ciprofloxacin HCl tablet 500 mg    dextrose 50% injection 12.5 g    dextrose 50% injection 25 g    escitalopram oxalate tablet 5 mg    glucagon (human recombinant) injection 1 mg    glucose chewable tablet 16 g    glucose chewable tablet 24 g    lactulose 20 gram/30 mL solution Soln 20 g    lidocaine 5 % patch 1 patch    midodrine tablet 15 mg    octreotide injection 100 mcg    ondansetron injection 4 mg    pantoprazole EC tablet 40 mg    rOPINIRole tablet 0.25 mg    sodium chloride 0.9% flush 10 mL    traZODone tablet 50 mg       Objective:     Vital Signs (Most Recent):  Temp: 98.6 °F (37 °C) (09/15/20 1127)  Pulse: 69 (09/15/20 1127)  Resp: 17 (09/15/20 1127)  BP: (!) 140/85 (09/15/20 1127)  SpO2: 99 % (09/15/20 1127) Vital Signs (24h Range):  Temp:  [97.8 °F (36.6 °C)-98.8 °F (37.1 °C)] 98.6 °F (37 °C)  Pulse:  [] 69  Resp:  [15-17] 17  SpO2:  [96 %-99 %] 99 %  BP: (101-140)/(71-85) 140/85     Weight: 79.5 kg (175 lb 2.5 oz) (09/15/20 0646)  Body mass index is 31.03 kg/m².    Physical Exam  Vitals signs and nursing note reviewed.   Constitutional:       General: He is not in acute distress.     Appearance: He is not diaphoretic.   HENT:      Head: Normocephalic and atraumatic.   Eyes:      General: Scleral icterus present.   Pulmonary:      Effort: Pulmonary effort is normal. No respiratory distress.   Abdominal:      General: There is no distension.      Palpations: Abdomen is soft.      Tenderness: There is no abdominal  tenderness.      Comments: Mild distension, soft, non-tender   Musculoskeletal:         General: No tenderness.   Skin:     General: Skin is warm.      Capillary Refill: Capillary refill takes less than 2 seconds.   Neurological:      Mental Status: He is alert and oriented to person, place, and time.         MELD-Na score: 25 at 9/15/2020  6:54 AM  MELD score: 20 at 9/15/2020  6:54 AM  Calculated from:  Serum Creatinine: 1.8 mg/dL at 9/15/2020  6:54 AM  Serum Sodium: 130 mmol/L at 9/15/2020  6:54 AM  Total Bilirubin: 2.1 mg/dL at 9/15/2020  6:54 AM  INR(ratio): 1.6 at 9/15/2020  6:54 AM  Age: 60 years 1 month    Significant Labs:  Labs within the past month have been reviewed.    Significant Imaging:  Labs: Reviewed

## 2020-09-15 NOTE — PT/OT/SLP PROGRESS
Occupational Therapy      Patient Name:  Kenneth Pardo   MRN:  [id number]    Patient not seen today secondary to Other (Comment)(Pt in ECHO during PM attempt for OT treatment. Per chart review, pt may d/c this date.). Will follow-up if pt remains hospitalized and appropriate for OT.    Hemalatha Roper OT  9/15/2020

## 2020-09-15 NOTE — ASSESSMENT & PLAN NOTE
61 yo male with alcoholic cirrhosis c/b portal hypertension, ascites, portal hypertensive gastropathy and hepatic encephalopathy that initially presented as a transfer from [facility] for management of acute hyponatremia and concurrent MERLY. This was believed to be 2/2 his home diuretic therapy; as such these were held with improvement in his initial hyponatremia and improvement in his MERLY. Nephrology now consulted as the patient has begun to have an increase in his serum Cr for the past 48 hours despite holding home diuretics.    DDX includes but is not limited to: HRS Type 1 vs Ischemic ATN vs Sepsis (CONS on urine culture though patient asymptomatic)  Diagnostics: Leukocytosis of 23.3 with left shift, Na 131, HCO3 21, BUN 34, Cr 1.9, UA on 9/8 with 1+ protein/bili/leuks/26 RBCs/16 WBCs/10 hyaline casts, Urine Na <20, Urine Osm 455, Urine Cx with CONS, UA on 9/14 with trace leuks/3 RBC's/4 WBC's/7 hyaline casts, Urine Na repeat <20, Protein Cr ration 0.3, Hogan stain negative  Imaging: US retroperitoneum on 9/14 with trace amount of ascites noted in the pelvis, but otherwise unremarkable sonographic appearance of the kidneys    Plan:  - Renally dose all medications  - Avoid nephrotoxic medications  - Strict intake and output   - Daily labs; will trend   - Continue triple therapy for HRS: albumin + midodrine + octreotide; will monitor for 1 more day and re-evaluate tomorrow with labs   - Urine sample collected- will spin and assess for casts

## 2020-09-16 ENCOUNTER — ANESTHESIA EVENT (OUTPATIENT)
Dept: ENDOSCOPY | Facility: HOSPITAL | Age: 60
DRG: 441 | End: 2020-09-16
Payer: MEDICAID

## 2020-09-16 ENCOUNTER — COMMITTEE REVIEW (OUTPATIENT)
Dept: TRANSPLANT | Facility: CLINIC | Age: 60
End: 2020-09-16

## 2020-09-16 DIAGNOSIS — K70.31 ALCOHOLIC CIRRHOSIS OF LIVER WITH ASCITES: Primary | ICD-10-CM

## 2020-09-16 LAB
ALBUMIN SERPL BCP-MCNC: 3.7 G/DL (ref 3.5–5.2)
ALP SERPL-CCNC: 170 U/L (ref 55–135)
ALT SERPL W/O P-5'-P-CCNC: 11 U/L (ref 10–44)
ANION GAP SERPL CALC-SCNC: 10 MMOL/L (ref 8–16)
APTT BLDCRRT: 48.9 SEC (ref 21–32)
APTT BLDCRRT: >150 SEC (ref 21–32)
APTT BLDCRRT: >150 SEC (ref 21–32)
AST SERPL-CCNC: 21 U/L (ref 10–40)
BASOPHILS # BLD AUTO: 0.16 K/UL (ref 0–0.2)
BASOPHILS NFR BLD: 0.7 % (ref 0–1.9)
BILIRUB SERPL-MCNC: 1.9 MG/DL (ref 0.1–1)
BUN SERPL-MCNC: 31 MG/DL (ref 6–20)
CALCIUM SERPL-MCNC: 8.4 MG/DL (ref 8.7–10.5)
CHLORIDE SERPL-SCNC: 101 MMOL/L (ref 95–110)
CO2 SERPL-SCNC: 20 MMOL/L (ref 23–29)
CREAT SERPL-MCNC: 1.7 MG/DL (ref 0.5–1.4)
DIFFERENTIAL METHOD: ABNORMAL
EOSINOPHIL # BLD AUTO: 1.2 K/UL (ref 0–0.5)
EOSINOPHIL NFR BLD: 5.5 % (ref 0–8)
ERYTHROCYTE [DISTWIDTH] IN BLOOD BY AUTOMATED COUNT: 14.2 % (ref 11.5–14.5)
EST. GFR  (AFRICAN AMERICAN): 50.3 ML/MIN/1.73 M^2
EST. GFR  (NON AFRICAN AMERICAN): 43.5 ML/MIN/1.73 M^2
GLUCOSE SERPL-MCNC: 120 MG/DL (ref 70–110)
HCT VFR BLD AUTO: 34.1 % (ref 40–54)
HGB BLD-MCNC: 11.2 G/DL (ref 14–18)
IMM GRANULOCYTES # BLD AUTO: 0.16 K/UL (ref 0–0.04)
IMM GRANULOCYTES NFR BLD AUTO: 0.7 % (ref 0–0.5)
INR PPP: 1.6 (ref 0.8–1.2)
LYMPHOCYTES # BLD AUTO: 2.2 K/UL (ref 1–4.8)
LYMPHOCYTES NFR BLD: 10 % (ref 18–48)
MAGNESIUM SERPL-MCNC: 1.9 MG/DL (ref 1.6–2.6)
MCH RBC QN AUTO: 34.1 PG (ref 27–31)
MCHC RBC AUTO-ENTMCNC: 32.8 G/DL (ref 32–36)
MCV RBC AUTO: 104 FL (ref 82–98)
MONOCYTES # BLD AUTO: 1.2 K/UL (ref 0.3–1)
MONOCYTES NFR BLD: 5.6 % (ref 4–15)
NEUTROPHILS # BLD AUTO: 16.6 K/UL (ref 1.8–7.7)
NEUTROPHILS NFR BLD: 77.5 % (ref 38–73)
NRBC BLD-RTO: 0 /100 WBC
PLATELET # BLD AUTO: 163 K/UL (ref 150–350)
PMV BLD AUTO: 10.7 FL (ref 9.2–12.9)
POTASSIUM SERPL-SCNC: 4.2 MMOL/L (ref 3.5–5.1)
PROT SERPL-MCNC: 5.1 G/DL (ref 6–8.4)
PROTHROMBIN TIME: 17.3 SEC (ref 9–12.5)
RBC # BLD AUTO: 3.28 M/UL (ref 4.6–6.2)
SARS-COV-2 RNA RESP QL NAA+PROBE: NOT DETECTED
SODIUM SERPL-SCNC: 131 MMOL/L (ref 136–145)
WBC # BLD AUTO: 21.49 K/UL (ref 3.9–12.7)

## 2020-09-16 PROCEDURE — 63600175 PHARM REV CODE 636 W HCPCS: Mod: NTX | Performed by: HOSPITALIST

## 2020-09-16 PROCEDURE — 25000003 PHARM REV CODE 250: Mod: NTX | Performed by: HOSPITALIST

## 2020-09-16 PROCEDURE — 97535 SELF CARE MNGMENT TRAINING: CPT | Mod: NTX

## 2020-09-16 PROCEDURE — 25000003 PHARM REV CODE 250: Mod: NTX | Performed by: INTERNAL MEDICINE

## 2020-09-16 PROCEDURE — 83735 ASSAY OF MAGNESIUM: CPT | Mod: NTX

## 2020-09-16 PROCEDURE — 20600001 HC STEP DOWN PRIVATE ROOM: Mod: NTX

## 2020-09-16 PROCEDURE — 99233 SBSQ HOSP IP/OBS HIGH 50: CPT | Mod: NTX,,, | Performed by: INTERNAL MEDICINE

## 2020-09-16 PROCEDURE — 99233 PR SUBSEQUENT HOSPITAL CARE,LEVL III: ICD-10-PCS | Mod: NTX,,, | Performed by: INTERNAL MEDICINE

## 2020-09-16 PROCEDURE — 80053 COMPREHEN METABOLIC PANEL: CPT | Mod: NTX

## 2020-09-16 PROCEDURE — 99233 PR SUBSEQUENT HOSPITAL CARE,LEVL III: ICD-10-PCS | Mod: NTX,,, | Performed by: HOSPITALIST

## 2020-09-16 PROCEDURE — 85610 PROTHROMBIN TIME: CPT | Mod: NTX

## 2020-09-16 PROCEDURE — 85730 THROMBOPLASTIN TIME PARTIAL: CPT | Mod: NTX

## 2020-09-16 PROCEDURE — 99233 SBSQ HOSP IP/OBS HIGH 50: CPT | Mod: NTX,,, | Performed by: HOSPITALIST

## 2020-09-16 PROCEDURE — 36415 COLL VENOUS BLD VENIPUNCTURE: CPT | Mod: NTX

## 2020-09-16 PROCEDURE — 85025 COMPLETE CBC W/AUTO DIFF WBC: CPT | Mod: NTX

## 2020-09-16 PROCEDURE — U0003 INFECTIOUS AGENT DETECTION BY NUCLEIC ACID (DNA OR RNA); SEVERE ACUTE RESPIRATORY SYNDROME CORONAVIRUS 2 (SARS-COV-2) (CORONAVIRUS DISEASE [COVID-19]), AMPLIFIED PROBE TECHNIQUE, MAKING USE OF HIGH THROUGHPUT TECHNOLOGIES AS DESCRIBED BY CMS-2020-01-R: HCPCS | Mod: NTX

## 2020-09-16 RX ORDER — POLYETHYLENE GLYCOL 3350, SODIUM SULFATE ANHYDROUS, SODIUM BICARBONATE, SODIUM CHLORIDE, POTASSIUM CHLORIDE 236; 22.74; 6.74; 5.86; 2.97 G/4L; G/4L; G/4L; G/4L; G/4L
4000 POWDER, FOR SOLUTION ORAL ONCE
Status: COMPLETED | OUTPATIENT
Start: 2020-09-16 | End: 2020-09-16

## 2020-09-16 RX ADMIN — ROPINIROLE HYDROCHLORIDE 0.25 MG: 0.25 TABLET, FILM COATED ORAL at 09:09

## 2020-09-16 RX ADMIN — MIDODRINE HYDROCHLORIDE 15 MG: 5 TABLET ORAL at 02:09

## 2020-09-16 RX ADMIN — OCTREOTIDE ACETATE 100 MCG: 100 INJECTION, SOLUTION INTRAVENOUS; SUBCUTANEOUS at 05:09

## 2020-09-16 RX ADMIN — LACTULOSE 20 G: 20 SOLUTION ORAL at 03:09

## 2020-09-16 RX ADMIN — CIPROFLOXACIN 500 MG: 500 TABLET, FILM COATED ORAL at 08:09

## 2020-09-16 RX ADMIN — MIDODRINE HYDROCHLORIDE 15 MG: 5 TABLET ORAL at 05:09

## 2020-09-16 RX ADMIN — OCTREOTIDE ACETATE 100 MCG: 100 INJECTION, SOLUTION INTRAVENOUS; SUBCUTANEOUS at 09:09

## 2020-09-16 RX ADMIN — MIDODRINE HYDROCHLORIDE 15 MG: 5 TABLET ORAL at 09:09

## 2020-09-16 RX ADMIN — OCTREOTIDE ACETATE 100 MCG: 100 INJECTION, SOLUTION INTRAVENOUS; SUBCUTANEOUS at 02:09

## 2020-09-16 RX ADMIN — LACTULOSE 20 G: 20 SOLUTION ORAL at 08:09

## 2020-09-16 RX ADMIN — PANTOPRAZOLE SODIUM 40 MG: 40 TABLET, DELAYED RELEASE ORAL at 08:09

## 2020-09-16 RX ADMIN — ESCITALOPRAM OXALATE 5 MG: 5 TABLET, FILM COATED ORAL at 08:09

## 2020-09-16 RX ADMIN — POLYETHYLENE GLYCOL 3350, SODIUM SULFATE ANHYDROUS, SODIUM BICARBONATE, SODIUM CHLORIDE, POTASSIUM CHLORIDE 4000 ML: 236; 22.74; 6.74; 5.86; 2.97 POWDER, FOR SOLUTION ORAL at 03:09

## 2020-09-16 NOTE — PT/OT/SLP PROGRESS
"Occupational Therapy   Treatment    Name: Kenneth Pardo  MRN: [id number]  Admitting Diagnosis:  Hyponatremia       Recommendations:     Discharge Recommendations: home health PT  Discharge Equipment Recommendations:  walker, rolling, bedside commode, shower chair  Barriers to discharge:  None    Assessment:     Kenneth Pardo is a 60 y.o. male with a medical diagnosis of Hyponatremia.  He presents with performance deficits affecting function are weakness, impaired endurance, impaired self care skills, impaired functional mobilty, impaired cardiopulmonary response to activity, decreased safety awareness, impaired balance. Pt tolerated session well but presents with self-limiting behaviors. Pt required moderate encouragement to participate in therapy session. Pt performed bed mobility with CGA, functional mobility to bathroom with SBA using no AD, sit<>stand transfers with SBA <>Supervision, and grooming standing at the sink with supervision. POC decreased to x2/wk due to improved functional mobility and limited participation. Pt would benefit from continued skilled acute OT services in order to maximize independence and safety with ADLs and functional mobility to ensure safe return to PLOF in the least restrictive environment.    Rehab Prognosis:  Good; patient would benefit from acute skilled OT services to address these deficits and reach maximum level of function.       Plan:     Patient to be seen 3 x/week to address the above listed problems via self-care/home management, therapeutic activities, therapeutic exercises  · Plan of Care Expires: 10/09/20  · Plan of Care Reviewed with: patient    Subjective     Pain/Comfort:  · Pain Rating 1: 0/10  · Pain Rating Post-Intervention 1: 0/10    Objective:     Communicated with: RN prior to session.  Patient found HOB elevated with telemetry upon OT entry to room. Pt agreeable to therapy session after moderate encouragement provided.     Pt stated, "I'm just ready to get the " "hell out of here."     General Precautions: Standard, fall   Orthopedic Precautions:N/A   Braces: N/A     Occupational Performance:     Bed Mobility:    · Patient completed Scooting/Bridging with supervision  · Patient completed Supine to Sit with contact guard assistance  · Patient completed Sit to Supine with stand by assistance     Functional Mobility/Transfers:  · Patient completed Sit <> Stand Transfer from EOB with stand by assistance  with  no assistive device   · Patient completed Toilet Transfer Step Transfer technique with supervision with  no AD  · Functional Mobility: Pt engaging in functional mobility to simulate household/community distances within pt's room  with SBA <>supervision and utilizing no AD in order to maximize functional activity tolerance and standing balance required for engagement in occupations of choice.   · Pt declined to perform further mobility in hallway   · Pt with no LOB and no SOB present   · VSS     Activities of Daily Living:  · Grooming: supervision pt completed hand hygiene and oral care standing at the sink   · Toileting: supervision for clothing management and hygiene sitting on toilet       AMPAC 6 Click ADL: 23    Treatment & Education:  - Pt educated on role of OT, POC, and goals for therapy.    - Patient and family aware of patient's deficits and therapy progression.   -Pt is appropriate to transfer with nsg and PCT with supervision   - Pt educated on importance of ambulating hallway daily with nursing staff in order to maintain functional strength   - Pt refused to sit UIC despite encouragement   - Pt educated on risk of prolonged stay in the bed.   - Time provided for therapeutic counseling and discussion of health disposition.   - Importance of OOB ax's with staff member assistance and sitting OOB majority of day.   - Pt completed ADLs and functional mobility for treatment session as noted above   - Pt verbalized understanding. Pt expressed no further " concerns/questions.  - Whiteboard updated       Patient left HOB elevated with all lines intact, call button in reach and RN notifiedEducation:      GOALS:   Multidisciplinary Problems     Occupational Therapy Goals        Problem: Occupational Therapy Goal    Goal Priority Disciplines Outcome Interventions   Occupational Therapy Goal     OT, PT/OT Ongoing, Progressing    Description: Goals to be met by: 9/20/2020     Patient will increase functional independence with ADLs by performing:    UE Dressing with [place].  LE Dressing with Supervision.  Grooming while standing at sink with [place].  Toileting from toilet with [place] for hygiene and clothing management. - MET 9/16  Toilet transfer to toilet with [place].MET 9/16                     Time Tracking:     OT Date of Treatment: 09/16/20  OT Start Time: 0936  OT Stop Time: 0947  OT Total Time (min): 11 min    Billable Minutes:Self Care/Home Management 11    Hailey Harrington OT  9/16/2020

## 2020-09-16 NOTE — PLAN OF CARE
Problem: Occupational Therapy Goal  Goal: Occupational Therapy Goal  Description: Goals to be met by: 9/20/2020     Patient will increase functional independence with ADLs by performing:    UE Dressing with [place].  LE Dressing with Supervision.  Grooming while standing at sink with [place].  Toileting from toilet with [place] for hygiene and clothing management. - MET 9/16  Toilet transfer to toilet with [place].MET 9/16    Outcome: Ongoing, Progressing    Hailey Harrington, OTR/L  Pager: [phone]  9/16/2020

## 2020-09-16 NOTE — PROGRESS NOTES
[facility]  Nephrology  Progress Note    Patient Name: Kenneth Pardo  MRN: [id number]  Admission Date: 9/9/2020  Hospital Length of Stay: 7 days  Attending Provider: Naresh Franks MD   Primary Care Physician: Mikala Etienne MD  Principal Problem:Hyponatremia    Subjective:     HPI: The pt is a 60 year old male with alcoholic cirrhosis c/b portal hypertension, ascites, portal hypertensive gastropathy and hepatic encephalopathy that initially presented as a transfer from [facility] for management of acute hyponatremia and concurrent MERLY. This was believed to be 2/2 his Lasix and Aldactone therapy; as such these were held with improvement in his initial hyponatremia and improvement in his MERLY. Nephrology was consulted as the patient has begun to have an increase in his serum Cr for the past 48 hours despite holding his home diuretics. Of note, the patient has now had 2 large volume paracenteses (9/9 removed 6.4 L's of fluid and returned cultures negative, 9/14 removed 6.8 L's of fluid). In addition, presenting with significant leukocytosis and currently on SBP prophylaxis with daily cipro. C diff negative. CONS on urine culture, but patient asymptomatic. Blood cultures NGTD. Ascitic fluid cultures pending at time of consult.         Interval History:     Patient continues to have good urine output. Urine sample collected on 9/15- no evidence of casts found- picture seemingly more c/w HRS. Has completed albumin therapy. Continue octreotide and midodrine.  Patient otherwise feeling well. Reports he does not want trazodone any more as he had bad nightmares last night.     Objective:     Vital Signs (Most Recent):  Temp: 98.5 °F (36.9 °C) (09/16/20 1131)  Pulse: 78 (09/16/20 1131)  Resp: 16 (09/16/20 1131)  BP: 105/65 (09/16/20 1131)  SpO2: 100 % (09/16/20 1131)  O2 Device (Oxygen Therapy): room air (09/16/20 1131) Vital Signs (24h Range):  Temp:  [98.2 °F (36.8 °C)-98.5 °F (36.9 °C)] 98.5 °F  (36.9 °C)  Pulse:  [] 78  Resp:  [13-18] 16  SpO2:  [97 %-100 %] 100 %  BP: (105-121)/(65-87) 105/65     Weight: 79.4 kg (175 lb) (09/15/20 1308)  Body mass index is 31 kg/m².  Body surface area is 1.88 meters squared.    I/O last 3 completed shifts:  In: 1830 [P.O.:1480; I.V.:350]  Out: 650 [Urine:650]    Physical Exam  Vitals signs and nursing note reviewed.   Constitutional:       Appearance: He is obese. He is not ill-appearing or toxic-appearing.   Eyes:      General: No scleral icterus.  Cardiovascular:      Rate and Rhythm: Normal rate and regular rhythm.      Pulses: Normal pulses.           Radial pulses are 2+ on the right side and 2+ on the left side.   Pulmonary:      Effort: Pulmonary effort is normal. No respiratory distress.      Breath sounds: No wheezing or rales.   Abdominal:      General: Abdomen is flat. Bowel sounds are normal. There is no distension.      Tenderness: There is no abdominal tenderness. There is no guarding.   Musculoskeletal:         General: No swelling or deformity.      Right lower leg: Edema (1+) present.      Left lower leg: Edema (1+) present.   Skin:     General: Skin is warm and dry.      Coloration: Skin is pale.      Findings: Bruising (to bilateral UE's) present. No lesion.      Comments: Multiple bruises to bilateral UE's  No evidence of palmar erythema or spider angiomas   Paracentesis dressing in place to left flank- CDI   Neurological:      Mental Status: He is alert and oriented to person, place, and time.   Psychiatric:         Mood and Affect: Mood normal.         Behavior: Behavior normal.         Significant Labs:  All labs within the past 24 hours have been reviewed.    Assessment/Plan:     MERLY (acute kidney injury)  61 yo male with alcoholic cirrhosis c/b portal hypertension, ascites, portal hypertensive gastropathy and hepatic encephalopathy that initially presented as a transfer from [facility] for management of acute hyponatremia and concurrent  MERLY. This was believed to be 2/2 his home diuretic therapy; as such these were held with improvement in his initial hyponatremia and improvement in his MERLY. Nephrology now consulted as the patient has begun to have an increase in his serum Cr for the past 48 hours despite holding home diuretics.    DDX includes but is not limited to: HRS Type 1 vs Ischemic ATN vs Sepsis (CONS on urine culture though patient asymptomatic)  Diagnostics: Leukocytosis of 23.3 with left shift, Na 131, HCO3 21, BUN 34, Cr 1.9, UA on 9/8 with 1+ protein/bili/leuks/26 RBCs/16 WBCs/10 hyaline casts, Urine Na <20, Urine Osm 455, Urine Cx with CONS, UA on 9/14 with trace leuks/3 RBC's/4 WBC's/7 hyaline casts, Urine Na repeat <20, Protein Cr ration 0.3, Hogan stain negative  Imaging: US retroperitoneum on 9/14 with trace amount of ascites noted in the pelvis, but otherwise unremarkable sonographic appearance of the kidneys    Plan:  - Renally dose all medications  - Avoid nephrotoxic medications  - Strict intake and output   - Daily labs; will trend   - Urine sample collected/spun  On 9/25- no evidence of casts to suggest ATN; more c/w HRS  - Completed albumin therapy; renal function stabilizing   - Continue midodrine + octreotide  - Nephrology will continue to follow along     Decompensated liver disease  - Management per primary     Depression with anxiety  - Continue Lexapro    Ascites due to alcoholic cirrhosis  - S/p 2 large volume paracenteses (9/9 net - 6.4 L's, 9/14 net -6.8 L's)   - Management per primary        Thank you for your consult. I will follow-up with patient. Please contact us if you have any additional questions.    Pia Vargas, DO  Nephrology  [facility]

## 2020-09-16 NOTE — PLAN OF CARE
Problem: Fall Injury Risk  Goal: Absence of Fall and Fall-Related Injury  Outcome: Ongoing, Progressing  Intervention: Identify and Manage Contributors to Fall Injury Risk  Flowsheets (Taken 9/16/2020 0439)  Self-Care Promotion:   independence encouraged   BADL personal objects within reach   BADL personal routines maintained   safe use of adaptive equipment encouraged  Medication Review/Management: medications reviewed  Intervention: Promote Injury-Free Environment  Flowsheets (Taken 9/16/2020 0439)  Safety Promotion/Fall Prevention:   assistive device/personal item within reach   side rails raised x 2   nonskid shoes/socks when out of bed  Environmental Safety Modification:   assistive device/personal items within reach   clutter free environment maintained     POC discussed with patient. AAOx4 VSS. No acute changes overnight. Pt NPO since midnight. No complaints. Will monitor patient.

## 2020-09-16 NOTE — ANESTHESIA PREPROCEDURE EVALUATION
[facility]  Anesthesia Pre-Operative Evaluation         Patient Name: Kenneth Pardo  YOB: 1960  MRN: [id number]    SUBJECTIVE:     Pre-operative evaluation for Procedure(s) (LRB):  COLONOSCOPY (N/A)     09/16/2020    Kenneth Pardo is a 60 y.o. male w/ a significant PMHx of alcoholic cirrhosis complicated by portal hypertension, ascites, portal hypertensive gastropathy, hepatic encephalopathy, and RLE DVT: on AC presents as a transfer from [facility] ED for management of acute hyponatremia and MERLY. EGD 7/31 with grade B esophagitis and portal hypertensive gastropathy and varices.    Patient now presents for the above procedure(s).      LDA:   Percutaneous Central Line Insertion/Assessment - Triple Lumen  right femoral vein;right femoral artery (Active)   Number of days:             Peripheral IV - Single Lumen 09/10/20 1020 20 G;1 3/4 in Left Upper Arm (Active)   Site Assessment Clean;Dry;Intact;No redness;No swelling 09/16/20 0400   Line Status No blood return;Capped;Flushed;Saline locked 09/15/20 1419   Dressing Status Clean;Dry;Intact 09/15/20 1419   Dressing Intervention Integrity maintained 09/15/20 1419   Dressing Change Due 09/14/20 09/15/20 0735   Site Change Due 09/14/20 09/15/20 0735   Reason Not Rotated Poor venous access 09/15/20 0735   Number of days: 5       Prev airway: None documented.    Drips: None documented   heparin (porcine) in D5W Stopped (09/16/20 0642)       Patient Active Problem List   Diagnosis    Acute alcoholic hepatitis    Alcohol use disorder, severe, dependence    Anxiety    Upper GI bleed    Acute hepatic encephalopathy    Macrocytic anemia    Severe alcohol dependence    Hypotension    Hyponatremia    SBP (spontaneous bacterial peritonitis)    Anasarca    Esophagitis, [place] grade B    Portal hypertensive  gastropathy    Acute blood loss anemia    Hepatorenal syndrome    Hyperkalemia    Alcoholic liver disease    Ascites due to alcoholic cirrhosis    Hepatic encephalopathy    Debility    Frequent falls    Depression with anxiety    Restless leg    Insomnia    Sepsis, unspecified organism    Decompensated liver disease    Right leg DVT    MERLY (acute kidney injury)    Leukocytosis    Pre-transplant evaluation for liver transplant       Review of patient's allergies indicates:  No Known Allergies    Current Inpatient Medications:   ciprofloxacin HCl  500 mg Oral Q24H    escitalopram oxalate  5 mg Oral Daily    lactulose  20 g Oral BID    midodrine  15 mg Oral Q8H    octreotide  100 mcg Subcutaneous Q8H    pantoprazole  40 mg Oral Daily    polyethylene glycol  4,000 mL Oral Once    rOPINIRole  0.25 mg Oral QHS       No current facility-administered medications on file prior to encounter.      Current Outpatient Medications on File Prior to Encounter   Medication Sig Dispense Refill    ciprofloxacin HCl (CIPRO) 500 MG tablet Take 1 tablet (500 mg) by mouth twice daily on 8/10 and then take 1 tablet daily thereafter 60 tablet 2    escitalopram oxalate (LEXAPRO) 5 MG Tab Take 1 tablet (5 mg total) by mouth once daily. 30 tablet 2    furosemide (LASIX) 20 MG tablet Take 1 tablet (20 mg total) by mouth once daily. 30 tablet 2    midodrine (PROAMATINE) 5 MG Tab Take 3 tablets (15 mg total) by mouth every 8 (eight) hours. 270 tablet 2    omeprazole (PRILOSEC) 40 MG capsule Take 1 capsule (40 mg total) by mouth every morning. 30 capsule 11    rOPINIRole (REQUIP) 0.25 MG tablet Take 1 tablet (0.25 mg total) by mouth every evening. 30 tablet 2    spironolactone (ALDACTONE) 50 MG tablet Take 1 tablet (50 mg total) by mouth once daily. 30 tablet 2    traZODone (DESYREL) 100 MG tablet Take 1 tablet (100 mg total) by mouth every evening. 30 tablet 2    ondansetron (ZOFRAN) 4 MG tablet Take 4 mg by  mouth every 8 (eight) hours as needed.         Past Surgical History:   Procedure Laterality Date    ESOPHAGOGASTRODUODENOSCOPY N/A 7/31/2020    Procedure: EGD (ESOPHAGOGASTRODUODENOSCOPY);  Surgeon: Jhonathan Lieberman MD;  Location: [address];  Service: Endoscopy;  Laterality: N/A;       Social History     Socioeconomic History    Marital status:      Spouse name: Not on file    Number of children: Not on file    Years of education: Not on file    Highest education level: Not on file   Occupational History    Not on file   Social Needs    Financial resource strain: Not on file    Food insecurity     Worry: Not on file     Inability: Not on file    Transportation needs     Medical: Not on file     Non-medical: Not on file   Tobacco Use    Smoking status: Never Smoker    Smokeless tobacco: Never Used   Substance and Sexual Activity    Alcohol use: Not Currently     Frequency: Never    Drug use: Not on file    Sexual activity: Not on file   Lifestyle    Physical activity     Days per week: Not on file     Minutes per session: Not on file    Stress: Not on file   Relationships    Social connections     Talks on phone: Not on file     Gets together: Not on file     Attends Yazidi service: Not on file     Active member of club or organization: Not on file     Attends meetings of clubs or organizations: Not on file     Relationship status: Not on file   Other Topics Concern    Not on file   Social History Narrative    Not on file       OBJECTIVE:     Vital Signs Range (Last 24H):  Temp:  [36.8 °C (98.2 °F)-37 °C (98.6 °F)]   Pulse:  []   Resp:  [13-18]   BP: (109-140)/(68-87)   SpO2:  [97 %-99 %]       Significant Labs:  Lab Results   Component Value Date    WBC 21.49 (H) 09/16/2020    HGB 11.2 (L) 09/16/2020    HCT 34.1 (L) 09/16/2020     09/16/2020    ALT 11 09/16/2020    AST 21 09/16/2020     (L) 09/16/2020    K 4.2 09/16/2020     09/16/2020    CREATININE 1.7 (H)  09/16/2020    BUN 31 (H) 09/16/2020    CO2 20 (L) 09/16/2020    TSH 0.740 09/09/2020    PSA 0.04 09/11/2020    INR 1.6 (H) 09/16/2020       Diagnostic Studies: No relevant studies.    EKG:   No results found for this or any previous visit.   No official reading as related to past of stress test  No overt signs of ischemia on evaluation  Low voltage     2D ECHO:  TTE:  Results for orders placed or performed during the hospital encounter of 09/09/20   Echo Color Flow Doppler? Yes   Result Value Ref Range    Ascending aorta 2.91 cm    STJ 2.34 cm    IVS 1.08 0.6 - 1.1 cm    LA size 3.25 cm    LVIDD 3.56 3.5 - 6.0 cm    LVIDS 2.28 2.1 - 4.0 cm    LVOT diameter 2.16 cm    PW 0.93 0.6 - 1.1 cm    Sinus 2.61 cm    LV Diastolic Volume 52.95 mL    LV Systolic Volume 17.79 mL    FS 36 %    LV mass 106.84 g    Left Ventricle Relative Wall Thickness 0.52 cm    LVOT area 3.7 cm2    LV Systolic Volume Index 9.2 mL/m2    LV Diastolic Volume Index 27.32 mL/m2    LV Mass Index 55 g/m2    BSA 2.01 m2    Narrative    · Limited cardiac assessment. Patient not cooperative with the study.   Consider repeat echocardiogram when stable clinical status for accurate   ventricular and valvular function.  · Normal left ventricular systolic function. The estimated ejection   fraction is 65%.  · Concentric left ventricular remodeling.  · Normal LV diastolic function.          PAT:  No results found for this or any previous visit.    ASSESSMENT/PLAN:         Anesthesia Evaluation    I have reviewed the Patient Summary Reports.    I have reviewed the Nursing Notes. I have reviewed the NPO Status.   I have reviewed the Medications.     Review of Systems  Anesthesia Hx:  No problems with previous Anesthesia  Denies Family Hx of Anesthesia complications.   Denies Personal Hx of Anesthesia complications.   Social:  Alcohol Use    Hematology/Oncology:     Oncology Normal    -- Anemia:   Cardiovascular:   Hypertension  Functional Capacity very limited /  < 3 METS    Pulmonary:  Pulmonary Normal    Renal/:   Chronic Renal Disease    Hepatic/GI:   Liver Disease, Hepatitis    Endocrine:  Endocrine Normal    Psych:   Psychiatric History          Physical Exam  General:  Well nourished    Airway/Jaw/Neck:  Airway Findings: Mouth Opening: Normal General Airway Assessment: Adult  Mallampati: IV  TM Distance: Normal, at least 6 cm  Jaw/Neck Findings:     Neck ROM: Normal ROM  Neck Findings: Normal     Dental:  Dental Findings: In tact, Periodontal disease, Severe    Chest/Lungs:  Chest/Lungs Findings: Clear to auscultation     Heart/Vascular:  Heart Findings: Rate: Normal  Rhythm: Regular Rhythm  Sounds: Normal        Mental Status:  Mental Status Findings:  Cooperative, Alert and Oriented         Anesthesia Plan  Type of Anesthesia, risks & benefits discussed:  Anesthesia Type:  general, MAC  Patient's Preference:   Intra-op Monitoring Plan: standard ASA monitors  Intra-op Monitoring Plan Comments:   Post Op Pain Control Plan: multimodal analgesia, IV/PO Opioids PRN and per primary service following discharge from PACU  Post Op Pain Control Plan Comments:   Induction:   IV  Beta Blocker:  Patient is not currently on a Beta-Blocker (No further documentation required).       Informed Consent: Patient understands risks and agrees with Anesthesia plan.  Questions answered. Anesthesia consent signed with patient.  ASA Score: 3     Day of Surgery Review of History & Physical:    H&P update referred to the provider.         Ready For Surgery From Anesthesia Perspective.

## 2020-09-16 NOTE — PROGRESS NOTES
Progress Note   Hospital Medicine         Patient Name: Kenneth Pardo  MRN:  [id number]  [facility] Medicine Team: [facility]OSP MED L Naresh Franks MD  Date of Admission:  9/9/2020     Length of Stay:  LOS: 7 days   Expected Discharge Date: 9/17/2020  Principal Problem:  Hyponatremia       Subjective:     Interval History/Overnight Events:  Patient feeling better today, Cr improving, cont HRS meds  - stress test negative for ischemia  - planning for C-scope Thursday and d/c then;  Hold heparin drip in the AM;     - called patient's wife and updated her on the plan; presentation next Wednesday; the goal is total recovery as this is alcohol hep;       Review of Systems   Constitutional: Negative for chills, fatigue, fever.   HENT: Negative for sore throat, trouble swallowing.    Eyes: Negative for photophobia, visual disturbance.   Respiratory: Negative for cough, shortness of breath.    Cardiovascular: Negative for chest pain, palpitations, leg swelling.   Gastrointestinal: Negative for abdominal pain, constipation, diarrhea, nausea, vomiting.   Endocrine: Negative for cold intolerance, heat intolerance.   Genitourinary: Negative for dysuria, frequency.   Musculoskeletal: Negative for arthralgias, myalgias.   Skin: Negative for rash, wound, erythema   Neurological: Negative for dizziness, syncope, weakness, light-headedness.   Psychiatric/Behavioral: Negative for confusion, hallucinations, anxiety  All other systems reviewed and are negative.    Objective:     Temp:  [98.2 °F (36.8 °C)-98.5 °F (36.9 °C)]   Pulse:  []   Resp:  [13-18]   BP: (105-121)/(65-87)   SpO2:  [97 %-100 %]       Physical Exam:  Constitutional: appears weak and ill  Head: Normocephalic and atraumatic.   Mouth/Throat: Oropharynx is clear and moist.   Eyes: EOM are normal. Pupils are equal, round, and reactive to light. positive scleral icterus.   Neck: Normal range of motion. Neck supple.   Cardiovascular: Normal rate and regular rhythm.  No murmur  heard.  Pulmonary/Chest: Effort normal and breath sounds normal. No respiratory distress. No wheezes, rales, or rhonchi  Abdominal: Soft. Bowel sounds are normal.  positive distension, no tenderness  Musculoskeletal: Normal range of motion. No edema.   Neurological: Alert and oriented to person, place, and time.   Skin: Skin is warm and dry.   Psychiatric: Normal mood and affect. Behavior is normal.     Recent Labs   Lab 09/11/20  0523 09/12/20  0624 09/13/20  0457 09/14/20  0515 09/15/20  0654 09/16/20  0404   WBC 19.92* 24.83* 23.90* 23.30* 22.91* 21.49*   HGB 11.0* 11.4* 11.9* 11.7* 11.9* 11.2*   HCT 33.7* 35.6* 38.4* 35.6* 36.6* 34.1*    190 225 197 155 163     Recent Labs   Lab 09/11/20  0523 09/12/20  0624 09/13/20  0457 09/14/20  0515 09/15/20  0654 09/16/20  0404   * 131* 129* 131* 130* 131*   K 3.4* 4.1 4.1 3.9 4.2 4.2   CL 96 98 97 99 98 101   CO2 19* 21* 23 21* 22* 20*   BUN 32* 27* 31* 34* 30* 31*   CREATININE 1.5* 1.5* 1.7* 1.9* 1.8* 1.7*   * 123* 108 101 163* 120*   CALCIUM 7.9* 8.1* 8.0* 8.4* 8.5* 8.4*   MG 2.1 2.2 2.1 2.1 2.1 1.9   PHOS 3.0 2.5* 2.8  --   --   --      Recent Labs   Lab 09/14/20  0515 09/15/20  0654 09/16/20  0404   ALKPHOS 195* 204* 170*   ALT 12 13 11   AST 24 27 21   ALBUMIN 3.4* 3.7 3.7   PROT 5.4* 5.3* 5.1*   BILITOT 2.3* 2.1* 1.9*   INR 1.5* 1.6* 1.6*     Recent Labs   Lab 09/14/20  1429 09/14/20  1734 09/14/20  2333 09/15/20  0737 09/15/20  1132 09/15/20  1648   POCTGLUCOSE 124* 130* 363* 173* 167* 137*        ciprofloxacin HCl  500 mg Oral Q24H    escitalopram oxalate  5 mg Oral Daily    lactulose  20 g Oral BID    midodrine  15 mg Oral Q8H    octreotide  100 mcg Subcutaneous Q8H    pantoprazole  40 mg Oral Daily    polyethylene glycol  4,000 mL Oral Once    rOPINIRole  0.25 mg Oral QHS       Assessment and Plan     Mr. Kenneth Pardo is a 60 y.o. male who presented to [facility] on 9/9/2020 with     Hospital Course:    Mr. Kenneth Pardo was admitted to  Hospital Medicine for management of     Active Hospital Problems    Diagnosis  POA    *Hyponatremia [E87.1]  Yes    MERLY (acute kidney injury) [N17.9]  Unknown    Leukocytosis [D72.829]  Unknown    Pre-transplant evaluation for liver transplant [Z01.818]  Not Applicable    Right leg DVT [I82.401]  Yes    Hepatorenal syndrome [K76.7]  Yes    Hyperkalemia [E87.5]  Yes    Alcoholic liver disease [K70.9]  Yes    Ascites due to alcoholic cirrhosis [K70.31]  Yes    Hepatic encephalopathy [K72.90]  Yes    Debility [R53.81]  Yes    Frequent falls [R29.6]  Not Applicable    Depression with anxiety [F41.8]  Yes    Restless leg [G25.81]  Yes    Insomnia [G47.00]  Yes    Sepsis, unspecified organism [A41.9]  Yes    Decompensated liver disease [K74.69]  Yes    Esophagitis, [place] grade B [K20.8]  Yes    Portal hypertensive gastropathy [K76.6, K31.89]  Yes    Anasarca [R60.1]  Yes    Hypotension [I95.9]  Yes      Resolved Hospital Problems   No resolved problems to display.     #Decompensated alcoholic cirrhosis   Recurrent ascites   Portal hypertension   Portal hypertensive gastropathy   Hepatic encephalopathy    MELD-Na score: 23 at 9/16/2020  4:04 AM  MELD score: 19 at 9/16/2020  4:04 AM  Calculated from:  Serum Creatinine: 1.7 mg/dL at 9/16/2020  4:04 AM  Serum Sodium: 131 mmol/L at 9/16/2020  4:04 AM  Total Bilirubin: 1.9 mg/dL at 9/16/2020  4:04 AM  INR(ratio): 1.6 at 9/16/2020  4:04 AM  Age: 60 years 1 month    -Large ascites on exam and CT abdomen   -diagnostic and therapeutic paracentesis in am   -hold diuretics given hyponatremia and MERLY   -continue lactulose for HE   -continue PPI   -PETH pending  -hepatology consult   - IR paracentesis with 6.4L removed and negative for SBP on 9/9  - approved for inpatient liver transplant evaluation  - IR paracentesis with 6.2L removed on 9/14 and negative for SBP  - stress test negative  - C-scope Thursday        # Right Leg DVT  - seen on U/S; on eliquis,  switching to heparin drip for procedure      #Hyponatremia   -Na 120 from recent baseline 136 in ED  -likely due to diuretic use, decompensated cirrhosis, SSRI use    -repeat Na 122 prior to transfer   -received 1L NS bolus in ED  -hold diuretics (lasix, spironolactone)  - hold SSRI  - improving with fluid restriction 1L     #MERLY  # Hepatorenal syndrome   - Cr improving today, renal U/S reviewed and nephrology consulted;   - urine studies   - cont midodrine, octreotide and albumin         #Leukocytosis   -WBC improving   -no signs of overt infection or sepsis   -afebrile   -blood and urine cultures negative  -UA showed pyuria and received empiric dose of rocephin   - negative for SBP  - C. Diff negative as well     #Depression with anxiety   -no acute issue   -hold fluoxetine in setting of hyponatremia (?SIADH)  -continue lexapro      #Insomnia  -trazodone prn      #Debility with falls  -PT evaluation for gait training   -fall precaution      # Hypotension  - midodrine     Disposition : Thursday after C-scope, presentation Wednesday      Naresh Franks MD  Medical Director [facility]  Spectra:  [phone]  Pager: [phone]

## 2020-09-16 NOTE — ASSESSMENT & PLAN NOTE
59 yo male with alcoholic cirrhosis c/b portal hypertension, ascites, portal hypertensive gastropathy and hepatic encephalopathy that initially presented as a transfer from [facility] for management of acute hyponatremia and concurrent MERLY. This was believed to be 2/2 his home diuretic therapy; as such these were held with improvement in his initial hyponatremia and improvement in his MERLY. Nephrology now consulted as the patient has begun to have an increase in his serum Cr for the past 48 hours despite holding home diuretics.    DDX includes but is not limited to: HRS Type 1 vs Ischemic ATN vs Sepsis (CONS on urine culture though patient asymptomatic)  Diagnostics: Leukocytosis of 23.3 with left shift, Na 131, HCO3 21, BUN 34, Cr 1.9, UA on 9/8 with 1+ protein/bili/leuks/26 RBCs/16 WBCs/10 hyaline casts, Urine Na <20, Urine Osm 455, Urine Cx with CONS, UA on 9/14 with trace leuks/3 RBC's/4 WBC's/7 hyaline casts, Urine Na repeat <20, Protein Cr ration 0.3, Hogan stain negative  Imaging: US retroperitoneum on 9/14 with trace amount of ascites noted in the pelvis, but otherwise unremarkable sonographic appearance of the kidneys    Plan:  - Renally dose all medications  - Avoid nephrotoxic medications  - Strict intake and output   - Daily labs; will trend   - Urine sample collected/spun  On 9/25- no evidence of casts to suggest ATN; more c/w HRS  - Completed albumin therapy; renal function stabilizing   - Continue midodrine + octreotide  - Nephrology will continue to follow along

## 2020-09-16 NOTE — PLAN OF CARE
Problem: Adult Inpatient Plan of Care  Goal: Plan of Care Review  Outcome: Ongoing, Progressing     Problem: Fall Injury Risk  Goal: Absence of Fall and Fall-Related Injury  Outcome: Ongoing, Progressing     POC reviewed with patient. AAOX4. VVS. Heparin drip 10units/kg/hr.  NPO for Colonoscopy on tomorrow. Safety maintained. Call light in reach. TM

## 2020-09-16 NOTE — COMMITTEE REVIEW
Kenneth Pardo's case presented to selection committee.  Patient has been deferred for liver transplant. Pt is significantly improving. Of concern on Pt's recent US from 9/12/20 is a right femoral vein DVT. Pt may need doppler to assess.  Pt will follow up in clinic with Dr. Madison and SW to re evaluate.   I was present at the committee meeting and attest to the decision of the committee.    Mahad So  09/17/2020

## 2020-09-16 NOTE — SUBJECTIVE & OBJECTIVE
Interval History:     Patient continues to have good urine output. Urine sample collected on 9/15- no evidence of casts found- picture seemingly more c/w HRS. Has completed albumin therapy. Continue octreotide and midodrine.  Patient otherwise feeling well. Reports he does not want trazodone any more as he had bad nightmares last night.     Objective:     Vital Signs (Most Recent):  Temp: 98.5 °F (36.9 °C) (09/16/20 1131)  Pulse: 78 (09/16/20 1131)  Resp: 16 (09/16/20 1131)  BP: 105/65 (09/16/20 1131)  SpO2: 100 % (09/16/20 1131)  O2 Device (Oxygen Therapy): room air (09/16/20 1131) Vital Signs (24h Range):  Temp:  [98.2 °F (36.8 °C)-98.5 °F (36.9 °C)] 98.5 °F (36.9 °C)  Pulse:  [] 78  Resp:  [13-18] 16  SpO2:  [97 %-100 %] 100 %  BP: (105-121)/(65-87) 105/65     Weight: 79.4 kg (175 lb) (09/15/20 1308)  Body mass index is 31 kg/m².  Body surface area is 1.88 meters squared.    I/O last 3 completed shifts:  In: 1830 [P.O.:1480; I.V.:350]  Out: 650 [Urine:650]    Physical Exam  Vitals signs and nursing note reviewed.   Constitutional:       Appearance: He is obese. He is not ill-appearing or toxic-appearing.   Eyes:      General: No scleral icterus.  Cardiovascular:      Rate and Rhythm: Normal rate and regular rhythm.      Pulses: Normal pulses.           Radial pulses are 2+ on the right side and 2+ on the left side.   Pulmonary:      Effort: Pulmonary effort is normal. No respiratory distress.      Breath sounds: No wheezing or rales.   Abdominal:      General: Abdomen is flat. Bowel sounds are normal. There is no distension.      Tenderness: There is no abdominal tenderness. There is no guarding.   Musculoskeletal:         General: No swelling or deformity.      Right lower leg: Edema (1+) present.      Left lower leg: Edema (1+) present.   Skin:     General: Skin is warm and dry.      Coloration: Skin is pale.      Findings: Bruising (to bilateral UE's) present. No lesion.      Comments: Multiple bruises  to bilateral UE's  No evidence of palmar erythema or spider angiomas   Paracentesis dressing in place to left flank- CDI   Neurological:      Mental Status: He is alert and oriented to person, place, and time.   Psychiatric:         Mood and Affect: Mood normal.         Behavior: Behavior normal.         Significant Labs:  All labs within the past 24 hours have been reviewed.

## 2020-09-17 ENCOUNTER — ANESTHESIA (OUTPATIENT)
Dept: ENDOSCOPY | Facility: HOSPITAL | Age: 60
DRG: 441 | End: 2020-09-17
Payer: MEDICAID

## 2020-09-17 LAB
ALBUMIN SERPL BCP-MCNC: 3.8 G/DL (ref 3.5–5.2)
ALP SERPL-CCNC: 220 U/L (ref 55–135)
ALT SERPL W/O P-5'-P-CCNC: 14 U/L (ref 10–44)
ANION GAP SERPL CALC-SCNC: 15 MMOL/L (ref 8–16)
APTT BLDCRRT: 117.5 SEC (ref 21–32)
AST SERPL-CCNC: 32 U/L (ref 10–40)
BACTERIA BLD CULT: NORMAL
BACTERIA BLD CULT: NORMAL
BACTERIA SPEC AEROBE CULT: NO GROWTH
BASOPHILS # BLD AUTO: 0.3 K/UL (ref 0–0.2)
BASOPHILS NFR BLD: 1.4 % (ref 0–1.9)
BILIRUB SERPL-MCNC: 2.2 MG/DL (ref 0.1–1)
BUN SERPL-MCNC: 30 MG/DL (ref 6–20)
CALCIUM SERPL-MCNC: 8.7 MG/DL (ref 8.7–10.5)
CHLORIDE SERPL-SCNC: 98 MMOL/L (ref 95–110)
CO2 SERPL-SCNC: 20 MMOL/L (ref 23–29)
CREAT SERPL-MCNC: 2.1 MG/DL (ref 0.5–1.4)
DIFFERENTIAL METHOD: ABNORMAL
EOSINOPHIL # BLD AUTO: 1 K/UL (ref 0–0.5)
EOSINOPHIL NFR BLD: 4.7 % (ref 0–8)
ERYTHROCYTE [DISTWIDTH] IN BLOOD BY AUTOMATED COUNT: 15 % (ref 11.5–14.5)
EST. GFR  (AFRICAN AMERICAN): 38.9 ML/MIN/1.73 M^2
EST. GFR  (NON AFRICAN AMERICAN): 33.7 ML/MIN/1.73 M^2
GLUCOSE SERPL-MCNC: 108 MG/DL (ref 70–110)
HCT VFR BLD AUTO: 41.3 % (ref 40–54)
HGB BLD-MCNC: 13 G/DL (ref 14–18)
IMM GRANULOCYTES # BLD AUTO: 0.17 K/UL (ref 0–0.04)
IMM GRANULOCYTES NFR BLD AUTO: 0.8 % (ref 0–0.5)
INR PPP: 1.3 (ref 0.8–1.2)
LYMPHOCYTES # BLD AUTO: 2.1 K/UL (ref 1–4.8)
LYMPHOCYTES NFR BLD: 9.7 % (ref 18–48)
MAGNESIUM SERPL-MCNC: 2 MG/DL (ref 1.6–2.6)
MCH RBC QN AUTO: 34.2 PG (ref 27–31)
MCHC RBC AUTO-ENTMCNC: 31.5 G/DL (ref 32–36)
MCV RBC AUTO: 109 FL (ref 82–98)
MONOCYTES # BLD AUTO: 1.2 K/UL (ref 0.3–1)
MONOCYTES NFR BLD: 5.5 % (ref 4–15)
NEUTROPHILS # BLD AUTO: 17 K/UL (ref 1.8–7.7)
NEUTROPHILS NFR BLD: 77.9 % (ref 38–73)
NRBC BLD-RTO: 0 /100 WBC
PLATELET # BLD AUTO: 194 K/UL (ref 150–350)
PMV BLD AUTO: 11 FL (ref 9.2–12.9)
POTASSIUM SERPL-SCNC: 4.1 MMOL/L (ref 3.5–5.1)
PROT SERPL-MCNC: 5.7 G/DL (ref 6–8.4)
PROTHROMBIN TIME: 14 SEC (ref 9–12.5)
RBC # BLD AUTO: 3.8 M/UL (ref 4.6–6.2)
SODIUM SERPL-SCNC: 133 MMOL/L (ref 136–145)
WBC # BLD AUTO: 21.82 K/UL (ref 3.9–12.7)

## 2020-09-17 PROCEDURE — 97803 MED NUTRITION INDIV SUBSEQ: CPT | Mod: NTX

## 2020-09-17 PROCEDURE — 63600175 PHARM REV CODE 636 W HCPCS: Mod: NTX | Performed by: HOSPITALIST

## 2020-09-17 PROCEDURE — 25000003 PHARM REV CODE 250: Mod: NTX | Performed by: NURSE ANESTHETIST, CERTIFIED REGISTERED

## 2020-09-17 PROCEDURE — 20600001 HC STEP DOWN PRIVATE ROOM: Mod: NTX

## 2020-09-17 PROCEDURE — 94761 N-INVAS EAR/PLS OXIMETRY MLT: CPT | Mod: NTX

## 2020-09-17 PROCEDURE — D9220A PRA ANESTHESIA: ICD-10-PCS | Mod: ANES,NTX,, | Performed by: ANESTHESIOLOGY

## 2020-09-17 PROCEDURE — 85610 PROTHROMBIN TIME: CPT | Mod: NTX

## 2020-09-17 PROCEDURE — D9220A PRA ANESTHESIA: ICD-10-PCS | Mod: CRNA,NTX,, | Performed by: NURSE ANESTHETIST, CERTIFIED REGISTERED

## 2020-09-17 PROCEDURE — 25000003 PHARM REV CODE 250: Mod: NTX | Performed by: HOSPITALIST

## 2020-09-17 PROCEDURE — 85025 COMPLETE CBC W/AUTO DIFF WBC: CPT | Mod: NTX

## 2020-09-17 PROCEDURE — 85730 THROMBOPLASTIN TIME PARTIAL: CPT | Mod: NTX

## 2020-09-17 PROCEDURE — 99233 PR SUBSEQUENT HOSPITAL CARE,LEVL III: ICD-10-PCS | Mod: NTX,,, | Performed by: INTERNAL MEDICINE

## 2020-09-17 PROCEDURE — 97530 THERAPEUTIC ACTIVITIES: CPT | Mod: NTX

## 2020-09-17 PROCEDURE — 37000009 HC ANESTHESIA EA ADD 15 MINS: Mod: NTX | Performed by: INTERNAL MEDICINE

## 2020-09-17 PROCEDURE — 80053 COMPREHEN METABOLIC PANEL: CPT | Mod: NTX

## 2020-09-17 PROCEDURE — 99232 SBSQ HOSP IP/OBS MODERATE 35: CPT | Mod: NTX,,, | Performed by: HOSPITALIST

## 2020-09-17 PROCEDURE — D9220A PRA ANESTHESIA: Mod: ANES,NTX,, | Performed by: ANESTHESIOLOGY

## 2020-09-17 PROCEDURE — 99233 SBSQ HOSP IP/OBS HIGH 50: CPT | Mod: NTX,,, | Performed by: INTERNAL MEDICINE

## 2020-09-17 PROCEDURE — 63600175 PHARM REV CODE 636 W HCPCS: Mod: NTX | Performed by: NURSE ANESTHETIST, CERTIFIED REGISTERED

## 2020-09-17 PROCEDURE — 63600175 PHARM REV CODE 636 W HCPCS: Mod: JG,NTX | Performed by: HOSPITALIST

## 2020-09-17 PROCEDURE — P9047 ALBUMIN (HUMAN), 25%, 50ML: HCPCS | Mod: JG,NTX | Performed by: HOSPITALIST

## 2020-09-17 PROCEDURE — 99232 PR SUBSEQUENT HOSPITAL CARE,LEVL II: ICD-10-PCS | Mod: NTX,,, | Performed by: HOSPITALIST

## 2020-09-17 PROCEDURE — 36415 COLL VENOUS BLD VENIPUNCTURE: CPT | Mod: NTX

## 2020-09-17 PROCEDURE — 37000008 HC ANESTHESIA 1ST 15 MINUTES: Mod: NTX | Performed by: INTERNAL MEDICINE

## 2020-09-17 PROCEDURE — 45378 PR COLONOSCOPY,DIAGNOSTIC: ICD-10-PCS | Mod: NTX,,, | Performed by: INTERNAL MEDICINE

## 2020-09-17 PROCEDURE — 83735 ASSAY OF MAGNESIUM: CPT | Mod: NTX

## 2020-09-17 PROCEDURE — G0121 COLON CA SCRN NOT HI RSK IND: HCPCS | Mod: NTX | Performed by: INTERNAL MEDICINE

## 2020-09-17 PROCEDURE — D9220A PRA ANESTHESIA: Mod: CRNA,NTX,, | Performed by: NURSE ANESTHETIST, CERTIFIED REGISTERED

## 2020-09-17 PROCEDURE — 45378 DIAGNOSTIC COLONOSCOPY: CPT | Mod: NTX,,, | Performed by: INTERNAL MEDICINE

## 2020-09-17 RX ORDER — ALBUMIN HUMAN 250 G/1000ML
25 SOLUTION INTRAVENOUS 2 TIMES DAILY
Status: DISCONTINUED | OUTPATIENT
Start: 2020-09-17 | End: 2020-09-18 | Stop reason: HOSPADM

## 2020-09-17 RX ORDER — PHENYLEPHRINE HYDROCHLORIDE 10 MG/ML
INJECTION INTRAVENOUS
Status: DISCONTINUED | OUTPATIENT
Start: 2020-09-17 | End: 2020-09-17

## 2020-09-17 RX ORDER — SODIUM CHLORIDE 9 MG/ML
INJECTION, SOLUTION INTRAVENOUS CONTINUOUS PRN
Status: DISCONTINUED | OUTPATIENT
Start: 2020-09-17 | End: 2020-09-17

## 2020-09-17 RX ORDER — PROPOFOL 10 MG/ML
VIAL (ML) INTRAVENOUS
Status: DISCONTINUED | OUTPATIENT
Start: 2020-09-17 | End: 2020-09-17

## 2020-09-17 RX ORDER — PROPOFOL 10 MG/ML
VIAL (ML) INTRAVENOUS CONTINUOUS PRN
Status: DISCONTINUED | OUTPATIENT
Start: 2020-09-17 | End: 2020-09-17

## 2020-09-17 RX ORDER — ONDANSETRON 2 MG/ML
4 INJECTION INTRAMUSCULAR; INTRAVENOUS ONCE AS NEEDED
Status: DISCONTINUED | OUTPATIENT
Start: 2020-09-17 | End: 2020-09-17 | Stop reason: HOSPADM

## 2020-09-17 RX ADMIN — ALBUMIN (HUMAN) 25 G: 12.5 SOLUTION INTRAVENOUS at 06:09

## 2020-09-17 RX ADMIN — SODIUM CHLORIDE: 0.9 INJECTION, SOLUTION INTRAVENOUS at 09:09

## 2020-09-17 RX ADMIN — PHENYLEPHRINE HYDROCHLORIDE 200 MCG: 10 INJECTION INTRAVENOUS at 09:09

## 2020-09-17 RX ADMIN — PROPOFOL 100 MCG/KG/MIN: 10 INJECTION, EMULSION INTRAVENOUS at 09:09

## 2020-09-17 RX ADMIN — LACTULOSE 20 G: 20 SOLUTION ORAL at 08:09

## 2020-09-17 RX ADMIN — APIXABAN 10 MG: 5 TABLET, FILM COATED ORAL at 09:09

## 2020-09-17 RX ADMIN — LACTULOSE 20 G: 20 SOLUTION ORAL at 03:09

## 2020-09-17 RX ADMIN — OCTREOTIDE ACETATE 100 MCG: 100 INJECTION, SOLUTION INTRAVENOUS; SUBCUTANEOUS at 01:09

## 2020-09-17 RX ADMIN — MIDODRINE HYDROCHLORIDE 15 MG: 5 TABLET ORAL at 01:09

## 2020-09-17 RX ADMIN — ESCITALOPRAM OXALATE 5 MG: 5 TABLET, FILM COATED ORAL at 08:09

## 2020-09-17 RX ADMIN — MIDODRINE HYDROCHLORIDE 15 MG: 5 TABLET ORAL at 09:09

## 2020-09-17 RX ADMIN — OCTREOTIDE ACETATE 100 MCG: 100 INJECTION, SOLUTION INTRAVENOUS; SUBCUTANEOUS at 09:09

## 2020-09-17 RX ADMIN — MIDODRINE HYDROCHLORIDE 15 MG: 5 TABLET ORAL at 06:09

## 2020-09-17 RX ADMIN — PROPOFOL 80 MG: 10 INJECTION, EMULSION INTRAVENOUS at 09:09

## 2020-09-17 RX ADMIN — PANTOPRAZOLE SODIUM 40 MG: 40 TABLET, DELAYED RELEASE ORAL at 08:09

## 2020-09-17 RX ADMIN — ROPINIROLE HYDROCHLORIDE 0.25 MG: 0.25 TABLET, FILM COATED ORAL at 09:09

## 2020-09-17 RX ADMIN — CIPROFLOXACIN 500 MG: 500 TABLET, FILM COATED ORAL at 08:09

## 2020-09-17 RX ADMIN — OCTREOTIDE ACETATE 100 MCG: 100 INJECTION, SOLUTION INTRAVENOUS; SUBCUTANEOUS at 06:09

## 2020-09-17 NOTE — PROGRESS NOTES
"  [facility]  Adult Nutrition  Consult Note    SUMMARY     Recommendations    1. Continue current Low sodium diet, fluid restriction per MD.   2. Add Boost Plus ONS BID to aid in caloric intake.   3. RD to monitor & follow-up.    Goals: Meet % EEN, EPN by RD f/u date  Nutrition Goal Status: progressing towards goal  Communication of RD Recs: reviewed with RN    Reason for Assessment    Reason For Assessment: RD follow-up  Diagnosis: other (see comments)(Hyponatremia)  Relevant Medical History: Etoh abuse  Interdisciplinary Rounds: did not attend    General Information Comments: At time of visit this AM, pt NPO for colonoscopy. Low sodium diet now resumed. Pt tolerating w/ 50% PO intake. Pt drinks Boost at home, will honor. Pt reports fair appetite PTA & UBW of 203#. Chart review confirms weight history. Pt w/ no indicators of malnutrition, however at risk if PO intake remains poor. Low sodium diet education complete - pt verbalized understanding & paperwork provided.  Nutrition Discharge Planning: Adequate PO intake    Nutrition/Diet History    Patient Reported Diet/Restrictions/Preferences: low salt  Spiritual, Cultural Beliefs, Bahai Practices, Values that Affect Care: no  Factors Affecting Nutritional Intake: decreased appetite    Anthropometrics    Temp: 98 °F (36.7 °C)  Height Method: Stated  Height: 5' 3" (160 cm)  Height (inches): 63 in  Weight Method: Stated  Weight: 79.4 kg (175 lb 0.7 oz)  Weight (lb): 175.05 lb  Ideal Body Weight (IBW), Male: 124 lb  % Ideal Body Weight, Male (lb): 141.17 %  BMI (Calculated): 31  BMI Grade: 30 - 34.9- obesity - grade I  Usual Body Weight (UBW), k.3 kg  % Usual Body Weight: 98.91  % Weight Change From Usual Weight: -1.3 %    Lab/Procedures/Meds    Pertinent Labs Reviewed: reviewed  Pertinent Labs Comments: BUN 31, Na 131, Creat 1.7, GFR 43.5  Pertinent Medications Reviewed: reviewed  Pertinent Medications Comments: " Lactulose    Estimated/Assessed Needs    Weight Used For Calorie Calculations: 79.4 kg (175 lb 0.7 oz)     Energy Calorie Requirements (kcal): 1886 kcal/d  Energy Need Method: [place]-St Jeor(1.25 PAL)     Protein Requirements: 79-95 g/d (1-1.2 g/kg)  Weight Used For Protein Calculations: 79.4 kg (175 lb 0.7 oz)     Estimated Fluid Requirement Method: other (see comments)(Per MD or 1 mL/kcal)  RDA Method (mL): 1886    Nutrition Prescription Ordered    Current Diet Order: Low Na    Evaluation of Received Nutrient/Fluid Intake    Comments: LBM: 9/17    Tolerance: tolerating    Nutrition Risk    Level of Risk/Frequency of Follow-up: (1x/week)     Assessment and Plan    Nutrition Problem  Increased nutrient needs     Related to (etiology):   Physiological causes     Signs and Symptoms (as evidenced by):   Liver tx work-up     Interventions(treatment strategy):  Collaboration of nutrition care w/ other providers  ONS     Nutrition Diagnosis Status:   Continues     Monitor and Evaluation    Food and Nutrient Intake: energy intake, food and beverage intake  Food and Nutrient Adminstration: diet order  Physical Activity and Function: nutrition-related ADLs and IADLs  Anthropometric Measurements: weight, weight change  Biochemical Data, Medical Tests and Procedures: inflammatory profile, lipid profile, glucose/endocrine profile, gastrointestinal profile, electrolyte and renal panel  Nutrition-Focused Physical Findings: overall appearance     Nutrition Follow-Up    RD Follow-up?: Yes

## 2020-09-17 NOTE — PLAN OF CARE
Problem: Physical Therapy Goal  Goal: Physical Therapy Goal  Description: Goals to be met by: 2020     Patient will increase functional independence with mobility by performin. Supine to sit with Supervision  2. Sit to stand transfer with Supervision  3. Gait  x 100 feet with [place] using RW or LRAD.   4. Ascend/descend 5 stairs with right Handrails and Contact Guard Assistance using LRAD. -MET ()  Ascend/descend 5 stairs with right Handrails and Supervision using LRAD.  5. Stand for 10 minutes with Stand-by Assistance using LRAD while reaching out of his MICHEAL in all planes to perform functional activities.   6. Lower extremity exercise program x15 reps per handout, with independence    Outcome: Met   Patient reports feeling more independent with mobility than his functional baseline. Goals met, patient denies having further therapy goals. He is safe to return home with no needs. Patient in agreement with therapy POC for discharge.   Annia Coleman, PT  2020

## 2020-09-17 NOTE — H&P
Short Stay Endoscopy History and Physical    PCP - Mikala Etienne MD    Procedure - Colonoscopy  ASA - per anesthesia  Mallampati - per anesthesia  History of Anesthesia problems - no  Family history Anesthesia problems - no   Plan of anesthesia - General    HPI:  This is a 60 y.o. male here for evaluation of : asymptomatic screening exam  Undergoing liver transplant evaluation. Does not remember last colonoscopy, but per chart was in  (not on file here). No fam hx CRC. On heparin gtt which was stopped this morning at 0300. IRN 1.3, Plts 194.      ROS:  Constitutional: No fevers, chills, No weight loss  CV: No chest pain  Pulm: No cough, No shortness of breath  GI: see HPI  Derm: No rash    Medical History:  has a past medical history of Hepatitis, Hypertension, and Liver failure.    Surgical History:  has a past surgical history that includes Esophagogastroduodenoscopy (N/A, 2020).    Family History: family history is not on file.. Otherwise no colon cancer, inflammatory bowel disease, or GI malignancies.    Social History:  reports that he has never smoked. He has never used smokeless tobacco. He reports previous alcohol use.    Review of patient's allergies indicates:  No Known Allergies    Medications:   Medications Prior to Admission   Medication Sig Dispense Refill Last Dose    ciprofloxacin HCl (CIPRO) 500 MG tablet Take 1 tablet (500 mg) by mouth twice daily on 8/10 and then take 1 tablet daily thereafter 60 tablet 2     escitalopram oxalate (LEXAPRO) 5 MG Tab Take 1 tablet (5 mg total) by mouth once daily. 30 tablet 2     furosemide (LASIX) 20 MG tablet Take 1 tablet (20 mg total) by mouth once daily. 30 tablet 2     [] lactulose (CHRONULAC) 10 gram/15 mL solution Take 15 mLs (10 g total) by mouth 2 (two) times a day. Take enough to have 3 bowel movements per day 900 mL 2     midodrine (PROAMATINE) 5 MG Tab Take 3 tablets (15 mg total) by mouth every 8 (eight) hours. 270  tablet 2     omeprazole (PRILOSEC) 40 MG capsule Take 1 capsule (40 mg total) by mouth every morning. 30 capsule 11     rOPINIRole (REQUIP) 0.25 MG tablet Take 1 tablet (0.25 mg total) by mouth every evening. 30 tablet 2     spironolactone (ALDACTONE) 50 MG tablet Take 1 tablet (50 mg total) by mouth once daily. 30 tablet 2     traZODone (DESYREL) 100 MG tablet Take 1 tablet (100 mg total) by mouth every evening. 30 tablet 2     ondansetron (ZOFRAN) 4 MG tablet Take 4 mg by mouth every 8 (eight) hours as needed.            Physical Exam:    Vital Signs:   Vitals:    09/17/20 0841   BP:    Pulse: 89   Resp:    Temp:        General Appearance: Well appearing in no acute distress  Eyes:    No scleral icterus  ENT: Neck supple, Lips, mucosa, and tongue normal; teeth and gums normal  Lungs: CTA bilaterally  Heart:  S1, S2 normal, no murmurs heard  Abdomen: Soft, non tender, non distended with positive bowel sounds. No hepatosplenomegaly, ascites, or mass.  Extremities: 2+ pulses, no clubbing, cyanosis or edema  Skin: No rash      Labs:  Lab Results   Component Value Date    WBC 21.82 (H) 09/17/2020    HGB 13.0 (L) 09/17/2020    HCT 41.3 09/17/2020     09/17/2020    ALT 14 09/17/2020    AST 32 09/17/2020     (L) 09/17/2020    K 4.1 09/17/2020    CL 98 09/17/2020    CREATININE 2.1 (H) 09/17/2020    BUN 30 (H) 09/17/2020    CO2 20 (L) 09/17/2020    TSH 0.740 09/09/2020    PSA 0.04 09/11/2020    INR 1.3 (H) 09/17/2020       Plan:  Colonoscopy for screening for colorectal cancer    I have explained the risks and benefits of endoscopy procedures to the patient including but not limited to bleeding, perforation, infection, and death.  The patient was asked if they understand and allowed to ask any further questions to their satisfaction.    Adis Chou MD

## 2020-09-17 NOTE — TRANSFER OF CARE
"Anesthesia Transfer of Care Note    Patient: Kenneth Pardo    Procedure(s) Performed: Procedure(s) (LRB):  COLONOSCOPY (N/A)    Patient location: PACU    Anesthesia Type: general    Transport from OR: Transported from OR on 6-10 L/min O2 by face mask with adequate spontaneous ventilation    Post pain: adequate analgesia    Post assessment: no apparent anesthetic complications and tolerated procedure well    Post vital signs: stable    Level of consciousness: responds to stimulation, alert and awake    Nausea/Vomiting: no nausea/vomiting    Complications: none    Transfer of care protocol was followed      Last vitals:   Visit Vitals  BP (!) 144/69 (BP Location: Right arm, Patient Position: Lying)   Pulse 71   Temp 36.5 °C (97.7 °F) (Temporal)   Resp 18   Ht 5' 3" (1.6 m)   Wt 79.4 kg (175 lb)   SpO2 100%   BMI 31.00 kg/m²     "

## 2020-09-17 NOTE — PROGRESS NOTES
[facility]  Nephrology  Progress Note    Patient Name: Kenneth Pardo  MRN: [id number]  Admission Date: 9/9/2020  Hospital Length of Stay: 8 days  Attending Provider: Aicha Rojas MD   Primary Care Physician: Mikala Etienne MD  Principal Problem:Hepatorenal syndrome    Subjective:     HPI: The pt is a 60 year old male with alcoholic cirrhosis c/b portal hypertension, ascites, portal hypertensive gastropathy and hepatic encephalopathy that initially presented as a transfer from [facility] for management of acute hyponatremia and concurrent MERLY. This was believed to be 2/2 his Lasix and Aldactone therapy; as such these were held with improvement in his initial hyponatremia and improvement in his MERLY. Nephrology was consulted as the patient has begun to have an increase in his serum Cr for the past 48 hours despite holding his home diuretics. Of note, the patient has now had 2 large volume paracenteses (9/9 removed 6.4 L's of fluid and returned cultures negative, 9/14 removed 6.8 L's of fluid). In addition, presenting with significant leukocytosis and currently on SBP prophylaxis with daily cipro. C diff negative. CONS on urine culture, but patient asymptomatic. Blood cultures NGTD. Ascitic fluid cultures pending at time of consult.         Interval History:     Patient continues report good urine output though charting shows no evidence of urine output despite orders for strict I's and O's. Picture more c/w HRS. Has completed albumin therapy x 2 days. On octreotide and midodrine.  Patient otherwise feeling well though eager to go home.     Objective:     Vital Signs (Most Recent):  Temp: 98 °F (36.7 °C) (09/17/20 1158)  Pulse: 100 (09/17/20 1158)  Resp: 16 (09/17/20 1158)  BP: 103/75 (09/17/20 1158)  SpO2: 99 % (09/17/20 1158)  O2 Device (Oxygen Therapy): room air (09/17/20 1158) Vital Signs (24h Range):  Temp:  [97.7 °F (36.5 °C)-98.2 °F (36.8 °C)] 98 °F (36.7 °C)  Pulse:  []  100  Resp:  [13-22] 16  SpO2:  [99 %-100 %] 99 %  BP: ()/(50-75) 103/75     Weight: 79.4 kg (175 lb 0.7 oz) (09/17/20 1300)  Body mass index is 31.01 kg/m².  Body surface area is 1.88 meters squared.    I/O last 3 completed shifts:  In: 870 [P.O.:620; I.V.:250]  Out: 600 [Urine:600]    Physical Exam  Vitals signs and nursing note reviewed.   Constitutional:       Appearance: He is obese. He is not ill-appearing or toxic-appearing.   Eyes:      General: No scleral icterus.  Cardiovascular:      Rate and Rhythm: Normal rate and regular rhythm.      Pulses: Normal pulses.           Radial pulses are 2+ on the right side and 2+ on the left side.   Pulmonary:      Effort: Pulmonary effort is normal. No respiratory distress.      Breath sounds: No wheezing or rales.   Abdominal:      General: Abdomen is flat. Bowel sounds are normal. There is no distension.      Tenderness: There is no abdominal tenderness. There is no guarding.   Musculoskeletal:         General: No swelling or deformity.      Right lower leg: Edema (1+) present.      Left lower leg: Edema (1+) present.   Skin:     General: Skin is warm and dry.      Coloration: Skin is pale.      Findings: Bruising (to bilateral UE's) present. No lesion.      Comments: Multiple bruises to bilateral UE's  No evidence of palmar erythema or spider angiomas   Paracentesis dressing in place to left flank- CDI   Neurological:      Mental Status: He is alert and oriented to person, place, and time.   Psychiatric:         Mood and Affect: Mood normal.         Behavior: Behavior normal.         Significant Labs:  All labs within the past 24 hours have been reviewed.    Assessment/Plan:     Hepatorenal Syndrome  61 yo male with alcoholic cirrhosis c/b portal hypertension, ascites, portal hypertensive gastropathy and hepatic encephalopathy that initially presented as a transfer from [facility] for management of acute hyponatremia and concurrent MERLY. This was believed to be  2/2 his home diuretic therapy; as such these were held with improvement in his initial hyponatremia and improvement in his MERLY. Nephrology now consulted as the patient has begun to have an increase in his serum Cr for the past 48 hours despite holding home diuretics.    DDX includes but is not limited to: HRS Type 1 vs Ischemic ATN vs Sepsis (CONS on urine culture though patient asymptomatic)  Diagnostics: Leukocytosis of 23.3 with left shift, Na 131, HCO3 21, BUN 34, Cr 1.9, UA on 9/8 with 1+ protein/bili/leuks/26 RBCs/16 WBCs/10 hyaline casts, Urine Na <20, Urine Osm 455, Urine Cx with CONS, UA on 9/14 with trace leuks/3 RBC's/4 WBC's/7 hyaline casts, Urine Na repeat <20, Protein Cr ration 0.3, Hogan stain negative  Imaging: US retroperitoneum on 9/14 with trace amount of ascites noted in the pelvis, but otherwise unremarkable sonographic appearance of the kidneys    Plan:  - Renally dose all medications  - Avoid nephrotoxic medications  - Strict intake and output   - Daily labs; will trend   - Urine sample collected/spun  On 9/25- no evidence of casts to suggest ATN; more c/w HRS  - Would recommend pressor support with Levophed/NE for MAP goal of 82  - If unable to begin pressor support; would recommend albumin + octreotide + midodrine as Cr now increasing   - Nephrology will continue to follow along     Decompensated liver disease  - Management per primary     Depression with anxiety  - Continue Lexapro    Ascites due to alcoholic cirrhosis  - S/p 2 large volume paracenteses (9/9 net - 6.4 L's, 9/14 net -6.8 L's)   - Management per primary        Thank you for your consult. I will follow-up with patient. Please contact us if you have any additional questions.    Pia Vargas,   Nephrology  [facility]

## 2020-09-17 NOTE — SUBJECTIVE & OBJECTIVE
Interval History:     Patient continues report good urine output though charting shows no evidence of urine output despite orders for strict I's and O's. Picture more c/w HRS. Has completed albumin therapy x 2 days. On octreotide and midodrine.  Patient otherwise feeling well though eager to go home.     Objective:     Vital Signs (Most Recent):  Temp: 98 °F (36.7 °C) (09/17/20 1158)  Pulse: 100 (09/17/20 1158)  Resp: 16 (09/17/20 1158)  BP: 103/75 (09/17/20 1158)  SpO2: 99 % (09/17/20 1158)  O2 Device (Oxygen Therapy): room air (09/17/20 1158) Vital Signs (24h Range):  Temp:  [97.7 °F (36.5 °C)-98.2 °F (36.8 °C)] 98 °F (36.7 °C)  Pulse:  [] 100  Resp:  [13-22] 16  SpO2:  [99 %-100 %] 99 %  BP: ()/(50-75) 103/75     Weight: 79.4 kg (175 lb 0.7 oz) (09/17/20 1300)  Body mass index is 31.01 kg/m².  Body surface area is 1.88 meters squared.    I/O last 3 completed shifts:  In: 870 [P.O.:620; I.V.:250]  Out: 600 [Urine:600]    Physical Exam  Vitals signs and nursing note reviewed.   Constitutional:       Appearance: He is obese. He is not ill-appearing or toxic-appearing.   Eyes:      General: No scleral icterus.  Cardiovascular:      Rate and Rhythm: Normal rate and regular rhythm.      Pulses: Normal pulses.           Radial pulses are 2+ on the right side and 2+ on the left side.   Pulmonary:      Effort: Pulmonary effort is normal. No respiratory distress.      Breath sounds: No wheezing or rales.   Abdominal:      General: Abdomen is flat. Bowel sounds are normal. There is no distension.      Tenderness: There is no abdominal tenderness. There is no guarding.   Musculoskeletal:         General: No swelling or deformity.      Right lower leg: Edema (1+) present.      Left lower leg: Edema (1+) present.   Skin:     General: Skin is warm and dry.      Coloration: Skin is pale.      Findings: Bruising (to bilateral UE's) present. No lesion.      Comments: Multiple bruises to bilateral UE's  No evidence of  palmar erythema or spider angiomas   Paracentesis dressing in place to left flank- CDI   Neurological:      Mental Status: He is alert and oriented to person, place, and time.   Psychiatric:         Mood and Affect: Mood normal.         Behavior: Behavior normal.         Significant Labs:  All labs within the past 24 hours have been reviewed.

## 2020-09-17 NOTE — PLAN OF CARE
Problem: Fall Injury Risk  Goal: Absence of Fall and Fall-Related Injury  Outcome: Ongoing, Progressing  Intervention: Identify and Manage Contributors to Fall Injury Risk  Flowsheets (Taken 9/17/2020 0648)  Self-Care Promotion:   independence encouraged   BADL personal objects within reach   BADL personal routines maintained   safe use of adaptive equipment encouraged  Medication Review/Management: medications reviewed  Intervention: Promote Injury-Free Environment  Flowsheets (Taken 9/17/2020 0648)  Safety Promotion/Fall Prevention:   assistive device/personal item within reach   commode/urinal/bedpan at bedside   nonskid shoes/socks when out of bed   side rails raised x 2   room near unit station  Environmental Safety Modification:   assistive device/personal items within reach   clutter free environment maintained   lighting adjusted   room near unit station

## 2020-09-17 NOTE — PLAN OF CARE
Problem: Adult Inpatient Plan of Care  Goal: Plan of Care Review  Outcome: Ongoing, Progressing  Goal: Optimal Comfort and Wellbeing  Outcome: Ongoing, Progressing     Problem: Fall Injury Risk  Goal: Absence of Fall and Fall-Related Injury  Outcome: Ongoing, Progressing    POC reviewed patient. AAOX4. VVS. No acute changes. Colonoscope done. Safety maintained. Call light in reach. TM

## 2020-09-17 NOTE — ASSESSMENT & PLAN NOTE
61 yo male with alcoholic cirrhosis c/b portal hypertension, ascites, portal hypertensive gastropathy and hepatic encephalopathy that initially presented as a transfer from [facility] for management of acute hyponatremia and concurrent MERLY. This was believed to be 2/2 his home diuretic therapy; as such these were held with improvement in his initial hyponatremia and improvement in his MERLY. Nephrology now consulted as the patient has begun to have an increase in his serum Cr for the past 48 hours despite holding home diuretics.    DDX includes but is not limited to: HRS Type 1 vs Ischemic ATN vs Sepsis (CONS on urine culture though patient asymptomatic)  Diagnostics: Leukocytosis of 23.3 with left shift, Na 131, HCO3 21, BUN 34, Cr 1.9, UA on 9/8 with 1+ protein/bili/leuks/26 RBCs/16 WBCs/10 hyaline casts, Urine Na <20, Urine Osm 455, Urine Cx with CONS, UA on 9/14 with trace leuks/3 RBC's/4 WBC's/7 hyaline casts, Urine Na repeat <20, Protein Cr ration 0.3, Hogan stain negative  Imaging: US retroperitoneum on 9/14 with trace amount of ascites noted in the pelvis, but otherwise unremarkable sonographic appearance of the kidneys    Plan:  - Renally dose all medications  - Avoid nephrotoxic medications  - Strict intake and output   - Daily labs; will trend   - Urine sample collected/spun  On 9/25- no evidence of casts to suggest ATN; more c/w HRS  - Would recommend pressor support with Levophed/NE for MAP goal of 82  - If unable to begin pressor support; would recommend albumin + octreotide + midodrine as Cr now increasing   - Nephrology will continue to follow along

## 2020-09-17 NOTE — PT/OT/SLP PROGRESS
Physical Therapy Treatment and Discharge    Patient Name:  Kenneth Pardo   MRN:  [id number]    Recommendations:     Discharge Recommendations:  home   Discharge Equipment Recommendations: none   Barriers to discharge: None    Assessment:     Kenneth Pardo is a 60 y.o. male admitted with a medical diagnosis of Hyponatremia.  He is ambulating independently 175' no AD, deferred stairs this date but demonstrated ability to ascend/descend stairs without assist on prior sessions. He states he feels he is more independent with mobility than on admission. He is safe to return home when medically appropriate.    Rehab Prognosis: Good; patient would benefit from acute skilled PT services to address these deficits and reach maximum level of function.    Recent Surgery: Procedure(s) (LRB):  COLONOSCOPY (N/A) Day of Surgery    Plan:     · Discharge acute therapy at this time. Please re-consult therapy should patient demonstrate a functional decline.     Subjective     Chief Complaint: none  Patient/Family Comments/goals: return home ASAP  Pain/Comfort:  · Pain Rating 1: 0/10      Objective:     Communicated with RN prior to session.  Patient found HOB elevated with telemetry upon PT entry to room.     General Precautions: Standard, fall   Orthopedic Precautions:N/A   Braces: N/A     Functional Mobility:    Bed Mobility  Supine to Sit on the R side:  independent   Sit to supine: independent    Transfers Sit to Stand:  independent    Gait  Gait Distance: 175 ft with no AD  Assistance Level: independent   Description: decreased lilliam, decreased stride length, flat foot on initial contact, no instability with dynamic head turns and unpredictable perturbations   Stairs Deferred at this time, has received stair training prior sessions          AM-PAC 6 CLICK MOBILITY  Turning over in bed (including adjusting bedclothes, sheets and blankets)?: 4  Sitting down on and standing up from a chair with arms (e.g., wheelchair, bedside commode,  etc.): 4  Moving from lying on back to sitting on the side of the bed?: 4  Moving to and from a bed to a chair (including a wheelchair)?: 4  Need to walk in hospital room?: 4  Climbing 3-5 steps with a railing?: 4  Basic Mobility Total Score: 24       Therapeutic Activities and Exercises:   Patient safe to ambulate independently.   Patient encouraged to continue mobilizing daily in room to prevent functional decline, he verbalized understanding. He is in agreement with POC for therapy- discharge at this time, home no needs. He denies further therapy goals.     Patient left HOB elevated with all lines intact and call button in reach..    GOALS:   Multidisciplinary Problems     Physical Therapy Goals     Not on file          Multidisciplinary Problems (Resolved)        Problem: Physical Therapy Goal    Goal Priority Disciplines Outcome Goal Variances Interventions   Physical Therapy Goal   (Resolved)     PT, PT/OT Met     Description: Goals to be met by: 2020     Patient will increase functional independence with mobility by performin. Supine to sit with Supervision  2. Sit to stand transfer with Supervision  3. Gait  x 100 feet with Modified [place] using RW or LRAD.   4. Ascend/descend 5 stairs with right Handrails and Contact Guard Assistance using LRAD. -MET ()  Ascend/descend 5 stairs with right Handrails and Supervision using LRAD.  5. Stand for 10 minutes with Stand-by Assistance using LRAD while reaching out of his MICHEAL in all planes to perform functional activities.   6. Lower extremity exercise program x15 reps per handout, with independence                     Time Tracking:     PT Received On: 20  PT Start Time: 0830     PT Stop Time: 08  PT Total Time (min): 8 min     Billable Minutes: Therapeutic Activity 8    Treatment Type: Treatment  PT/PTA: PT     PTA Visit Number: 0     Annia Coleman, PT  2020

## 2020-09-17 NOTE — [employer] PATIENT INSTRUCTIONS
Discharge Summary/Instructions after an Endoscopic Procedure  Patient Name: Kenneth Pardo  Patient MRN: [id number]  Patient YOB: 1960 Thursday, September 17, 2020  Jhonathan Lieberman MD  RESTRICTIONS:  During your procedure today, you received medications for sedation.  These   medications may affect your judgment, balance and coordination.  Therefore,   for 24 hours, you have the following restrictions:   - DO NOT drive a car, operate machinery, make legal/financial decisions,   sign important papers or drink alcohol.    ACTIVITY:  Today: no heavy lifting, straining or running due to procedural   sedation/anesthesia.  The following day: return to full activity including work.  DIET:  Eat and drink normally unless instructed otherwise.     TREATMENT FOR COMMON SIDE EFFECTS:  - Mild abdominal pain, nausea, belching, bloating or excessive gas:  rest,   eat lightly and use a heating pad.  - Sore Throat: treat with throat lozenges and/or gargle with warm salt   water.  - Because air was used during the procedure, expelling large amounts of air   from your rectum or belching is normal.  - If a bowel prep was taken, you may not have a bowel movement for 1-3 days.    This is normal.  SYMPTOMS TO WATCH FOR AND REPORT TO YOUR PHYSICIAN:  1. Abdominal pain or bloating, other than gas cramps.  2. Chest pain.  3. Back pain.  4. Signs of infection such as: chills or fever occurring within 24 hours   after the procedure.  5. Rectal bleeding, which would show as bright red, maroon, or black stools.   (A tablespoon of blood from the rectum is not serious, especially if   hemorrhoids are present.)  6. Vomiting.  7. Weakness or dizziness.  GO DIRECTLY TO THE NEAREST EMERGENCY ROOM IF YOU HAVE ANY OF THE FOLLOWING:      Difficulty breathing              Chills and/or fever over 101 F   Persistent vomiting and/or vomiting blood   Severe abdominal pain   Severe chest pain   Black, tarry stools   Bleeding- more than one tablespoon   Any  other symptom or condition that you feel may need urgent attention  Your doctor recommends these additional instructions:  If any biopsies were taken, your doctors clinic will contact you in 1 to 2   weeks with any results.  - Return patient to hospital [facility] for ongoing care.   - Resume previous diet.   - Resume heparin at prior dose today.   - Repeat colonoscopy in 10 years for screening purposes.   - The findings and recommendations were discussed with the patient.  For questions, problems or results please call your physician - Jhonathan Lieberman MD at Work:  [phone].  [facility], EMERGENCY ROOM PHONE NUMBER: [phone]  IF A COMPLICATION OR EMERGENCY SITUATION ARISES AND YOU ARE UNABLE TO REACH   YOUR PHYSICIAN - GO DIRECTLY TO THE EMERGENCY ROOM.  Jhonathan Lieberman MD  9/17/2020 9:53:48 AM  This report has been verified and signed electronically.  [employer]

## 2020-09-17 NOTE — NURSING TRANSFER
Nursing Transfer Note      9/17/2020     Transfer To: [id number] from PACU    Transfer via stretcher    Transfer with cardiac monitoring    Transported by PCT    Medicines sent: NA    Chart send with patient: Yes    Notified: No recipients found to notified by messaging,    Patient reassessed at:  09/17/20 @1537

## 2020-09-18 VITALS
WEIGHT: 175.06 LBS | HEIGHT: 63 IN | BODY MASS INDEX: 31.02 KG/M2 | RESPIRATION RATE: 16 BRPM | DIASTOLIC BLOOD PRESSURE: 83 MMHG | HEART RATE: 66 BPM | OXYGEN SATURATION: 99 % | TEMPERATURE: 98 F | SYSTOLIC BLOOD PRESSURE: 111 MMHG

## 2020-09-18 LAB
ALBUMIN SERPL BCP-MCNC: 3.5 G/DL (ref 3.5–5.2)
ALP SERPL-CCNC: 184 U/L (ref 55–135)
ALT SERPL W/O P-5'-P-CCNC: 13 U/L (ref 10–44)
ANION GAP SERPL CALC-SCNC: 10 MMOL/L (ref 8–16)
AST SERPL-CCNC: 24 U/L (ref 10–40)
BASOPHILS # BLD AUTO: 0.2 K/UL (ref 0–0.2)
BASOPHILS NFR BLD: 0.9 % (ref 0–1.9)
BILIRUB SERPL-MCNC: 1.9 MG/DL (ref 0.1–1)
BUN SERPL-MCNC: 32 MG/DL (ref 6–20)
CALCIUM SERPL-MCNC: 8.7 MG/DL (ref 8.7–10.5)
CHLORIDE SERPL-SCNC: 99 MMOL/L (ref 95–110)
CO2 SERPL-SCNC: 21 MMOL/L (ref 23–29)
CREAT SERPL-MCNC: 2.1 MG/DL (ref 0.5–1.4)
DIFFERENTIAL METHOD: ABNORMAL
EOSINOPHIL # BLD AUTO: 1.3 K/UL (ref 0–0.5)
EOSINOPHIL NFR BLD: 6.1 % (ref 0–8)
ERYTHROCYTE [DISTWIDTH] IN BLOOD BY AUTOMATED COUNT: 14.6 % (ref 11.5–14.5)
EST. GFR  (AFRICAN AMERICAN): 38.9 ML/MIN/1.73 M^2
EST. GFR  (NON AFRICAN AMERICAN): 33.7 ML/MIN/1.73 M^2
GLUCOSE SERPL-MCNC: 109 MG/DL (ref 70–110)
HCT VFR BLD AUTO: 36.7 % (ref 40–54)
HGB BLD-MCNC: 12.2 G/DL (ref 14–18)
IMM GRANULOCYTES # BLD AUTO: 0.19 K/UL (ref 0–0.04)
IMM GRANULOCYTES NFR BLD AUTO: 0.9 % (ref 0–0.5)
INR PPP: 1.5 (ref 0.8–1.2)
LYMPHOCYTES # BLD AUTO: 2.5 K/UL (ref 1–4.8)
LYMPHOCYTES NFR BLD: 11.1 % (ref 18–48)
MAGNESIUM SERPL-MCNC: 1.8 MG/DL (ref 1.6–2.6)
MCH RBC QN AUTO: 34.5 PG (ref 27–31)
MCHC RBC AUTO-ENTMCNC: 33.2 G/DL (ref 32–36)
MCV RBC AUTO: 104 FL (ref 82–98)
MONOCYTES # BLD AUTO: 1.4 K/UL (ref 0.3–1)
MONOCYTES NFR BLD: 6.2 % (ref 4–15)
NEUTROPHILS # BLD AUTO: 16.5 K/UL (ref 1.8–7.7)
NEUTROPHILS NFR BLD: 74.8 % (ref 38–73)
NRBC BLD-RTO: 0 /100 WBC
PLATELET # BLD AUTO: 190 K/UL (ref 150–350)
PMV BLD AUTO: 10.6 FL (ref 9.2–12.9)
POTASSIUM SERPL-SCNC: 3.8 MMOL/L (ref 3.5–5.1)
PROT SERPL-MCNC: 5.2 G/DL (ref 6–8.4)
PROTHROMBIN TIME: 16.5 SEC (ref 9–12.5)
RBC # BLD AUTO: 3.54 M/UL (ref 4.6–6.2)
SODIUM SERPL-SCNC: 130 MMOL/L (ref 136–145)
WBC # BLD AUTO: 22.04 K/UL (ref 3.9–12.7)

## 2020-09-18 PROCEDURE — 63600175 PHARM REV CODE 636 W HCPCS: Mod: NTX | Performed by: HOSPITALIST

## 2020-09-18 PROCEDURE — P9047 ALBUMIN (HUMAN), 25%, 50ML: HCPCS | Mod: JG,NTX | Performed by: HOSPITALIST

## 2020-09-18 PROCEDURE — 80053 COMPREHEN METABOLIC PANEL: CPT | Mod: NTX

## 2020-09-18 PROCEDURE — 99239 PR HOSPITAL DISCHARGE DAY,>30 MIN: ICD-10-PCS | Mod: NTX,,, | Performed by: HOSPITALIST

## 2020-09-18 PROCEDURE — 36415 COLL VENOUS BLD VENIPUNCTURE: CPT | Mod: NTX

## 2020-09-18 PROCEDURE — 25000003 PHARM REV CODE 250: Mod: NTX | Performed by: HOSPITALIST

## 2020-09-18 PROCEDURE — 85025 COMPLETE CBC W/AUTO DIFF WBC: CPT | Mod: NTX

## 2020-09-18 PROCEDURE — 99233 PR SUBSEQUENT HOSPITAL CARE,LEVL III: ICD-10-PCS | Mod: NTX,,, | Performed by: INTERNAL MEDICINE

## 2020-09-18 PROCEDURE — 63600175 PHARM REV CODE 636 W HCPCS: Mod: JG,NTX | Performed by: HOSPITALIST

## 2020-09-18 PROCEDURE — 99233 SBSQ HOSP IP/OBS HIGH 50: CPT | Mod: NTX,,, | Performed by: INTERNAL MEDICINE

## 2020-09-18 PROCEDURE — 83735 ASSAY OF MAGNESIUM: CPT | Mod: NTX

## 2020-09-18 PROCEDURE — 99239 HOSP IP/OBS DSCHRG MGMT >30: CPT | Mod: NTX,,, | Performed by: HOSPITALIST

## 2020-09-18 PROCEDURE — 85610 PROTHROMBIN TIME: CPT | Mod: NTX

## 2020-09-18 RX ORDER — CIPROFLOXACIN 500 MG/1
500 TABLET ORAL DAILY
Qty: 60 TABLET | Refills: 2
Start: 2020-09-18 | End: 2020-09-18 | Stop reason: SDUPTHER

## 2020-09-18 RX ORDER — CIPROFLOXACIN 500 MG/1
500 TABLET ORAL DAILY
Qty: 30 TABLET | Refills: 2
Start: 2020-09-18 | End: 2020-09-18 | Stop reason: SDUPTHER

## 2020-09-18 RX ORDER — CIPROFLOXACIN 500 MG/1
500 TABLET ORAL DAILY
Qty: 30 TABLET | Refills: 2 | Status: SHIPPED | OUTPATIENT
Start: 2020-09-18 | End: 2021-03-23

## 2020-09-18 RX ORDER — LACTULOSE 10 G/15ML
10 SOLUTION ORAL; RECTAL 2 TIMES DAILY
Qty: 900 ML | Refills: 2 | Status: ON HOLD
Start: 2020-09-18 | End: 2020-10-19

## 2020-09-18 RX ADMIN — OCTREOTIDE ACETATE 100 MCG: 100 INJECTION, SOLUTION INTRAVENOUS; SUBCUTANEOUS at 05:09

## 2020-09-18 RX ADMIN — MIDODRINE HYDROCHLORIDE 15 MG: 5 TABLET ORAL at 02:09

## 2020-09-18 RX ADMIN — ESCITALOPRAM OXALATE 5 MG: 5 TABLET, FILM COATED ORAL at 08:09

## 2020-09-18 RX ADMIN — MIDODRINE HYDROCHLORIDE 15 MG: 5 TABLET ORAL at 05:09

## 2020-09-18 RX ADMIN — PANTOPRAZOLE SODIUM 40 MG: 40 TABLET, DELAYED RELEASE ORAL at 08:09

## 2020-09-18 RX ADMIN — ALBUMIN (HUMAN) 25 G: 12.5 SOLUTION INTRAVENOUS at 08:09

## 2020-09-18 RX ADMIN — APIXABAN 10 MG: 5 TABLET, FILM COATED ORAL at 08:09

## 2020-09-18 RX ADMIN — LACTULOSE 20 G: 20 SOLUTION ORAL at 08:09

## 2020-09-18 RX ADMIN — CIPROFLOXACIN 500 MG: 500 TABLET, FILM COATED ORAL at 08:09

## 2020-09-18 RX ADMIN — OCTREOTIDE ACETATE 100 MCG: 100 INJECTION, SOLUTION INTRAVENOUS; SUBCUTANEOUS at 02:09

## 2020-09-18 NOTE — PROGRESS NOTES
Progress Note   Hospital Medicine         Patient Name: Kenneth Pardo  MRN:  [id number]  [facility] Medicine Team: [facility] Aicha Rojas MD  Date of Admission:  9/9/2020     Length of Stay:  LOS: 8 days   Expected Discharge Date: 9/18/2020  Principal Problem:  Hepatorenal syndrome       Subjective:     Interval History/Overnight Events:    Doing well no acute issues   Cr up today and given albumin   C scope with no acute findings  Asking to go home. Explained that would want to repeat renal labs tomorrow.  Advised against AMA d/c     Review of Systems   Constitutional: Negative for chills, fatigue, fever.   HENT: Negative for sore throat, trouble swallowing.    Eyes: Negative for photophobia, visual disturbance.   Respiratory: Negative for cough, shortness of breath.    Cardiovascular: Negative for chest pain, palpitations, leg swelling.   Gastrointestinal: Negative for abdominal pain, constipation, diarrhea, nausea, vomiting.   Endocrine: Negative for cold intolerance, heat intolerance.   Genitourinary: Negative for dysuria, frequency.   Musculoskeletal: Negative for arthralgias, myalgias.   Skin: Negative for rash, wound, erythema   Neurological: Negative for dizziness, syncope, weakness, light-headedness.   Psychiatric/Behavioral: Negative for confusion, hallucinations, anxiety  All other systems reviewed and are negative.    Objective:     Temp:  [97.7 °F (36.5 °C)-98.5 °F (36.9 °C)]   Pulse:  []   Resp:  [13-22]   BP: ()/(50-89)   SpO2:  [99 %-100 %]       Physical Exam:  Constitutional: appears weak and ill  Head: Normocephalic and atraumatic.   Mouth/Throat: Oropharynx is clear and moist.   Eyes: EOM are normal. Pupils are equal, round, and reactive to light. -scleral icterus.   Neck: Normal range of motion. Neck supple.   Cardiovascular: Normal rate and regular rhythm.  No murmur heard.  Pulmonary/Chest: Effort normal and breath sounds normal. No respiratory distress. No wheezes, rales, or  rhonchi  Abdominal: Soft. Bowel sounds are normal.  positive distension, no tenderness  Musculoskeletal: Normal range of motion. No edema.   Neurological: Alert and oriented to person, place, and time.   Skin: Skin is warm and dry.   Psychiatric: Normal mood and affect. Behavior is normal.     Recent Labs   Lab 09/12/20 0624 09/13/20 0457 09/14/20  0515 09/15/20  0654 09/16/20  0404 09/17/20  0638   WBC 24.83* 23.90* 23.30* 22.91* 21.49* 21.82*   HGB 11.4* 11.9* 11.7* 11.9* 11.2* 13.0*   HCT 35.6* 38.4* 35.6* 36.6* 34.1* 41.3    225 197 155 163 194     Recent Labs   Lab 09/11/20 0523 09/12/20 0624 09/13/20 0457  09/15/20  0654 09/16/20  0404 09/17/20  0638   * 131* 129*   < > 130* 131* 133*   K 3.4* 4.1 4.1   < > 4.2 4.2 4.1   CL 96 98 97   < > 98 101 98   CO2 19* 21* 23   < > 22* 20* 20*   BUN 32* 27* 31*   < > 30* 31* 30*   CREATININE 1.5* 1.5* 1.7*   < > 1.8* 1.7* 2.1*   * 123* 108   < > 163* 120* 108   CALCIUM 7.9* 8.1* 8.0*   < > 8.5* 8.4* 8.7   MG 2.1 2.2 2.1   < > 2.1 1.9 2.0   PHOS 3.0 2.5* 2.8  --   --   --   --     < > = values in this interval not displayed.     Recent Labs   Lab 09/15/20  0654 09/16/20 0404 09/17/20  0638 09/17/20  0724   ALKPHOS 204* 170* 220*  --    ALT 13 11 14  --    AST 27 21 32  --    ALBUMIN 3.7 3.7 3.8  --    PROT 5.3* 5.1* 5.7*  --    BILITOT 2.1* 1.9* 2.2*  --    INR 1.6* 1.6*  --  1.3*     Recent Labs   Lab 09/14/20  1429 09/14/20  1734 09/14/20  2333 09/15/20  0737 09/15/20  1132 09/15/20  1648   POCTGLUCOSE 124* 130* 363* 173* 167* 137*        albumin human 25%  25 g Intravenous BID    apixaban  10 mg Oral BID    ciprofloxacin HCl  500 mg Oral Q24H    escitalopram oxalate  5 mg Oral Daily    lactulose  20 g Oral BID    midodrine  15 mg Oral Q8H    octreotide  100 mcg Subcutaneous Q8H    pantoprazole  40 mg Oral Daily    rOPINIRole  0.25 mg Oral QHS       Assessment and Plan         Active Hospital Problems    Diagnosis  POA    *Hepatorenal  syndrome [K76.7]  Yes    Hepatorenal Syndrome [N17.9]  Yes    Leukocytosis [D72.829]  Yes    Pre-transplant evaluation for liver transplant [Z01.818]  Not Applicable    Right leg DVT [I82.401]  Yes    Hyperkalemia [E87.5]  Yes    Alcoholic liver disease [K70.9]  Yes    Ascites due to alcoholic cirrhosis [K70.31]  Yes    Hepatic encephalopathy [K72.90]  Yes    Debility [R53.81]  Yes    Frequent falls [R29.6]  Not Applicable    Depression with anxiety [F41.8]  Yes    Restless leg [G25.81]  Yes    Insomnia [G47.00]  Yes    Sepsis, unspecified organism [A41.9]  Yes    Decompensated liver disease [K74.69]  Yes    Esophagitis, [place] grade B [K20.8]  Yes    Portal hypertensive gastropathy [K76.6, K31.89]  Yes    Anasarca [R60.1]  Yes    Hyponatremia [E87.1]  Yes    Hypotension [I95.9]  Yes      Resolved Hospital Problems   No resolved problems to display.     #Decompensated alcoholic cirrhosis   Recurrent ascites   Portal hypertension   Portal hypertensive gastropathy   Hepatic encephalopathy    MELD-Na score: 22 at 9/17/2020  7:24 AM  MELD score: 19 at 9/17/2020  7:24 AM  Calculated from:  Serum Creatinine: 2.1 mg/dL at 9/17/2020  6:38 AM  Serum Sodium: 133 mmol/L at 9/17/2020  6:38 AM  Total Bilirubin: 2.2 mg/dL at 9/17/2020  6:38 AM  INR(ratio): 1.3 at 9/17/2020  7:24 AM  Age: 60 years 1 month    -Large ascites on exam and CT abdomen   -diagnostic and therapeutic paracentesis in am   -hold diuretics given hyponatremia and MERLY   -continue lactulose for HE   -continue PPI   -PETH pending  -hepatology consult   - IR paracentesis with 6.4L removed and negative for SBP on 9/9  - approved for inpatient liver transplant evaluation  - IR paracentesis with 6.2L removed on 9/14 and negative for SBP  - stress test negative  - C-scope 9/17       # Right Leg DVT  - seen on U/S; on eliquis, switching to heparin drip for procedure   - plan to d/c with eliquis      #Hyponatremia   -Na 120 from recent baseline  136 in ED  -likely due to diuretic use, decompensated cirrhosis, SSRI use    -repeat Na 122 prior to transfer   -received 1L NS bolus in ED  -hold diuretics (lasix, spironolactone)  - hold SSRI  - resolved      #MERLY  # Hepatorenal syndrome   - Cr improving today, renal U/S reviewed and nephrology consulted;   - urine studies   - cont midodrine, octreotide and albumin         #Leukocytosis   -WBC improving   -no signs of overt infection or sepsis   -afebrile   -blood and urine cultures negative  -UA showed pyuria and received empiric dose of rocephin   - negative for SBP  - C. Diff negative as well     #Depression with anxiety   -no acute issue   -hold fluoxetine in setting of hyponatremia (?SIADH)  -continue lexapro      #Insomnia  -trazodone prn      #Debility with falls  -PT evaluation for gait training   -fall precaution      # Hypotension  - midodrine     Disposition : d/c home on 9/18, based on Cr    Patient's note was created using MModal Dictation.  Any errors in syntax may not have been identified and edited on initial review prior to signing this note.    Signing Physician:     Aicha Rojas MD  Department of Hospital Medicine   [facility]  Pager [phone]  Spectra [phone]  9/17/2020

## 2020-09-18 NOTE — PROGRESS NOTES
[facility]  Nephrology  Progress Note    Patient Name: Kenneth Pardo  MRN: [id number]  Admission Date: 9/9/2020  Hospital Length of Stay: 9 days  Attending Provider: Aicha Rojas MD   Primary Care Physician: Mikala Etienne MD  Principal Problem:Hepatorenal syndrome    Subjective:     HPI: The pt is a 60 year old male with alcoholic cirrhosis c/b portal hypertension, ascites, portal hypertensive gastropathy and hepatic encephalopathy that initially presented as a transfer from [facility] for management of acute hyponatremia and concurrent MERLY. This was believed to be 2/2 his Lasix and Aldactone therapy; as such these were held with improvement in his initial hyponatremia and improvement in his MERLY. Nephrology was consulted as the patient has begun to have an increase in his serum Cr for the past 48 hours despite holding his home diuretics. Of note, the patient has now had 2 large volume paracenteses (9/9 removed 6.4 L's of fluid and returned cultures negative, 9/14 removed 6.8 L's of fluid). In addition, presenting with significant leukocytosis and currently on SBP prophylaxis with daily cipro. C diff negative. CONS on urine culture, but patient asymptomatic. Blood cultures NGTD. Ascitic fluid cultures pending at time of consult.         Interval History:     Patient continues to report good urine output though charting shows no evidence of urine output despite orders for strict I's and O's. Picture most c/w HRS. Cr/Kidney function has appeared to stabilize. Has completed albumin therapy x 2 days. On octreotide and midodrine. No complaints of pain or discomfort. Abdomen soft.     Objective:     Vital Signs (Most Recent):  Temp: 97.9 °F (36.6 °C) (09/18/20 1121)  Pulse: 75 (09/18/20 1121)  Resp: 15 (09/18/20 1121)  BP: (!) 101/52 (09/18/20 1121)  SpO2: 100 % (09/18/20 1121)  O2 Device (Oxygen Therapy): room air (09/18/20 1121) Vital Signs (24h Range):  Temp:  [96.5 °F (35.8 °C)-98.5 °F  (36.9 °C)] 97.9 °F (36.6 °C)  Pulse:  [67-75] 75  Resp:  [14-16] 15  SpO2:  [98 %-100 %] 100 %  BP: ()/(52-89) 101/52     Weight: 79.4 kg (175 lb 0.7 oz) (09/17/20 1300)  Body mass index is 31.01 kg/m².  Body surface area is 1.88 meters squared.    I/O last 3 completed shifts:  In: 350 [P.O.:200; I.V.:150]  Out: -     Physical Exam  Vitals signs and nursing note reviewed.   Constitutional:       Appearance: He is obese. He is not ill-appearing or toxic-appearing.   Eyes:      General: No scleral icterus.  Cardiovascular:      Rate and Rhythm: Normal rate and regular rhythm.      Pulses: Normal pulses.           Radial pulses are 2+ on the right side and 2+ on the left side.   Pulmonary:      Effort: Pulmonary effort is normal. No respiratory distress.      Breath sounds: No wheezing or rales.   Abdominal:      General: Abdomen is flat. Bowel sounds are normal. There is no distension.      Tenderness: There is no abdominal tenderness. There is no guarding.   Musculoskeletal:         General: No swelling or deformity.      Right lower leg: Edema (1+) present.      Left lower leg: Edema (1+) present.   Skin:     General: Skin is warm and dry.      Coloration: Skin is pale.      Findings: Bruising (to bilateral UE's) present. No lesion.      Comments: Multiple bruises to bilateral UE's  No evidence of palmar erythema or spider angiomas    Neurological:      Mental Status: He is alert and oriented to person, place, and time.   Psychiatric:         Mood and Affect: Mood normal.         Behavior: Behavior normal.         Significant Labs:  All labs within the past 24 hours have been reviewed.    Assessment/Plan:     Hepatorenal Syndrome  59 yo male with alcoholic cirrhosis c/b portal hypertension, ascites, portal hypertensive gastropathy and hepatic encephalopathy that initially presented as a transfer from [facility] for management of acute hyponatremia and concurrent MERLY. This was believed to be 2/2 his home  diuretic therapy; as such these were held with improvement in his initial hyponatremia and improvement in his MERLY. Nephrology now consulted as the patient has begun to have an increase in his serum Cr for the past 48 hours despite holding home diuretics.    DDX includes but is not limited to: HRS Type 1 vs Ischemic ATN vs Sepsis (CONS on urine culture though patient asymptomatic)  Diagnostics: Leukocytosis of 23.3 with left shift, Na 131, HCO3 21, BUN 34, Cr 1.9, UA on 9/8 with 1+ protein/bili/leuks/26 RBCs/16 WBCs/10 hyaline casts, Urine Na <20, Urine Osm 455, Urine Cx with CONS, UA on 9/14 with trace leuks/3 RBC's/4 WBC's/7 hyaline casts, Urine Na repeat <20, Protein Cr ration 0.3, Hogan stain negative  Imaging: US retroperitoneum on 9/14 with trace amount of ascites noted in the pelvis, but otherwise unremarkable sonographic appearance of the kidneys    Plan:  - Renally dose all medications  - Avoid nephrotoxic medications  - Strict intake and output   - Daily labs; will trend   - Urine sample collected/spun  On 9/25- no evidence of casts to suggest ATN; more c/w HRS  - Continue albumin + octreotide + midodrine   - Nephrology will continue to follow along     Decompensated liver disease  - Management per primary     Depression with anxiety  - Continue Lexapro    Ascites due to alcoholic cirrhosis  - S/p 2 large volume paracenteses (9/9 net - 6.4 L's, 9/14 net -6.8 L's)   - Management per primary        Thank you for your consult. I will follow-up with patient. Please contact us if you have any additional questions.    Pia Vargas, DO  Nephrology  [facility]

## 2020-09-18 NOTE — DISCHARGE SUMMARY
DISCHARGE SUMMARY  Hospital Medicine    Team: [facility]    Patient Name: Kenneth Pardo  YOB: 1960    Admit Date: 9/9/2020    Discharge Date: 09/18/2020    Discharge Attending Physician: Aicha Rojas MD     Chief Complaint: MERLY/ Hepatorenal syndrome    Princilpal Diagnoses:  Active Hospital Problems    Diagnosis  POA    *Hepatorenal syndrome [K76.7]  Yes    Hepatorenal Syndrome [N17.9]  Yes    Leukocytosis [D72.829]  Yes    Pre-transplant evaluation for liver transplant [Z01.818]  Not Applicable    Right leg DVT [I82.401]  Yes    Hyperkalemia [E87.5]  Yes    Alcoholic liver disease [K70.9]  Yes    Ascites due to alcoholic cirrhosis [K70.31]  Yes    Hepatic encephalopathy [K72.90]  Yes    Debility [R53.81]  Yes    Frequent falls [R29.6]  Not Applicable    Depression with anxiety [F41.8]  Yes    Restless leg [G25.81]  Yes    Insomnia [G47.00]  Yes    Sepsis, unspecified organism [A41.9]  Yes    Decompensated liver disease [K74.69]  Yes    Esophagitis, [place] grade B [K20.8]  Yes    Portal hypertensive gastropathy [K76.6, K31.89]  Yes    Anasarca [R60.1]  Yes    Hyponatremia [E87.1]  Yes    Hypotension [I95.9]  Yes      Resolved Hospital Problems   No resolved problems to display.       Discharged Condition: Admit problems have stabilized        HOSPITAL COURSE:      Initial Presentation:    As per admitting provider,     60 year old male with PMH of alcoholic cirrhosis complicated by portal hypertension, ascites, portal hypertensive gastropathy, hepatic encephalopathy presents as a transfer from [facility] ED for management of acute hyponatremia and MERLY. Patient was [place] [facility] [place] of [place] for decompensated cirrhosis - paracentesis cultures negative. He was discharged on lasix and spironolactone. Patient had post hospital discharge labs yesterday which was significant for hyponatremia Na 120, Scr 1.9, K 5.4, WBC 27.   He was advised to go to ED  for evaluation where he received 1L NS and ceftriaxone 2 gm IVPB. CT abdomen showed large ascites.      Patient reports worsening abdominal distention, fatigue, generalized weakness, poor appetite since discharged from hospital last month. He reports frequent falls at home due to weakness with skin abrasion over his left knee. Denies fever, chills, chest pain, sob, head injury, LOC,  abdominal pain, dysuria, hematuria, joint pain, dark stool or bleeding from other sites. 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 He denies NSAID/tylenol use and drinking alcohol since discharged from hospital.      Currently he appears chronically ill but not in distress. He is awake, alert, oriented to person and place only.     Course of Principle Problem for Admission:    #MERLY  # Hepatorenal syndrome   - Cr improving today, renal U/S reviewed and nephrology consulted;   - urine studies reviewed   - s/p midodrine, octreotide and albumin   - Cr stabilized at 2.1. patient anxious to e discharged home. Cr remained stable, possibly new baseline ?  he was d/c home with no diuretics with close outpatient follow and outpatient labs several days after d/c    On the day of d/c, patient was doing well with no acute issues. Feeing great.  Upset about having to be in the hospital, despite feeling well. Had a long discussion regarding d/c planning   Cr stabilized at 2.1. patient anxious to e discharged home. Cr remained stable, possibly new baseline ?  he was d/c home with no diuretics with close outpatient follow and outpatient labs several days after d/c    Physical Exam:  Constitutional: appears weak and ill  Head: Normocephalic and atraumatic.   Mouth/Throat: Oropharynx is clear and moist.   Eyes: EOM are normal. Pupils are equal, round, and reactive to light. -scleral icterus.   Neck: Normal range of motion. Neck supple.   Cardiovascular: Normal rate and regular rhythm.  No murmur heard.  Pulmonary/Chest: Effort normal and  "breath sounds normal. No respiratory distress. No wheezes, rales, or rhonchi  Abdominal: Soft. Bowel sounds are normal.  positive distension, no tenderness  Musculoskeletal: Normal range of motion. No edema.   Neurological: Alert and oriented to person, place, and time.   Skin: Skin is warm and dry.   Psychiatric: Normal mood and affect. Behavior is normal    Other Medical Problems Addressed in the Hospital:    #Decompensated alcoholic cirrhosis   Recurrent ascites   Portal hypertension   Portal hypertensive gastropathy   Hepatic encephalopathy   MELD-Na score: 25 at 9/18/2020  4:20 AM  MELD score: 20 at 9/18/2020  4:20 AM  Calculated from:  Serum Creatinine: 2.1 mg/dL at 9/18/2020  4:20 AM  Serum Sodium: 130 mmol/L at 9/18/2020  4:20 AM  Total Bilirubin: 1.9 mg/dL at 9/18/2020  4:20 AM  INR(ratio): 1.5 at 9/18/2020  4:20 AM  Age: 60 years 1 month     -Large ascites on exam and CT abdomen   -hold diuretics given hyponatremia and MERLY   -continue lactulose for HE   -continue PPI   -PETH 9/9 - negative . Prior admission in 7/20- pt with ETOH level of 317  -hepatology consulted  - presented to liver txp committee on 9/16 - "Patient has been deferred for liver transplant. Pt is significantly improving. Of concern on Pt's recent US from 9/12/20 is a right femoral vein DVT. Pt may need doppler to assess.  Pt will follow up in clinic with Dr. aMdison and SW to re evaluate."  - IR paracentesis with 6.4L removed and negative for SBP on 9/9  - approved for inpatient liver transplant evaluation  - IR paracentesis with 6.2L removed on 9/14 and negative for SBP  - stress test negative  - C-scope 9/17 completed   - outpatient hepatology follow and cont re assessment for possible necessity for OLT    # Right Leg DVT  - seen on U/S; on eliquis, switching to heparin drip for procedure   - plan to d/c with eliquis      #Hyponatremia   -Na 120 from recent baseline 136 in ED  -likely due to diuretic use, decompensated cirrhosis, SSRI use "    -repeat Na 122 prior to transfer   -received 1L NS bolus in ED  -hold diuretics (lasix, spironolactone)  - hold SSRI  - resolved/ stabilized      #Leukocytosis   -WBC improving   -no signs of overt infection or sepsis   -afebrile   -blood and urine cultures negative  -UA showed pyuria and received empiric dose of rocephin   - negative for SBP  - C. Diff negative as well     #Depression with anxiety   -no acute issue   -continue lexapro      #Insomnia  -trazodone prn      #Debility with falls  -PT rec home d/c     # Hypotension  - due to decompensated liver dz.   -  midodrine     CONSULTS: Hepatology, nephrology     PROCEDURES: C scope, para     Labs:    Chemistries:   Recent Labs   Lab 09/12/20  0624 09/13/20  0457  09/16/20  0404 09/17/20  0638 09/18/20  0420   * 129*   < > 131* 133* 130*   K 4.1 4.1   < > 4.2 4.1 3.8   CL 98 97   < > 101 98 99   CO2 21* 23   < > 20* 20* 21*   BUN 27* 31*   < > 31* 30* 32*   CREATININE 1.5* 1.7*   < > 1.7* 2.1* 2.1*   CALCIUM 8.1* 8.0*   < > 8.4* 8.7 8.7   PROT 4.9* 4.9*   < > 5.1* 5.7* 5.2*   BILITOT 2.4* 2.4*   < > 1.9* 2.2* 1.9*   ALKPHOS 198* 202*   < > 170* 220* 184*   ALT 17 17   < > 11 14 13   AST 31 24   < > 21 32 24   MG 2.2 2.1   < > 1.9 2.0 1.8   PHOS 2.5* 2.8  --   --   --   --     < > = values in this interval not displayed.        WBC:   Recent Labs   Lab 09/14/20  0515 09/15/20  0654 09/16/20  0404 09/17/20  0638 09/18/20  0420   WBC 23.30* 22.91* 21.49* 21.82* 22.04*     Bands:     CBC/Anemia Labs: Coags:    Recent Labs   Lab 09/16/20  0404 09/17/20  0638 09/18/20  0420   WBC 21.49* 21.82* 22.04*   HGB 11.2* 13.0* 12.2*   HCT 34.1* 41.3 36.7*    194 190   * 109* 104*   RDW 14.2 15.0* 14.6*    Recent Labs   Lab 09/16/20  0404 09/16/20  1209 09/16/20  2043 09/17/20  0211 09/17/20  0724 09/18/20  0420   INR 1.6*  --   --   --  1.3* 1.5*   APTT >150.0* >150.0* 48.9* 117.5*  --   --         Diagnostic Results:    Microbiology Results (last 7 days)      Procedure Component Value Units Date/Time    Culture, Anaerobe [[id number]] Collected: 09/14/20 1213    Order Status: Completed Specimen: Body Fluid from Ascites Updated: 09/18/20 0855     Anaerobic Culture Culture in progress    Blood culture [[id number]] Collected: 09/12/20 1316    Order Status: Completed Specimen: Blood Updated: 09/17/20 1612     Blood Culture, Routine No growth after 5 days.    Blood culture [[id number]] Collected: 09/12/20 1316    Order Status: Completed Specimen: Blood Updated: 09/17/20 1612     Blood Culture, Routine No growth after 5 days.    Aerobic culture [[id number]] Collected: 09/14/20 1213    Order Status: Completed Specimen: Body Fluid from Ascites Updated: 09/17/20 1106     Aerobic Bacterial Culture No growth    Culture, Body Fluid - Bactec [[id number]] Collected: 09/09/20 1216    Order Status: Completed Specimen: Body Fluid from Peritoneal Fluid Updated: 09/14/20 1612     Body Fluid Culture, Sterile No growth after 5 days.    Clostridium difficile EIA [[id number]] Collected: 09/12/20 1433    Order Status: Completed Specimen: Stool Updated: 09/13/20 1205     C. diff Antigen Negative     C difficile Toxins A+B, EIA Negative     Comment: Testing not recommended for children <24 months old.               Disposition:  Home        Future Scheduled Appointments:  Future Appointments   Date Time Provider Department Center   9/28/2020 10:00 AM LAB, [facility] LAB [facility]   9/30/2020 10:30 AM Mainor Madison MD [facility]       Follow-up Plans from This Hospitalization:      Discharge Medication List:       Kenneth Pardo   Home Medication Instructions QIAN:[id number]    Printed on:09/18/20 1125   Medication Information                      apixaban (ELIQUIS) 5 mg Tab  Take 2 tablets (10mg) by mouth twice daily until 9/22, then take 1 tablet (5mg) by mouth twice daily.             ciprofloxacin HCl (CIPRO) 500 MG tablet  Take 1 tablet (500 mg total) by mouth once daily.              escitalopram oxalate (LEXAPRO) 5 MG Tab  Take 1 tablet (5 mg total) by mouth once daily.             lactulose (CHRONULAC) 10 gram/15 mL solution  Take 15 mLs (10 g total) by mouth 2 (two) times a day. Take enough to have 3 bowel movements per day             midodrine (PROAMATINE) 5 MG Tab  Take 3 tablets (15 mg total) by mouth every 8 (eight) hours.             omeprazole (PRILOSEC) 40 MG capsule  Take 1 capsule (40 mg total) by mouth every morning.             ondansetron (ZOFRAN) 4 MG tablet  Take 4 mg by mouth every 8 (eight) hours as needed.             rOPINIRole (REQUIP) 0.25 MG tablet  Take 1 tablet (0.25 mg total) by mouth every evening.             traZODone (DESYREL) 100 MG tablet  Take 1 tablet (100 mg total) by mouth every evening.                   At the time of discharge patient was told to take all medications as prescribed, to keep all followup appointments, and to call their primary care physician or return to the emergency room if they have any worsening or concerning symptoms.    Time spent on the discharge of the patient including review of hospital course with the patient. reviewing discharge medications and arranging follow-up care 45 minutes.  Patient was seen and examined on the date of discharge and determined to be suitable for discharge.        Signing Physician:  Aicha Rojas MD

## 2020-09-18 NOTE — DISCHARGE INSTRUCTIONS
Cirrhosis Counseling  - strict abstinence of alcohol use  - compliance with lactulose and rifaximin if you have developed hepatic encephalopathy (confusion due to high ammonia level)  - avoid non-steroidal anti-inflammatory drugs (NSAIDs) such as ibuprofen, Motrin, naprosyn, Alleve due to the risk of kidney damage  - can take acetaminophen (Tylenol), no more than 2000 mg per day  - low sodium (salt) 2 gram per day diet  - 2 liters per day fluid restriction   - nutrition: 25-30kcal (calorie per body body weight in kilogram) per day  - no need to restrict protein in diet  - high protein diet: 120 grams per day to prevent muscle mass loss. Recommended at least 1 protein shake daily using Premier Protein shakes    - avoid fasting  - Late night snack with 50 gram of complex carbohydrates and protein  - resistance exercises for muscle strength  - avoid raw seafoods due to the risk of fatal Vibrio vulnificus infection

## 2020-09-18 NOTE — PLAN OF CARE
Future Appointments   Date Time Provider Department Center   9/28/2020 10:00 AM LAB, [facility]   9/30/2020 10:30 AM Mainor Madison MD [facility]           09/18/20 1342   Final Note   Assessment Type Final Discharge Note   Anticipated Discharge Disposition Home   What phone number can be called within the next 1-3 days to see how you are doing after discharge? [phone]   Hospital Follow Up  Appt(s) scheduled? Yes   Right Care Referral Info   Post Acute Recommendation No Care   Post-Acute Status   Post-Acute Authorization Other   Other Status No Post-Acute Service Needs   Discharge Delays None known at this time

## 2020-09-18 NOTE — NURSING
Patient being discharge and IV access removed. Medications delivered from bedside pharmacy. Wife at bedside. Discharge instructions given.

## 2020-09-18 NOTE — SUBJECTIVE & OBJECTIVE
Interval History:     Patient continues to report good urine output though charting shows no evidence of urine output despite orders for strict I's and O's. Picture most c/w HRS. Cr/Kidney function has appeared to stabilize. Has completed albumin therapy x 2 days. On octreotide and midodrine. No complaints of pain or discomfort. Abdomen soft.     Objective:     Vital Signs (Most Recent):  Temp: 97.9 °F (36.6 °C) (09/18/20 1121)  Pulse: 75 (09/18/20 1121)  Resp: 15 (09/18/20 1121)  BP: (!) 101/52 (09/18/20 1121)  SpO2: 100 % (09/18/20 1121)  O2 Device (Oxygen Therapy): room air (09/18/20 1121) Vital Signs (24h Range):  Temp:  [96.5 °F (35.8 °C)-98.5 °F (36.9 °C)] 97.9 °F (36.6 °C)  Pulse:  [67-75] 75  Resp:  [14-16] 15  SpO2:  [98 %-100 %] 100 %  BP: ()/(52-89) 101/52     Weight: 79.4 kg (175 lb 0.7 oz) (09/17/20 1300)  Body mass index is 31.01 kg/m².  Body surface area is 1.88 meters squared.    I/O last 3 completed shifts:  In: 350 [P.O.:200; I.V.:150]  Out: -     Physical Exam  Vitals signs and nursing note reviewed.   Constitutional:       Appearance: He is obese. He is not ill-appearing or toxic-appearing.   Eyes:      General: No scleral icterus.  Cardiovascular:      Rate and Rhythm: Normal rate and regular rhythm.      Pulses: Normal pulses.           Radial pulses are 2+ on the right side and 2+ on the left side.   Pulmonary:      Effort: Pulmonary effort is normal. No respiratory distress.      Breath sounds: No wheezing or rales.   Abdominal:      General: Abdomen is flat. Bowel sounds are normal. There is no distension.      Tenderness: There is no abdominal tenderness. There is no guarding.   Musculoskeletal:         General: No swelling or deformity.      Right lower leg: Edema (1+) present.      Left lower leg: Edema (1+) present.   Skin:     General: Skin is warm and dry.      Coloration: Skin is pale.      Findings: Bruising (to bilateral UE's) present. No lesion.      Comments: Multiple  bruises to bilateral UE's  No evidence of palmar erythema or spider angiomas    Neurological:      Mental Status: He is alert and oriented to person, place, and time.   Psychiatric:         Mood and Affect: Mood normal.         Behavior: Behavior normal.         Significant Labs:  All labs within the past 24 hours have been reviewed.

## 2020-09-18 NOTE — PLAN OF CARE
AAOx4. VSS. No significant events overnight. Possible D/C home today. Bed in lowest position, call light and personal items within reach. Will continue to monitor.

## 2020-09-18 NOTE — ASSESSMENT & PLAN NOTE
61 yo male with alcoholic cirrhosis c/b portal hypertension, ascites, portal hypertensive gastropathy and hepatic encephalopathy that initially presented as a transfer from [facility] for management of acute hyponatremia and concurrent MERLY. This was believed to be 2/2 his home diuretic therapy; as such these were held with improvement in his initial hyponatremia and improvement in his MERLY. Nephrology now consulted as the patient has begun to have an increase in his serum Cr for the past 48 hours despite holding home diuretics.    DDX includes but is not limited to: HRS Type 1 vs Ischemic ATN vs Sepsis (CONS on urine culture though patient asymptomatic)  Diagnostics: Leukocytosis of 23.3 with left shift, Na 131, HCO3 21, BUN 34, Cr 1.9, UA on 9/8 with 1+ protein/bili/leuks/26 RBCs/16 WBCs/10 hyaline casts, Urine Na <20, Urine Osm 455, Urine Cx with CONS, UA on 9/14 with trace leuks/3 RBC's/4 WBC's/7 hyaline casts, Urine Na repeat <20, Protein Cr ration 0.3, Hogan stain negative  Imaging: US retroperitoneum on 9/14 with trace amount of ascites noted in the pelvis, but otherwise unremarkable sonographic appearance of the kidneys    Plan:  - Renally dose all medications  - Avoid nephrotoxic medications  - Strict intake and output   - Daily labs; will trend   - Urine sample collected/spun  On 9/25- no evidence of casts to suggest ATN; more c/w HRS  - Continue albumin + octreotide + midodrine   - Nephrology will continue to follow along

## 2020-09-21 ENCOUNTER — PATIENT OUTREACH (OUTPATIENT)
Dept: ADMINISTRATIVE | Facility: CLINIC | Age: 60
End: 2020-09-21

## 2020-09-21 LAB — BACTERIA SPEC ANAEROBE CULT: NORMAL

## 2020-09-22 ENCOUNTER — TELEPHONE (OUTPATIENT)
Dept: TRANSPLANT | Facility: CLINIC | Age: 60
End: 2020-09-22

## 2020-09-22 NOTE — TELEPHONE ENCOUNTER
Phone call returned to Dee Dee.  She reports that while inpatient, she submitted paperwork that needed completion since patient unable to work at this time.  She states that it was a single form given to hepatologist that would provide monetary assistance.  Dee Dee is requesting an update.  Informed her that above will be sent to inpatient staff for status update and hat she will be notified once additional info obtained.  Understanding verbalized.  No further questions noted at present time.    =============================================    ----- Message from Nasima Tello sent at 9/22/2020 10:54 AM CDT -----  Regarding: Speak with office  Contact: Dee Dee Funez is calling to found out about paperwork doctor was to fill out for insurance.      Dee Dee# [phone]

## 2020-09-23 NOTE — TELEPHONE ENCOUNTER
Called patient's wife, Dee Dee.  Informed her that previously submitted paperwork (since patient unable to work) is complete.  Per her request, paperwork mailed to her home.  Address in [facility] confirmed.  She denies the need for any further assistance.

## 2020-09-28 ENCOUNTER — HOSPITAL ENCOUNTER (INPATIENT)
Facility: HOSPITAL | Age: 60
LOS: 8 days | Discharge: HOME | DRG: 442 | End: 2020-10-06
Attending: HOSPITALIST | Admitting: HOSPITALIST
Payer: MEDICAID

## 2020-09-28 ENCOUNTER — LAB VISIT (OUTPATIENT)
Dept: LAB | Facility: HOSPITAL | Age: 60
End: 2020-09-28
Attending: STUDENT IN AN ORGANIZED HEALTH CARE EDUCATION/TRAINING PROGRAM
Payer: MEDICAID

## 2020-09-28 ENCOUNTER — HOSPITAL ENCOUNTER (EMERGENCY)
Facility: HOSPITAL | Age: 60
Discharge: ANOTHER HOSPITAL | End: 2020-09-28
Attending: EMERGENCY MEDICINE
Payer: MEDICAID

## 2020-09-28 VITALS
HEART RATE: 82 BPM | OXYGEN SATURATION: 98 % | RESPIRATION RATE: 16 BRPM | SYSTOLIC BLOOD PRESSURE: 109 MMHG | DIASTOLIC BLOOD PRESSURE: 54 MMHG | HEIGHT: 63 IN | TEMPERATURE: 99 F | BODY MASS INDEX: 31.01 KG/M2

## 2020-09-28 DIAGNOSIS — E87.1 HYPONATREMIA: ICD-10-CM

## 2020-09-28 DIAGNOSIS — K70.31 ALCOHOLIC CIRRHOSIS OF LIVER WITH ASCITES: ICD-10-CM

## 2020-09-28 DIAGNOSIS — N17.9 AKI (ACUTE KIDNEY INJURY): Primary | ICD-10-CM

## 2020-09-28 DIAGNOSIS — N17.9 ACUTE KIDNEY INJURY: Primary | ICD-10-CM

## 2020-09-28 DIAGNOSIS — N17.9 AKI (ACUTE KIDNEY INJURY): ICD-10-CM

## 2020-09-28 LAB
ALBUMIN SERPL BCP-MCNC: 2.9 G/DL (ref 3.5–5.2)
ALP SERPL-CCNC: 240 U/L (ref 55–135)
ALT SERPL W/O P-5'-P-CCNC: 23 U/L (ref 10–44)
ANION GAP SERPL CALC-SCNC: 9 MMOL/L (ref 8–16)
AST SERPL-CCNC: 31 U/L (ref 10–40)
BASOPHILS # BLD AUTO: 0.11 K/UL (ref 0–0.2)
BASOPHILS NFR BLD: 0.5 % (ref 0–1.9)
BILIRUB SERPL-MCNC: 1.2 MG/DL (ref 0.1–1)
BUN SERPL-MCNC: 58 MG/DL (ref 6–20)
CALCIUM SERPL-MCNC: 9.1 MG/DL (ref 8.7–10.5)
CHLORIDE SERPL-SCNC: 93 MMOL/L (ref 95–110)
CO2 SERPL-SCNC: 21 MMOL/L (ref 23–29)
CREAT SERPL-MCNC: 3.5 MG/DL (ref 0.5–1.4)
DIFFERENTIAL METHOD: ABNORMAL
EOSINOPHIL # BLD AUTO: 0.8 K/UL (ref 0–0.5)
EOSINOPHIL NFR BLD: 3.4 % (ref 0–8)
ERYTHROCYTE [DISTWIDTH] IN BLOOD BY AUTOMATED COUNT: 13.2 % (ref 11.5–14.5)
EST. GFR  (AFRICAN AMERICAN): 21 ML/MIN/1.73 M^2
EST. GFR  (NON AFRICAN AMERICAN): 18.2 ML/MIN/1.73 M^2
GLUCOSE SERPL-MCNC: 105 MG/DL (ref 70–110)
HCT VFR BLD AUTO: 36.2 % (ref 40–54)
HGB BLD-MCNC: 12.4 G/DL (ref 14–18)
IMM GRANULOCYTES # BLD AUTO: 0.21 K/UL (ref 0–0.04)
IMM GRANULOCYTES NFR BLD AUTO: 0.9 % (ref 0–0.5)
INR PPP: 1.3 (ref 0.8–1.2)
LYMPHOCYTES # BLD AUTO: 1.6 K/UL (ref 1–4.8)
LYMPHOCYTES NFR BLD: 7.4 % (ref 18–48)
MCH RBC QN AUTO: 33.2 PG (ref 27–31)
MCHC RBC AUTO-ENTMCNC: 34.3 G/DL (ref 32–36)
MCV RBC AUTO: 97 FL (ref 82–98)
MONOCYTES # BLD AUTO: 1.3 K/UL (ref 0.3–1)
MONOCYTES NFR BLD: 5.7 % (ref 4–15)
NEUTROPHILS # BLD AUTO: 18.3 K/UL (ref 1.8–7.7)
NEUTROPHILS NFR BLD: 82.1 % (ref 38–73)
NRBC BLD-RTO: 0 /100 WBC
PLATELET # BLD AUTO: 266 K/UL (ref 150–350)
PMV BLD AUTO: 10.3 FL (ref 9.2–12.9)
POTASSIUM SERPL-SCNC: 4.9 MMOL/L (ref 3.5–5.1)
PROT SERPL-MCNC: 6.3 G/DL (ref 6–8.4)
PROTHROMBIN TIME: 13.1 SEC (ref 9–12.5)
RBC # BLD AUTO: 3.73 M/UL (ref 4.6–6.2)
SARS-COV-2 RDRP RESP QL NAA+PROBE: NEGATIVE
SODIUM SERPL-SCNC: 123 MMOL/L (ref 136–145)
WBC # BLD AUTO: 22.23 K/UL (ref 3.9–12.7)

## 2020-09-28 PROCEDURE — 99223 1ST HOSP IP/OBS HIGH 75: CPT | Mod: NTX,,, | Performed by: HOSPITALIST

## 2020-09-28 PROCEDURE — 85025 COMPLETE CBC W/AUTO DIFF WBC: CPT | Mod: TXP

## 2020-09-28 PROCEDURE — 25000003 PHARM REV CODE 250: Mod: NTX | Performed by: EMERGENCY MEDICINE

## 2020-09-28 PROCEDURE — 36415 COLL VENOUS BLD VENIPUNCTURE: CPT | Mod: NTX

## 2020-09-28 PROCEDURE — 85610 PROTHROMBIN TIME: CPT | Mod: NTX

## 2020-09-28 PROCEDURE — 99223 PR INITIAL HOSPITAL CARE,LEVL III: ICD-10-PCS | Mod: NTX,,, | Performed by: HOSPITALIST

## 2020-09-28 PROCEDURE — 20600001 HC STEP DOWN PRIVATE ROOM: Mod: NTX

## 2020-09-28 PROCEDURE — 99285 EMERGENCY DEPT VISIT HI MDM: CPT | Mod: NTX

## 2020-09-28 PROCEDURE — 80053 COMPREHEN METABOLIC PANEL: CPT | Mod: TXP

## 2020-09-28 PROCEDURE — U0002 COVID-19 LAB TEST NON-CDC: HCPCS | Mod: NTX

## 2020-09-28 RX ORDER — ALBUMIN HUMAN 250 G/1000ML
80 SOLUTION INTRAVENOUS ONCE
Status: COMPLETED | OUTPATIENT
Start: 2020-09-29 | End: 2020-09-29

## 2020-09-28 RX ORDER — TALC
6 POWDER (GRAM) TOPICAL NIGHTLY PRN
Status: DISCONTINUED | OUTPATIENT
Start: 2020-09-29 | End: 2020-10-06 | Stop reason: HOSPADM

## 2020-09-28 RX ORDER — ACETAMINOPHEN 325 MG/1
325 TABLET ORAL EVERY 4 HOURS PRN
Status: DISCONTINUED | OUTPATIENT
Start: 2020-09-29 | End: 2020-10-06 | Stop reason: HOSPADM

## 2020-09-28 RX ORDER — IBUPROFEN 200 MG
16 TABLET ORAL
Status: DISCONTINUED | OUTPATIENT
Start: 2020-09-29 | End: 2020-10-06 | Stop reason: HOSPADM

## 2020-09-28 RX ORDER — PANTOPRAZOLE SODIUM 40 MG/1
40 TABLET, DELAYED RELEASE ORAL DAILY
Status: DISCONTINUED | OUTPATIENT
Start: 2020-09-29 | End: 2020-10-06 | Stop reason: HOSPADM

## 2020-09-28 RX ORDER — MIDODRINE HYDROCHLORIDE 5 MG/1
15 TABLET ORAL EVERY 8 HOURS
Status: DISCONTINUED | OUTPATIENT
Start: 2020-09-29 | End: 2020-10-06 | Stop reason: HOSPADM

## 2020-09-28 RX ORDER — ROPINIROLE 0.25 MG/1
0.25 TABLET, FILM COATED ORAL NIGHTLY
Status: DISCONTINUED | OUTPATIENT
Start: 2020-09-29 | End: 2020-10-06 | Stop reason: HOSPADM

## 2020-09-28 RX ORDER — ONDANSETRON 2 MG/ML
4 INJECTION INTRAMUSCULAR; INTRAVENOUS EVERY 8 HOURS PRN
Status: DISCONTINUED | OUTPATIENT
Start: 2020-09-29 | End: 2020-10-06 | Stop reason: HOSPADM

## 2020-09-28 RX ORDER — TRAZODONE HYDROCHLORIDE 100 MG/1
100 TABLET ORAL NIGHTLY
Status: DISCONTINUED | OUTPATIENT
Start: 2020-09-29 | End: 2020-10-06 | Stop reason: HOSPADM

## 2020-09-28 RX ORDER — ESCITALOPRAM OXALATE 5 MG/1
5 TABLET ORAL DAILY
Status: DISCONTINUED | OUTPATIENT
Start: 2020-09-29 | End: 2020-10-06 | Stop reason: HOSPADM

## 2020-09-28 RX ORDER — OCTREOTIDE ACETATE 50 UG/ML
50 INJECTION, SOLUTION INTRAVENOUS; SUBCUTANEOUS EVERY 8 HOURS
Status: DISCONTINUED | OUTPATIENT
Start: 2020-09-29 | End: 2020-09-29

## 2020-09-28 RX ORDER — IPRATROPIUM BROMIDE AND ALBUTEROL SULFATE 2.5; .5 MG/3ML; MG/3ML
3 SOLUTION RESPIRATORY (INHALATION) EVERY 6 HOURS PRN
Status: DISCONTINUED | OUTPATIENT
Start: 2020-09-29 | End: 2020-10-06 | Stop reason: HOSPADM

## 2020-09-28 RX ORDER — SODIUM CHLORIDE 9 MG/ML
1000 INJECTION, SOLUTION INTRAVENOUS
Status: COMPLETED | OUTPATIENT
Start: 2020-09-28 | End: 2020-09-28

## 2020-09-28 RX ORDER — IBUPROFEN 200 MG
24 TABLET ORAL
Status: DISCONTINUED | OUTPATIENT
Start: 2020-09-29 | End: 2020-10-06 | Stop reason: HOSPADM

## 2020-09-28 RX ORDER — SODIUM CHLORIDE 0.9 % (FLUSH) 0.9 %
10 SYRINGE (ML) INJECTION
Status: DISCONTINUED | OUTPATIENT
Start: 2020-09-29 | End: 2020-10-06 | Stop reason: HOSPADM

## 2020-09-28 RX ADMIN — SODIUM CHLORIDE 1000 ML: 9 INJECTION, SOLUTION INTRAVENOUS at 01:09

## 2020-09-28 NOTE — ED PROVIDER NOTES
Encounter Date: 9/28/2020       History     Chief Complaint   Patient presents with    Labs Only     to ER to be evaluated for transfer to [place].      59 yo male with history of acholic cirrhosis, ascites, HTN here via POV after he was reportedly called by his transplant team and instructed to report to the nearest ED following some labs drawn this AM showing an acute kidney injury. The patient is without acute complaint and reports he is at his baseline. No fever. No recent illness or known sick contacts. Onset unclear, but appears new since labs about 10 days ago. No aggravating or alleviating factors.         Review of patient's allergies indicates:  No Known Allergies  Past Medical History:   Diagnosis Date    Hepatitis     Hypertension     Liver failure      Past Surgical History:   Procedure Laterality Date    COLONOSCOPY N/A 9/17/2020    Procedure: COLONOSCOPY;  Surgeon: Jhonathan Lieberman MD;  Location: [facility] ([address] [place] [address]);  Service: Endoscopy;  Laterality: N/A;    ESOPHAGOGASTRODUODENOSCOPY N/A 7/31/2020    Procedure: EGD (ESOPHAGOGASTRODUODENOSCOPY);  Surgeon: Jhonathan Lieberman MD;  Location: [address]);  Service: Endoscopy;  Laterality: N/A;     No family history on file.  Social History     Tobacco Use    Smoking status: Never Smoker    Smokeless tobacco: Never Used   Substance Use Topics    Alcohol use: Not Currently     Frequency: Never    Drug use: Not on file     Review of Systems   Constitutional: Positive for fatigue. Negative for chills and fever.   Respiratory: Negative.    Cardiovascular: Negative.    Gastrointestinal: Positive for abdominal distention. Negative for abdominal pain, constipation, diarrhea, nausea and vomiting.   All other systems reviewed and are negative.      Physical Exam     Initial Vitals [09/28/20 1309]   BP Pulse Resp Temp SpO2   (!) 109/59 85 16 98.8 °F (37.1 °C) 98 %      MAP       --         Physical Exam    Nursing note and vitals reviewed.  Constitutional:  He is not diaphoretic. No distress.   Appears chronically ill   HENT:   Head: Normocephalic and atraumatic.   Mouth/Throat: No oropharyngeal exudate.   Eyes: Conjunctivae are normal. Right eye exhibits no discharge. Left eye exhibits no discharge. No scleral icterus.   Neck: Normal range of motion. Neck supple. No JVD present.   Cardiovascular: Normal rate, regular rhythm and intact distal pulses.   Pulmonary/Chest: Breath sounds normal. No stridor. No respiratory distress. He has no wheezes. He has no rhonchi. He has no rales.   Abdominal: Soft. Bowel sounds are normal. He exhibits distension. There is no abdominal tenderness. There is no rebound and no guarding.   Musculoskeletal: Normal range of motion. No tenderness.   Neurological: He is alert and oriented to person, place, and time.   Skin: Skin is warm. Capillary refill takes less than 2 seconds. No rash noted. No erythema.         ED Course   Procedures  Labs Reviewed   SARS-COV-2 RNA AMPLIFICATION, QUAL          Imaging Results    None                                      Clinical Impression:     ICD-10-CM ICD-9-CM   1. Acute kidney injury  N17.9 584.9   2. Hyponatremia  E87.1 276.1   3. Alcoholic cirrhosis of liver with ascites  K70.31 571.2                      Disposition:   Disposition: Transferred  Condition: Fair     ED Disposition Condition    Transfer to Another Facility Stable                            Chetan Gibbons MD  09/28/20 8383

## 2020-09-28 NOTE — PROGRESS NOTES
Called patient and discussed labs.  Most concerning creatinine of 3.5 from 2.1 ten days ago and serum sodium 123 from 130.  I recommended he present to nearest ED.  Patient and his wife expressed understanding.

## 2020-09-28 NOTE — PLAN OF CARE
Outside Transfer Acceptance Note / Regional Referral Center    Date of acceptance: 09/28/2020    Referring facility/ provider:  [facility] Dr Chetan Gibbons     Reason for transfer:  Higher level of care /  Acute renal  Failure /  Alcoholic cirrhosis    Report from referring provider:    60-year-old m with a H/O alcoholic cirrhosis, ascites, HTN presented to [facility] after a call from his XPL team with instructions to report to the nearest ED following labs drawn earlier this morning showing acute kidney injury.  On exam the patient is AOX3 ROS pos for abd distension.  Otherwise ROS unremarkable.  /59 temp 98.8° HR 85 RR 16 WBC 22.23 (22.04 on 9/18/20), Hb 12.4 INR 1.3, Na 123 K 4.9 BUN 58 (32 on 9/18/20) Cr 3.5 (2.1 on 9/18/20)  bilirubin 1.2 (1.9 on 09/18/20).  The referring provider spoke to [facility] hepatology (Dr. Staley) who recommended transfer to [facility] liver service.    Temp:  [98.8 °F (37.1 °C)]   Pulse:  [85]   Resp:  [16]   BP: (109)/(59)   SpO2:  [98 %]     Recent Labs   Lab 09/28/20  1007   WBC 22.23*   HGB 12.4*   HCT 36.2*        Recent Labs   Lab 09/28/20  1007   *   K 4.9   CL 93*   CO2 21*   BUN 58*   CREATININE 3.5*      CALCIUM 9.1     Recent Labs   Lab 09/28/20  1007   ALKPHOS 240*   ALT 23   AST 31   ALBUMIN 2.9*   PROT 6.3   BILITOT 1.2*   INR 1.3*       To Do List:    Admit to     Upon patient arrival to floor, please send SecureChat to [facility] or call extension [phone] (if no answer, this will flip to a beeper, so enter your call back number) for Hospital Medicine admit team assignment and for additional admit orders for the patient.  Do not page the attending physician associated with the patient on arrival (this physician may not be on duty at the time of arrival).  Rather, always call [phone] to reach the triage physician for orders and team assignment.    Fady Benjamin MD A  / Hospital Medicine

## 2020-09-28 NOTE — ED NOTES
Pt accepted @ [facility].  Report can be called to [phone].  Transfer Center setting up transport.

## 2020-09-29 LAB
ALBUMIN SERPL BCP-MCNC: 4.5 G/DL (ref 3.5–5.2)
ALP SERPL-CCNC: 169 U/L (ref 55–135)
ALT SERPL W/O P-5'-P-CCNC: 14 U/L (ref 10–44)
ANION GAP SERPL CALC-SCNC: 12 MMOL/L (ref 8–16)
APPEARANCE FLD: CLEAR
AST SERPL-CCNC: 24 U/L (ref 10–40)
BASOPHILS # BLD AUTO: 0.06 K/UL (ref 0–0.2)
BASOPHILS # BLD AUTO: 0.08 K/UL (ref 0–0.2)
BASOPHILS NFR BLD: 0.4 % (ref 0–1.9)
BASOPHILS NFR BLD: 0.6 % (ref 0–1.9)
BILIRUB SERPL-MCNC: 1.4 MG/DL (ref 0.1–1)
BODY FLD TYPE: NORMAL
BUN SERPL-MCNC: 58 MG/DL (ref 6–20)
CALCIUM SERPL-MCNC: 9.3 MG/DL (ref 8.7–10.5)
CHLORIDE SERPL-SCNC: 93 MMOL/L (ref 95–110)
CO2 SERPL-SCNC: 19 MMOL/L (ref 23–29)
COLOR FLD: YELLOW
CREAT SERPL-MCNC: 3.3 MG/DL (ref 0.5–1.4)
DIFFERENTIAL METHOD: ABNORMAL
DIFFERENTIAL METHOD: ABNORMAL
EOSINOPHIL # BLD AUTO: 0.7 K/UL (ref 0–0.5)
EOSINOPHIL # BLD AUTO: 0.8 K/UL (ref 0–0.5)
EOSINOPHIL NFR BLD: 4.7 % (ref 0–8)
EOSINOPHIL NFR BLD: 5.4 % (ref 0–8)
ERYTHROCYTE [DISTWIDTH] IN BLOOD BY AUTOMATED COUNT: 13.2 % (ref 11.5–14.5)
ERYTHROCYTE [DISTWIDTH] IN BLOOD BY AUTOMATED COUNT: 13.3 % (ref 11.5–14.5)
EST. GFR  (AFRICAN AMERICAN): 22.5 ML/MIN/1.73 M^2
EST. GFR  (NON AFRICAN AMERICAN): 19.5 ML/MIN/1.73 M^2
FIBRINOGEN PPP-MCNC: 395 MG/DL (ref 182–366)
GLUCOSE SERPL-MCNC: 104 MG/DL (ref 70–110)
HAPTOGLOB SERPL-MCNC: 127 MG/DL (ref 30–250)
HCT VFR BLD AUTO: 29.4 % (ref 40–54)
HCT VFR BLD AUTO: 29.5 % (ref 40–54)
HGB BLD-MCNC: 10.3 G/DL (ref 14–18)
HGB BLD-MCNC: 9.6 G/DL (ref 14–18)
IMM GRANULOCYTES # BLD AUTO: 0.09 K/UL (ref 0–0.04)
IMM GRANULOCYTES # BLD AUTO: 0.13 K/UL (ref 0–0.04)
IMM GRANULOCYTES NFR BLD AUTO: 0.6 % (ref 0–0.5)
IMM GRANULOCYTES NFR BLD AUTO: 0.9 % (ref 0–0.5)
INR PPP: 1.3 (ref 0.8–1.2)
LACTATE SERPL-SCNC: 2.7 MMOL/L (ref 0.5–2.2)
LDH SERPL L TO P-CCNC: 166 U/L (ref 110–260)
LYMPHOCYTES # BLD AUTO: 1.4 K/UL (ref 1–4.8)
LYMPHOCYTES # BLD AUTO: 1.4 K/UL (ref 1–4.8)
LYMPHOCYTES NFR BLD: 9.5 % (ref 18–48)
LYMPHOCYTES NFR BLD: 9.7 % (ref 18–48)
LYMPHOCYTES NFR FLD MANUAL: 41 %
MCH RBC QN AUTO: 33 PG (ref 27–31)
MCH RBC QN AUTO: 34.3 PG (ref 27–31)
MCHC RBC AUTO-ENTMCNC: 32.7 G/DL (ref 32–36)
MCHC RBC AUTO-ENTMCNC: 34.9 G/DL (ref 32–36)
MCV RBC AUTO: 101 FL (ref 82–98)
MCV RBC AUTO: 98 FL (ref 82–98)
MONOCYTES # BLD AUTO: 0.7 K/UL (ref 0.3–1)
MONOCYTES # BLD AUTO: 0.9 K/UL (ref 0.3–1)
MONOCYTES NFR BLD: 5.3 % (ref 4–15)
MONOCYTES NFR BLD: 5.8 % (ref 4–15)
NEUTROPHILS # BLD AUTO: 10.9 K/UL (ref 1.8–7.7)
NEUTROPHILS # BLD AUTO: 11.6 K/UL (ref 1.8–7.7)
NEUTROPHILS NFR BLD: 78.4 % (ref 38–73)
NEUTROPHILS NFR BLD: 78.7 % (ref 38–73)
NEUTROPHILS NFR FLD MANUAL: 59 %
NRBC BLD-RTO: 0 /100 WBC
NRBC BLD-RTO: 0 /100 WBC
PLATELET # BLD AUTO: 207 K/UL (ref 150–350)
PLATELET # BLD AUTO: 292 K/UL (ref 150–350)
PMV BLD AUTO: 10.1 FL (ref 9.2–12.9)
PMV BLD AUTO: 10.9 FL (ref 9.2–12.9)
POTASSIUM SERPL-SCNC: 4.8 MMOL/L (ref 3.5–5.1)
PROT SERPL-MCNC: 6.6 G/DL (ref 6–8.4)
PROTHROMBIN TIME: 13.7 SEC (ref 9–12.5)
RBC # BLD AUTO: 2.91 M/UL (ref 4.6–6.2)
RBC # BLD AUTO: 3 M/UL (ref 4.6–6.2)
SODIUM SERPL-SCNC: 124 MMOL/L (ref 136–145)
WBC # BLD AUTO: 13.95 K/UL (ref 3.9–12.7)
WBC # BLD AUTO: 14.78 K/UL (ref 3.9–12.7)
WBC # FLD: 76 /CU MM

## 2020-09-29 PROCEDURE — 85384 FIBRINOGEN ACTIVITY: CPT | Mod: NTX

## 2020-09-29 PROCEDURE — 99233 PR SUBSEQUENT HOSPITAL CARE,LEVL III: ICD-10-PCS | Mod: NTX,,, | Performed by: HOSPITALIST

## 2020-09-29 PROCEDURE — 99233 SBSQ HOSP IP/OBS HIGH 50: CPT | Mod: NTX,,, | Performed by: INTERNAL MEDICINE

## 2020-09-29 PROCEDURE — 87075 CULTR BACTERIA EXCEPT BLOOD: CPT | Mod: NTX

## 2020-09-29 PROCEDURE — 99233 PR SUBSEQUENT HOSPITAL CARE,LEVL III: ICD-10-PCS | Mod: NTX,,, | Performed by: INTERNAL MEDICINE

## 2020-09-29 PROCEDURE — 63600175 PHARM REV CODE 636 W HCPCS: Mod: NTX | Performed by: HOSPITALIST

## 2020-09-29 PROCEDURE — 85025 COMPLETE CBC W/AUTO DIFF WBC: CPT | Mod: 91,NTX

## 2020-09-29 PROCEDURE — 83615 LACTATE (LD) (LDH) ENZYME: CPT | Mod: NTX

## 2020-09-29 PROCEDURE — 97161 PT EVAL LOW COMPLEX 20 MIN: CPT | Mod: NTX

## 2020-09-29 PROCEDURE — 83605 ASSAY OF LACTIC ACID: CPT | Mod: NTX

## 2020-09-29 PROCEDURE — 83010 ASSAY OF HAPTOGLOBIN QUANT: CPT | Mod: NTX

## 2020-09-29 PROCEDURE — 80053 COMPREHEN METABOLIC PANEL: CPT | Mod: NTX

## 2020-09-29 PROCEDURE — 36415 COLL VENOUS BLD VENIPUNCTURE: CPT | Mod: NTX

## 2020-09-29 PROCEDURE — 97165 OT EVAL LOW COMPLEX 30 MIN: CPT | Mod: NTX

## 2020-09-29 PROCEDURE — P9047 ALBUMIN (HUMAN), 25%, 50ML: HCPCS | Mod: JG,NTX | Performed by: HOSPITALIST

## 2020-09-29 PROCEDURE — 25000003 PHARM REV CODE 250: Mod: NTX | Performed by: HOSPITALIST

## 2020-09-29 PROCEDURE — 87070 CULTURE OTHR SPECIMN AEROBIC: CPT | Mod: NTX

## 2020-09-29 PROCEDURE — 85610 PROTHROMBIN TIME: CPT | Mod: NTX

## 2020-09-29 PROCEDURE — 20600001 HC STEP DOWN PRIVATE ROOM: Mod: NTX

## 2020-09-29 PROCEDURE — 89051 BODY FLUID CELL COUNT: CPT | Mod: NTX

## 2020-09-29 PROCEDURE — 87040 BLOOD CULTURE FOR BACTERIA: CPT | Mod: 59,NTX

## 2020-09-29 PROCEDURE — 80321 ALCOHOLS BIOMARKERS 1OR 2: CPT | Mod: NTX

## 2020-09-29 PROCEDURE — 99233 SBSQ HOSP IP/OBS HIGH 50: CPT | Mod: NTX,,, | Performed by: HOSPITALIST

## 2020-09-29 RX ORDER — ALBUMIN HUMAN 250 G/1000ML
12.5 SOLUTION INTRAVENOUS CONTINUOUS PRN
Status: DISPENSED | OUTPATIENT
Start: 2020-09-29 | End: 2020-09-29

## 2020-09-29 RX ORDER — ALBUMIN HUMAN 250 G/1000ML
25 SOLUTION INTRAVENOUS 2 TIMES DAILY
Status: DISCONTINUED | OUTPATIENT
Start: 2020-09-30 | End: 2020-09-29

## 2020-09-29 RX ORDER — LACTULOSE 10 G/15ML
15 SOLUTION ORAL 3 TIMES DAILY
Status: DISCONTINUED | OUTPATIENT
Start: 2020-09-29 | End: 2020-10-06 | Stop reason: HOSPADM

## 2020-09-29 RX ORDER — ALBUMIN HUMAN 250 G/1000ML
50 SOLUTION INTRAVENOUS ONCE
Status: COMPLETED | OUTPATIENT
Start: 2020-09-29 | End: 2020-09-29

## 2020-09-29 RX ORDER — OCTREOTIDE ACETATE 50 UG/ML
100 INJECTION, SOLUTION INTRAVENOUS; SUBCUTANEOUS EVERY 8 HOURS
Status: DISCONTINUED | OUTPATIENT
Start: 2020-09-29 | End: 2020-09-30

## 2020-09-29 RX ADMIN — OCTREOTIDE ACETATE 50 MCG: 50 INJECTION, SOLUTION INTRAVENOUS; SUBCUTANEOUS at 05:09

## 2020-09-29 RX ADMIN — ALBUMIN (HUMAN) 12.5 G: 12.5 SOLUTION INTRAVENOUS at 01:09

## 2020-09-29 RX ADMIN — MIDODRINE HYDROCHLORIDE 15 MG: 5 TABLET ORAL at 01:09

## 2020-09-29 RX ADMIN — CEFTRIAXONE 2 G: 2 INJECTION, SOLUTION INTRAVENOUS at 11:09

## 2020-09-29 RX ADMIN — APIXABAN 5 MG: 5 TABLET, FILM COATED ORAL at 09:09

## 2020-09-29 RX ADMIN — MIDODRINE HYDROCHLORIDE 15 MG: 5 TABLET ORAL at 09:09

## 2020-09-29 RX ADMIN — ALBUMIN HUMAN 50 G: 0.25 SOLUTION INTRAVENOUS at 12:09

## 2020-09-29 RX ADMIN — OCTREOTIDE ACETATE 100 MCG: 50 INJECTION, SOLUTION INTRAVENOUS; SUBCUTANEOUS at 11:09

## 2020-09-29 RX ADMIN — LACTULOSE 15 G: 20 SOLUTION ORAL at 09:09

## 2020-09-29 RX ADMIN — ESCITALOPRAM 5 MG: 5 TABLET, FILM COATED ORAL at 08:09

## 2020-09-29 RX ADMIN — LACTULOSE 15 G: 20 SOLUTION ORAL at 08:09

## 2020-09-29 RX ADMIN — OCTREOTIDE ACETATE 50 MCG: 50 INJECTION, SOLUTION INTRAVENOUS; SUBCUTANEOUS at 12:09

## 2020-09-29 RX ADMIN — TRAZODONE HYDROCHLORIDE 100 MG: 100 TABLET ORAL at 09:09

## 2020-09-29 RX ADMIN — LACTULOSE 15 G: 20 SOLUTION ORAL at 02:09

## 2020-09-29 RX ADMIN — PANTOPRAZOLE SODIUM 40 MG: 40 TABLET, DELAYED RELEASE ORAL at 08:09

## 2020-09-29 RX ADMIN — ROPINIROLE HYDROCHLORIDE 0.25 MG: 0.25 TABLET, FILM COATED ORAL at 09:09

## 2020-09-29 RX ADMIN — MIDODRINE HYDROCHLORIDE 15 MG: 5 TABLET ORAL at 05:09

## 2020-09-29 RX ADMIN — ALBUMIN (HUMAN) 80 G: 12.5 SOLUTION INTRAVENOUS at 01:09

## 2020-09-29 RX ADMIN — OCTREOTIDE ACETATE 100 MCG: 50 INJECTION, SOLUTION INTRAVENOUS; SUBCUTANEOUS at 02:09

## 2020-09-29 RX ADMIN — APIXABAN 5 MG: 5 TABLET, FILM COATED ORAL at 08:09

## 2020-09-29 NOTE — PT/OT/SLP EVAL
Physical Therapy Evaluation and Discharge Note    Patient Name:  Kenneth Pardo   MRN:  [id number]    Recommendations:     Discharge Recommendations:  home   Discharge Equipment Recommendations: none   Barriers to discharge: None    Assessment:     Kenneth Pardo is a 60 y.o. male admitted with a medical diagnosis of MERLY (acute kidney injury). Pt tolerated session well and appears to be performing at his functional baseline. Baker for bed mobility. Supervision for standing balance while performing dynamic activities for ~3 min. Amb ~100 ft with no AD and SBA. Ascend/descend 7 steps using R HR and CGA for safety. He demo's good safety awareness, adequate strength, and stability. He is safe to return home. At this time, patient is functioning at their prior level of function and does not require further acute PT services. Acute PT services being d/c at this time. Please re-consult if pt shows a decline in functional mobility.     Recent Surgery: * No surgery found *      Plan:     During this hospitalization, patient does not require further acute PT services.  Please re-consult if situation changes.      Subjective     Chief Complaint: did not state  Patient/Family Comments/goals: to return home  Pain/Comfort:  · Pain Rating 1: 0/10    Patients cultural, spiritual, Scientologist conflicts given the current situation: no    Living Environment:  Pt lives with wife and son in trailer with 4-5 GERRY, no HR. Bath/shower combo.   Prior to admission, patients level of function was modified independent. He does not work but drives independently. Equipment used at home: none.  DME owned (not currently used): none.  Upon discharge, patient will have assistance from wife.    Objective:     Communicated with RN prior to session.  Patient found HOB elevated with   upon PT entry to room.    General Precautions: Standard, fall   Orthopedic Precautions:    Braces: N/A     Exams:    Cognitive Exam  Patient is A&O x4 and follows 100% of  one -step commands    Fine Motor Coordination   -       WNL     Postural Exam Patient presented with the following abnormalities:    -       Rounded shoulders  -       Forward head  -       Kyphosis  -       Posterior pelvic tilt   Sensation    -       Light touch intact   Skin Integrity/Edema     -       Skin integrity: visibly intact  -       Edema: NA   R LE ROM WNL   R LE Strength Grossly 4+/5 hip flexion, knee ext/flex, and ankle DF/PF   L LE ROM WNL    L LE Strength  grossly 4+/5 hip flexion, knee ext/flex, and ankle DF/PF       Balance   Static Sitting independent   Dynamic Sitting independent   Static Standing independent   Dynamic Standing       supervision         Functional Mobility:    Bed Mobility  Rolling to R: Independent  Supine to Sit on the R side:  independent  Scoot to EOB in sitting: independent   Transfers Sit to Stand:  independent   Gait  Gait Distance: ~50 ft + ~50 ft with no AD  Assistance Level: SBA (for safety)  Description: reciprocal steps, decreased foot clearance, hips externally rotated, lateral excursion, decreased stride length    Stairs Ascended/descended: 7 stairs   Quality:   -Ascend: reciprocal steps, steady with no LOB  -Descend: step to pattern leading with the R, steady with no LOB  Level of assist: CGA (for safety)  Rails used: R     Stair Training: performing step to pattern to ensure safety, straddling HR with both hands         AM-PAC 6 CLICK MOBILITY  Total Score:24       Therapeutic Activities and Exercises:   -Pt safe to amb independently with RN x 1 assist. Whiteboard updated.  -Educated pt on safety with mobility and importance of out of bed activities.   -Discussed roles and goals of physical therapy in the acute care setting.   -Pt expressed understanding and agreement to all.     Left pt in chair. Instructed him to call for RN when ready to return to bed just to ensure safety. Call light within reach.       AM-PAC 6 CLICK MOBILITY  Total Score:24     Patient left  up in chair with all lines intact and call button in reach.    GOALS:   Multidisciplinary Problems     Physical Therapy Goals     Not on file          Multidisciplinary Problems (Resolved)        Problem: Physical Therapy Goal    Goal Priority Disciplines Outcome Goal Variances Interventions   Physical Therapy Goal   (Resolved)     PT, PT/OT Met     Description: Initial evaluation complete. Pt performing at functional baseline. He is safe to return home with no further physical therapy services. Acute physical therapy services being d/c at this time. Please re-consult if pt has a decline in functional mobility.                    History:     Past Medical History:   Diagnosis Date    Hepatitis     Hypertension     Liver failure        Past Surgical History:   Procedure Laterality Date    COLONOSCOPY N/A 9/17/2020    Procedure: COLONOSCOPY;  Surgeon: Jhonathan Lieberman MD;  Location: [address]);  Service: Endoscopy;  Laterality: N/A;    ESOPHAGOGASTRODUODENOSCOPY N/A 7/31/2020    Procedure: EGD (ESOPHAGOGASTRODUODENOSCOPY);  Surgeon: Jhonathan Lieberman MD;  Location: [address]);  Service: Endoscopy;  Laterality: N/A;       Time Tracking:     PT Received On: 09/29/20  PT Start Time: 1044     PT Stop Time: 1059  PT Total Time (min): 15 min     Billable Minutes: Evaluation 15 (co-eval with OT)      Ramona Barboza, NAWAF  09/29/2020

## 2020-09-29 NOTE — H&P
Inpatient Radiology Pre-procedure Note    History of Present Illness:  Kenneth Pardo is a 60 y.o. male who presents for ultrasound guided paracentesis.  Admission H&P reviewed.  Past Medical History:   Diagnosis Date    Hepatitis     Hypertension     Liver failure      Past Surgical History:   Procedure Laterality Date    COLONOSCOPY N/A 9/17/2020    Procedure: COLONOSCOPY;  Surgeon: Jhonathan Lieberman MD;  Location: [facility] ([address] [place] [address]);  Service: Endoscopy;  Laterality: N/A;    ESOPHAGOGASTRODUODENOSCOPY N/A 7/31/2020    Procedure: EGD (ESOPHAGOGASTRODUODENOSCOPY);  Surgeon: Jhonathan Lieberman MD;  Location: [facility] ([address] [place] [address]);  Service: Endoscopy;  Laterality: N/A;       Review of Systems:   As documented in primary team H&P    Home Meds:   Prior to Admission medications    Medication Sig Start Date End Date Taking? Authorizing Provider   apixaban (ELIQUIS) 5 mg Tab Take 2 tablets (10mg) by mouth twice daily until 9/22, then take 1 tablet (5mg) by mouth twice daily. 9/18/20   Aicha Rojas MD   ciprofloxacin HCl (CIPRO) 500 MG tablet Take 1 tablet (500 mg total) by mouth once daily. 9/18/20   Aicha Rojas MD   escitalopram oxalate (LEXAPRO) 5 MG Tab Take 1 tablet (5 mg total) by mouth once daily. 8/9/20 8/9/21  Aicha Rojas MD   lactulose (CHRONULAC) 10 gram/15 mL solution Take 15 mLs (10 g total) by mouth 2 (two) times a day. Take enough to have 3 bowel movements per day 9/18/20 10/18/20  Aicha Rojas MD   midodrine (PROAMATINE) 5 MG Tab Take 3 tablets (15 mg total) by mouth every 8 (eight) hours. 8/9/20 9/21/20  Aicha Rojas MD   omeprazole (PRILOSEC) 40 MG capsule Take 1 capsule (40 mg total) by mouth every morning. 9/8/20 9/8/21  Mainor Madison MD   ondansetron (ZOFRAN) 4 MG tablet Take 4 mg by mouth every 8 (eight) hours as needed. 7/21/20   Historical Provider   rOPINIRole (REQUIP) 0.25 MG tablet Take 1 tablet (0.25 mg total) by mouth every evening. 8/9/20 8/9/21  Aicha Rojas MD    traZODone (DESYREL) 100 MG tablet Take 1 tablet (100 mg total) by mouth every evening. 8/9/20 8/9/21  Aicha Rojas MD     Scheduled Meds:    [START ON 9/30/2020] albumin human 25%  25 g Intravenous BID    apixaban  5 mg Oral BID    cefTRIAXone (ROCEPHIN) IVPB  2 g Intravenous Q24H    escitalopram oxalate  5 mg Oral Daily    lactulose  15 g Oral TID    midodrine  15 mg Oral Q8H    octreotide  100 mcg Subcutaneous Q8H    pantoprazole  40 mg Oral Daily    rOPINIRole  0.25 mg Oral QHS    traZODone  100 mg Oral QHS     Continuous Infusions:   PRN Meds:acetaminophen, albuterol-ipratropium, glucose, glucose, melatonin, ondansetron, promethazine (PHENERGAN) IVPB, sodium chloride 0.9%  Anticoagulants/Antiplatelets: eliquis    Allergies: Review of patient's allergies indicates:  No Known Allergies  Sedation Hx: have not been any systemic reactions    Vitals:  Temp: 97.8 °F (36.6 °C) (09/29/20 1100)  Pulse: 90 (09/29/20 1100)  Resp: 17 (09/29/20 1100)  BP: (!) 95/59 (09/29/20 1100)  SpO2: 100 % (09/29/20 1100)     Physical Exam:  ASA: 3  Mallampati: n/a    General: no acute distress  Mental Status: alert and oriented to person, place and time  HEENT: normocephalic, atraumatic  Chest: unlabored breathing  Heart: regular heart rate  Abdomen: distended  Extremity: moves all extremities    Plan: ultrasound guided paracentesis  Sedation Plan: local    Suzanne Lion PA-C  Interventional Radiology  Clinic [phone]

## 2020-09-29 NOTE — HPI
60 year old M alcoholic cirrhosis complicated by portal hypertension, ascites, portal hypertensive gastropathy, hepatic encephalopathy presents as a transfer from [facility] after he had follow up labs which showed he had an increase of his creatinine to 3.5 and decrease in his Na to 123. Patient was recently [place] [facility] [place] same problem. On that admission (9/8-9/17), it was initially thought that patient had an MERLY related to his diuretics, lasix and spironolactone, but was eventually treated for HRS after SBP was ruled out with a paracentesis. On discharge, patient had a Cr of 2.1 and Na 130. Upon discharge (9/17), patient's lasix and spironolactone were held. He reports being adherent to his 2g/day sodium restriction and has remained abstinent from alcohol.      Patient denies fevers, SOB, chest pain, abdominal pain, change in bowel or bladder habits, dysuria, frequency, decreased urine or abdominal distension changed from his prior.

## 2020-09-29 NOTE — PROCEDURES
Radiology Post-Procedure Note    Pre Op Diagnosis: Ascites  Post Op Diagnosis: Same    Procedure: Ultrasound Guided Paracentesis    Procedure performed by: Suzanne Lion PA-C    Written Informed Consent Obtained: Yes  Specimen Removed: YES clear, yellow  Estimated Blood Loss: Minimal    Findings:   Successful paracentesis.  LLQ. Albumin administered PRN per protocol.    Patient tolerated procedure well.    Suzanne Lion PA-C  Interventional Radiology  Clinic [phone]

## 2020-09-29 NOTE — SUBJECTIVE & OBJECTIVE
Review of Systems   Constitutional: Negative for appetite change, chills, diaphoresis, fatigue and fever.   HENT: Negative.    Respiratory: Negative for cough, choking and shortness of breath.    Gastrointestinal: Positive for abdominal distention (unchanged). Negative for abdominal pain, diarrhea, nausea and vomiting.   Genitourinary: Negative for difficulty urinating, dysuria, flank pain and frequency.   Musculoskeletal: Negative for arthralgias and back pain.   Psychiatric/Behavioral: Negative.        Past Medical History:   Diagnosis Date    Hepatitis     Hypertension     Liver failure        Past Surgical History:   Procedure Laterality Date    COLONOSCOPY N/A 9/17/2020    Procedure: COLONOSCOPY;  Surgeon: Jhonathan Lieberman MD;  Location: [facility] ([address] [place] [address]);  Service: Endoscopy;  Laterality: N/A;    ESOPHAGOGASTRODUODENOSCOPY N/A 7/31/2020    Procedure: EGD (ESOPHAGOGASTRODUODENOSCOPY);  Surgeon: Jhoanthan Lieberman MD;  Location: [address]);  Service: Endoscopy;  Laterality: N/A;       Family history of liver disease: No    Review of patient's allergies indicates:  No Known Allergies    Tobacco Use    Smoking status: Never Smoker    Smokeless tobacco: Never Used   Substance and Sexual Activity    Alcohol use: Not Currently     Frequency: Never    Drug use: Not on file    Sexual activity: Not on file       Medications Prior to Admission   Medication Sig Dispense Refill Last Dose    apixaban (ELIQUIS) 5 mg Tab Take 2 tablets (10mg) by mouth twice daily until 9/22, then take 1 tablet (5mg) by mouth twice daily. 70 tablet 2     ciprofloxacin HCl (CIPRO) 500 MG tablet Take 1 tablet (500 mg total) by mouth once daily. 30 tablet 2     escitalopram oxalate (LEXAPRO) 5 MG Tab Take 1 tablet (5 mg total) by mouth once daily. 30 tablet 2     lactulose (CHRONULAC) 10 gram/15 mL solution Take 15 mLs (10 g total) by mouth 2 (two) times a day. Take enough to have 3 bowel movements per day 900 mL 2     midodrine  (PROAMATINE) 5 MG Tab Take 3 tablets (15 mg total) by mouth every 8 (eight) hours. 270 tablet 2     omeprazole (PRILOSEC) 40 MG capsule Take 1 capsule (40 mg total) by mouth every morning. 30 capsule 11     ondansetron (ZOFRAN) 4 MG tablet Take 4 mg by mouth every 8 (eight) hours as needed.       rOPINIRole (REQUIP) 0.25 MG tablet Take 1 tablet (0.25 mg total) by mouth every evening. 30 tablet 2     traZODone (DESYREL) 100 MG tablet Take 1 tablet (100 mg total) by mouth every evening. 30 tablet 2        Objective:     Vital Signs (Most Recent):  Temp: 97.7 °F (36.5 °C) (09/29/20 0818)  Pulse: 90 (09/29/20 0818)  Resp: 16 (09/29/20 0818)  BP: (!) 95/58 (09/29/20 0818)  SpO2: 97 % (09/29/20 0818) Vital Signs (24h Range):  Temp:  [97.7 °F (36.5 °C)-98.8 °F (37.1 °C)] 97.7 °F (36.5 °C)  Pulse:  [82-90] 90  Resp:  [16-20] 16  SpO2:  [97 %-100 %] 97 %  BP: ()/(54-78) 95/58     Weight: 88.1 kg (194 lb 3.6 oz) (09/28/20 2245)  Body mass index is 34.41 kg/m².    Physical Exam  Vitals signs and nursing note reviewed.   Constitutional:       General: He is not in acute distress.     Appearance: He is normal weight. He is not ill-appearing, toxic-appearing or diaphoretic.   HENT:      Head: Normocephalic and atraumatic.   Eyes:      General: No scleral icterus.  Cardiovascular:      Rate and Rhythm: Normal rate and regular rhythm.   Pulmonary:      Effort: No respiratory distress.      Breath sounds: No wheezing or rales.   Abdominal:      General: There is distension.      Palpations: There is no mass.      Tenderness: There is no abdominal tenderness. There is no guarding or rebound.      Hernia: No hernia is present.      Comments: Firm abdomen   Musculoskeletal:      Right lower leg: No edema.      Left lower leg: No edema.   Skin:     Capillary Refill: Capillary refill takes 2 to 3 seconds.      Coloration: Skin is not jaundiced.   Neurological:      Mental Status: He is alert.   Psychiatric:         Mood and  Affect: Mood normal.         Thought Content: Thought content normal.         MELD-Na score: 29 at 9/29/2020  8:01 AM  MELD score: 22 at 9/29/2020  8:01 AM  Calculated from:  Serum Creatinine: 3.3 mg/dL at 9/29/2020  5:31 AM  Serum Sodium: 124 mmol/L (Rounded to 125 mmol/L) at 9/29/2020  5:31 AM  Total Bilirubin: 1.4 mg/dL at 9/29/2020  5:31 AM  INR(ratio): 1.3 at 9/29/2020  8:01 AM  Age: 60 years 1 month    Significant Labs:  Labs within the past month have been reviewed.    Significant Imaging:  Labs: Reviewed

## 2020-09-29 NOTE — PLAN OF CARE
Problem: Occupational Therapy Goal  Goal: Occupational Therapy Goal  Outcome: Met      Pt is currently performing self care tasks, functional mobility & t/fs at level requiring no physical assist. Functional strength and balance are appropriate. OT services are not recommended at this time. Pt d/c from acute OT 9/29/2020    Hailey Harrington OTR/L  Pager: [phone]  9/29/2020

## 2020-09-29 NOTE — PLAN OF CARE
Para completed, pt tolerated well. No apparent distress noted. 9.4 Liters removed from left abd, mepore applied CDI. Labs collected and sent. Albumin 300 ml given per protocol. Report called to YADY Haider.

## 2020-09-29 NOTE — H&P
Hospital Medicine  History and Physical Exam    Team: Networked reference to record PCT  Alberto Salamanca MD  Admit Date: 9/28/2020  Principal Problem:  MERLY (acute kidney injury)   Patient information was obtained from patient, past medical records and ER records.   Primary care Physician: Mikala Etienne MD  Code status: Full Code    HPI: 60-year-old M with a H/O alcoholic cirrhosis, ascites, HTN presented to [facility] after a call from his XPL team with instructions to report to the nearest ED following labs drawn earlier this morning showing acute kidney injury.  On exam the patient is AOX3 ROS pos for abd distension.  /59 temp 98.8° HR 85 RR 16 WBC 22.23 (22.04 on 9/18/20), Hb 12.4 INR 1.3, Na 123 K 4.9 BUN 58 (32 on 9/18/20) Cr 3.5 (2.1 on 9/18/20)  bilirubin 1.2 (1.9 on 09/18/20).  The referring provider spoke to [facility] hepatology (Dr. Staley) who recommended transfer to [facility] liver service.     On my assessment, pt. Has no  New complaints. He reports that he had been feeling stable since leaving the hospital. He has not noted any changes in urine output and reports that his PO intake has been the same. He denies any nausea, vomiting, fevers, or chills.    No results found for: HGBA1C    Past Medical History: Patient has a past medical history of Hepatitis, Hypertension, and Liver failure.    Past Surgical History: Patient has a past surgical history that includes Esophagogastroduodenoscopy (N/A, 7/31/2020) and Colonoscopy (N/A, 9/17/2020).    Social History: Patient reports that he has never smoked. He has never used smokeless tobacco. He reports previous alcohol use.    Family History: family history is not on file.    Medications: reviewed     Allergies: Patient has No Known Allergies.    ROS  Pain Scale: 0 /10   Constitutional: no fever or chills  Respiratory: no cough or shortness of breath  Cardiovascular: no chest pain or palpitations  Gastrointestinal: no nausea or vomiting, no abdominal  pain or change in bowel habits  Genitourinary: no hematuria or dysuria  Integument/Breast: no rash or pruritis  Hematologic/Lymphatic: no easy bruising or lymphadenopathy  Musculoskeletal: no arthralgias or myalgias  Neurological: no seizures or tremors  Behavioral/Psych: no depression or anxiety    PEx  Temp:  [98.6 °F (37 °C)-98.8 °F (37.1 °C)]   Pulse:  [82-85]   Resp:  [16]   BP: (107-116)/(54-67)   SpO2:  [97 %-98 %]   There is no height or weight on file to calculate BMI.   No intake or output data in the 24 hours ending 09/28/20 0301      General appearance: no distress,   Mental status: Alert and oriented x 3  HEENT:  conjunctivae/corneas clear, PERRL  Neck: supple, thyroid not enlarged  Pulm:   normal respiratory effort, CTA B, no c/w/r  Card: RRR, S1, S2 normal, no murmur, click, rub or gallop  Abd: soft, NT, Moderately distended, BS present; no masses, no organomegaly  Ext: 1+ LE edema B/L  Pulses: 2+, symmetric  Skin: color, texture, turgor normal. No rashes or lesions  Neuro: CN II-XII grossly intact, no focal numbness or weakness, normal strength and tone     Recent Results (from the past 24 hour(s))   CBC auto differential    Collection Time: 09/28/20 10:07 AM   Result Value Ref Range    WBC 22.23 (H) 3.90 - 12.70 K/uL    RBC 3.73 (L) 4.60 - 6.20 M/uL    Hemoglobin 12.4 (L) 14.0 - 18.0 g/dL    Hematocrit 36.2 (L) 40.0 - 54.0 %    Mean Corpuscular Volume 97 82 - 98 fL    Mean Corpuscular Hemoglobin 33.2 (H) 27.0 - 31.0 pg    Mean Corpuscular Hemoglobin Conc 34.3 32.0 - 36.0 g/dL    RDW 13.2 11.5 - 14.5 %    Platelets 266 150 - 350 K/uL    MPV 10.3 9.2 - 12.9 fL    Immature Granulocytes 0.9 (H) 0.0 - 0.5 %    Gran # (ANC) 18.3 (H) 1.8 - 7.7 K/uL    Immature Grans (Abs) 0.21 (H) 0.00 - 0.04 K/uL    Lymph # 1.6 1.0 - 4.8 K/uL    Mono # 1.3 (H) 0.3 - 1.0 K/uL    Eos # 0.8 (H) 0.0 - 0.5 K/uL    Baso # 0.11 0.00 - 0.20 K/uL    nRBC 0 0 /100 WBC    Gran% 82.1 (H) 38.0 - 73.0 %    Lymph% 7.4 (L) 18.0 - 48.0 %     Mono% 5.7 4.0 - 15.0 %    Eosinophil% 3.4 0.0 - 8.0 %    Basophil% 0.5 0.0 - 1.9 %    Differential Method Automated    Comprehensive metabolic panel    Collection Time: 09/28/20 10:07 AM   Result Value Ref Range    Sodium 123 (L) 136 - 145 mmol/L    Potassium 4.9 3.5 - 5.1 mmol/L    Chloride 93 (L) 95 - 110 mmol/L    CO2 21 (L) 23 - 29 mmol/L    Glucose 105 70 - 110 mg/dL    BUN, Bld 58 (H) 6 - 20 mg/dL    Creatinine 3.5 (H) 0.5 - 1.4 mg/dL    Calcium 9.1 8.7 - 10.5 mg/dL    Total Protein 6.3 6.0 - 8.4 g/dL    Albumin 2.9 (L) 3.5 - 5.2 g/dL    Total Bilirubin 1.2 (H) 0.1 - 1.0 mg/dL    Alkaline Phosphatase 240 (H) 55 - 135 U/L    AST 31 10 - 40 U/L    ALT 23 10 - 44 U/L    Anion Gap 9 8 - 16 mmol/L    eGFR if African American 21.0 (A) >60 mL/min/1.73 m^2    eGFR if non  18.2 (A) >60 mL/min/1.73 m^2   Protime-INR    Collection Time: 09/28/20 10:07 AM   Result Value Ref Range    Prothrombin Time 13.1 (H) 9.0 - 12.5 sec    INR 1.3 (H) 0.8 - 1.2   COVID-19 Rapid Screening    Collection Time: 09/28/20  1:22 PM   Result Value Ref Range    SARS-CoV-2 RNA, Amplification, Qual Negative Negative       No results for input(s): POCTGLUCOSE in the last 168 hours.    Active Hospital Problems    Diagnosis  POA    Hepatorenal Syndrome [N17.9]  Yes      Resolved Hospital Problems   No resolved problems to display.       Assessment and Plan:  Hepatorenal Syndrome  MERLY  -Cr 3.5 on presentation, baseline level 2.1 from recent admission  -Suspicion for hepatorenal syndrome in setting of decompensated cirrhosis  -Continue midodrine, start octreotide and albumin  -Avoid nephrotoxic medications and continue to monitor kidney function    Decompensated alcoholic cirrhosis   Recurrent ascites   Portal hypertension   Portal hypertensive gastropathy   Hepatic encephalopathy   MELD-Na score: 29 at 9/28/2020 10:07 AM  MELD score: 22 at 9/28/2020 10:07 AM  Calculated from:  Serum Creatinine: 3.5 mg/dL at 9/28/2020 10:07  AM  Serum Sodium: 123 mmol/L (Rounded to 125 mmol/L) at 9/28/2020 10:07 AM  Total Bilirubin: 1.2 mg/dL at 9/28/2020 10:07 AM  INR(ratio): 1.3 at 9/28/2020 10:07 AM  Age: 60 years 1 month  -continue lactulose for HE   -continue PPI  -Hepatology consulted     Right Leg DVT  -Continue eliquis     Hyponatremia   -Na 123 from recent baseline 130  -likely due to decompensated cirrhosis, treat as above    Leukocytosis   -WBC 22.23 was also elevated to 22.04 at time of discharge 10 days ago.  -no signs of overt infection or sepsis, pt. Afebrile.   -Continue to monitor CBC     Depression with anxiety   -no acute issue   -continue lexapro      Insomnia  -trazodone prn      Debility with falls  -PT/OT while inpateint     Hypotension   -Continue midodrine     DVT PPx: Sofie Salamanca MD  Hospital Medicine Staff  [phone] pager

## 2020-09-29 NOTE — PLAN OF CARE
Problem: Adult Inpatient Plan of Care  Goal: Plan of Care Review  Outcome: Ongoing, Progressing   POC reviewed with pt. Went for ultrasound and paracentesis today, 9.4L removed. No acute changes. Safety maintained. Will continue to monitor.

## 2020-09-29 NOTE — ASSESSMENT & PLAN NOTE
Suspected HRS given hyponatremia and MERLY, will need to rule out infectious etiology. See decompensated cirrhosis.

## 2020-09-29 NOTE — ASSESSMENT & PLAN NOTE
Patient presents for lab abnormalities with worsened Cr and hyponatremia as compared to his discharge on 9/17. Cr 3.5 on admission compared to 2.1 on last discharge. INR and TBili improved compared to DC labs 9/17.      Recomendations   - sepsis work up - CXR, urine cx, blood cx  - obtain paracentesis to rule out SBP   - treat for presumed SBP  - continue HRS protocol - octreotide, albumin, midodrine  - sodium restriction 2g/day

## 2020-09-29 NOTE — PT/OT/SLP EVAL
Occupational Therapy   Evaluation and Discharge Note    Name: Kenneth Pardo  MRN: [id number]  Admitting Diagnosis:  MERLY (acute kidney injury)      Recommendations:     Discharge Recommendations: home  Discharge Equipment Recommendations:  none  Barriers to discharge:  None    Assessment:     Kenneth Pardo is a 60 y.o. male with a medical diagnosis of MERLY (acute kidney injury). At this time, patient is functioning at their prior level of function and does not require further acute OT services. Pt performed bed mobility with mod (I), sit<>stand transfers with independence,, and functional mobility with supervision. Pt does not required skilled OT intervention and is to be d/c from acute OT 9/29/2020.     Plan:     During this hospitalization, patient does not require further acute OT services.  Please re-consult if situation changes.    · Plan of Care Reviewed with: patient    Subjective     Chief Complaint: none stated   Patient/Family Comments/goals: to return home    Occupational Profile:  Living Environment: Pt lives with his wife and son in a trailer with 4- 5 GERRY and R HR present. Pt has a tub/shower combo with no DME present. Pt does not work but was driving.   Previous level of function: PTA, pt was (I) with ADLs and functional mobility   Roles and Routines: , father   Equipment Used at home:  none  Assistance upon Discharge: Pt will have assistance from family upon d/c.     Pain/Comfort:  · Pain Rating 1: 0/10  · Pain Rating Post-Intervention 1: 0/10    Patients cultural, spiritual, Scientologist conflicts given the current situation: no    Objective:     Communicated with: RN prior to session.  Patient found HOB elevated with telemetry upon OT entry to room. Pt agreeable to therapy session.     General Precautions: Standard, fall   Orthopedic Precautions:N/A   Braces: N/A     Occupational Performance:    Bed Mobility:    · Patient completed Scooting/Bridging with independence  · Patient completed Supine to  Sit with independence    Functional Mobility/Transfers:  · Patient completed Sit <> Stand Transfer with independence  with  no assistive device   · Functional Mobility: Pt engaging in functional mobility to simulate household/community distances approx 100ft  with supervision and utilizing no AD in order to maximize functional activity tolerance and standing balance required for engagement in occupations of choice.  · Pt with no LOB and no fatigue noted.   · Pt ascended/descend 7 steps with SBA <>CGA using R HR.     Activities of Daily Living:  · Grooming: independence pt combed hair while standing at the sink.   · Upper Body Dressing: modified independence donning gown like robe while sitting EOB     Cognitive/Visual Perceptual:  Cognitive/Psychosocial Skills:     -       Oriented to: Person, Place, Time and Situation   -       Follows Commands/attention:Follows multistep  commands  -       Communication: clear/fluent  -       Memory: No Deficits noted  -       Safety awareness/insight to disability: intact   -       Mood/Affect/Coping skills/emotional control: Appropriate to situation  Visual/Perceptual:      -Intact      Physical Exam:  Balance:     Static sit: (I)   Dynamic sit: (I)   Static standing: (I)   Dynamic Standing: (S)    Postural examination/scapula alignment:    -       Rounded shoulders  -       Forward head  -       Kyphosis  Skin integrity: Visible skin intact  Edema:  None noted  Sensation:    -       Intact  Dominant hand:    -       right   Upper Extremity Range of Motion:     -       Right Upper Extremity: WFL  -       Left Upper Extremity: WFL  Upper Extremity Strength:    -       Right Upper Extremity: WFL  -       Left Upper Extremity: WFL   Strength:    -       Right Upper Extremity: WFL  -       Left Upper Extremity: WFL  Fine Motor Coordination:    -       Intact    AMPAC 6 Click ADL:  AMPAC Total Score: 24    Treatment & Education:   Pt educated on role of OT, POC, and d/c  recs.   POC was dicussed with patient/caregiver, who was included in its development and is in agreement with the identified goals and treatment plan.     Pt is safe to mobilize daily with a staff member for safety.    Educated on importance of EOB/OOB mobility, maintaining routine, sitting up in chair, and maximizing independence with ADLs during admission    Pt completed ADLs and functional mobility for treatment session as noted above    Pt/caregiver verbalized understanding and expressed no further concerns/questions.   Updated communication board with level of assist required (supervision x 1 person assistance & no AD) & educated RN/patient that pt is appropriate for transfers and functional mobility within hallway with RN/PCT.     Education:    Patient left up in chair with all lines intact, call button in reach and RN notified    GOALS:   Multidisciplinary Problems     Occupational Therapy Goals     Not on file          Multidisciplinary Problems (Resolved)        Problem: Occupational Therapy Goal    Goal Priority Disciplines Outcome Interventions   Occupational Therapy Goal   (Resolved)     OT, PT/OT Met                    History:     Past Medical History:   Diagnosis Date    Hepatitis     Hypertension     Liver failure        Past Surgical History:   Procedure Laterality Date    COLONOSCOPY N/A 9/17/2020    Procedure: COLONOSCOPY;  Surgeon: Jhonathan Lieberman MD;  Location: [address]);  Service: Endoscopy;  Laterality: N/A;    ESOPHAGOGASTRODUODENOSCOPY N/A 7/31/2020    Procedure: EGD (ESOPHAGOGASTRODUODENOSCOPY);  Surgeon: Jhonathan Lieberman MD;  Location: [address]);  Service: Endoscopy;  Laterality: N/A;       Time Tracking:     OT Date of Treatment: 09/29/20  OT Start Time: 1044  OT Stop Time: 1100  OT Total Time (min): 16 min    Billable Minutes:Evaluation 15 (co-eval with PT)    Hailey Harrington OT  9/29/2020

## 2020-09-29 NOTE — PLAN OF CARE
Problem: Physical Therapy Goal  Goal: Physical Therapy Goal  Description: Initial evaluation complete. Pt performing at functional baseline. He is safe to return home with no further physical therapy services. Acute physical therapy services being d/c at this time. Please re-consult if pt has a decline in functional mobility.   Outcome: Met Ramona Barboza, NAWAF  9/29/2020

## 2020-09-29 NOTE — NURSING
Admission assessment to be completed.  Oriented to room and procedure of unit.  Vital signs as documented.  Physician notified of arrival.

## 2020-09-29 NOTE — CONSULTS
[facility]  Hepatology  Consult Note    Patient Name: Kenneth Pardo  MRN: [id number]  Admission Date: 9/28/2020  Hospital Length of Stay: 1 days  Attending Provider: Naresh Franks MD   Primary Care Physician: Mikala Etienne MD  Principal Problem:MERYL (acute kidney injury)    Inpatient consult to Hepatology  Consult performed by: Rama Khanna MD  Consult ordered by: Alberto Salamanca MD        Subjective:     Transplant status: No    HPI:  60 year old M alcoholic cirrhosis complicated by portal hypertension, ascites, portal hypertensive gastropathy, hepatic encephalopathy presents as a transfer from [facility] after he had follow up labs which showed he had an increase of his creatinine to 3.5 and decrease in his Na to 123. [facility]. On that admission (9/8-9/17), it was initially thought that patient had an MERLY related to his diuretics, lasix and spironolactone, but was eventually treated for HRS after SBP was ruled out with a paracentesis. On discharge, patient had a Cr of 2.1 and Na 130. Upon discharge (9/17), patient's lasix and spironolactone were held. He reports being adherent to his 2g/day sodium restriction and has remained abstinent from alcohol.      Patient denies fevers, SOB, chest pain, abdominal pain, change in bowel or bladder habits, dysuria, frequency, decreased urine or abdominal distension changed from his prior.     Review of Systems   Constitutional: Negative for appetite change, chills, diaphoresis, fatigue and fever.   HENT: Negative.    Respiratory: Negative for cough, choking and shortness of breath.    Gastrointestinal: Positive for abdominal distention (unchanged). Negative for abdominal pain, diarrhea, nausea and vomiting.   Genitourinary: Negative for difficulty urinating, dysuria, flank pain and frequency.   Musculoskeletal: Negative for arthralgias and back pain.   Psychiatric/Behavioral: Negative.         Past Medical History:   Diagnosis Date    Hepatitis     Hypertension     Liver failure        Past Surgical History:   Procedure Laterality Date    COLONOSCOPY N/A 9/17/2020    Procedure: COLONOSCOPY;  Surgeon: Jhonathan Lieberman MD;  [facility]  Service: Endoscopy;  Laterality: N/A;    ESOPHAGOGASTRODUODENOSCOPY N/A 7/31/2020    Procedure: EGD (ESOPHAGOGASTRODUODENOSCOPY);  Surgeon: Jhonathan Lieberman MD;  Location: [facility] ([address] [place] [address]);  Service: Endoscopy;  Laterality: N/A;       Family history of liver disease: No    Review of patient's allergies indicates:  No Known Allergies    Tobacco Use    Smoking status: Never Smoker    Smokeless tobacco: Never Used   Substance and Sexual Activity    Alcohol use: Not Currently     Frequency: Never    Drug use: Not on file    Sexual activity: Not on file       Medications Prior to Admission   Medication Sig Dispense Refill Last Dose    apixaban (ELIQUIS) 5 mg Tab Take 2 tablets (10mg) by mouth twice daily until 9/22, then take 1 tablet (5mg) by mouth twice daily. 70 tablet 2     ciprofloxacin HCl (CIPRO) 500 MG tablet Take 1 tablet (500 mg total) by mouth once daily. 30 tablet 2     escitalopram oxalate (LEXAPRO) 5 MG Tab Take 1 tablet (5 mg total) by mouth once daily. 30 tablet 2     lactulose (CHRONULAC) 10 gram/15 mL solution Take 15 mLs (10 g total) by mouth 2 (two) times a day. Take enough to have 3 bowel movements per day 900 mL 2     midodrine (PROAMATINE) 5 MG Tab Take 3 tablets (15 mg total) by mouth every 8 (eight) hours. 270 tablet 2     omeprazole (PRILOSEC) 40 MG capsule Take 1 capsule (40 mg total) by mouth every morning. 30 capsule 11     ondansetron (ZOFRAN) 4 MG tablet Take 4 mg by mouth every 8 (eight) hours as needed.       rOPINIRole (REQUIP) 0.25 MG tablet Take 1 tablet (0.25 mg total) by mouth every evening. 30 tablet 2     traZODone (DESYREL) 100 MG tablet Take 1 tablet (100 mg total) by mouth every evening. 30 tablet 2         Objective:     Vital Signs (Most Recent):  Temp: 97.7 °F (36.5 °C) (09/29/20 0818)  Pulse: 90 (09/29/20 0818)  Resp: 16 (09/29/20 0818)  BP: (!) 95/58 (09/29/20 0818)  SpO2: 97 % (09/29/20 0818) Vital Signs (24h Range):  Temp:  [97.7 °F (36.5 °C)-98.8 °F (37.1 °C)] 97.7 °F (36.5 °C)  Pulse:  [82-90] 90  Resp:  [16-20] 16  SpO2:  [97 %-100 %] 97 %  BP: ()/(54-78) 95/58     Weight: 88.1 kg (194 lb 3.6 oz) (09/28/20 2245)  Body mass index is 34.41 kg/m².    Physical Exam  Vitals signs and nursing note reviewed.   Constitutional:       General: He is not in acute distress.     Appearance: He is normal weight. He is not ill-appearing, toxic-appearing or diaphoretic.   HENT:      Head: Normocephalic and atraumatic.   Eyes:      General: No scleral icterus.  Cardiovascular:      Rate and Rhythm: Normal rate and regular rhythm.   Pulmonary:      Effort: No respiratory distress.      Breath sounds: No wheezing or rales.   Abdominal:      General: There is distension.      Palpations: There is no mass.      Tenderness: There is no abdominal tenderness. There is no guarding or rebound.      Hernia: No hernia is present.      Comments: Firm abdomen   Musculoskeletal:      Right lower leg: No edema.      Left lower leg: No edema.   Skin:     Capillary Refill: Capillary refill takes 2 to 3 seconds.      Coloration: Skin is not jaundiced.   Neurological:      Mental Status: He is alert.   Psychiatric:         Mood and Affect: Mood normal.         Thought Content: Thought content normal.         MELD-Na score: 29 at 9/29/2020  8:01 AM  MELD score: 22 at 9/29/2020  8:01 AM  Calculated from:  Serum Creatinine: 3.3 mg/dL at 9/29/2020  5:31 AM  Serum Sodium: 124 mmol/L (Rounded to 125 mmol/L) at 9/29/2020  5:31 AM  Total Bilirubin: 1.4 mg/dL at 9/29/2020  5:31 AM  INR(ratio): 1.3 at 9/29/2020  8:01 AM  Age: 60 years 1 month    Significant Labs:  Labs within the past month have been reviewed.    Significant Imaging:  Labs:  Reviewed    Assessment/Plan:     * Hepatorenal Syndrome  Suspected HRS given hyponatremia and MERLY, will need to rule out infectious etiology. See decompensated cirrhosis.     Decompensated liver disease  Patient presents for lab abnormalities with worsened Cr and hyponatremia as compared to his discharge on 9/17. Cr 3.5 on admission compared to 2.1 on last discharge. INR and TBili improved compared to DC labs 9/17.      Recomendations   - sepsis work up - CXR, urine cx, blood cx  - obtain paracentesis to rule out SBP   - treat for presumed SBP  - continue HRS protocol - octreotide, albumin, midodrine  - sodium restriction 2g/day    Hyponatremia  See decompensated cirrhosis        Thank you for your consult. Patient case and plan of care discussed with Dr. Staley and Dr. Franks. I will follow-up with patient. Please contact us if you have any additional questions.    Rama Khanna MD  Hepatology  [facility]

## 2020-09-30 LAB
ALBUMIN SERPL BCP-MCNC: 4.1 G/DL (ref 3.5–5.2)
ALP SERPL-CCNC: 128 U/L (ref 55–135)
ALT SERPL W/O P-5'-P-CCNC: 13 U/L (ref 10–44)
ANION GAP SERPL CALC-SCNC: 14 MMOL/L (ref 8–16)
AST SERPL-CCNC: 30 U/L (ref 10–40)
BASOPHILS # BLD AUTO: 0.15 K/UL (ref 0–0.2)
BASOPHILS NFR BLD: 1 % (ref 0–1.9)
BILIRUB SERPL-MCNC: 1.1 MG/DL (ref 0.1–1)
BUN SERPL-MCNC: 50 MG/DL (ref 6–20)
CALCIUM SERPL-MCNC: 8.6 MG/DL (ref 8.7–10.5)
CHLORIDE SERPL-SCNC: 97 MMOL/L (ref 95–110)
CO2 SERPL-SCNC: 16 MMOL/L (ref 23–29)
CREAT SERPL-MCNC: 2.8 MG/DL (ref 0.5–1.4)
DIFFERENTIAL METHOD: ABNORMAL
EOSINOPHIL # BLD AUTO: 0.9 K/UL (ref 0–0.5)
EOSINOPHIL NFR BLD: 5.6 % (ref 0–8)
ERYTHROCYTE [DISTWIDTH] IN BLOOD BY AUTOMATED COUNT: 13.2 % (ref 11.5–14.5)
EST. GFR  (AFRICAN AMERICAN): 27.5 ML/MIN/1.73 M^2
EST. GFR  (NON AFRICAN AMERICAN): 23.8 ML/MIN/1.73 M^2
GLUCOSE SERPL-MCNC: 124 MG/DL (ref 70–110)
HCT VFR BLD AUTO: 33.1 % (ref 40–54)
HGB BLD-MCNC: 11.3 G/DL (ref 14–18)
IMM GRANULOCYTES # BLD AUTO: 0.12 K/UL (ref 0–0.04)
IMM GRANULOCYTES NFR BLD AUTO: 0.8 % (ref 0–0.5)
INR PPP: 1.3 (ref 0.8–1.2)
LYMPHOCYTES # BLD AUTO: 1.3 K/UL (ref 1–4.8)
LYMPHOCYTES NFR BLD: 8.5 % (ref 18–48)
MCH RBC QN AUTO: 33.4 PG (ref 27–31)
MCHC RBC AUTO-ENTMCNC: 34.1 G/DL (ref 32–36)
MCV RBC AUTO: 98 FL (ref 82–98)
MONOCYTES # BLD AUTO: 1 K/UL (ref 0.3–1)
MONOCYTES NFR BLD: 6.5 % (ref 4–15)
NEUTROPHILS # BLD AUTO: 12.1 K/UL (ref 1.8–7.7)
NEUTROPHILS NFR BLD: 77.6 % (ref 38–73)
NRBC BLD-RTO: 0 /100 WBC
PLATELET # BLD AUTO: 196 K/UL (ref 150–350)
PMV BLD AUTO: 10.6 FL (ref 9.2–12.9)
POTASSIUM SERPL-SCNC: 5 MMOL/L (ref 3.5–5.1)
PROT SERPL-MCNC: 5.7 G/DL (ref 6–8.4)
PROTHROMBIN TIME: 14.6 SEC (ref 9–12.5)
RBC # BLD AUTO: 3.38 M/UL (ref 4.6–6.2)
SODIUM SERPL-SCNC: 127 MMOL/L (ref 136–145)
WBC # BLD AUTO: 15.59 K/UL (ref 3.9–12.7)

## 2020-09-30 PROCEDURE — 20600001 HC STEP DOWN PRIVATE ROOM: Mod: NTX

## 2020-09-30 PROCEDURE — 85025 COMPLETE CBC W/AUTO DIFF WBC: CPT | Mod: NTX

## 2020-09-30 PROCEDURE — 99233 PR SUBSEQUENT HOSPITAL CARE,LEVL III: ICD-10-PCS | Mod: NTX,,, | Performed by: HOSPITALIST

## 2020-09-30 PROCEDURE — 25000003 PHARM REV CODE 250: Mod: NTX | Performed by: HOSPITALIST

## 2020-09-30 PROCEDURE — 80053 COMPREHEN METABOLIC PANEL: CPT | Mod: NTX

## 2020-09-30 PROCEDURE — 63600175 PHARM REV CODE 636 W HCPCS: Mod: NTX | Performed by: HOSPITALIST

## 2020-09-30 PROCEDURE — 36415 COLL VENOUS BLD VENIPUNCTURE: CPT | Mod: NTX

## 2020-09-30 PROCEDURE — 85610 PROTHROMBIN TIME: CPT | Mod: NTX

## 2020-09-30 PROCEDURE — 99233 SBSQ HOSP IP/OBS HIGH 50: CPT | Mod: NTX,,, | Performed by: HOSPITALIST

## 2020-09-30 RX ADMIN — LACTULOSE 15 G: 20 SOLUTION ORAL at 09:09

## 2020-09-30 RX ADMIN — ROPINIROLE HYDROCHLORIDE 0.25 MG: 0.25 TABLET, FILM COATED ORAL at 09:09

## 2020-09-30 RX ADMIN — OCTREOTIDE ACETATE 100 MCG: 50 INJECTION, SOLUTION INTRAVENOUS; SUBCUTANEOUS at 02:09

## 2020-09-30 RX ADMIN — MELATONIN TAB 3 MG 6 MG: 3 TAB at 09:09

## 2020-09-30 RX ADMIN — ESCITALOPRAM 5 MG: 5 TABLET, FILM COATED ORAL at 09:09

## 2020-09-30 RX ADMIN — TRAZODONE HYDROCHLORIDE 100 MG: 100 TABLET ORAL at 09:09

## 2020-09-30 RX ADMIN — LACTULOSE 15 G: 20 SOLUTION ORAL at 02:09

## 2020-09-30 RX ADMIN — MIDODRINE HYDROCHLORIDE 15 MG: 5 TABLET ORAL at 02:09

## 2020-09-30 RX ADMIN — APIXABAN 5 MG: 5 TABLET, FILM COATED ORAL at 09:09

## 2020-09-30 RX ADMIN — MIDODRINE HYDROCHLORIDE 15 MG: 5 TABLET ORAL at 09:09

## 2020-09-30 RX ADMIN — CEFTRIAXONE 2 G: 2 INJECTION, SOLUTION INTRAVENOUS at 12:09

## 2020-09-30 RX ADMIN — OCTREOTIDE ACETATE 100 MCG: 50 INJECTION, SOLUTION INTRAVENOUS; SUBCUTANEOUS at 05:09

## 2020-09-30 RX ADMIN — MIDODRINE HYDROCHLORIDE 15 MG: 5 TABLET ORAL at 06:09

## 2020-09-30 RX ADMIN — PANTOPRAZOLE SODIUM 40 MG: 40 TABLET, DELAYED RELEASE ORAL at 09:09

## 2020-09-30 NOTE — PHARMACY MED REC
"Admission Medication Reconciliation - Pharmacy Consult Note    The home medication history was taken by Melania Spann, Pharmacy Tech.     You may go to "Admission" then "Reconcile Home Medications" tabs to review and/or act upon these items.      No issues noted with the medication reconciliation.    Please address this information as you see fit.  Feel free to contact us if you have any questions or require assistance.    Quita Delatorre, PharmD, BCPS  [phone]      .    .             "

## 2020-09-30 NOTE — PLAN OF CARE
Mikala Etienne MD   [address] [facility]       [facility] [address] [place] [address] [address] [facility]  [address] [facility] [address]  Phone: [phone] Fax: [phone]     Payor: MEDICAID / Plan: [facility] ([facility]) / Product Type: Managed Medicaid /             09/30/20 1039   Discharge Assessment   Assessment Type Discharge Planning Assessment   Confirmed/corrected address and phone number on facesheet? Yes   Assessment information obtained from? Patient;Medical Record   Prior to hospitilization cognitive status: Alert/Oriented   Prior to hospitalization functional status: Independent   Current cognitive status: Alert/Oriented   Current Functional Status: Independent   Lives With spouse;child(butch), adult   Able to Return to Prior Arrangements yes   Is patient able to care for self after discharge? Yes   Who are your caregiver(s) and their phone number(s)? Dee Dee Pardo (spouse) [phone]   Patient's perception of discharge disposition home or selfcare   Readmission Within the Last 30 Days previous discharge plan unsuccessful   If yes, most recent facility name: Recently discharged from [facility] on 9/18   Patient currently being followed by outpatient case management? No   Patient currently receives any other outside agency services? No   Equipment Currently Used at Home none   Is the patient taking medications as prescribed? yes   Does the patient have transportation home? Yes   Transportation Anticipated family or friend will provide   Does the patient receive services at the [facility]? No   Discharge Plan A Home with family   Discharge Plan B Home with family   DME Needed Upon Discharge  none   Readmission Questionnaire   At the time of your discharge, did someone talk to you about what your health problems were? Yes   At the time of discharge, did someone talk to you about what to watch out for regarding worsening of your health  problem? Yes   At the time of discharge, did someone talk to you about what to do if you experienced worsening of your health problem? Yes   At the time of discharge, did someone talk to you about which medication to take when you left the hospital and which ones to stop taking? Yes   At the time of discharge, did someone talk to you about when and where to follow up with a doctor after you left the hospital? Yes   Do you have problems taking your medications as prescribed? No   Do you have any problems affording any of  your prescribed medications? No   Do you have problems obtaining/receiving your medications? No   Does the patient have transportation to healthcare appointments? Yes   Does the patient have family/friends to help with healtcare needs after discharge? yes

## 2020-09-30 NOTE — PROGRESS NOTES
Progress Note   Hospital Medicine         Patient Name: Kenneth Pardo  MRN:  [id number]  [facility] MED L Naresh Franks MD  Date of Admission:  9/28/2020     Length of Stay:  LOS: 1 day   Expected Discharge Date:   Principal Problem:  MERLY (acute kidney injury)       Subjective:     Interval History/Overnight Events:  Patient doing well today; went for IR paracentesis and 9.2L removed and negative for SBP; fluid strict patient and continue albumin; infectious work up under way; patient deferred for liver transplant in hopes for resolution of alcohol hepatitis    Review of Systems   Constitutional: Negative for chills, fatigue, fever.   HENT: Negative for sore throat, trouble swallowing.    Eyes: Negative for photophobia, visual disturbance.   Respiratory: Negative for cough, shortness of breath.    Cardiovascular: Negative for chest pain, palpitations, leg swelling.   Gastrointestinal: Negative for abdominal pain, constipation, diarrhea, nausea, vomiting.   Endocrine: Negative for cold intolerance, heat intolerance.   Genitourinary: Negative for dysuria, frequency.   Musculoskeletal: Negative for arthralgias, myalgias.   Skin: Negative for rash, wound, erythema   Neurological: Negative for dizziness, syncope, weakness, light-headedness.   Psychiatric/Behavioral: Negative for confusion, hallucinations, anxiety  All other systems reviewed and are negative.    Objective:     Temp:  [97.5 °F (36.4 °C)-97.8 °F (36.6 °C)]   Pulse:  [82-98]   Resp:  [16-20]   BP: ()/(50-78)   SpO2:  [95 %-100 %]       Physical Exam:  Constitutional: appears weak and ill  Head: Normocephalic and atraumatic.   Mouth/Throat: Oropharynx is clear and moist.   Eyes: EOM are normal. Pupils are equal, round, and reactive to light. No scleral icterus.   Neck: Normal range of motion. Neck supple.   Cardiovascular: Normal rate and regular rhythm.  No murmur heard.  Pulmonary/Chest: Effort normal and breath sounds normal. No  respiratory distress. No wheezes, rales, or rhonchi  Abdominal: Soft. Bowel sounds are normal. positive distension, no tenderness  Musculoskeletal: Normal range of motion. No edema.   Neurological: Alert and oriented to person, place, and time.   Skin: Skin is warm and dry.   Psychiatric: Normal mood and affect. Behavior is normal.     Recent Labs   Lab 09/28/20 1007 09/29/20  0531 09/29/20  1530   WBC 22.23* 14.78* 13.95*   HGB 12.4* 9.6* 10.3*   HCT 36.2* 29.4* 29.5*    207 292     Recent Labs   Lab 09/28/20 1007 09/29/20  0531   * 124*   K 4.9 4.8   CL 93* 93*   CO2 21* 19*   BUN 58* 58*   CREATININE 3.5* 3.3*    104   CALCIUM 9.1 9.3     Recent Labs   Lab 09/28/20 1007 09/29/20  0531 09/29/20  0801   ALKPHOS 240* 169*  --    ALT 23 14  --    AST 31 24  --    ALBUMIN 2.9* 4.5  --    PROT 6.3 6.6  --    BILITOT 1.2* 1.4*  --    INR 1.3*  --  1.3*     No results for input(s): POCTGLUCOSE in the last 168 hours.     apixaban  5 mg Oral BID    cefTRIAXone (ROCEPHIN) IVPB  2 g Intravenous Q24H    escitalopram oxalate  5 mg Oral Daily    lactulose  15 g Oral TID    midodrine  15 mg Oral Q8H    octreotide  100 mcg Subcutaneous Q8H    pantoprazole  40 mg Oral Daily    rOPINIRole  0.25 mg Oral QHS    traZODone  100 mg Oral QHS       Assessment and Plan     Mr. Kenneth Pardo is a 60 y.o. male who presented to [facility] on 9/28/2020 with     Hospital Course:    Mr. Kenneth Pardo was admitted to Hospital Medicine for management of     Active Hospital Problems    Diagnosis  POA    *Hepatorenal Syndrome [N17.9]  Yes    Leukocytosis [D72.829]  Yes    Right leg DVT [I82.401]  Yes    Decompensated liver disease [K74.69]  Yes    Alcoholic liver disease [K70.9]  Yes    Hepatic encephalopathy [K72.90]  Yes    Depression with anxiety [F41.8]  Yes    Debility [R53.81]  Yes    Hyponatremia [E87.1]  Yes      Resolved Hospital Problems   No resolved problems to display.     #Decompensated alcoholic  cirrhosis   Recurrent ascites   Portal hypertension   Portal hypertensive gastropathy   Hepatic encephalopathy    MELD-Na score: 29 at 9/29/2020  8:01 AM  MELD score: 22 at 9/29/2020  8:01 AM  Calculated from:  Serum Creatinine: 3.3 mg/dL at 9/29/2020  5:31 AM  Serum Sodium: 124 mmol/L (Rounded to 125 mmol/L) at 9/29/2020  5:31 AM  Total Bilirubin: 1.4 mg/dL at 9/29/2020  5:31 AM  INR(ratio): 1.3 at 9/29/2020  8:01 AM  Age: 60 years 1 month    -continue lactulose for HE   -continue PPI   -PETH pending  -hepatology consult   - IR paracentesis with 9.2L removed and negative for SBP on 9/29  - completed liver transplant eval and deferred       # Right Leg DVT  - seen on U/S; on eliquis,      #Hyponatremia   -Na 124 from recent baseline 136 in ED  -fluid restrict to 1L      #MERLY  # Hepatorenal syndrome   - Cr improving today,;   - urine studies   - cont midodrine, octreotide and albumin         #Leukocytosis   -WBC improving   -no signs of overt infection or sepsis   -afebrile   -blood cultures    - negative for SBP       #Depression with anxiety   -no acute issue   -hold fluoxetine in setting of hyponatremia (?SIADH)  -continue lexapro      #Insomnia  -trazodone prn      #Debility with falls  -PT evaluation for gait training   -fall precaution       # Hypotension  - midodrine          Diet:  Low sodium  GI PPx:    DVT PPx:    Goals of Care:  full       Disposition:  In next two daysf    Naresh Franks MD  Medical Director [facility]  Spectra:  [phone]  Pager: [phone]

## 2020-09-30 NOTE — PLAN OF CARE
Problem: Adult Inpatient Plan of Care  Goal: Plan of Care Review  Outcome: Ongoing, Progressing     Discussed w/pt plan of care, pt verbalized understanding, discussed importance of adhering to fluid restriction, pt verbalized understanding, pt ambulated in [place]bot w/ standby assist, pt ambulatory in room, pt had a shower, pt denies pain/needs at this time, will continue to monitor      Problem: Adult Inpatient Plan of Care  Goal: Optimal Comfort and Wellbeing  Outcome: Ongoing, Progressing     Pt linens changed, pt had a shower, pt needs assessed and met, pt ambulated in [place]bot w/ standby assistance, will continue to monitor

## 2020-09-30 NOTE — PLAN OF CARE
No acute changes overnight. VSS. Hourly rounding performed. Pt resting. No needs at this time. Call bell in reach. Will continue to monitor.

## 2020-09-30 NOTE — PROGRESS NOTES
Progress Note   Hospital Medicine         Patient Name: Kenneth Pardo  MRN:  [id number]  [facility] Medicine Team: [facility] MED L Naresh Franks MD  Date of Admission:  9/28/2020     Length of Stay:  LOS: 2 days   Expected Discharge Date:   Principal Problem:  MERLY (acute kidney injury)       Subjective:     Interval History/Overnight Events:  Patient doing well today, sodium and Cr improving; wife at the bedside; told patient and wife that important to get involved in [employer] on discharge; also need to set up outpatient paracentesis, [facility] at [facility]; infectious work up negative, unclear why Cr went up, improving  - sodium likely dropped due to excess fluid intake ; no on diuretics at home    Review of Systems   Constitutional: Negative for chills, fatigue, fever.   HENT: Negative for sore throat, trouble swallowing.    Eyes: Negative for photophobia, visual disturbance.   Respiratory: Negative for cough, shortness of breath.    Cardiovascular: Negative for chest pain, palpitations, leg swelling.   Gastrointestinal: Negative for abdominal pain, constipation, diarrhea, nausea, vomiting.   Endocrine: Negative for cold intolerance, heat intolerance.   Genitourinary: Negative for dysuria, frequency.   Musculoskeletal: Negative for arthralgias, myalgias.   Skin: Negative for rash, wound, erythema   Neurological: Negative for dizziness, syncope, weakness, light-headedness.   Psychiatric/Behavioral: Negative for confusion, hallucinations, anxiety  All other systems reviewed and are negative.    Objective:     Temp:  [97.8 °F (36.6 °C)-98.1 °F (36.7 °C)]   Pulse:  [82-98]   Resp:  [16]   BP: ()/(52-68)   SpO2:  [95 %-100 %]       Physical Exam:  Constitutional: appears weak and ill  Head: Normocephalic and atraumatic.   Mouth/Throat: Oropharynx is clear and moist.   Eyes: EOM are normal. Pupils are equal, round, and reactive to light. No scleral icterus.   Neck: Normal range of motion. Neck supple.    Cardiovascular: Normal rate and regular rhythm.  No murmur heard.  Pulmonary/Chest: Effort normal and breath sounds normal. No respiratory distress. No wheezes, rales, or rhonchi  Abdominal: Soft. Bowel sounds are normal. positive distension, no tenderness  Musculoskeletal: Normal range of motion. No edema.   Neurological: Alert and oriented to person, place, and time.   Skin: Skin is warm and dry.   Psychiatric: Normal mood and affect. Behavior is normal.     Recent Labs   Lab 09/28/20 1007 09/29/20  0531 09/29/20  1530 09/30/20  0544   WBC 22.23* 14.78* 13.95* 15.59*   HGB 12.4* 9.6* 10.3* 11.3*   HCT 36.2* 29.4* 29.5* 33.1*    207 292 196     Recent Labs   Lab 09/28/20 1007 09/29/20  0531 09/30/20  0543   * 124* 127*   K 4.9 4.8 5.0   CL 93* 93* 97   CO2 21* 19* 16*   BUN 58* 58* 50*   CREATININE 3.5* 3.3* 2.8*    104 124*   CALCIUM 9.1 9.3 8.6*     Recent Labs   Lab 09/28/20 1007 09/29/20  0531 09/29/20  0801 09/30/20  0542 09/30/20  0543   ALKPHOS 240* 169*  --   --  128   ALT 23 14  --   --  13   AST 31 24  --   --  30   ALBUMIN 2.9* 4.5  --   --  4.1   PROT 6.3 6.6  --   --  5.7*   BILITOT 1.2* 1.4*  --   --  1.1*   INR 1.3*  --  1.3* 1.3*  --      No results for input(s): POCTGLUCOSE in the last 168 hours.     apixaban  5 mg Oral BID    escitalopram oxalate  5 mg Oral Daily    lactulose  15 g Oral TID    midodrine  15 mg Oral Q8H    octreotide  100 mcg Subcutaneous Q8H    pantoprazole  40 mg Oral Daily    rOPINIRole  0.25 mg Oral QHS    traZODone  100 mg Oral QHS       Assessment and Plan     Mr. Kenneth Pardo is a 60 y.o. male who presented to [facility] on 9/28/2020 with     Hospital Course:    Mr. Kenneth Pardo was admitted to Hospital Medicine for management of     Active Hospital Problems    Diagnosis  POA    *Hepatorenal Syndrome [N17.9]  Yes    Leukocytosis [D72.829]  Yes    Right leg DVT [I82.401]  Yes    Decompensated liver disease [K74.69]  Yes    Alcoholic liver  disease [K70.9]  Yes    Hepatic encephalopathy [K72.90]  Yes    Depression with anxiety [F41.8]  Yes    Debility [R53.81]  Yes    Hyponatremia [E87.1]  Yes      Resolved Hospital Problems   No resolved problems to display.     #Decompensated alcoholic cirrhosis   Recurrent ascites   Portal hypertension   Portal hypertensive gastropathy   Hepatic encephalopathy    MELD-Na score: 27 at 9/30/2020  5:43 AM  MELD score: 20 at 9/30/2020  5:43 AM  Calculated from:  Serum Creatinine: 2.8 mg/dL at 9/30/2020  5:43 AM  Serum Sodium: 127 mmol/L at 9/30/2020  5:43 AM  Total Bilirubin: 1.1 mg/dL at 9/30/2020  5:43 AM  INR(ratio): 1.3 at 9/30/2020  5:42 AM  Age: 60 years 1 month    -continue lactulose for HE   -continue PPI   -PETH pending  -hepatology consult   - IR paracentesis with 9.2L removed and negative for SBP on 9/29  - completed liver transplant eval and deferred       # Right Leg DVT  - seen on U/S; on eliquis,      #Hyponatremia   -Na 127 from recent baseline 136 in ED  -fluid restrict to 1L   - had excessive fluid intake at home      #MERLY  # Hepatorenal syndrome   - Cr improving today,;   - urine studies   - cont midodrine, octreotide and albumin stopped          #Leukocytosis   -WBC improving   -no signs of overt infection or sepsis   -afebrile   -blood cultures    - negative for SBP       #Depression with anxiety   -no acute issue   -hold fluoxetine in setting of hyponatremia (?SIADH)  -continue lexapro      #Insomnia  -trazodone prn      #Debility with falls  -PT evaluation for gait training   -fall precaution       # Hypotension  - midodrine          Diet:  Low sodium  GI PPx:    DVT PPx:    Goals of Care:  full       Disposition: likely tomorrow     Naresh Franks MD  Medical Director [facility]  Spectra:  [phone]  Pager: [phone]

## 2020-10-01 PROBLEM — R79.89 ELEVATED SERUM CREATININE: Status: ACTIVE | Noted: 2020-10-01

## 2020-10-01 LAB
ALBUMIN SERPL BCP-MCNC: 3.7 G/DL (ref 3.5–5.2)
ALP SERPL-CCNC: 158 U/L (ref 55–135)
ALT SERPL W/O P-5'-P-CCNC: 15 U/L (ref 10–44)
ANION GAP SERPL CALC-SCNC: 12 MMOL/L (ref 8–16)
AST SERPL-CCNC: 30 U/L (ref 10–40)
BACTERIA #/AREA URNS AUTO: ABNORMAL /HPF
BASOPHILS # BLD AUTO: 0.16 K/UL (ref 0–0.2)
BASOPHILS NFR BLD: 1 % (ref 0–1.9)
BILIRUB SERPL-MCNC: 1.1 MG/DL (ref 0.1–1)
BILIRUB UR QL STRIP: NEGATIVE
BUN SERPL-MCNC: 48 MG/DL (ref 6–20)
CALCIUM SERPL-MCNC: 8.9 MG/DL (ref 8.7–10.5)
CHLORIDE SERPL-SCNC: 100 MMOL/L (ref 95–110)
CLARITY UR REFRACT.AUTO: CLEAR
CO2 SERPL-SCNC: 20 MMOL/L (ref 23–29)
COLOR UR AUTO: YELLOW
CREAT SERPL-MCNC: 2.9 MG/DL (ref 0.5–1.4)
CREAT UR-MCNC: 183 MG/DL (ref 23–375)
CREAT UR-MCNC: 183 MG/DL (ref 23–375)
DIFFERENTIAL METHOD: ABNORMAL
EOSINOPHIL # BLD AUTO: 0.9 K/UL (ref 0–0.5)
EOSINOPHIL NFR BLD: 5.4 % (ref 0–8)
ERYTHROCYTE [DISTWIDTH] IN BLOOD BY AUTOMATED COUNT: 13.5 % (ref 11.5–14.5)
EST. GFR  (AFRICAN AMERICAN): 26.4 ML/MIN/1.73 M^2
EST. GFR  (NON AFRICAN AMERICAN): 22.8 ML/MIN/1.73 M^2
GLUCOSE SERPL-MCNC: 137 MG/DL (ref 70–110)
GLUCOSE UR QL STRIP: NEGATIVE
HCT VFR BLD AUTO: 36 % (ref 40–54)
HGB BLD-MCNC: 11.7 G/DL (ref 14–18)
HGB UR QL STRIP: NEGATIVE
HYALINE CASTS UR QL AUTO: 8 /LPF
IMM GRANULOCYTES # BLD AUTO: 0.13 K/UL (ref 0–0.04)
IMM GRANULOCYTES NFR BLD AUTO: 0.8 % (ref 0–0.5)
INR PPP: 1.3 (ref 0.8–1.2)
KETONES UR QL STRIP: NEGATIVE
LEUKOCYTE ESTERASE UR QL STRIP: NEGATIVE
LYMPHOCYTES # BLD AUTO: 1.7 K/UL (ref 1–4.8)
LYMPHOCYTES NFR BLD: 10.1 % (ref 18–48)
MCH RBC QN AUTO: 33 PG (ref 27–31)
MCHC RBC AUTO-ENTMCNC: 32.5 G/DL (ref 32–36)
MCV RBC AUTO: 101 FL (ref 82–98)
MICROSCOPIC COMMENT: ABNORMAL
MONOCYTES # BLD AUTO: 0.7 K/UL (ref 0.3–1)
MONOCYTES NFR BLD: 4.3 % (ref 4–15)
NEUTROPHILS # BLD AUTO: 13 K/UL (ref 1.8–7.7)
NEUTROPHILS NFR BLD: 78.4 % (ref 38–73)
NITRITE UR QL STRIP: NEGATIVE
NRBC BLD-RTO: 0 /100 WBC
PH UR STRIP: 5 [PH] (ref 5–8)
PLATELET # BLD AUTO: 229 K/UL (ref 150–350)
PMV BLD AUTO: 10.4 FL (ref 9.2–12.9)
POTASSIUM SERPL-SCNC: 4 MMOL/L (ref 3.5–5.1)
PROT SERPL-MCNC: 5.5 G/DL (ref 6–8.4)
PROT UR QL STRIP: ABNORMAL
PROT UR-MCNC: 67 MG/DL (ref 0–15)
PROT/CREAT UR: 0.37 MG/G{CREAT} (ref 0–0.2)
PROTHROMBIN TIME: 14 SEC (ref 9–12.5)
RBC # BLD AUTO: 3.55 M/UL (ref 4.6–6.2)
RBC #/AREA URNS AUTO: 4 /HPF (ref 0–4)
SODIUM SERPL-SCNC: 132 MMOL/L (ref 136–145)
SODIUM UR-SCNC: <20 MMOL/L (ref 20–250)
SP GR UR STRIP: 1.02 (ref 1–1.03)
SQUAMOUS #/AREA URNS AUTO: 0 /HPF
URN SPEC COLLECT METH UR: ABNORMAL
WBC # BLD AUTO: 16.58 K/UL (ref 3.9–12.7)
WBC #/AREA URNS AUTO: 6 /HPF (ref 0–5)

## 2020-10-01 PROCEDURE — 25000003 PHARM REV CODE 250: Mod: NTX | Performed by: HOSPITALIST

## 2020-10-01 PROCEDURE — 80053 COMPREHEN METABOLIC PANEL: CPT | Mod: NTX

## 2020-10-01 PROCEDURE — 63600175 PHARM REV CODE 636 W HCPCS: Mod: JG,NTX | Performed by: HOSPITALIST

## 2020-10-01 PROCEDURE — 99233 SBSQ HOSP IP/OBS HIGH 50: CPT | Mod: NTX,,, | Performed by: HOSPITALIST

## 2020-10-01 PROCEDURE — 81001 URINALYSIS AUTO W/SCOPE: CPT | Mod: NTX

## 2020-10-01 PROCEDURE — 99233 PR SUBSEQUENT HOSPITAL CARE,LEVL III: ICD-10-PCS | Mod: NTX,,, | Performed by: HOSPITALIST

## 2020-10-01 PROCEDURE — 36415 COLL VENOUS BLD VENIPUNCTURE: CPT | Mod: NTX

## 2020-10-01 PROCEDURE — 84300 ASSAY OF URINE SODIUM: CPT | Mod: NTX

## 2020-10-01 PROCEDURE — 85610 PROTHROMBIN TIME: CPT | Mod: NTX

## 2020-10-01 PROCEDURE — 85025 COMPLETE CBC W/AUTO DIFF WBC: CPT | Mod: NTX

## 2020-10-01 PROCEDURE — 20600001 HC STEP DOWN PRIVATE ROOM: Mod: NTX

## 2020-10-01 PROCEDURE — P9047 ALBUMIN (HUMAN), 25%, 50ML: HCPCS | Mod: JG,NTX | Performed by: HOSPITALIST

## 2020-10-01 PROCEDURE — 84156 ASSAY OF PROTEIN URINE: CPT | Mod: NTX

## 2020-10-01 RX ORDER — ALBUMIN HUMAN 250 G/1000ML
25 SOLUTION INTRAVENOUS 2 TIMES DAILY
Status: COMPLETED | OUTPATIENT
Start: 2020-10-01 | End: 2020-10-02

## 2020-10-01 RX ORDER — OCTREOTIDE ACETATE 100 UG/ML
100 INJECTION, SOLUTION INTRAVENOUS; SUBCUTANEOUS EVERY 8 HOURS
Status: DISCONTINUED | OUTPATIENT
Start: 2020-10-01 | End: 2020-10-06

## 2020-10-01 RX ADMIN — OCTREOTIDE ACETATE 100 MCG: 100 INJECTION, SOLUTION INTRAVENOUS; SUBCUTANEOUS at 10:10

## 2020-10-01 RX ADMIN — LACTULOSE 15 G: 20 SOLUTION ORAL at 09:10

## 2020-10-01 RX ADMIN — MIDODRINE HYDROCHLORIDE 15 MG: 5 TABLET ORAL at 06:10

## 2020-10-01 RX ADMIN — PANTOPRAZOLE SODIUM 40 MG: 40 TABLET, DELAYED RELEASE ORAL at 09:10

## 2020-10-01 RX ADMIN — APIXABAN 5 MG: 5 TABLET, FILM COATED ORAL at 09:10

## 2020-10-01 RX ADMIN — TRAZODONE HYDROCHLORIDE 100 MG: 100 TABLET ORAL at 09:10

## 2020-10-01 RX ADMIN — MELATONIN TAB 3 MG 6 MG: 3 TAB at 09:10

## 2020-10-01 RX ADMIN — MIDODRINE HYDROCHLORIDE 15 MG: 5 TABLET ORAL at 02:10

## 2020-10-01 RX ADMIN — ESCITALOPRAM 5 MG: 5 TABLET, FILM COATED ORAL at 09:10

## 2020-10-01 RX ADMIN — ROPINIROLE HYDROCHLORIDE 0.25 MG: 0.25 TABLET, FILM COATED ORAL at 09:10

## 2020-10-01 RX ADMIN — LACTULOSE 15 G: 20 SOLUTION ORAL at 02:10

## 2020-10-01 RX ADMIN — ALBUMIN (HUMAN) 25 G: 12.5 SOLUTION INTRAVENOUS at 10:10

## 2020-10-01 RX ADMIN — MIDODRINE HYDROCHLORIDE 15 MG: 5 TABLET ORAL at 09:10

## 2020-10-01 NOTE — HPI
Mr Edvin is a 61 yo M with PMHx of HTN, EtOH cirrhosis complicated by portal hypertension, ascites, portal hypertensive gastropathy, and hepatic encephalopathy who presents as a transfer from [facility] after he had follow up labs which showed he had an increase of his creatinine to 3.5 and decrease in his Na to 123. Patient was recently admitted to [facility] for the same problem. On that admission (9/8-9/17), it was initially thought that patient had an MERLY related to his diuretics, lasix and spironolactone, but was eventually treated for HRS after SBP was ruled out with a paracentesis. On discharge, patient had a Cr of 2.1 and Na 130. Upon discharge (9/17), patient's lasix and spironolactone were held. On this admission, he reports being adherent to his 2g/day sodium restriction, no decreased PO intake, and no decrease in urine output. On arrival to [facility] ED, Scr 3.5 (form 2.1 on discharge), WBC 22, INR 1.3, afebrile, and hemodynamically stable. Patient admitted and sepsis workup begun. Underwent para 9/29 where 9.2 L were removed, labs negative for SBP. Cr has trended down, and nephrology consulted due to concern for SBP.

## 2020-10-01 NOTE — ASSESSMENT & PLAN NOTE
Alcoholic cirrhotic (c/b portal hypertension, ascites, portal hypertensive gastropathy and hepatic encephalopathy) presents with acute hyponatremia (123) and MERLY (Scr 3.5 from baseline 2.1). Also with leukocytosis, but para negative for SBP. Unclear etiology at this time for his elevated Cr but ddx includes MERLY or HRS.      Plan:  - Renally dose all medications  - Avoid nephrotoxic medications  - Strict intake and output   - Daily labs; will trend   - UA  - Cr trending down since admission, today 2.9  - MAP goal 80-90 for adequate renal perfusion  - Continue albumin + octreotide + midodrine  - Nephrology will continue to follow along

## 2020-10-01 NOTE — ASSESSMENT & PLAN NOTE
Alcoholic cirrhotic (c/b portal hypertension, ascites, portal hypertensive gastropathy and hepatic encephalopathy) presents with acute hyponatremia (123) and MERLY (Scr 3.5 from baseline 2.1). Also with leukocytosis, but para negative for SBP. Unclear etiology at this time for his elevated Cr but ddx includes MERLY or HRS type I.      Plan:  - Renally dose all medications  - Avoid nephrotoxic medications  - Strict intake and output   - Daily labs; will trend   - UA  - Cr trending down since admission, today 2.9  - MAP goal 80-90 for adequate renal perfusion  - rec albumin 25%, 50 g q 6 hr for 48 hrs  - Continue albumin + octreotide + midodrine  - Nephrology will continue to follow along

## 2020-10-01 NOTE — CONSULTS
[facility]  Nephrology  Consult Note    Patient Name: Kenneth Pardo  MRN: [id number]  Admission Date: 9/28/2020  Hospital Length of Stay: 3 days  Attending Provider: Aicha Rojas MD   Primary Care Physician: Mikala Etienne MD  Principal Problem:MERLY (acute kidney injury)    Inpatient consult to Nephrology  Consult performed by: Braden Baxter MD  Consult ordered by: Aicha Rojas MD  Reason for consult: Elevated SCr  Assessment/Recommendations: Please see full H&P for details        Subjective:     HPI: Mr Pardo is a 59 yo M with PMHx of HTN, EtOH cirrhosis complicated by portal hypertension, ascites, portal hypertensive gastropathy, and hepatic encephalopathy who presents as a transfer from [facility] after he had follow up labs which showed he had an increase of his creatinine to 3.5 and decrease in his Na to 123. Patient was recently admitted to [facility] for the same problem. On that admission (9/8-9/17), it was initially thought that patient had an MERLY related to his diuretics, lasix and spironolactone, but was eventually treated for HRS after SBP was ruled out with a paracentesis. On discharge, patient had a Cr of 2.1 and Na 130. Upon discharge (9/17), patient's lasix and spironolactone were held. On this admission, he reports being adherent to his 2g/day sodium restriction, no decreased PO intake, and no decrease in urine output. On arrival to [facility] ED, Scr 3.5 (form 2.1 on discharge), WBC 22, INR 1.3, afebrile, and hemodynamically stable. Patient admitted and sepsis workup begun. Underwent para 9/29 where 9.2 L were removed, labs negative for SBP. Cr has trended down, and nephrology consulted due to concern for SBP.    Past Medical History:   Diagnosis Date    Hepatitis     Hypertension     Liver failure        Past Surgical History:   Procedure Laterality Date    COLONOSCOPY N/A 9/17/2020    Procedure: COLONOSCOPY;  Surgeon: Jhonathan Lieberman MD;  Location: [facility] ([address] [place] [address]);   Service: Endoscopy;  Laterality: N/A;    ESOPHAGOGASTRODUODENOSCOPY N/A 7/31/2020    Procedure: EGD (ESOPHAGOGASTRODUODENOSCOPY);  Surgeon: Jhonathan Lieberman MD;  Location: [address]);  Service: Endoscopy;  Laterality: N/A;       Review of patient's allergies indicates:  No Known Allergies  Current Facility-Administered Medications   Medication Frequency    acetaminophen tablet 325 mg Q4H PRN    albuterol-ipratropium 2.5 mg-0.5 mg/3 mL nebulizer solution 3 mL Q6H PRN    apixaban tablet 5 mg BID    escitalopram oxalate tablet 5 mg Daily    glucose chewable tablet 16 g PRN    glucose chewable tablet 24 g PRN    lactulose 20 gram/30 mL solution Soln 15 g TID    melatonin tablet 6 mg Nightly PRN    midodrine tablet 15 mg Q8H    ondansetron injection 4 mg Q8H PRN    pantoprazole EC tablet 40 mg Daily    promethazine (PHENERGAN) 6.25 mg in dextrose 5 % 50 mL IVPB Q6H PRN    rOPINIRole tablet 0.25 mg QHS    sodium chloride 0.9% flush 10 mL PRN    traZODone tablet 100 mg QHS     Family History     None        Tobacco Use    Smoking status: Never Smoker    Smokeless tobacco: Never Used   Substance and Sexual Activity    Alcohol use: Not Currently     Frequency: Never    Drug use: Not on file    Sexual activity: Not on file     Review of Systems   Constitutional: Negative for appetite change and fever.   HENT: Negative for sinus pressure and sore throat.    Eyes: Negative for visual disturbance.   Respiratory: Negative for cough, shortness of breath and wheezing.    Cardiovascular: Positive for leg swelling. Negative for chest pain and palpitations.   Gastrointestinal: Positive for abdominal distention. Negative for abdominal pain, blood in stool, constipation, diarrhea, nausea and vomiting.   Endocrine: Negative for polyuria.   Genitourinary: Positive for decreased urine volume (for last 3 days). Negative for difficulty urinating, dysuria and hematuria.   Musculoskeletal: Negative for neck pain and  neck stiffness.   Neurological: Negative for dizziness, seizures, weakness, light-headedness and headaches.     Objective:     Vital Signs (Most Recent):  Temp: 98 °F (36.7 °C) (10/01/20 1147)  Pulse: 82 (10/01/20 1147)  Resp: 18 (10/01/20 1147)  BP: (!) 87/55 (10/01/20 1147)  SpO2: 97 % (10/01/20 1147)  O2 Device (Oxygen Therapy): room air (10/01/20 0900) Vital Signs (24h Range):  Temp:  [97.5 °F (36.4 °C)-98.2 °F (36.8 °C)] 98 °F (36.7 °C)  Pulse:  [82-92] 82  Resp:  [18-20] 18  SpO2:  [95 %-98 %] 97 %  BP: ()/(51-67) 87/55     Weight: 88.1 kg (194 lb 3.6 oz) (09/28/20 2245)  Body mass index is 34.41 kg/m².  Body surface area is 1.98 meters squared.    No intake/output data recorded.    Physical Exam  Constitutional:       General: He is not in acute distress.     Appearance: Normal appearance. He is not ill-appearing, toxic-appearing or diaphoretic.   HENT:      Head: Normocephalic and atraumatic.      Mouth/Throat:      Mouth: Mucous membranes are moist.      Pharynx: Oropharynx is clear. No oropharyngeal exudate or posterior oropharyngeal erythema.   Eyes:      General: No scleral icterus.     Extraocular Movements: Extraocular movements intact.      Conjunctiva/sclera: Conjunctivae normal.      Pupils: Pupils are equal, round, and reactive to light.   Neck:      Musculoskeletal: Normal range of motion and neck supple.   Cardiovascular:      Rate and Rhythm: Normal rate and regular rhythm.      Pulses: Normal pulses.      Heart sounds: Normal heart sounds.   Pulmonary:      Effort: Pulmonary effort is normal. No respiratory distress.      Breath sounds: Normal breath sounds. No wheezing.   Abdominal:      General: Bowel sounds are normal. There is distension.      Palpations: Abdomen is soft.      Tenderness: There is no abdominal tenderness. There is no guarding.   Musculoskeletal:         General: No swelling or tenderness.      Right lower leg: No edema.      Left lower leg: No edema.   Skin:      General: Skin is warm and dry.   Neurological:      General: No focal deficit present.      Mental Status: He is alert and oriented to person, place, and time. Mental status is at baseline.         Significant Labs:  CBC:   Recent Labs   Lab 10/01/20  0533   WBC 16.58*   RBC 3.55*   HGB 11.7*   HCT 36.0*      *   MCH 33.0*   MCHC 32.5     CMP:   Recent Labs   Lab 10/01/20  0533   *   CALCIUM 8.9   ALBUMIN 3.7   PROT 5.5*   *   K 4.0   CO2 20*      BUN 48*   CREATININE 2.9*   ALKPHOS 158*   ALT 15   AST 30   BILITOT 1.1*     No results for input(s): COLORU, CLARITYU, SPECGRAV, PHUR, PROTEINUA, GLUCOSEU, BILIRUBINCON, BLOODU, WBCU, RBCU, BACTERIA, MUCUS, NITRITE, LEUKOCYTESUR, UROBILINOGEN, HYALINECASTS in the last 168 hours.  All labs within the past 24 hours have been reviewed.    Significant Imaging:  No significant pertinent imaging in last 24 hours    Assessment/Plan:     * Hepatorenal Syndrome  Alcoholic cirrhotic (c/b portal hypertension, ascites, portal hypertensive gastropathy and hepatic encephalopathy) presents with acute hyponatremia (123) and MERLY (Scr 3.5 from baseline 2.1). Also with leukocytosis, but para negative for SBP. Unclear etiology at this time for his elevated Cr but ddx includes MERLY or HRS.      Plan:  - Renally dose all medications  - Avoid nephrotoxic medications  - Strict intake and output   - Daily labs; will trend   - UA  - Cr trending down since admission, today 2.9  - MAP goal 80-90 for adequate renal perfusion  - Continue albumin + octreotide + midodrine  - Nephrology will continue to follow along     Elevated serum creatinine  Alcoholic cirrhotic (c/b portal hypertension, ascites, portal hypertensive gastropathy and hepatic encephalopathy) presents with acute hyponatremia (123) and MERLY (Scr 3.5 from baseline 2.1). Also with leukocytosis, but para negative for SBP. Unclear etiology at this time for his elevated Cr but ddx includes MERLY or HRS type I.       Plan:  - Renally dose all medications  - Avoid nephrotoxic medications  - Strict intake and output   - Daily labs; will trend   - UA  - Cr trending down since admission, today 2.9  - MAP goal 80-90 for adequate renal perfusion  - rec albumin 25%, 50 g q 6 hr for 48 hrs, rec continuing octreotide  - Continue albumin + octreotide + midodrine  - Nephrology will continue to follow along     Hyponatremia  Hx of hyponatremia, on discharge 131, on arrival 123. Adherent to 2 g Na restriction but denies any decrease in PO intake. Patient's diuretics were held after his discharge due to thinking his MERLY/hypoNa was 2/2 to them.    Plan:  - improving, 132 today  - asymptomatic    Thank you for your consult. I will follow-up with patient. Please contact us if you have any additional questions.    Braden Baxter MD, PGY-1  Nephrology  [facility]

## 2020-10-01 NOTE — SUBJECTIVE & OBJECTIVE
Past Medical History:   Diagnosis Date    Hepatitis     Hypertension     Liver failure        Past Surgical History:   Procedure Laterality Date    COLONOSCOPY N/A 9/17/2020    Procedure: COLONOSCOPY;  Surgeon: Jhonathan Lieberman MD;  Location: [facility] ([address] [place] [address]);  Service: Endoscopy;  Laterality: N/A;    ESOPHAGOGASTRODUODENOSCOPY N/A 7/31/2020    Procedure: EGD (ESOPHAGOGASTRODUODENOSCOPY);  Surgeon: Jhonathan Lieberman MD;  Location: [facility] ([address] [place] [address]);  Service: Endoscopy;  Laterality: N/A;       Review of patient's allergies indicates:  No Known Allergies  Current Facility-Administered Medications   Medication Frequency    acetaminophen tablet 325 mg Q4H PRN    albuterol-ipratropium 2.5 mg-0.5 mg/3 mL nebulizer solution 3 mL Q6H PRN    apixaban tablet 5 mg BID    escitalopram oxalate tablet 5 mg Daily    glucose chewable tablet 16 g PRN    glucose chewable tablet 24 g PRN    lactulose 20 gram/30 mL solution Soln 15 g TID    melatonin tablet 6 mg Nightly PRN    midodrine tablet 15 mg Q8H    ondansetron injection 4 mg Q8H PRN    pantoprazole EC tablet 40 mg Daily    promethazine (PHENERGAN) 6.25 mg in dextrose 5 % 50 mL IVPB Q6H PRN    rOPINIRole tablet 0.25 mg QHS    sodium chloride 0.9% flush 10 mL PRN    traZODone tablet 100 mg QHS     Family History     None        Tobacco Use    Smoking status: Never Smoker    Smokeless tobacco: Never Used   Substance and Sexual Activity    Alcohol use: Not Currently     Frequency: Never    Drug use: Not on file    Sexual activity: Not on file     Review of Systems   Constitutional: Negative for appetite change and fever.   HENT: Negative for sinus pressure and sore throat.    Eyes: Negative for visual disturbance.   Respiratory: Negative for cough, shortness of breath and wheezing.    Cardiovascular: Positive for leg swelling. Negative for chest pain and palpitations.   Gastrointestinal: Positive for abdominal distention. Negative for abdominal pain, blood in  stool, constipation, diarrhea, nausea and vomiting.   Endocrine: Negative for polyuria.   Genitourinary: Positive for decreased urine volume (for last 3 days). Negative for difficulty urinating, dysuria and hematuria.   Musculoskeletal: Negative for neck pain and neck stiffness.   Neurological: Negative for dizziness, seizures, weakness, light-headedness and headaches.     Objective:     Vital Signs (Most Recent):  Temp: 98 °F (36.7 °C) (10/01/20 1147)  Pulse: 82 (10/01/20 1147)  Resp: 18 (10/01/20 1147)  BP: (!) 87/55 (10/01/20 1147)  SpO2: 97 % (10/01/20 1147)  O2 Device (Oxygen Therapy): room air (10/01/20 0900) Vital Signs (24h Range):  Temp:  [97.5 °F (36.4 °C)-98.2 °F (36.8 °C)] 98 °F (36.7 °C)  Pulse:  [82-92] 82  Resp:  [18-20] 18  SpO2:  [95 %-98 %] 97 %  BP: ()/(51-67) 87/55     Weight: 88.1 kg (194 lb 3.6 oz) (09/28/20 2245)  Body mass index is 34.41 kg/m².  Body surface area is 1.98 meters squared.    No intake/output data recorded.    Physical Exam  Constitutional:       General: He is not in acute distress.     Appearance: Normal appearance. He is not ill-appearing, toxic-appearing or diaphoretic.   HENT:      Head: Normocephalic and atraumatic.      Mouth/Throat:      Mouth: Mucous membranes are moist.      Pharynx: Oropharynx is clear. No oropharyngeal exudate or posterior oropharyngeal erythema.   Eyes:      General: No scleral icterus.     Extraocular Movements: Extraocular movements intact.      Conjunctiva/sclera: Conjunctivae normal.      Pupils: Pupils are equal, round, and reactive to light.   Neck:      Musculoskeletal: Normal range of motion and neck supple.   Cardiovascular:      Rate and Rhythm: Normal rate and regular rhythm.      Pulses: Normal pulses.      Heart sounds: Normal heart sounds.   Pulmonary:      Effort: Pulmonary effort is normal. No respiratory distress.      Breath sounds: Normal breath sounds. No wheezing.   Abdominal:      General: Bowel sounds are normal. There  is distension.      Palpations: Abdomen is soft.      Tenderness: There is no abdominal tenderness. There is no guarding.   Musculoskeletal:         General: No swelling or tenderness.      Right lower leg: No edema.      Left lower leg: No edema.   Skin:     General: Skin is warm and dry.   Neurological:      General: No focal deficit present.      Mental Status: He is alert and oriented to person, place, and time. Mental status is at baseline.         Significant Labs:  CBC:   Recent Labs   Lab 10/01/20  0533   WBC 16.58*   RBC 3.55*   HGB 11.7*   HCT 36.0*      *   MCH 33.0*   MCHC 32.5     CMP:   Recent Labs   Lab 10/01/20  0533   *   CALCIUM 8.9   ALBUMIN 3.7   PROT 5.5*   *   K 4.0   CO2 20*      BUN 48*   CREATININE 2.9*   ALKPHOS 158*   ALT 15   AST 30   BILITOT 1.1*     No results for input(s): COLORU, CLARITYU, SPECGRAV, PHUR, PROTEINUA, GLUCOSEU, BILIRUBINCON, BLOODU, WBCU, RBCU, BACTERIA, MUCUS, NITRITE, LEUKOCYTESUR, UROBILINOGEN, HYALINECASTS in the last 168 hours.  All labs within the past 24 hours have been reviewed.    Significant Imaging:  No significant pertinent imaging in last 24 hours

## 2020-10-01 NOTE — PLAN OF CARE
Problem: Adult Inpatient Plan of Care  Goal: Plan of Care Review  Outcome: Ongoing, Progressing     Discussed w/ pt plan of care, pt verbalized understanding, pt is bizarre w/ approach, pt ambulated in hallway x2 w/ standby assist, pt ambulates in room independently, wife called to check on pt, pt informed wife his plan of care, performed UA, pt A&O x4, pt denies pain/needs at this time      Problem: Adult Inpatient Plan of Care  Goal: Plan of Care Review  Outcome: Ongoing, Progressing       UA performed, nephrology consulted, will continue to monitor

## 2020-10-01 NOTE — ASSESSMENT & PLAN NOTE
Hx of hyponatremia, on discharge 131, on arrival 123. Adherent to 2 g Na restriction but denies any decrease in PO intake. Patient's diuretics were held after his discharge due to thinking his MERLY/hypoNa was 2/2 to them.    Plan:  - improving, 132 today  - asymptomatic

## 2020-10-02 PROBLEM — E87.20 METABOLIC ACIDOSIS: Status: ACTIVE | Noted: 2020-10-02

## 2020-10-02 LAB
ALBUMIN SERPL BCP-MCNC: 3.5 G/DL (ref 3.5–5.2)
ALP SERPL-CCNC: 163 U/L (ref 55–135)
ALT SERPL W/O P-5'-P-CCNC: 13 U/L (ref 10–44)
ANION GAP SERPL CALC-SCNC: 11 MMOL/L (ref 8–16)
AST SERPL-CCNC: 30 U/L (ref 10–40)
BASOPHILS # BLD AUTO: 0.14 K/UL (ref 0–0.2)
BASOPHILS NFR BLD: 0.9 % (ref 0–1.9)
BILIRUB SERPL-MCNC: 0.9 MG/DL (ref 0.1–1)
BUN SERPL-MCNC: 47 MG/DL (ref 6–20)
CALCIUM SERPL-MCNC: 8.4 MG/DL (ref 8.7–10.5)
CHLORIDE SERPL-SCNC: 101 MMOL/L (ref 95–110)
CO2 SERPL-SCNC: 18 MMOL/L (ref 23–29)
CREAT SERPL-MCNC: 2.6 MG/DL (ref 0.5–1.4)
DIFFERENTIAL METHOD: ABNORMAL
EOSINOPHIL # BLD AUTO: 0.8 K/UL (ref 0–0.5)
EOSINOPHIL NFR BLD: 4.8 % (ref 0–8)
ERYTHROCYTE [DISTWIDTH] IN BLOOD BY AUTOMATED COUNT: 13.4 % (ref 11.5–14.5)
EST. GFR  (AFRICAN AMERICAN): 30.1 ML/MIN/1.73 M^2
EST. GFR  (NON AFRICAN AMERICAN): 26 ML/MIN/1.73 M^2
GLUCOSE SERPL-MCNC: 100 MG/DL (ref 70–110)
HCT VFR BLD AUTO: 30.5 % (ref 40–54)
HGB BLD-MCNC: 10.3 G/DL (ref 14–18)
IMM GRANULOCYTES # BLD AUTO: 0.11 K/UL (ref 0–0.04)
IMM GRANULOCYTES NFR BLD AUTO: 0.7 % (ref 0–0.5)
INR PPP: 1.3 (ref 0.8–1.2)
LYMPHOCYTES # BLD AUTO: 1.8 K/UL (ref 1–4.8)
LYMPHOCYTES NFR BLD: 11.2 % (ref 18–48)
MCH RBC QN AUTO: 33.4 PG (ref 27–31)
MCHC RBC AUTO-ENTMCNC: 33.8 G/DL (ref 32–36)
MCV RBC AUTO: 99 FL (ref 82–98)
MONOCYTES # BLD AUTO: 1 K/UL (ref 0.3–1)
MONOCYTES NFR BLD: 6.5 % (ref 4–15)
NEUTROPHILS # BLD AUTO: 12.1 K/UL (ref 1.8–7.7)
NEUTROPHILS NFR BLD: 75.9 % (ref 38–73)
NRBC BLD-RTO: 0 /100 WBC
PLATELET # BLD AUTO: 189 K/UL (ref 150–350)
PMV BLD AUTO: 10.6 FL (ref 9.2–12.9)
POTASSIUM SERPL-SCNC: 3.9 MMOL/L (ref 3.5–5.1)
PROT SERPL-MCNC: 5.1 G/DL (ref 6–8.4)
PROTHROMBIN TIME: 14 SEC (ref 9–12.5)
RBC # BLD AUTO: 3.08 M/UL (ref 4.6–6.2)
SODIUM SERPL-SCNC: 130 MMOL/L (ref 136–145)
WBC # BLD AUTO: 15.97 K/UL (ref 3.9–12.7)

## 2020-10-02 PROCEDURE — 85025 COMPLETE CBC W/AUTO DIFF WBC: CPT | Mod: NTX

## 2020-10-02 PROCEDURE — P9047 ALBUMIN (HUMAN), 25%, 50ML: HCPCS | Mod: JG,NTX | Performed by: HOSPITALIST

## 2020-10-02 PROCEDURE — 85610 PROTHROMBIN TIME: CPT | Mod: NTX

## 2020-10-02 PROCEDURE — 63600175 PHARM REV CODE 636 W HCPCS: Mod: JG,NTX | Performed by: HOSPITALIST

## 2020-10-02 PROCEDURE — 36415 COLL VENOUS BLD VENIPUNCTURE: CPT | Mod: NTX

## 2020-10-02 PROCEDURE — 25000003 PHARM REV CODE 250: Mod: NTX | Performed by: HOSPITALIST

## 2020-10-02 PROCEDURE — 99233 PR SUBSEQUENT HOSPITAL CARE,LEVL III: ICD-10-PCS | Mod: NTX,,, | Performed by: HOSPITALIST

## 2020-10-02 PROCEDURE — 20600001 HC STEP DOWN PRIVATE ROOM: Mod: NTX

## 2020-10-02 PROCEDURE — 80053 COMPREHEN METABOLIC PANEL: CPT | Mod: NTX

## 2020-10-02 PROCEDURE — 99233 SBSQ HOSP IP/OBS HIGH 50: CPT | Mod: NTX,,, | Performed by: HOSPITALIST

## 2020-10-02 RX ORDER — ALBUMIN HUMAN 250 G/1000ML
25 SOLUTION INTRAVENOUS 2 TIMES DAILY
Status: COMPLETED | OUTPATIENT
Start: 2020-10-02 | End: 2020-10-02

## 2020-10-02 RX ORDER — ALBUMIN HUMAN 250 G/1000ML
25 SOLUTION INTRAVENOUS 2 TIMES DAILY
Status: DISCONTINUED | OUTPATIENT
Start: 2020-10-02 | End: 2020-10-02

## 2020-10-02 RX ADMIN — ALBUMIN (HUMAN) 25 G: 12.5 SOLUTION INTRAVENOUS at 09:10

## 2020-10-02 RX ADMIN — OCTREOTIDE ACETATE 100 MCG: 100 INJECTION, SOLUTION INTRAVENOUS; SUBCUTANEOUS at 09:10

## 2020-10-02 RX ADMIN — OCTREOTIDE ACETATE 100 MCG: 100 INJECTION, SOLUTION INTRAVENOUS; SUBCUTANEOUS at 05:10

## 2020-10-02 RX ADMIN — TRAZODONE HYDROCHLORIDE 100 MG: 100 TABLET ORAL at 09:10

## 2020-10-02 RX ADMIN — PANTOPRAZOLE SODIUM 40 MG: 40 TABLET, DELAYED RELEASE ORAL at 09:10

## 2020-10-02 RX ADMIN — MIDODRINE HYDROCHLORIDE 15 MG: 5 TABLET ORAL at 05:10

## 2020-10-02 RX ADMIN — ESCITALOPRAM 5 MG: 5 TABLET, FILM COATED ORAL at 09:10

## 2020-10-02 RX ADMIN — LACTULOSE 15 G: 20 SOLUTION ORAL at 02:10

## 2020-10-02 RX ADMIN — APIXABAN 5 MG: 5 TABLET, FILM COATED ORAL at 09:10

## 2020-10-02 RX ADMIN — MIDODRINE HYDROCHLORIDE 15 MG: 5 TABLET ORAL at 02:10

## 2020-10-02 RX ADMIN — LACTULOSE 15 G: 20 SOLUTION ORAL at 09:10

## 2020-10-02 RX ADMIN — OCTREOTIDE ACETATE 100 MCG: 100 INJECTION, SOLUTION INTRAVENOUS; SUBCUTANEOUS at 02:10

## 2020-10-02 RX ADMIN — ALBUMIN (HUMAN) 25 G: 12.5 SOLUTION INTRAVENOUS at 02:10

## 2020-10-02 RX ADMIN — ROPINIROLE HYDROCHLORIDE 0.25 MG: 0.25 TABLET, FILM COATED ORAL at 09:10

## 2020-10-02 RX ADMIN — MIDODRINE HYDROCHLORIDE 15 MG: 5 TABLET ORAL at 09:10

## 2020-10-02 NOTE — SUBJECTIVE & OBJECTIVE
Interval History: Patient seen and examined by myself this morning. No acute events overnight. Patient claims he's made slighlty more urine in last 24 hours than previously. Denies any dysuria, SOB, or feeling that he is carrying more fluid on him than usual; last paracentesis 09/30. All questions and concerns addressed at this time regarding nephrology POC.    Review of patient's allergies indicates:  No Known Allergies  Current Facility-Administered Medications   Medication Frequency    acetaminophen tablet 325 mg Q4H PRN    albumin human 25% bottle 25 g BID    albuterol-ipratropium 2.5 mg-0.5 mg/3 mL nebulizer solution 3 mL Q6H PRN    apixaban tablet 5 mg BID    escitalopram oxalate tablet 5 mg Daily    glucose chewable tablet 16 g PRN    glucose chewable tablet 24 g PRN    lactulose 20 gram/30 mL solution Soln 15 g TID    melatonin tablet 6 mg Nightly PRN    midodrine tablet 15 mg Q8H    octreotide injection 100 mcg Q8H    ondansetron injection 4 mg Q8H PRN    pantoprazole EC tablet 40 mg Daily    promethazine (PHENERGAN) 6.25 mg in dextrose 5 % 50 mL IVPB Q6H PRN    rOPINIRole tablet 0.25 mg QHS    sodium chloride 0.9% flush 10 mL PRN    traZODone tablet 100 mg QHS       Objective:     Vital Signs (Most Recent):  Temp: 98 °F (36.7 °C) (10/02/20 0424)  Pulse: 76 (10/02/20 0424)  Resp: 14 (10/02/20 0424)  BP: 90/63 (10/02/20 0500)  SpO2: 100 % (10/02/20 0424)  O2 Device (Oxygen Therapy): room air (10/02/20 0424) Vital Signs (24h Range):  Temp:  [97.8 °F (36.6 °C)-98.2 °F (36.8 °C)] 98 °F (36.7 °C)  Pulse:  [76-84] 76  Resp:  [14-18] 14  SpO2:  [97 %-100 %] 100 %  BP: ()/(51-63) 90/63     Weight: 88.1 kg (194 lb 3.6 oz) (09/28/20 2245)  Body mass index is 34.41 kg/m².  Body surface area is 1.98 meters squared.    I/O last 3 completed shifts:  In: 640 [P.O.:640]  Out: -     Physical Exam  Constitutional:       General: He is not in acute distress.     Appearance: Normal appearance.   HENT:       Head: Normocephalic and atraumatic.      Mouth/Throat:      Mouth: Mucous membranes are moist.      Pharynx: Oropharynx is clear.   Eyes:      General: No scleral icterus.     Extraocular Movements: Extraocular movements intact.      Conjunctiva/sclera: Conjunctivae normal.      Pupils: Pupils are equal, round, and reactive to light.   Neck:      Musculoskeletal: Normal range of motion and neck supple.   Cardiovascular:      Rate and Rhythm: Normal rate and regular rhythm.      Pulses: Normal pulses.      Heart sounds: Normal heart sounds.   Pulmonary:      Effort: Pulmonary effort is normal.      Breath sounds: Normal breath sounds.   Abdominal:      General: Bowel sounds are normal. There is distension.      Palpations: Abdomen is soft.      Tenderness: There is no abdominal tenderness.   Musculoskeletal:      Right lower leg: No edema.      Left lower leg: No edema.   Skin:     General: Skin is warm and dry.   Neurological:      General: No focal deficit present.      Mental Status: He is alert. Mental status is at baseline.         Significant Labs:  CBC:   Recent Labs   Lab 10/02/20  0330   WBC 15.97*   RBC 3.08*   HGB 10.3*   HCT 30.5*      MCV 99*   MCH 33.4*   MCHC 33.8     CMP:   Recent Labs   Lab 10/02/20  0330      CALCIUM 8.4*   ALBUMIN 3.5   PROT 5.1*   *   K 3.9   CO2 18*      BUN 47*   CREATININE 2.6*   ALKPHOS 163*   ALT 13   AST 30   BILITOT 0.9     All labs within the past 24 hours have been reviewed.     Significant Imaging:  No significant imaging in last 24 hours

## 2020-10-02 NOTE — PLAN OF CARE
10/02/20 1531   Discharge Reassessment   Assessment Type Discharge Planning Reassessment   Provided patient/caregiver education on the expected discharge date and the discharge plan Yes   Discharge Plan A Home with family   Discharge Plan B Home with family   DME Needed Upon Discharge  other (see comments)  (TBD)   Anticipated Discharge Disposition Home   Post-Acute Status   Post-Acute Authorization Other   Other Status No Post-Acute Service Needs

## 2020-10-02 NOTE — ASSESSMENT & PLAN NOTE
Patient admitted with CO2 of 21, and elevated lactate. Bicarb has trended down to 18    Plan:  - likely 2/2 to patient's cirrhotic liver disease  - rec NaHCO3 1300 TID

## 2020-10-02 NOTE — ASSESSMENT & PLAN NOTE
Hx of hyponatremia, on discharge 131, on arrival 123. Adherent to 2 g Na restriction but denies any decrease in PO intake. Patient's diuretics were held after his discharge due to thinking his MERLY/hypoNa was 2/2 to them.    Plan:  - Lynda, random, < 20  - likely hypervolemic hyponatremia 2/2 to patient's cirrhosis  - 130 today today  - asymptomatic

## 2020-10-02 NOTE — PHYSICIAN QUERY
"PT Name: Kenneth Pardo  MR #: [id number]     ACUITY OF CONDITION CLARIFICATION    Re Hill RN, CCDS; [email or web address]    This form is a permanent document in the medical record.     Query Date: October 2, 2020    By submitting this query, we are merely seeking further clarification of documentation to reflect the severity of illness of your patient. Please utilize your independent clinical judgment when addressing the question(s) below.    The Medical record reflects the following:     Indicators   Supporting Clinical Findings Location in Medical Record   x Documentation of condition Hepatic encephalopathy H&P; Hosp PN 10/1    Lab Value(s)      Radiology Findings     x Treatment/Medication -continue lactulose for HE  H&P   x Other Neuro grossly intact    Alert and oriented to person, place, and time.   recent recurrent admissions for decomp liver disease and ROBBIE H&P      Hosp PN 10/1      Provider, please specify the acuity/chronicity of  "hepatic encephalopathy".     [  X ] Chronic   [   ] Other - specify: _________________   [   ]  Clinically Undetermined     Please document in your progress notes daily for the duration of treatment until resolved, and include in your discharge summary.  "

## 2020-10-02 NOTE — PROGRESS NOTES
[facility]  Nephrology  Progress Note    Patient Name: Kenneth Pardo  MRN: [id number]  Admission Date: 9/28/2020  Hospital Length of Stay: 4 days  Attending Provider: Aicha Rojas MD   Primary Care Physician: Mikala Etienne MD  Principal Problem:MERLY (acute kidney injury)    Subjective:     HPI: Mr Pardo is a 59 yo M with PMHx of HTN, EtOH cirrhosis complicated by portal hypertension, ascites, portal hypertensive gastropathy, and hepatic encephalopathy who presents as a transfer from [facility] after he had follow up labs which showed he had an increase of his creatinine to 3.5 and decrease in his Na to 123. Patient was recently admitted to [facility] for the same problem. On that admission (9/8-9/17), it was initially thought that patient had an MERLY related to his diuretics, lasix and spironolactone, but was eventually treated for HRS after SBP was ruled out with a paracentesis. On discharge, patient had a Cr of 2.1 and Na 130. Upon discharge (9/17), patient's lasix and spironolactone were held. On this admission, he reports being adherent to his 2g/day sodium restriction, no decreased PO intake, and no decrease in urine output. On arrival to [facility], Scr 3.5 (form 2.1 on discharge), WBC 22, INR 1.3, afebrile, and hemodynamically stable. Patient admitted and sepsis workup begun. Underwent para 9/29 where 9.2 L were removed, labs negative for SBP. Cr has trended down, and nephrology consulted due to concern for SBP.    Interval History: Patient seen and examined by myself this morning. No acute events overnight. Patient claims he's made slighlty more urine in last 24 hours than previously. Denies any dysuria, SOB, or feeling that he is carrying more fluid on him than usual; last paracentesis 09/30. All questions and concerns addressed at this time regarding nephrology POC.    Review of patient's allergies indicates:  No Known Allergies  Current Facility-Administered Medications   Medication  Frequency    acetaminophen tablet 325 mg Q4H PRN    albumin human 25% bottle 25 g BID    albuterol-ipratropium 2.5 mg-0.5 mg/3 mL nebulizer solution 3 mL Q6H PRN    apixaban tablet 5 mg BID    escitalopram oxalate tablet 5 mg Daily    glucose chewable tablet 16 g PRN    glucose chewable tablet 24 g PRN    lactulose 20 gram/30 mL solution Soln 15 g TID    melatonin tablet 6 mg Nightly PRN    midodrine tablet 15 mg Q8H    octreotide injection 100 mcg Q8H    ondansetron injection 4 mg Q8H PRN    pantoprazole EC tablet 40 mg Daily    promethazine (PHENERGAN) 6.25 mg in dextrose 5 % 50 mL IVPB Q6H PRN    rOPINIRole tablet 0.25 mg QHS    sodium chloride 0.9% flush 10 mL PRN    traZODone tablet 100 mg QHS       Objective:     Vital Signs (Most Recent):  Temp: 98 °F (36.7 °C) (10/02/20 0424)  Pulse: 76 (10/02/20 0424)  Resp: 14 (10/02/20 0424)  BP: 90/63 (10/02/20 0500)  SpO2: 100 % (10/02/20 0424)  O2 Device (Oxygen Therapy): room air (10/02/20 0424) Vital Signs (24h Range):  Temp:  [97.8 °F (36.6 °C)-98.2 °F (36.8 °C)] 98 °F (36.7 °C)  Pulse:  [76-84] 76  Resp:  [14-18] 14  SpO2:  [97 %-100 %] 100 %  BP: ()/(51-63) 90/63     Weight: 88.1 kg (194 lb 3.6 oz) (09/28/20 2245)  Body mass index is 34.41 kg/m².  Body surface area is 1.98 meters squared.    I/O last 3 completed shifts:  In: 640 [P.O.:640]  Out: -     Physical Exam  Constitutional:       General: He is not in acute distress.     Appearance: Normal appearance.   HENT:      Head: Normocephalic and atraumatic.      Mouth/Throat:      Mouth: Mucous membranes are moist.      Pharynx: Oropharynx is clear.   Eyes:      General: No scleral icterus.     Extraocular Movements: Extraocular movements intact.      Conjunctiva/sclera: Conjunctivae normal.      Pupils: Pupils are equal, round, and reactive to light.   Neck:      Musculoskeletal: Normal range of motion and neck supple.   Cardiovascular:      Rate and Rhythm: Normal rate and regular rhythm.       Pulses: Normal pulses.      Heart sounds: Normal heart sounds.   Pulmonary:      Effort: Pulmonary effort is normal.      Breath sounds: Normal breath sounds.   Abdominal:      General: Bowel sounds are normal. There is distension.      Palpations: Abdomen is soft.      Tenderness: There is no abdominal tenderness.   Musculoskeletal:      Right lower leg: No edema.      Left lower leg: No edema.   Skin:     General: Skin is warm and dry.   Neurological:      General: No focal deficit present.      Mental Status: He is alert. Mental status is at baseline.         Significant Labs:  CBC:   Recent Labs   Lab 10/02/20  0330   WBC 15.97*   RBC 3.08*   HGB 10.3*   HCT 30.5*      MCV 99*   MCH 33.4*   MCHC 33.8     CMP:   Recent Labs   Lab 10/02/20  0330      CALCIUM 8.4*   ALBUMIN 3.5   PROT 5.1*   *   K 3.9   CO2 18*      BUN 47*   CREATININE 2.6*   ALKPHOS 163*   ALT 13   AST 30   BILITOT 0.9     All labs within the past 24 hours have been reviewed.     Significant Imaging:  No significant imaging in last 24 hours    Assessment/Plan:     * Hepatorenal Syndrome  Alcoholic cirrhotic (c/b portal hypertension, ascites, portal hypertensive gastropathy and hepatic encephalopathy) presents with acute hyponatremia (123) and MERLY (Scr 3.5 from baseline 2.1). Also with leukocytosis, but para negative for SBP. Unclear etiology at this time for his elevated Cr but ddx includes MERLY or HRS- physiology consistent with that of HRS Type I     Plan:  - Renally dose all medications  - Avoid nephrotoxic medications  - Strict intake and output   - Daily labs; will trend   - Cr trending down since admission, today 2.6  - uPro/Cr elevated to 0.37  - MAP goal 80-90 for adequate renal perfusion  - Continue albumin + octreotide + midodrine  - Nephrology will continue to follow along     Metabolic acidosis  Patient admitted with CO2 of 21, and elevated lactate. Bicarb has trended down to 18    Plan:  - likely 2/2 to  patient's cirrhotic liver disease  - rec NaHCO3 1300 TID    Hyponatremia  Hx of hyponatremia, on discharge 131, on arrival 123. Adherent to 2 g Na restriction but denies any decrease in PO intake. Patient's diuretics were held after his discharge due to thinking his EMRLY/hypoNa was 2/2 to them.    Plan:  - Lynda, random, < 20  - likely hypervolemic hyponatremia 2/2 to patient's cirrhosis  - 130 today today  - asymptomatic        Thank you for your consult. I will follow-up with patient. Please contact us if you have any additional questions.    Braden Baxter MD, PGY-1  Nephrology  [facility]

## 2020-10-02 NOTE — PHYSICIAN QUERY
PT Name: Kenneth Pardo  MR #: [id number]  CKD CLARIFICATION   Re Hill RN, CCDS; [email or web address]    This form is a permanent document in the medical record.     Query Date: October 2, 2020    By submitting this query, we are merely seeking further clarification of documentation.   Please utilize your independent clinical judgment when addressing the question(s) below.    The Medical Record contains the following:     Indicators   Supporting Clinical Findings Location in Medical Record   x CKD or Chronic Kidney (Renal) Failure / Disease Comes to [facility] with AKI9 on CKD Renal CN 10/1   x BUN/  Creatinine                  GFR    9/28/2020 10:07 9/29/2020 05:31 9/30/2020 05:43 10/1/2020 05:33 10/2/2020 03:30   bun 58 (H) 58 (H) 50 (H) 48 (H) 47 (H)   Cr  3.5 (H) 3.3 (H) 2.8 (H) 2.9 (H) 2.6 (H)   gfr 18.2 (A) 19.5 (A) 23.8 (A) 22.8 (A) 26.0 (A)        Lab    Dehydration      Nausea / Vomiting      Dialysis / CRRT      Medication      Treatment     x Other Chronic Conditions HTN; alcoholic cirrhosis  decompensated Cirrhosis  Hypotension ?-Continue midodrine  Recurrent ascites  Portal hypertension  Portal hypertensive gastropathy  Hepatic encephalopathy   Right Leg DVT - Continue eliquis?  Depression with anxiety   Debility with falls   H&P 9/28   x Other Recently dc'd with creatinine of 2 and off diruetics Renal CN 10/1     Provider, please further specify the stage of Chronic Kidney Disease (CKD).     [employer] Definitions     CKD Stage Description eGFR (mL/min)   [   ]    IV Severely reduced kidney function 15-29   [   ]  V Kidney failure <15   [X   ] Other (please specify): __does NOT have CKD__________    [   ]  Clinically Undetermined        Please document in your progress notes daily for the duration of treatment until resolved and include in your discharge summary.

## 2020-10-02 NOTE — PROGRESS NOTES
Progress Note   Hospital Medicine         Patient Name: Kenneth Pardo  MRN:  [id number]  [facility] Medicine Team: [facility] [place] [facility]OSP MED L Aicha Rojas MD  Date of Admission:  9/28/2020     Length of Stay:  LOS: 3 days   Expected Discharge Date:   Principal Problem:  MERLY (acute kidney injury)       Subjective:     Interval History/Overnight Events:    Felling well  No acute issues    Low BP but asymptomatic   Na improving, but Cr up to 2.9.  Nephrology consulted  Started on HRS meds     S/p LVP with no SBP noted      albumin human 25%  25 g Intravenous BID    apixaban  5 mg Oral BID    escitalopram oxalate  5 mg Oral Daily    lactulose  15 g Oral TID    midodrine  15 mg Oral Q8H    octreotide  100 mcg Subcutaneous Q8H    pantoprazole  40 mg Oral Daily    rOPINIRole  0.25 mg Oral QHS    traZODone  100 mg Oral QHS       Review of Systems   Constitutional: Negative for chills, fatigue, fever.   HENT: Negative for sore throat, trouble swallowing.    Eyes: Negative for photophobia, visual disturbance.   Respiratory: Negative for cough, shortness of breath.    Cardiovascular: Negative for chest pain, palpitations, leg swelling.   Gastrointestinal: Negative for abdominal pain, constipation, diarrhea, nausea, vomiting.   Endocrine: Negative for cold intolerance, heat intolerance.   Genitourinary: Negative for dysuria, frequency.   Musculoskeletal: Negative for arthralgias, myalgias.   Skin: Negative for rash, wound, erythema   Neurological: Negative for dizziness, syncope, weakness, light-headedness.   Psychiatric/Behavioral: Negative for confusion, hallucinations, anxiety  All other systems reviewed and are negative.      Objective:     Temp:  [97.5 °F (36.4 °C)-98.2 °F (36.8 °C)]   Pulse:  [81-92]   Resp:  [18-20]   BP: ()/(51-56)   SpO2:  [97 %-100 %]       Physical Exam:  Constitutional: appears chronically ill   Head: Normocephalic and atraumatic.   Mouth/Throat: Oropharynx is clear and moist.   Eyes: EOM are  normal. Pupils are equal, round, and reactive to light. No scleral icterus.   Neck: Normal range of motion. Neck supple.   Cardiovascular: Normal rate and regular rhythm.  No murmur heard.  Pulmonary/Chest: Effort normal and breath sounds normal. No respiratory distress. No wheezes, rales, or rhonchi  Abdominal: Soft. Bowel sounds are normal. positive distension, no tenderness  Musculoskeletal: Normal range of motion. No edema.   Neurological: Alert and oriented to person, place, and time.   Skin: Skin is warm and dry. + pallor   Psychiatric: Normal mood and affect. Behavior is normal.     Recent Labs   Lab 09/28/20  1007 09/29/20  0531 09/29/20  1530 09/30/20  0544 10/01/20  0533   WBC 22.23* 14.78* 13.95* 15.59* 16.58*   HGB 12.4* 9.6* 10.3* 11.3* 11.7*   HCT 36.2* 29.4* 29.5* 33.1* 36.0*    207 292 196 229     Recent Labs   Lab 09/29/20  0531 09/30/20  0543 10/01/20  0533   * 127* 132*   K 4.8 5.0 4.0   CL 93* 97 100   CO2 19* 16* 20*   BUN 58* 50* 48*   CREATININE 3.3* 2.8* 2.9*    124* 137*   CALCIUM 9.3 8.6* 8.9     Recent Labs   Lab 09/29/20  0531 09/29/20  0801 09/30/20  0542 09/30/20  0543 10/01/20  0533   ALKPHOS 169*  --   --  128 158*   ALT 14  --   --  13 15   AST 24  --   --  30 30   ALBUMIN 4.5  --   --  4.1 3.7   PROT 6.6  --   --  5.7* 5.5*   BILITOT 1.4*  --   --  1.1* 1.1*   INR  --  1.3* 1.3*  --  1.3*     No results for input(s): POCTGLUCOSE in the last 168 hours.      Assessment and Plan     Active Hospital Problems    Diagnosis  POA    *Hepatorenal Syndrome [N17.9]  Yes    Elevated serum creatinine [R79.89]  Unknown    Leukocytosis [D72.829]  Yes    Right leg DVT [I82.401]  Yes    Decompensated liver disease [K74.69]  Yes    Alcoholic liver disease [K70.9]  Yes    Hepatic encephalopathy [K72.90]  Yes    Depression with anxiety [F41.8]  Yes    Debility [R53.81]  Yes    Hyponatremia [E87.1]  Yes      Resolved Hospital Problems   No resolved problems to display.      #Decompensated alcoholic cirrhosis   Recurrent ascites   Portal hypertension   Portal hypertensive gastropathy   Hepatic encephalopathy    MELD-Na score: 23 at 10/1/2020  5:33 AM  MELD score: 20 at 10/1/2020  5:33 AM  Calculated from:  Serum Creatinine: 2.9 mg/dL at 10/1/2020  5:33 AM  Serum Sodium: 132 mmol/L at 10/1/2020  5:33 AM  Total Bilirubin: 1.1 mg/dL at 10/1/2020  5:33 AM  INR(ratio): 1.3 at 10/1/2020  5:33 AM  Age: 60 years 1 month    -recent recurrent admissions for decomp liver disease and FPC  - PETH negative more recently   -hepatology consult   - IR paracentesis with 9.2L removed and negative for SBP on 9/29  - completed liver transplant eval and deferred - unable to be listed for OLT while on DOAC as well (for DVT)      # Right Leg DVT  - seen on U/S; on eliquis,      #Hyponatremia   -Na 127 from recent baseline 136 in ED  -fluid restrict to 1L   - had excessive fluid intake at home      #MERLY  # Hepatorenal syndrome   - Cr up to 2.9.  Nephrology consulted  Started on HRS meds           #Leukocytosis   -WBC improving   -no signs of overt infection or sepsis   -afebrile   -blood cultures    - negative for SBP       #Depression with anxiety   -no acute issue   -hold fluoxetine in setting of hyponatremia (?SIADH)  -continue lexapro      #Insomnia  -trazodone prn      #Debility with falls  -PT evaluation for gait training   -fall precaution       # Hypotension  - midodrine          Diet:  Low sodium  GI PPx:    DVT PPx:    Goals of Care:  full       Disposition:  TBD    Patient's note was created using MModal Dictation.  Any errors in syntax may not have been identified and edited on initial review prior to signing this note.    Signing Physician:     Aicha Rojas MD  Department of Hospital Medicine   [facility]  Pager [phone]  [facility] [id number]  10/1/2020

## 2020-10-02 NOTE — ASSESSMENT & PLAN NOTE
Alcoholic cirrhotic (c/b portal hypertension, ascites, portal hypertensive gastropathy and hepatic encephalopathy) presents with acute hyponatremia (123) and MERLY (Scr 3.5 from baseline 2.1). Also with leukocytosis, but para negative for SBP. Unclear etiology at this time for his elevated Cr but ddx includes MERLY or HRS- physiology consistent with that of HRS Type I     Plan:  - Renally dose all medications  - Avoid nephrotoxic medications  - Strict intake and output   - Daily labs; will trend   - Cr trending down since admission, today 2.6  - uPro/Cr elevated to 0.37  - MAP goal 80-90 for adequate renal perfusion  - Continue albumin + octreotide + midodrine  - Nephrology will continue to follow along

## 2020-10-02 NOTE — PLAN OF CARE
Problem: Adult Inpatient Plan of Care  Goal: Plan of Care Review  Outcome: Ongoing, Progressing     Discussed w/ pt plan of care, pt verbalized understanding, pt has fluid restriction of 1000mL, pt drank around 600mL during my shift, offered multiple times to ambulate pt, pt declined at least x3, pt denies pain/needs at this time, will continue to monitor pt    \  Problem: Fluid Imbalance (Acute Kidney Injury/Impairment)  Goal: Optimal Fluid Balance  Outcome: Ongoing, Progressing     Pt aware of fluid restriction, pt attempting to follow order, will continue to monitor pt fluid intake

## 2020-10-02 NOTE — PLAN OF CARE
Full assessment as documented. Pt remained within fluid restriction overnight. New IV placed and pt received scheduled albumin.

## 2020-10-03 LAB
ALBUMIN SERPL BCP-MCNC: 4.2 G/DL (ref 3.5–5.2)
ALP SERPL-CCNC: 156 U/L (ref 55–135)
ALT SERPL W/O P-5'-P-CCNC: 15 U/L (ref 10–44)
ANION GAP SERPL CALC-SCNC: 14 MMOL/L (ref 8–16)
AST SERPL-CCNC: 31 U/L (ref 10–40)
BACTERIA SPEC AEROBE CULT: NO GROWTH
BASOPHILS # BLD AUTO: 0.11 K/UL (ref 0–0.2)
BASOPHILS NFR BLD: 0.8 % (ref 0–1.9)
BILIRUB SERPL-MCNC: 0.8 MG/DL (ref 0.1–1)
BUN SERPL-MCNC: 45 MG/DL (ref 6–20)
CALCIUM SERPL-MCNC: 8.9 MG/DL (ref 8.7–10.5)
CHLORIDE SERPL-SCNC: 101 MMOL/L (ref 95–110)
CO2 SERPL-SCNC: 19 MMOL/L (ref 23–29)
CREAT SERPL-MCNC: 2.9 MG/DL (ref 0.5–1.4)
DIFFERENTIAL METHOD: ABNORMAL
EOSINOPHIL # BLD AUTO: 0.6 K/UL (ref 0–0.5)
EOSINOPHIL NFR BLD: 4.5 % (ref 0–8)
ERYTHROCYTE [DISTWIDTH] IN BLOOD BY AUTOMATED COUNT: 13.5 % (ref 11.5–14.5)
EST. GFR  (AFRICAN AMERICAN): 26.4 ML/MIN/1.73 M^2
EST. GFR  (NON AFRICAN AMERICAN): 22.8 ML/MIN/1.73 M^2
GLUCOSE SERPL-MCNC: 108 MG/DL (ref 70–110)
HCT VFR BLD AUTO: 32.1 % (ref 40–54)
HGB BLD-MCNC: 10.9 G/DL (ref 14–18)
IMM GRANULOCYTES # BLD AUTO: 0.07 K/UL (ref 0–0.04)
IMM GRANULOCYTES NFR BLD AUTO: 0.5 % (ref 0–0.5)
INR PPP: 1.3 (ref 0.8–1.2)
LYMPHOCYTES # BLD AUTO: 1.9 K/UL (ref 1–4.8)
LYMPHOCYTES NFR BLD: 14.2 % (ref 18–48)
MCH RBC QN AUTO: 33.9 PG (ref 27–31)
MCHC RBC AUTO-ENTMCNC: 34 G/DL (ref 32–36)
MCV RBC AUTO: 100 FL (ref 82–98)
MONOCYTES # BLD AUTO: 0.8 K/UL (ref 0.3–1)
MONOCYTES NFR BLD: 5.6 % (ref 4–15)
NEUTROPHILS # BLD AUTO: 10 K/UL (ref 1.8–7.7)
NEUTROPHILS NFR BLD: 74.4 % (ref 38–73)
NRBC BLD-RTO: 0 /100 WBC
PLATELET # BLD AUTO: 188 K/UL (ref 150–350)
PMV BLD AUTO: 10.8 FL (ref 9.2–12.9)
POTASSIUM SERPL-SCNC: 4.1 MMOL/L (ref 3.5–5.1)
PROT SERPL-MCNC: 5.6 G/DL (ref 6–8.4)
PROTHROMBIN TIME: 14.1 SEC (ref 9–12.5)
RBC # BLD AUTO: 3.22 M/UL (ref 4.6–6.2)
SODIUM SERPL-SCNC: 134 MMOL/L (ref 136–145)
WBC # BLD AUTO: 13.47 K/UL (ref 3.9–12.7)

## 2020-10-03 PROCEDURE — 20600001 HC STEP DOWN PRIVATE ROOM: Mod: NTX

## 2020-10-03 PROCEDURE — 25000003 PHARM REV CODE 250: Mod: NTX | Performed by: HOSPITALIST

## 2020-10-03 PROCEDURE — 80053 COMPREHEN METABOLIC PANEL: CPT | Mod: NTX

## 2020-10-03 PROCEDURE — 99232 PR SUBSEQUENT HOSPITAL CARE,LEVL II: ICD-10-PCS | Mod: NTX,,, | Performed by: HOSPITALIST

## 2020-10-03 PROCEDURE — 63600175 PHARM REV CODE 636 W HCPCS: Mod: NTX | Performed by: HOSPITALIST

## 2020-10-03 PROCEDURE — 85610 PROTHROMBIN TIME: CPT | Mod: NTX

## 2020-10-03 PROCEDURE — 36415 COLL VENOUS BLD VENIPUNCTURE: CPT | Mod: NTX

## 2020-10-03 PROCEDURE — 85025 COMPLETE CBC W/AUTO DIFF WBC: CPT | Mod: NTX

## 2020-10-03 PROCEDURE — 99232 SBSQ HOSP IP/OBS MODERATE 35: CPT | Mod: NTX,,, | Performed by: HOSPITALIST

## 2020-10-03 RX ADMIN — ESCITALOPRAM 5 MG: 5 TABLET, FILM COATED ORAL at 08:10

## 2020-10-03 RX ADMIN — ROPINIROLE HYDROCHLORIDE 0.25 MG: 0.25 TABLET, FILM COATED ORAL at 09:10

## 2020-10-03 RX ADMIN — PANTOPRAZOLE SODIUM 40 MG: 40 TABLET, DELAYED RELEASE ORAL at 08:10

## 2020-10-03 RX ADMIN — APIXABAN 5 MG: 5 TABLET, FILM COATED ORAL at 08:10

## 2020-10-03 RX ADMIN — MIDODRINE HYDROCHLORIDE 15 MG: 5 TABLET ORAL at 02:10

## 2020-10-03 RX ADMIN — MIDODRINE HYDROCHLORIDE 15 MG: 5 TABLET ORAL at 09:10

## 2020-10-03 RX ADMIN — MIDODRINE HYDROCHLORIDE 15 MG: 5 TABLET ORAL at 05:10

## 2020-10-03 RX ADMIN — LACTULOSE 15 G: 20 SOLUTION ORAL at 02:10

## 2020-10-03 RX ADMIN — APIXABAN 5 MG: 5 TABLET, FILM COATED ORAL at 09:10

## 2020-10-03 RX ADMIN — OCTREOTIDE ACETATE 100 MCG: 100 INJECTION, SOLUTION INTRAVENOUS; SUBCUTANEOUS at 02:10

## 2020-10-03 RX ADMIN — OCTREOTIDE ACETATE 100 MCG: 100 INJECTION, SOLUTION INTRAVENOUS; SUBCUTANEOUS at 09:10

## 2020-10-03 RX ADMIN — OCTREOTIDE ACETATE 100 MCG: 100 INJECTION, SOLUTION INTRAVENOUS; SUBCUTANEOUS at 05:10

## 2020-10-03 RX ADMIN — LACTULOSE 15 G: 20 SOLUTION ORAL at 08:10

## 2020-10-03 RX ADMIN — TRAZODONE HYDROCHLORIDE 100 MG: 100 TABLET ORAL at 09:10

## 2020-10-03 RX ADMIN — HYPROMELLOSE 2910 2 DROP: 5 SOLUTION OPHTHALMIC at 03:10

## 2020-10-03 RX ADMIN — SODIUM CHLORIDE 1000 ML: 0.9 INJECTION, SOLUTION INTRAVENOUS at 02:10

## 2020-10-03 NOTE — PLAN OF CARE
Problem: Adult Inpatient Plan of Care  Goal: Plan of Care Review  Outcome: Ongoing, Progressing     Discussed w/ pt plan of care, pt verbalized understanding, pt denies pain/needs at this time, attempted to ambulate pt in [place] x3, pt declined, pt shows no s/s of distress, IV fluids administered per MAR, pt on strict urine output measurements, will continue to monitor, pt declined bath today      Problem: Fluid Imbalance (Acute Kidney Injury/Impairment)  Goal: Optimal Fluid Balance  Outcome: Ongoing, Progressing     Administered IV fluids per MAR,pt fluid restriction increased to 1500cc, will monitor urine output and fluid intake

## 2020-10-03 NOTE — PROGRESS NOTES
Progress Note   Hospital Medicine         Patient Name: Kenneth Pardo  MRN:  [id number]  [place] [facility] [place] [facility] [place] Aicha Rojas MD  Date of Admission:  9/28/2020     Length of Stay:  LOS: 4 days   Expected Discharge Date:   Principal Problem:  MERLY (acute kidney injury)       Subjective:     Interval History/Overnight Events:    Felling well  No acute issues    Low BP but asymptomatic   Na improving, Cr now improving as well     albumin human 25%  25 g Intravenous BID    apixaban  5 mg Oral BID    escitalopram oxalate  5 mg Oral Daily    lactulose  15 g Oral TID    midodrine  15 mg Oral Q8H    octreotide  100 mcg Subcutaneous Q8H    pantoprazole  40 mg Oral Daily    rOPINIRole  0.25 mg Oral QHS    traZODone  100 mg Oral QHS       Review of Systems   Constitutional: Negative for chills, fatigue, fever.   HENT: Negative for sore throat, trouble swallowing.    Eyes: Negative for photophobia, visual disturbance.   Respiratory: Negative for cough, shortness of breath.    Cardiovascular: Negative for chest pain, palpitations, leg swelling.   Gastrointestinal: Negative for abdominal pain, constipation, diarrhea, nausea, vomiting.   Endocrine: Negative for cold intolerance, heat intolerance.   Genitourinary: Negative for dysuria, frequency.   Musculoskeletal: Negative for arthralgias, myalgias.   Skin: Negative for rash, wound, erythema   Neurological: Negative for dizziness, syncope, weakness, light-headedness.   Psychiatric/Behavioral: Negative for confusion, hallucinations, anxiety  All other systems reviewed and are negative.      Objective:     Temp:  [97.6 °F (36.4 °C)-98.2 °F (36.8 °C)]   Pulse:  [69-81]   Resp:  [14-19]   BP: ()/(49-63)   SpO2:  [99 %-100 %]       Physical Exam:  Constitutional: appears chronically ill   Head: Normocephalic and atraumatic.   Mouth/Throat: Oropharynx is clear and moist.   Eyes: EOM are normal. Pupils are equal, round, and reactive to light. No scleral  icterus.   Neck: Normal range of motion. Neck supple.   Cardiovascular: Normal rate and regular rhythm.  No murmur heard.  Pulmonary/Chest: Effort normal and breath sounds normal. No respiratory distress. No wheezes, rales, or rhonchi  Abdominal: Soft. Bowel sounds are normal. Mild distension, no tenderness  Musculoskeletal: Normal range of motion. No edema.   Neurological: Alert and oriented to person, place, and time.   Skin: Skin is warm and dry. + pallor   Psychiatric: Normal mood and affect. Behavior is normal.     Recent Labs   Lab 09/28/20  1007 09/29/20  0531 09/29/20  1530 09/30/20  0544 10/01/20  0533 10/02/20  0330   WBC 22.23* 14.78* 13.95* 15.59* 16.58* 15.97*   HGB 12.4* 9.6* 10.3* 11.3* 11.7* 10.3*   HCT 36.2* 29.4* 29.5* 33.1* 36.0* 30.5*    207 292 196 229 189     Recent Labs   Lab 09/30/20  0543 10/01/20  0533 10/02/20  0330   * 132* 130*   K 5.0 4.0 3.9   CL 97 100 101   CO2 16* 20* 18*   BUN 50* 48* 47*   CREATININE 2.8* 2.9* 2.6*   * 137* 100   CALCIUM 8.6* 8.9 8.4*     Recent Labs   Lab 09/30/20  0542 09/30/20  0543 10/01/20  0533 10/02/20  0330   ALKPHOS  --  128 158* 163*   ALT  --  13 15 13   AST  --  30 30 30   ALBUMIN  --  4.1 3.7 3.5   PROT  --  5.7* 5.5* 5.1*   BILITOT  --  1.1* 1.1* 0.9   INR 1.3*  --  1.3* 1.3*     No results for input(s): POCTGLUCOSE in the last 168 hours.      Assessment and Plan     Active Hospital Problems    Diagnosis  POA    *Hepatorenal Syndrome [N17.9]  Yes    Metabolic acidosis [E87.2]  Yes    Leukocytosis [D72.829]  Yes    Right leg DVT [I82.401]  Yes    Decompensated liver disease [K74.69]  Yes    Alcoholic liver disease [K70.9]  Yes    Hepatic encephalopathy [K72.90]  Yes    Depression with anxiety [F41.8]  Yes    Debility [R53.81]  Yes    Hyponatremia [E87.1]  Yes    Hypotension [I95.9]  Yes    Severe alcohol dependence [F10.20]  Yes    Alcohol use disorder, severe, dependence [F10.20]  Yes      Resolved Hospital Problems    No resolved problems to display.     #Decompensated alcoholic cirrhosis   Recurrent ascites   Portal hypertension   Portal hypertensive gastropathy   Hepatic encephalopathy    MELD-Na score: 24 at 10/2/2020  3:30 AM  MELD score: 19 at 10/2/2020  3:30 AM  Calculated from:  Serum Creatinine: 2.6 mg/dL at 10/2/2020  3:30 AM  Serum Sodium: 130 mmol/L at 10/2/2020  3:30 AM  Total Bilirubin: 0.9 mg/dL (Rounded to 1 mg/dL) at 10/2/2020  3:30 AM  INR(ratio): 1.3 at 10/2/2020  3:30 AM  Age: 60 years 1 month    -recent recurrent admissions for decomp liver disease and prison  - PETH negative more recently   -hepatology consult   - IR paracentesis with 9.2L removed and negative for SBP on 9/29  - completed liver transplant eval and deferred - unable to be listed for OLT while on DOAC as well (for DVT)      # Right Leg DVT  - seen on U/S; on eliquis,      #Hyponatremia   -Na 127 from recent baseline 136 in ED  -fluid restrict to 1L   - had excessive fluid intake at home      #MERLY  # Hepatorenal syndrome   - Cr up to 2.9.  Nephrology consulted    - Started on HRS meds           #Leukocytosis   -WBC improving   -no signs of overt infection or sepsis   -afebrile   -blood cultures    - negative for SBP       #Depression with anxiety   -no acute issue   -hold fluoxetine in setting of hyponatremia (?SIADH)  -continue lexapro      #Insomnia  -trazodone prn      #Debility with falls  -PT evaluation for gait training   -fall precaution       # Hypotension  - 2/2 decompensated liver disease   - cont TID midodrine 15 mg tid          Diet:  Low sodium  GI PPx:    DVT PPx:    Goals of Care:  full       Disposition:  TBD    Patient's note was created using MModal Dictation.  Any errors in syntax may not have been identified and edited on initial review prior to signing this note.    Signing Physician:     Aicha Rojas MD  Department of Hospital Medicine   [facility]  Pager [phone]  [place] [phone]  10/2/2020

## 2020-10-03 NOTE — PROGRESS NOTES
Progress Note   Hospital Medicine         Patient Name: Kenneth Pardo  MRN:  [id number]  [facility] Medicine Team: [facility]OS[facility] MED L Aicha Rojas MD  Date of Admission:  9/28/2020     Length of Stay:  LOS: 5 days   Expected Discharge Date:   Principal Problem:  MERLY (acute kidney injury)       Subjective:     Interval History/Overnight Events:    Felling well  No acute issues    Low BP but asymptomatic .  Notes a little bit of lightheadedness upon standing at times  Creatinine increasing to 2.9.  Has been on H RS medications.  Given IV fluids today.  No significant volume overload noted.  Nephrology following- state that they will continue assessing for the necessity for IV pressors     apixaban  5 mg Oral BID    escitalopram oxalate  5 mg Oral Daily    lactulose  15 g Oral TID    midodrine  15 mg Oral Q8H    octreotide  100 mcg Subcutaneous Q8H    pantoprazole  40 mg Oral Daily    rOPINIRole  0.25 mg Oral QHS    sodium chloride 0.9%  1,000 mL Intravenous Once    traZODone  100 mg Oral QHS       Review of Systems   Constitutional: Negative for chills, fatigue, fever.   HENT: Negative for sore throat, trouble swallowing.    Eyes: Negative for photophobia, visual disturbance.   Respiratory: Negative for cough, shortness of breath.    Cardiovascular: Negative for chest pain, palpitations, leg swelling.   Gastrointestinal: Negative for abdominal pain, constipation, diarrhea, nausea, vomiting.   Endocrine: Negative for cold intolerance, heat intolerance.   Genitourinary: Negative for dysuria, frequency.   Musculoskeletal: Negative for arthralgias, myalgias.   Skin: Negative for rash, wound, erythema   Neurological: Negative for dizziness, syncope, weakness, light-headedness.   Psychiatric/Behavioral: Negative for confusion, hallucinations, anxiety  All other systems reviewed and are negative.      Objective:     Temp:  [97.3 °F (36.3 °C)-98.2 °F (36.8 °C)]   Pulse:  [60-74]   Resp:  [14-16]   BP: ()/(53-61)    SpO2:  [100 %]       Physical Exam:  Constitutional: appears chronically ill   Head: Normocephalic and atraumatic.   Mouth/Throat: Oropharynx is clear and moist.   Eyes: EOM are normal. Pupils are equal, round, and reactive to light. No scleral icterus.   Neck: Normal range of motion. Neck supple.   Cardiovascular: Normal rate and regular rhythm.  No murmur heard.  Pulmonary/Chest: Effort normal and breath sounds normal. No respiratory distress. No wheezes, rales, or rhonchi  Abdominal: Soft. Bowel sounds are normal. Mild distension, no tenderness  Musculoskeletal: Normal range of motion. No edema.   Neurological: Alert and oriented to person, place, and time.   Skin: Skin is warm and dry. + pallor   Psychiatric: Normal mood and affect. Behavior is normal.     Recent Labs   Lab 09/29/20  0531 09/29/20  1530 09/30/20  0544 10/01/20  0533 10/02/20  0330 10/03/20  0250   WBC 14.78* 13.95* 15.59* 16.58* 15.97* 13.47*   HGB 9.6* 10.3* 11.3* 11.7* 10.3* 10.9*   HCT 29.4* 29.5* 33.1* 36.0* 30.5* 32.1*    292 196 229 189 188     Recent Labs   Lab 10/01/20  0533 10/02/20  0330 10/03/20  0250   * 130* 134*   K 4.0 3.9 4.1    101 101   CO2 20* 18* 19*   BUN 48* 47* 45*   CREATININE 2.9* 2.6* 2.9*   * 100 108   CALCIUM 8.9 8.4* 8.9     Recent Labs   Lab 10/01/20  0533 10/02/20  0330 10/03/20  0250   ALKPHOS 158* 163* 156*   ALT 15 13 15   AST 30 30 31   ALBUMIN 3.7 3.5 4.2   PROT 5.5* 5.1* 5.6*   BILITOT 1.1* 0.9 0.8   INR 1.3* 1.3* 1.3*     No results for input(s): POCTGLUCOSE in the last 168 hours.      Assessment and Plan     Active Hospital Problems    Diagnosis  POA    *Hepatorenal Syndrome [N17.9]  Yes    Metabolic acidosis [E87.2]  Yes    Leukocytosis [D72.829]  Yes    Right leg DVT [I82.401]  Yes    Decompensated liver disease [K74.69]  Yes    Alcoholic liver disease [K70.9]  Yes    Hepatic encephalopathy [K72.90]  Yes    Depression with anxiety [F41.8]  Yes    Debility [R53.81]  Yes     Hyponatremia [E87.1]  Yes    Hypotension [I95.9]  Yes    Severe alcohol dependence [F10.20]  Yes    Alcohol use disorder, severe, dependence [F10.20]  Yes      Resolved Hospital Problems   No resolved problems to display.       # Hepatorenal syndrome   - Cr up to 2.9. concern for HRS Type I   - Nephrology consulted    - Started on HRS meds   - creatinine remains stable around 2.9-2.6  - given IV fluids on 10/03/2020   -Nephrology following- state that they will continue assessing for the necessity for IV pressors      #Decompensated alcoholic cirrhosis   Recurrent ascites   Portal hypertension   Portal hypertensive gastropathy   Hepatic encephalopathy    MELD-Na score: 22 at 10/3/2020  2:50 AM  MELD score: 20 at 10/3/2020  2:50 AM  Calculated from:  Serum Creatinine: 2.9 mg/dL at 10/3/2020  2:50 AM  Serum Sodium: 134 mmol/L at 10/3/2020  2:50 AM  Total Bilirubin: 0.8 mg/dL (Rounded to 1 mg/dL) at 10/3/2020  2:50 AM  INR(ratio): 1.3 at 10/3/2020  2:50 AM  Age: 60 years 1 month    -recent recurrent admissions for decomp liver disease and [facility]  - PETH negative more recently   -hepatology consult   - IR paracentesis with 9.2L removed and negative for SBP on 9/29  - completed liver transplant eval and deferred - unable to be listed for OLT while on DOAC as well (for DVT)      # Right Leg DVT  - seen on U/S; on eliquis- continue Eliquis therapy for total of 3 months     #Hyponatremia   -Na 127 from recent baseline 136 in ED  -fluid restriction  - had excessive fluid intake at home   - sodium improving to 130s        #Leukocytosis   -WBC improving   -no signs of overt infection or sepsis   -afebrile   -blood cultures    - negative for SBP       #Depression with anxiety   -no acute issue  -continue lexapro      #Insomnia  -trazodone prn      #Debility with falls  -PT evaluation for gait training   -fall precaution       # Hypotension  - 2/2 decompensated liver disease   - cont TID midodrine 15 mg tid          Diet:   Low sodium  GI PPx:    DVT PPx:    Goals of Care:  full       Disposition:  TBD    Patient's note was created using MModal Dictation.  Any errors in syntax may not have been identified and edited on initial review prior to signing this note.    Signing Physician:     Aicha Rojas MD  Department of Hospital Medicine   [facility]  Pager [phone]  Spectra [phone]  10/3/2020

## 2020-10-03 NOTE — PLAN OF CARE
Pt markedly depressed this shift; otherwise stable and unchanged  Problem: Adjustment to Illness (Sepsis/Septic Shock)  Goal: Optimal Coping  Outcome: Ongoing, Progressing

## 2020-10-04 LAB
ALBUMIN SERPL BCP-MCNC: 3.6 G/DL (ref 3.5–5.2)
ALP SERPL-CCNC: 183 U/L (ref 55–135)
ALT SERPL W/O P-5'-P-CCNC: 17 U/L (ref 10–44)
ANION GAP SERPL CALC-SCNC: 13 MMOL/L (ref 8–16)
AST SERPL-CCNC: 34 U/L (ref 10–40)
BACTERIA BLD CULT: NORMAL
BACTERIA BLD CULT: NORMAL
BASOPHILS # BLD AUTO: 0.11 K/UL (ref 0–0.2)
BASOPHILS NFR BLD: 0.8 % (ref 0–1.9)
BILIRUB SERPL-MCNC: 0.8 MG/DL (ref 0.1–1)
BUN SERPL-MCNC: 42 MG/DL (ref 6–20)
CALCIUM SERPL-MCNC: 8.8 MG/DL (ref 8.7–10.5)
CHLORIDE SERPL-SCNC: 106 MMOL/L (ref 95–110)
CO2 SERPL-SCNC: 17 MMOL/L (ref 23–29)
CREAT SERPL-MCNC: 2.5 MG/DL (ref 0.5–1.4)
DIFFERENTIAL METHOD: ABNORMAL
EOSINOPHIL # BLD AUTO: 0.7 K/UL (ref 0–0.5)
EOSINOPHIL NFR BLD: 4.7 % (ref 0–8)
ERYTHROCYTE [DISTWIDTH] IN BLOOD BY AUTOMATED COUNT: 14.1 % (ref 11.5–14.5)
EST. GFR  (AFRICAN AMERICAN): 31.5 ML/MIN/1.73 M^2
EST. GFR  (NON AFRICAN AMERICAN): 27.3 ML/MIN/1.73 M^2
GLUCOSE SERPL-MCNC: 144 MG/DL (ref 70–110)
HCT VFR BLD AUTO: 35 % (ref 40–54)
HGB BLD-MCNC: 11.5 G/DL (ref 14–18)
IMM GRANULOCYTES # BLD AUTO: 0.13 K/UL (ref 0–0.04)
IMM GRANULOCYTES NFR BLD AUTO: 0.9 % (ref 0–0.5)
INR PPP: 1.3 (ref 0.8–1.2)
LYMPHOCYTES # BLD AUTO: 1.6 K/UL (ref 1–4.8)
LYMPHOCYTES NFR BLD: 11.5 % (ref 18–48)
MCH RBC QN AUTO: 32.9 PG (ref 27–31)
MCHC RBC AUTO-ENTMCNC: 32.9 G/DL (ref 32–36)
MCV RBC AUTO: 100 FL (ref 82–98)
MONOCYTES # BLD AUTO: 0.9 K/UL (ref 0.3–1)
MONOCYTES NFR BLD: 6.4 % (ref 4–15)
NEUTROPHILS # BLD AUTO: 10.7 K/UL (ref 1.8–7.7)
NEUTROPHILS NFR BLD: 75.7 % (ref 38–73)
NRBC BLD-RTO: 0 /100 WBC
PLATELET # BLD AUTO: 207 K/UL (ref 150–350)
PMV BLD AUTO: 10.7 FL (ref 9.2–12.9)
POTASSIUM SERPL-SCNC: 3.9 MMOL/L (ref 3.5–5.1)
PROT SERPL-MCNC: 5.3 G/DL (ref 6–8.4)
PROTHROMBIN TIME: 14 SEC (ref 9–12.5)
RBC # BLD AUTO: 3.5 M/UL (ref 4.6–6.2)
SODIUM SERPL-SCNC: 136 MMOL/L (ref 136–145)
WBC # BLD AUTO: 14.1 K/UL (ref 3.9–12.7)

## 2020-10-04 PROCEDURE — 25000003 PHARM REV CODE 250: Mod: NTX | Performed by: HOSPITALIST

## 2020-10-04 PROCEDURE — P9047 ALBUMIN (HUMAN), 25%, 50ML: HCPCS | Mod: JG,NTX | Performed by: HOSPITALIST

## 2020-10-04 PROCEDURE — 85025 COMPLETE CBC W/AUTO DIFF WBC: CPT | Mod: NTX

## 2020-10-04 PROCEDURE — 36415 COLL VENOUS BLD VENIPUNCTURE: CPT | Mod: NTX

## 2020-10-04 PROCEDURE — 85610 PROTHROMBIN TIME: CPT | Mod: NTX

## 2020-10-04 PROCEDURE — 63600175 PHARM REV CODE 636 W HCPCS: Mod: JG,NTX | Performed by: HOSPITALIST

## 2020-10-04 PROCEDURE — 99232 SBSQ HOSP IP/OBS MODERATE 35: CPT | Mod: NTX,,, | Performed by: HOSPITALIST

## 2020-10-04 PROCEDURE — 80053 COMPREHEN METABOLIC PANEL: CPT | Mod: NTX

## 2020-10-04 PROCEDURE — 99232 PR SUBSEQUENT HOSPITAL CARE,LEVL II: ICD-10-PCS | Mod: NTX,,, | Performed by: HOSPITALIST

## 2020-10-04 PROCEDURE — 20600001 HC STEP DOWN PRIVATE ROOM: Mod: NTX

## 2020-10-04 RX ORDER — SODIUM BICARBONATE 650 MG/1
650 TABLET ORAL 2 TIMES DAILY
Status: DISCONTINUED | OUTPATIENT
Start: 2020-10-04 | End: 2020-10-05

## 2020-10-04 RX ORDER — ALBUMIN HUMAN 250 G/1000ML
25 SOLUTION INTRAVENOUS 3 TIMES DAILY
Status: COMPLETED | OUTPATIENT
Start: 2020-10-04 | End: 2020-10-04

## 2020-10-04 RX ORDER — SODIUM CHLORIDE 9 MG/ML
INJECTION, SOLUTION INTRAVENOUS CONTINUOUS
Status: ACTIVE | OUTPATIENT
Start: 2020-10-04 | End: 2020-10-05

## 2020-10-04 RX ADMIN — ESCITALOPRAM 5 MG: 5 TABLET, FILM COATED ORAL at 08:10

## 2020-10-04 RX ADMIN — TRAZODONE HYDROCHLORIDE 100 MG: 100 TABLET ORAL at 09:10

## 2020-10-04 RX ADMIN — MIDODRINE HYDROCHLORIDE 15 MG: 5 TABLET ORAL at 02:10

## 2020-10-04 RX ADMIN — LACTULOSE 15 G: 20 SOLUTION ORAL at 02:10

## 2020-10-04 RX ADMIN — LACTULOSE 15 G: 20 SOLUTION ORAL at 08:10

## 2020-10-04 RX ADMIN — ROPINIROLE HYDROCHLORIDE 0.25 MG: 0.25 TABLET, FILM COATED ORAL at 09:10

## 2020-10-04 RX ADMIN — ALBUMIN (HUMAN) 25 G: 12.5 SOLUTION INTRAVENOUS at 04:10

## 2020-10-04 RX ADMIN — PANTOPRAZOLE SODIUM 40 MG: 40 TABLET, DELAYED RELEASE ORAL at 08:10

## 2020-10-04 RX ADMIN — SODIUM CHLORIDE: 0.9 INJECTION, SOLUTION INTRAVENOUS at 02:10

## 2020-10-04 RX ADMIN — MIDODRINE HYDROCHLORIDE 15 MG: 5 TABLET ORAL at 09:10

## 2020-10-04 RX ADMIN — ALBUMIN (HUMAN) 25 G: 12.5 SOLUTION INTRAVENOUS at 09:10

## 2020-10-04 RX ADMIN — LACTULOSE 15 G: 20 SOLUTION ORAL at 09:10

## 2020-10-04 RX ADMIN — OCTREOTIDE ACETATE 100 MCG: 100 INJECTION, SOLUTION INTRAVENOUS; SUBCUTANEOUS at 02:10

## 2020-10-04 RX ADMIN — OCTREOTIDE ACETATE 100 MCG: 100 INJECTION, SOLUTION INTRAVENOUS; SUBCUTANEOUS at 09:10

## 2020-10-04 RX ADMIN — ALBUMIN (HUMAN) 25 G: 12.5 SOLUTION INTRAVENOUS at 12:10

## 2020-10-04 RX ADMIN — MIDODRINE HYDROCHLORIDE 15 MG: 5 TABLET ORAL at 05:10

## 2020-10-04 RX ADMIN — APIXABAN 5 MG: 5 TABLET, FILM COATED ORAL at 09:10

## 2020-10-04 RX ADMIN — OCTREOTIDE ACETATE 100 MCG: 100 INJECTION, SOLUTION INTRAVENOUS; SUBCUTANEOUS at 05:10

## 2020-10-04 RX ADMIN — SODIUM BICARBONATE 650 MG TABLET 650 MG: at 09:10

## 2020-10-04 RX ADMIN — APIXABAN 5 MG: 5 TABLET, FILM COATED ORAL at 08:10

## 2020-10-04 NOTE — PLAN OF CARE
Problem: Adult Inpatient Plan of Care  Goal: Plan of Care Review  Outcome: Ongoing, Progressing     Discussed w/ pt plan of care, pt verbalized understanding, pt refused to walk x2 days, instructed pt he needed to ambulate and sit up in chair, pt verbalized understanding, pt sat in chair x 2 hours, pt given shower, pt let shower run for over an hour prior to taking shower, pt IV fluids started per MAR, will continue to monitor and encourage pt to self care      Problem: Adjustment to Illness (Sepsis/Septic Shock)  Goal: Optimal Coping  Outcome: Ongoing, Progressing     Pt encouraged to bathe multiple times, pt up in chair after several requests, pt ambulated approx 35 feet, will continue to encourage pt to perform ADLs

## 2020-10-04 NOTE — PROGRESS NOTES
Progress Note   Hospital Medicine         Patient Name: Kenneth Pardo  MRN:  [id number]  Hospital Medicine Team: [facility] HOSP MED L Aicha Rojas MD  Date of Admission:  9/28/2020     Length of Stay:  LOS: 6 days   Expected Discharge Date:   Principal Problem:  MERLY (acute kidney injury)       Subjective:     Interval History/Overnight Events:    Felling well  No acute issues    Mental status at baseline   No worsening abd distension or LE edema  Cr down to 2.5 with IVF. Given more IVF today with HRS meds     albumin human 25%  25 g Intravenous TID    apixaban  5 mg Oral BID    escitalopram oxalate  5 mg Oral Daily    lactulose  15 g Oral TID    midodrine  15 mg Oral Q8H    octreotide  100 mcg Subcutaneous Q8H    pantoprazole  40 mg Oral Daily    rOPINIRole  0.25 mg Oral QHS    sodium bicarbonate  650 mg Oral BID    traZODone  100 mg Oral QHS       Review of Systems   Constitutional: Negative for chills, fatigue, fever.   HENT: Negative for sore throat, trouble swallowing.    Eyes: Negative for photophobia, visual disturbance.   Respiratory: Negative for cough, shortness of breath.    Cardiovascular: Negative for chest pain, palpitations, leg swelling.   Gastrointestinal: Negative for abdominal pain, constipation, diarrhea, nausea, vomiting.   Endocrine: Negative for cold intolerance, heat intolerance.   Genitourinary: Negative for dysuria, frequency.   Musculoskeletal: Negative for arthralgias, myalgias.   Skin: Negative for rash, wound, erythema   Neurological: Negative for dizziness, syncope, weakness, light-headedness.   Psychiatric/Behavioral: Negative for confusion, hallucinations, anxiety  All other systems reviewed and are negative.      Objective:     Temp:  [97.9 °F (36.6 °C)-98.7 °F (37.1 °C)]   Pulse:  [67-76]   Resp:  [16-18]   BP: ()/(53-76)   SpO2:  [98 %-100 %]       Physical Exam:  Constitutional: appears chronically ill   Head: Normocephalic and atraumatic.   Mouth/Throat: Oropharynx is  clear and moist.   Eyes: EOM are normal. Pupils are equal, round, and reactive to light. No scleral icterus.   Neck: Normal range of motion. Neck supple.   Cardiovascular: Normal rate and regular rhythm.  No murmur heard.  Pulmonary/Chest: Effort normal and breath sounds normal. No respiratory distress. No wheezes, rales, or rhonchi  Abdominal: Soft. Bowel sounds are normal. Mild distension, no tenderness  Musculoskeletal: Normal range of motion. No edema.   Neurological: Alert and oriented to person, place, and time.   Skin: Skin is warm and dry. + pallor   Psychiatric: Normal mood and affect. Behavior is normal.     Recent Labs   Lab 09/29/20  1530 09/30/20  0544 10/01/20  0533 10/02/20  0330 10/03/20  0250 10/04/20  0624   WBC 13.95* 15.59* 16.58* 15.97* 13.47* 14.10*   HGB 10.3* 11.3* 11.7* 10.3* 10.9* 11.5*   HCT 29.5* 33.1* 36.0* 30.5* 32.1* 35.0*    196 229 189 188 207     Recent Labs   Lab 10/02/20  0330 10/03/20  0250 10/04/20  0624   * 134* 136   K 3.9 4.1 3.9    101 106   CO2 18* 19* 17*   BUN 47* 45* 42*   CREATININE 2.6* 2.9* 2.5*    108 144*   CALCIUM 8.4* 8.9 8.8     Recent Labs   Lab 10/02/20  0330 10/03/20  0250 10/04/20  0624   ALKPHOS 163* 156* 183*   ALT 13 15 17   AST 30 31 34   ALBUMIN 3.5 4.2 3.6   PROT 5.1* 5.6* 5.3*   BILITOT 0.9 0.8 0.8   INR 1.3* 1.3* 1.3*     No results for input(s): POCTGLUCOSE in the last 168 hours.      Assessment and Plan     Active Hospital Problems    Diagnosis  POA    *Hepatorenal Syndrome [N17.9]  Yes    Metabolic acidosis [E87.2]  Yes    Leukocytosis [D72.829]  Yes    Right leg DVT [I82.401]  Yes    Decompensated liver disease [K74.69]  Yes    Alcoholic liver disease [K70.9]  Yes    Hepatic encephalopathy [K72.90]  Yes    Depression with anxiety [F41.8]  Yes    Debility [R53.81]  Yes    Hyponatremia [E87.1]  Yes    Hypotension [I95.9]  Yes    Severe alcohol dependence [F10.20]  Yes    Alcohol use disorder, severe, dependence  [F10.20]  Yes      Resolved Hospital Problems   No resolved problems to display.       # Hepatorenal syndrome   - Cr up to 2.9. concern for HRS Type I   - Nephrology consulted    - Started on HRS meds   - creatinine remains stable around 2.9-2.6  - given IV fluids on 10/03/2020 and 10/4  -Nephrology following- state that they will continue assessing for the necessity for IV pressors      #Decompensated alcoholic cirrhosis   Recurrent ascites   Portal hypertension   Portal hypertensive gastropathy   Hepatic encephalopathy    MELD-Na score: 19 at 10/4/2020  6:24 AM  MELD score: 18 at 10/4/2020  6:24 AM  Calculated from:  Serum Creatinine: 2.5 mg/dL at 10/4/2020  6:24 AM  Serum Sodium: 136 mmol/L at 10/4/2020  6:24 AM  Total Bilirubin: 0.8 mg/dL (Rounded to 1 mg/dL) at 10/4/2020  6:24 AM  INR(ratio): 1.3 at 10/4/2020  6:24 AM  Age: 60 years 1 month    -recent recurrent admissions for decomp liver disease and skilled nursing  - PETH negative more recently   -hepatology consult   - IR paracentesis with 9.2L removed and negative for SBP on 9/29  - completed liver transplant eval and deferred - unable to be listed for OLT while on DOAC as well (for DVT)      # Right Leg DVT  - seen on U/S; on eliquis- continue Eliquis therapy for total of 3 months     #Hyponatremia   -Na 127 from recent baseline 136 in ED  -fluid restriction  - had excessive fluid intake at home   - sodium improving to 130s        #Leukocytosis   -WBC improving   -no signs of overt infection or sepsis   -afebrile   -blood cultures    - negative for SBP       #Depression with anxiety   -no acute issue  -continue lexapro      #Insomnia  -trazodone prn      #Debility with falls  -PT evaluation for gait training   -fall precaution       # Hypotension  - 2/2 decompensated liver disease   - cont TID midodrine 15 mg tid          Diet:  Low sodium  GI PPx:    DVT PPx:    Goals of Care:  full       Disposition:  TBD    Patient's note was created using MModal Dictation.  Any  errors in syntax may not have been identified and edited on initial review prior to signing this note.    Signing Physician:     Aicha Rojas MD  Department of [place] [facility] Medicine   [facility]  Pager [phone] [employer] [phone]  10/4/2020

## 2020-10-04 NOTE — PLAN OF CARE
Problem: Adult Inpatient Plan of Care  Goal: Plan of Care Review  Outcome: Ongoing, Progressing    Plan of care reviewed with patient, aaox4. Vss. No acute events this shift monitoring ongoing.

## 2020-10-05 LAB
ALBUMIN SERPL BCP-MCNC: 4.1 G/DL (ref 3.5–5.2)
ALP SERPL-CCNC: 156 U/L (ref 55–135)
ALT SERPL W/O P-5'-P-CCNC: 19 U/L (ref 10–44)
ANION GAP SERPL CALC-SCNC: 13 MMOL/L (ref 8–16)
AST SERPL-CCNC: 33 U/L (ref 10–40)
BILIRUB SERPL-MCNC: 0.8 MG/DL (ref 0.1–1)
BUN SERPL-MCNC: 37 MG/DL (ref 6–20)
CALCIUM SERPL-MCNC: 9.1 MG/DL (ref 8.7–10.5)
CHLORIDE SERPL-SCNC: 110 MMOL/L (ref 95–110)
CO2 SERPL-SCNC: 16 MMOL/L (ref 23–29)
CREAT SERPL-MCNC: 2.2 MG/DL (ref 0.5–1.4)
EST. GFR  (AFRICAN AMERICAN): 36.8 ML/MIN/1.73 M^2
EST. GFR  (NON AFRICAN AMERICAN): 31.8 ML/MIN/1.73 M^2
GLUCOSE SERPL-MCNC: 136 MG/DL (ref 70–110)
PHOSPHATIDYLETHANOL (PETH): NEGATIVE NG/ML
POCT GLUCOSE: 170 MG/DL (ref 70–110)
POTASSIUM SERPL-SCNC: 4 MMOL/L (ref 3.5–5.1)
PROT SERPL-MCNC: 5.6 G/DL (ref 6–8.4)
SODIUM SERPL-SCNC: 139 MMOL/L (ref 136–145)

## 2020-10-05 PROCEDURE — 80053 COMPREHEN METABOLIC PANEL: CPT | Mod: NTX

## 2020-10-05 PROCEDURE — 36415 COLL VENOUS BLD VENIPUNCTURE: CPT | Mod: NTX

## 2020-10-05 PROCEDURE — 99232 SBSQ HOSP IP/OBS MODERATE 35: CPT | Mod: NTX,,, | Performed by: HOSPITALIST

## 2020-10-05 PROCEDURE — 20600001 HC STEP DOWN PRIVATE ROOM: Mod: NTX

## 2020-10-05 PROCEDURE — 99232 PR SUBSEQUENT HOSPITAL CARE,LEVL II: ICD-10-PCS | Mod: NTX,,, | Performed by: HOSPITALIST

## 2020-10-05 PROCEDURE — 25000003 PHARM REV CODE 250: Mod: NTX | Performed by: HOSPITALIST

## 2020-10-05 PROCEDURE — 63600175 PHARM REV CODE 636 W HCPCS: Mod: NTX | Performed by: HOSPITALIST

## 2020-10-05 PROCEDURE — 99233 SBSQ HOSP IP/OBS HIGH 50: CPT | Mod: NTX,,, | Performed by: HOSPITALIST

## 2020-10-05 PROCEDURE — 99233 PR SUBSEQUENT HOSPITAL CARE,LEVL III: ICD-10-PCS | Mod: NTX,,, | Performed by: HOSPITALIST

## 2020-10-05 RX ORDER — SODIUM BICARBONATE 650 MG/1
1300 TABLET ORAL 2 TIMES DAILY
Status: DISCONTINUED | OUTPATIENT
Start: 2020-10-05 | End: 2020-10-06 | Stop reason: HOSPADM

## 2020-10-05 RX ORDER — CIPROFLOXACIN 500 MG/1
500 TABLET ORAL EVERY 24 HOURS
Status: DISCONTINUED | OUTPATIENT
Start: 2020-10-05 | End: 2020-10-06 | Stop reason: HOSPADM

## 2020-10-05 RX ADMIN — LACTULOSE 15 G: 20 SOLUTION ORAL at 08:10

## 2020-10-05 RX ADMIN — TRAZODONE HYDROCHLORIDE 100 MG: 100 TABLET ORAL at 09:10

## 2020-10-05 RX ADMIN — SODIUM BICARBONATE 650 MG TABLET 1300 MG: at 09:10

## 2020-10-05 RX ADMIN — ROPINIROLE HYDROCHLORIDE 0.25 MG: 0.25 TABLET, FILM COATED ORAL at 09:10

## 2020-10-05 RX ADMIN — LACTULOSE 15 G: 20 SOLUTION ORAL at 09:10

## 2020-10-05 RX ADMIN — MIDODRINE HYDROCHLORIDE 15 MG: 5 TABLET ORAL at 02:10

## 2020-10-05 RX ADMIN — APIXABAN 5 MG: 5 TABLET, FILM COATED ORAL at 08:10

## 2020-10-05 RX ADMIN — OCTREOTIDE ACETATE 100 MCG: 100 INJECTION, SOLUTION INTRAVENOUS; SUBCUTANEOUS at 02:10

## 2020-10-05 RX ADMIN — PANTOPRAZOLE SODIUM 40 MG: 40 TABLET, DELAYED RELEASE ORAL at 08:10

## 2020-10-05 RX ADMIN — OCTREOTIDE ACETATE 100 MCG: 100 INJECTION, SOLUTION INTRAVENOUS; SUBCUTANEOUS at 05:10

## 2020-10-05 RX ADMIN — LACTULOSE 15 G: 20 SOLUTION ORAL at 02:10

## 2020-10-05 RX ADMIN — MIDODRINE HYDROCHLORIDE 15 MG: 5 TABLET ORAL at 09:10

## 2020-10-05 RX ADMIN — ESCITALOPRAM 5 MG: 5 TABLET, FILM COATED ORAL at 08:10

## 2020-10-05 RX ADMIN — MIDODRINE HYDROCHLORIDE 15 MG: 5 TABLET ORAL at 05:10

## 2020-10-05 RX ADMIN — CIPROFLOXACIN 500 MG: 500 TABLET, FILM COATED ORAL at 12:10

## 2020-10-05 RX ADMIN — OCTREOTIDE ACETATE 100 MCG: 100 INJECTION, SOLUTION INTRAVENOUS; SUBCUTANEOUS at 09:10

## 2020-10-05 RX ADMIN — SODIUM CHLORIDE 1000 ML: 0.9 INJECTION, SOLUTION INTRAVENOUS at 02:10

## 2020-10-05 RX ADMIN — SODIUM BICARBONATE 650 MG TABLET 650 MG: at 08:10

## 2020-10-05 RX ADMIN — APIXABAN 5 MG: 5 TABLET, FILM COATED ORAL at 09:10

## 2020-10-05 NOTE — SUBJECTIVE & OBJECTIVE
Interval History: Patient seen and examined at bedside. Scr on decreasing trend. No UOP recorded.     Review of patient's allergies indicates:  No Known Allergies  Current Facility-Administered Medications   Medication Frequency    acetaminophen tablet 325 mg Q4H PRN    albuterol-ipratropium 2.5 mg-0.5 mg/3 mL nebulizer solution 3 mL Q6H PRN    apixaban tablet 5 mg BID    artificial tears 0.5 % ophthalmic solution 2 drop PRN    ciprofloxacin HCl tablet 500 mg Q24H    escitalopram oxalate tablet 5 mg Daily    glucose chewable tablet 16 g PRN    glucose chewable tablet 24 g PRN    lactulose 20 gram/30 mL solution Soln 15 g TID    melatonin tablet 6 mg Nightly PRN    midodrine tablet 15 mg Q8H    octreotide injection 100 mcg Q8H    ondansetron injection 4 mg Q8H PRN    pantoprazole EC tablet 40 mg Daily    promethazine (PHENERGAN) 6.25 mg in dextrose 5 % 50 mL IVPB Q6H PRN    rOPINIRole tablet 0.25 mg QHS    sodium bicarbonate tablet 1,300 mg BID    sodium chloride 0.9% flush 10 mL PRN    traZODone tablet 100 mg QHS       Objective:     Vital Signs (Most Recent):  Temp: 97.7 °F (36.5 °C) (10/05/20 1157)  Pulse: 81 (10/05/20 1157)  Resp: 17 (10/05/20 1157)  BP: 101/61 (10/05/20 1157)  SpO2: 96 % (10/05/20 1157)  O2 Device (Oxygen Therapy): room air (10/05/20 0823) Vital Signs (24h Range):  Temp:  [97.7 °F (36.5 °C)-98.3 °F (36.8 °C)] 97.7 °F (36.5 °C)  Pulse:  [70-85] 81  Resp:  [16-18] 17  SpO2:  [96 %-100 %] 96 %  BP: (101-125)/(61-78) 101/61     Weight: 88.1 kg (194 lb 3.6 oz) (09/28/20 2245)  Body mass index is 34.41 kg/m².  Body surface area is 1.98 meters squared.    I/O last 3 completed shifts:  In: 60 [P.O.:60]  Out: 200 [Urine:200]    Physical Exam  Constitutional:       General: He is not in acute distress.  HENT:      Head: Normocephalic and atraumatic.      Mouth/Throat:      Mouth: Mucous membranes are moist.      Pharynx: Oropharynx is clear.   Eyes:      General: No scleral icterus.      Extraocular Movements: Extraocular movements intact.      Conjunctiva/sclera: Conjunctivae normal.      Pupils: Pupils are equal, round, and reactive to light.   Neck:      Musculoskeletal: Normal range of motion and neck supple.   Cardiovascular:      Rate and Rhythm: Normal rate and regular rhythm.      Pulses: Normal pulses.      Heart sounds: Normal heart sounds.   Pulmonary:      Effort: Pulmonary effort is normal.      Breath sounds: Normal breath sounds.   Abdominal:      General: Bowel sounds are normal. There is distension.      Palpations: Abdomen is soft.      Tenderness: There is no abdominal tenderness.   Musculoskeletal:      Right lower leg: No edema.      Left lower leg: No edema.   Skin:     General: Skin is warm and dry.   Neurological:      General: No focal deficit present.      Mental Status: He is alert. Mental status is at baseline.         Significant Labs:  CBC:   Recent Labs   Lab 10/04/20  0624   WBC 14.10*   RBC 3.50*   HGB 11.5*   HCT 35.0*      *   MCH 32.9*   MCHC 32.9     CMP:   Recent Labs   Lab 10/05/20  0931   *   CALCIUM 9.1   ALBUMIN 4.1   PROT 5.6*      K 4.0   CO2 16*      BUN 37*   CREATININE 2.2*   ALKPHOS 156*   ALT 19   AST 33   BILITOT 0.8     All labs within the past 24 hours have been reviewed.

## 2020-10-05 NOTE — ASSESSMENT & PLAN NOTE
Alcoholic cirrhotic (c/b portal hypertension, ascites, portal hypertensive gastropathy and hepatic encephalopathy) presents with acute hyponatremia (123) and MERLY (Scr 3.5 from baseline 2.1). Also with leukocytosis, but para negative for SBP. Unclear etiology at this time for his elevated Cr but ddx includes MERLY or HRS- physiology consistent with that of HRS Type I     Recommendations:  - Patient renal function showing signs of recovery. Continue current HRS protocol medications while hospitalized. No need for vasopressors.   - Acid base: increase sodium bicarbonate to 1300 mg tid   - Strict I/O and chart  - Avoid nephrotoxic medications, NSAIDs, IV contrast, etc.  - Medication doses adjusted to GFR  - Maintain MAP > 65   eMERGENCY dEPARTMENT eNCOUnter      CHIEF COMPLAINT    Chief Complaint   Patient presents with   • PICC Line Problem       HPI    Miguel Garcia is a 78 year old male who presents to the ED for a PICC line placement. The pt gets TPN through the PICC for 14 hours, starting in the evening. He reports that the PICC line got snagged in something when he went to the bathroom last night. He reports minimal bleeding. Denies any complaints. Comes here for PICC placement.      ALLERGIES    ALLERGIES:   Allergen Reactions   • Metronidazole Other (See Comments)     FLAGYL   • Sulfa Antibiotics Palpitations       CURRENT MEDICATIONS    Current Facility-Administered Medications   Medication Dose Route Frequency Provider Last Rate Last Dose   • sodium chloride (PF) 0.9 % injection 10 mL  10 mL Injection 3 times per day Shemar Mitchell MD   10 mL at 06/30/20 1500     Current Outpatient Medications   Medication Sig Dispense Refill   • midodrine (PROAMATINE) 10 MG tablet Take 1 tablet by mouth 3 times daily. 270 tablet 1   • cyclobenzaprine (FLEXERIL) 10 MG tablet Take 1 tablet by mouth 3 times daily as needed for Muscle spasms. 30 tablet 0   • promethazine (PHENERGAN) 12.5 MG tablet Take 1 tablet by mouth every 6 hours as needed for Nausea. 90 tablet 0   • metFORMIN (GLUCOPHAGE) 500 MG tablet TAKE 1 TABLET DAILY WITH BREAKFAST 90 tablet 1   • HYDROcodone-acetaminophen (NORCO) 5-325 MG per tablet Take 1 tablet by mouth every 6 hours as needed for Pain. 60 tablet 0   • ipratropium-albuterol (DUONEB) 0.5-2.5 (3) MG/3ML nebulizer solution Take 3 mLs by nebulization 3 times daily. Needs appt for further refills from Dr. Figueroa.  3/3/20 90 mL 11   • fludrocortisone (FLORINEF) 0.1 MG tablet Take 1 tablet by mouth daily. 30 tablet 5   • HYDROcodone-acetaminophen (NORCO) 5-325 MG per tablet 1 tablet by PEG Tube route every 8 hours as needed for Pain. 30 tablet 0   • hyoscyamine (ANASPAZ,LEVSIN) 0.125 MG tablet TK 1 T PO Q 8 H PRF ABDOMINAL  PAIN  0   • mirtazapine (REMERON) 15 MG tablet TK 1 T PO QD HS  0   • nicotine (NICODERM) 14 MG/24HR patch UNW AND GAMA 1 PA TO SKIN ONCE A DAY  0   • zinc oxide 20 % ointment APPLY EXTERNALLY TO THE AFFECTED AREA AS NEEDED  0   • blood glucose (ACCU-CHEK FRANSISCO) test strip Test blood sugar 4 times daily as directed. Diagnosis: E 11.9. Meter: Accu-chek [place] 300 each 3   • blood glucose lancets Test blood sugar 4 times daily as directed. Diagnosis: E 11.9. Meter: Accu-chek [place] 300 each 0   • dicyclomine (BENTYL) 10 MG capsule Take 1 capsule by mouth 3 times daily (before meals). 270 capsule 3   • acetaminophen (TYLENOL) 500 MG tablet Take 500 mg by mouth every 6 hours as needed for Pain. Not to exceed 4000mg in 24hours.(8 tablets)     • Cyanocobalamin (VITAMIN B 12 PO) Take 2,500 mcg by mouth daily.     • B Complex Vitamins (B COMPLEX 50) Tab Take 1 tablet by mouth daily.     • oxygen (O2) gas Inhale 2 L/min into the lungs nightly. To wear O2 at night, may wear during the daytime if need be.     • ferrous sulfate 220 (44 Fe) MG/5ML liquid Take 6.8 mLs by mouth daily. 150 mL 2   • calcium carbonate (TUMS) 500 MG chewable tablet Chew 1 tablet by mouth 3 times daily as needed for Heartburn.     • amylase-lipase-protease 120,000-24,000-76,000 units (CREON) 34599 units per capsule Take 1 capsule by mouth 3 times daily (with meals).     • ALPRAZolam (XANAX) 0.25 MG tablet GIVE 1 TABLET BY MOUTH THREE TIMES DAILY Authorized: 15 Remainin 90 tablet 0   • simethicone (MYLICON) 80 MG chewable tablet Chew 1 tablet by mouth 2 times daily as needed for Flatulence. 30 tablet 0   • cannabidiol (CBD) oil Take 1 application by mouth 2 times daily as needed (Anxiety or pain). Do not start before 2020.     • PROAIR  (90 Base) MCG/ACT inhaler USE 1 INHALATION EVERY 4 HOURS AS NEEDED FOR SHORTNESS OF BREATH OR WHEEZING 8.5 g 1   • ondansetron (ZOFRAN) 4 MG tablet Take 1 tablet by mouth every 8 hours as needed for  Nausea. 30 tablet 1   • mometasone (NASONEX) 50 MCG/ACT nasal spray USE 2 SPRAYS IN EACH NOSTRIL DAILY 51 g 3   • Blood Glucose Monitoring Suppl (ACCU-CHEK FRANSISCO) Device Check blood glucose 2 times daily as directed. 1 each 0   • Cholecalciferol (VITAMIN D) 2000 UNITS CAPS Take 2,000 Units by mouth daily.      • Probiotic Product (PROBIOTIC DAILY PO) Take  by mouth 2 times daily.     • sertraline (ZOLOFT) 100 MG tablet Take 150 mg by mouth daily.       • esomeprazole (NEXIUM) 40 MG packet Take 40 mg by mouth daily (before breakfast).         PAST MEDICAL HISTORY    Past Medical History:   Diagnosis Date   • Adenocarcinoma of esophagus (CMS/[facility])    • Anxiety    • Chronic obstructive pulmonary disease (CMS/[facility])    • COPD (chronic obstructive pulmonary disease) (CMS/[facility]) 4/2/2014   • Depression    • DJD (degenerative joint disease)    • DM (diabetes mellitus) (CMS/[facility])    • Emphysema 4/2/2014   • GERD (gastroesophageal reflux disease)    • IBS (irritable bowel syndrome)    • Lung nodules 4/2/2014   • Severe protein-calorie malnutrition (CMS/[facility]) 12/03/2019   • Shortness of breath 4/2/2014   • Tobacco use disorder 4/2/2014   • Urothelial carcinoma of bladder (CMS/[facility]) 01/30/2020    TURBT, invasive urothelial carcinoma on pathology       SURGICAL HISTORY    Past Surgical History:   Procedure Laterality Date   • ------------other-------------  1/1/2010    esophagectomy for esophageal/stomach cancer   • Ankle surgery     • Arthroscopy knee medial or lat  01/01/2002   • Colon surgery  12/01/2019    J tube   • Colonoscopy  04/01/2008   • Colonoscopy  10/12/2016   • Hemorhoidectomy int ext single column group  2002    Hemorrhoidectomy   • Hemorrhoidectomy,external  01/01/1983   • Ir abscess drain pelvic percutaneous  01/02/2020   • Ir thoracentesis Right 12/02/2019   • Laparoscopy, cholecystectomy  03/28/2003    Cholecystectomy, laparoscopic   • Nasal septum surgery  01/01/1980   • Prostate surgery  01/01/2002   • Shoulder  surgery  01/01/1990    Acromioclavicular    • Transurethral resection of bladder tumor  01/30/2020    T1HG, urothelial carcinoma, invasive       SOCIAL HISTORY    Social History     Tobacco Use   • Smoking status: Current Some Day Smoker     Packs/day: 0.50     Years: 50.00     Pack years: 25.00     Types: Cigarettes     Last attempt to quit: 4/11/2017     Years since quitting: 3.2   • Smokeless tobacco: Never Used   • Tobacco comment: not interested in quiting, refused offer of patch   Substance Use Topics   • Alcohol use: Not Currently     Alcohol/week: 0.0 standard drinks     Comment: very little   • Drug use: No       FAMILY HISTORY    Family History   Problem Relation Age of Onset   • Thyroid Other    • Diabetes Paternal Aunt    • Tuberculosis Mother    • Cancer, Breast Daughter    • Heart disease Father    • Arthritis Father        REVIEW OF SYSTEMS    No limitations  Constitutional:  Denies fever or chills.   Respiratory:  Denies cough or shortness of breath.   Cardiovascular:  Denies chest pain or edema.   GI:  Denies abdominal pain, nausea, vomiting, bloody stools or diarrhea.   :  Denies dysuria, frequency, or urgency.   Musculoskeletal:  Denies back pain or joint pain.   Neurologic:  Denies headache, focal weakness or sensory changes.          PHYSICAL EXAM    ED Triage Vitals [06/30/20 1221]   ED Triage Vitals Group      Temp 98.4 °F (36.9 °C)      Heart Rate 91      Resp 18      /85      SpO2 95 %      EtCO2 mmHg       Height       Weight       Weight Scale Used       BMI (Calculated)       IBW/kg (Calculated)            Constitutional:  Well developed, well nourished, in no acute distress, non-toxic appearance.   Respiratory:  No respiratory distress.  Cardiovascular:  Regular rate and rhythm.  GI:  Soft, normal bowel sounds, non-tender, nondistended. Peg tube, colostomy noted.   Musculoskeletal:  No tenderness, erythema, swelling, or discharge noted from the insertion site of the PICC line.    Neurologic:  Alert & oriented x 3; No focal deficits  Psychiatric:  Speech and behavior appropriate.         ED COURSE & MEDICAL DECISION MAKING    I have reviewed the patient's past medical/surgical/social and family history. Non toxic appearing male who presents to the ED for IC access for daily TPN infusion. Will call PICC line team for insertion.    1:49 PM  PICC line about to be placed. Pt remains asymptomatic.    2:45 PM  PICC placed. Pt resting comfortably. Will d/c home.    The patient was given ed warnings, dc instructions and follow up information to go home with.  Pt understands and agrees with plan for discharge.  All questions answered at this time.        FINAL IMPRESSION    1. Displacement of peripherally inserted central venous catheter (PICC) (CMS/[facility])                   Shemar Mitchell MD  06/30/20 4445

## 2020-10-05 NOTE — PLAN OF CARE
Problem: Adult Inpatient Plan of Care  Goal: Plan of Care Review  Outcome: Ongoing, Progressing   Discussed w/ pt plan of care, pt verbalized understanding, pt denies pain/needs at this time, pt receiving IV fluids per MAR, pt shows no s/s of distress, pt appears happier than yesterday, will encourage pt to self perform ADLs, will attempt to ambulate pt in [place] again prior to end of shift  .    Problem: Fluid Imbalance (Acute Kidney Injury/Impairment)  Goal: Optimal Fluid Balance  Outcome: Ongoing, Progressing     IV fluids replaced per MAR, will continue to monitor pt oral intake

## 2020-10-05 NOTE — PROGRESS NOTES
[facility]  Nephrology  Progress Note    Patient Name: Kenenth Pardo  MRN: [id number]  Admission Date: 9/28/2020  Hospital Length of Stay: 7 days  Attending Provider: Aicha Rojas MD   Primary Care Physician: Mikala Etienne MD  Principal Problem:MERLY (acute kidney injury)    Subjective:     HPI: Mr Pardo is a 59 yo M with PMHx of HTN, EtOH cirrhosis complicated by portal hypertension, ascites, portal hypertensive gastropathy, and hepatic encephalopathy who presents as a transfer from [facility] after he had follow up labs which showed he had an increase of his creatinine to 3.5 and decrease in his Na to 123. [place] [facility] [place]. On that admission (9/8-9/17), it was initially thought that patient had an MERLY related to his diuretics, lasix and spironolactone, but was eventually treated for HRS after SBP was ruled out with a paracentesis. On discharge, patient had a Cr of 2.1 and Na 130. Upon discharge (9/17), patient's lasix and spironolactone were held. On this admission, he reports being adherent to his 2g/day sodium restriction, no decreased PO intake, and no decrease in urine output. On [place] [facility] [place], Scr 3.5 (form 2.1 on discharge), WBC 22, INR 1.3, afebrile, and hemodynamically stable. Patient admitted and sepsis workup begun. Underwent para 9/29 where 9.2 L were removed, labs negative for SBP. Cr has trended down, and nephrology consulted due to concern for SBP.    Interval History: Patient seen and examined at bedside. Scr on decreasing trend. No UOP recorded.     Review of patient's allergies indicates:  No Known Allergies  Current Facility-Administered Medications   Medication Frequency    acetaminophen tablet 325 mg Q4H PRN    albuterol-ipratropium 2.5 mg-0.5 mg/3 mL nebulizer solution 3 mL Q6H PRN    apixaban tablet 5 mg BID    artificial tears 0.5 % ophthalmic solution 2 drop PRN    ciprofloxacin HCl tablet 500 mg Q24H     escitalopram oxalate tablet 5 mg Daily    glucose chewable tablet 16 g PRN    glucose chewable tablet 24 g PRN    lactulose 20 gram/30 mL solution Soln 15 g TID    melatonin tablet 6 mg Nightly PRN    midodrine tablet 15 mg Q8H    octreotide injection 100 mcg Q8H    ondansetron injection 4 mg Q8H PRN    pantoprazole EC tablet 40 mg Daily    promethazine (PHENERGAN) 6.25 mg in dextrose 5 % 50 mL IVPB Q6H PRN    rOPINIRole tablet 0.25 mg QHS    sodium bicarbonate tablet 1,300 mg BID    sodium chloride 0.9% flush 10 mL PRN    traZODone tablet 100 mg QHS       Objective:     Vital Signs (Most Recent):  Temp: 97.7 °F (36.5 °C) (10/05/20 1157)  Pulse: 81 (10/05/20 1157)  Resp: 17 (10/05/20 1157)  BP: 101/61 (10/05/20 1157)  SpO2: 96 % (10/05/20 1157)  O2 Device (Oxygen Therapy): room air (10/05/20 0823) Vital Signs (24h Range):  Temp:  [97.7 °F (36.5 °C)-98.3 °F (36.8 °C)] 97.7 °F (36.5 °C)  Pulse:  [70-85] 81  Resp:  [16-18] 17  SpO2:  [96 %-100 %] 96 %  BP: (101-125)/(61-78) 101/61     Weight: 88.1 kg (194 lb 3.6 oz) (09/28/20 2245)  Body mass index is 34.41 kg/m².  Body surface area is 1.98 meters squared.    I/O last 3 completed shifts:  In: 60 [P.O.:60]  Out: 200 [Urine:200]    Physical Exam  Constitutional:       General: He is not in acute distress.  HENT:      Head: Normocephalic and atraumatic.      Mouth/Throat:      Mouth: Mucous membranes are moist.      Pharynx: Oropharynx is clear.   Eyes:      General: No scleral icterus.     Extraocular Movements: Extraocular movements intact.      Conjunctiva/sclera: Conjunctivae normal.      Pupils: Pupils are equal, round, and reactive to light.   Neck:      Musculoskeletal: Normal range of motion and neck supple.   Cardiovascular:      Rate and Rhythm: Normal rate and regular rhythm.      Pulses: Normal pulses.      Heart sounds: Normal heart sounds.   Pulmonary:      Effort: Pulmonary effort is normal.      Breath sounds: Normal breath sounds.    Abdominal:      General: Bowel sounds are normal. There is distension.      Palpations: Abdomen is soft.      Tenderness: There is no abdominal tenderness.   Musculoskeletal:      Right lower leg: No edema.      Left lower leg: No edema.   Skin:     General: Skin is warm and dry.   Neurological:      General: No focal deficit present.      Mental Status: He is alert. Mental status is at baseline.         Significant Labs:  CBC:   Recent Labs   Lab 10/04/20  0624   WBC 14.10*   RBC 3.50*   HGB 11.5*   HCT 35.0*      *   MCH 32.9*   MCHC 32.9     CMP:   Recent Labs   Lab 10/05/20  0931   *   CALCIUM 9.1   ALBUMIN 4.1   PROT 5.6*      K 4.0   CO2 16*      BUN 37*   CREATININE 2.2*   ALKPHOS 156*   ALT 19   AST 33   BILITOT 0.8     All labs within the past 24 hours have been reviewed.       Assessment/Plan:     * Hepatorenal Syndrome  Alcoholic cirrhotic (c/b portal hypertension, ascites, portal hypertensive gastropathy and hepatic encephalopathy) presents with acute hyponatremia (123) and MERLY (Scr 3.5 from baseline 2.1). Also with leukocytosis, but para negative for SBP. Unclear etiology at this time for his elevated Cr but ddx includes MERLY or HRS- physiology consistent with that of HRS Type I     Recommendations:  - Patient renal function showing signs of recovery. Continue current HRS protocol medications while hospitalized. No need for vasopressors.   - Acid base: increase sodium bicarbonate to 1300 mg tid   - Strict I/O and chart  - Avoid nephrotoxic medications, NSAIDs, IV contrast, etc.  - Medication doses adjusted to GFR  - Maintain MAP > 65    Metabolic acidosis  - Increase sodium bicarb dose and frequency to 1300 mg TID        Thank you for your consult. I will sign off. Please contact us if you have any additional questions.    Cal Riley MD  Nephrology  [facility]

## 2020-10-06 VITALS
HEART RATE: 93 BPM | HEIGHT: 63 IN | OXYGEN SATURATION: 97 % | TEMPERATURE: 98 F | SYSTOLIC BLOOD PRESSURE: 110 MMHG | DIASTOLIC BLOOD PRESSURE: 76 MMHG | RESPIRATION RATE: 17 BRPM | WEIGHT: 184.31 LBS | BODY MASS INDEX: 32.66 KG/M2

## 2020-10-06 DIAGNOSIS — K70.11 ALCOHOLIC HEPATITIS WITH ASCITES: Primary | ICD-10-CM

## 2020-10-06 LAB
ALBUMIN SERPL BCP-MCNC: 3.8 G/DL (ref 3.5–5.2)
ALP SERPL-CCNC: 173 U/L (ref 55–135)
ALT SERPL W/O P-5'-P-CCNC: 20 U/L (ref 10–44)
ANION GAP SERPL CALC-SCNC: 10 MMOL/L (ref 8–16)
AST SERPL-CCNC: 37 U/L (ref 10–40)
BASOPHILS # BLD AUTO: 0.13 K/UL (ref 0–0.2)
BASOPHILS NFR BLD: 0.9 % (ref 0–1.9)
BILIRUB SERPL-MCNC: 0.8 MG/DL (ref 0.1–1)
BUN SERPL-MCNC: 34 MG/DL (ref 6–20)
CALCIUM SERPL-MCNC: 9.1 MG/DL (ref 8.7–10.5)
CHLORIDE SERPL-SCNC: 108 MMOL/L (ref 95–110)
CO2 SERPL-SCNC: 20 MMOL/L (ref 23–29)
CREAT SERPL-MCNC: 2 MG/DL (ref 0.5–1.4)
DIFFERENTIAL METHOD: ABNORMAL
EOSINOPHIL # BLD AUTO: 0.6 K/UL (ref 0–0.5)
EOSINOPHIL NFR BLD: 4.5 % (ref 0–8)
ERYTHROCYTE [DISTWIDTH] IN BLOOD BY AUTOMATED COUNT: 14.4 % (ref 11.5–14.5)
EST. GFR  (AFRICAN AMERICAN): 41.3 ML/MIN/1.73 M^2
EST. GFR  (NON AFRICAN AMERICAN): 35.7 ML/MIN/1.73 M^2
GLUCOSE SERPL-MCNC: 103 MG/DL (ref 70–110)
HCT VFR BLD AUTO: 38.7 % (ref 40–54)
HGB BLD-MCNC: 12.4 G/DL (ref 14–18)
IMM GRANULOCYTES # BLD AUTO: 0.09 K/UL (ref 0–0.04)
IMM GRANULOCYTES NFR BLD AUTO: 0.6 % (ref 0–0.5)
INR PPP: 1.2 (ref 0.8–1.2)
LYMPHOCYTES # BLD AUTO: 1.5 K/UL (ref 1–4.8)
LYMPHOCYTES NFR BLD: 10.3 % (ref 18–48)
MCH RBC QN AUTO: 32.9 PG (ref 27–31)
MCHC RBC AUTO-ENTMCNC: 32 G/DL (ref 32–36)
MCV RBC AUTO: 103 FL (ref 82–98)
MONOCYTES # BLD AUTO: 0.9 K/UL (ref 0.3–1)
MONOCYTES NFR BLD: 6.6 % (ref 4–15)
NEUTROPHILS # BLD AUTO: 11 K/UL (ref 1.8–7.7)
NEUTROPHILS NFR BLD: 77.1 % (ref 38–73)
NRBC BLD-RTO: 0 /100 WBC
PLATELET # BLD AUTO: 178 K/UL (ref 150–350)
PMV BLD AUTO: 10.9 FL (ref 9.2–12.9)
POTASSIUM SERPL-SCNC: 4 MMOL/L (ref 3.5–5.1)
PROT SERPL-MCNC: 5.6 G/DL (ref 6–8.4)
PROTHROMBIN TIME: 13.3 SEC (ref 9–12.5)
RBC # BLD AUTO: 3.77 M/UL (ref 4.6–6.2)
SODIUM SERPL-SCNC: 138 MMOL/L (ref 136–145)
WBC # BLD AUTO: 14.24 K/UL (ref 3.9–12.7)

## 2020-10-06 PROCEDURE — 25000003 PHARM REV CODE 250: Mod: NTX | Performed by: HOSPITALIST

## 2020-10-06 PROCEDURE — 99239 PR HOSPITAL DISCHARGE DAY,>30 MIN: ICD-10-PCS | Mod: NTX,,, | Performed by: HOSPITALIST

## 2020-10-06 PROCEDURE — 99239 HOSP IP/OBS DSCHRG MGMT >30: CPT | Mod: NTX,,, | Performed by: HOSPITALIST

## 2020-10-06 PROCEDURE — 63600175 PHARM REV CODE 636 W HCPCS: Mod: NTX | Performed by: HOSPITALIST

## 2020-10-06 PROCEDURE — 94761 N-INVAS EAR/PLS OXIMETRY MLT: CPT | Mod: NTX

## 2020-10-06 PROCEDURE — 85025 COMPLETE CBC W/AUTO DIFF WBC: CPT | Mod: NTX

## 2020-10-06 PROCEDURE — 99900035 HC TECH TIME PER 15 MIN (STAT): Mod: NTX

## 2020-10-06 PROCEDURE — 36415 COLL VENOUS BLD VENIPUNCTURE: CPT | Mod: NTX

## 2020-10-06 PROCEDURE — 85610 PROTHROMBIN TIME: CPT | Mod: NTX

## 2020-10-06 PROCEDURE — 80053 COMPREHEN METABOLIC PANEL: CPT | Mod: NTX

## 2020-10-06 RX ORDER — MUPIROCIN 20 MG/G
OINTMENT TOPICAL 2 TIMES DAILY
Status: DISCONTINUED | OUTPATIENT
Start: 2020-10-06 | End: 2020-10-06 | Stop reason: HOSPADM

## 2020-10-06 RX ORDER — LIDOCAINE 50 MG/G
1 PATCH TOPICAL
Status: DISCONTINUED | OUTPATIENT
Start: 2020-10-06 | End: 2020-10-06 | Stop reason: HOSPADM

## 2020-10-06 RX ADMIN — PANTOPRAZOLE SODIUM 40 MG: 40 TABLET, DELAYED RELEASE ORAL at 08:10

## 2020-10-06 RX ADMIN — SODIUM CHLORIDE 1000 ML: 0.9 INJECTION, SOLUTION INTRAVENOUS at 08:10

## 2020-10-06 RX ADMIN — SODIUM BICARBONATE 650 MG TABLET 1300 MG: at 08:10

## 2020-10-06 RX ADMIN — OCTREOTIDE ACETATE 100 MCG: 100 INJECTION, SOLUTION INTRAVENOUS; SUBCUTANEOUS at 05:10

## 2020-10-06 RX ADMIN — MIDODRINE HYDROCHLORIDE 15 MG: 5 TABLET ORAL at 02:10

## 2020-10-06 RX ADMIN — LACTULOSE 15 G: 20 SOLUTION ORAL at 02:10

## 2020-10-06 RX ADMIN — LACTULOSE 15 G: 20 SOLUTION ORAL at 08:10

## 2020-10-06 RX ADMIN — ESCITALOPRAM 5 MG: 5 TABLET, FILM COATED ORAL at 08:10

## 2020-10-06 RX ADMIN — MIDODRINE HYDROCHLORIDE 15 MG: 5 TABLET ORAL at 05:10

## 2020-10-06 RX ADMIN — CIPROFLOXACIN 500 MG: 500 TABLET, FILM COATED ORAL at 08:10

## 2020-10-06 RX ADMIN — MUPIROCIN: 20 OINTMENT TOPICAL at 08:10

## 2020-10-06 RX ADMIN — APIXABAN 5 MG: 5 TABLET, FILM COATED ORAL at 08:10

## 2020-10-06 RX ADMIN — LIDOCAINE 1 PATCH: 50 PATCH TOPICAL at 06:10

## 2020-10-06 NOTE — DISCHARGE INSTRUCTIONS
Cirrhosis Counseling  - strict abstinence of alcohol use  - compliance with lactulose and rifaximin if you have developed hepatic encephalopathy (confusion due to high ammonia level)  - avoid non-steroidal anti-inflammatory drugs (NSAIDs) such as ibuprofen, Motrin, naprosyn, Alleve due to the risk of kidney damage  - can take acetaminophen (Tylenol), no more than 2000 mg per day  - low sodium (salt) 2 gram per day diet  - nutrition: 25-30kcal (calorie per body body weight in kilogram) per day  - no need to restrict protein in diet  - high protein diet: 120 grams per day to prevent muscle mass loss. Recommended at least 1 protein shake daily using Premier Protein shakes    - avoid fasting  - Late night snack with 50 gram of complex carbohydrates and protein  - resistance exercises for muscle strength  - avoid raw seafoods due to the risk of fatal Vibrio vulnificus infection

## 2020-10-06 NOTE — PLAN OF CARE
[facility], which matched what they had on file for pt.  Confirmation number is [id number] and any questions regarding this transport should be directed to [phone] with confirmation number.  Ride scheduled for 2pm, as per [facility].          Arely Mclain, [facility]  Ext [phone]

## 2020-10-06 NOTE — PROGRESS NOTES
Progress Note   Hospital Medicine         Patient Name: Kenneth Pardo  MRN:  [id number]  Hospital Medicine Team: [facility]OSP MED L Aicha Rojas MD  Date of Admission:  9/28/2020     Length of Stay:  LOS: 7 days   Expected Discharge Date:   Principal Problem:  MERLY (acute kidney injury)       Subjective:     Interval History/Overnight Events:    Felling well  No acute issues    Mental status at baseline   No worsening abd distension or LE edema  Cr down to 2.2 with IVF. Given more IVF today as he has responded to them well   Nephrology signed off   Possible d/c on 10/6 without diuretics and with hep and nephro follow up      apixaban  5 mg Oral BID    ciprofloxacin HCl  500 mg Oral Q24H    escitalopram oxalate  5 mg Oral Daily    lactulose  15 g Oral TID    midodrine  15 mg Oral Q8H    octreotide  100 mcg Subcutaneous Q8H    pantoprazole  40 mg Oral Daily    rOPINIRole  0.25 mg Oral QHS    sodium bicarbonate  1,300 mg Oral BID    traZODone  100 mg Oral QHS       Review of Systems   Constitutional: Negative for chills, fatigue, fever.   HENT: Negative for sore throat, trouble swallowing.    Eyes: Negative for photophobia, visual disturbance.   Respiratory: Negative for cough, shortness of breath.    Cardiovascular: Negative for chest pain, palpitations, leg swelling.   Gastrointestinal: Negative for abdominal pain, constipation, diarrhea, nausea, vomiting.   Endocrine: Negative for cold intolerance, heat intolerance.   Genitourinary: Negative for dysuria, frequency.   Musculoskeletal: Negative for arthralgias, myalgias.   Skin: Negative for rash, wound, erythema   Neurological: Negative for dizziness, syncope, weakness, light-headedness.   Psychiatric/Behavioral: Negative for confusion, hallucinations, anxiety  All other systems reviewed and are negative.      Objective:     Temp:  [97.7 °F (36.5 °C)-98.6 °F (37 °C)]   Pulse:  [72-85]   Resp:  [16-18]   BP: (101-123)/(61-78)   SpO2:  [96 %-100 %]        Physical Exam:  Constitutional: appears chronically ill   Head: Normocephalic and atraumatic.   Mouth/Throat: Oropharynx is clear and moist.   Eyes: EOM are normal. Pupils are equal, round, and reactive to light. No scleral icterus.   Neck: Normal range of motion. Neck supple.   Cardiovascular: Normal rate and regular rhythm.  No murmur heard.  Pulmonary/Chest: Effort normal and breath sounds normal. No respiratory distress. No wheezes, rales, or rhonchi  Abdominal: Soft. Bowel sounds are normal. Mild distension, no tenderness  Musculoskeletal: Normal range of motion. No edema.   Neurological: Alert and oriented to person, place, and time.   Skin: Skin is warm and dry. + pallor   Psychiatric: Normal mood and affect. Behavior is normal.     Recent Labs   Lab 09/29/20  1530 09/30/20  0544 10/01/20  0533 10/02/20  0330 10/03/20  0250 10/04/20  0624   WBC 13.95* 15.59* 16.58* 15.97* 13.47* 14.10*   HGB 10.3* 11.3* 11.7* 10.3* 10.9* 11.5*   HCT 29.5* 33.1* 36.0* 30.5* 32.1* 35.0*    196 229 189 188 207     Recent Labs   Lab 10/03/20  0250 10/04/20  0624 10/05/20  0931   * 136 139   K 4.1 3.9 4.0    106 110   CO2 19* 17* 16*   BUN 45* 42* 37*   CREATININE 2.9* 2.5* 2.2*    144* 136*   CALCIUM 8.9 8.8 9.1     Recent Labs   Lab 10/02/20  0330 10/03/20  0250 10/04/20  0624 10/05/20  0931   ALKPHOS 163* 156* 183* 156*   ALT 13 15 17 19   AST 30 31 34 33   ALBUMIN 3.5 4.2 3.6 4.1   PROT 5.1* 5.6* 5.3* 5.6*   BILITOT 0.9 0.8 0.8 0.8   INR 1.3* 1.3* 1.3*  --      Recent Labs   Lab 10/05/20  1210   POCTGLUCOSE 170*         Assessment and Plan     Active Hospital Problems    Diagnosis  POA    *Hepatorenal Syndrome [N17.9]  Yes    Metabolic acidosis [E87.2]  Yes    Leukocytosis [D72.829]  Yes    Right leg DVT [I82.401]  Yes    Decompensated liver disease [K74.69]  Yes    Alcoholic liver disease [K70.9]  Yes    Hepatic encephalopathy [K72.90]  Yes    Depression with anxiety [F41.8]  Yes     Debility [R53.81]  Yes    Hyponatremia [E87.1]  Yes    Hypotension [I95.9]  Yes    Severe alcohol dependence [F10.20]  Yes    Alcohol use disorder, severe, dependence [F10.20]  Yes      Resolved Hospital Problems   No resolved problems to display.       # Hepatorenal syndrome   - Cr up to 2.9. concern for HRS Type I   - Nephrology consulted    - Started on HRS meds   - creatinine remains stable around 2.9-2.6  - given IV fluids on 10/03/2020 and 10/4  - Cr improving with IVF and HRS meds      #Decompensated alcoholic cirrhosis   Recurrent ascites   Portal hypertension   Portal hypertensive gastropathy   Hepatic encephalopathy    MELD-Na score: 17 at 10/5/2020  9:31 AM  MELD score: 17 at 10/5/2020  9:31 AM  Calculated from:  Serum Creatinine: 2.2 mg/dL at 10/5/2020  9:31 AM  Serum Sodium: 139 mmol/L (Rounded to 137 mmol/L) at 10/5/2020  9:31 AM  Total Bilirubin: 0.8 mg/dL (Rounded to 1 mg/dL) at 10/5/2020  9:31 AM  INR(ratio): 1.3 at 10/4/2020  6:24 AM  Age: 60 years 1 month    -recent recurrent admissions for decomp liver disease and MCC  - PETH negative more recently   -hepatology consult   - IR paracentesis with 9.2L removed and negative for SBP on 9/29  - completed liver transplant eval and deferred - unable to be listed for OLT while on DOAC as well (for DVT)  -Possible d/c on 10/6 without diuretics and with hep and nephro follow up       # Right Leg DVT  - seen on U/S; on eliquis- continue Eliquis therapy for total of 3 months     #Hyponatremia   -Na 127 from recent baseline 136 in ED  -fluid restriction  - had excessive fluid intake at home   - sodium improving to 130s        #Leukocytosis   -WBC improving   -no signs of overt infection or sepsis   -afebrile   -blood cultures    - negative for SBP       #Depression with anxiety   -no acute issue  -continue lexapro      #Insomnia  -trazodone prn      #Debility with falls  -PT evaluation for gait training   -fall precaution   - PT OT rec home  - improved  with HE therapy     # Hypotension  - 2/2 decompensated liver disease   - cont TID midodrine 15 mg tid   - BP increasing      Diet:  Low sodium  GI PPx:    DVT PPx:    Goals of Care:  full       Disposition:  Possible d/c on 10/6 without diuretics and with hep and nephro follow up       Signing Physician:     Aicha Rojas MD  Department of Hospital Medicine   [facility]  Pager [phone] [id number]  10/5/2020

## 2020-10-06 NOTE — PLAN OF CARE
Future Appointments   Date Time Provider Department Center   10/12/2020 10:00 AM LAB, [facility] LAB [facility]   10/19/2020 10:00 AM LAB, [facility]   10/21/2020  9:30 AM Main[facility] Madison [facility]   10/26/2020  9:50 AM LAB, [facility]           10/06/20 1522   Final Note   Assessment Type Final Discharge Note   Anticipated Discharge Disposition Home   What phone number can be called within the next 1-3 days to see how you are doing after discharge? [phone]   Hospital Follow Up  Appt(s) scheduled? Yes  (Ambulatory referral sent to Nephrology.)   Right Care Referral Info   Post Acute Recommendation No Care   Post-Acute Status   Post-Acute Authorization Other   Other Status No Post-Acute Service Needs   Discharge Delays None known at this time

## 2020-10-06 NOTE — PHYSICIAN QUERY
PT Name: Kenneth Pardo  MR #: [id number]     RENAL CONDITION CLARIFICATION   Re Hill RN, CCDS; [email or web address]    This form is a permanent document in the medical record.     Query Date: October 6, 2020    By submitting this query, we are merely seeking further clarification of documentation.    Please utilize your independent clinical judgment when addressing the question(s) below.    The Medical Record contains the following:   Indicator Supporting Clinical Findings Location in Medical Record    Kidney (Renal) Insufficiency     x Kidney (Renal) Failure/Injury Acute Kidney Injury; HRS    H&P    Nephrotoxic Agents     x BUN  Creatinine   GFR     9/28    9/29  9/30  10/1  10/2  10/3  10/4  10/5  10/6    bun 58 (H) 58 (H) 50 (H) 48 (H) 47 (H) 45 (H) 42 (H) 37 (H) 34 (H)   Cr  3.5 (H) 3.3 (H) 2.8 (H) 2.9 (H) 2.6 (H) 2.9 (H) 2.5 (H) 2.2 (H) 2.0 (H)   gfr 18.2 (A) 19.5 (A) 23.8 (A) 22.8 (A) 26.0 (A) 22.8 (A) 27.3 (A) 31.8 (A) 35.7 (A)          Lab   x Urine: Casts  Eosinophils Hyaline Cast 8 on 10/1 Lab    Dehydration      Nausea  /Vomiting      Dialysis/  CRRT     x Treatment: - Cr improving today     decreased urine vol for last 3d   - Has been with hypotension with Systolics in 80s.   - Ertiology for MERLY could be increased Intra abd pressures w/ascites, hypotension   - Would recommend ?albumin ?25 % , 50 grams every 6 hours for next 48 hrs with octreotide and maintaining MAP 80 -85 .    Hosp Pn 9/29         REnal CN 10/1        x Other:      10/1/2020 12:06 10/1/2020 12:06   Creatinine, Random Ur 183.0 183.0   Protein, Urine Random  67 (H)   Prot/Creat Ratio, Ur  0.37 (H)   Sodium Urine Random <20 (A)      decompensated Cirrhosis   continue midodrine - start octreotide & albumin   Hypotension ?-Continue midodrine   Recurrent ascites   Portal hypertension   Portal hypertensive gastropathy   Hepatic encephalopathy   Right Leg DVT - Continue eliquis   Hyponatremia   Leukocytosis - no signs of overt infection  "or sepsis, pt.   Depression with anxiety   Debility with falls  Lab                                H&P     Acute Kidney Injury / Acute Renal Failure has different defining criteria. A generally accepted guideline is:   A greater than 100% (2X) rise in serum creatinine from baseline* occurring during the course of a single hospital stay.   *Baseline as determined by the providers judgment and consideration of previous lab values and other documentation, if available.  A diagnosis of Acute Kidney Injury/Acute Renal Failure should incorporate abnormal labs and clinical findings that are clinically significant    References: 1. Jerson et al. Acute renal failure-definition, outcome measures, animal models, fluid therapy and information technology needs: the Second International Consensus Conference of the Acute Dialysis Quality Initiative (ADQI) Group. Crit Care 2004; 8:B204; 2. Tyler et al. Acute Kidney Injury Network: report of an initiative to improve outcomes in acute kidney injury. Crit Care 2007; 11:R31; 3. Kidney Disease: Improving Global Outcomes (KDIGO). Acute Kidney Injury Work Group. KDIGO clinical practice guidelines for acute kidney injury. Kidney Int Suppl 2012; 2:1.  The clinical guidelines noted above are only a system guideline. It does not replace the providers clinical judgment.     Provider, please  Clarify "acute kidney injury"  Associated with above findings:     [    ] Acute Kidney Failure/Injury with Tubular Necrosis  - Damage to the tubule cells of the kidney. Common triggers: shock, hypotension, IV contrast, rhabdomyolysis, medications   [ X   ] Unspecified Acute Kidney Failure/Injury     [    ] Other Acute Kidney Failure/Injury (please specify): ____________     [    ] Other (please specify): _______________________________   [  ] Clinically Undetermined     Please document in your progress notes daily for the duration of treatment until resolved and include in your discharge summary.       "

## 2020-10-06 NOTE — PLAN OF CARE
Problem: Adult Inpatient Plan of Care  Goal: Plan of Care Review  Outcome: Met     Discussed w/ pt discharge instructions, pt verbalized understanding, pt denies pain/needs at this time, pt shows no s/s of distress, pt gathered valuables/belongings, pt IV removed, pt left unit via wheelchair to transport vehicle

## 2020-10-06 NOTE — PLAN OF CARE
POC reviewed w/ pt, questions and concerns addressed. No acute events thru the night. All vital signs stable. No complaints. AOX4. Fluid restriction monitored closely, reeducated on the importance of it. Pt c/o low back pain 4/10. Dr. Arteaga ordered lidocaine patch for pain.  Safety maintained. Bed locked, in lowest position, call light in reach, side rails x2. Mobilized pt to highest level of functioning. See doc flowsheets for further info. Will continue to monitor.

## 2020-10-07 ENCOUNTER — PATIENT OUTREACH (OUTPATIENT)
Dept: ADMINISTRATIVE | Facility: CLINIC | Age: 60
End: 2020-10-07

## 2020-10-07 LAB — BACTERIA SPEC ANAEROBE CULT: NORMAL

## 2020-10-07 NOTE — PATIENT INSTRUCTIONS
Discharge Instructions for Cirrhosis of the Liver  You have been diagnosed with cirrhosis of the liver. Cirrhosis is a chronic (long-lasting) liver problem that occurs when liver tissue is destroyed and replaced by scar tissue. Causes of cirrhosis include infection such as viral hepatitis, chronic alcoholism, and genetic diseases. Signs of cirrhosis may be absent or only mild at first, but they usually get progressively worse. Cirrhosis is likely to occur if you have a history of alcohol abuse. Cirrhosis cant be cured, but it can be treated.  Home Care    Dont drink alcohol. If you stop drinking now, you will feel better and live longer.   Find out about local support groups such as Alcoholics Anonymous in the phone book or online at www.aa.org.   Ask your doctor about medication that can help you quit drinking.   Cut back on salt.   Limit canned, dried, packaged, and fast foods.   Dont add salt to your food at the table.   Season foods with herbs instead of salt when you cook.   Take your medications exactly as directed.   Talk to your doctor about vitamin supplements as well as any over the counter medications.   Avoid aspirin and other blood-thinning medications.   Ask your doctor about what kind of diet you should follow. You may be asked to limit or not eat certain foods.   Ask your doctor about receiving vaccinations for other liver diseases.  Follow-Up  Make a follow-up appointment as directed by our staff for lab tests, imaging of your liver (ultrasound every 6 months), and endoscopy to evaluate for varices.    When to Call Your Doctor  Call your doctor immediately if you have any of the following:   Fatigue, weakness, or lack of appetite   Vomiting (with or without blood)   Yellowing of your skin or eyes (jaundice)   Itching   Swelling in your abdomen or legs   Black or tarry stools   Skin that bruises easily   Confusion or trouble thinking clearly   © 6855-3057 [employer]  [place] [address]. All rights reserved. This information is not intended as a substitute for professional medical care. Always follow your healthcare professional's instructions.

## 2020-10-07 NOTE — TELEPHONE ENCOUNTER
C3 nurse attempted to contact patient. The following occurred:   C3 nurse attempted to contact Kenneth Pardo for a TCC post hospital discharge follow up call. The patient is unable to conduct the call @ this time. The patient requested a callback.    The patient has a scheduled HOSFU appointment with Dr. Madison on 10/21/2020 @ 3239. Message sent to Physician staff.

## 2020-10-08 ENCOUNTER — NURSE TRIAGE (OUTPATIENT)
Dept: ADMINISTRATIVE | Facility: CLINIC | Age: 60
End: 2020-10-08

## 2020-10-08 ENCOUNTER — HOSPITAL ENCOUNTER (EMERGENCY)
Facility: HOSPITAL | Age: 60
Discharge: HOME | End: 2020-10-08
Attending: EMERGENCY MEDICINE
Payer: MEDICAID

## 2020-10-08 VITALS
WEIGHT: 194 LBS | BODY MASS INDEX: 34.38 KG/M2 | SYSTOLIC BLOOD PRESSURE: 130 MMHG | TEMPERATURE: 99 F | HEIGHT: 63 IN | HEART RATE: 103 BPM | OXYGEN SATURATION: 100 % | DIASTOLIC BLOOD PRESSURE: 94 MMHG | RESPIRATION RATE: 16 BRPM

## 2020-10-08 DIAGNOSIS — B02.9 HERPES ZOSTER WITHOUT COMPLICATION: Primary | ICD-10-CM

## 2020-10-08 PROCEDURE — 99283 EMERGENCY DEPT VISIT LOW MDM: CPT | Mod: NTX

## 2020-10-08 PROCEDURE — 25000003 PHARM REV CODE 250: Mod: NTX | Performed by: EMERGENCY MEDICINE

## 2020-10-08 RX ORDER — VALACYCLOVIR HYDROCHLORIDE 1 G/1
1000 TABLET, FILM COATED ORAL EVERY 8 HOURS
Qty: 21 TABLET | Refills: 0 | Status: ON HOLD | OUTPATIENT
Start: 2020-10-08 | End: 2020-10-19 | Stop reason: HOSPADM

## 2020-10-08 RX ORDER — VALACYCLOVIR HYDROCHLORIDE 500 MG/1
1000 TABLET, FILM COATED ORAL ONCE
Status: COMPLETED | OUTPATIENT
Start: 2020-10-08 | End: 2020-10-08

## 2020-10-08 RX ADMIN — VALACYCLOVIR HYDROCHLORIDE 1000 MG: 500 TABLET, FILM COATED ORAL at 10:10

## 2020-10-08 NOTE — LETTER
[facility]  [address]  [place] [address]  Phone: [phone]  Fax: [phone]   Kenneth Pardo  1960    Wife called nurse line:   Rash started yesterday. Worse today.       Recent discharge from hospital. Has developed rash to chest , abd and nursing home around to back. Looks like red ant bites- fluid filled- but is not from red ants.     No confusion, no vomiting, no change in swelling in legs--no black tarry stools. abd same.     Feels like something Sticking pt from inside out intermittently with intermittent itching. Left knee with pain- old but flared up.   Protocol advice given and pt verbalizes understanding.       Reason for Disposition   Rash looks like large or small blisters (i.e., fluid filled bubbles or sacs on the skin)   Joint pain or swelling    Additional Information   Negative: [1] Life-threatening reaction (anaphylaxis) in the past to similar substance (e.g., food, insect bite/sting, chemical, etc.) AND [2] < 2 hours since exposure   Negative: [1] Sudden onset of rash (within last 2 hours) AND [2] difficulty with breathing or swallowing   Negative: Shock suspected (e.g., cold/pale/clammy skin, too weak to stand, low BP, rapid pulse)   Negative: Difficult to awaken or acting confused (e.g., disoriented, slurred speech)   Negative: [1] Purple or blood-colored spots or dots AND [2] fever   Negative: Sounds like a life-threatening emergency to the triager   Negative: [1] Widespread rash AND [2] bright red, sunburn-like AND [3] current tampon use or nasal packing   Negative: [1] Widespread rash AND [2] bright red, sunburn-like AND [3] wound infection or recent surgery   Negative: [1] Bright red skin AND [2] peels off in sheets   Negative: Stiff neck (unable to touch chin to chest)   Negative: Fever    Protocols used: RASH OR REDNESS - WIDESPREAD-A-

## 2020-10-09 NOTE — DISCHARGE SUMMARY
DISCHARGE SUMMARY  Hospital Medicine    Team: [facility]    Patient Name: Kenneth Pardo  YOB: 1960    Admit Date: 9/28/2020    Discharge Date: 10/06/2020    Discharge Attending Physician: JACQUELINE Rojas MD    Chief Complaint: MERLY/  Hepatorenal Syndrome    Princilpal Diagnoses:  Active Hospital Problems    Diagnosis  POA    *Hepatorenal Syndrome [N17.9]  Yes    Metabolic acidosis [E87.2]  Yes    Leukocytosis [D72.829]  Yes    Right leg DVT [I82.401]  Yes    Decompensated liver disease [K74.69]  Yes    Alcoholic liver disease [K70.9]  Yes    Chronic Hepatic encephalopathy [K72.90]  Yes    Depression with anxiety [F41.8]  Yes    Debility [R53.81]  Yes    Hyponatremia [E87.1]  Yes    Hypotension [I95.9]  Yes    Severe alcohol dependence [F10.20]  Yes    Alcohol use disorder, severe, dependence [F10.20]  Yes      Resolved Hospital Problems   No resolved problems to display.       Discharged Condition: Admit problems have stabilized       HOSPITAL COURSE:      Initial Presentation:    As per admitting provider,     60-year-old M with a H/O alcoholic cirrhosis, ascites, HTN [facility] after a call from his XPL team with instructions to report to the nearest ED following labs drawn earlier this morning showing acute kidney injury.  On exam the patient is AOX3 ROS pos for abd distension.  /59 temp 98.8° HR 85 RR 16 WBC 22.23 (22.04 on 9/18/20), Hb 12.4 INR 1.3, Na 123 K 4.9 BUN 58 (32 on 9/18/20) Cr 3.5 (2.1 on 9/18/20)  bilirubin 1.2 (1.9 on 09/18/20).  The referring provider spoke to [facility] hepatology (Dr. Staley) who recommended transfer to [facility] liver service.      On my assessment, pt. Has no  New complaints. He reports that he had been feeling stable since leaving the hospital. He has not noted any changes in urine output and reports that his PO intake has been the same. He denies any nausea, vomiting, fevers, or chills.    Course of Principle Problem for Admission:    #  Hepatorenal syndrome   - Cr up to 2.9. concern for HRS Type I vs pre renal MERLY 2/2 ATN from hypotension   - Nephrology consulted    - Started on HRS meds   - creatinine remains stable around 2.9-2.6  - given IV fluids on 10/03/2020 and 10/4  - Cr improving with IVF and HRS meds     On the day of d/c, patient was doing well with no acute issues. Cr down to 2.  Has responded very well to IVF, and was given more IVF on the day of d/c . Discharged off diuretics. Nephrology referral placed for follow up and further management. Already has outpatient paracentesis scheduled for prn dawson. No volume overload or large tense ascites on discharge     Physical Exam:  Constitutional: appears chronically ill   Head: Normocephalic and atraumatic.   Mouth/Throat: Oropharynx is clear and moist.   Eyes: EOM are normal. Pupils are equal, round, and reactive to light. No scleral icterus.   Neck: Normal range of motion. Neck supple.   Cardiovascular: Normal rate and regular rhythm.  No murmur heard.  Pulmonary/Chest: Effort normal and breath sounds normal. No respiratory distress. No wheezes, rales, or rhonchi  Abdominal: Soft. Bowel sounds are normal. Mild distension, no tenderness  Musculoskeletal: Normal range of motion. No edema.   Neurological: Alert and oriented to person, place, and time.   Skin: Skin is warm and dry.   Psychiatric: Normal mood and affect. Behavior is normal.        Other Medical Problems Addressed in the Hospital:    #Decompensated alcoholic cirrhosis   Recurrent ascites   Portal hypertension   Portal hypertensive gastropathy   Hepatic encephalopathy    MELD-Na score: 17 at 10/5/2020  9:31 AM  MELD score: 17 at 10/5/2020  9:31 AM  Calculated from:  Serum Creatinine: 2.2 mg/dL at 10/5/2020  9:31 AM  Serum Sodium: 139 mmol/L (Rounded to 137 mmol/L) at 10/5/2020  9:31 AM  Total Bilirubin: 0.8 mg/dL (Rounded to 1 mg/dL) at 10/5/2020  9:31 AM  INR(ratio): 1.3 at 10/4/2020  6:24 AM  Age: 60 years 1 month     -recent  recurrent admissions for decomp liver disease and group home  - PETH negative more recently   -hepatology consult   - IR paracentesis with 9.2L removed and negative for SBP on 9/29  - completed liver transplant eval and deferred - unable to be listed for OLT while on DOAC as well (for DVT)  -Possible d/c on 10/6 without diuretics and with hep and nephro follow up       # Right Leg DVT  - seen on U/S; on eliquis- continue Eliquis therapy for total of 3 months     #Hyponatremia   -Na 127 from recent baseline 136 in ED  -fluid restriction  - had excessive fluid intake at home   - sodium improving to 130s        #Leukocytosis   -WBC improving   -no signs of overt infection or sepsis . Likely 2/2 hx of ETOH hepatitis   -afebrile   -blood cultures  ngtd  - negative for SBP     #Depression with anxiety   -no acute issue  -continue lexapro      #Insomnia  -trazodone prn      #Debility with falls  -PT evaluation for gait training   -fall precaution   - PT OT rec home  - improved with HE therapy     # Hypotension  - 2/2 decompensated liver disease   - cont TID midodrine 15 mg tid   - BP increasing      CONSULTS: hepatology    PROCEDURES: none    Labs:    Chemistries:   Recent Labs   Lab 10/04/20  0624 10/05/20  0931 10/06/20  0530    139 138   K 3.9 4.0 4.0    110 108   CO2 17* 16* 20*   BUN 42* 37* 34*   CREATININE 2.5* 2.2* 2.0*   CALCIUM 8.8 9.1 9.1   PROT 5.3* 5.6* 5.6*   BILITOT 0.8 0.8 0.8   ALKPHOS 183* 156* 173*   ALT 17 19 20   AST 34 33 37        WBC:   Recent Labs   Lab 10/02/20  0330 10/03/20  0250 10/04/20  0624 10/06/20  0530   WBC 15.97* 13.47* 14.10* 14.24*     Bands:     CBC/Anemia Labs: Coags:    Recent Labs   Lab 10/03/20  0250 10/04/20  0624 10/06/20  0530   WBC 13.47* 14.10* 14.24*   HGB 10.9* 11.5* 12.4*   HCT 32.1* 35.0* 38.7*    207 178   * 100* 103*   RDW 13.5 14.1 14.4    Recent Labs   Lab 10/03/20  0250 10/04/20  0624 10/06/20  0530   INR 1.3* 1.3* 1.2        Diagnostic  Results:    Microbiology Results (last 7 days)     Procedure Component Value Units Date/Time    Blood culture [[id number]] Collected: 09/29/20 1117    Order Status: Completed Specimen: Blood from Antecubital, Right Arm Updated: 10/04/20 1412     Blood Culture, Routine No growth after 5 days.    Blood culture [[id number]] Collected: 09/29/20 1124    Order Status: Completed Specimen: Blood from Antecubital, Left Arm Updated: 10/04/20 1412     Blood Culture, Routine No growth after 5 days.    Aerobic culture [[id number]] Collected: 09/29/20 1241    Order Status: Completed Specimen: Body Fluid from Ascites Updated: 10/03/20 0941     Aerobic Bacterial Culture No growth            Disposition:  Home       Future Scheduled Appointments:  Future Appointments   Date Time Provider Department Center   10/12/2020 10:00 AM [facility], [facility]   10/19/2020 10:00 AM [facility], [facility]   10/21/2020  9:30 AM Mainor Madison MD [facility]   10/26/2020  9:50 AM LAB, [facility]       Follow-up Plans from This Hospitalization:      Discharge Medication List:       Kenneth Pardo   Home Medication Instructions QIAN:[id number]    Printed on:10/08/20 5981   Medication Information                      apixaban (ELIQUIS) 5 mg Tab  Take 1 tablet (5 mg total) by mouth 2 (two) times daily.             ciprofloxacin HCl (CIPRO) 500 MG tablet  Take 1 tablet (500 mg total) by mouth once daily.             escitalopram oxalate (LEXAPRO) 5 MG Tab  Take 1 tablet (5 mg total) by mouth once daily.             lactulose (CHRONULAC) 10 gram/15 mL solution  Take 15 mLs (10 g total) by mouth 2 (two) times a day. Take enough to have 3 bowel movements per day             midodrine (PROAMATINE) 5 MG Tab  Take 3 tablets (15 mg total) by mouth every 8 (eight) hours.             omeprazole (PRILOSEC) 40 MG capsule  Take 1 capsule (40 mg total) by mouth every morning.             rOPINIRole (REQUIP)  0.25 MG tablet  Take 1 tablet (0.25 mg total) by mouth every evening.             traZODone (DESYREL) 100 MG tablet  Take 1 tablet (100 mg total) by mouth every evening.                   At the time of discharge patient was told to take all medications as prescribed, to keep all followup appointments, and to call their primary care physician or return to the emergency room if they have any worsening or concerning symptoms.    Time spent on the discharge of the patient including review of hospital course with the patient. reviewing discharge medications and arranging follow-up care 45 minutes.  Patient was seen and examined on the date of discharge and determined to be suitable for discharge.        Signing Physician:  Aicha Rojas MD

## 2020-10-09 NOTE — TELEPHONE ENCOUNTER
Called patient to inform him of the recommendation to follow-up w/ his PCP (for possible shingles).  Patient reports that he was seen in ED last night and diagnosed w/ shingles. Started on valtrex.  Patient also requesting to schedule standing dawson (starting in 2 weeks) in the [facility] area.  Will fax orders as requested to [facility].  Patient also requesting to schedule a virtual visit instead of in person visit on 10/21.  Informed patient/ pt's wife of the need to download My [employer] and My Chart connor and notify transplant team once complete.  Unable to schedule VV until done.  Understanding verbalized and agrees to do.  They deny questions or the need for any further assistance and report that they are in the process of evacuating for [place].  Will notify Dr. Madison of above.

## 2020-10-09 NOTE — TELEPHONE ENCOUNTER
Spoke with wife:   Recent discharge from hospital. Rash started yesterday. Worse today.Has developed rash to chest , abd and assisted around to back. Looks like red ant bites- fluid filled- but is not from red ants.     No confusion, no vomiting, no change in swelling in legs--no black tarry stools. abd same.     Feels like something Sticking pt from inside out intermittently with intermittent itching. Left knee with pain- old but flared up.   Protocol advice given and pt verbalizes understanding.       Reason for Disposition   Rash looks like large or small blisters (i.e., fluid filled bubbles or sacs on the skin)   Joint pain or swelling    Additional Information   Negative: [1] Life-threatening reaction (anaphylaxis) in the past to similar substance (e.g., food, insect bite/sting, chemical, etc.) AND [2] < 2 hours since exposure   Negative: [1] Sudden onset of rash (within last 2 hours) AND [2] difficulty with breathing or swallowing   Negative: Shock suspected (e.g., cold/pale/clammy skin, too weak to stand, low BP, rapid pulse)   Negative: Difficult to awaken or acting confused (e.g., disoriented, slurred speech)   Negative: [1] Purple or blood-colored spots or dots AND [2] fever   Negative: Sounds like a life-threatening emergency to the triager   Negative: [1] Widespread rash AND [2] bright red, sunburn-like AND [3] current tampon use or nasal packing   Negative: [1] Widespread rash AND [2] bright red, sunburn-like AND [3] wound infection or recent surgery   Negative: [1] Bright red skin AND [2] peels off in sheets   Negative: Stiff neck (unable to touch chin to chest)   Negative: Fever    Protocols used: RASH OR REDNESS - WIDESPREAD-A-AH

## 2020-10-10 NOTE — ED PROVIDER NOTES
Encounter Date: 10/8/2020       History     Chief Complaint   Patient presents with    Rash     onset about a week ago, around midsection     Pt is a 61 y/o M with PMHx as per below who presents with concern for the presence of a painful rash along his L lower back that radiates to his abdomen  Pt first noticed the rash a week ago and since that time it has become more prominent and tender.         Review of patient's allergies indicates:  No Known Allergies  Past Medical History:   Diagnosis Date    Hepatitis     Hypertension     Liver failure      Past Surgical History:   Procedure Laterality Date    COLONOSCOPY N/A 9/17/2020    Procedure: COLONOSCOPY;  Surgeon: Jhonathan Lieberman MD;  Location: [facility] ([address] [place] [address]);  Service: Endoscopy;  Laterality: N/A;    ESOPHAGOGASTRODUODENOSCOPY N/A 7/31/2020    Procedure: EGD (ESOPHAGOGASTRODUODENOSCOPY);  Surgeon: Jhonathan Lieberman MD;  [facility]  Service: Endoscopy;  Laterality: N/A;     History reviewed. No pertinent family history.  Social History     Tobacco Use    Smoking status: Never Smoker    Smokeless tobacco: Never Used   Substance Use Topics    Alcohol use: Not Currently     Frequency: Never    Drug use: Not on file     Review of Systems   Skin: Positive for rash.   All other systems reviewed and are negative.      Physical Exam     Initial Vitals [10/08/20 2142]   BP Pulse Resp Temp SpO2   (!) 130/94 103 16 98.5 °F (36.9 °C) 100 %      MAP       --         Physical Exam    Nursing note and vitals reviewed.  Constitutional: He appears well-developed and well-nourished. He is not diaphoretic. No distress.   HENT:   Head: Normocephalic and atraumatic.   Eyes: Right eye exhibits no discharge. Left eye exhibits no discharge.   Neck: No tracheal deviation present. No JVD present.   Cardiovascular: Normal rate, regular rhythm and intact distal pulses.   Pulmonary/Chest: No stridor. No respiratory distress.   Abdominal: Soft. He exhibits no distension  and no mass. There is no abdominal tenderness. There is no rebound and no guarding.   Musculoskeletal: Normal range of motion. No tenderness or edema.   Neurological: He is alert and oriented to person, place, and time. He has normal strength. GCS score is 15. GCS eye subscore is 4. GCS verbal subscore is 5. GCS motor subscore is 6.   HELEN with NGND's   Skin: Skin is warm and dry. Capillary refill takes less than 2 seconds. No rash and no abscess noted. No erythema. No pallor.        Psychiatric: He has a normal mood and affect. His behavior is normal. Judgment and thought content normal.         ED Course   Procedures  Labs Reviewed - No data to display       Imaging Results    None       Pt arrived alert, afebrile, non-toxic in appearance, in no acute respiratory distress with VSS.  Rash is consistent with shingles.  Will give Rx of Valacyclovir for outpatient treatment.  Pt discharged and counseled on the need to return to the nearest emergency room if they experience any other concerning symptoms.  Pt counseled to F/U outpatient with a PCP over the next week.    Soham Jackson MD                                            Clinical Impression:       ICD-10-CM ICD-9-CM   1. Herpes zoster without complication  B02.9 053.9                          ED Disposition Condition    Discharge Stable        ED Prescriptions     Medication Sig Dispense Start Date End Date Auth. Provider    valACYclovir (VALTREX) 1000 MG tablet Take 1 tablet (1,000 mg total) by mouth every 8 (eight) hours. for 7 days 21 tablet 10/8/2020 10/15/2020 Soham Jackson MD        Follow-up Information     Follow up With Specialties Details Why Contact Info Additional Information    Neva Nielsen MD Internal Medicine Schedule an appointment as soon as possible for a visit in 1 week to follow-up on today's visit [address] [facility]  [address]  [place] [facility]  [facility] [id number]  [phone]       [facility] -  Emergency Department Emergency Medicine Go to  As needed, If symptoms worsen [address] [facility] [address]  [phone] Floor 1                                       Soham Jackson MD  10/09/20 1940

## 2020-10-12 ENCOUNTER — HOSPITAL ENCOUNTER (EMERGENCY)
Facility: HOSPITAL | Age: 60
Discharge: ANOTHER HOSPITAL | End: 2020-10-13
Attending: FAMILY MEDICINE
Payer: MEDICAID

## 2020-10-12 ENCOUNTER — LAB VISIT (OUTPATIENT)
Dept: LAB | Facility: HOSPITAL | Age: 60
End: 2020-10-12
Attending: STUDENT IN AN ORGANIZED HEALTH CARE EDUCATION/TRAINING PROGRAM
Payer: MEDICAID

## 2020-10-12 DIAGNOSIS — K70.31 ALCOHOLIC CIRRHOSIS OF LIVER WITH ASCITES: ICD-10-CM

## 2020-10-12 DIAGNOSIS — K70.11 ALCOHOLIC HEPATITIS WITH ASCITES: ICD-10-CM

## 2020-10-12 DIAGNOSIS — K76.7 HEPATORENAL SYNDROME: ICD-10-CM

## 2020-10-12 DIAGNOSIS — E87.20 METABOLIC ACIDOSIS: Primary | ICD-10-CM

## 2020-10-12 LAB
ALBUMIN SERPL BCP-MCNC: 3.6 G/DL (ref 3.5–5.2)
ALBUMIN SERPL BCP-MCNC: 3.6 G/DL (ref 3.5–5.2)
ALP SERPL-CCNC: 151 U/L (ref 55–135)
ALP SERPL-CCNC: 156 U/L (ref 55–135)
ALT SERPL W/O P-5'-P-CCNC: 23 U/L (ref 10–44)
ALT SERPL W/O P-5'-P-CCNC: 23 U/L (ref 10–44)
ANION GAP SERPL CALC-SCNC: 11 MMOL/L (ref 8–16)
ANION GAP SERPL CALC-SCNC: 11 MMOL/L (ref 8–16)
ANION GAP SERPL CALC-SCNC: 14 MMOL/L (ref 8–16)
APTT BLDCRRT: 35.5 SEC (ref 21–32)
AST SERPL-CCNC: 30 U/L (ref 10–40)
AST SERPL-CCNC: 30 U/L (ref 10–40)
BASOPHILS # BLD AUTO: 0.11 K/UL (ref 0–0.2)
BASOPHILS # BLD AUTO: 0.12 K/UL (ref 0–0.2)
BASOPHILS NFR BLD: 0.9 % (ref 0–1.9)
BASOPHILS NFR BLD: 1 % (ref 0–1.9)
BILIRUB SERPL-MCNC: 0.9 MG/DL (ref 0.1–1)
BILIRUB SERPL-MCNC: 0.9 MG/DL (ref 0.1–1)
BUN SERPL-MCNC: 42 MG/DL (ref 6–20)
BUN SERPL-MCNC: 43 MG/DL (ref 6–20)
BUN SERPL-MCNC: 44 MG/DL (ref 6–20)
CALCIUM SERPL-MCNC: 8 MG/DL (ref 8.7–10.5)
CALCIUM SERPL-MCNC: 8.9 MG/DL (ref 8.7–10.5)
CALCIUM SERPL-MCNC: 9.1 MG/DL (ref 8.7–10.5)
CHLORIDE SERPL-SCNC: 102 MMOL/L (ref 95–110)
CHLORIDE SERPL-SCNC: 104 MMOL/L (ref 95–110)
CHLORIDE SERPL-SCNC: 108 MMOL/L (ref 95–110)
CO2 SERPL-SCNC: 15 MMOL/L (ref 23–29)
CO2 SERPL-SCNC: 16 MMOL/L (ref 23–29)
CO2 SERPL-SCNC: 19 MMOL/L (ref 23–29)
CORRECTED TEMPERATURE (PH): 7.21
CREAT SERPL-MCNC: 2.7 MG/DL (ref 0.5–1.4)
CREAT SERPL-MCNC: 3 MG/DL (ref 0.5–1.4)
CREAT SERPL-MCNC: 3 MG/DL (ref 0.5–1.4)
DIFFERENTIAL METHOD: ABNORMAL
DIFFERENTIAL METHOD: ABNORMAL
EOSINOPHIL # BLD AUTO: 0.6 K/UL (ref 0–0.5)
EOSINOPHIL # BLD AUTO: 0.7 K/UL (ref 0–0.5)
EOSINOPHIL NFR BLD: 5 % (ref 0–8)
EOSINOPHIL NFR BLD: 5.6 % (ref 0–8)
ERYTHROCYTE [DISTWIDTH] IN BLOOD BY AUTOMATED COUNT: 13.6 % (ref 11.5–14.5)
ERYTHROCYTE [DISTWIDTH] IN BLOOD BY AUTOMATED COUNT: 13.6 % (ref 11.5–14.5)
EST. GFR  (AFRICAN AMERICAN): 25.3 ML/MIN/1.73 M^2
EST. GFR  (AFRICAN AMERICAN): 25.3 ML/MIN/1.73 M^2
EST. GFR  (AFRICAN AMERICAN): 28.7 ML/MIN/1.73 M^2
EST. GFR  (NON AFRICAN AMERICAN): 21.9 ML/MIN/1.73 M^2
EST. GFR  (NON AFRICAN AMERICAN): 21.9 ML/MIN/1.73 M^2
EST. GFR  (NON AFRICAN AMERICAN): 24.9 ML/MIN/1.73 M^2
FIO2: 21 %
GLUCOSE SERPL-MCNC: 108 MG/DL (ref 70–110)
GLUCOSE SERPL-MCNC: 120 MG/DL (ref 70–110)
GLUCOSE SERPL-MCNC: 92 MG/DL (ref 70–110)
HCT VFR BLD AUTO: 32.4 % (ref 40–54)
HCT VFR BLD AUTO: 33.1 % (ref 40–54)
HGB BLD-MCNC: 11 G/DL (ref 14–18)
HGB BLD-MCNC: 11.2 G/DL (ref 14–18)
IMM GRANULOCYTES # BLD AUTO: 0.04 K/UL (ref 0–0.04)
IMM GRANULOCYTES # BLD AUTO: 0.05 K/UL (ref 0–0.04)
IMM GRANULOCYTES NFR BLD AUTO: 0.3 % (ref 0–0.5)
IMM GRANULOCYTES NFR BLD AUTO: 0.4 % (ref 0–0.5)
INR PPP: 1.3 (ref 0.8–1.2)
INR PPP: 1.4 (ref 0.8–1.2)
LYMPHOCYTES # BLD AUTO: 1.5 K/UL (ref 1–4.8)
LYMPHOCYTES # BLD AUTO: 1.6 K/UL (ref 1–4.8)
LYMPHOCYTES NFR BLD: 12.2 % (ref 18–48)
LYMPHOCYTES NFR BLD: 13.9 % (ref 18–48)
MCH RBC QN AUTO: 32.5 PG (ref 27–31)
MCH RBC QN AUTO: 32.8 PG (ref 27–31)
MCHC RBC AUTO-ENTMCNC: 33.8 G/DL (ref 32–36)
MCHC RBC AUTO-ENTMCNC: 34 G/DL (ref 32–36)
MCV RBC AUTO: 96 FL (ref 82–98)
MCV RBC AUTO: 97 FL (ref 82–98)
MONOCYTES # BLD AUTO: 1 K/UL (ref 0.3–1)
MONOCYTES # BLD AUTO: 1.1 K/UL (ref 0.3–1)
MONOCYTES NFR BLD: 8.5 % (ref 4–15)
MONOCYTES NFR BLD: 8.7 % (ref 4–15)
NEUTROPHILS # BLD AUTO: 8.3 K/UL (ref 1.8–7.7)
NEUTROPHILS # BLD AUTO: 9.1 K/UL (ref 1.8–7.7)
NEUTROPHILS NFR BLD: 70.6 % (ref 38–73)
NEUTROPHILS NFR BLD: 72.9 % (ref 38–73)
NRBC BLD-RTO: 0 /100 WBC
NRBC BLD-RTO: 0 /100 WBC
O2DEVICE: ABNORMAL
PH SMN: 7.21 [PH] (ref 7.21–7.31)
PLATELET # BLD AUTO: 177 K/UL (ref 150–350)
PLATELET # BLD AUTO: 178 K/UL (ref 150–350)
PMV BLD AUTO: 10.4 FL (ref 9.2–12.9)
PMV BLD AUTO: 10.4 FL (ref 9.2–12.9)
POC PERFORMED BY: ABNORMAL
POC TEMPERATURE: 37 C
POTASSIUM SERPL-SCNC: 3.8 MMOL/L (ref 3.5–5.1)
POTASSIUM SERPL-SCNC: 4.2 MMOL/L (ref 3.5–5.1)
POTASSIUM SERPL-SCNC: 4.4 MMOL/L (ref 3.5–5.1)
PROT SERPL-MCNC: 6.1 G/DL (ref 6–8.4)
PROT SERPL-MCNC: 6.2 G/DL (ref 6–8.4)
PROTHROMBIN TIME: 13.3 SEC (ref 9–12.5)
PROTHROMBIN TIME: 13.7 SEC (ref 9–12.5)
RBC # BLD AUTO: 3.35 M/UL (ref 4.6–6.2)
RBC # BLD AUTO: 3.45 M/UL (ref 4.6–6.2)
SARS-COV-2 RDRP RESP QL NAA+PROBE: NEGATIVE
SODIUM SERPL-SCNC: 132 MMOL/L (ref 136–145)
SODIUM SERPL-SCNC: 134 MMOL/L (ref 136–145)
SODIUM SERPL-SCNC: 134 MMOL/L (ref 136–145)
SPECIMEN SOURCE: ABNORMAL
WBC # BLD AUTO: 11.7 K/UL (ref 3.9–12.7)
WBC # BLD AUTO: 12.41 K/UL (ref 3.9–12.7)

## 2020-10-12 PROCEDURE — 36415 COLL VENOUS BLD VENIPUNCTURE: CPT | Mod: TXP

## 2020-10-12 PROCEDURE — 25000003 PHARM REV CODE 250: Mod: NTX | Performed by: NURSE PRACTITIONER

## 2020-10-12 PROCEDURE — 85610 PROTHROMBIN TIME: CPT | Mod: TXP

## 2020-10-12 PROCEDURE — 96361 HYDRATE IV INFUSION ADD-ON: CPT | Mod: NTX

## 2020-10-12 PROCEDURE — 80053 COMPREHEN METABOLIC PANEL: CPT | Mod: TXP

## 2020-10-12 PROCEDURE — 85610 PROTHROMBIN TIME: CPT | Mod: 91,NTX

## 2020-10-12 PROCEDURE — 85025 COMPLETE CBC W/AUTO DIFF WBC: CPT | Mod: 91,NTX

## 2020-10-12 PROCEDURE — 80053 COMPREHEN METABOLIC PANEL: CPT | Mod: 91,NTX

## 2020-10-12 PROCEDURE — 80048 BASIC METABOLIC PNL TOTAL CA: CPT | Mod: NTX

## 2020-10-12 PROCEDURE — 96365 THER/PROPH/DIAG IV INF INIT: CPT | Mod: NTX

## 2020-10-12 PROCEDURE — 87040 BLOOD CULTURE FOR BACTERIA: CPT | Mod: 59,NTX

## 2020-10-12 PROCEDURE — 99285 EMERGENCY DEPT VISIT HI MDM: CPT | Mod: 25,NTX

## 2020-10-12 PROCEDURE — U0002 COVID-19 LAB TEST NON-CDC: HCPCS | Mod: NTX

## 2020-10-12 PROCEDURE — 85730 THROMBOPLASTIN TIME PARTIAL: CPT | Mod: NTX

## 2020-10-12 PROCEDURE — 63600175 PHARM REV CODE 636 W HCPCS: Mod: NTX | Performed by: EMERGENCY MEDICINE

## 2020-10-12 PROCEDURE — 36415 COLL VENOUS BLD VENIPUNCTURE: CPT | Mod: NTX

## 2020-10-12 PROCEDURE — 85025 COMPLETE CBC W/AUTO DIFF WBC: CPT | Mod: TXP

## 2020-10-12 RX ORDER — VALACYCLOVIR HYDROCHLORIDE 500 MG/1
1000 TABLET, FILM COATED ORAL ONCE
Status: COMPLETED | OUTPATIENT
Start: 2020-10-13 | End: 2020-10-12

## 2020-10-12 RX ADMIN — VALACYCLOVIR HYDROCHLORIDE 1000 MG: 500 TABLET, FILM COATED ORAL at 11:10

## 2020-10-12 RX ADMIN — SODIUM CHLORIDE 1000 ML: 0.9 INJECTION, SOLUTION INTRAVENOUS at 07:10

## 2020-10-12 RX ADMIN — CEFTRIAXONE 1 G: 1 INJECTION, SOLUTION INTRAVENOUS at 10:10

## 2020-10-12 RX ADMIN — SODIUM CHLORIDE 500 ML: 0.9 INJECTION, SOLUTION INTRAVENOUS at 07:10

## 2020-10-12 NOTE — ED PROVIDER NOTES
"Encounter Date: 10/12/2020       History     Chief Complaint   Patient presents with    Abnormal Lab     Pt states called by hospital for abnormal labs.  Pt has doctors [facility]     Patient reported to ED after notified by doctor's office that "one of my labs was off by one number." Patient has a history of liver and kidney failure and requires frequent admissions for paracentesis.     The history is provided by the patient.     Review of patient's allergies indicates:  No Known Allergies  Past Medical History:   Diagnosis Date    Hepatitis     Hypertension     Liver failure      Past Surgical History:   Procedure Laterality Date    COLONOSCOPY N/A 9/17/2020    Procedure: COLONOSCOPY;  Surgeon: Jhonathan Lieberman MD;  Location: [facility] ([address] [place] [address]);  Service: Endoscopy;  Laterality: N/A;    ESOPHAGOGASTRODUODENOSCOPY N/A 7/31/2020    Procedure: EGD (ESOPHAGOGASTRODUODENOSCOPY);  Surgeon: Jhonathan Lieberman MD;  Location: [address]);  Service: Endoscopy;  Laterality: N/A;     History reviewed. No pertinent family history.  Social History     Tobacco Use    Smoking status: Never Smoker    Smokeless tobacco: Never Used   Substance Use Topics    Alcohol use: Not Currently     Frequency: Never    Drug use: Not Currently     Review of Systems   Constitutional: Negative for chills, diaphoresis, fatigue and fever.   Respiratory: Negative.  Negative for cough, chest tightness and shortness of breath.    Cardiovascular: Positive for leg swelling. Negative for chest pain and palpitations.   Gastrointestinal: Positive for abdominal distention. Negative for abdominal pain, constipation, diarrhea, nausea and vomiting.   Skin: Negative.    Neurological: Negative for dizziness, weakness, light-headedness and headaches.   Hematological: Bruises/bleeds easily.   All other systems reviewed and are negative.      Physical Exam     Initial Vitals [10/12/20 1619]   BP Pulse Resp Temp SpO2   112/78 99 12 98.5 °F (36.9 °C) 99 " %      MAP       --         Physical Exam    Nursing note and vitals reviewed.  Constitutional: He appears well-developed and well-nourished.   HENT:   Head: Normocephalic and atraumatic.   Eyes: EOM are normal. Pupils are equal, round, and reactive to light.   Neck: Normal range of motion.   Cardiovascular: Normal rate, regular rhythm, normal heart sounds and intact distal pulses. Exam reveals no gallop and no friction rub.    No murmur heard.  Bilateral 3+ lower extremity edema   Pulmonary/Chest: Breath sounds normal. No respiratory distress. He has no wheezes. He has no rhonchi. He exhibits no tenderness.   Abdominal: Soft. Bowel sounds are normal. He exhibits distension, fluid wave and ascites. He exhibits no pulsatile midline mass and no mass. There is no abdominal tenderness. There is no rigidity, no guarding and no CVA tenderness. No hernia.   Musculoskeletal: Normal range of motion. Edema present.   Neurological: He is alert and oriented to person, place, and time. He has normal strength and normal reflexes.   Skin: Skin is warm and dry. Capillary refill takes less than 2 seconds. Bruising noted.   Psychiatric: He has a normal mood and affect.         ED Course   Procedures  Labs Reviewed   CBC W/ AUTO DIFFERENTIAL - Abnormal; Notable for the following components:       Result Value    RBC 3.45 (*)     Hemoglobin 11.2 (*)     Hematocrit 33.1 (*)     Mean Corpuscular Hemoglobin 32.5 (*)     Gran # (ANC) 8.3 (*)     Eos # 0.7 (*)     Lymph% 13.9 (*)     All other components within normal limits   COMPREHENSIVE METABOLIC PANEL - Abnormal; Notable for the following components:    Sodium 134 (*)     CO2 16 (*)     BUN, Bld 44 (*)     Creatinine 3.0 (*)     Alkaline Phosphatase 151 (*)     eGFR if  25.3 (*)     eGFR if non  21.9 (*)     All other components within normal limits   PROTIME-INR - Abnormal; Notable for the following components:    Prothrombin Time 13.3 (*)     INR 1.3  "(*)     All other components within normal limits   APTT - Abnormal; Notable for the following components:    aPTT 35.5 (*)     All other components within normal limits   BASIC METABOLIC PANEL - Abnormal; Notable for the following components:    Sodium 134 (*)     CO2 15 (*)     Glucose 120 (*)     BUN, Bld 42 (*)     Creatinine 2.7 (*)     Calcium 8.0 (*)     eGFR if  28.7 (*)     eGFR if non  24.9 (*)     All other components within normal limits    Narrative:     Repeat BUN Creatinine following bolus   CULTURE, BLOOD   CULTURE, BLOOD   SARS-COV-2 RNA AMPLIFICATION, QUAL          Imaging Results    None          Medical Decision Making:   Initial Assessment:   Patient is a 60 year old white male in no acute distress who presents with abnormal labs. Patient states, "the doctor's office called me and told me to come to the ED because my labs were off by 1." Patient has a significant history of cirrhosis and chronic kidney disease. Patient has diffuse bruising, a large abdomen, and bilateral edema. Patient denies fever, abdominal pain, nausea and vomiting. Patient states, "I feel fine."  Differential Diagnosis:   Hepatorenal Syndrome  Acute Kidney Injury  Ascites  Coagulopathy   Clinical Tests:   Lab Tests: Ordered                   ED Course as of Oct 14 0327   Mon Oct 12, 2020   2109 Improved after fluid bolus   Creatinine(!): 2.7 [AB]   2202 Contacted Dr. So at [facility] to notify them of patient Co2, acidosis, low UOP, and mild improvement in creatinine after 1500 cc fluid bolus. Dr. So wants patient transferred to [facility], admitted to Internal Medicine and consult Hepatology. Patient to receive broad spectrum antibiotics prior to transfer.    [AB]   Tue Oct 13, 2020   1705 Continue to await available room at [facility].  Patient is in agreement with transfer.  He is stable.    [RB]      ED Course User Index  [AB] Marichuy Presley NP  [RB] Faisal EVANS" Houston Werner MD     Pt had minimal UOP despite IVF bolus and noted to have metabolic acidosis with pH of 7.2.  Pt discussed with Dr Horn and accepted to [facility] for further evaluation and management.  Rocephin given and Blood Cx's drawn as per Dr. So's request.  WIll continue to monitor and await transfer by EMS.    Soham Jackson MD               Clinical Impression:     ICD-10-CM ICD-9-CM   1. Metabolic acidosis  E87.2 276.2   2. Hepatorenal syndrome  K76.7 572.4   3. Alcoholic cirrhosis of liver with ascites  K70.31 571.2                          ED Disposition Condition    Transfer to Another Facility Stable                            Soham Jackson MD  10/12/20 6237       Soham Jackson MD  10/14/20 8508

## 2020-10-12 NOTE — PROGRESS NOTES
Called patient and discussed labs. Creatinine 3.0 from 2.0 six days ago. I advised him to present to nearest ED. He expressed understanding.

## 2020-10-13 ENCOUNTER — HOSPITAL ENCOUNTER (INPATIENT)
Facility: HOSPITAL | Age: 60
LOS: 6 days | Discharge: HOME | DRG: 682 | End: 2020-10-19
Attending: HOSPITALIST | Admitting: HOSPITALIST
Payer: MEDICAID

## 2020-10-13 VITALS
WEIGHT: 194 LBS | OXYGEN SATURATION: 98 % | TEMPERATURE: 98 F | HEIGHT: 63 IN | HEART RATE: 98 BPM | BODY MASS INDEX: 34.38 KG/M2 | RESPIRATION RATE: 18 BRPM | SYSTOLIC BLOOD PRESSURE: 112 MMHG | DIASTOLIC BLOOD PRESSURE: 62 MMHG

## 2020-10-13 DIAGNOSIS — N17.9 AKI (ACUTE KIDNEY INJURY): ICD-10-CM

## 2020-10-13 DIAGNOSIS — R00.0 TACHYCARDIA: ICD-10-CM

## 2020-10-13 DIAGNOSIS — K76.7 HEPATORENAL SYNDROME: Primary | ICD-10-CM

## 2020-10-13 DIAGNOSIS — K70.31 ASCITES DUE TO ALCOHOLIC CIRRHOSIS: ICD-10-CM

## 2020-10-13 DIAGNOSIS — I49.9 ABNORMAL HEART RHYTHM: ICD-10-CM

## 2020-10-13 PROCEDURE — 25000003 PHARM REV CODE 250: Mod: NTX | Performed by: EMERGENCY MEDICINE

## 2020-10-13 PROCEDURE — 11000001 HC ACUTE MED/SURG PRIVATE ROOM: Mod: NTX

## 2020-10-13 NOTE — PLAN OF CARE
(Physician in Lead of Transfers)   Outside Transfer Acceptance Note / Regional Referral Center    Upon patient arrival to floor, please call extension [phone] (if no answer, this will flip to a beeper, so enter your call back number) for Hospital Medicine admit team assignment and for additional admit orders for the patient.  Do not page the attending physician associated with the patient on arrival (this physician may not be on duty at the time of arrival).  Rather, always call [phone] to reach the triage physician for orders and team assignment.     Transferring Physician: Marichuy Presley NP    Accepting Physician: Ivan Horn MD    Date of Acceptance: 10/12/2020    Transferring Facility: [facility]     Reason for Transfer: higher level of care    Report from Transferring Physician/Hospital course:  Patient is a 60 y.o. male who has a past medical history of alcoholic cirrhosis, ascites, HTN presented to [facility] after a call from his PCPwith instructions to report to the nearest ED following labs showing acute kidney injury. CMP showed Sr Cr of 2.7 and CO2 of 15. Patient was recently discharged from hospital after being treated for HRS. See below discharge summery. Referring facility discussed with hepatology who recommended patient be transferred. Hepatology as accepted transfer and will consult on patient in AM. Patient given 1L IVF's with minimal urine out.       Discharge summery 10/8:     # Hepatorenal syndrome   - Cr up to 2.9. concern for HRS Type I vs pre renal MERLY 2/2 ATN from hypotension   - Nephrology consulted    - Started on HRS meds   - creatinine remains stable around 2.9-2.6  - given IV fluids on 10/03/2020 and 10/4  - Cr improving with IVF and HRS meds     On the day of d/c, patient was doing well with no acute issues. Cr down to 2.  Has responded very well to IVF, and was given more IVF on the day of d/c . Discharged off diuretics. Nephrology referral placed for follow up and  further management. Already has outpatient paracentesis scheduled for prn dawson. No volume overload or large tense ascites on discharge     Vital Signs (Most Recent):  Temp: 98 °F (36.7 °C) (10/12/20 2213)  Pulse: 100 (10/12/20 2213)  Resp: 18 (10/12/20 2213)  BP: 120/70 (10/12/20 2213)  SpO2: 100 % (10/12/20 2213) Vital Signs (24h Range):  Temp:  [98 °F (36.7 °C)-98.5 °F (36.9 °C)] 98 °F (36.7 °C)  Pulse:  [] 100  Resp:  [12-18] 18  SpO2:  [99 %-100 %] 100 %  BP: (112-120)/(70-78) 120/70       Labs & Radiographs: see EPIC    Significant Labs:   CMP:   Recent Labs   Lab 10/12/20  1007 10/12/20  1700 10/12/20  2024   * 134* 134*   K 4.2 3.8 4.4    104 108   CO2 19* 16* 15*   GLU 92 108 120*   BUN 43* 44* 42*   CREATININE 3.0* 3.0* 2.7*   CALCIUM 9.1 8.9 8.0*   PROT 6.2 6.1  --    ALBUMIN 3.6 3.6  --    BILITOT 0.9 0.9  --    ALKPHOS 156* 151*  --    AST 30 30  --    ALT 23 23  --    ANIONGAP 11 14 11   ESTGFRAFRICA 25.3* 25.3* 28.7*   EGFRNONAA 21.9* 21.9* 24.9*   , CBC:   Recent Labs   Lab 10/12/20  1007 10/12/20  1700   WBC 12.41 11.70   HGB 11.0* 11.2*   HCT 32.4* 33.1*    177   , INR:   Recent Labs   Lab 10/12/20  1007 10/12/20  1700   INR 1.4* 1.3*   , Lipid Panel No results for input(s): CHOL, HDL, LDLCALC, TRIG, CHOLHDL in the last 48 hours. and Troponin No results for input(s): TROPONINI in the last 48 hours.    Significant Imaging: see epic    To Do List:   1. Admit to   2. Consult hepatology  3. Consult nephrology       Upon patient arrival to floor, please call extension [phone] (if no answer, this will flip to a beeper, so enter your call back number) for Hospital Medicine admit team assignment and for additional admit orders for the patient.  Do not page the attending physician associated with the patient on arrival (this physician may not be on duty at the time of arrival).  Rather, always call [phone] to reach the triage physician for orders and team assignment.       Ivan  MD Justina  Hospital Medicine Staff

## 2020-10-13 NOTE — ED PROVIDER NOTES
Encounter Date: 10/12/2020       History     Chief Complaint   Patient presents with    Abnormal Lab     Pt states called by hospital for abnormal labs.  Pt has doctors [facility] HPI  Review of patient's allergies indicates:  No Known Allergies  Past Medical History:   Diagnosis Date    Hepatitis     Hypertension     Liver failure      Past Surgical History:   Procedure Laterality Date    COLONOSCOPY N/A 9/17/2020    Procedure: COLONOSCOPY;  Surgeon: Jhonathan Lieberman MD;  Location: [address]);  Service: Endoscopy;  Laterality: N/A;    ESOPHAGOGASTRODUODENOSCOPY N/A 7/31/2020    Procedure: EGD (ESOPHAGOGASTRODUODENOSCOPY);  Surgeon: Jhonathan Lieberman MD;  Location: [address]);  Service: Endoscopy;  Laterality: N/A;     History reviewed. No pertinent family history.  Social History     Tobacco Use    Smoking status: Never Smoker    Smokeless tobacco: Never Used   Substance Use Topics    Alcohol use: Not Currently     Frequency: Never    Drug use: Not Currently     Review of Systems    Physical Exam     Initial Vitals [10/12/20 1619]   BP Pulse Resp Temp SpO2   112/78 99 12 98.5 °F (36.9 °C) 99 %      MAP       --         Physical Exam    ED Course   Procedures  Labs Reviewed   CBC W/ AUTO DIFFERENTIAL - Abnormal; Notable for the following components:       Result Value    RBC 3.45 (*)     Hemoglobin 11.2 (*)     Hematocrit 33.1 (*)     Mean Corpuscular Hemoglobin 32.5 (*)     Gran # (ANC) 8.3 (*)     Eos # 0.7 (*)     Lymph% 13.9 (*)     All other components within normal limits   COMPREHENSIVE METABOLIC PANEL - Abnormal; Notable for the following components:    Sodium 134 (*)     CO2 16 (*)     BUN, Bld 44 (*)     Creatinine 3.0 (*)     Alkaline Phosphatase 151 (*)     eGFR if  25.3 (*)     eGFR if non  21.9 (*)     All other components within normal limits   PROTIME-INR - Abnormal; Notable for the following components:    Prothrombin Time 13.3 (*)     INR  1.3 (*)     All other components within normal limits   APTT - Abnormal; Notable for the following components:    aPTT 35.5 (*)     All other components within normal limits   BASIC METABOLIC PANEL - Abnormal; Notable for the following components:    Sodium 134 (*)     CO2 15 (*)     Glucose 120 (*)     BUN, Bld 42 (*)     Creatinine 2.7 (*)     Calcium 8.0 (*)     eGFR if  28.7 (*)     eGFR if non  24.9 (*)     All other components within normal limits    Narrative:     Repeat BUN Creatinine following bolus   CULTURE, BLOOD   CULTURE, BLOOD   SARS-COV-2 RNA AMPLIFICATION, QUAL          Imaging Results    None                            ED Course as of Oct 13 1715   Mon Oct 12, 2020   2109 Improved after fluid bolus   Creatinine(!): 2.7 [AB]   2202 Contacted Dr. So at [facility] to notify them of patient Co2, acidosis, low UOP, and mild improvement in creatinine after 1500 cc fluid bolus. Dr. So wants patient transferred to [facility], admitted to Internal Medicine and consult Hepatology. Patient to receive broad spectrum antibiotics prior to transfer.    [AB]   Tue Oct 13, 2020   1705 Continue to await available room at [facility].  Patient is in agreement with transfer.  He is stable.    [RB]      ED Course User Index  [AB] Marichuy Presley NP  [RB] Faisal Conrad Jr., MD            Clinical Impression:       ICD-10-CM ICD-9-CM   1. Metabolic acidosis  E87.2 276.2   2. Hepatorenal syndrome  K76.7 572.4   3. Alcoholic cirrhosis of liver with ascites  K70.31 571.2                          ED Disposition Condition    Transfer to Another Facility Stable                            Faisal Conrad Jr., MD  10/13/20 8388

## 2020-10-13 NOTE — ED NOTES
"Dr Horn with [facility] accepting pt for transfer. Dr Jackson states "they will call us back with a room number"  "

## 2020-10-13 NOTE — ED NOTES
"Transfer center phoned for Marichuy Presley NP to consult with pt's hepatologist. "will call doc and connect yall"  "

## 2020-10-13 NOTE — ED NOTES
Sheryl from transfer center phoning to inform us that NO beds are available at this time. Will continue to try the next 4 hours.

## 2020-10-13 NOTE — ED NOTES
NEUROLOGICAL:   Patient is awake , alert  and oriented x 4 . Pupils are PERRL. Gait is steady.   Moves all extremities without difficulty.   Patient reports no neuro complaints..  GCS 15    HEENT:   Head appears normocephalic  and symmetric .   Eyes appear WNL to both eyes. Patient reports no complaints  to both eyes .   Ears appear WNL. Patient reports no complaints  to both ears.   Nares appear patent . Patient reports no nose complaints .  Mouth appears moist, pink and teeth intact. Patient reports no mouth complaints.   Throat appears pink and moist . Patient reports no throat complaints.    CARDIOVASCULAR:   S1 and S2 present, no murmurs, gallops, or rubs, rate regular  and pulses palpable (2+)    On palpation no edema noted , noted to none.   Patient reports no CV complaints.   Patient vitals are WNL.    RESPIRATORY:   Airway Clear, Open, and Patent.  Respirations are even and unlabored.   Breath sounds clear  to all lung fields.   Patient reports no respiratory complaints.     GASTROINTESTINAL:   Abdomen is soft  and non-tender x 4 quadrants. Bowel sounds are normoactive to all quadrants .   Patient reports no GI complaints . Abdominal distension/acites noted.      GENITOURINARY:   Patient reports no  complaints.     MUSCULOSKELETAL:   full range of motion to all extremities, no tenderness noted, no weakness noted, swelling noted to RLE and swelling noted to LLE.   Patient reports no musculoskeletal complaints     SKIN:   Skin appears warm , dry , good turgor, color normal for race and intact. Bruising noted to mid-sternal chest area. Abrasions and diffuse briusing noted to abdomen.    Provided patient with a cup of coffee. Patient has no complaints at this time. Resting comfortably. Visiting with family member, sitting up in wheelchair.

## 2020-10-14 PROBLEM — N18.9 ACUTE KIDNEY INJURY SUPERIMPOSED ON CHRONIC KIDNEY DISEASE: Status: ACTIVE | Noted: 2020-09-14

## 2020-10-14 LAB
ALBUMIN SERPL BCP-MCNC: 3 G/DL (ref 3.5–5.2)
ALBUMIN SERPL BCP-MCNC: 3.6 G/DL (ref 3.5–5.2)
ALP SERPL-CCNC: 135 U/L (ref 55–135)
ALP SERPL-CCNC: 174 U/L (ref 55–135)
ALT SERPL W/O P-5'-P-CCNC: 18 U/L (ref 10–44)
ALT SERPL W/O P-5'-P-CCNC: 22 U/L (ref 10–44)
ANION GAP SERPL CALC-SCNC: 10 MMOL/L (ref 8–16)
ANION GAP SERPL CALC-SCNC: 12 MMOL/L (ref 8–16)
AST SERPL-CCNC: 33 U/L (ref 10–40)
AST SERPL-CCNC: 42 U/L (ref 10–40)
BASOPHILS # BLD AUTO: 0.07 K/UL (ref 0–0.2)
BASOPHILS # BLD AUTO: 0.09 K/UL (ref 0–0.2)
BASOPHILS NFR BLD: 0.6 % (ref 0–1.9)
BASOPHILS NFR BLD: 0.7 % (ref 0–1.9)
BILIRUB SERPL-MCNC: 0.8 MG/DL (ref 0.1–1)
BILIRUB SERPL-MCNC: 0.8 MG/DL (ref 0.1–1)
BUN SERPL-MCNC: 39 MG/DL (ref 6–20)
BUN SERPL-MCNC: 39 MG/DL (ref 6–20)
CALCIUM SERPL-MCNC: 8.3 MG/DL (ref 8.7–10.5)
CALCIUM SERPL-MCNC: 8.8 MG/DL (ref 8.7–10.5)
CHLORIDE SERPL-SCNC: 105 MMOL/L (ref 95–110)
CHLORIDE SERPL-SCNC: 105 MMOL/L (ref 95–110)
CO2 SERPL-SCNC: 14 MMOL/L (ref 23–29)
CO2 SERPL-SCNC: 15 MMOL/L (ref 23–29)
CREAT SERPL-MCNC: 2.6 MG/DL (ref 0.5–1.4)
CREAT SERPL-MCNC: 3 MG/DL (ref 0.5–1.4)
DIFFERENTIAL METHOD: ABNORMAL
DIFFERENTIAL METHOD: ABNORMAL
EOSINOPHIL # BLD AUTO: 0.3 K/UL (ref 0–0.5)
EOSINOPHIL # BLD AUTO: 0.4 K/UL (ref 0–0.5)
EOSINOPHIL NFR BLD: 2.9 % (ref 0–8)
EOSINOPHIL NFR BLD: 2.9 % (ref 0–8)
ERYTHROCYTE [DISTWIDTH] IN BLOOD BY AUTOMATED COUNT: 13.6 % (ref 11.5–14.5)
ERYTHROCYTE [DISTWIDTH] IN BLOOD BY AUTOMATED COUNT: 13.8 % (ref 11.5–14.5)
EST. GFR  (AFRICAN AMERICAN): 25.3 ML/MIN/1.73 M^2
EST. GFR  (AFRICAN AMERICAN): 30.1 ML/MIN/1.73 M^2
EST. GFR  (NON AFRICAN AMERICAN): 21.9 ML/MIN/1.73 M^2
EST. GFR  (NON AFRICAN AMERICAN): 26 ML/MIN/1.73 M^2
FERRITIN SERPL-MCNC: 1984 NG/ML (ref 20–300)
GGT SERPL-CCNC: 101 U/L (ref 8–55)
GLUCOSE SERPL-MCNC: 108 MG/DL (ref 70–110)
GLUCOSE SERPL-MCNC: 94 MG/DL (ref 70–110)
HCT VFR BLD AUTO: 27.8 % (ref 40–54)
HCT VFR BLD AUTO: 35.3 % (ref 40–54)
HGB BLD-MCNC: 11.5 G/DL (ref 14–18)
HGB BLD-MCNC: 9.1 G/DL (ref 14–18)
IMM GRANULOCYTES # BLD AUTO: 0.05 K/UL (ref 0–0.04)
IMM GRANULOCYTES # BLD AUTO: 0.05 K/UL (ref 0–0.04)
IMM GRANULOCYTES NFR BLD AUTO: 0.3 % (ref 0–0.5)
IMM GRANULOCYTES NFR BLD AUTO: 0.5 % (ref 0–0.5)
INR PPP: 1.1 (ref 0.8–1.2)
INR PPP: 1.2 (ref 0.8–1.2)
IRON SERPL-MCNC: 78 UG/DL (ref 45–160)
LACTATE SERPL-SCNC: 2.6 MMOL/L (ref 0.5–2.2)
LACTATE SERPL-SCNC: 2.8 MMOL/L (ref 0.5–2.2)
LYMPHOCYTES # BLD AUTO: 1.2 K/UL (ref 1–4.8)
LYMPHOCYTES # BLD AUTO: 1.9 K/UL (ref 1–4.8)
LYMPHOCYTES NFR BLD: 11.6 % (ref 18–48)
LYMPHOCYTES NFR BLD: 12.8 % (ref 18–48)
MAGNESIUM SERPL-MCNC: 1.5 MG/DL (ref 1.6–2.6)
MAGNESIUM SERPL-MCNC: 1.6 MG/DL (ref 1.6–2.6)
MCH RBC QN AUTO: 32.4 PG (ref 27–31)
MCH RBC QN AUTO: 32.7 PG (ref 27–31)
MCHC RBC AUTO-ENTMCNC: 32.6 G/DL (ref 32–36)
MCHC RBC AUTO-ENTMCNC: 32.7 G/DL (ref 32–36)
MCV RBC AUTO: 100 FL (ref 82–98)
MCV RBC AUTO: 99 FL (ref 82–98)
MONOCYTES # BLD AUTO: 0.8 K/UL (ref 0.3–1)
MONOCYTES # BLD AUTO: 1.1 K/UL (ref 0.3–1)
MONOCYTES NFR BLD: 7.8 % (ref 4–15)
MONOCYTES NFR BLD: 8 % (ref 4–15)
NEUTROPHILS # BLD AUTO: 11.1 K/UL (ref 1.8–7.7)
NEUTROPHILS # BLD AUTO: 7.7 K/UL (ref 1.8–7.7)
NEUTROPHILS NFR BLD: 75.6 % (ref 38–73)
NEUTROPHILS NFR BLD: 76.3 % (ref 38–73)
NRBC BLD-RTO: 0 /100 WBC
NRBC BLD-RTO: 0 /100 WBC
OSMOLALITY UR: 373 MOSM/KG (ref 50–1200)
PHOSPHATE SERPL-MCNC: 4.1 MG/DL (ref 2.7–4.5)
PHOSPHATE SERPL-MCNC: 4.5 MG/DL (ref 2.7–4.5)
PLATELET # BLD AUTO: 154 K/UL (ref 150–350)
PLATELET # BLD AUTO: 210 K/UL (ref 150–350)
PMV BLD AUTO: 10.5 FL (ref 9.2–12.9)
PMV BLD AUTO: 10.9 FL (ref 9.2–12.9)
POTASSIUM SERPL-SCNC: 4.2 MMOL/L (ref 3.5–5.1)
POTASSIUM SERPL-SCNC: 4.6 MMOL/L (ref 3.5–5.1)
PROT SERPL-MCNC: 4.9 G/DL (ref 6–8.4)
PROT SERPL-MCNC: 6.1 G/DL (ref 6–8.4)
PROTHROMBIN TIME: 12.4 SEC (ref 9–12.5)
PROTHROMBIN TIME: 13 SEC (ref 9–12.5)
RBC # BLD AUTO: 2.81 M/UL (ref 4.6–6.2)
RBC # BLD AUTO: 3.52 M/UL (ref 4.6–6.2)
SATURATED IRON: 66 % (ref 20–50)
SODIUM SERPL-SCNC: 130 MMOL/L (ref 136–145)
SODIUM SERPL-SCNC: 131 MMOL/L (ref 136–145)
TOTAL IRON BINDING CAPACITY: 118 UG/DL (ref 250–450)
TRANSFERRIN SERPL-MCNC: 80 MG/DL (ref 200–375)
WBC # BLD AUTO: 10.11 K/UL (ref 3.9–12.7)
WBC # BLD AUTO: 14.66 K/UL (ref 3.9–12.7)

## 2020-10-14 PROCEDURE — 99232 SBSQ HOSP IP/OBS MODERATE 35: CPT | Mod: NTX,,, | Performed by: INTERNAL MEDICINE

## 2020-10-14 PROCEDURE — 84540 ASSAY OF URINE/UREA-N: CPT | Mod: NTX

## 2020-10-14 PROCEDURE — 93010 EKG 12-LEAD: ICD-10-PCS | Mod: NTX,,, | Performed by: INTERNAL MEDICINE

## 2020-10-14 PROCEDURE — 84133 ASSAY OF URINE POTASSIUM: CPT | Mod: NTX

## 2020-10-14 PROCEDURE — 63600175 PHARM REV CODE 636 W HCPCS: Mod: NTX | Performed by: STUDENT IN AN ORGANIZED HEALTH CARE EDUCATION/TRAINING PROGRAM

## 2020-10-14 PROCEDURE — 36415 COLL VENOUS BLD VENIPUNCTURE: CPT | Mod: NTX

## 2020-10-14 PROCEDURE — 99232 PR SUBSEQUENT HOSPITAL CARE,LEVL II: ICD-10-PCS | Mod: NTX,,, | Performed by: INTERNAL MEDICINE

## 2020-10-14 PROCEDURE — 84100 ASSAY OF PHOSPHORUS: CPT | Mod: 91,NTX

## 2020-10-14 PROCEDURE — 97116 GAIT TRAINING THERAPY: CPT | Mod: NTX

## 2020-10-14 PROCEDURE — 80053 COMPREHEN METABOLIC PANEL: CPT | Mod: NTX

## 2020-10-14 PROCEDURE — 83935 ASSAY OF URINE OSMOLALITY: CPT | Mod: NTX

## 2020-10-14 PROCEDURE — 84300 ASSAY OF URINE SODIUM: CPT | Mod: NTX

## 2020-10-14 PROCEDURE — 87040 BLOOD CULTURE FOR BACTERIA: CPT | Mod: 59,NTX

## 2020-10-14 PROCEDURE — 97165 OT EVAL LOW COMPLEX 30 MIN: CPT | Mod: NTX

## 2020-10-14 PROCEDURE — 93005 ELECTROCARDIOGRAM TRACING: CPT | Mod: NTX

## 2020-10-14 PROCEDURE — 93010 ELECTROCARDIOGRAM REPORT: CPT | Mod: NTX,,, | Performed by: INTERNAL MEDICINE

## 2020-10-14 PROCEDURE — P9047 ALBUMIN (HUMAN), 25%, 50ML: HCPCS | Mod: NTX | Performed by: STUDENT IN AN ORGANIZED HEALTH CARE EDUCATION/TRAINING PROGRAM

## 2020-10-14 PROCEDURE — 83540 ASSAY OF IRON: CPT | Mod: NTX

## 2020-10-14 PROCEDURE — 25000003 PHARM REV CODE 250: Mod: NTX | Performed by: STUDENT IN AN ORGANIZED HEALTH CARE EDUCATION/TRAINING PROGRAM

## 2020-10-14 PROCEDURE — 82977 ASSAY OF GGT: CPT | Mod: NTX

## 2020-10-14 PROCEDURE — 82728 ASSAY OF FERRITIN: CPT | Mod: NTX

## 2020-10-14 PROCEDURE — 80307 DRUG TEST PRSMV CHEM ANLYZR: CPT | Mod: NTX

## 2020-10-14 PROCEDURE — 97530 THERAPEUTIC ACTIVITIES: CPT | Mod: NTX

## 2020-10-14 PROCEDURE — 82043 UR ALBUMIN QUANTITATIVE: CPT | Mod: NTX

## 2020-10-14 PROCEDURE — 85610 PROTHROMBIN TIME: CPT | Mod: NTX

## 2020-10-14 PROCEDURE — 83605 ASSAY OF LACTIC ACID: CPT | Mod: 91,NTX

## 2020-10-14 PROCEDURE — 80321 ALCOHOLS BIOMARKERS 1OR 2: CPT | Mod: NTX

## 2020-10-14 PROCEDURE — 97161 PT EVAL LOW COMPLEX 20 MIN: CPT | Mod: NTX

## 2020-10-14 PROCEDURE — 83735 ASSAY OF MAGNESIUM: CPT | Mod: NTX

## 2020-10-14 PROCEDURE — 85025 COMPLETE CBC W/AUTO DIFF WBC: CPT | Mod: NTX

## 2020-10-14 PROCEDURE — 11000001 HC ACUTE MED/SURG PRIVATE ROOM: Mod: NTX

## 2020-10-14 RX ORDER — PANTOPRAZOLE SODIUM 40 MG/1
40 TABLET, DELAYED RELEASE ORAL DAILY
Status: DISCONTINUED | OUTPATIENT
Start: 2020-10-14 | End: 2020-10-19 | Stop reason: HOSPADM

## 2020-10-14 RX ORDER — ALBUMIN HUMAN 250 G/1000ML
25 SOLUTION INTRAVENOUS DAILY
Status: DISCONTINUED | OUTPATIENT
Start: 2020-10-15 | End: 2020-10-15

## 2020-10-14 RX ORDER — NOREPINEPHRINE BITARTRATE/D5W 4MG/250ML
0.02 PLASTIC BAG, INJECTION (ML) INTRAVENOUS CONTINUOUS
Status: DISCONTINUED | OUTPATIENT
Start: 2020-10-14 | End: 2020-10-14

## 2020-10-14 RX ORDER — MIDODRINE HYDROCHLORIDE 5 MG/1
15 TABLET ORAL EVERY 8 HOURS
Status: DISCONTINUED | OUTPATIENT
Start: 2020-10-14 | End: 2020-10-19 | Stop reason: HOSPADM

## 2020-10-14 RX ORDER — ALBUMIN HUMAN 250 G/1000ML
25 SOLUTION INTRAVENOUS 2 TIMES DAILY
Status: DISCONTINUED | OUTPATIENT
Start: 2020-10-14 | End: 2020-10-14

## 2020-10-14 RX ORDER — SODIUM BICARBONATE 650 MG/1
1300 TABLET ORAL 3 TIMES DAILY
Status: DISCONTINUED | OUTPATIENT
Start: 2020-10-14 | End: 2020-10-19 | Stop reason: HOSPADM

## 2020-10-14 RX ORDER — CEFTRIAXONE 1 G/1
1 INJECTION, POWDER, FOR SOLUTION INTRAMUSCULAR; INTRAVENOUS
Status: DISCONTINUED | OUTPATIENT
Start: 2020-10-14 | End: 2020-10-15

## 2020-10-14 RX ORDER — LACTULOSE 10 G/15ML
15 SOLUTION ORAL 3 TIMES DAILY
Status: DISCONTINUED | OUTPATIENT
Start: 2020-10-14 | End: 2020-10-19 | Stop reason: HOSPADM

## 2020-10-14 RX ORDER — VALACYCLOVIR HYDROCHLORIDE 500 MG/1
1000 TABLET, FILM COATED ORAL EVERY 12 HOURS
Status: DISCONTINUED | OUTPATIENT
Start: 2020-10-14 | End: 2020-10-15

## 2020-10-14 RX ORDER — OCTREOTIDE ACETATE 100 UG/ML
100 INJECTION, SOLUTION INTRAVENOUS; SUBCUTANEOUS EVERY 8 HOURS
Status: DISCONTINUED | OUTPATIENT
Start: 2020-10-14 | End: 2020-10-18

## 2020-10-14 RX ORDER — ALBUMIN HUMAN 250 G/1000ML
50 SOLUTION INTRAVENOUS ONCE
Status: COMPLETED | OUTPATIENT
Start: 2020-10-14 | End: 2020-10-14

## 2020-10-14 RX ORDER — SODIUM CHLORIDE 0.9 % (FLUSH) 0.9 %
10 SYRINGE (ML) INJECTION
Status: DISCONTINUED | OUTPATIENT
Start: 2020-10-14 | End: 2020-10-19 | Stop reason: HOSPADM

## 2020-10-14 RX ORDER — ROPINIROLE 0.25 MG/1
0.25 TABLET, FILM COATED ORAL NIGHTLY
Status: DISCONTINUED | OUTPATIENT
Start: 2020-10-14 | End: 2020-10-19 | Stop reason: HOSPADM

## 2020-10-14 RX ORDER — VALACYCLOVIR HYDROCHLORIDE 500 MG/1
1000 TABLET, FILM COATED ORAL EVERY 8 HOURS
Status: DISCONTINUED | OUTPATIENT
Start: 2020-10-14 | End: 2020-10-14

## 2020-10-14 RX ORDER — ESCITALOPRAM OXALATE 5 MG/1
5 TABLET ORAL DAILY
Status: DISCONTINUED | OUTPATIENT
Start: 2020-10-14 | End: 2020-10-19 | Stop reason: HOSPADM

## 2020-10-14 RX ADMIN — VALACYCLOVIR HYDROCHLORIDE 1000 MG: 500 TABLET, FILM COATED ORAL at 09:10

## 2020-10-14 RX ADMIN — CEFTRIAXONE SODIUM 1 G: 1 INJECTION, POWDER, FOR SOLUTION INTRAMUSCULAR; INTRAVENOUS at 02:10

## 2020-10-14 RX ADMIN — ALBUMIN (HUMAN) 50 G: 12.5 SOLUTION INTRAVENOUS at 11:10

## 2020-10-14 RX ADMIN — MIDODRINE HYDROCHLORIDE 15 MG: 5 TABLET ORAL at 09:10

## 2020-10-14 RX ADMIN — MIDODRINE HYDROCHLORIDE 15 MG: 5 TABLET ORAL at 06:10

## 2020-10-14 RX ADMIN — VALACYCLOVIR HYDROCHLORIDE 1000 MG: 500 TABLET, FILM COATED ORAL at 06:10

## 2020-10-14 RX ADMIN — APIXABAN 5 MG: 5 TABLET, FILM COATED ORAL at 10:10

## 2020-10-14 RX ADMIN — MIDODRINE HYDROCHLORIDE 15 MG: 5 TABLET ORAL at 03:10

## 2020-10-14 RX ADMIN — OCTREOTIDE ACETATE 100 MCG: 100 INJECTION, SOLUTION INTRAVENOUS; SUBCUTANEOUS at 09:10

## 2020-10-14 RX ADMIN — APIXABAN 5 MG: 5 TABLET, FILM COATED ORAL at 01:10

## 2020-10-14 RX ADMIN — SODIUM CHLORIDE, SODIUM LACTATE, POTASSIUM CHLORIDE, AND CALCIUM CHLORIDE 1000 ML: .6; .31; .03; .02 INJECTION, SOLUTION INTRAVENOUS at 02:10

## 2020-10-14 RX ADMIN — ROPINIROLE HYDROCHLORIDE 0.25 MG: 0.25 TABLET, FILM COATED ORAL at 02:10

## 2020-10-14 RX ADMIN — OCTREOTIDE ACETATE 100 MCG: 100 INJECTION, SOLUTION INTRAVENOUS; SUBCUTANEOUS at 03:10

## 2020-10-14 RX ADMIN — OCTREOTIDE ACETATE 100 MCG: 100 INJECTION, SOLUTION INTRAVENOUS; SUBCUTANEOUS at 02:10

## 2020-10-14 RX ADMIN — ALBUMIN (HUMAN) 25 G: 12.5 SOLUTION INTRAVENOUS at 02:10

## 2020-10-14 RX ADMIN — APIXABAN 5 MG: 5 TABLET, FILM COATED ORAL at 09:10

## 2020-10-14 RX ADMIN — SODIUM BICARBONATE 650 MG TABLET 1300 MG: at 09:10

## 2020-10-14 RX ADMIN — LACTULOSE 15 G: 10 SOLUTION ORAL at 09:10

## 2020-10-14 RX ADMIN — SODIUM BICARBONATE 650 MG TABLET 1300 MG: at 10:10

## 2020-10-14 RX ADMIN — SODIUM BICARBONATE 650 MG TABLET 1300 MG: at 03:10

## 2020-10-14 RX ADMIN — PANTOPRAZOLE SODIUM 40 MG: 40 TABLET, DELAYED RELEASE ORAL at 10:10

## 2020-10-14 RX ADMIN — ROPINIROLE HYDROCHLORIDE 0.25 MG: 0.25 TABLET, FILM COATED ORAL at 09:10

## 2020-10-14 RX ADMIN — LACTULOSE 15 G: 10 SOLUTION ORAL at 01:10

## 2020-10-14 RX ADMIN — ALBUMIN (HUMAN) 25 G: 12.5 SOLUTION INTRAVENOUS at 10:10

## 2020-10-14 RX ADMIN — ESCITALOPRAM 5 MG: 5 TABLET, FILM COATED ORAL at 10:10

## 2020-10-14 NOTE — ASSESSMENT & PLAN NOTE
Mr. Pardo is a 59 y/o man with previously described history, physical exam, laboratories and imaging studies being consulted for concerns of HRS in the setting of MERLY.   - HRS: patient without acute HRS type 1 at the moment. No precipiating event for HRS such as bacterial infection, GIB, large volume paracentesis w/o albumin infusion or acute alcohol intoxication. Therefore, if one of the aforementioned causes were present, this patient hemodynamics would be compromised which is not the case. This leans towards for HRS type 2 if no precipitating event.     Recommendations:  - MERLY: continue Midodrine 15 mg tid, Octreotide 100 mcg tid and IV albumin. Current volume expansion and vasoconstrictive therapy is showing improvement on kidney function. There is no need for IV vasopressor at this moment.   - Diagnostic/therapeutic paracentesis. Less likely SBP, could have confounding results since pt already started on abx.   - U/A, Elizabeth,UCl   - Strict I/O and chart  - Avoid nephrotoxic medications, NSAIDs, IV contrast, etc.  - Daily weights and chart  - Medication doses adjusted to GFR  - Maintain MAP > 65  - Hb > 7 gm/dL  - Will follow closely

## 2020-10-14 NOTE — ASSESSMENT & PLAN NOTE
Patient Alert, O*3 but seem incoherent in his answer, not aware if he is taken him home lactulose as prescribe   Plan:   - Resume lactulose 15 mg TID>> titrate to 3-4 BM daily

## 2020-10-14 NOTE — ASSESSMENT & PLAN NOTE
Patient was discharge of Ciprofloxacin for SBP ppx  Patient stated he feels his abdomen enlarging   He was presented to transplant board last month, patient was deferred at this time due to need for AC for DVT.     MELD-Na score: 22 at 10/14/2020  5:42 AM  MELD score: 18 at 10/14/2020  5:42 AM  Calculated from:  Serum Creatinine: 2.6 mg/dL at 10/14/2020  5:42 AM  Serum Sodium: 131 mmol/L at 10/14/2020  5:42 AM  Total Bilirubin: 0.8 mg/dL (Rounded to 1 mg/dL) at 10/14/2020  5:42 AM  INR(ratio): 1.2 at 10/14/2020  5:42 AM  Age: 60 years 2 months     Plan:   - Will obtain US>> if ascites present consult IR for Paracentesis  - Started Ceftriaxone for SBP ppx    - Low sodium diet

## 2020-10-14 NOTE — CONSULTS
[facility]  Nephrology  Consult Note    Patient Name: Kenneth Pardo  MRN: [id number]  Admission Date: 10/13/2020  Hospital Length of Stay: 1 days  Attending Provider: Ariel Rubio MD   Primary Care Physician: Neva Nielsen MD  Principal Problem:Acute kidney injury superimposed on chronic kidney disease    Inpatient consult to Nephrology  Consult performed by: Cal Riley MD  Consult ordered by: Shilpa Granados MD  Reason for consult: MERLY/HRS        Subjective:     HPI: A 59 y/o  male with no known history of CKD, and PMHx of HTN, EtOH cirrhosis complicated by portal hypertension, ascites, portal hypertensive gastropathy, was referred to this institution after being contacted by his PCP for abnormal lab results. Patient was recently discharged from [facility] after being admitted for decompensated hepatic cirrhosis and concerns for HRS which Nephrology was following. Cr at discharged was 2.0, yesterday on admission 3.0.  Patient admitted not following a fluid restricted diet once discharged (freely drinking water, juices, tea etc). Denies fever, chills, SOB, chest pain, N/V, problems with urination. Does complains of lower extremity edema and abdominal distention.     Nephrology consulted for management of MERLY        Past Medical History:   Diagnosis Date    Hepatitis     Hypertension     Liver failure        Past Surgical History:   Procedure Laterality Date    COLONOSCOPY N/A 9/17/2020    Procedure: COLONOSCOPY;  Surgeon: Jhonathan Lieberman MD;  Location: [address]);  Service: Endoscopy;  Laterality: N/A;    ESOPHAGOGASTRODUODENOSCOPY N/A 7/31/2020    Procedure: EGD (ESOPHAGOGASTRODUODENOSCOPY);  Surgeon: Jhonathan Lieberman MD;  Location: [address]);  Service: Endoscopy;  Laterality: N/A;       Review of patient's allergies indicates:  No Known Allergies  Current Facility-Administered Medications   Medication Frequency    [START ON 10/15/2020] albumin human 25% bottle 25 g  Daily    apixaban tablet 5 mg BID    cefTRIAXone injection 1 g Q24H    escitalopram oxalate tablet 5 mg Daily    lactulose 20 gram/30 mL solution Soln 15 g TID    midodrine tablet 15 mg Q8H    octreotide injection 100 mcg Q8H    pantoprazole EC tablet 40 mg Daily    rOPINIRole tablet 0.25 mg QHS    sodium bicarbonate tablet 1,300 mg TID    sodium chloride 0.9% flush 10 mL PRN    valACYclovir tablet 1,000 mg Q12H     Family History     None        Tobacco Use    Smoking status: Never Smoker    Smokeless tobacco: Never Used   Substance and Sexual Activity    Alcohol use: Not Currently     Frequency: Never    Drug use: Not Currently    Sexual activity: Not on file     Review of Systems   Constitutional: Negative.    HENT: Negative.    Respiratory: Negative.    Cardiovascular: Positive for leg swelling. Negative for chest pain.   Gastrointestinal: Positive for abdominal distention.   Genitourinary: Negative.      Objective:     Vital Signs (Most Recent):  Temp: 98 °F (36.7 °C) (10/14/20 0500)  Pulse: 94 (10/14/20 1035)  Resp: 18 (10/14/20 0500)  BP: 112/78 (10/14/20 0500)  SpO2: 100 % (10/13/20 2300)  O2 Device (Oxygen Therapy): room air (10/14/20 0500) Vital Signs (24h Range):  Temp:  [98 °F (36.7 °C)-98.2 °F (36.8 °C)] 98 °F (36.7 °C)  Pulse:  [] 94  Resp:  [18-22] 18  SpO2:  [98 %-100 %] 100 %  BP: ()/(58-78) 112/78     Weight: 88 kg (194 lb 0.1 oz) (10/14/20 1117)  Body mass index is 34.38 kg/m².  Body surface area is 1.98 meters squared.    No intake/output data recorded.    Physical Exam  Vitals signs and nursing note reviewed.   Constitutional:       General: He is not in acute distress.  Neck:      Musculoskeletal: Normal range of motion.   Cardiovascular:      Rate and Rhythm: Normal rate and regular rhythm.   Pulmonary:      Effort: Pulmonary effort is normal. No respiratory distress.      Breath sounds: No wheezing or rhonchi.   Abdominal:      General: There is distension.    Musculoskeletal:      Right lower leg: Edema present.      Left lower leg: Edema present.   Skin:     Comments: Abdomen LUQ crusted vesicles with a trajectory towards left flank   Neurological:      General: No focal deficit present.      Mental Status: He is alert.         Significant Labs:  ABGs:   Recent Labs   Lab 10/12/20  2122   PH 7.206*     CBC:   Recent Labs   Lab 10/14/20  0542   WBC 10.11   RBC 2.81*   HGB 9.1*   HCT 27.8*      MCV 99*   MCH 32.4*   MCHC 32.7     CMP:   Recent Labs   Lab 10/14/20  0542   GLU 94   CALCIUM 8.3*   ALBUMIN 3.0*   PROT 4.9*   *   K 4.6   CO2 14*      BUN 39*   CREATININE 2.6*   ALKPHOS 135   ALT 18   AST 33   BILITOT 0.8     Coagulation:   Recent Labs   Lab 10/12/20  1700  10/14/20  0542   INR 1.3*   < > 1.2   APTT 35.5*  --   --     < > = values in this interval not displayed.     LFTs:   Recent Labs   Lab 10/14/20  0542   ALT 18   AST 33   ALKPHOS 135   BILITOT 0.8   PROT 4.9*   ALBUMIN 3.0*     All labs within the past 24 hours have been reviewed.    Significant Imaging:     US: Reviewed  Renal US 10/14: Personally reviewed    Assessment/Plan:     * Acute kidney injury superimposed on chronic kidney disease  Mr. Pardo is a 61 y/o man with previously described history, physical exam, laboratories and imaging studies being consulted for concerns of HRS in the setting of MERLY.   - HRS: patient without acute HRS type 1 at the moment. No precipiating event for HRS such as bacterial infection, GIB, large volume paracentesis w/o albumin infusion or acute alcohol intoxication. Therefore, if one of the aforementioned causes were present, this patient hemodynamics would be compromised which is not the case. This leans towards for HRS type 2 if no precipitating event.     Recommendations:  - MERLY: continue Midodrine 15 mg tid, Octreotide 100 mcg tid and IV albumin. Current volume expansion and vasoconstrictive therapy is showing improvement on kidney function.  There is no need for IV vasopressor at this moment.   - Diagnostic/therapeutic paracentesis. Less likely SBP, could have confounding results since pt already started on abx.   - U/A, Elizabeth,UCl   - Strict I/O and chart  - Avoid nephrotoxic medications, NSAIDs, IV contrast, etc.  - Daily weights and chart  - Medication doses adjusted to GFR  - Maintain MAP > 65  - Hb > 7 gm/dL  - Will follow closely      Hyponatremia  - Hypervolemic hyponatremia 2/2 to cirrhosis    Recommendations:  - Fluid restriction 1L  - Lasix 60 mg IV x 1 dose    Ascites due to alcoholic cirrhosis  - Managed by primary team    Metabolic acidosis  - Continue sodium bicarbonate 1300 mg tid        Thank you for your consult. I will follow-up with patient. Please contact us if you have any additional questions.    Cal Riley MD  Nephrology  [facility]

## 2020-10-14 NOTE — ASSESSMENT & PLAN NOTE
Patient was discharge of Ciprofloxacin for SBP ppx  Patient stated he feels his abdomen enlarging   He was presented to transplant board last month, patient was deferred at this time due to need for AC for DVT.     Plan:   - Will obtain US>> if ascites present consult IR for Paracentesis  - Started Ceftriaxone for SBP ppx    - Low sodium diet

## 2020-10-14 NOTE — PLAN OF CARE
CM to bedside - pt provided assessment info. Pt w/ a cane in place, lives w/ spouse and 18y/o son. Pt will likely d/c home w/ no needs. Pt states that he plans for his sister to pick him up but if she is unable he will need a ride home - CM will relay to [facility] CM.     CM provided patient anticipated HOLLEY which was written on the whiteboard and will be update by nursing staff.   Patient provided a Discharge Planning booklet. Patient verbalized understanding.    CICI ANDREWS [id number] - assisting [facility] CM Brynn [id number]     10/14/20 1532   Discharge Assessment   Assessment Type Discharge Planning Assessment   Confirmed/corrected address and phone number on facesheet? Yes   Assessment information obtained from? Patient;Medical Record   Expected Length of Stay (days) 3   Communicated expected length of stay with patient/caregiver yes   Prior to hospitilization cognitive status: Alert/Oriented   Prior to hospitalization functional status: Assistive Equipment   Current cognitive status: Alert/Oriented   Current Functional Status: Assistive Equipment;Needs Assistance   Facility Arrived From: [facility]   Lives With spouse   Able to Return to Prior Arrangements yes   Is patient able to care for self after discharge? Unable to determine at this time (comments)   Who are your caregiver(s) and their phone number(s)? spouse - Dee Dee [phone]; sister - Leonela [phone]   Patient's perception of discharge disposition home or selfcare   Readmission Within the Last 30 Days previous discharge plan unsuccessful   If yes, most recent facility name: [facility] [place] [facility]   Patient currently being followed by outpatient case management? No   Patient currently receives any other outside agency services? No   Equipment Currently Used at Home cane, straight   Do you have any problems affording any of your prescribed medications? No   Is the patient taking medications as prescribed? yes   Does the patient have transportation home? Yes   Transportation  Anticipated health plan transportation;family or friend will provide   Dialysis Name and Scheduled days N/A   Does the patient receive services at the [facility]? No   Discharge Plan A Home with family   Discharge Plan B Home with family;Home Health   DME Needed Upon Discharge  other (see comments)  (TBD)   Patient/Family in Agreement with Plan yes   Readmission Questionnaire   At the time of your discharge, did someone talk to you about what your health problems were? Yes  (readmit completed on opening screen)

## 2020-10-14 NOTE — CONSULTS
Wound care consult received. Pt reports history of shingles and upon assessment of L lower abdomen extending to back, noted dried crusted lesions with no drainage- healing shingles. Pt continues with anti-viral medication. No wound care needed at this time.  Recommend to keep lesions clean and dry.  Wound care team to sign off.  Please reconsult for any further needs. [id number]    Abdomen- healing shingles

## 2020-10-14 NOTE — HPI
Mr Edvin is a 59 yo M with PMHx of HTN, EtOH cirrhosis complicated by portal hypertension, ascites, portal hypertensive gastropathy, and hepatic encephalopathy who presents to the hospital after being contacted about abnormal lab result.  his cr increase to 2.7 from 2 on the discharge. Patient was admitted multiple time recently for same problem. On last admission through this is related to HRS.      Patient has no complains. At time of interview he denied any pain, headache sore throat, SOB, chest pain, abdominal pain, N/V, constipation or diarrhea. He seems not knowing the medication he is taking, including the lactulose. Noted BLEE he stated it seems worsen overtime. He reports he has not been to the hospital in like a month. He was discharge about 4 days ago.      On last admission, nephrology was on board, patient was treated for HRS with Continue albumin + octreotide + midodrine. Diuresis stopped. For metabolic acidosis he was started on NS bicarb 1300 tid

## 2020-10-14 NOTE — PLAN OF CARE
10/14/20 1529   Readmission Questionnaire   At the time of your discharge, did someone talk to you about what your health problems were? Yes   At the time of discharge, did someone talk to you about what to watch out for regarding worsening of your health problem? Yes   At the time of discharge, did someone talk to you about what to do if you experienced worsening of your health problem? Yes   At the time of discharge, did someone talk to you about which medication to take when you left the hospital and which ones to stop taking? Yes   At the time of discharge, did someone talk to you about when and where to follow up with a doctor after you left the hospital? Yes   What do you believe caused you to be sick enough to be re-admitted? edema; SOB   How often do you need to have someone help you when you read instructions, pamphlets, or other written material from your doctor or pharmacy? Sometimes   Do you have problems taking your medications as prescribed? No   Do you have any problems affording any of  your prescribed medications? No   Do you have problems obtaining/receiving your medications? No   Does the patient have transportation to healthcare appointments? Yes   Lives With spouse   Living Arrangements house   Does the patient have family/friends to help with healtcare needs after discharge? yes   Who are your caregiver(s) and their phone number(s)? spouse - Dee Dee [phone]; sister - Leonela [phone]   Does your caregiver provide all the help you need? Yes   Are you currently feeling confused? No   Are you currently having problems thinking? No   Are you currently having memory problems? No

## 2020-10-14 NOTE — CONSULTS
[facility]  Hepatology  Consult/progress note    Patient Name: Kenneth Pardo  MRN: [id number]  Admission Date: 10/13/2020  Hospital Length of Stay: 1 days  Code Status: Full Code   Attending Provider: Ariel Rubio MD   Consulting Provider: Reinaldo Molina MD  Primary Care Physician: Neva Nielsen MD  Principal Problem:Acute kidney injury superimposed on chronic kidney disease    Inpatient consult to Hepatology  Consult performed by: Reinaldo Molina MD  Consult ordered by: Jonathon Cortez MD        Subjective:     HPI: Kenneth Pardo is a 60 y.o. male with history of alcoholic cirrhosis decompensated with ascites and hepatic encephalopathy, no varices on endoscopy on 07/31/2020, patient recently deferred for liver transplant on 09/16/2020 due to significant improvement, concern on patient's recent ultrasound from 09/12/2020 for right femoral vein DVT may need Doppler to assess.  Patient was told to come to ER by Dr. Heladio Madison due to rising creatinine since recent discharge after management of HRS. Patient denies fevers, chills, night sweats, abdominal pain.         Past Medical History:   Diagnosis Date    Hepatitis     Hypertension     Liver failure        Past Surgical History:   Procedure Laterality Date    COLONOSCOPY N/A 9/17/2020    Procedure: COLONOSCOPY;  Surgeon: Jhonathan Lieberman MD;  Location: [address];  Service: Endoscopy;  Laterality: N/A;    ESOPHAGOGASTRODUODENOSCOPY N/A 7/31/2020    Procedure: EGD (ESOPHAGOGASTRODUODENOSCOPY);  Surgeon: Jhonathan Lieberman MD;  Location: [address]);  Service: Endoscopy;  Laterality: N/A;       No family history on file.    Social History     Socioeconomic History    Marital status:      Spouse name: Not on file    Number of children: Not on file    Years of education: Not on file    Highest education level: Not on file   Occupational History    Not on file   Social Needs    Financial resource strain: Not on file     Food insecurity     Worry: Not on file     Inability: Not on file    Transportation needs     Medical: Not on file     Non-medical: Not on file   Tobacco Use    Smoking status: Never Smoker    Smokeless tobacco: Never Used   Substance and Sexual Activity    Alcohol use: Not Currently     Frequency: Never    Drug use: Not Currently    Sexual activity: Not on file   Lifestyle    Physical activity     Days per week: Not on file     Minutes per session: Not on file    Stress: Not on file   Relationships    Social connections     Talks on phone: Not on file     Gets together: Not on file     Attends [employer] service: Not on file     Active member of club or organization: Not on file     Attends meetings of clubs or organizations: Not on file     Relationship status: Not on file   Other Topics Concern    Not on file   Social History Narrative    Not on file       No current facility-administered medications on file prior to encounter.      Current Outpatient Medications on File Prior to Encounter   Medication Sig Dispense Refill    apixaban (ELIQUIS) 5 mg Tab Take 1 tablet (5 mg total) by mouth 2 (two) times daily. 70 tablet 2    ciprofloxacin HCl (CIPRO) 500 MG tablet Take 1 tablet (500 mg total) by mouth once daily. 30 tablet 2    escitalopram oxalate (LEXAPRO) 5 MG Tab Take 1 tablet (5 mg total) by mouth once daily. 30 tablet 2    lactulose (CHRONULAC) 10 gram/15 mL solution Take 15 mLs (10 g total) by mouth 2 (two) times a day. Take enough to have 3 bowel movements per day 900 mL 2    midodrine (PROAMATINE) 5 MG Tab Take 3 tablets (15 mg total) by mouth every 8 (eight) hours. 270 tablet 2    omeprazole (PRILOSEC) 40 MG capsule Take 1 capsule (40 mg total) by mouth every morning. 30 capsule 11    rOPINIRole (REQUIP) 0.25 MG tablet Take 1 tablet (0.25 mg total) by mouth every evening. 30 tablet 2    traZODone (DESYREL) 100 MG tablet Take 1 tablet (100 mg total) by mouth every evening. (Patient  taking differently: Take  mg by mouth every evening. ) 30 tablet 2    valACYclovir (VALTREX) 1000 MG tablet Take 1 tablet (1,000 mg total) by mouth every 8 (eight) hours. for 7 days 21 tablet 0       Review of patient's allergies indicates:  No Known Allergies    Review of Systems   Constitutional: Negative for chills, fever and weight loss.   HENT: Negative for hearing loss and sore throat.    Eyes: Negative for blurred vision and double vision.   Respiratory: Negative for cough and shortness of breath.    Cardiovascular: Negative for chest pain and leg swelling.   Gastrointestinal: Negative for abdominal pain, nausea and vomiting.   Genitourinary: Negative for dysuria and urgency.   Musculoskeletal: Negative for joint pain and myalgias.   Skin: Negative for itching and rash.   Neurological: Negative for dizziness and loss of consciousness.        Objective:     Vitals:    10/14/20 1300   BP:    Pulse: 100   Resp:    Temp:          Physical Exam  Vitals signs reviewed.   Constitutional:       General: He is not in acute distress.     Appearance: Normal appearance. He is not diaphoretic.   HENT:      Head: Normocephalic and atraumatic.   Eyes:      General: No scleral icterus.     Conjunctiva/sclera: Conjunctivae normal.   Neck:      Musculoskeletal: Neck supple.   Cardiovascular:      Rate and Rhythm: Normal rate and regular rhythm.   Pulmonary:      Effort: Pulmonary effort is normal.      Breath sounds: Normal breath sounds.   Abdominal:      General: Bowel sounds are normal. There is no distension.      Palpations: Abdomen is soft. There is no mass.      Tenderness: There is no abdominal tenderness. There is no guarding or rebound.   Skin:     General: Skin is warm and dry.      Comments: Shingles rash present   Neurological:      Mental Status: He is alert and oriented to person, place, and time.          Significant Labs:  Recent Labs   Lab 10/12/20  1700 10/14/20  0045 10/14/20  0542   HGB 11.2* 11.5*  9.1*       Lab Results   Component Value Date    WBC 10.11 10/14/2020    HGB 9.1 (L) 10/14/2020    HCT 27.8 (L) 10/14/2020    MCV 99 (H) 10/14/2020     10/14/2020       Lab Results   Component Value Date     (L) 10/14/2020    K 4.6 10/14/2020     10/14/2020    CO2 14 (L) 10/14/2020    BUN 39 (H) 10/14/2020    CREATININE 2.6 (H) 10/14/2020    CALCIUM 8.3 (L) 10/14/2020    ANIONGAP 12 10/14/2020    ESTGFRAFRICA 30.1 (A) 10/14/2020    EGFRNONAA 26.0 (A) 10/14/2020       Lab Results   Component Value Date    ALT 18 10/14/2020    AST 33 10/14/2020     (H) 10/14/2020    ALKPHOS 135 10/14/2020    BILITOT 0.8 10/14/2020       Lab Results   Component Value Date    INR 1.2 10/14/2020    INR 1.1 10/14/2020    INR 1.3 (H) 10/12/2020           Significant Imaging:  Reviewed pertinent radiology findings.       Assessment/Plan:       MELD-Na score: 22 at 10/14/2020  5:42 AM  MELD score: 18 at 10/14/2020  5:42 AM  Calculated from:  Serum Creatinine: 2.6 mg/dL at 10/14/2020  5:42 AM  Serum Sodium: 131 mmol/L at 10/14/2020  5:42 AM  Total Bilirubin: 0.8 mg/dL (Rounded to 1 mg/dL) at 10/14/2020  5:42 AM  INR(ratio): 1.2 at 10/14/2020  5:42 AM  Age: 60 years 2 months    Problem List:  1. Alcoholic cirrhosis decompensated with ascites and hepatic encephalopathy  2. MERLY/HRS    Plan:  1. Nephrology on board recommend albumin b.i.d., octreotide, midodrine.  2. Lactulose titrated to 3 bowel movements per day, add rifaximin 550 daily.  3. Will reopen transplant evaluation and present possibly next week    Please obtain daily CBC, BMP, LFT, INR  Thank you for involving us in the care of Kenneth Pardo. Please call with any additional questions, concerns or changes in the patient's clinical status.    Reinaldo R Nemakayala, MD  Gastroenterology Fellow PGY IV   [facility]

## 2020-10-14 NOTE — PT/OT/SLP EVAL
"Occupational Therapy   Evaluation    Name: Kenneth Pardo  MRN: [id number]  Admitting Diagnosis:  Acute kidney injury superimposed on chronic kidney disease       Co-eval with PT to safely assess pt's activity tolerance     Recommendations:     Discharge Recommendations: home  Discharge Equipment Recommendations:  none  Barriers to discharge:  None    Assessment:     Kenneth Pardo is a 60 y.o. male with a medical diagnosis of Acute kidney injury superimposed on chronic kidney disease.  Pt with good participation and activity tolerance during session.  He is functioning near his baseline and would benefit from continued acute OT services to prevent deconditioning.  He presents with the following deficits. Performance deficits affecting function: weakness, impaired endurance, impaired self care skills, impaired functional mobilty, gait instability, impaired balance, decreased safety awareness.      Rehab Prognosis: Good; patient would benefit from acute skilled OT services to address these deficits and reach maximum level of function.       Plan:     Patient to be seen 3 x/week to address the above listed problems via self-care/home management, therapeutic activities, therapeutic exercises  · Plan of Care Expires: 11/14/20  · Plan of Care Reviewed with: patient    Subjective   "Once I'm on my feet I'm good."  "One of my dogs passed away last night."   Chief Complaint: back/neck discomfort  Patient/Family Comments/goals: to return home    Occupational Profile:  Living Environment: Pt lives with his wife and 17-year-old son in a trailer with 4-5 GERRY and RHR.  He has a tub/shower combo and standard toilet with no DME.  Pt reports multiple falls but states it's not a concern to him.  Previous level of function: Per pt, he needs assistance from his wife with bed mobility and with lower body dressing, but can perform other ADLs IND.  Pt states he has been wearing an adult brief at home (had one on during assessment).  Pt is IND " "with functional mobility.  Pt does not drive.  Roles and Routines: father, ; pt does not work; pt enjoys playing [employer].  Pt states current family income is from his son, who works at [employer].   Equipment Used at Home:  other (see comments)(Pt with a straight cane in his room, but he states he does not use it due to difficulty ambulating with it.)  Assistance upon Discharge: His family    Pain/Comfort:  Pain Rating 1: other (see comments)(Pt reported "back and neck discomfort but not pain.")  Pain Rating Post-Intervention 1: other (see comments)(see above)    Patients cultural, spiritual, Islam conflicts given the current situation: no    Objective:     Communicated with: RN and PT prior to session.  Patient found HOB elevated with telemetry, peripheral IV, blood pressure cuff, pulse ox (continuous) upon OT entry to room.    General Precautions: Standard, airborne, contact, fall, other (see comments)(Pt was not airborne/contact precautions at time of evaluation.  Nursing informed therapist that pt has since tested positive for shingles.)   Orthopedic Precautions:N/A   Braces: N/A     Occupational Performance:    Bed Mobility:    · Patient completed Rolling/Turning to Left with  stand by assistance  · Patient completed Scooting/Bridging with stand by assistance  · Patient completed Supine to Sit with stand by assistance    Functional Mobility/Transfers:  · Patient completed Sit <> Stand Transfer from EOB with stand by assistance  with  no assistive device   · Patient completed Bed <> Chair Transfer using Step Transfer technique with stand by assistance with no assistive device  · Functional Mobility: Pt ambulated a functional household distance in his room (~40 ft) with SBA with no AD.  Pt with no LOB and required no rest break.    Activities of Daily Living:  · Feeding:  setup assistance of tray table to drink his water while UIC   · Lower Body Dressing: maximal assistance to maksim B socks while " sitting EOB  · When asked pt if he had been up to the bathroom, he said he has been able to use it 3 times by himself.  He said he had no problems during 2 of the occasions but said he had an accident (BM) during the other occasion.    Cognitive/Visual Perceptual:  Cognitive/Psychosocial Skills:     -       Oriented to: Person, Place, Time and Situation.  Pt knew who the President was.  -       Follows Commands/attention:Follows one-step commands  -       Communication: clear/fluent    Physical Exam:  Dominant hand:    -       R-handed  Upper Extremity Range of Motion:     -       Right Upper Extremity: WFL  -       Left Upper Extremity: WFL  Upper Extremity Strength:    -       Right Upper Extremity: WFL  -       Left Upper Extremity: WFL   Strength:    -       Right Upper Extremity: WFL  -       Left Upper Extremity: WFL  Fine Motor Coordination:    -       Intact  Left hand, finger to nose, Right hand, finger to nose, Left hand thumb/finger opposition skills and Right hand thumb/finger opposition skills    AMPAC 6 Click ADL:  AMPAC Total Score: 20    Treatment & Education:  - Pt edu on role of OT, POC, safety when performing self care tasks, benefit of performing OOB activity, and safety when performing functional transfers and mobility.    -White board updated     - Self care tasks completed-- as noted above     Education:    Patient left up in chair with all lines intact and call button in reach    GOALS:   Multidisciplinary Problems     Occupational Therapy Goals        Problem: Occupational Therapy Goal    Goal Priority Disciplines Outcome Interventions   Occupational Therapy Goal     OT, PT/OT Ongoing, Progressing    Description: Goals to be met by: 10/28/2020     Patient will increase functional independence with ADLs by performing:    UE Dressing with Modified [place].  LE Dressing with Minimal Assistance.  Grooming while standing at sink with Modified [place].  Toileting from toilet with  Modified [place] for hygiene and clothing management.   Supine to sit with Modified [place].  Sit to stand transfer with Modified [place]  Toilet transfer to toilet with Modified [place].                     History:     Past Medical History:   Diagnosis Date    Hepatitis     Hypertension     Liver failure          Past Surgical History:   Procedure Laterality Date    COLONOSCOPY N/A 9/17/2020    Procedure: COLONOSCOPY;  Surgeon: Jhonathan Lieberman MD;  Location: [address]);  Service: Endoscopy;  Laterality: N/A;    ESOPHAGOGASTRODUODENOSCOPY N/A 7/31/2020    Procedure: EGD (ESOPHAGOGASTRODUODENOSCOPY);  Surgeon: Jhonathan Lieberman MD;  Location: [address]);  Service: Endoscopy;  Laterality: N/A;       Time Tracking:     OT Date of Treatment: 10/14/20  OT Start Time: 0935  OT Stop Time: 0955  OT Total Time (min): 20 min    Billable Minutes:Evaluation 10 min.  Therapeutic Activity 10 min.    Lj Law, OT  10/14/2020

## 2020-10-14 NOTE — H&P
[facility] - ICU 14 [facility] Medicine  History & Physical    Patient Name: Kenneth Pardo  MRN: [id number]  Admission Date: 10/13/2020  Attending Physician: Ivan Horn MD   Primary Care Provider: Neva Nielsen MD         Patient information was obtained from patient, past medical records and ER records.     Subjective:     Principal Problem:Acute kidney injury superimposed on chronic kidney disease    Chief Complaint: No chief complaint on file.       HPI: Mr Pardo is a 59 yo M with PMHx of HTN, EtOH cirrhosis complicated by portal hypertension, ascites, portal hypertensive gastropathy, and hepatic encephalopathy who presents to the hospital after being contacted about abnormal lab result.  his cr increase to 2.7 from 2 on the discharge. Patient was admitted multiple time recently for same problem. On last admission through this is related to HRS.      Patient has no complains. At time of interview he denied any pain, headache sore throat, SOB, chest pain, abdominal pain, N/V, constipation or diarrhea. He seems not knowing the medication he is taking, including the lactulose. Noted BLEE he stated it seems worsen overtime. He reports he has not been to the hospital in like a month. He was discharge about 4 days ago.      On last admission, nephrology was on board, patient was treated for HRS with Continue albumin + octreotide + midodrine. Diuresis stopped. For metabolic acidosis he was started on NS bicarb 1300 tid      Past Medical History:   Diagnosis Date    Hepatitis     Hypertension     Liver failure        Past Surgical History:   Procedure Laterality Date    COLONOSCOPY N/A 9/17/2020    Procedure: COLONOSCOPY;  Surgeon: Jhonathan Lieberman MD;  Location: [address]);  Service: Endoscopy;  Laterality: N/A;    ESOPHAGOGASTRODUODENOSCOPY N/A 7/31/2020    Procedure: EGD (ESOPHAGOGASTRODUODENOSCOPY);  Surgeon: Jhonathan Lieberman MD;  Location: [address]);  Service: Endoscopy;  Laterality:  N/A;       Review of patient's allergies indicates:  No Known Allergies    No current facility-administered medications on file prior to encounter.      Current Outpatient Medications on File Prior to Encounter   Medication Sig    apixaban (ELIQUIS) 5 mg Tab Take 1 tablet (5 mg total) by mouth 2 (two) times daily.    ciprofloxacin HCl (CIPRO) 500 MG tablet Take 1 tablet (500 mg total) by mouth once daily.    escitalopram oxalate (LEXAPRO) 5 MG Tab Take 1 tablet (5 mg total) by mouth once daily.    lactulose (CHRONULAC) 10 gram/15 mL solution Take 15 mLs (10 g total) by mouth 2 (two) times a day. Take enough to have 3 bowel movements per day    midodrine (PROAMATINE) 5 MG Tab Take 3 tablets (15 mg total) by mouth every 8 (eight) hours.    omeprazole (PRILOSEC) 40 MG capsule Take 1 capsule (40 mg total) by mouth every morning.    rOPINIRole (REQUIP) 0.25 MG tablet Take 1 tablet (0.25 mg total) by mouth every evening.    traZODone (DESYREL) 100 MG tablet Take 1 tablet (100 mg total) by mouth every evening. (Patient taking differently: Take  mg by mouth every evening. )    valACYclovir (VALTREX) 1000 MG tablet Take 1 tablet (1,000 mg total) by mouth every 8 (eight) hours. for 7 days     Family History     None        Tobacco Use    Smoking status: Never Smoker    Smokeless tobacco: Never Used   Substance and Sexual Activity    Alcohol use: Not Currently     Frequency: Never    Drug use: Not Currently    Sexual activity: Not on file     Review of Systems   Constitutional: Positive for chills. Negative for activity change, appetite change and fever.   HENT: Negative for congestion and trouble swallowing.    Eyes: Negative for visual disturbance.   Respiratory: Negative for cough, shortness of breath and wheezing.    Cardiovascular: Positive for leg swelling. Negative for chest pain.   Gastrointestinal: Positive for abdominal distention. Negative for abdominal pain, constipation, diarrhea, nausea and  vomiting.   Endocrine: Negative for polyuria.   Genitourinary: Negative for dysuria.   Musculoskeletal: Negative for back pain.   Skin: Negative for color change.   Neurological: Negative for light-headedness and headaches.   Psychiatric/Behavioral: Positive for confusion.     Objective:     Vital Signs (Most Recent):    Vital Signs (24h Range):  Temp:  [98.1 °F (36.7 °C)] 98.1 °F (36.7 °C)  Pulse:  [] 98  Resp:  [18] 18  SpO2:  [98 %-100 %] 98 %  BP: (103-127)/(59-73) 112/62        There is no height or weight on file to calculate BMI.    Physical Exam  Constitutional:       Appearance: He is obese.   HENT:      Head: Normocephalic.      Mouth/Throat:      Mouth: Mucous membranes are moist.   Eyes:      Pupils: Pupils are equal, round, and reactive to light.   Cardiovascular:      Rate and Rhythm: Normal rate.   Pulmonary:      Effort: Pulmonary effort is normal. No respiratory distress.      Breath sounds: No wheezing.   Abdominal:      General: Bowel sounds are normal. There is distension.      Tenderness: There is no abdominal tenderness. There is no guarding.   Musculoskeletal:      Right lower leg: Edema present.      Left lower leg: Edema present.      Comments: Bilateral WILLIE up to the knee    Skin:     General: Skin is warm.   Neurological:      Mental Status: He is alert and oriented to person, place, and time.      Comments: He is oriented to self, time and place    Psychiatric:         Mood and Affect: Mood normal.           CRANIAL NERVES     CN III, IV, VI   Pupils are equal, round, and reactive to light.       Significant Labs:   CBC:   Recent Labs   Lab 10/12/20  1007 10/12/20  1700   WBC 12.41 11.70   HGB 11.0* 11.2*   HCT 32.4* 33.1*    177     CMP:   Recent Labs   Lab 10/12/20  1007 10/12/20  1700 10/12/20  2024   * 134* 134*   K 4.2 3.8 4.4    104 108   CO2 19* 16* 15*   GLU 92 108 120*   BUN 43* 44* 42*   CREATININE 3.0* 3.0* 2.7*   CALCIUM 9.1 8.9 8.0*   PROT 6.2 6.1  --     ALBUMIN 3.6 3.6  --    BILITOT 0.9 0.9  --    ALKPHOS 156* 151*  --    AST 30 30  --    ALT 23 23  --    ANIONGAP 11 14 11   EGFRNONAA 21.9* 21.9* 24.9*     Coagulation:   Recent Labs   Lab 10/12/20  1700   INR 1.3*   APTT 35.5*     All pertinent labs within the past 24 hours have been reviewed.    Significant Imaging: I have reviewed and interpreted all pertinent imaging results/findings within the past 24 hours.    Assessment/Plan:     * Acute kidney injury superimposed on chronic kidney disease  Mr Pardo is a 61 yo M with PMHx of HTN, EtOH cirrhosis complicated by portal hypertension, ascites, portal hypertensive gastropathy, and hepatic encephalopathy who presents to the hospital after being contacted about abnormal lab result.  his cr increase to 2.7 from 2 on the discharge. Patient was admitted multiple time recently for same problem. On last admission through this is related to HRS.      Baseline Cr: 0.6  At presentation Cr: 3>> after bolus IVF>> 2.7>> worsen to 3      Ddx, Pre-renal: Hypovolemia, Renal vasoconstriction ( HRS)  Vs Intra-marycruz: ATN, AIN, Small-med vessel vs glomerulonephritis   Vs Post Renal causes    Plan:   - Given worsen renal function will start treatment for HRS with Octreotide, midodrine and albumin, will re-assess need for vasopressor   - Will resume NA bicarb 1300 mg TID for worsen metabolic acidosis   - Trend with Daily BMP   - Follow Urine Studies: UA reflex to cultures, Urine NA, K, BUN, osm   - Monitor electrolytes closely, MG, Phos, K, replete at needed cautiously   - will obtain renal US to assess for Post renal causes, obstruction, hydronephrosis, follow the result  - Renal dose of meds   - Optimize hemodynamic MAP and CO >> resume home midodrine  - Strict Is/Os  - Avoid: NSAID, ACEI, ARBs, Contrasts   - follow Microalbumine/cr ratio   - Nephrology consulted appreciate recs       Ascites due to alcoholic cirrhosis  Patient was discharge of Ciprofloxacin for SBP ppx  Patient  stated he feels his abdomen enlarging   He was presented to transplant board last month, patient was deferred at this time due to need for AC for DVT.     Plan:   - Will obtain US>> if ascites present consult IR for Paracentesis  - Started Ceftriaxone for SBP ppx    - Low sodium diet       Metabolic acidosis  Lactate 2.6  Will resume  Na bicarb 1300 mg TID   Will give bolus IVF   Trend lactate      Right leg DVT  Resume Apixaban         Debility  PT/OT      Hyponatremia  Likely related to liver cirrhosis   Plan:   - Follow Urine studies to further determine the cause         Hypotension  Continue Midodrine       Macrocytic anemia  Plan:   - Daily CBC   - follow folate, b12 and iron panel         chronic hepatic encephalopathy  Patient Alert, O*3 but seem incoherent in his answer, not aware if he is taken him home lactulose as prescribe   Plan:   - Resume lactulose 15 mg TID>> titrate to 3-4 BM daily         Anxiety  Continue home Citalopram         VTE Risk Mitigation (From admission, onward)         Ordered     apixaban tablet 5 mg  2 times daily      10/14/20 0042     IP VTE HIGH RISK PATIENT  Once      10/14/20 0016     Place sequential compression device  Until discontinued      10/14/20 0016                   Shilpa Granados MD  Internal Medicine Department, PGY-II  [facility]  [phone]

## 2020-10-14 NOTE — PLAN OF CARE
Problem: Occupational Therapy Goal  Goal: Occupational Therapy Goal  Description: Goals to be met by: 10/28/2020     Patient will increase functional independence with ADLs by performing:    UE Dressing with Modified [place].  LE Dressing with Minimal Assistance.  Grooming while standing at sink with Modified [place].  Toileting from toilet with Modified [place] for hygiene and clothing management.   Supine to sit with Modified [place].  Sit to stand transfer with Modified [place]  Toilet transfer to toilet with Modified [place].    Outcome: Ongoing, Progressing    Pt evaluated and OT goals established.    Lj Law, RANJEET   10/14/2020

## 2020-10-14 NOTE — ASSESSMENT & PLAN NOTE
Mr Edvin is a 59 yo M with PMHx of HTN, EtOH cirrhosis complicated by portal hypertension, ascites, portal hypertensive gastropathy, and hepatic encephalopathy who presents to the hospital after being contacted about abnormal lab result.  his cr increase to 2.7 from 2 on the discharge. Patient was admitted multiple time recently for same problem. On last admission through this is related to HRS.      Baseline Cr: 0.6  At presentation Cr: 3>> after bolus IVF>> 2.7>> worsen to 3      Ddx, Pre-renal: Hypovolemia, Renal vasoconstriction ( HRS)  Vs Intra-marycruz: ATN, AIN, Small-med vessel vs glomerulonephritis   Vs Post Renal causes    Plan:   - Given worsen renal function will start treatment for HRS with Octreotide, midodrine and albumin, will re-assess need for vasopressor   - Will resume NA bicarb 1300 mg TID for worsen metabolic acidosis   - Trend with Daily BMP   - Follow Urine Studies: UA reflex to cultures, Urine NA, K, BUN, osm   - Monitor electrolytes closely, MG, Phos, K, replete at needed cautiously   - will obtain renal US to assess for Post renal causes, obstruction, hydronephrosis, follow the result  - Renal dose of meds   - Optimize hemodynamic MAP and CO >> resume home midodrine  - Strict Is/Os  - Avoid: NSAID, ACEI, ARBs, Contrasts   - follow Microalbumine/cr ratio   - Nephrology consulted appreciate recs

## 2020-10-14 NOTE — PLAN OF CARE
Problem: Physical Therapy Goal  Goal: Physical Therapy Goal  Description: Goals to be met by: 10/24/20     Patient will increase functional independence with mobility by performin. Supine to sit with [place]  2. Sit to supine with [place]  3. Sit to stand transfer with [place]  4. Gait  x 100 feet with Modified [place] using LRAD, if needed.   5. Ascend/descend 4 stairs with Stand-by Assistance.     Outcome: Ongoing, Progressing  Initial evaluation completed. Patient tolerated assessment well. Established POC and goals. Patient would continue to benefit from skilled PT services in order to improve functional mobility independence.     Sindi Lozoya, PT, DPT  10/14/2020

## 2020-10-14 NOTE — SUBJECTIVE & OBJECTIVE
Past Medical History:   Diagnosis Date    Hepatitis     Hypertension     Liver failure        Past Surgical History:   Procedure Laterality Date    COLONOSCOPY N/A 9/17/2020    Procedure: COLONOSCOPY;  Surgeon: Jhonathan Lieberman MD;  Location: [facility] ([address] [place] [address]);  Service: Endoscopy;  Laterality: N/A;    ESOPHAGOGASTRODUODENOSCOPY N/A 7/31/2020    Procedure: EGD (ESOPHAGOGASTRODUODENOSCOPY);  Surgeon: Jhonathan Lieberman MD;  Location: [facility] ([address] [place] [address]);  Service: Endoscopy;  Laterality: N/A;       Review of patient's allergies indicates:  No Known Allergies  Current Facility-Administered Medications   Medication Frequency    [START ON 10/15/2020] albumin human 25% bottle 25 g Daily    apixaban tablet 5 mg BID    cefTRIAXone injection 1 g Q24H    escitalopram oxalate tablet 5 mg Daily    lactulose 20 gram/30 mL solution Soln 15 g TID    midodrine tablet 15 mg Q8H    octreotide injection 100 mcg Q8H    pantoprazole EC tablet 40 mg Daily    rOPINIRole tablet 0.25 mg QHS    sodium bicarbonate tablet 1,300 mg TID    sodium chloride 0.9% flush 10 mL PRN    valACYclovir tablet 1,000 mg Q12H     Family History     None        Tobacco Use    Smoking status: Never Smoker    Smokeless tobacco: Never Used   Substance and Sexual Activity    Alcohol use: Not Currently     Frequency: Never    Drug use: Not Currently    Sexual activity: Not on file     Review of Systems   Constitutional: Negative.    HENT: Negative.    Respiratory: Negative.    Cardiovascular: Positive for leg swelling. Negative for chest pain.   Gastrointestinal: Positive for abdominal distention.   Genitourinary: Negative.      Objective:     Vital Signs (Most Recent):  Temp: 98 °F (36.7 °C) (10/14/20 0500)  Pulse: 94 (10/14/20 1035)  Resp: 18 (10/14/20 0500)  BP: 112/78 (10/14/20 0500)  SpO2: 100 % (10/13/20 2300)  O2 Device (Oxygen Therapy): room air (10/14/20 0500) Vital Signs (24h Range):  Temp:  [98 °F (36.7 °C)-98.2 °F (36.8 °C)] 98 °F (36.7  °C)  Pulse:  [] 94  Resp:  [18-22] 18  SpO2:  [98 %-100 %] 100 %  BP: ()/(58-78) 112/78     Weight: 88 kg (194 lb 0.1 oz) (10/14/20 1117)  Body mass index is 34.38 kg/m².  Body surface area is 1.98 meters squared.    No intake/output data recorded.    Physical Exam  Vitals signs and nursing note reviewed.   Constitutional:       General: He is not in acute distress.  Neck:      Musculoskeletal: Normal range of motion.   Cardiovascular:      Rate and Rhythm: Normal rate and regular rhythm.   Pulmonary:      Effort: Pulmonary effort is normal. No respiratory distress.      Breath sounds: No wheezing or rhonchi.   Abdominal:      General: There is distension.   Musculoskeletal:      Right lower leg: Edema present.      Left lower leg: Edema present.   Skin:     Comments: Abdomen LUQ crusted vesicles with a trajectory towards left flank   Neurological:      General: No focal deficit present.      Mental Status: He is alert.         Significant Labs:  ABGs:   Recent Labs   Lab 10/12/20  2122   PH 7.206*     CBC:   Recent Labs   Lab 10/14/20  0542   WBC 10.11   RBC 2.81*   HGB 9.1*   HCT 27.8*      MCV 99*   MCH 32.4*   MCHC 32.7     CMP:   Recent Labs   Lab 10/14/20  0542   GLU 94   CALCIUM 8.3*   ALBUMIN 3.0*   PROT 4.9*   *   K 4.6   CO2 14*      BUN 39*   CREATININE 2.6*   ALKPHOS 135   ALT 18   AST 33   BILITOT 0.8     Coagulation:   Recent Labs   Lab 10/12/20  1700  10/14/20  0542   INR 1.3*   < > 1.2   APTT 35.5*  --   --     < > = values in this interval not displayed.     LFTs:   Recent Labs   Lab 10/14/20  0542   ALT 18   AST 33   ALKPHOS 135   BILITOT 0.8   PROT 4.9*   ALBUMIN 3.0*     All labs within the past 24 hours have been reviewed.    Significant Imaging:     US: Reviewed  Renal US 10/14: Personally reviewed

## 2020-10-14 NOTE — PT/OT/SLP EVAL
Physical Therapy Evaluation    Patient Name:  Kenneth Pardo   MRN:  [id number]    Recommendations:     Discharge Recommendations:  home   Discharge Equipment Recommendations: (TBD pending progress)   Barriers to discharge: None    Assessment:     Kenneth Pardo is a 60 y.o. male admitted with a medical diagnosis of Acute kidney injury superimposed on chronic kidney disease.  He presents with the following impairments/functional limitations:  weakness, impaired endurance, impaired self care skills, impaired functional mobilty, gait instability, impaired balance, decreased coordination . Patient tolerated session well. He reports feeling steady on his feet. Patient will benefit from PT services to address the mentioned deficits in order to promote an improve functional mobility status. Upon d/c, rec of home with no PT upon d/c.     Rehab Prognosis: Good; patient would benefit from acute skilled PT services to address these deficits and reach maximum level of function.    Recent Surgery: * No surgery found *      Plan:     During this hospitalization, patient to be seen 3 x/week to address the identified rehab impairments via gait training, therapeutic activities, therapeutic exercises, neuromuscular re-education and progress toward the following goals:    · Plan of Care Expires:  11/13/20    History:     Past Medical History:   Diagnosis Date    Hepatitis     Hypertension     Liver failure        Past Surgical History:   Procedure Laterality Date    COLONOSCOPY N/A 9/17/2020    Procedure: COLONOSCOPY;  Surgeon: Jhonathan Lieberman MD;  Location: [address]);  Service: Endoscopy;  Laterality: N/A;    ESOPHAGOGASTRODUODENOSCOPY N/A 7/31/2020    Procedure: EGD (ESOPHAGOGASTRODUODENOSCOPY);  Surgeon: Jhonathan Lieberman MD;  Location: [address]);  Service: Endoscopy;  Laterality: N/A;       Subjective     Chief Complaint: weakness   Patient/Family Comments/goals: to ho home   Pain/Comfort:  · Pain Rating 1: 0/10  · Pain  Rating Post-Intervention 1: 0/10    Patients cultural, spiritual, Gnosticism conflicts given the current situation: no    Living Environment:  Patient's living environment is as follows:  Home type Trailer    1 or 2 stories  SS   Number of GERRY/ rails 4-5 GERRY (no handrails)    AD used?/Owned?  SPC   Bathroom set-up  Tub/shower   Working? no   Driving? no   Individuals living with patient:  Wife, son    Hobbies/Roles: Playing guitar    Prior to admission, patients level of function was Mod(I) with SPC as needed and assistance as needed for ADLs from his wife for LBD and mobility.  Equipment used at home: cane, straight.  DME owned (not currently used): none.  Upon discharge, patient will have assistance from wife.    Objective:     Communicated with RN prior to session.  Patient found HOB elevated with telemetry, pulse ox (continuous), peripheral IV  upon PT entry to room. See below for detailed functional assessment. Patient agreeable to participate in initial evaluation.    General Precautions: Standard, fall   Orthopedic Precautions:N/A   Braces: N/A     Exams:  · Cognitive Exam:  Patient is oriented to Person, Place, Time and Situation  · Postural Exam:  Patient presented with the following abnormalities: -       Rounded shoulders  · -       Forward head  · Sensation:    LEFT LE: -       Intact  light/touch LE RIGHT LE:-       Intact  light/touch LE      ROM and Strength   Right Lower Extremity  Left Lower Extremity    Hip Flexion WFL WFL   Knee Extension  WFL WFL   Knee Flexion  WFL WFL   Ankle DF WFL WFL   Ankle PF  WFL WFL     Functional Mobility:  · Bed Mobility:     · Scooting: stand by assistance  · Supine to Sit: stand by assistance  · Transfers:     · Sit to Stand:  stand by assistance with no AD  · Gait: 40ft with SBA and no AD  ·  mildly unsteady, no LOB   ·  decreased lilliam, swing through gait pattern   · Balance: sitting EOB- Sup; static standing - SBA; dynamic standing - SBA     Therapeutic  Activities and Exercises:  -Patient educated on the role and goal of PT services during acute care LOS. Question and concerns acknowledged and answered as appropriate.   -Will continue to educated as needed.    -White board updated in patients room to reflect level of assistance needed with nursing. RNx1 - ambulation and transfers     AM-PAC 6 CLICK MOBILITY  Total Score:23     Patient left up in chair with all lines intact, call button in reach and RN notified.  White board updated in patient room to reflect level of function with nursing.     GOALS:   Multidisciplinary Problems     Physical Therapy Goals        Problem: Physical Therapy Goal    Goal Priority Disciplines Outcome Goal Variances Interventions   Physical Therapy Goal     PT, PT/OT Ongoing, Progressing     Description: Goals to be met by: 10/24/20     Patient will increase functional independence with mobility by performin. Supine to sit with Preston  2. Sit to supine with Preston  3. Sit to stand transfer with Preston  4. Gait  x 100 feet with Modified Preston using LRAD, if needed.   5. Ascend/descend 4 stairs with Stand-by Assistance.                      Time Tracking:     PT Received On: 10/14/20  PT Start Time: 935     PT Stop Time: 955  PT Total Time (min): 20 min     Billable Minutes: Evaluation 10 and Gait Training 10      Sindi Lozoya, PT  10/14/2020

## 2020-10-14 NOTE — ASSESSMENT & PLAN NOTE
- Hypervolemic hyponatremia 2/2 to cirrhosis    Recommendations:  - Fluid restriction 1L  - Lasix 60 mg IV x 1 dose

## 2020-10-14 NOTE — ASSESSMENT & PLAN NOTE
Likely related to liver cirrhosis   Plan:   - Follow Urine studies to further determine the cause

## 2020-10-14 NOTE — HPI
A 61 y/o  male with no known history of CKD, and PMHx of HTN, EtOH cirrhosis complicated by portal hypertension, ascites, portal hypertensive gastropathy, was referred to this institution after being contacted by his PCP for abnormal lab results. Patient was recently discharged from [facility] after being admitted for decompensated hepatic cirrhosis and concerns for HRS which Nephrology was following. Cr at discharged was 2.0, yesterday on admission 3.0.  Patient admitted not following a fluid restricted diet once discharged (freely drinking water, juices, tea etc). Denies fever, chills, SOB, chest pain, N/V, problems with urination. Does complains of lower extremity edema and abdominal distention.     Nephrology consulted for management of MERLY

## 2020-10-15 PROBLEM — K92.2 UPPER GI BLEED: Status: RESOLVED | Noted: 2020-07-31 | Resolved: 2020-10-15

## 2020-10-15 PROBLEM — K70.9 ALCOHOLIC LIVER DISEASE: Status: RESOLVED | Noted: 2020-09-09 | Resolved: 2020-10-15

## 2020-10-15 PROBLEM — B02.9 HERPES ZOSTER WITHOUT COMPLICATION: Status: ACTIVE | Noted: 2020-10-15

## 2020-10-15 PROBLEM — E87.5 HYPERKALEMIA: Status: RESOLVED | Noted: 2020-09-09 | Resolved: 2020-10-15

## 2020-10-15 PROBLEM — K70.10 ACUTE ALCOHOLIC HEPATITIS: Status: RESOLVED | Noted: 2020-07-30 | Resolved: 2020-10-15

## 2020-10-15 PROBLEM — A41.9 SEPSIS, UNSPECIFIED ORGANISM: Status: RESOLVED | Noted: 2020-09-09 | Resolved: 2020-10-15

## 2020-10-15 PROBLEM — F10.21 ALCOHOL DEPENDENCE IN REMISSION: Status: ACTIVE | Noted: 2020-10-15

## 2020-10-15 PROBLEM — D62 ACUTE BLOOD LOSS ANEMIA: Status: RESOLVED | Noted: 2020-08-09 | Resolved: 2020-10-15

## 2020-10-15 LAB
ALBUMIN SERPL BCP-MCNC: 4.4 G/DL (ref 3.5–5.2)
ALBUMIN/CREAT UR: 32.8 UG/MG (ref 0–30)
ALP SERPL-CCNC: 113 U/L (ref 55–135)
ALT SERPL W/O P-5'-P-CCNC: 14 U/L (ref 10–44)
ANION GAP SERPL CALC-SCNC: 12 MMOL/L (ref 8–16)
AST SERPL-CCNC: 27 U/L (ref 10–40)
BASOPHILS # BLD AUTO: 0.14 K/UL (ref 0–0.2)
BASOPHILS NFR BLD: 1.5 % (ref 0–1.9)
BILIRUB SERPL-MCNC: 0.9 MG/DL (ref 0.1–1)
BILIRUB UR QL STRIP: NEGATIVE
BUN SERPL-MCNC: 34 MG/DL (ref 6–20)
CALCIUM SERPL-MCNC: 8.9 MG/DL (ref 8.7–10.5)
CHLORIDE SERPL-SCNC: 105 MMOL/L (ref 95–110)
CLARITY UR REFRACT.AUTO: CLEAR
CO2 SERPL-SCNC: 15 MMOL/L (ref 23–29)
COLOR UR AUTO: YELLOW
CREAT SERPL-MCNC: 2.8 MG/DL (ref 0.5–1.4)
CREAT UR-MCNC: 125 MG/DL (ref 23–375)
CREAT UR-MCNC: 125 MG/DL (ref 23–375)
DIFFERENTIAL METHOD: ABNORMAL
EOSINOPHIL # BLD AUTO: 0.6 K/UL (ref 0–0.5)
EOSINOPHIL NFR BLD: 6.2 % (ref 0–8)
ERYTHROCYTE [DISTWIDTH] IN BLOOD BY AUTOMATED COUNT: 14 % (ref 11.5–14.5)
EST. GFR  (AFRICAN AMERICAN): 27.5 ML/MIN/1.73 M^2
EST. GFR  (NON AFRICAN AMERICAN): 23.8 ML/MIN/1.73 M^2
ETHANOL UR-MCNC: <10 MG/DL
GLUCOSE SERPL-MCNC: 143 MG/DL (ref 70–110)
GLUCOSE UR QL STRIP: NEGATIVE
HCT VFR BLD AUTO: 31.2 % (ref 40–54)
HGB BLD-MCNC: 10.3 G/DL (ref 14–18)
HGB UR QL STRIP: NEGATIVE
IMM GRANULOCYTES # BLD AUTO: 0.02 K/UL (ref 0–0.04)
IMM GRANULOCYTES NFR BLD AUTO: 0.2 % (ref 0–0.5)
INR PPP: 1.3 (ref 0.8–1.2)
KETONES UR QL STRIP: NEGATIVE
LEUKOCYTE ESTERASE UR QL STRIP: NEGATIVE
LYMPHOCYTES # BLD AUTO: 1.5 K/UL (ref 1–4.8)
LYMPHOCYTES NFR BLD: 16 % (ref 18–48)
MAGNESIUM SERPL-MCNC: 1.6 MG/DL (ref 1.6–2.6)
MCH RBC QN AUTO: 32.9 PG (ref 27–31)
MCHC RBC AUTO-ENTMCNC: 33 G/DL (ref 32–36)
MCV RBC AUTO: 100 FL (ref 82–98)
MICROALBUMIN UR DL<=1MG/L-MCNC: 41 UG/ML
MONOCYTES # BLD AUTO: 0.8 K/UL (ref 0.3–1)
MONOCYTES NFR BLD: 8.7 % (ref 4–15)
NEUTROPHILS # BLD AUTO: 6.1 K/UL (ref 1.8–7.7)
NEUTROPHILS NFR BLD: 67.4 % (ref 38–73)
NITRITE UR QL STRIP: NEGATIVE
NRBC BLD-RTO: 0 /100 WBC
PH UR STRIP: 5 [PH] (ref 5–8)
PHOSPHATE SERPL-MCNC: 4 MG/DL (ref 2.7–4.5)
PLATELET # BLD AUTO: 194 K/UL (ref 150–350)
PMV BLD AUTO: 10.4 FL (ref 9.2–12.9)
POTASSIUM SERPL-SCNC: 3.8 MMOL/L (ref 3.5–5.1)
POTASSIUM UR-SCNC: 31 MMOL/L (ref 15–95)
PROT SERPL-MCNC: 6.1 G/DL (ref 6–8.4)
PROT UR QL STRIP: NEGATIVE
PROTHROMBIN TIME: 13.7 SEC (ref 9–12.5)
RBC # BLD AUTO: 3.13 M/UL (ref 4.6–6.2)
SODIUM SERPL-SCNC: 132 MMOL/L (ref 136–145)
SODIUM UR-SCNC: <20 MMOL/L (ref 20–250)
SP GR UR STRIP: 1.01 (ref 1–1.03)
URN SPEC COLLECT METH UR: NORMAL
UUN UR-MCNC: 605 MG/DL (ref 140–1050)
WBC # BLD AUTO: 9.06 K/UL (ref 3.9–12.7)

## 2020-10-15 PROCEDURE — 85025 COMPLETE CBC W/AUTO DIFF WBC: CPT | Mod: NTX

## 2020-10-15 PROCEDURE — P9047 ALBUMIN (HUMAN), 25%, 50ML: HCPCS | Mod: NTX | Performed by: STUDENT IN AN ORGANIZED HEALTH CARE EDUCATION/TRAINING PROGRAM

## 2020-10-15 PROCEDURE — 63600175 PHARM REV CODE 636 W HCPCS: Mod: NTX | Performed by: STUDENT IN AN ORGANIZED HEALTH CARE EDUCATION/TRAINING PROGRAM

## 2020-10-15 PROCEDURE — 99232 PR SUBSEQUENT HOSPITAL CARE,LEVL II: ICD-10-PCS | Mod: NTX,,, | Performed by: INTERNAL MEDICINE

## 2020-10-15 PROCEDURE — 36415 COLL VENOUS BLD VENIPUNCTURE: CPT | Mod: NTX

## 2020-10-15 PROCEDURE — 25000003 PHARM REV CODE 250: Mod: NTX | Performed by: STUDENT IN AN ORGANIZED HEALTH CARE EDUCATION/TRAINING PROGRAM

## 2020-10-15 PROCEDURE — 81003 URINALYSIS AUTO W/O SCOPE: CPT | Mod: NTX

## 2020-10-15 PROCEDURE — 99233 PR SUBSEQUENT HOSPITAL CARE,LEVL III: ICD-10-PCS | Mod: NTX,,, | Performed by: HOSPITALIST

## 2020-10-15 PROCEDURE — 63600175 PHARM REV CODE 636 W HCPCS: Mod: NTX | Performed by: HOSPITALIST

## 2020-10-15 PROCEDURE — 80053 COMPREHEN METABOLIC PANEL: CPT | Mod: NTX

## 2020-10-15 PROCEDURE — 85610 PROTHROMBIN TIME: CPT | Mod: NTX

## 2020-10-15 PROCEDURE — 99232 SBSQ HOSP IP/OBS MODERATE 35: CPT | Mod: NTX,,, | Performed by: INTERNAL MEDICINE

## 2020-10-15 PROCEDURE — 84100 ASSAY OF PHOSPHORUS: CPT | Mod: NTX

## 2020-10-15 PROCEDURE — 93005 ELECTROCARDIOGRAM TRACING: CPT | Mod: NTX

## 2020-10-15 PROCEDURE — 93010 ELECTROCARDIOGRAM REPORT: CPT | Mod: NTX,,, | Performed by: INTERNAL MEDICINE

## 2020-10-15 PROCEDURE — 25000003 PHARM REV CODE 250: Mod: NTX | Performed by: HOSPITALIST

## 2020-10-15 PROCEDURE — 99233 SBSQ HOSP IP/OBS HIGH 50: CPT | Mod: NTX,,, | Performed by: HOSPITALIST

## 2020-10-15 PROCEDURE — 11000001 HC ACUTE MED/SURG PRIVATE ROOM: Mod: NTX

## 2020-10-15 PROCEDURE — 93010 EKG 12-LEAD: ICD-10-PCS | Mod: NTX,,, | Performed by: INTERNAL MEDICINE

## 2020-10-15 PROCEDURE — 83735 ASSAY OF MAGNESIUM: CPT | Mod: NTX

## 2020-10-15 RX ORDER — FUROSEMIDE 10 MG/ML
60 INJECTION INTRAMUSCULAR; INTRAVENOUS ONCE
Status: COMPLETED | OUTPATIENT
Start: 2020-10-15 | End: 2020-10-15

## 2020-10-15 RX ORDER — CIPROFLOXACIN 250 MG/1
250 TABLET, FILM COATED ORAL EVERY 24 HOURS
Status: DISCONTINUED | OUTPATIENT
Start: 2020-10-16 | End: 2020-10-19

## 2020-10-15 RX ORDER — VALACYCLOVIR HYDROCHLORIDE 500 MG/1
1000 TABLET, FILM COATED ORAL DAILY
Status: DISCONTINUED | OUTPATIENT
Start: 2020-10-16 | End: 2020-10-15

## 2020-10-15 RX ORDER — ONDANSETRON 4 MG/1
4 TABLET, FILM COATED ORAL EVERY 8 HOURS PRN
Status: DISCONTINUED | OUTPATIENT
Start: 2020-10-15 | End: 2020-10-19 | Stop reason: HOSPADM

## 2020-10-15 RX ORDER — ACETAMINOPHEN 325 MG/1
325 TABLET ORAL EVERY 8 HOURS PRN
Status: DISCONTINUED | OUTPATIENT
Start: 2020-10-15 | End: 2020-10-19 | Stop reason: HOSPADM

## 2020-10-15 RX ORDER — MAGNESIUM SULFATE HEPTAHYDRATE 40 MG/ML
2 INJECTION, SOLUTION INTRAVENOUS ONCE
Status: COMPLETED | OUTPATIENT
Start: 2020-10-15 | End: 2020-10-15

## 2020-10-15 RX ORDER — VALACYCLOVIR HYDROCHLORIDE 500 MG/1
1000 TABLET, FILM COATED ORAL DAILY
Status: COMPLETED | OUTPATIENT
Start: 2020-10-16 | End: 2020-10-18

## 2020-10-15 RX ADMIN — LACTULOSE 15 G: 10 SOLUTION ORAL at 09:10

## 2020-10-15 RX ADMIN — SODIUM BICARBONATE 650 MG TABLET 1300 MG: at 08:10

## 2020-10-15 RX ADMIN — MIDODRINE HYDROCHLORIDE 15 MG: 5 TABLET ORAL at 09:10

## 2020-10-15 RX ADMIN — ESCITALOPRAM 5 MG: 5 TABLET, FILM COATED ORAL at 08:10

## 2020-10-15 RX ADMIN — FUROSEMIDE 60 MG: 10 INJECTION, SOLUTION INTRAVENOUS at 05:10

## 2020-10-15 RX ADMIN — MAGNESIUM SULFATE 2 G: 2 INJECTION INTRAVENOUS at 02:10

## 2020-10-15 RX ADMIN — MIDODRINE HYDROCHLORIDE 15 MG: 5 TABLET ORAL at 05:10

## 2020-10-15 RX ADMIN — OCTREOTIDE ACETATE 100 MCG: 100 INJECTION, SOLUTION INTRAVENOUS; SUBCUTANEOUS at 09:10

## 2020-10-15 RX ADMIN — OCTREOTIDE ACETATE 100 MCG: 100 INJECTION, SOLUTION INTRAVENOUS; SUBCUTANEOUS at 02:10

## 2020-10-15 RX ADMIN — ALBUMIN (HUMAN) 25 G: 12.5 SOLUTION INTRAVENOUS at 08:10

## 2020-10-15 RX ADMIN — OCTREOTIDE ACETATE 100 MCG: 100 INJECTION, SOLUTION INTRAVENOUS; SUBCUTANEOUS at 05:10

## 2020-10-15 RX ADMIN — LACTULOSE 15 G: 10 SOLUTION ORAL at 02:10

## 2020-10-15 RX ADMIN — SODIUM BICARBONATE 650 MG TABLET 1300 MG: at 09:10

## 2020-10-15 RX ADMIN — CEFTRIAXONE SODIUM 1 G: 1 INJECTION, POWDER, FOR SOLUTION INTRAMUSCULAR; INTRAVENOUS at 01:10

## 2020-10-15 RX ADMIN — ROPINIROLE HYDROCHLORIDE 0.25 MG: 0.25 TABLET, FILM COATED ORAL at 09:10

## 2020-10-15 RX ADMIN — PANTOPRAZOLE SODIUM 40 MG: 40 TABLET, DELAYED RELEASE ORAL at 08:10

## 2020-10-15 RX ADMIN — APIXABAN 5 MG: 5 TABLET, FILM COATED ORAL at 08:10

## 2020-10-15 RX ADMIN — VALACYCLOVIR HYDROCHLORIDE 1000 MG: 500 TABLET, FILM COATED ORAL at 08:10

## 2020-10-15 RX ADMIN — MIDODRINE HYDROCHLORIDE 15 MG: 5 TABLET ORAL at 02:10

## 2020-10-15 RX ADMIN — SODIUM BICARBONATE 650 MG TABLET 1300 MG: at 02:10

## 2020-10-15 NOTE — ASSESSMENT & PLAN NOTE
NAGMA most likely 2/2 to gastrointestinal bicarbonate loss (pt on Lactulose)    Recommendations:   - Continue sodium bicarbonate 1300 mg tid  - Titrate down lactulose if goal for bowel movements are being met

## 2020-10-15 NOTE — PLAN OF CARE
Plan of care discussed with pt. PT AOx3, ambulatory w/ SBA. Lactulose given w/ three BMs during shift. VS Stable w/ MAPs >65. Telemetry rhythm unclear w/ Mag levels low. EKG and Mag replacements given per MD order. Monitoring I'Os. Pt incontinent intermittently w/ brief on at night.  Pt denies CP, SOB, or pain/discomfort at this time.Pt free of injuries this shift.  All questions addressed.  Will continue to monitor.

## 2020-10-15 NOTE — PLAN OF CARE
Problem: Adult Inpatient Plan of Care  Goal: Plan of Care Review  Outcome: Ongoing, Progressing  Goal: Patient-Specific Goal (Individualization)  Outcome: Ongoing, Progressing  Goal: Absence of Hospital-Acquired Illness or Injury  Outcome: Ongoing, Progressing  Goal: Optimal Comfort and Wellbeing  Outcome: Ongoing, Progressing  Goal: Readiness for Transition of Care  Outcome: Ongoing, Progressing  Goal: Rounds/Family Conference  Outcome: Ongoing, Progressing     Problem: Adjustment to Illness (Sepsis/Septic Shock)  Goal: Optimal Coping  Outcome: Ongoing, Progressing     Problem: Bleeding (Sepsis/Septic Shock)  Goal: Absence of Bleeding  Outcome: Ongoing, Progressing     Problem: Glycemic Control Impaired (Sepsis/Septic Shock)  Goal: Blood Glucose Level Within Desired Range  Outcome: Ongoing, Progressing     Problem: Hemodynamic Instability (Sepsis/Septic Shock)  Goal: Effective Tissue Perfusion  Outcome: Ongoing, Progressing     Problem: Infection (Sepsis/Septic Shock)  Goal: Absence of Infection Signs/Symptoms  Outcome: Ongoing, Progressing     Problem: Nutrition Impaired (Sepsis/Septic Shock)  Goal: Optimal Nutrition Intake  Outcome: Ongoing, Progressing     Problem: Respiratory Compromise (Sepsis/Septic Shock)  Goal: Effective Oxygenation and Ventilation  Outcome: Ongoing, Progressing     Problem: Electrolyte Imbalance (Acute Kidney Injury/Impairment)  Goal: Serum Electrolyte Balance  Outcome: Ongoing, Progressing     Problem: Fluid Imbalance (Acute Kidney Injury/Impairment)  Goal: Optimal Fluid Balance  Outcome: Ongoing, Progressing     Problem: Hematologic Alteration (Acute Kidney Injury/Impairment)  Goal: Hemoglobin, Hematocrit and Platelets Within Normal Range  Outcome: Ongoing, Progressing     Problem: Oral Intake Inadequate (Acute Kidney Injury/Impairment)  Goal: Optimal Nutrition Intake  Outcome: Ongoing, Progressing     Problem: Renal Function Impairment (Acute Kidney Injury/Impairment)  Goal: Effective  Renal Function  Outcome: Ongoing, Progressing     Problem: Fall Injury Risk  Goal: Absence of Fall and Fall-Related Injury  Outcome: Ongoing, Progressing     Problem: Infection  Goal: Infection Symptom Resolution  Outcome: Ongoing, Progressing

## 2020-10-15 NOTE — SUBJECTIVE & OBJECTIVE
Interval History: Patient seen and examined at bedside. Unmeasured UOP. Renal function stable. Bicarb 15 on am labs. Multiple loose bowel movements overnight reported by pt.     Review of patient's allergies indicates:  No Known Allergies  Current Facility-Administered Medications   Medication Frequency    acetaminophen tablet 325 mg Q8H PRN    albumin human 25% bottle 25 g Daily    apixaban tablet 5 mg BID    [START ON 10/16/2020] ciprofloxacin HCl tablet 250 mg Q24H    escitalopram oxalate tablet 5 mg Daily    lactulose 20 gram/30 mL solution Soln 15 g TID    midodrine tablet 15 mg Q8H    octreotide injection 100 mcg Q8H    ondansetron tablet 4 mg Q8H PRN    pantoprazole EC tablet 40 mg Daily    rOPINIRole tablet 0.25 mg QHS    sodium bicarbonate tablet 1,300 mg TID    sodium chloride 0.9% flush 10 mL PRN    [START ON 10/16/2020] valACYclovir tablet 1,000 mg Daily       Objective:     Vital Signs (Most Recent):  Temp: 97.9 °F (36.6 °C) (10/15/20 1157)  Pulse: 94 (10/15/20 1157)  Resp: 18 (10/15/20 1157)  BP: 113/62 (10/15/20 1157)  SpO2: 97 % (10/15/20 1157)  O2 Device (Oxygen Therapy): room air (10/15/20 1157) Vital Signs (24h Range):  Temp:  [97.4 °F (36.3 °C)-97.9 °F (36.6 °C)] 97.9 °F (36.6 °C)  Pulse:  [] 94  Resp:  [18-20] 18  SpO2:  [96 %-100 %] 97 %  BP: (109-118)/(58-68) 113/62     Weight: 88 kg (194 lb 0.1 oz) (10/14/20 1117)  Body mass index is 34.38 kg/m².  Body surface area is 1.98 meters squared.    I/O last 3 completed shifts:  In: 440 [P.O.:440]  Out: -     Physical Exam  Vitals signs and nursing note reviewed.   Constitutional:       General: He is not in acute distress.  Neck:      Musculoskeletal: Normal range of motion.   Cardiovascular:      Rate and Rhythm: Normal rate and regular rhythm.   Pulmonary:      Effort: Pulmonary effort is normal. No respiratory distress.      Breath sounds: No wheezing or rhonchi.   Abdominal:      General: There is distension.    Musculoskeletal:      Right lower leg: Edema present.      Left lower leg: Edema present.   Skin:     Comments: Abdomen LUQ crusted vesicles with a trajectory towards left flank   Neurological:      General: No focal deficit present.      Mental Status: He is alert.         Significant Labs:  CBC:   Recent Labs   Lab 10/15/20  0321   WBC 9.06   RBC 3.13*   HGB 10.3*   HCT 31.2*      *   MCH 32.9*   MCHC 33.0     CMP:   Recent Labs   Lab 10/15/20  0321   *   CALCIUM 8.9   ALBUMIN 4.4   PROT 6.1   *   K 3.8   CO2 15*      BUN 34*   CREATININE 2.8*   ALKPHOS 113   ALT 14   AST 27   BILITOT 0.9     All labs within the past 24 hours have been reviewed.

## 2020-10-15 NOTE — PROGRESS NOTES
Pharmacist Renal Dose Adjustment Note    Kenneth Pardo is a 60 y.o. male being treated with valacylcovir    Patient Data:    Vital Signs (Most Recent):  Temp: 97.6 °F (36.4 °C) (10/15/20 0800)  Pulse: 63 (10/15/20 1100)  Resp: 19 (10/15/20 0800)  BP: (!) 118/58 (10/15/20 0800)  SpO2: 96 % (10/15/20 0800)   Vital Signs (72h Range):  Temp:  [97.4 °F (36.3 °C)-98.5 °F (36.9 °C)]   Pulse:  []   Resp:  [12-22]   BP: ()/(56-78)   SpO2:  [96 %-100 %]      Recent Labs   Lab 10/14/20  0045 10/14/20  0542 10/15/20  0321   CREATININE 3.0* 2.6* 2.8*     Serum creatinine: 2.8 mg/dL (H) 10/15/20 0321  Estimated creatinine clearance: 28.2 mL/min (A)    Valacyclovir changed to 1 g PO q24h.     Pharmacist's Name: IRENE HENRY  Pharmacist's Extension: [id number]

## 2020-10-15 NOTE — PROGRESS NOTES
Progress Note  Hospital Medicine  [facility]       Patient Name: Kenneth Pardo  MRN:  [id number]  Hospital Medicine Team: [facility] Aicha Rojas MD  Date of Admission:  10/13/2020     Length of Stay:  LOS: 2 days   Expected Discharge Date: 10/17/2020  Principal Problem:  Hepatorenal syndrome     Subjective:     Interval History/Overnight Events:    Patient transferred from another service.  Has anasarca and tense ascites.  Plan for paracentesis tomorrow.  Otherwise patient has no other complaints, feels okay.      Nephrology is following.  Creatinine 2.8.  Concern for H RS type 2.  Giving IV Lasix today as recommended by nephrology.  Hepatology team reassessing the patient for rib presentation to transplant committee.      Will obtain another ultrasound of the like to evaluate the condition of DVT, patient with previous diagnosis of nonocclusive femoral vein DVT about a month ago.  Finish up valacyclovir therapy for recently diagnosed shingles of the stomach, as outpatient.  Airborne precautions taken off, given the lesions have not crusted over     [START ON 10/17/2020] apixaban  5 mg Oral BID    [START ON 10/16/2020] ciprofloxacin HCl  250 mg Oral Q24H    escitalopram oxalate  5 mg Oral Daily    furosemide (LASIX) IV  60 mg Intravenous Once    lactulose  15 g Oral TID    midodrine  15 mg Oral Q8H    octreotide  100 mcg Intravenous Q8H    pantoprazole  40 mg Oral Daily    rOPINIRole  0.25 mg Oral QHS    sodium bicarbonate  1,300 mg Oral TID    [START ON 10/16/2020] valACYclovir  1,000 mg Oral Daily           acetaminophen, ondansetron, sodium chloride 0.9%    Review of Systems   Constitutional: Negative for chills, fatigue, fever.   HENT: Negative for sore throat, trouble swallowing.    Eyes: Negative for photophobia, visual disturbance.   Respiratory: Negative for cough, shortness of breath.    Cardiovascular: Negative for chest pain, palpitations, leg swelling.    Gastrointestinal: Negative for abdominal pain, constipation, diarrhea, nausea, vomiting.  Abdominal rash+ abdominal distention  Endocrine: Negative for cold intolerance, heat intolerance.   Genitourinary: Negative for dysuria, frequency.   Musculoskeletal: Negative for arthralgias, myalgias.  Anasarca  Skin: Negative for rash, wound, erythema   Neurological: Negative for dizziness, syncope, weakness, light-headedness.   Psychiatric/Behavioral: Negative for confusion, hallucinations, anxiety  All other systems reviewed and are negative.    Objective:     Temp:  [97.4 °F (36.3 °C)-97.9 °F (36.6 °C)]   Pulse:  [63-99]   Resp:  [18-20]   BP: (109-118)/(58-68)   SpO2:  [96 %-100 %]         Weight change:    Body mass index is 34.38 kg/m².       Physical Exam:  Constitutional:   chronically ill looking,  non-distressed, not diaphoretic.   HENT: NC/AT, external ears normal  Eyes: PERRL, EOMI, conjunctiva normal, no discharge b/l,    Neck: normal ROM, supple  CV: RRR, no m/r/g, no carotid bruits, +2 peripheral pulses.  Pulmonary/Chest wall: Breathing comfortably w/o distress, CTAB, no w/r/r, no crackles.     GI: Soft, non-tender, (+) BS, (+) BM   +distended  Musculoskeletal: Normal ROM, no atrophy,  + pitting edema in LE bilaterally + anasarca  Neurological: AAO x 4, no focal deficits noted   No asterixis   Skin: warm, dry  Psych: normal mood and affect, normal behavior, thought content and judgement.    Labs:    Chemistries:   Recent Labs   Lab 10/14/20  0045 10/14/20  0542 10/15/20  0321   * 131* 132*   K 4.2 4.6 3.8    105 105   CO2 15* 14* 15*   BUN 39* 39* 34*   CREATININE 3.0* 2.6* 2.8*   CALCIUM 8.8 8.3* 8.9   PROT 6.1 4.9* 6.1   BILITOT 0.8 0.8 0.9   ALKPHOS 174* 135 113   ALT 22 18 14   AST 42* 33 27   MG 1.6 1.5* 1.6   PHOS 4.5 4.1 4.0        WBC:   Recent Labs   Lab 10/12/20  1007 10/12/20  1700 10/14/20  0045 10/14/20  0542 10/15/20  0321   WBC 12.41 11.70 14.66* 10.11 9.06     Bands:      CBC/Anemia Labs: Coags:    Recent Labs   Lab 10/14/20  0045 10/14/20  0542 10/15/20  0321   WBC 14.66* 10.11 9.06   HGB 11.5* 9.1* 10.3*   HCT 35.3* 27.8* 31.2*    154 194   * 99* 100*   RDW 13.8 13.6 14.0   IRON 78  --   --    FERRITIN 1,984*  --   --     Recent Labs   Lab 10/12/20  1700 10/14/20  0045 10/14/20  0542 10/15/20  0321   INR 1.3* 1.1 1.2 1.3*   APTT 35.5*  --   --   --           Assessment and Plan     Hospital Course:    Mr. Kenneth Pardo was admitted to Hospital Medicine for management of  Hepatorenal syndrome     Active Hospital Problems    Diagnosis  POA    *Hepatorenal syndrome [K76.7]  Yes    Alcohol dependence in remission [F10.21]  Yes    Herpes zoster without complication [B02.9]  Yes    Metabolic acidosis [E87.2]  Yes    Right leg DVT [I82.401]  Yes    Ascites due to alcoholic cirrhosis [K70.31]  Yes    Debility [R53.81]  Yes    Chronic Hepatic encephalopathy [K72.90]  Yes    Decompensated liver disease [K74.69]  Yes    Anasarca [R60.1]  Yes    hx of SBP (spontaneous bacterial peritonitis) [K65.2]  Yes    Macrocytic anemia [D53.9]  Yes    Hypotension [I95.9]  Yes    Hyponatremia [E87.1]  Yes    Anxiety [F41.9]  Yes      Resolved Hospital Problems   No resolved problems to display.       Hepatorenal syndrome   Metabolic acidosis  - patient with recurrent hospitalizations for Sebastian I/ H RS.  Now admitted with creatinine of 3.  Previously discharged with creatinine of 2.  Highly concerning for H RS type 2  - nephrology following  - continue sodium bicarbonate supplementation given metabolic acidosis  - continue octreotide t.i.d. and midodrine 15 mg t.i.d.  - patient had been receiving albumin.  - giving IV Lasix 60 mg on 10/15/2020 at Nephrology's recommendation  - plan for paracentesis for decompression and SBP rule out on 10/16    Decompensated alcoholic cirrhosis with ascites   hx of SBP (spontaneous bacterial peritonitis)  Alcohol dependence in  remission  Chronic Hepatic encephalopathy   Hypotension  Anasarca    MELD-Na score: 22 at 10/15/2020  3:21 AM  MELD score: 19 at 10/15/2020  3:21 AM  Calculated from:  Serum Creatinine: 2.8 mg/dL at 10/15/2020  3:21 AM  Serum Sodium: 132 mmol/L at 10/15/2020  3:21 AM  Total Bilirubin: 0.9 mg/dL (Rounded to 1 mg/dL) at 10/15/2020  3:21 AM  INR(ratio): 1.3 at 10/15/2020  3:21 AM  Age: 60 years 2 months    -  recurrent admissions for decomp liver disease, hepatorenal syndrome, and sequela of liver failure  - PETH negative more recently - negative on 09/29/2020  - patient was presented to liver transplant committee on 09/16/2020.  He was deferred at that time.  Due to right moral vein DVT, and anticoagulation for that, as well as the need to be re-evaluated in clinic  - hepatology consulted.  Asked to re-evaluate patient for liver transplant listing.  Given ongoing hospitalizations, and hepatorenal syndrome  - plan for paracentesis on 10/15/2020- diagnostic and therapeutic  - treating H RS as above.  Nephrology consulted  - continue lactulose for history of hepatic encephalopathy.  Continue p.o. ciprofloxacin for prophylaxis given history of SBP    Hyponatremia   - hypervolemic hyponatremia.  In setting of underlying liver disease.  - sodium 132-130  - monitor.  Continue fluid restriction      Right Leg DVT  - diagnosed with Nonocclusive deep venous thrombosis of the proximal right femoral vein.  On 09/12/2020.  Had been on apixaban for the DVT.    - unable to list patient for liver transplant due to anticoagulation status  - plan to repeat ultrasound of the right leg to re-evaluate the DVT.  If DVT is gone, will likely stop anticoagulation, and possibly list him for liver transplant    Herpes zoster without complication  - patient recently diagnosed with shingles of his right abdomen.  Had been placed on valacyclovir as outpatient.  - lesions have crusted over.  Airborne precautions removed  - complete therapy with  valacyclovir- last day on 10/18/2020      Diet:  Low Na with fluid restriction   GI PPx:    DVT PPx:  Home apixaban   Goals of Care:  Full     High Risk Conditions:  HRS    Disposition:    TBD    Patient's note was created using MModal Dictation.  Any errors in syntax may not have been identified and edited on initial review prior to signing this note.    Signing Physician:     Aicha Rojas MD  Department of Hospital Medicine   [facility]  Pager [phone] [place] [phone]  10/15/2020

## 2020-10-15 NOTE — PROGRESS NOTES
[facility] - ICU 14 WT  Nephrology  Progress Note    Patient Name: Kenneth Pardo  MRN: [id number]  Admission Date: 10/13/2020  Hospital Length of Stay: 2 days  Attending Provider: Aicha Rojas MD   Primary Care Physician: Neva Nielsen MD  Principal Problem:Hepatorenal syndrome    Subjective:     HPI: A 59 y/o  male with no known history of CKD, and PMHx of HTN, EtOH cirrhosis complicated by portal hypertension, ascites, portal hypertensive gastropathy, was referred to this institution after being contacted by his PCP for abnormal lab results. Patient was recently discharged from [facility] after being admitted for decompensated hepatic cirrhosis and concerns for HRS which Nephrology was following. Cr at discharged was 2.0, yesterday on admission 3.0.  Patient admitted not following a fluid restricted diet once discharged (freely drinking water, juices, tea etc). Denies fever, chills, SOB, chest pain, N/V, problems with urination. Does complains of lower extremity edema and abdominal distention.     Nephrology consulted for management of MERLY        Interval History: Patient seen and examined at bedside. Unmeasured UOP. Renal function stable. Bicarb 15 on am labs. Multiple loose bowel movements overnight reported by pt.     Review of patient's allergies indicates:  No Known Allergies  Current Facility-Administered Medications   Medication Frequency    acetaminophen tablet 325 mg Q8H PRN    albumin human 25% bottle 25 g Daily    apixaban tablet 5 mg BID    [START ON 10/16/2020] ciprofloxacin HCl tablet 250 mg Q24H    escitalopram oxalate tablet 5 mg Daily    lactulose 20 gram/30 mL solution Soln 15 g TID    midodrine tablet 15 mg Q8H    octreotide injection 100 mcg Q8H    ondansetron tablet 4 mg Q8H PRN    pantoprazole EC tablet 40 mg Daily    rOPINIRole tablet 0.25 mg QHS    sodium bicarbonate tablet 1,300 mg TID    sodium chloride 0.9% flush 10 mL PRN    [START ON 10/16/2020]  valACYclovir tablet 1,000 mg Daily       Objective:     Vital Signs (Most Recent):  Temp: 97.9 °F (36.6 °C) (10/15/20 1157)  Pulse: 94 (10/15/20 1157)  Resp: 18 (10/15/20 1157)  BP: 113/62 (10/15/20 1157)  SpO2: 97 % (10/15/20 1157)  O2 Device (Oxygen Therapy): room air (10/15/20 1157) Vital Signs (24h Range):  Temp:  [97.4 °F (36.3 °C)-97.9 °F (36.6 °C)] 97.9 °F (36.6 °C)  Pulse:  [] 94  Resp:  [18-20] 18  SpO2:  [96 %-100 %] 97 %  BP: (109-118)/(58-68) 113/62     Weight: 88 kg (194 lb 0.1 oz) (10/14/20 1117)  Body mass index is 34.38 kg/m².  Body surface area is 1.98 meters squared.    I/O last 3 completed shifts:  In: 440 [P.O.:440]  Out: -     Physical Exam  Vitals signs and nursing note reviewed.   Constitutional:       General: He is not in acute distress.  Neck:      Musculoskeletal: Normal range of motion.   Cardiovascular:      Rate and Rhythm: Normal rate and regular rhythm.   Pulmonary:      Effort: Pulmonary effort is normal. No respiratory distress.      Breath sounds: No wheezing or rhonchi.   Abdominal:      General: There is distension.   Musculoskeletal:      Right lower leg: Edema present.      Left lower leg: Edema present.   Skin:     Comments: Abdomen LUQ crusted vesicles with a trajectory towards left flank   Neurological:      General: No focal deficit present.      Mental Status: He is alert.         Significant Labs:  CBC:   Recent Labs   Lab 10/15/20  0321   WBC 9.06   RBC 3.13*   HGB 10.3*   HCT 31.2*      *   MCH 32.9*   MCHC 33.0     CMP:   Recent Labs   Lab 10/15/20  0321   *   CALCIUM 8.9   ALBUMIN 4.4   PROT 6.1   *   K 3.8   CO2 15*      BUN 34*   CREATININE 2.8*   ALKPHOS 113   ALT 14   AST 27   BILITOT 0.9     All labs within the past 24 hours have been reviewed.     Assessment/Plan:     Hyponatremia  - Hypervolemic hyponatremia 2/2 to cirrhosis    Recommendations:  - Fluid restriction 1L  - Lasix 60 mg IV x 1 dose    Ascites due to alcoholic  cirrhosis  - Managed by primary team  - GI following    Metabolic acidosis  NAGMA most likely 2/2 to gastrointestinal bicarbonate loss (pt on Lactulose)    Recommendations:   - Continue sodium bicarbonate 1300 mg tid  - Titrate down lactulose if goal for bowel movements are being met         Thank you for your consult. I will follow-up with patient. Please contact us if you have any additional questions.    Cal Riley MD  Nephrology  [facility] - ICU 14 WT

## 2020-10-15 NOTE — PROGRESS NOTES
team 5 paged about patient telemetry rhythm with unclear rhythm. Last  Magnesium 1.5 with no replacements during the day. PT VS stable, denies any SOB or CP.     Tong Nagel MD state he will pass by to assess patient. Orders placed for EKG, Mag 2g replacement, and stat labs.

## 2020-10-16 LAB
ALBUMIN SERPL BCP-MCNC: 3.9 G/DL (ref 3.5–5.2)
ALP SERPL-CCNC: 98 U/L (ref 55–135)
ALT SERPL W/O P-5'-P-CCNC: 15 U/L (ref 10–44)
ANION GAP SERPL CALC-SCNC: 11 MMOL/L (ref 8–16)
APPEARANCE FLD: CLEAR
AST SERPL-CCNC: 27 U/L (ref 10–40)
BILIRUB SERPL-MCNC: 0.8 MG/DL (ref 0.1–1)
BODY FLD TYPE: NORMAL
BUN SERPL-MCNC: 31 MG/DL (ref 6–20)
CALCIUM SERPL-MCNC: 8.7 MG/DL (ref 8.7–10.5)
CHLORIDE SERPL-SCNC: 105 MMOL/L (ref 95–110)
CO2 SERPL-SCNC: 18 MMOL/L (ref 23–29)
COLOR FLD: YELLOW
CREAT SERPL-MCNC: 2.5 MG/DL (ref 0.5–1.4)
EST. GFR  (AFRICAN AMERICAN): 31.5 ML/MIN/1.73 M^2
EST. GFR  (NON AFRICAN AMERICAN): 27.3 ML/MIN/1.73 M^2
GLUCOSE SERPL-MCNC: 169 MG/DL (ref 70–110)
GRAM STN SPEC: NORMAL
GRAM STN SPEC: NORMAL
LYMPHOCYTES NFR FLD MANUAL: 32 %
MAGNESIUM SERPL-MCNC: 1.8 MG/DL (ref 1.6–2.6)
MESOTHL CELL NFR FLD MANUAL: 15 %
MONOS+MACROS NFR FLD MANUAL: 22 %
NEUTROPHILS NFR FLD MANUAL: 31 %
PHOSPHATE SERPL-MCNC: 3.1 MG/DL (ref 2.7–4.5)
POTASSIUM SERPL-SCNC: 3.3 MMOL/L (ref 3.5–5.1)
PROT SERPL-MCNC: 5.4 G/DL (ref 6–8.4)
SODIUM SERPL-SCNC: 134 MMOL/L (ref 136–145)
WBC # FLD: 73 /CU MM

## 2020-10-16 PROCEDURE — 80053 COMPREHEN METABOLIC PANEL: CPT | Mod: NTX

## 2020-10-16 PROCEDURE — 87205 SMEAR GRAM STAIN: CPT | Mod: NTX

## 2020-10-16 PROCEDURE — P9047 ALBUMIN (HUMAN), 25%, 50ML: HCPCS | Mod: JG,NTX | Performed by: HOSPITALIST

## 2020-10-16 PROCEDURE — 99232 PR SUBSEQUENT HOSPITAL CARE,LEVL II: ICD-10-PCS | Mod: NTX,,, | Performed by: HOSPITALIST

## 2020-10-16 PROCEDURE — 25000003 PHARM REV CODE 250: Mod: NTX | Performed by: STUDENT IN AN ORGANIZED HEALTH CARE EDUCATION/TRAINING PROGRAM

## 2020-10-16 PROCEDURE — 25000003 PHARM REV CODE 250: Mod: NTX | Performed by: HOSPITALIST

## 2020-10-16 PROCEDURE — 99231 PR SUBSEQUENT HOSPITAL CARE,LEVL I: ICD-10-PCS | Mod: NTX,,, | Performed by: INTERNAL MEDICINE

## 2020-10-16 PROCEDURE — 36415 COLL VENOUS BLD VENIPUNCTURE: CPT | Mod: NTX

## 2020-10-16 PROCEDURE — 99232 SBSQ HOSP IP/OBS MODERATE 35: CPT | Mod: NTX,,, | Performed by: INTERNAL MEDICINE

## 2020-10-16 PROCEDURE — 99232 PR SUBSEQUENT HOSPITAL CARE,LEVL II: ICD-10-PCS | Mod: NTX,,, | Performed by: INTERNAL MEDICINE

## 2020-10-16 PROCEDURE — 11000001 HC ACUTE MED/SURG PRIVATE ROOM: Mod: NTX

## 2020-10-16 PROCEDURE — 83735 ASSAY OF MAGNESIUM: CPT | Mod: NTX

## 2020-10-16 PROCEDURE — 97530 THERAPEUTIC ACTIVITIES: CPT | Mod: NTX

## 2020-10-16 PROCEDURE — 84100 ASSAY OF PHOSPHORUS: CPT | Mod: NTX

## 2020-10-16 PROCEDURE — 63600175 PHARM REV CODE 636 W HCPCS: Mod: NTX | Performed by: HOSPITALIST

## 2020-10-16 PROCEDURE — 99231 SBSQ HOSP IP/OBS SF/LOW 25: CPT | Mod: NTX,,, | Performed by: INTERNAL MEDICINE

## 2020-10-16 PROCEDURE — 87070 CULTURE OTHR SPECIMN AEROBIC: CPT | Mod: NTX

## 2020-10-16 PROCEDURE — 89051 BODY FLUID CELL COUNT: CPT | Mod: NTX

## 2020-10-16 PROCEDURE — 99232 SBSQ HOSP IP/OBS MODERATE 35: CPT | Mod: NTX,,, | Performed by: HOSPITALIST

## 2020-10-16 PROCEDURE — 63600175 PHARM REV CODE 636 W HCPCS: Mod: NTX | Performed by: STUDENT IN AN ORGANIZED HEALTH CARE EDUCATION/TRAINING PROGRAM

## 2020-10-16 RX ORDER — FUROSEMIDE 10 MG/ML
40 INJECTION INTRAMUSCULAR; INTRAVENOUS DAILY
Status: DISCONTINUED | OUTPATIENT
Start: 2020-10-16 | End: 2020-10-18

## 2020-10-16 RX ORDER — ALBUMIN HUMAN 250 G/1000ML
50 SOLUTION INTRAVENOUS ONCE
Status: COMPLETED | OUTPATIENT
Start: 2020-10-16 | End: 2020-10-16

## 2020-10-16 RX ADMIN — CIPROFLOXACIN HYDROCHLORIDE 250 MG: 250 TABLET, FILM COATED ORAL at 08:10

## 2020-10-16 RX ADMIN — LACTULOSE 15 G: 10 SOLUTION ORAL at 09:10

## 2020-10-16 RX ADMIN — MIDODRINE HYDROCHLORIDE 15 MG: 5 TABLET ORAL at 09:10

## 2020-10-16 RX ADMIN — FUROSEMIDE 40 MG: 10 INJECTION, SOLUTION INTRAMUSCULAR; INTRAVENOUS at 06:10

## 2020-10-16 RX ADMIN — OCTREOTIDE ACETATE 100 MCG: 100 INJECTION, SOLUTION INTRAVENOUS; SUBCUTANEOUS at 09:10

## 2020-10-16 RX ADMIN — VALACYCLOVIR HYDROCHLORIDE 1000 MG: 500 TABLET, FILM COATED ORAL at 08:10

## 2020-10-16 RX ADMIN — SODIUM BICARBONATE 650 MG TABLET 1300 MG: at 09:10

## 2020-10-16 RX ADMIN — MIDODRINE HYDROCHLORIDE 15 MG: 5 TABLET ORAL at 06:10

## 2020-10-16 RX ADMIN — LACTULOSE 15 G: 10 SOLUTION ORAL at 08:10

## 2020-10-16 RX ADMIN — MIDODRINE HYDROCHLORIDE 15 MG: 5 TABLET ORAL at 03:10

## 2020-10-16 RX ADMIN — SODIUM BICARBONATE 650 MG TABLET 1300 MG: at 03:10

## 2020-10-16 RX ADMIN — ALBUMIN (HUMAN) 50 G: 25 SOLUTION INTRAVENOUS at 03:10

## 2020-10-16 RX ADMIN — ESCITALOPRAM 5 MG: 5 TABLET, FILM COATED ORAL at 08:10

## 2020-10-16 RX ADMIN — OCTREOTIDE ACETATE 100 MCG: 100 INJECTION, SOLUTION INTRAVENOUS; SUBCUTANEOUS at 06:10

## 2020-10-16 RX ADMIN — OCTREOTIDE ACETATE 100 MCG: 100 INJECTION, SOLUTION INTRAVENOUS; SUBCUTANEOUS at 03:10

## 2020-10-16 RX ADMIN — ROPINIROLE HYDROCHLORIDE 0.25 MG: 0.25 TABLET, FILM COATED ORAL at 09:10

## 2020-10-16 RX ADMIN — SODIUM BICARBONATE 650 MG TABLET 1300 MG: at 08:10

## 2020-10-16 RX ADMIN — LACTULOSE 15 G: 10 SOLUTION ORAL at 03:10

## 2020-10-16 RX ADMIN — PANTOPRAZOLE SODIUM 40 MG: 40 TABLET, DELAYED RELEASE ORAL at 08:10

## 2020-10-16 NOTE — ASSESSMENT & PLAN NOTE
Mr. Pardo is a 59 y/o man with previously described history, physical exam, laboratories and imaging studies being consulted for concerns of HRS in the setting of MERLY.   - HRS: patient without acute HRS type 1 at the moment. No precipiating event for HRS such as bacterial infection, GIB, large volume paracentesis w/o albumin infusion or acute alcohol intoxication. Therefore, if one of the aforementioned causes were present, this patient hemodynamics would be compromised which is not the case. This leans towards for HRS type 2 if no precipitating event.      Recommendations:  - Continue Midodrine 15 mg tid, Octreotide 100 mcg tid and IV Albumin.   - Monitor hemodynamics after paracentesis. Can increase Octreotide to 200 mcg tid if MAP below 65.   - Lasix 40 mg IV daily  - Daily renal panel  - Strict I/O and chart  - Avoid nephrotoxic medications, NSAIDs, IV contrast, etc.  - Daily weights and chart  - Medication doses adjusted to GFR  - Hb > 7 gm/dL  - Will follow closely

## 2020-10-16 NOTE — PROGRESS NOTES
[facility] - ICU 14 WT  Nephrology  Progress Note    Patient Name: Kenneth Pardo  MRN: [id number]  Admission Date: 10/13/2020  Hospital Length of Stay: 3 days  Attending Provider: Aicha Rojas MD   Primary Care Physician: Neva Nielsen MD  Principal Problem:Hepatorenal syndrome    Subjective:     HPI: A 61 y/o  male with no known history of CKD, and PMHx of HTN, EtOH cirrhosis complicated by portal hypertension, ascites, portal hypertensive gastropathy, was referred to this institution after being contacted by his PCP for abnormal lab results. Patient was recently discharged from [facility] after being admitted for decompensated hepatic cirrhosis and concerns for HRS which Nephrology was following. Cr at discharged was 2.0, yesterday on admission 3.0.  Patient admitted not following a fluid restricted diet once discharged (freely drinking water, juices, tea etc). Denies fever, chills, SOB, chest pain, N/V, problems with urination. Does complains of lower extremity edema and abdominal distention.     Nephrology consulted for management of MERLY        Interval History: NAEON. Received Lasix 60 mg IV yesterday. Cr and Na stable. Bicarbonate level on increasing trend.     Review of patient's allergies indicates:  No Known Allergies  Current Facility-Administered Medications   Medication Frequency    acetaminophen tablet 325 mg Q8H PRN    [COMPLETED] albumin human 25% bottle 50 g Once    [START ON 10/17/2020] apixaban tablet 5 mg BID    ciprofloxacin HCl tablet 250 mg Q24H    escitalopram oxalate tablet 5 mg Daily    lactulose 20 gram/30 mL solution Soln 15 g TID    midodrine tablet 15 mg Q8H    octreotide injection 100 mcg Q8H    ondansetron tablet 4 mg Q8H PRN    pantoprazole EC tablet 40 mg Daily    rOPINIRole tablet 0.25 mg QHS    sodium bicarbonate tablet 1,300 mg TID    sodium chloride 0.9% flush 10 mL PRN    valACYclovir tablet 1,000 mg Daily       Objective:     Vital Signs (Most  Recent):  Temp: 97.6 °F (36.4 °C) (10/16/20 1114)  Pulse: 75 (10/16/20 1351)  Resp: 19 (10/16/20 1114)  BP: 110/65 (10/16/20 1351)  SpO2: 99 % (10/16/20 1351)  O2 Device (Oxygen Therapy): room air (10/16/20 1114) Vital Signs (24h Range):  Temp:  [97.3 °F (36.3 °C)-98.2 °F (36.8 °C)] 97.6 °F (36.4 °C)  Pulse:  [72-91] 75  Resp:  [19-20] 19  SpO2:  [97 %-100 %] 99 %  BP: ()/(53-74) 110/65     Weight: 88 kg (194 lb 0.1 oz) (10/14/20 1117)  Body mass index is 34.38 kg/m².  Body surface area is 1.98 meters squared.    I/O last 3 completed shifts:  In: 440 [P.O.:440]  Out: -     Physical Exam  Vitals signs and nursing note reviewed.   Constitutional:       General: He is not in acute distress.  Neck:      Musculoskeletal: Normal range of motion.   Cardiovascular:      Rate and Rhythm: Normal rate and regular rhythm.   Pulmonary:      Effort: Pulmonary effort is normal. No respiratory distress.      Breath sounds: No wheezing or rhonchi.   Abdominal:      General: There is distension.   Musculoskeletal:      Right lower leg: Edema present.      Left lower leg: Edema present.   Skin:     Comments: Abdomen LUQ crusted vesicles with a trajectory towards left flank   Neurological:      General: No focal deficit present.      Mental Status: He is alert.         Significant Labs:  CBC:   Recent Labs   Lab 10/15/20  0321   WBC 9.06   RBC 3.13*   HGB 10.3*   HCT 31.2*      *   MCH 32.9*   MCHC 33.0     CMP:   Recent Labs   Lab 10/16/20  0504   *   CALCIUM 8.7   ALBUMIN 3.9   PROT 5.4*   *   K 3.3*   CO2 18*      BUN 31*   CREATININE 2.5*   ALKPHOS 98   ALT 15   AST 27   BILITOT 0.8     All labs within the past 24 hours have been reviewed.     Assessment/Plan:     * Hepatorenal syndrome  Mr. Pardo is a 61 y/o man with previously described history, physical exam, laboratories and imaging studies being consulted for concerns of HRS in the setting of MERLY.   - HRS: patient without acute HRS type  1 at the moment. No precipiating event for HRS such as bacterial infection, GIB, large volume paracentesis w/o albumin infusion or acute alcohol intoxication. Therefore, if one of the aforementioned causes were present, this patient hemodynamics would be compromised which is not the case. This leans towards for HRS type 2 if no precipitating event.      Recommendations:  - Continue Midodrine 15 mg tid, Octreotide 100 mcg tid and IV Albumin.   - Monitor hemodynamics after paracentesis. Can increase Octreotide to 200 mcg tid if MAP below 65.   - Lasix 40 mg IV daily  - Daily renal panel  - Strict I/O and chart  - Avoid nephrotoxic medications, NSAIDs, IV contrast, etc.  - Daily weights and chart  - Medication doses adjusted to GFR  - Hb > 7 gm/dL  - Will follow closely    Hyponatremia  - Hypervolemic hyponatremia 2/2 to cirrhosis    Recommendations:  - Fluid restriction 1L  - Lasix 40 mg IV daily    Ascites due to alcoholic cirrhosis  - Managed by primary team  - Hepatology following    Metabolic acidosis  NAGMA most likely 2/2 to gastrointestinal bicarbonate loss (pt on Lactulose)    Recommendations:   - Continue sodium bicarbonate 1300 mg tid        Thank you for your consult. I will follow-up with patient. Please contact us if you have any additional questions.    Cal Riley MD  Nephrology  [facility] - ICU 14 WT

## 2020-10-16 NOTE — PLAN OF CARE
Problem: Occupational Therapy Goal  Goal: Occupational Therapy Goal  Description: Goals to be met by: 10/28/2020     Patient will increase functional independence with ADLs by performing:    UE Dressing with Modified [place]. -met 10/16  LE Dressing with Minimal Assistance. -met 10/16  Grooming while standing at sink with Modified [place]. -met 10/16  Toileting from toilet with Modified [place] for hygiene and clothing management. -met 10/16  Supine to sit with Modified [place]. -met 10/16  Sit to stand transfer with Modified [place] -met 10/16  Toilet transfer to toilet with Modified [place]. -met 10/16    Outcome: Met     Pt has met all OT goals and is functioning at his baseline.  Pt does not require additional acute OT services.  D/c home, no needs.    Lj Law, OT   10/16/2020

## 2020-10-16 NOTE — PLAN OF CARE
Pt not medically stable for discharge.  Plan for paracentesis today.   Total Bilirubin: 0.8 mg/dL (Rounded to 1 mg/dL) at 10/16/2020, INR(ratio): 1.3 at 10/15/2020.     Nephrology following for MERLY -  recommend albumin b.i.d., octreotide, midodrine.  Hepatology following - Lactulose titrated to 3 bowel movements per day, add rifaximin 550 daily.    Will reopen transplant evaluation and present possibly next week.    When medically ready, anticipate discharge plan A - home with family or plan B - home with home health.    CM to follow for discharge planning needs.  NETTE Stout RN  Case Management  [id number]         10/16/20 1243   Discharge Reassessment   Assessment Type Discharge Planning Reassessment   Provided patient/caregiver education on the expected discharge date and the discharge plan Yes   Do you have any problems affording any of your prescribed medications? TBD   Discharge Plan A Home with family   Discharge Plan B Home with family;Home Health   DME Needed Upon Discharge  other (see comments)  (TBD)   Anticipated Discharge Disposition Home   Can the patient/caregiver answer the patient profile reliably? Yes, cognitively intact   Describe the patient's ability to walk at the present time. No restrictions   Number of comorbid conditions (as recorded on the chart) Four   Post-Acute Status   Post-Acute Authorization Other   Other Status No Post-Acute Service Needs   Discharge Delays None known at this time

## 2020-10-16 NOTE — SUBJECTIVE & OBJECTIVE
Interval History: NAEON. Received Lasix 60 mg IV yesterday. Cr and Na stable. Bicarbonate level on increasing trend.     Review of patient's allergies indicates:  No Known Allergies  Current Facility-Administered Medications   Medication Frequency    acetaminophen tablet 325 mg Q8H PRN    [COMPLETED] albumin human 25% bottle 50 g Once    [START ON 10/17/2020] apixaban tablet 5 mg BID    ciprofloxacin HCl tablet 250 mg Q24H    escitalopram oxalate tablet 5 mg Daily    lactulose 20 gram/30 mL solution Soln 15 g TID    midodrine tablet 15 mg Q8H    octreotide injection 100 mcg Q8H    ondansetron tablet 4 mg Q8H PRN    pantoprazole EC tablet 40 mg Daily    rOPINIRole tablet 0.25 mg QHS    sodium bicarbonate tablet 1,300 mg TID    sodium chloride 0.9% flush 10 mL PRN    valACYclovir tablet 1,000 mg Daily       Objective:     Vital Signs (Most Recent):  Temp: 97.6 °F (36.4 °C) (10/16/20 1114)  Pulse: 75 (10/16/20 1351)  Resp: 19 (10/16/20 1114)  BP: 110/65 (10/16/20 1351)  SpO2: 99 % (10/16/20 1351)  O2 Device (Oxygen Therapy): room air (10/16/20 1114) Vital Signs (24h Range):  Temp:  [97.3 °F (36.3 °C)-98.2 °F (36.8 °C)] 97.6 °F (36.4 °C)  Pulse:  [72-91] 75  Resp:  [19-20] 19  SpO2:  [97 %-100 %] 99 %  BP: ()/(53-74) 110/65     Weight: 88 kg (194 lb 0.1 oz) (10/14/20 1117)  Body mass index is 34.38 kg/m².  Body surface area is 1.98 meters squared.    I/O last 3 completed shifts:  In: 440 [P.O.:440]  Out: -     Physical Exam  Vitals signs and nursing note reviewed.   Constitutional:       General: He is not in acute distress.  Neck:      Musculoskeletal: Normal range of motion.   Cardiovascular:      Rate and Rhythm: Normal rate and regular rhythm.   Pulmonary:      Effort: Pulmonary effort is normal. No respiratory distress.      Breath sounds: No wheezing or rhonchi.   Abdominal:      General: There is distension.   Musculoskeletal:      Right lower leg: Edema present.      Left lower leg: Edema  present.   Skin:     Comments: Abdomen LUQ crusted vesicles with a trajectory towards left flank   Neurological:      General: No focal deficit present.      Mental Status: He is alert.         Significant Labs:  CBC:   Recent Labs   Lab 10/15/20  0321   WBC 9.06   RBC 3.13*   HGB 10.3*   HCT 31.2*      *   MCH 32.9*   MCHC 33.0     CMP:   Recent Labs   Lab 10/16/20  0504   *   CALCIUM 8.7   ALBUMIN 3.9   PROT 5.4*   *   K 3.3*   CO2 18*      BUN 31*   CREATININE 2.5*   ALKPHOS 98   ALT 15   AST 27   BILITOT 0.8     All labs within the past 24 hours have been reviewed.

## 2020-10-16 NOTE — H&P
Inpatient Radiology Pre-procedure Note    History of Present Illness:  Kenneth Pardo is a 60 y.o. male who presents for ultrasound guided paracentesis.  Admission H&P reviewed.  Past Medical History:   Diagnosis Date    Hepatitis     Hypertension     Liver failure      Past Surgical History:   Procedure Laterality Date    COLONOSCOPY N/A 9/17/2020    Procedure: COLONOSCOPY;  Surgeon: Jhonathan Lieberman MD;  Location: [facility] ([address] [place] [address]);  Service: Endoscopy;  Laterality: N/A;    ESOPHAGOGASTRODUODENOSCOPY N/A 7/31/2020    Procedure: EGD (ESOPHAGOGASTRODUODENOSCOPY);  Surgeon: Jhonathan Lieberman MD;  Location: [facility] ([address] [place] [address]);  Service: Endoscopy;  Laterality: N/A;       Review of Systems:   As documented in primary team H&P    Home Meds:   Prior to Admission medications    Medication Sig Start Date End Date Taking? Authorizing Provider   apixaban (ELIQUIS) 5 mg Tab Take 1 tablet (5 mg total) by mouth 2 (two) times daily. 10/6/20  Yes Aicha Rojas MD   ciprofloxacin HCl (CIPRO) 500 MG tablet Take 1 tablet (500 mg total) by mouth once daily. 9/18/20  Yes Aicha Rojas MD   escitalopram oxalate (LEXAPRO) 5 MG Tab Take 1 tablet (5 mg total) by mouth once daily. 8/9/20 8/9/21 Yes Aicha Rojas MD   lactulose (CHRONULAC) 10 gram/15 mL solution Take 15 mLs (10 g total) by mouth 2 (two) times a day. Take enough to have 3 bowel movements per day 9/18/20 10/18/20 Yes Aicha Rojas MD   midodrine (PROAMATINE) 5 MG Tab Take 3 tablets (15 mg total) by mouth every 8 (eight) hours. 8/9/20 10/14/20 Yes Aicha Rojsa MD   omeprazole (PRILOSEC) 40 MG capsule Take 1 capsule (40 mg total) by mouth every morning. 9/8/20 9/8/21 Yes Mainor Madison MD   rOPINIRole (REQUIP) 0.25 MG tablet Take 1 tablet (0.25 mg total) by mouth every evening. 8/9/20 8/9/21 Yes Aicha Rojas MD   traZODone (DESYREL) 100 MG tablet Take 1 tablet (100 mg total) by mouth every evening.  Patient taking differently: Take  mg by mouth  every evening.  8/9/20 8/9/21 Yes Aicha Rojas MD   valACYclovir (VALTREX) 1000 MG tablet Take 1 tablet (1,000 mg total) by mouth every 8 (eight) hours. for 7 days 10/8/20 10/15/20 Yes Soham Jackson MD     Scheduled Meds:    [START ON 10/17/2020] apixaban  5 mg Oral BID    ciprofloxacin HCl  250 mg Oral Q24H    escitalopram oxalate  5 mg Oral Daily    lactulose  15 g Oral TID    midodrine  15 mg Oral Q8H    octreotide  100 mcg Intravenous Q8H    pantoprazole  40 mg Oral Daily    rOPINIRole  0.25 mg Oral QHS    sodium bicarbonate  1,300 mg Oral TID    valACYclovir  1,000 mg Oral Daily     Continuous Infusions:   PRN Meds:acetaminophen, ondansetron, sodium chloride 0.9%  Anticoagulants/Antiplatelets: eliquis    Allergies: Review of patient's allergies indicates:  No Known Allergies  Sedation Hx: have not been any systemic reactions    Vitals:  Temp: 97.6 °F (36.4 °C) (10/16/20 1114)  Pulse: 76 (10/16/20 1114)  Resp: 19 (10/16/20 1114)  BP: (!) 109/53 (10/16/20 1114)  SpO2: 98 % (10/16/20 1114)     Physical Exam:  ASA: 3  Mallampati: n/a    General: no acute distress  Mental Status: alert and oriented to person, place and time  HEENT: normocephalic, atraumatic  Chest: unlabored breathing  Heart: regular heart rate  Abdomen: distended  Extremity: moves all extremities    Plan: ultrasound guided paracentesis  Sedation Plan: local    Suzanne Lion PA-C  Interventional Radiology  Clinic [phone]

## 2020-10-16 NOTE — ASSESSMENT & PLAN NOTE
- Hypervolemic hyponatremia 2/2 to cirrhosis    Recommendations:  - Fluid restriction 1L  - Lasix 40 mg IV daily

## 2020-10-16 NOTE — PROGRESS NOTES
[facility]  Hepatology  Consult/progress note    Patient Name: Kenneth Pardo  MRN: [id number]  Admission Date: 10/13/2020  Hospital Length of Stay: 3 days  Code Status: Full Code   Attending Provider: Aicha Rojas MD   Consulting Provider: Reinaldo Molina MD  Primary Care Physician: Neva Nielsen MD  Principal Problem:Hepatorenal syndrome    Consults  Subjective:       Patient denies fevers, chills, night sweats, abdominal pain. no signs of confusion.     Past Medical History:   Diagnosis Date    Hepatitis     Hypertension     Liver failure        Past Surgical History:   Procedure Laterality Date    COLONOSCOPY N/A 9/17/2020    Procedure: COLONOSCOPY;  Surgeon: Jhonathan Lieberman MD;  Location: [address]);  Service: Endoscopy;  Laterality: N/A;    ESOPHAGOGASTRODUODENOSCOPY N/A 7/31/2020    Procedure: EGD (ESOPHAGOGASTRODUODENOSCOPY);  Surgeon: Jhonathan Lieberman MD;  Location: [address]);  Service: Endoscopy;  Laterality: N/A;       No family history on file.    Social History     Socioeconomic History    Marital status:      Spouse name: Not on file    Number of children: Not on file    Years of education: Not on file    Highest education level: Not on file   Occupational History    Not on file   Social Needs    Financial resource strain: Not on file    Food insecurity     Worry: Not on file     Inability: Not on file    Transportation needs     Medical: Not on file     Non-medical: Not on file   Tobacco Use    Smoking status: Never Smoker    Smokeless tobacco: Never Used   Substance and Sexual Activity    Alcohol use: Not Currently     Frequency: Never    Drug use: Not Currently    Sexual activity: Not on file   Lifestyle    Physical activity     Days per week: Not on file     Minutes per session: Not on file    Stress: Not on file   Relationships    Social connections     Talks on phone: Not on file     Gets together: Not on file     Attends  Orthodoxy service: Not on file     Active member of club or organization: Not on file     Attends meetings of clubs or organizations: Not on file     Relationship status: Not on file   Other Topics Concern    Not on file   Social History Narrative    Not on file       No current facility-administered medications on file prior to encounter.      Current Outpatient Medications on File Prior to Encounter   Medication Sig Dispense Refill    apixaban (ELIQUIS) 5 mg Tab Take 1 tablet (5 mg total) by mouth 2 (two) times daily. 70 tablet 2    ciprofloxacin HCl (CIPRO) 500 MG tablet Take 1 tablet (500 mg total) by mouth once daily. 30 tablet 2    escitalopram oxalate (LEXAPRO) 5 MG Tab Take 1 tablet (5 mg total) by mouth once daily. 30 tablet 2    lactulose (CHRONULAC) 10 gram/15 mL solution Take 15 mLs (10 g total) by mouth 2 (two) times a day. Take enough to have 3 bowel movements per day 900 mL 2    midodrine (PROAMATINE) 5 MG Tab Take 3 tablets (15 mg total) by mouth every 8 (eight) hours. 270 tablet 2    omeprazole (PRILOSEC) 40 MG capsule Take 1 capsule (40 mg total) by mouth every morning. 30 capsule 11    rOPINIRole (REQUIP) 0.25 MG tablet Take 1 tablet (0.25 mg total) by mouth every evening. 30 tablet 2    traZODone (DESYREL) 100 MG tablet Take 1 tablet (100 mg total) by mouth every evening. (Patient taking differently: Take  mg by mouth every evening. ) 30 tablet 2    valACYclovir (VALTREX) 1000 MG tablet Take 1 tablet (1,000 mg total) by mouth every 8 (eight) hours. for 7 days 21 tablet 0       Review of patient's allergies indicates:  No Known Allergies    ROS     Objective:     Vitals:    10/16/20 1114   BP: (!) 109/53   Pulse: 76   Resp: 19   Temp: 97.6 °F (36.4 °C)         Physical Exam   Vitals signs reviewed.   Constitutional:       General: He is not in acute distress.     Appearance: Normal appearance. He is not diaphoretic.   HENT:      Head: Normocephalic and atraumatic.   Eyes:       General: No scleral icterus.     Conjunctiva/sclera: Conjunctivae normal.   Neck:      Musculoskeletal: Neck supple.   Cardiovascular:      Rate and Rhythm: Normal rate and regular rhythm.   Pulmonary:      Effort: Pulmonary effort is normal.      Breath sounds: Normal breath sounds.   Abdominal:      General: Bowel sounds are normal. There is distension.      Palpations: Abdomen is soft. There is no mass.      Tenderness: There is no abdominal tenderness. There is no guarding or rebound.   Skin:     General: Skin is warm and dry.      Comments: Shingles rash present   Neurological:      Mental Status: He is alert and oriented to person, place, and time.     Significant Labs:  Recent Labs   Lab 10/14/20  0045 10/14/20  0542 10/15/20  0321   HGB 11.5* 9.1* 10.3*       Lab Results   Component Value Date    WBC 9.06 10/15/2020    HGB 10.3 (L) 10/15/2020    HCT 31.2 (L) 10/15/2020     (H) 10/15/2020     10/15/2020       Lab Results   Component Value Date     (L) 10/16/2020    K 3.3 (L) 10/16/2020     10/16/2020    CO2 18 (L) 10/16/2020    BUN 31 (H) 10/16/2020    CREATININE 2.5 (H) 10/16/2020    CALCIUM 8.7 10/16/2020    ANIONGAP 11 10/16/2020    ESTGFRAFRICA 31.5 (A) 10/16/2020    EGFRNONAA 27.3 (A) 10/16/2020       Lab Results   Component Value Date    ALT 15 10/16/2020    AST 27 10/16/2020     (H) 10/14/2020    ALKPHOS 98 10/16/2020    BILITOT 0.8 10/16/2020       Lab Results   Component Value Date    INR 1.3 (H) 10/15/2020    INR 1.2 10/14/2020    INR 1.1 10/14/2020           Significant Imaging:  Reviewed pertinent radiology findings.       Assessment/Plan:       MELD-Na score: 20 at 10/16/2020  5:04 AM  MELD score: 18 at 10/16/2020  5:04 AM  Calculated from:  Serum Creatinine: 2.5 mg/dL at 10/16/2020  5:04 AM  Serum Sodium: 134 mmol/L at 10/16/2020  5:04 AM  Total Bilirubin: 0.8 mg/dL (Rounded to 1 mg/dL) at 10/16/2020  5:04 AM  INR(ratio): 1.3 at 10/15/2020  3:21 AM  Age: 60 years  2 months  Problem List:  1. Alcoholic cirrhosis decompensated with ascites and hepatic encephalopathy  2. MERLY/HRS     Plan:  1. Nephrology on board recommend albumin b.i.d., octreotide, midodrine.  2. Lactulose titrated to 3 bowel movements per day, add rifaximin 550 daily.  3. Will reopen transplant evaluation and present possibly next week    Please obtain daily CBC, BMP, LFT, INR  Thank you for involving us in the care of Kenneth Pardo. Please call with any additional questions, concerns or changes in the patient's clinical status.    Reinaldo Molina MD  Gastroenterology Fellow PGY IV   [facility]

## 2020-10-16 NOTE — PT/OT/SLP PROGRESS
"Occupational Therapy   Treatment/Discharge     Name: Kenneth Pardo  MRN: [id number]  Admitting Diagnosis:  Hepatorenal syndrome       Recommendations:     Discharge Recommendations: home  Discharge Equipment Recommendations:  none  Barriers to discharge:  None    Assessment:     Kenneth Pardo is a 60 y.o. male with a medical diagnosis of Hepatorenal syndrome.  Pt with good participation and activity tolerance this date, ambulating over 300 ft with Supervision with no AD.  Per pt, he has been performing ADLs in his room independently.  Pt is currently functioning at his baseline.  He no longer requires acute OT services.  D/c to home with no therapy needs.  He presents with the following deficits. Performance deficits affecting function are gait instability, impaired balance.     Rehab Prognosis:  Good; patient would benefit from acute skilled OT services to address these deficits and reach maximum level of function.       Plan:     Patient to be seen 3 x/week to address the above listed problems via self-care/home management, therapeutic activities, therapeutic exercises  · Plan of Care Expires: 11/14/20  · Plan of Care Reviewed with: patient    Subjective   "I wish I could have a Coke."  "I already used the bathroom."  Pain/Comfort:  · Pain Rating 1: 0/10  · Pain Rating Post-Intervention 1: 0/10    Objective:     Communicated with: RN prior to session.  Patient found right sidelying with (no active lines) upon OT entry to room.    General Precautions: Standard, fall   Orthopedic Precautions:N/A   Braces: N/A     Occupational Performance:     Bed Mobility:    · Patient completed Rolling/Turning to Right with modified independence  · Patient completed Scooting/Bridging with modified independence  · Patient completed Supine to Sit with modified independence  · Patient completed Sit to Supine with modified independence     Functional Mobility/Transfers:  · Patient completed Sit <> Stand Transfer from EOB with modified " independence  with  no assistive device   · Functional Mobility: Pt ambulated over 300 ft out in the hallway with Supervision with no AD.  Pt with 2 mild LOB (1 to the L, 1 to the R), which pt was able to correct.   Pt stated this is his baseline.  He occasionally used the handrail on the wall to assist with balancing.  Pt took one brief standing break to look out the window.    Activities of Daily Living:  · Lower Body Dressing: modified independence to adjust B socks while lying down in bed  Pt sates he has been able to use the bathroom independently without incident.     [facility] 6 Click ADL: 24    Treatment & Education:  Pt edu on role of OT, POC.  - White board updated  - Self care tasks completed-- as noted above     -Therapeutic listening regarding pt's diet.  Educated pt that he hasn't been allowed to drink Coke due to his renal (low sodium) diet.  Pt verbalized understanding.       Patient left HOB elevated with call button in reach and RN notifiedEducation:      GOALS:   Multidisciplinary Problems     Occupational Therapy Goals     Not on file          Multidisciplinary Problems (Resolved)        Problem: Occupational Therapy Goal    Goal Priority Disciplines Outcome Interventions   Occupational Therapy Goal   (Resolved)     OT, PT/OT Met    Description: Goals to be met by: 10/28/2020     Patient will increase functional independence with ADLs by performing:    UE Dressing with Modified [place]. -met 10/16  LE Dressing with Minimal Assistance. -met 10/16  Grooming while standing at sink with Modified [place]. -met 10/16  Toileting from toilet with Modified [place] for hygiene and clothing management. -met 10/16  Supine to sit with Modified [place]. -met 10/16  Sit to stand transfer with Modified [place] -met 10/16  Toilet transfer to toilet with Modified [place]. -met 10/16                     Time Tracking:     OT Date of Treatment: 10/16/20  OT Start Time: 1037  OT Stop Time:  1054  OT Total Time (min): 17 min    Billable Minutes:Therapeutic Activity 17 min.    Lj Law, OT  10/16/2020

## 2020-10-16 NOTE — PROCEDURES
Radiology Post-Procedure Note    Pre Op Diagnosis: Ascites  Post Op Diagnosis: Same    Procedure: Ultrasound Guided Paracentesis    Procedure performed by: Suzanne Lion PA-C    Written Informed Consent Obtained: Yes  Specimen Removed: YES clear, yellow  Estimated Blood Loss: Minimal    Findings:   Successful paracentesis.  RLQ. Albumin administered PRN per protocol.    Patient tolerated procedure well.    Suzanne Lion PA-C  Interventional Radiology  Clinic [phone]

## 2020-10-16 NOTE — ASSESSMENT & PLAN NOTE
NAGMA most likely 2/2 to gastrointestinal bicarbonate loss (pt on Lactulose)    Recommendations:   - Continue sodium bicarbonate 1300 mg tid

## 2020-10-17 LAB
ALBUMIN SERPL BCP-MCNC: 3.6 G/DL (ref 3.5–5.2)
ALP SERPL-CCNC: 95 U/L (ref 55–135)
ALT SERPL W/O P-5'-P-CCNC: 14 U/L (ref 10–44)
ANION GAP SERPL CALC-SCNC: 12 MMOL/L (ref 8–16)
AST SERPL-CCNC: 27 U/L (ref 10–40)
BASOPHILS # BLD AUTO: 0.11 K/UL (ref 0–0.2)
BASOPHILS NFR BLD: 1.1 % (ref 0–1.9)
BILIRUB SERPL-MCNC: 0.7 MG/DL (ref 0.1–1)
BUN SERPL-MCNC: 28 MG/DL (ref 6–20)
CALCIUM SERPL-MCNC: 8.6 MG/DL (ref 8.7–10.5)
CHLORIDE SERPL-SCNC: 104 MMOL/L (ref 95–110)
CO2 SERPL-SCNC: 20 MMOL/L (ref 23–29)
CREAT SERPL-MCNC: 2.1 MG/DL (ref 0.5–1.4)
DIFFERENTIAL METHOD: ABNORMAL
EOSINOPHIL # BLD AUTO: 0.5 K/UL (ref 0–0.5)
EOSINOPHIL NFR BLD: 5.4 % (ref 0–8)
ERYTHROCYTE [DISTWIDTH] IN BLOOD BY AUTOMATED COUNT: 14 % (ref 11.5–14.5)
EST. GFR  (AFRICAN AMERICAN): 38.9 ML/MIN/1.73 M^2
EST. GFR  (NON AFRICAN AMERICAN): 33.7 ML/MIN/1.73 M^2
GLUCOSE SERPL-MCNC: 121 MG/DL (ref 70–110)
HCT VFR BLD AUTO: 30.3 % (ref 40–54)
HGB BLD-MCNC: 10.2 G/DL (ref 14–18)
IMM GRANULOCYTES # BLD AUTO: 0.04 K/UL (ref 0–0.04)
IMM GRANULOCYTES NFR BLD AUTO: 0.4 % (ref 0–0.5)
INR PPP: 1.2 (ref 0.8–1.2)
LYMPHOCYTES # BLD AUTO: 2.2 K/UL (ref 1–4.8)
LYMPHOCYTES NFR BLD: 22.1 % (ref 18–48)
MAGNESIUM SERPL-MCNC: 1.6 MG/DL (ref 1.6–2.6)
MCH RBC QN AUTO: 33 PG (ref 27–31)
MCHC RBC AUTO-ENTMCNC: 33.7 G/DL (ref 32–36)
MCV RBC AUTO: 98 FL (ref 82–98)
MONOCYTES # BLD AUTO: 0.9 K/UL (ref 0.3–1)
MONOCYTES NFR BLD: 8.8 % (ref 4–15)
NEUTROPHILS # BLD AUTO: 6.1 K/UL (ref 1.8–7.7)
NEUTROPHILS NFR BLD: 62.2 % (ref 38–73)
NRBC BLD-RTO: 0 /100 WBC
PHOSPHATE SERPL-MCNC: 2.9 MG/DL (ref 2.7–4.5)
PLATELET # BLD AUTO: 192 K/UL (ref 150–350)
PMV BLD AUTO: 10.3 FL (ref 9.2–12.9)
POTASSIUM SERPL-SCNC: 2.9 MMOL/L (ref 3.5–5.1)
PROT SERPL-MCNC: 5.1 G/DL (ref 6–8.4)
PROTHROMBIN TIME: 13.2 SEC (ref 9–12.5)
RBC # BLD AUTO: 3.09 M/UL (ref 4.6–6.2)
SODIUM SERPL-SCNC: 136 MMOL/L (ref 136–145)
WBC # BLD AUTO: 9.85 K/UL (ref 3.9–12.7)

## 2020-10-17 PROCEDURE — 83735 ASSAY OF MAGNESIUM: CPT | Mod: NTX

## 2020-10-17 PROCEDURE — 36415 COLL VENOUS BLD VENIPUNCTURE: CPT | Mod: NTX

## 2020-10-17 PROCEDURE — 11000001 HC ACUTE MED/SURG PRIVATE ROOM: Mod: NTX

## 2020-10-17 PROCEDURE — 99232 PR SUBSEQUENT HOSPITAL CARE,LEVL II: ICD-10-PCS | Mod: NTX,,, | Performed by: HOSPITALIST

## 2020-10-17 PROCEDURE — 80053 COMPREHEN METABOLIC PANEL: CPT | Mod: NTX

## 2020-10-17 PROCEDURE — 25000003 PHARM REV CODE 250: Mod: NTX | Performed by: HOSPITALIST

## 2020-10-17 PROCEDURE — 25000003 PHARM REV CODE 250: Mod: NTX | Performed by: STUDENT IN AN ORGANIZED HEALTH CARE EDUCATION/TRAINING PROGRAM

## 2020-10-17 PROCEDURE — 85025 COMPLETE CBC W/AUTO DIFF WBC: CPT | Mod: NTX

## 2020-10-17 PROCEDURE — 63600175 PHARM REV CODE 636 W HCPCS: Mod: NTX | Performed by: HOSPITALIST

## 2020-10-17 PROCEDURE — 99232 SBSQ HOSP IP/OBS MODERATE 35: CPT | Mod: NTX,,, | Performed by: HOSPITALIST

## 2020-10-17 PROCEDURE — 63600175 PHARM REV CODE 636 W HCPCS: Mod: NTX | Performed by: STUDENT IN AN ORGANIZED HEALTH CARE EDUCATION/TRAINING PROGRAM

## 2020-10-17 PROCEDURE — 84100 ASSAY OF PHOSPHORUS: CPT | Mod: NTX

## 2020-10-17 PROCEDURE — 85610 PROTHROMBIN TIME: CPT | Mod: NTX

## 2020-10-17 RX ORDER — POTASSIUM CHLORIDE 20 MEQ/1
40 TABLET, EXTENDED RELEASE ORAL EVERY 6 HOURS
Status: COMPLETED | OUTPATIENT
Start: 2020-10-17 | End: 2020-10-17

## 2020-10-17 RX ADMIN — SODIUM BICARBONATE 650 MG TABLET 1300 MG: at 08:10

## 2020-10-17 RX ADMIN — LACTULOSE 15 G: 10 SOLUTION ORAL at 08:10

## 2020-10-17 RX ADMIN — MIDODRINE HYDROCHLORIDE 15 MG: 5 TABLET ORAL at 09:10

## 2020-10-17 RX ADMIN — MIDODRINE HYDROCHLORIDE 15 MG: 5 TABLET ORAL at 02:10

## 2020-10-17 RX ADMIN — SODIUM BICARBONATE 650 MG TABLET 1300 MG: at 02:10

## 2020-10-17 RX ADMIN — POTASSIUM CHLORIDE 40 MEQ: 1500 TABLET, EXTENDED RELEASE ORAL at 06:10

## 2020-10-17 RX ADMIN — PANTOPRAZOLE SODIUM 40 MG: 40 TABLET, DELAYED RELEASE ORAL at 08:10

## 2020-10-17 RX ADMIN — LACTULOSE 15 G: 10 SOLUTION ORAL at 02:10

## 2020-10-17 RX ADMIN — VALACYCLOVIR HYDROCHLORIDE 1000 MG: 500 TABLET, FILM COATED ORAL at 08:10

## 2020-10-17 RX ADMIN — APIXABAN 5 MG: 5 TABLET, FILM COATED ORAL at 08:10

## 2020-10-17 RX ADMIN — FUROSEMIDE 40 MG: 10 INJECTION, SOLUTION INTRAMUSCULAR; INTRAVENOUS at 08:10

## 2020-10-17 RX ADMIN — POTASSIUM CHLORIDE 40 MEQ: 1500 TABLET, EXTENDED RELEASE ORAL at 09:10

## 2020-10-17 RX ADMIN — CIPROFLOXACIN HYDROCHLORIDE 250 MG: 250 TABLET, FILM COATED ORAL at 08:10

## 2020-10-17 RX ADMIN — APIXABAN 5 MG: 5 TABLET, FILM COATED ORAL at 09:10

## 2020-10-17 RX ADMIN — SODIUM BICARBONATE 650 MG TABLET 1300 MG: at 09:10

## 2020-10-17 RX ADMIN — OCTREOTIDE ACETATE 100 MCG: 100 INJECTION, SOLUTION INTRAVENOUS; SUBCUTANEOUS at 02:10

## 2020-10-17 RX ADMIN — MIDODRINE HYDROCHLORIDE 15 MG: 5 TABLET ORAL at 05:10

## 2020-10-17 RX ADMIN — ESCITALOPRAM 5 MG: 5 TABLET, FILM COATED ORAL at 08:10

## 2020-10-17 RX ADMIN — ROPINIROLE HYDROCHLORIDE 0.25 MG: 0.25 TABLET, FILM COATED ORAL at 09:10

## 2020-10-17 RX ADMIN — OCTREOTIDE ACETATE 100 MCG: 100 INJECTION, SOLUTION INTRAVENOUS; SUBCUTANEOUS at 09:10

## 2020-10-17 RX ADMIN — OCTREOTIDE ACETATE 100 MCG: 100 INJECTION, SOLUTION INTRAVENOUS; SUBCUTANEOUS at 05:10

## 2020-10-17 NOTE — PROGRESS NOTES
10/17/20 0025   Vital Signs   Temp 97.9 °F (36.6 °C)   Temp src Oral   Pulse 76   Heart Rate Source Monitor   Resp 16   SpO2 97 %   BP (!) 83/50   MAP (mmHg) 62       Pt resting and aroused for vitals. Pt with asymptomatic hypotension. No distress assessed. Med team L paged. Dr. Spaulding returned call. Md updated on pt current nursing assessment, concern and vitals. No new orders received.

## 2020-10-17 NOTE — PROGRESS NOTES
Progress Note  Hospital Medicine  [facility]       Patient Name: Kenneth Pardo  MRN:  [id number]  Hospital Medicine Team: [facility] MED L Aicha Rojas MD  Date of Admission:  10/13/2020     Length of Stay:  LOS: 3 days   Expected Discharge Date: 10/19/2020  Principal Problem:  Hepatorenal syndrome     Subjective:     Interval History/Overnight Events:    Patient doing well with no acute issues.  Creatinine down trending.  Nephrology recommending IV Lasix which is ordered.  Status post large volume paracentesis with no SBP noted.  ultrasound of right lower extremity shows no further DVT.     [START ON 10/17/2020] apixaban  5 mg Oral BID    ciprofloxacin HCl  250 mg Oral Q24H    escitalopram oxalate  5 mg Oral Daily    furosemide (LASIX) IV  40 mg Intravenous Daily    lactulose  15 g Oral TID    midodrine  15 mg Oral Q8H    octreotide  100 mcg Intravenous Q8H    pantoprazole  40 mg Oral Daily    rOPINIRole  0.25 mg Oral QHS    sodium bicarbonate  1,300 mg Oral TID    valACYclovir  1,000 mg Oral Daily           acetaminophen, ondansetron, sodium chloride 0.9%    Review of Systems   Constitutional: Negative for chills, fatigue, fever.   HENT: Negative for sore throat, trouble swallowing.    Eyes: Negative for photophobia, visual disturbance.   Respiratory: Negative for cough, shortness of breath.    Cardiovascular: Negative for chest pain, palpitations, leg swelling.   Gastrointestinal: Negative for abdominal pain, constipation, diarrhea, nausea, vomiting.  Abdominal rash+ abdominal distention  Endocrine: Negative for cold intolerance, heat intolerance.   Genitourinary: Negative for dysuria, frequency.   Musculoskeletal: Negative for arthralgias, myalgias.  Anasarca  Skin: Negative for rash, wound, erythema   Neurological: Negative for dizziness, syncope, weakness, light-headedness.   Psychiatric/Behavioral: Negative for confusion, hallucinations, anxiety  All other systems  reviewed and are negative.    Objective:     Temp:  [97.3 °F (36.3 °C)-98.2 °F (36.8 °C)]   Pulse:  [72-82]   Resp:  [19-20]   BP: ()/(53-74)   SpO2:  [97 %-100 %]         Weight change:    Body mass index is 34.38 kg/m².       Physical Exam:  Constitutional:   chronically ill looking,  non-distressed, not diaphoretic.   HENT: NC/AT, external ears normal  Eyes: PERRL, EOMI, conjunctiva normal, no discharge b/l,    Neck: normal ROM, supple  CV: RRR, no m/r/g, no carotid bruits, +2 peripheral pulses.  Pulmonary/Chest wall: Breathing comfortably w/o distress, CTAB, no w/r/r, no crackles.     GI: Soft, non-tender, (+) BS, (+) BM   +distended  Musculoskeletal: Normal ROM, no atrophy,  + pitting edema in LE bilaterally + anasarca  Neurological: AAO x 4, no focal deficits noted   No asterixis   Skin: warm, dry  Psych: normal mood and affect, normal behavior, thought content and judgement.    Labs:    Chemistries:   Recent Labs   Lab 10/14/20  0542 10/15/20  0321 10/16/20  0504   * 132* 134*   K 4.6 3.8 3.3*    105 105   CO2 14* 15* 18*   BUN 39* 34* 31*   CREATININE 2.6* 2.8* 2.5*   CALCIUM 8.3* 8.9 8.7   PROT 4.9* 6.1 5.4*   BILITOT 0.8 0.9 0.8   ALKPHOS 135 113 98   ALT 18 14 15   AST 33 27 27   MG 1.5* 1.6 1.8   PHOS 4.1 4.0 3.1        WBC:   Recent Labs   Lab 10/12/20  1007 10/12/20  1700 10/14/20  0045 10/14/20  0542 10/15/20  0321   WBC 12.41 11.70 14.66* 10.11 9.06     Bands:     CBC/Anemia Labs: Coags:    Recent Labs   Lab 10/14/20  0045 10/14/20  0542 10/15/20  0321   WBC 14.66* 10.11 9.06   HGB 11.5* 9.1* 10.3*   HCT 35.3* 27.8* 31.2*    154 194   * 99* 100*   RDW 13.8 13.6 14.0   IRON 78  --   --    FERRITIN 1,984*  --   --     Recent Labs   Lab 10/12/20  1700 10/14/20  0045 10/14/20  0542 10/15/20  0321   INR 1.3* 1.1 1.2 1.3*   APTT 35.5*  --   --   --           Assessment and Plan     Hospital Course:    Mr. Kenneth Pardo was admitted to Hospital Medicine for management of   Hepatorenal syndrome     Active Hospital Problems    Diagnosis  POA    *Hepatorenal syndrome [K76.7]  Yes    Alcohol dependence in remission [F10.21]  Yes    Herpes zoster without complication [B02.9]  Yes    Metabolic acidosis [E87.2]  Yes    Right leg DVT [I82.401]  Yes    Ascites due to alcoholic cirrhosis [K70.31]  Yes    Debility [R53.81]  Yes    Chronic Hepatic encephalopathy [K72.90]  Yes    Decompensated liver disease [K74.69]  Yes    Anasarca [R60.1]  Yes    hx of SBP (spontaneous bacterial peritonitis) [K65.2]  Yes    Macrocytic anemia [D53.9]  Yes    Hypotension [I95.9]  Yes    Hyponatremia [E87.1]  Yes    Anxiety [F41.9]  Yes      Resolved Hospital Problems   No resolved problems to display.       Hepatorenal syndrome   Metabolic acidosis  - patient with recurrent hospitalizations for Sebastian I/ H RS.  Now admitted with creatinine of 3.  Previously discharged with creatinine of 2.  Highly concerning for H RS type 2  - nephrology following  - continue sodium bicarbonate supplementation given metabolic acidosis  - continue octreotide t.i.d. and midodrine 15 mg t.i.d.  - patient had been receiving albumin.  - giving IV Lasix 60 mg on 10/15/2020 at Nephrology's recommendation  - s/p LVP on 10/16 with no SBP noted    Decompensated alcoholic cirrhosis with ascites   hx of SBP (spontaneous bacterial peritonitis)  Alcohol dependence in remission  Chronic Hepatic encephalopathy   Hypotension  Anasarca    MELD-Na score: 20 at 10/16/2020  5:04 AM  MELD score: 18 at 10/16/2020  5:04 AM  Calculated from:  Serum Creatinine: 2.5 mg/dL at 10/16/2020  5:04 AM  Serum Sodium: 134 mmol/L at 10/16/2020  5:04 AM  Total Bilirubin: 0.8 mg/dL (Rounded to 1 mg/dL) at 10/16/2020  5:04 AM  INR(ratio): 1.3 at 10/15/2020  3:21 AM  Age: 60 years 2 months    -  recurrent admissions for decomp liver disease, hepatorenal syndrome, and sequela of liver failure  - PETH negative more recently - negative on 09/29/2020  - patient  was presented to liver transplant committee on 09/16/2020.  He was deferred at that time.  Due to right moral vein DVT, and anticoagulation for that, as well as the need to be re-evaluated in clinic  - hepatology consulted.  Asked to re-evaluate patient for liver transplant listing.  Given ongoing hospitalizations, and hepatorenal syndrome  - plan for paracentesis on 10/15/2020- diagnostic and therapeutic  - treating H RS as above.  Nephrology consulted  - continue lactulose for history of hepatic encephalopathy.  Continue p.o. ciprofloxacin for prophylaxis given history of SBP    Hyponatremia   - hypervolemic hyponatremia.  In setting of underlying liver disease.  - sodium 132-130  - monitor.  Continue fluid restriction      Right Leg DVT  - diagnosed with Nonocclusive deep venous thrombosis of the proximal right femoral vein.  On 09/12/2020.  Had been on apixaban for the DVT.    - unable to list patient for liver transplant due to anticoagulation status  - repeat ultrasound of the right leg - shows no further DVT.  - patient ideally needs 2 more months of anticoagulation therapy, to complete therapy course for provoked DVT.  If anticoagulation becomes an issue in terms of transplantation, anticoagulation could be stopped and IVC filter may be placed before transplant surgery.  Subsequently IVC filter may be removed 6 months later.    Herpes zoster without complication  - patient recently diagnosed with shingles of his right abdomen.  Had been placed on valacyclovir as outpatient.  - lesions have crusted over.  Airborne precautions removed  - complete therapy with valacyclovir- last day on 10/18/2020      Diet:  Low Na with fluid restriction   GI PPx:    DVT PPx:  Home apixaban   Goals of Care:  Full     High Risk Conditions:  HRS    Disposition:    TBD    Patient's note was created using MModal Dictation.  Any errors in syntax may not have been identified and edited on initial review prior to signing this  note.    Signing Physician:     Aicha Rojas MD  Department of Hospital Medicine   [facility]  Pager [phone] [id number] [id number]  10/16/2020

## 2020-10-18 LAB
ALBUMIN SERPL BCP-MCNC: 3.4 G/DL (ref 3.5–5.2)
ALP SERPL-CCNC: 95 U/L (ref 55–135)
ALT SERPL W/O P-5'-P-CCNC: 17 U/L (ref 10–44)
ANION GAP SERPL CALC-SCNC: 9 MMOL/L (ref 8–16)
AST SERPL-CCNC: 35 U/L (ref 10–40)
BACTERIA BLD CULT: NORMAL
BACTERIA BLD CULT: NORMAL
BILIRUB SERPL-MCNC: 0.8 MG/DL (ref 0.1–1)
BUN SERPL-MCNC: 24 MG/DL (ref 6–20)
CALCIUM SERPL-MCNC: 8.5 MG/DL (ref 8.7–10.5)
CHLORIDE SERPL-SCNC: 106 MMOL/L (ref 95–110)
CO2 SERPL-SCNC: 22 MMOL/L (ref 23–29)
CREAT SERPL-MCNC: 1.9 MG/DL (ref 0.5–1.4)
EST. GFR  (AFRICAN AMERICAN): 43.9 ML/MIN/1.73 M^2
EST. GFR  (NON AFRICAN AMERICAN): 38 ML/MIN/1.73 M^2
GLUCOSE SERPL-MCNC: 128 MG/DL (ref 70–110)
MAGNESIUM SERPL-MCNC: 1.4 MG/DL (ref 1.6–2.6)
PHOSPHATE SERPL-MCNC: 2.5 MG/DL (ref 2.7–4.5)
POTASSIUM SERPL-SCNC: 3.5 MMOL/L (ref 3.5–5.1)
PROT SERPL-MCNC: 5 G/DL (ref 6–8.4)
SODIUM SERPL-SCNC: 137 MMOL/L (ref 136–145)

## 2020-10-18 PROCEDURE — 80053 COMPREHEN METABOLIC PANEL: CPT | Mod: NTX

## 2020-10-18 PROCEDURE — 63600175 PHARM REV CODE 636 W HCPCS: Mod: NTX | Performed by: STUDENT IN AN ORGANIZED HEALTH CARE EDUCATION/TRAINING PROGRAM

## 2020-10-18 PROCEDURE — 99232 PR SUBSEQUENT HOSPITAL CARE,LEVL II: ICD-10-PCS | Mod: NTX,,, | Performed by: HOSPITALIST

## 2020-10-18 PROCEDURE — 25000003 PHARM REV CODE 250: Mod: NTX | Performed by: HOSPITALIST

## 2020-10-18 PROCEDURE — 83735 ASSAY OF MAGNESIUM: CPT | Mod: NTX

## 2020-10-18 PROCEDURE — 99232 SBSQ HOSP IP/OBS MODERATE 35: CPT | Mod: NTX,,, | Performed by: HOSPITALIST

## 2020-10-18 PROCEDURE — 84100 ASSAY OF PHOSPHORUS: CPT | Mod: NTX

## 2020-10-18 PROCEDURE — 25000003 PHARM REV CODE 250: Mod: NTX | Performed by: STUDENT IN AN ORGANIZED HEALTH CARE EDUCATION/TRAINING PROGRAM

## 2020-10-18 PROCEDURE — 36415 COLL VENOUS BLD VENIPUNCTURE: CPT | Mod: NTX

## 2020-10-18 PROCEDURE — 63600175 PHARM REV CODE 636 W HCPCS: Mod: NTX | Performed by: HOSPITALIST

## 2020-10-18 PROCEDURE — 11000001 HC ACUTE MED/SURG PRIVATE ROOM: Mod: NTX

## 2020-10-18 RX ORDER — OCTREOTIDE ACETATE 100 UG/ML
100 INJECTION, SOLUTION INTRAVENOUS; SUBCUTANEOUS EVERY 8 HOURS
Status: DISCONTINUED | OUTPATIENT
Start: 2020-10-18 | End: 2020-10-19 | Stop reason: HOSPADM

## 2020-10-18 RX ADMIN — SODIUM BICARBONATE 650 MG TABLET 1300 MG: at 08:10

## 2020-10-18 RX ADMIN — APIXABAN 5 MG: 5 TABLET, FILM COATED ORAL at 10:10

## 2020-10-18 RX ADMIN — LACTULOSE 15 G: 10 SOLUTION ORAL at 08:10

## 2020-10-18 RX ADMIN — LACTULOSE 15 G: 10 SOLUTION ORAL at 10:10

## 2020-10-18 RX ADMIN — LACTULOSE 15 G: 10 SOLUTION ORAL at 03:10

## 2020-10-18 RX ADMIN — OCTREOTIDE ACETATE 100 MCG: 100 INJECTION, SOLUTION INTRAVENOUS; SUBCUTANEOUS at 08:10

## 2020-10-18 RX ADMIN — SODIUM BICARBONATE 650 MG TABLET 1300 MG: at 10:10

## 2020-10-18 RX ADMIN — OCTREOTIDE ACETATE 100 MCG: 100 INJECTION, SOLUTION INTRAVENOUS; SUBCUTANEOUS at 03:10

## 2020-10-18 RX ADMIN — ROPINIROLE HYDROCHLORIDE 0.25 MG: 0.25 TABLET, FILM COATED ORAL at 08:10

## 2020-10-18 RX ADMIN — ESCITALOPRAM 5 MG: 5 TABLET, FILM COATED ORAL at 10:10

## 2020-10-18 RX ADMIN — MIDODRINE HYDROCHLORIDE 15 MG: 5 TABLET ORAL at 08:10

## 2020-10-18 RX ADMIN — OCTREOTIDE ACETATE 100 MCG: 100 INJECTION, SOLUTION INTRAVENOUS; SUBCUTANEOUS at 06:10

## 2020-10-18 RX ADMIN — MIDODRINE HYDROCHLORIDE 15 MG: 5 TABLET ORAL at 10:10

## 2020-10-18 RX ADMIN — VALACYCLOVIR HYDROCHLORIDE 1000 MG: 500 TABLET, FILM COATED ORAL at 10:10

## 2020-10-18 RX ADMIN — APIXABAN 5 MG: 5 TABLET, FILM COATED ORAL at 08:10

## 2020-10-18 RX ADMIN — MIDODRINE HYDROCHLORIDE 15 MG: 5 TABLET ORAL at 06:10

## 2020-10-18 RX ADMIN — PANTOPRAZOLE SODIUM 40 MG: 40 TABLET, DELAYED RELEASE ORAL at 10:10

## 2020-10-18 RX ADMIN — FUROSEMIDE 40 MG: 10 INJECTION, SOLUTION INTRAMUSCULAR; INTRAVENOUS at 10:10

## 2020-10-18 RX ADMIN — MIDODRINE HYDROCHLORIDE 15 MG: 5 TABLET ORAL at 03:10

## 2020-10-18 RX ADMIN — SODIUM BICARBONATE 650 MG TABLET 1300 MG: at 03:10

## 2020-10-18 RX ADMIN — CIPROFLOXACIN HYDROCHLORIDE 250 MG: 250 TABLET, FILM COATED ORAL at 10:10

## 2020-10-18 RX ADMIN — OCTREOTIDE ACETATE 100 MCG: 100 INJECTION, SOLUTION INTRAVENOUS; SUBCUTANEOUS at 10:10

## 2020-10-18 NOTE — PLAN OF CARE
Pt free of any injury or falls, VSS, skin intact. Scheduled and PRN meds given as ordered or when requested. Demonstrated ability to use call light when needed. Bed locked in lowest position. Care plan reviewed w/ patient and education given. Pt is not demonstrating any overt S/S of pain or distress at this time. Will continue to monitor.

## 2020-10-18 NOTE — PROGRESS NOTES
Progress Note  Hospital Medicine  [facility]       Patient Name: Kenneth Pardo  MRN:  [id number]  Hospital Medicine Team: [facility] L Aicha Rojas MD  Date of Admission:  10/13/2020     Length of Stay:  LOS: 4 days   Expected Discharge Date: 10/19/2020  Principal Problem:  Hepatorenal syndrome     Subjective:     Interval History/Overnight Events:    Patient doing well with no acute issues.  Creatinine down trending.  Patient receiving daily IV Lasix.  Patient also on H RS meds Creatinine down to 2.1 today     apixaban  5 mg Oral BID    ciprofloxacin HCl  250 mg Oral Q24H    escitalopram oxalate  5 mg Oral Daily    furosemide (LASIX) IV  40 mg Intravenous Daily    lactulose  15 g Oral TID    midodrine  15 mg Oral Q8H    octreotide  100 mcg Intravenous Q8H    pantoprazole  40 mg Oral Daily    rOPINIRole  0.25 mg Oral QHS    sodium bicarbonate  1,300 mg Oral TID    valACYclovir  1,000 mg Oral Daily           acetaminophen, ondansetron, sodium chloride 0.9%    Review of Systems   Constitutional: Negative for chills, fatigue, fever.   HENT: Negative for sore throat, trouble swallowing.    Eyes: Negative for photophobia, visual disturbance.   Respiratory: Negative for cough, shortness of breath.    Cardiovascular: Negative for chest pain, palpitations, leg swelling.   Gastrointestinal: Negative for abdominal pain, constipation, diarrhea, nausea, vomiting.  Abdominal rash+ abdominal distention  Endocrine: Negative for cold intolerance, heat intolerance.   Genitourinary: Negative for dysuria, frequency.   Musculoskeletal: Negative for arthralgias, myalgias.  Anasarca  Skin: Negative for rash, wound, erythema   Neurological: Negative for dizziness, syncope, weakness, light-headedness.   Psychiatric/Behavioral: Negative for confusion, hallucinations, anxiety  All other systems reviewed and are negative.    Objective:     Temp:  [97.3 °F (36.3 °C)-97.9 °F (36.6 °C)]   Pulse:   [74-84]   Resp:  [16-18]   BP: ()/(50-57)   SpO2:  [97 %-98 %]         Weight change:    Body mass index is 34.38 kg/m².       Physical Exam:  Constitutional:   chronically ill looking,  non-distressed, not diaphoretic.   HENT: NC/AT, external ears normal  Eyes: PERRL, EOMI, conjunctiva normal, no discharge b/l,    Neck: normal ROM, supple  CV: RRR, no m/r/g, no carotid bruits, +2 peripheral pulses.  Pulmonary/Chest wall: Breathing comfortably w/o distress, CTAB, no w/r/r, no crackles.     GI: Soft, non-tender, (+) BS, (+) BM   +distended  Musculoskeletal: Normal ROM, no atrophy,  + pitting edema in LE bilaterally + anasarca  Neurological: AAO x 4, no focal deficits noted   No asterixis   Skin: warm, dry  Psych: normal mood and affect, normal behavior, thought content and judgement.    Labs:    Chemistries:   Recent Labs   Lab 10/15/20  0321 10/16/20  0504 10/17/20  0408   * 134* 136   K 3.8 3.3* 2.9*    105 104   CO2 15* 18* 20*   BUN 34* 31* 28*   CREATININE 2.8* 2.5* 2.1*   CALCIUM 8.9 8.7 8.6*   PROT 6.1 5.4* 5.1*   BILITOT 0.9 0.8 0.7   ALKPHOS 113 98 95   ALT 14 15 14   AST 27 27 27   MG 1.6 1.8 1.6   PHOS 4.0 3.1 2.9        WBC:   Recent Labs   Lab 10/12/20  1700 10/14/20  0045 10/14/20  0542 10/15/20  0321 10/17/20  0408   WBC 11.70 14.66* 10.11 9.06 9.85     Bands:     CBC/Anemia Labs: Coags:    Recent Labs   Lab 10/14/20  0045 10/14/20  0542 10/15/20  0321 10/17/20  0408   WBC 14.66* 10.11 9.06 9.85   HGB 11.5* 9.1* 10.3* 10.2*   HCT 35.3* 27.8* 31.2* 30.3*    154 194 192   * 99* 100* 98   RDW 13.8 13.6 14.0 14.0   IRON 78  --   --   --    FERRITIN 1,984*  --   --   --     Recent Labs   Lab 10/12/20  1700  10/14/20  0542 10/15/20  0321 10/17/20  0408   INR 1.3*   < > 1.2 1.3* 1.2   APTT 35.5*  --   --   --   --     < > = values in this interval not displayed.          Assessment and Plan     Hospital Course:    Mr. Kenneth Pardo was admitted to Hospital Medicine for  management of  Hepatorenal syndrome     Active Hospital Problems    Diagnosis  POA    *Hepatorenal syndrome [K76.7]  Yes    Alcohol dependence in remission [F10.21]  Yes    Herpes zoster without complication [B02.9]  Yes    Metabolic acidosis [E87.2]  Yes    Right leg DVT [I82.401]  Yes    Ascites due to alcoholic cirrhosis [K70.31]  Yes    Debility [R53.81]  Yes    Chronic Hepatic encephalopathy [K72.90]  Yes    Decompensated liver disease [K74.69]  Yes    Anasarca [R60.1]  Yes    hx of SBP (spontaneous bacterial peritonitis) [K65.2]  Yes    Macrocytic anemia [D53.9]  Yes    Hypotension [I95.9]  Yes    Hyponatremia [E87.1]  Yes    Anxiety [F41.9]  Yes      Resolved Hospital Problems   No resolved problems to display.       Hepatorenal syndrome   Metabolic acidosis  - patient with recurrent hospitalizations for Sebastian I/ H RS.  Now admitted with creatinine of 3.  Previously discharged with creatinine of 2.  Highly concerning for H RS type 2  - nephrology following  - continue sodium bicarbonate supplementation given metabolic acidosis  - continue octreotide t.i.d. and midodrine 15 mg t.i.d.  - patient had been receiving albumin.  - giving IV Lasix 60 mg on 10/15/2020 at Nephrology's recommendation  - s/p LVP on 10/16 with no SBP noted  - patient receiving IV Lasix 40 daily.  Creatinine down trending    Decompensated alcoholic cirrhosis with ascites   hx of SBP (spontaneous bacterial peritonitis)  Alcohol dependence in remission  Chronic Hepatic encephalopathy   Hypotension  Anasarca    MELD-Na score: 17 at 10/17/2020  4:08 AM  MELD score: 16 at 10/17/2020  4:08 AM  Calculated from:  Serum Creatinine: 2.1 mg/dL at 10/17/2020  4:08 AM  Serum Sodium: 136 mmol/L at 10/17/2020  4:08 AM  Total Bilirubin: 0.7 mg/dL (Rounded to 1 mg/dL) at 10/17/2020  4:08 AM  INR(ratio): 1.2 at 10/17/2020  4:08 AM  Age: 60 years 2 months    -  recurrent admissions for decomp liver disease, hepatorenal syndrome, and sequela of  liver failure  - PETH negative more recently - negative on 09/29/2020  - patient was presented to liver transplant committee on 09/16/2020.  He was deferred at that time.  Due to right moral vein DVT, and anticoagulation for that, as well as the need to be re-evaluated in clinic  - hepatology consulted.  Asked to re-evaluate patient for liver transplant listing.  Given ongoing hospitalizations, and hepatorenal syndrome  - plan for paracentesis on 10/15/2020- diagnostic and therapeutic  - treating H RS as above.  Nephrology consulted  - continue lactulose for history of hepatic encephalopathy.  Continue p.o. ciprofloxacin for prophylaxis given history of SBP    Hyponatremia   - hypervolemic hyponatremia.  In setting of underlying liver disease.  - sodium 132-130  - monitor.  Continue fluid restriction      Right Leg DVT  - diagnosed with Nonocclusive deep venous thrombosis of the proximal right femoral vein.  On 09/12/2020.  Had been on apixaban for the DVT.    - unable to list patient for liver transplant due to anticoagulation status  - repeat ultrasound of the right leg - shows no further DVT.  - patient ideally needs 2 more months of anticoagulation therapy, to complete therapy course for provoked DVT.  If anticoagulation becomes an issue in terms of transplantation, anticoagulation could be stopped and IVC filter may be placed before transplant surgery.  Subsequently IVC filter may be removed 6 months later.    Herpes zoster without complication  - patient recently diagnosed with shingles of his right abdomen.  Had been placed on valacyclovir as outpatient.  - lesions have crusted over.  Airborne precautions removed  - complete therapy with valacyclovir- last day on 10/18/2020      Diet:  Low Na with fluid restriction   GI PPx:    DVT PPx:  Home apixaban   Goals of Care:  Full     High Risk Conditions:  HRS    Disposition:    TBD    Patient's note was created using MModal Dictation.  Any errors in syntax may not  have been identified and edited on initial review prior to signing this note.    Signing Physician:     Aicha Rojas MD  Department of [facility]   [facility]  Pager [phone] [place] [phone]  10/17/2020

## 2020-10-19 VITALS
HEART RATE: 64 BPM | HEIGHT: 63 IN | OXYGEN SATURATION: 100 % | RESPIRATION RATE: 18 BRPM | TEMPERATURE: 98 F | SYSTOLIC BLOOD PRESSURE: 96 MMHG | BODY MASS INDEX: 34.38 KG/M2 | DIASTOLIC BLOOD PRESSURE: 51 MMHG | WEIGHT: 194 LBS

## 2020-10-19 LAB
ALBUMIN SERPL BCP-MCNC: 3.3 G/DL (ref 3.5–5.2)
ALP SERPL-CCNC: 101 U/L (ref 55–135)
ALT SERPL W/O P-5'-P-CCNC: 17 U/L (ref 10–44)
ANION GAP SERPL CALC-SCNC: 11 MMOL/L (ref 8–16)
AST SERPL-CCNC: 33 U/L (ref 10–40)
BACTERIA BLD CULT: NORMAL
BACTERIA BLD CULT: NORMAL
BASOPHILS # BLD AUTO: 0.18 K/UL (ref 0–0.2)
BASOPHILS NFR BLD: 1.5 % (ref 0–1.9)
BILIRUB SERPL-MCNC: 0.7 MG/DL (ref 0.1–1)
BUN SERPL-MCNC: 25 MG/DL (ref 6–20)
CALCIUM SERPL-MCNC: 8.5 MG/DL (ref 8.7–10.5)
CHLORIDE SERPL-SCNC: 102 MMOL/L (ref 95–110)
CO2 SERPL-SCNC: 22 MMOL/L (ref 23–29)
CREAT SERPL-MCNC: 1.9 MG/DL (ref 0.5–1.4)
DIFFERENTIAL METHOD: ABNORMAL
EOSINOPHIL # BLD AUTO: 0.7 K/UL (ref 0–0.5)
EOSINOPHIL NFR BLD: 5.6 % (ref 0–8)
ERYTHROCYTE [DISTWIDTH] IN BLOOD BY AUTOMATED COUNT: 14.8 % (ref 11.5–14.5)
EST. GFR  (AFRICAN AMERICAN): 43.9 ML/MIN/1.73 M^2
EST. GFR  (NON AFRICAN AMERICAN): 38 ML/MIN/1.73 M^2
GLUCOSE SERPL-MCNC: 125 MG/DL (ref 70–110)
HCT VFR BLD AUTO: 34.6 % (ref 40–54)
HGB BLD-MCNC: 11.5 G/DL (ref 14–18)
IMM GRANULOCYTES # BLD AUTO: 0.08 K/UL (ref 0–0.04)
IMM GRANULOCYTES NFR BLD AUTO: 0.7 % (ref 0–0.5)
INR PPP: 1.2 (ref 0.8–1.2)
LYMPHOCYTES # BLD AUTO: 2.7 K/UL (ref 1–4.8)
LYMPHOCYTES NFR BLD: 22.3 % (ref 18–48)
MAGNESIUM SERPL-MCNC: 1.4 MG/DL (ref 1.6–2.6)
MCH RBC QN AUTO: 32.6 PG (ref 27–31)
MCHC RBC AUTO-ENTMCNC: 33.2 G/DL (ref 32–36)
MCV RBC AUTO: 98 FL (ref 82–98)
MONOCYTES # BLD AUTO: 1.2 K/UL (ref 0.3–1)
MONOCYTES NFR BLD: 9.7 % (ref 4–15)
NEUTROPHILS # BLD AUTO: 7.2 K/UL (ref 1.8–7.7)
NEUTROPHILS NFR BLD: 60.2 % (ref 38–73)
NRBC BLD-RTO: 0 /100 WBC
PHOSPHATE SERPL-MCNC: 2.6 MG/DL (ref 2.7–4.5)
PLATELET # BLD AUTO: 241 K/UL (ref 150–350)
PMV BLD AUTO: 10.3 FL (ref 9.2–12.9)
POTASSIUM SERPL-SCNC: 2.9 MMOL/L (ref 3.5–5.1)
PROT SERPL-MCNC: 5 G/DL (ref 6–8.4)
PROTHROMBIN TIME: 12.9 SEC (ref 9–12.5)
RBC # BLD AUTO: 3.53 M/UL (ref 4.6–6.2)
SODIUM SERPL-SCNC: 135 MMOL/L (ref 136–145)
WBC # BLD AUTO: 11.91 K/UL (ref 3.9–12.7)

## 2020-10-19 PROCEDURE — 80053 COMPREHEN METABOLIC PANEL: CPT | Mod: NTX

## 2020-10-19 PROCEDURE — 99239 PR HOSPITAL DISCHARGE DAY,>30 MIN: ICD-10-PCS | Mod: NTX,,, | Performed by: HOSPITALIST

## 2020-10-19 PROCEDURE — 36415 COLL VENOUS BLD VENIPUNCTURE: CPT | Mod: NTX

## 2020-10-19 PROCEDURE — 25000003 PHARM REV CODE 250: Mod: NTX | Performed by: STUDENT IN AN ORGANIZED HEALTH CARE EDUCATION/TRAINING PROGRAM

## 2020-10-19 PROCEDURE — 25000003 PHARM REV CODE 250: Mod: NTX | Performed by: HOSPITALIST

## 2020-10-19 PROCEDURE — 99239 HOSP IP/OBS DSCHRG MGMT >30: CPT | Mod: NTX,,, | Performed by: HOSPITALIST

## 2020-10-19 PROCEDURE — 84100 ASSAY OF PHOSPHORUS: CPT | Mod: NTX

## 2020-10-19 PROCEDURE — 85610 PROTHROMBIN TIME: CPT | Mod: NTX

## 2020-10-19 PROCEDURE — 85025 COMPLETE CBC W/AUTO DIFF WBC: CPT | Mod: NTX

## 2020-10-19 PROCEDURE — 63600175 PHARM REV CODE 636 W HCPCS: Mod: NTX | Performed by: HOSPITALIST

## 2020-10-19 PROCEDURE — 83735 ASSAY OF MAGNESIUM: CPT | Mod: NTX

## 2020-10-19 RX ORDER — LACTULOSE 10 G/15ML
10 SOLUTION ORAL; RECTAL 2 TIMES DAILY
Qty: 900 ML | Refills: 2
Start: 2020-10-19 | End: 2020-11-18

## 2020-10-19 RX ORDER — CIPROFLOXACIN 500 MG/1
500 TABLET ORAL EVERY 24 HOURS
Status: DISCONTINUED | OUTPATIENT
Start: 2020-10-19 | End: 2020-10-19 | Stop reason: HOSPADM

## 2020-10-19 RX ORDER — SODIUM BICARBONATE 650 MG/1
650 TABLET ORAL 2 TIMES DAILY
Qty: 60 TABLET | Refills: 2 | Status: SHIPPED | OUTPATIENT
Start: 2020-10-19 | End: 2020-11-18 | Stop reason: SDUPTHER

## 2020-10-19 RX ORDER — MAGNESIUM SULFATE HEPTAHYDRATE 40 MG/ML
2 INJECTION, SOLUTION INTRAVENOUS ONCE
Status: COMPLETED | OUTPATIENT
Start: 2020-10-19 | End: 2020-10-19

## 2020-10-19 RX ORDER — POTASSIUM CHLORIDE 20 MEQ/1
40 TABLET, EXTENDED RELEASE ORAL ONCE
Status: COMPLETED | OUTPATIENT
Start: 2020-10-19 | End: 2020-10-19

## 2020-10-19 RX ADMIN — OCTREOTIDE ACETATE 100 MCG: 100 INJECTION, SOLUTION INTRAVENOUS; SUBCUTANEOUS at 01:10

## 2020-10-19 RX ADMIN — POTASSIUM CHLORIDE 40 MEQ: 1500 TABLET, EXTENDED RELEASE ORAL at 01:10

## 2020-10-19 RX ADMIN — POTASSIUM CHLORIDE 40 MEQ: 1500 TABLET, EXTENDED RELEASE ORAL at 09:10

## 2020-10-19 RX ADMIN — SODIUM BICARBONATE 650 MG TABLET 1300 MG: at 09:10

## 2020-10-19 RX ADMIN — LACTULOSE 15 G: 10 SOLUTION ORAL at 09:10

## 2020-10-19 RX ADMIN — SODIUM BICARBONATE 650 MG TABLET 1300 MG: at 01:10

## 2020-10-19 RX ADMIN — CIPROFLOXACIN 500 MG: 500 TABLET, FILM COATED ORAL at 09:10

## 2020-10-19 RX ADMIN — OCTREOTIDE ACETATE 100 MCG: 100 INJECTION, SOLUTION INTRAVENOUS; SUBCUTANEOUS at 06:10

## 2020-10-19 RX ADMIN — APIXABAN 5 MG: 5 TABLET, FILM COATED ORAL at 09:10

## 2020-10-19 RX ADMIN — LACTULOSE 15 G: 10 SOLUTION ORAL at 01:10

## 2020-10-19 RX ADMIN — MIDODRINE HYDROCHLORIDE 15 MG: 5 TABLET ORAL at 06:10

## 2020-10-19 RX ADMIN — ESCITALOPRAM 5 MG: 5 TABLET, FILM COATED ORAL at 09:10

## 2020-10-19 RX ADMIN — MIDODRINE HYDROCHLORIDE 15 MG: 5 TABLET ORAL at 01:10

## 2020-10-19 RX ADMIN — PANTOPRAZOLE SODIUM 40 MG: 40 TABLET, DELAYED RELEASE ORAL at 09:10

## 2020-10-19 RX ADMIN — MAGNESIUM SULFATE 2 G: 2 INJECTION INTRAVENOUS at 10:10

## 2020-10-19 NOTE — ASSESSMENT & PLAN NOTE
Mr. Pardo is a 61 y/o man with previously described history, physical exam, laboratories and imaging studies being consulted for concerns of HRS in the setting of [facility].   - HRS: patient without acute HRS type 1 at the moment. No precipiating event for HRS such as bacterial infection, GIB, large volume paracentesis w/o albumin infusion or acute alcohol intoxication. Therefore, if one of the aforementioned causes were present, this patient hemodynamics would be compromised which is not the case. This leans towards for HRS type 2 if no precipitating event.      Recommendations:  - Cr stable   - Continue Midodrine 15 mg tid, Octreotide 100 mcg tid   - Daily renal panel  - Strict I/O and chart  - Avoid nephrotoxic medications, NSAIDs, IV contrast, etc.  - Daily weights and chart  - Medication doses adjusted to GFR  - Hb > 7 gm/dL

## 2020-10-19 NOTE — SUBJECTIVE & OBJECTIVE
Interval History: seen at the bedside no event overnight     Review of patient's allergies indicates:  No Known Allergies  Current Facility-Administered Medications   Medication Frequency    acetaminophen tablet 325 mg Q8H PRN    apixaban tablet 5 mg BID    ciprofloxacin HCl tablet 500 mg Q24H    escitalopram oxalate tablet 5 mg Daily    lactulose 20 gram/30 mL solution Soln 15 g TID    midodrine tablet 15 mg Q8H    octreotide injection 100 mcg Q8H    ondansetron tablet 4 mg Q8H PRN    pantoprazole EC tablet 40 mg Daily    rOPINIRole tablet 0.25 mg QHS    sodium bicarbonate tablet 1,300 mg TID    sodium chloride 0.9% flush 10 mL PRN       Objective:     Vital Signs (Most Recent):  Temp: 98.2 °F (36.8 °C) (10/19/20 0913)  Pulse: 64 (10/19/20 0913)  Resp: 18 (10/19/20 0415)  BP: (!) 96/51 (10/19/20 0913)  SpO2: 100 % (10/19/20 0913)  O2 Device (Oxygen Therapy): room air (10/19/20 0415) Vital Signs (24h Range):  Temp:  [98 °F (36.7 °C)-98.3 °F (36.8 °C)] 98.2 °F (36.8 °C)  Pulse:  [63-78] 64  Resp:  [18] 18  SpO2:  [96 %-100 %] 100 %  BP: ()/(50-55) 96/51     Weight: 88 kg (194 lb 0.1 oz) (10/14/20 1117)  Body mass index is 34.38 kg/m².  Body surface area is 1.98 meters squared.    I/O last 3 completed shifts:  In: 1080 [P.O.:1080]  Out: -     Physical Exam  Vitals signs and nursing note reviewed.   Constitutional:       General: He is not in acute distress.  Neck:      Musculoskeletal: Normal range of motion.   Cardiovascular:      Rate and Rhythm: Normal rate and regular rhythm.   Pulmonary:      Effort: Pulmonary effort is normal. No respiratory distress.      Breath sounds: No wheezing or rhonchi.   Abdominal:      General: There is distension.   Musculoskeletal:      Right lower leg: Edema present.      Left lower leg: Edema present.   Skin:     Comments: Abdomen LUQ crusted vesicles with a trajectory towards left flank   Neurological:      General: No focal deficit present.      Mental Status:  He is alert.         Significant Labs:  BMP:   Recent Labs   Lab 10/19/20  0457   *      CO2 22*   BUN 25*   CREATININE 1.9*   CALCIUM 8.5*   MG 1.4*     All labs within the past 24 hours have been reviewed.     Significant Imaging:  bmp

## 2020-10-19 NOTE — PLAN OF CARE
Problem: Adult Inpatient Plan of Care  Goal: Plan of Care Review  Outcome: Met  Goal: Patient-Specific Goal (Individualization)  Outcome: Met  Goal: Absence of Hospital-Acquired Illness or Injury  Outcome: Met  Goal: Optimal Comfort and Wellbeing  Outcome: Met  Goal: Readiness for Transition of Care  Outcome: Met  Goal: Rounds/Family Conference  Outcome: Met     Problem: Adjustment to Illness (Sepsis/Septic Shock)  Goal: Optimal Coping  Outcome: Met     Problem: Bleeding (Sepsis/Septic Shock)  Goal: Absence of Bleeding  Outcome: Met     Problem: Glycemic Control Impaired (Sepsis/Septic Shock)  Goal: Blood Glucose Level Within Desired Range  Outcome: Met     Problem: Hemodynamic Instability (Sepsis/Septic Shock)  Goal: Effective Tissue Perfusion  Outcome: Met     Problem: Infection (Sepsis/Septic Shock)  Goal: Absence of Infection Signs/Symptoms  Outcome: Met     Problem: Nutrition Impaired (Sepsis/Septic Shock)  Goal: Optimal Nutrition Intake  Outcome: Met     Problem: Respiratory Compromise (Sepsis/Septic Shock)  Goal: Effective Oxygenation and Ventilation  Outcome: Met     Problem: Electrolyte Imbalance (Acute Kidney Injury/Impairment)  Goal: Serum Electrolyte Balance  Outcome: Met     Problem: Fluid Imbalance (Acute Kidney Injury/Impairment)  Goal: Optimal Fluid Balance  Outcome: Met     Problem: Hematologic Alteration (Acute Kidney Injury/Impairment)  Goal: Hemoglobin, Hematocrit and Platelets Within Normal Range  Outcome: Met     Problem: Oral Intake Inadequate (Acute Kidney Injury/Impairment)  Goal: Optimal Nutrition Intake  Outcome: Met     Problem: Renal Function Impairment (Acute Kidney Injury/Impairment)  Goal: Effective Renal Function  Outcome: Met     Problem: Fall Injury Risk  Goal: Absence of Fall and Fall-Related Injury  Outcome: Met     Problem: Infection  Goal: Infection Symptom Resolution  Outcome: Met     Problem: Skin Injury Risk Increased  Goal: Skin Health and Integrity  Outcome: Met

## 2020-10-19 NOTE — PLAN OF CARE
SW was informed that patient is in need of transportation. SW spoke with patient via room phone to verify address. SW spoke with pt's nurse Tanja #[id number] via phone call to discuss transport time set-up. SW arranged wheelchair transport via [facility]. Requested  time is 3:00 pm. Requested  time does not guarantee arrival time.    Gloria Alex LMSW  Case Management   [facility]   Ext. [phone]

## 2020-10-19 NOTE — PLAN OF CARE
Pt discharged home.  SW set up transportation as pt needed a ride home.  CM scheduled follow up appointments.      Future Appointments   Date Time Provider Department Center   10/21/2020  9:30 AM Mainor Madison MD [facility]   10/26/2020  9:50 AM LAB, [facility] to follow for discharge planning needs.  NETTE Stout RN  Case Management  [phone]        10/19/20 1526   Final Note   Assessment Type Final Discharge Note   Anticipated Discharge Disposition Home   Hospital Follow Up  Appt(s) scheduled? Yes   Discharge plans and expectations educations in teach back method with documentation complete? Yes   Post-Acute Status   Post-Acute Authorization Other   Other Status No Post-Acute Service Needs   Discharge Delays None known at this time

## 2020-10-19 NOTE — PROGRESS NOTES
Progress Note  Hospital Medicine  [facility]       Patient Name: Kenneth Pardo  MRN:  [id number]  Hospital Medicine Team: [facility]OS[facility] MED L Aicha Rojas MD  Date of Admission:  10/13/2020     Length of Stay:  LOS: 5 days   Expected Discharge Date: 10/19/2020  Principal Problem:  Hepatorenal syndrome     Subjective:     Interval History/Overnight Events:    Patient doing well with no acute issues.  Creatinine down to 1.9.  LE edema resolved.  No distension      apixaban  5 mg Oral BID    ciprofloxacin HCl  250 mg Oral Q24H    escitalopram oxalate  5 mg Oral Daily    lactulose  15 g Oral TID    midodrine  15 mg Oral Q8H    octreotide  100 mcg Subcutaneous Q8H    pantoprazole  40 mg Oral Daily    rOPINIRole  0.25 mg Oral QHS    sodium bicarbonate  1,300 mg Oral TID           acetaminophen, ondansetron, sodium chloride 0.9%    Review of Systems   Constitutional: Negative for chills, fatigue, fever.   HENT: Negative for sore throat, trouble swallowing.    Eyes: Negative for photophobia, visual disturbance.   Respiratory: Negative for cough, shortness of breath.    Cardiovascular: Negative for chest pain, palpitations, leg swelling.   Gastrointestinal: Negative for abdominal pain, constipation, diarrhea, nausea, vomiting.  Abdominal rash+ abdominal distention  Endocrine: Negative for cold intolerance, heat intolerance.   Genitourinary: Negative for dysuria, frequency.   Musculoskeletal: Negative for arthralgias, myalgias.  Anasarca  Skin: Negative for rash, wound, erythema   Neurological: Negative for dizziness, syncope, weakness, light-headedness.   Psychiatric/Behavioral: Negative for confusion, hallucinations, anxiety  All other systems reviewed and are negative.    Objective:     Temp:  [98 °F (36.7 °C)-98.2 °F (36.8 °C)]   Pulse:  [76-80]   Resp:  [18]   BP: (88-91)/(51-58)   SpO2:  [95 %-96 %]         Weight change:    Body mass index is 34.38 kg/m².       Physical  Exam:  Constitutional:   chronically ill looking,  non-distressed, not diaphoretic.   HENT: NC/AT, external ears normal  Eyes: PERRL, EOMI, conjunctiva normal, no discharge b/l,    Neck: normal ROM, supple  CV: RRR, no m/r/g, no carotid bruits, +2 peripheral pulses.  Pulmonary/Chest wall: Breathing comfortably w/o distress, CTAB, no w/r/r, no crackles.     GI: Soft, non-tender, (+) BS, (+) BM   +distended  Musculoskeletal: Normal ROM, no atrophy,  + pitting edema in LE bilaterally + anasarca  Neurological: AAO x 4, no focal deficits noted   No asterixis   Skin: warm, dry  Psych: normal mood and affect, normal behavior, thought content and judgement.    Labs:    Chemistries:   Recent Labs   Lab 10/16/20  0504 10/17/20  0408 10/18/20  0511   * 136 137   K 3.3* 2.9* 3.5    104 106   CO2 18* 20* 22*   BUN 31* 28* 24*   CREATININE 2.5* 2.1* 1.9*   CALCIUM 8.7 8.6* 8.5*   PROT 5.4* 5.1* 5.0*   BILITOT 0.8 0.7 0.8   ALKPHOS 98 95 95   ALT 15 14 17   AST 27 27 35   MG 1.8 1.6 1.4*   PHOS 3.1 2.9 2.5*        WBC:   Recent Labs   Lab 10/12/20  1700 10/14/20  0045 10/14/20  0542 10/15/20  0321 10/17/20  0408   WBC 11.70 14.66* 10.11 9.06 9.85     Bands:     CBC/Anemia Labs: Coags:    Recent Labs   Lab 10/14/20  0045 10/14/20  0542 10/15/20  0321 10/17/20  0408   WBC 14.66* 10.11 9.06 9.85   HGB 11.5* 9.1* 10.3* 10.2*   HCT 35.3* 27.8* 31.2* 30.3*    154 194 192   * 99* 100* 98   RDW 13.8 13.6 14.0 14.0   IRON 78  --   --   --    FERRITIN 1,984*  --   --   --     Recent Labs   Lab 10/12/20  1700  10/14/20  0542 10/15/20  0321 10/17/20  0408   INR 1.3*   < > 1.2 1.3* 1.2   APTT 35.5*  --   --   --   --     < > = values in this interval not displayed.          Assessment and Plan     Hospital Course:    Mr. Kenneth Pardo was admitted to Hospital Medicine for management of  Hepatorenal syndrome     Active Hospital Problems    Diagnosis  POA    *Hepatorenal syndrome [K76.7]  Yes    Alcohol dependence in  remission [F10.21]  Yes    Herpes zoster without complication [B02.9]  Yes    Metabolic acidosis [E87.2]  Yes    Right leg DVT [I82.401]  Yes    Ascites due to alcoholic cirrhosis [K70.31]  Yes    Debility [R53.81]  Yes    Chronic Hepatic encephalopathy [K72.90]  Yes    Decompensated liver disease [K74.69]  Yes    Anasarca [R60.1]  Yes    hx of SBP (spontaneous bacterial peritonitis) [K65.2]  Yes    Macrocytic anemia [D53.9]  Yes    Hypotension [I95.9]  Yes    Hyponatremia [E87.1]  Yes    Anxiety [F41.9]  Yes      Resolved Hospital Problems   No resolved problems to display.       Hepatorenal syndrome   Metabolic acidosis  - patient with recurrent hospitalizations for Sebastian I/ H RS.  Now admitted with creatinine of 3.  Previously discharged with creatinine of 2.  Highly concerning for H RS type 2  - nephrology following  - continue sodium bicarbonate supplementation given metabolic acidosis  - continue octreotide t.i.d. and midodrine 15 mg t.i.d.  - patient had been receiving albumin.  - giving IV Lasix 60 mg on 10/15/2020 at Nephrology's recommendation  - s/p LVP on 10/16 with no SBP noted  - patient receiving IV Lasix 40 daily.  Creatinine down trending- lasix stopped on 10/19 as edema resolved     Decompensated alcoholic cirrhosis with ascites   hx of SBP (spontaneous bacterial peritonitis)  Alcohol dependence in remission  Chronic Hepatic encephalopathy   Hypotension  Anasarca    MELD-Na score: 15 at 10/18/2020  5:11 AM  MELD score: 15 at 10/18/2020  5:11 AM  Calculated from:  Serum Creatinine: 1.9 mg/dL at 10/18/2020  5:11 AM  Serum Sodium: 137 mmol/L at 10/18/2020  5:11 AM  Total Bilirubin: 0.8 mg/dL (Rounded to 1 mg/dL) at 10/18/2020  5:11 AM  INR(ratio): 1.2 at 10/17/2020  4:08 AM  Age: 60 years 2 months    -  recurrent admissions for decomp liver disease, hepatorenal syndrome, and sequela of liver failure  - PETH negative more recently - negative on 09/29/2020  - patient was presented to liver  transplant committee on 09/16/2020.  He was deferred at that time.  Due to right moral vein DVT, and anticoagulation for that, as well as the need to be re-evaluated in clinic  - hepatology consulted.  Asked to re-evaluate patient for liver transplant listing.  Given ongoing hospitalizations, and hepatorenal syndrome  - plan for paracentesis on 10/15/2020- diagnostic and therapeutic  - treating H RS as above.  Nephrology consulted  - continue lactulose for history of hepatic encephalopathy.  Continue p.o. ciprofloxacin for prophylaxis given history of SBP    Hyponatremia   - hypervolemic hyponatremia.  In setting of underlying liver disease.  - sodium 132-130  - monitor.  Continue fluid restriction      Right Leg DVT  - diagnosed with Nonocclusive deep venous thrombosis of the proximal right femoral vein.  On 09/12/2020.  Had been on apixaban for the DVT.    - unable to list patient for liver transplant due to anticoagulation status  - repeat ultrasound of the right leg - shows no further DVT.  - patient ideally needs 2 more months of anticoagulation therapy, to complete therapy course for provoked DVT.  If anticoagulation becomes an issue in terms of transplantation, anticoagulation could be stopped and IVC filter may be placed before transplant surgery.  Subsequently IVC filter may be removed 6 months later.    Herpes zoster without complication  - patient recently diagnosed with shingles of his right abdomen.  Had been placed on valacyclovir as outpatient.  - lesions have crusted over.  Airborne precautions removed  - complete therapy with valacyclovir- last day on 10/18/2020      Diet:  Low Na with fluid restriction   GI PPx:    DVT PPx:  Home apixaban   Goals of Care:  Full     High Risk Conditions:  HRS    Disposition:    10/19- 10/20    Patient's note was created using MModal Dictation.  Any errors in syntax may not have been identified and edited on initial review prior to signing this note.    Signing  Physician:     Aicha Rojas MD  Department of [facility]   [facility]  Pager [phone] [phone]  10/18/2020

## 2020-10-19 NOTE — PROGRESS NOTES
[facility] - ICU 14 WT  Nephrology  Progress Note    Patient Name: Kenneth Pardo  MRN: [id number]  Admission Date: 10/13/2020  Hospital Length of Stay: 6 days  Attending Provider: Aicha Rojas MD   Primary Care Physician: Neva Nielsen MD  Principal Problem:Hepatorenal syndrome    Subjective:     HPI: A 61 y/o  male with no known history of CKD, and PMHx of HTN, EtOH cirrhosis complicated by portal hypertension, ascites, portal hypertensive gastropathy, was referred to this institution after being contacted by his PCP for abnormal lab results. Patient was [place] [facility] [place] being admitted for decompensated hepatic cirrhosis and concerns for HRS which Nephrology was following. Cr at discharged was 2.0, yesterday on admission 3.0.  Patient admitted not following a fluid restricted diet once discharged (freely drinking water, juices, tea etc). Denies fever, chills, SOB, chest pain, N/V, problems with urination. Does complains of lower extremity edema and abdominal distention.     Nephrology consulted for management of MERLY        Interval History: seen at the bedside no event overnight     Review of patient's allergies indicates:  No Known Allergies  Current Facility-Administered Medications   Medication Frequency    acetaminophen tablet 325 mg Q8H PRN    apixaban tablet 5 mg BID    ciprofloxacin HCl tablet 500 mg Q24H    escitalopram oxalate tablet 5 mg Daily    lactulose 20 gram/30 mL solution Soln 15 g TID    midodrine tablet 15 mg Q8H    octreotide injection 100 mcg Q8H    ondansetron tablet 4 mg Q8H PRN    pantoprazole EC tablet 40 mg Daily    rOPINIRole tablet 0.25 mg QHS    sodium bicarbonate tablet 1,300 mg TID    sodium chloride 0.9% flush 10 mL PRN       Objective:     Vital Signs (Most Recent):  Temp: 98.2 °F (36.8 °C) (10/19/20 0913)  Pulse: 64 (10/19/20 0913)  Resp: 18 (10/19/20 0415)  BP: (!) 96/51 (10/19/20 0913)  SpO2: 100 % (10/19/20 0913)  O2  Device (Oxygen Therapy): room air (10/19/20 0415) Vital Signs (24h Range):  Temp:  [98 °F (36.7 °C)-98.3 °F (36.8 °C)] 98.2 °F (36.8 °C)  Pulse:  [63-78] 64  Resp:  [18] 18  SpO2:  [96 %-100 %] 100 %  BP: ()/(50-55) 96/51     Weight: 88 kg (194 lb 0.1 oz) (10/14/20 1117)  Body mass index is 34.38 kg/m².  Body surface area is 1.98 meters squared.    I/O last 3 completed shifts:  In: 1080 [P.O.:1080]  Out: -     Physical Exam  Vitals signs and nursing note reviewed.   Constitutional:       General: He is not in acute distress.  Neck:      Musculoskeletal: Normal range of motion.   Cardiovascular:      Rate and Rhythm: Normal rate and regular rhythm.   Pulmonary:      Effort: Pulmonary effort is normal. No respiratory distress.      Breath sounds: No wheezing or rhonchi.   Abdominal:      General: There is distension.   Musculoskeletal:      Right lower leg: Edema present.      Left lower leg: Edema present.   Skin:     Comments: Abdomen LUQ crusted vesicles with a trajectory towards left flank   Neurological:      General: No focal deficit present.      Mental Status: He is alert.         Significant Labs:  BMP:   Recent Labs   Lab 10/19/20  0457   *      CO2 22*   BUN 25*   CREATININE 1.9*   CALCIUM 8.5*   MG 1.4*     All labs within the past 24 hours have been reviewed.     Significant Imaging:  bmp    Assessment/Plan:     * Hepatorenal syndrome  Mr. Pardo is a 59 y/o man with previously described history, physical exam, laboratories and imaging studies being consulted for concerns of HRS in the setting of MERLY.   - HRS: patient without acute HRS type 1 at the moment. No precipiating event for HRS such as bacterial infection, GIB, large volume paracentesis w/o albumin infusion or acute alcohol intoxication. Therefore, if one of the aforementioned causes were present, this patient hemodynamics would be compromised which is not the case. This leans towards for HRS type 2 if no precipitating event.       Recommendations:  - Cr stable   - Continue Midodrine 15 mg tid, Octreotide 100 mcg tid   - Daily renal panel  - Strict I/O and chart  - Avoid nephrotoxic medications, NSAIDs, IV contrast, etc.  - Daily weights and chart  - Medication doses adjusted to GFR  - Hb > 7 gm/dL      Metabolic acidosis  NAGMA most likely 2/2 to gastrointestinal bicarbonate loss (pt on Lactulose)    Recommendations:   - Continue sodium bicarbonate 1300 mg tid    Ascites due to alcoholic cirrhosis  - Managed by primary team  - Hepatology following        Thank you for your consult. I will sign off. Please contact us if you have any additional questions.    Shavon Brumfield MD  Nephrology  [facility] - ICU 14 WT

## 2020-10-19 NOTE — DISCHARGE SUMMARY
DISCHARGE SUMMARY  Hospital Medicine    [place] [facility]    Patient Name: Kenneth Pardo  YOB: 1960    Admit Date: 10/13/2020    Discharge Date: 10/19/2020    Discharge Attending Physician: Aicha Rojas MD     Chief Complaint: Hepatorenal syndrome     Princilpal Diagnoses:  Active Hospital Problems    Diagnosis  POA    *Hepatorenal syndrome [K76.7]  Yes    Alcohol dependence in remission [F10.21]  Yes    Herpes zoster without complication [B02.9]  Yes    Metabolic acidosis [E87.2]  Yes    Right leg DVT [I82.401]  Yes    Ascites due to alcoholic cirrhosis [K70.31]  Yes    Debility [R53.81]  Yes    Chronic Hepatic encephalopathy [K72.90]  Yes    Decompensated liver disease [K74.69]  Yes    Anasarca [R60.1]  Yes    hx of SBP (spontaneous bacterial peritonitis) [K65.2]  Yes    Macrocytic anemia [D53.9]  Yes    Hypotension [I95.9]  Yes    Hyponatremia [E87.1]  Yes    Anxiety [F41.9]  Yes      Resolved Hospital Problems   No resolved problems to display.       Discharged Condition: Admit problems have stabilized       HOSPITAL COURSE:      Initial Presentation:    As per admitting provider,      61 yo M with PMHx of HTN, EtOH cirrhosis complicated by portal hypertension, ascites, portal hypertensive gastropathy, and hepatic encephalopathy who presents to the hospital after being contacted about abnormal lab result.  his cr increase to 2.7 from 2 on the discharge. Patient was admitted multiple time recently for same problem. On last admission through this is related to HRS.       Patient has no complains. At time of interview he denied any pain, headache sore throat, SOB, chest pain, abdominal pain, N/V, constipation or diarrhea. He seems not knowing the medication he is taking, including the lactulose. Noted BLEE he stated it seems worsen overtime. He reports he has not been to the hospital in like a month. He was discharge about 4 days ago.       On last admission, nephrology was  on board, patient was treated for HRS with Continue albumin + octreotide + midodrine. Diuresis stopped. For metabolic acidosis he was started on NS bicarb 1300 tid      Course of Principle Problem for Admission:    Hepatorenal syndrome   Metabolic acidosis  - patient with recurrent hospitalizations for Sebastian I/ H RS.  Now admitted with creatinine of 3.  Previously discharged with creatinine of 2.  Highly concerning for H RS type 2  - nephrology following  - continue sodium bicarbonate supplementation given metabolic acidosis  - continue octreotide t.i.d. and midodrine 15 mg t.i.d.  - patient had been receiving albumin.  - giving IV Lasix 60 mg on 10/15/2020 at Nephrology's recommendation  - s/p LVP on 10/16 with no SBP noted  - patient receiving IV Lasix 40 daily.  Creatinine down trending- lasix stopped on 10/19 as edema resolved   - Cr stabilized at 1.9- possible new baseline       On the day of d/c, patient was doing well with no acute issues.   Cr 1.9, stable for two days.  Stable to d/c from nephrology perspective with outpatient labs and close follow up with nephrology, nephro referral placed. D/c off diuretics, LE edema resolved.  Needs outpt LVPs weekly which pt has been already set up for     Physical Exam:  Constitutional:   chronically ill looking,  non-distressed, not diaphoretic.   HENT: NC/AT, external ears normal  Eyes: PERRL, EOMI, conjunctiva normal, no discharge b/l,    Neck: normal ROM, supple  CV: RRR, no m/r/g, no carotid bruits, +2 peripheral pulses.  Pulmonary/Chest wall: Breathing comfortably w/o distress, CTAB, no w/r/r, no crackles.     GI: Soft, non-tender, (+) BS, (+) BM   +less distended  Musculoskeletal: Normal ROM, no atrophy,  resolved  pitting edema in LE bilaterally +resolved   anasarca  Neurological: AAO x 4, no focal deficits noted   No asterixis   Skin: warm, dry  Psych: normal mood and affect, normal behavior, thought content and judgement.    Other Medical Problems Addressed in  the Hospital:    Decompensated alcoholic cirrhosis with ascites   hx of SBP (spontaneous bacterial peritonitis)  Alcohol dependence in remission  Chronic Hepatic encephalopathy   Hypotension  Anasarca     MELD-Na score: 15 at 10/18/2020  5:11 AM  MELD score: 15 at 10/18/2020  5:11 AM  Calculated from:  Serum Creatinine: 1.9 mg/dL at 10/18/2020  5:11 AM  Serum Sodium: 137 mmol/L at 10/18/2020  5:11 AM  Total Bilirubin: 0.8 mg/dL (Rounded to 1 mg/dL) at 10/18/2020  5:11 AM  INR(ratio): 1.2 at 10/17/2020  4:08 AM  Age: 60 years 2 months     -  recurrent admissions for decomp liver disease, hepatorenal syndrome, and sequela of liver failure  - PETH negative more recently - negative on 09/29/2020  - patient was presented to liver transplant committee on 09/16/2020.  He was deferred at that time.  Due to right moral vein DVT, and anticoagulation for that, as well as the need to be re-evaluated in clinic  - hepatology consulted.  Asked to re-evaluate patient for liver transplant listing.  Given ongoing hospitalizations, and hepatorenal syndrome  - plan for paracentesis on 10/15/2020- no SBP noted   - treating H RS as above.  Nephrology consulted  - continue lactulose for history of hepatic encephalopathy.  Continue p.o. ciprofloxacin for prophylaxis given history of SBP  - requires weekly outpatient LVPs  - hepatology clinic f/u and liver txp committee re discussion      Hyponatremia   - hypervolemic hyponatremia.  In setting of underlying liver disease.  - sodium 132-130  - monitor.  Continue fluid restriction        Right Leg DVT  - diagnosed with Nonocclusive deep venous thrombosis of the proximal right femoral vein.  On 09/12/2020.  Had been on apixaban for the DVT.    - unable to list patient for liver transplant due to anticoagulation status  - repeat ultrasound of the right leg - shows no further DVT.  - patient ideally needs 2 more months of anticoagulation therapy, to complete therapy course for provoked DVT.  If  anticoagulation becomes an issue in terms of transplantation, anticoagulation could be stopped and IVC filter may be placed before transplant surgery.  Subsequently IVC filter may be removed 6 months later.  - transplant committee may also consider warfarin and its affects of INR elevation      Herpes zoster without complication  - patient recently diagnosed with shingles of his right abdomen.  Had been placed on valacyclovir as outpatient.  - lesions have crusted over.  Airborne precautions removed  - complete therapy with valacyclovir- last day on 10/18/2020      CONSULTS: hepatology , nephrology     PROCEDURES: LVP    Labs:    Chemistries:   Recent Labs   Lab 10/17/20  0408 10/18/20  0511 10/19/20  0457    137 135*   K 2.9* 3.5 2.9*    106 102   CO2 20* 22* 22*   BUN 28* 24* 25*   CREATININE 2.1* 1.9* 1.9*   CALCIUM 8.6* 8.5* 8.5*   PROT 5.1* 5.0* 5.0*   BILITOT 0.7 0.8 0.7   ALKPHOS 95 95 101   ALT 14 17 17   AST 27 35 33   MG 1.6 1.4* 1.4*   PHOS 2.9 2.5* 2.6*        WBC:   Recent Labs   Lab 10/14/20  0045 10/14/20  0542 10/15/20  0321 10/17/20  0408 10/19/20  0457   WBC 14.66* 10.11 9.06 9.85 11.91     Bands:     CBC/Anemia Labs: Coags:    Recent Labs   Lab 10/14/20  0045  10/15/20  0321 10/17/20  0408 10/19/20  0457   WBC 14.66*   < > 9.06 9.85 11.91   HGB 11.5*   < > 10.3* 10.2* 11.5*   HCT 35.3*   < > 31.2* 30.3* 34.6*      < > 194 192 241   *   < > 100* 98 98   RDW 13.8   < > 14.0 14.0 14.8*   IRON 78  --   --   --   --    FERRITIN 1,984*  --   --   --   --     < > = values in this interval not displayed.    Recent Labs   Lab 10/12/20  1700  10/15/20  0321 10/17/20  0408 10/19/20  0457   INR 1.3*   < > 1.3* 1.2 1.2   APTT 35.5*  --   --   --   --     < > = values in this interval not displayed.          Future Scheduled Appointments:  Future Appointments   Date Time Provider Department Center   10/21/2020  9:30 AM [facility]   10/26/2020  9:50 AM  LAB, [facility] LAB [facility]       Follow-up Plans from This Hospitalization:      Discharge Medication List:       Kenneth Pardo   Home Medication Instructions QIAN:[id number]    Printed on:10/19/20 6012   Medication Information                      apixaban (ELIQUIS) 5 mg Tab  Take 1 tablet (5 mg total) by mouth 2 (two) times daily.             ciprofloxacin HCl (CIPRO) 500 MG tablet  Take 1 tablet (500 mg total) by mouth once daily.             escitalopram oxalate (LEXAPRO) 5 MG Tab  Take 1 tablet (5 mg total) by mouth once daily.             lactulose (CHRONULAC) 10 gram/15 mL solution  Take 15 mLs (10 g total) by mouth 2 (two) times a day. Take enough to have 3 bowel movements per day             midodrine (PROAMATINE) 5 MG Tab  Take 3 tablets (15 mg total) by mouth every 8 (eight) hours.             omeprazole (PRILOSEC) 40 MG capsule  Take 1 capsule (40 mg total) by mouth every morning.             rOPINIRole (REQUIP) 0.25 MG tablet  Take 1 tablet (0.25 mg total) by mouth every evening.             sodium bicarbonate 650 MG tablet  Take 1 tablet (650 mg total) by mouth 2 (two) times daily.             traZODone (DESYREL) 100 MG tablet  Take 1 tablet (100 mg total) by mouth every evening.                   At the time of discharge patient was told to take all medications as prescribed, to keep all followup appointments, and to call their primary care physician or return to the emergency room if they have any worsening or concerning symptoms.    Time spent on the discharge of the patient including review of hospital course with the patient. reviewing discharge medications and arranging follow-up care 45 minutes.  Patient was seen and examined on the date of discharge and determined to be suitable for discharge.        Signing Physician:  Aicha Rojas MD

## 2020-10-20 ENCOUNTER — TELEPHONE (OUTPATIENT)
Dept: TRANSPLANT | Facility: CLINIC | Age: 60
End: 2020-10-20

## 2020-10-20 ENCOUNTER — PATIENT OUTREACH (OUTPATIENT)
Dept: ADMINISTRATIVE | Facility: CLINIC | Age: 60
End: 2020-10-20

## 2020-10-20 DIAGNOSIS — K70.31 ALCOHOLIC CIRRHOSIS OF LIVER WITH ASCITES: Primary | ICD-10-CM

## 2020-10-20 LAB — PHOSPHATIDYLETHANOL (PETH): NEGATIVE NG/ML

## 2020-10-20 NOTE — TELEPHONE ENCOUNTER
In preparation for Liver transplant committee meeting, MAXIMO contacted caregiver to inquire further information of not presenting to the hospital for inpatient eval with Fellow Liver transplant SW Nathan Powell LMSW.  Caregiver reported having trouble with finding transportation, reporting pt's sister was unable to transport due to work. MAXIMO informed caregiver SW needs to meet with caregiver in person in clinic, Caregiver reported pt having appt on Friday with Dr. Madison. SW informed caregiver will inform Tx coordinator for SW to see pt on Friday to provide education and verify caregiver plan. MAXIMO also reminded caregiver to have transportation plan set, due to organ offers will be at any random time, thus having transportation plan is very important during the transplant process. Caregiver verbalized understanding and reported currently working on transportation plan and contacting individuals that could assist with transportation. Caregiver reported will contact pt's other sister to provide transportation for Friday, caregiver also reported will contact Medicaid transportation as well. Caregiver denied any further questions or concerns. MAXIMO remains following and available.

## 2020-10-20 NOTE — TELEPHONE ENCOUNTER
C3 nurse attempted to contact patient. No answer.  C3 nurse attempted to contact Kenneth Pardo for a TCC post hospital discharge follow up call. Left OOC# and name with Blanco Pardo to call back    The patient has an appointment with MD Los  on 10/23/2020 @ 1100. Message sent to Physician staff.

## 2020-10-21 ENCOUNTER — COMMITTEE REVIEW (OUTPATIENT)
Dept: TRANSPLANT | Facility: CLINIC | Age: 60
End: 2020-10-21

## 2020-10-21 LAB — BACTERIA FLD CULT: NORMAL

## 2020-10-21 NOTE — COMMITTEE REVIEW
Kenneth Pardo's case presented to selection committee.  Patient has been deferred for liver transplant. Patient has right leg DVT. Patient needs IVC filter placed.        I was present and agree with the committee's conclusions.

## 2020-10-21 NOTE — PATIENT INSTRUCTIONS
Discharge Instructions for Cirrhosis of the Liver  You have been diagnosed with cirrhosis of the liver. This is a long-term (chronic) problem. It occurs when liver tissue is destroyed and replaced by scar tissue. Causes of cirrhosis include:  · Infection such as viral hepatitis  · Chronic alcoholism  · The bodys immune system attacks healthy cells (autoimmune disorders)  · Obesity  · Medicine side effects  · Genetic diseases  Sometimes the exact cause is unknown. You may not have any symptoms at first. Or your symptoms may be mild. But they usually get worse. Cirrhosis is likely to occur if you have a history of long-term alcohol abuse. Cirrhosis cant be cured. But it can be treated.   Home care  Alcohol  · People with liver disease should not drink alcohol. If you stop drinking, you may feel better and live longer.  · If you are a chronic alcohol user, you will have withdrawal symptoms. Talk with your healthcare provider for more information.    · If alcohol is a problem, ask your provider about medicine that can help you quit drinking.    · Find a local Alcoholics Anonymous support group online at www.aa.org.  Diet  · Ask your provider what kind of diet you should follow. You may be asked to limit or not eat certain foods. Do not limit your protein intake.  · Weigh yourself daily and keep a weight log. If you have a sudden change in weight, call your provider.  · Cut back on salt:  ¨ Limit canned, dried, packaged, and fast foods.  ¨ Dont add salt to your food at the table.  ¨ Season foods with herbs instead of salt when you cook.  Medicines, supplements, and vaccines  · Take your medicines exactly as directed.  · Talk to your provider before taking vitamins, over-the-counter medicines, or herbal supplements. Many herbal supplements may be poisonous (toxic) to the liver.  · Avoid aspirin and other blood-thinning medicines.  · Discuss vitamin supplements and deficiencies with your provider.  · Ask your provider  about getting vaccinations for viruses that can cause liver diseases.  Follow-up care  Follow up with your healthcare provider, or as advised. You will likely have the following tests:  · Lab tests  · Blood tests for liver cancer  · Ultrasound of your liver every 6 months  · Endoscopy to check for swollen veins (varices) in your digestive tract  When to call your provider  Call your healthcare provider right away if you have any of the following:  · Fever of 100.4°F (38.0°C) or higher  · Extreme tiredness (fatigue), weakness, or lack of appetite  · Vomiting (with or without blood)  · Yellowing of your skin or eyes (jaundice)  · Itching  · Swelling in your belly or legs  · Black or tarry stools  · Skin that bruises easily  · Confusion or trouble thinking clearly   Date Last Reviewed: 8/1/2016  © 3875-2484 "[employer]. [address] [place] [address], [place] [address]. All rights reserved. This information is not intended as a substitute for professional medical care. Always follow your healthcare professional's instructions.

## 2020-10-23 ENCOUNTER — HOSPITAL ENCOUNTER (OUTPATIENT)
Dept: INTERVENTIONAL RADIOLOGY/VASCULAR | Facility: HOSPITAL | Age: 60
Discharge: HOME | End: 2020-10-23
Attending: STUDENT IN AN ORGANIZED HEALTH CARE EDUCATION/TRAINING PROGRAM
Payer: MEDICAID

## 2020-10-23 ENCOUNTER — OFFICE VISIT (OUTPATIENT)
Dept: TRANSPLANT | Facility: CLINIC | Age: 60
End: 2020-10-23
Attending: INTERNAL MEDICINE
Payer: MEDICAID

## 2020-10-23 ENCOUNTER — SOCIAL WORK (OUTPATIENT)
Dept: TRANSPLANT | Facility: CLINIC | Age: 60
End: 2020-10-23
Payer: MEDICAID

## 2020-10-23 VITALS
TEMPERATURE: 98 F | RESPIRATION RATE: 18 BRPM | OXYGEN SATURATION: 100 % | HEART RATE: 85 BPM | DIASTOLIC BLOOD PRESSURE: 56 MMHG | SYSTOLIC BLOOD PRESSURE: 116 MMHG

## 2020-10-23 VITALS
RESPIRATION RATE: 18 BRPM | HEART RATE: 84 BPM | DIASTOLIC BLOOD PRESSURE: 57 MMHG | SYSTOLIC BLOOD PRESSURE: 112 MMHG | OXYGEN SATURATION: 100 %

## 2020-10-23 DIAGNOSIS — K65.2 SBP (SPONTANEOUS BACTERIAL PERITONITIS): ICD-10-CM

## 2020-10-23 DIAGNOSIS — K70.31 ASCITES DUE TO ALCOHOLIC CIRRHOSIS: Primary | ICD-10-CM

## 2020-10-23 DIAGNOSIS — K70.31 ALCOHOLIC CIRRHOSIS OF LIVER WITH ASCITES: ICD-10-CM

## 2020-10-23 DIAGNOSIS — K74.69 DECOMPENSATED LIVER DISEASE: ICD-10-CM

## 2020-10-23 LAB
APPEARANCE FLD: NORMAL
BASOPHILS NFR FLD MANUAL: 1 %
BODY FLD TYPE: NORMAL
COLOR FLD: YELLOW
LYMPHOCYTES NFR FLD MANUAL: 46 %
MESOTHL CELL NFR FLD MANUAL: 1 %
MONOS+MACROS NFR FLD MANUAL: 30 %
NEUTROPHILS NFR FLD MANUAL: 22 %
WBC # FLD: 100 /CU MM

## 2020-10-23 PROCEDURE — 99999 PR PBB SHADOW E&M-EST. PATIENT-LVL III: ICD-10-PCS | Mod: PBBFAC,TXP,, | Performed by: INTERNAL MEDICINE

## 2020-10-23 PROCEDURE — P9047 ALBUMIN (HUMAN), 25%, 50ML: HCPCS | Mod: JG,TXP | Performed by: STUDENT IN AN ORGANIZED HEALTH CARE EDUCATION/TRAINING PROGRAM

## 2020-10-23 PROCEDURE — 49083 ABD PARACENTESIS W/IMAGING: CPT | Mod: NTX

## 2020-10-23 PROCEDURE — 99999 PR PBB SHADOW E&M-EST. PATIENT-LVL III: CPT | Mod: PBBFAC,TXP,, | Performed by: INTERNAL MEDICINE

## 2020-10-23 PROCEDURE — 49083 IR PARACENTESIS WITH IMAGING: ICD-10-PCS | Mod: NTX,,, | Performed by: FAMILY MEDICINE

## 2020-10-23 PROCEDURE — 99213 OFFICE O/P EST LOW 20 MIN: CPT | Mod: PBBFAC,25,NTX | Performed by: INTERNAL MEDICINE

## 2020-10-23 PROCEDURE — 63600175 PHARM REV CODE 636 W HCPCS: Mod: JG,TXP | Performed by: STUDENT IN AN ORGANIZED HEALTH CARE EDUCATION/TRAINING PROGRAM

## 2020-10-23 PROCEDURE — 99213 OFFICE O/P EST LOW 20 MIN: CPT | Mod: S$PBB,TXP,, | Performed by: INTERNAL MEDICINE

## 2020-10-23 PROCEDURE — 99213 PR OFFICE/OUTPT VISIT, EST, LEVL III, 20-29 MIN: ICD-10-PCS | Mod: S$PBB,TXP,, | Performed by: INTERNAL MEDICINE

## 2020-10-23 PROCEDURE — 49083 ABD PARACENTESIS W/IMAGING: CPT | Mod: NTX,,, | Performed by: FAMILY MEDICINE

## 2020-10-23 PROCEDURE — 89051 BODY FLUID CELL COUNT: CPT | Mod: NTX

## 2020-10-23 RX ORDER — ALBUMIN HUMAN 250 G/1000ML
75 SOLUTION INTRAVENOUS ONCE
Status: DISCONTINUED | OUTPATIENT
Start: 2020-10-23 | End: 2020-10-23

## 2020-10-23 RX ORDER — ALBUMIN HUMAN 250 G/1000ML
50 SOLUTION INTRAVENOUS ONCE
Status: COMPLETED | OUTPATIENT
Start: 2020-10-23 | End: 2020-10-23

## 2020-10-23 RX ADMIN — ALBUMIN (HUMAN) 50 G: 25 SOLUTION INTRAVENOUS at 11:10

## 2020-10-23 NOTE — PROGRESS NOTES
Subjective:       Patient ID: Kenneth Pardo is a 60 y.o. male.    Chief Complaint: Waitlist Maintenance    This 60-year-old gentleman has decompensated alcoholic cirrhosis.  He was recently admitted to [facility] with a paddle renal syndrome.  Has a history of spontaneous bacterial peritonitis.  During his hospital admission he was treated with midodrine, albumin and octreotide.  He was discharged on day 6 with a serum creatinine of 1.9.  He was recently presented at her multidisciplinary liver transplant selection committee in approved for listing.  He had a large volume paracentesis today which was negative for spontaneous bacterial peritonitis.  He has no complaints of encephalopathy or GI bleeding.  He continues to recover from herpes zoster.  I will be checking his blood work today to determine his meld score in ensure that his renal function is stable.    Review of Systems   Constitutional: Positive for fatigue. Negative for activity change, appetite change, chills and unexpected weight change.   HENT: Negative for nasal congestion, facial swelling and tinnitus.    Eyes: Negative for visual disturbance.   Respiratory: Negative for cough, shortness of breath and wheezing.    Cardiovascular: Positive for leg swelling. Negative for chest pain and palpitations.   Gastrointestinal: Positive for abdominal distention. Negative for abdominal pain.   Genitourinary: Negative for dysuria.   Musculoskeletal: Negative for arthralgias, joint swelling and myalgias.   Neurological: Negative for syncope and headaches.   Hematological: Does not bruise/bleed easily.   Psychiatric/Behavioral: Negative for confusion.         Objective:      Physical Exam  Constitutional:       Appearance: He is cachectic.   Eyes:      General: No scleral icterus.  Cardiovascular:      Rate and Rhythm: Normal rate and regular rhythm.      Heart sounds: Normal heart sounds.   Pulmonary:      Effort: Pulmonary effort is normal. No  respiratory distress.      Breath sounds: Normal breath sounds. No wheezing.   Abdominal:      General: Bowel sounds are normal. There is no distension.      Palpations: Abdomen is soft. There is shifting dullness and fluid wave. There is no mass.      Tenderness: There is no abdominal tenderness. There is no rebound.       Musculoskeletal: Normal range of motion.   Lymphadenopathy:      Cervical: No cervical adenopathy.   Skin:     General: Skin is warm and dry.   Neurological:      Mental Status: He is alert and oriented to person, place, and time.         Assessment:       1. Ascites due to alcoholic cirrhosis    2. Decompensated liver disease    3. hx of SBP (spontaneous bacterial peritonitis)        Plan:         I will send him today to determine his meld score and on in sure his renal function is stable.    He will return to clinic for reassessment in 4 weeks time.    He will remain off diuretics.

## 2020-10-23 NOTE — H&P
Radiology History & Physical      SUBJECTIVE:     Chief Complaint: abdominal distention    History of Present Illness:  Kenneth Pardo is a 60 y.o. male who presents for ultrasound guided paracentesis  Past Medical History:   Diagnosis Date    Hepatitis     Hypertension     Liver failure      Past Surgical History:   Procedure Laterality Date    COLONOSCOPY N/A 9/17/2020    Procedure: COLONOSCOPY;  Surgeon: Jhonathan Lieberman MD;  Location: [address]);  Service: Endoscopy;  Laterality: N/A;    ESOPHAGOGASTRODUODENOSCOPY N/A 7/31/2020    Procedure: EGD (ESOPHAGOGASTRODUODENOSCOPY);  Surgeon: Jhonathan Lieberman MD;  Location: [facility] ([address] [place] [address]);  Service: Endoscopy;  Laterality: N/A;       Home Meds:   Prior to Admission medications    Medication Sig Start Date End Date Taking? Authorizing Provider   apixaban (ELIQUIS) 5 mg Tab Take 1 tablet (5 mg total) by mouth 2 (two) times daily. 10/6/20   Aicha Rojas MD   ciprofloxacin HCl (CIPRO) 500 MG tablet Take 1 tablet (500 mg total) by mouth once daily. 9/18/20   Aicha Rojas MD   escitalopram oxalate (LEXAPRO) 5 MG Tab Take 1 tablet (5 mg total) by mouth once daily. 8/9/20 8/9/21  Aicha Rojas MD   lactulose (CHRONULAC) 10 gram/15 mL solution Take 15 mLs (10 g total) by mouth 2 (two) times a day. Take enough to have 3 bowel movements per day 10/19/20 11/18/20  Aicha Rojas MD   midodrine (PROAMATINE) 5 MG Tab Take 3 tablets (15 mg total) by mouth every 8 (eight) hours. 8/9/20 10/21/20  Aicha Rojas MD   omeprazole (PRILOSEC) 40 MG capsule Take 1 capsule (40 mg total) by mouth every morning. 9/8/20 9/8/21  Mainor Madison MD   rOPINIRole (REQUIP) 0.25 MG tablet Take 1 tablet (0.25 mg total) by mouth every evening. 8/9/20 8/9/21  Aicha Rojas MD   sodium bicarbonate 650 MG tablet Take 1 tablet (650 mg total) by mouth 2 (two) times daily. 10/19/20 10/19/21  Aicha Rojas MD   traZODone (DESYREL) 100 MG tablet Take 1 tablet (100 mg  total) by mouth every evening.  Patient taking differently: Take  mg by mouth every evening.  8/9/20 8/9/21  Aicha Rojas MD     Anticoagulants/Antiplatelets: apixaban    Allergies: Review of patient's allergies indicates:  No Known Allergies  Sedation History:  no adverse reactions    Review of Systems:   Hematological: no known coagulopathies  Respiratory: no shortness of breath  Cardiovascular: no chest pain  Gastrointestinal: no abdominal pain  Genito-Urinary: no dysuria  Musculoskeletal: negative  Neurological: no TIA or stroke symptoms         OBJECTIVE:     Vital Signs (Most Recent)       Physical Exam:  ASA: 2  Mallampati: n/a    General: no acute distress  Mental Status: alert and oriented to person, place and time  HEENT: normocephalic, atraumatic  Chest: unlabored breathing  Heart: regular heart rate  Abdomen: distended  Extremity: moves all extremities    ASSESSMENT/PLAN:     Sedation Plan: local  Patient will undergo ultrasound guided paracentesis.    AMARJIT Walker, FNP  Interventional Radiology  [phone] [facility]

## 2020-10-23 NOTE — Clinical Note
October 23, 2020                     [facility] 1st Fl  [address]  [facility] [address]  Phone: [phone]   Patient: Kenneth Pardo   MR Number: [id number]   YOB: 1960   Date of Visit: 10/23/2020       Dear      Thank you for referring Kenneth Pardo to me for evaluation. Attached you will find relevant portions of my assessment and plan of care.    If you have questions, please do not hesitate to call me. I look forward to following Kenneth Pardo along with you.    Sincerely,    Arnold Perez MD    Enclosure    If you would like to receive this communication electronically, please contact [email or web address] or [phone] to request [employer] access.    [facility] is a tool which provides read-only access to select patient information with whom you have a relationship. Its easy to use and provides real time access to review your patients record including encounter summaries, notes, results, and demographic information.    If you feel you have received this communication in error or would no longer like to receive these types of communications, please e-mail [email or web address]

## 2020-10-23 NOTE — SEDATION DOCUMENTATION
Paracentesis complete. 5900 mLs peritoneal fluid drained. Pt tolerated well. Dressing to right abd clean, dry, and intact. Albumin 25% given 200mLs. Specimens sent per lab order. Pt discharged.

## 2020-10-23 NOTE — PROGRESS NOTES
Clinic appt with MAXIMO:    MAXIMO met with caregiver(wife, Dee Dee Pardo) in clinic. Pt and caregiver present aaox4, engaged, asking, and answering questions appropriately. Caregiver requested from MAXIMO if, secondary caregiver may be placed on speaker phone during clinic visit. SW encouraged to have secondary caregiver on speaker phone, to provide education on caregiver roles and information on the [facility]. Caregiver denied working nor having prior committment or obligations. Secondary caregiver(while on speakerphone) will be providing transportation if and when needed. SW informed caregivers and pt, liver offers may occur at any time of the day thus will need to have transportation plan set in place, due to pt's wife reporting unable to drive long distances in her vehicle. Pt's sister, verbalized understanding and reported is readily available to provide transportation. MAXIMO provided education on caregiver roles, LR apt, financial report, fundraising, Social Security office number, virtual IOP and AA meetings. Pt and caregiver reported have not applied for disability. SW provided social security contact information to start applying for disability, due to pt with no income and is being supported by his son(who is working part time and assists with bills). Pt and caregiver reported will start receiving SNAP benefits next month. Caregiver reported will be receiving a little over $500 monthly for SNAP.    MAXIMO inquired about IOP and AA. Pt and caregiver reported plans in enrolling at [facility]. MAXIMO provided IOP and AA resources, if pt were to choose to enroll at a different facility. MAXIMO also provided pt and caregiver AA logs and informed to fax biweekly to MAXIMO. MAXIMO also provided a ADELA to pt and caregiver to complete once enrolling in an IOP and informed to fax to SW once completed. Caregiver and pt denied having further questions or concerns. SW remains following and available.     Transplant  Social Work - Candidacy  Assessment/Plan:      Psychosocial Suitability: Patient presents as a high risk candidate for liver transplant at this time due to the following psychosocial risk factors: ETOH use and cigarette smoking less than 1 year ago; no income; and pt's primary caregiver without reliable transportation.  Pt reports that his last ETOH use was 4-5 months when learning of his ESLD. Pt reports that he was drinking around 1 pint of alcohol per night. Pt reports that after learning of his ESLD he stopped smoking and drinking and has not had any use since then. Pt reports that he has gone to [facility] in [place], [place] to seek substance abuse treatment and plans on going again once discharging. SW provided the pt with a list of virtual IOP programs that are based out of [place], [place] as well as some virtual AA meeting resources. Pt expresses interest in completing substance abuse treatment to help with life long sobriety. Pt's wife reports that she has a vehicle, but stated that it is not reliable. She did report that the patient's friend BOAZ or the pt's sister Leonela, who both live near by, can transport the patient or patient's wife at any time. SW encouraged the pt and pt's wife to complete the pt's application for SSD and offered any assistance with applying. Protective factors includes a good support system and an adequate caregiver plan; adequate insurance coverage; and a willingness to complete substance abuse treatment and attend AA meetings.     Recommendations/Additional Comments:   -local lodging   -fundraising  -enrollment in IOP  -AA meeting attendance   -random PETH testing  -finish signing up for SSD

## 2020-10-23 NOTE — PROCEDURES
Radiology Post-Procedure Note    Pre Op Diagnosis: Ascites  Post Op Diagnosis: Same    Procedure: Ultrasound Guided Paracentesis    Procedure performed by: Pravin RILEY, Eryn     Written Informed Consent Obtained: Yes  Specimen Removed: YES clear yellow  Estimated Blood Loss: Minimal    Findings:   Successful paracentesis.  Albumin administered PRN per protocol.    Patient tolerated procedure well.    Eryn Costello, APRN, FNP  Interventional Radiology  [phone] clinic

## 2020-10-25 ENCOUNTER — PATIENT MESSAGE (OUTPATIENT)
Dept: HEPATOLOGY | Facility: CLINIC | Age: 60
End: 2020-10-25

## 2020-10-26 ENCOUNTER — PATIENT MESSAGE (OUTPATIENT)
Dept: HEPATOLOGY | Facility: CLINIC | Age: 60
End: 2020-10-26

## 2020-10-26 ENCOUNTER — LAB VISIT (OUTPATIENT)
Dept: LAB | Facility: HOSPITAL | Age: 60
End: 2020-10-26
Attending: STUDENT IN AN ORGANIZED HEALTH CARE EDUCATION/TRAINING PROGRAM
Payer: MEDICAID

## 2020-10-26 DIAGNOSIS — K70.11 ALCOHOLIC HEPATITIS WITH ASCITES: ICD-10-CM

## 2020-10-26 LAB
ALBUMIN SERPL BCP-MCNC: 3.1 G/DL (ref 3.5–5.2)
ALP SERPL-CCNC: 160 U/L (ref 55–135)
ALT SERPL W/O P-5'-P-CCNC: 31 U/L (ref 10–44)
ANION GAP SERPL CALC-SCNC: 5 MMOL/L (ref 8–16)
AST SERPL-CCNC: 63 U/L (ref 10–40)
BASOPHILS # BLD AUTO: 0.07 K/UL (ref 0–0.2)
BASOPHILS NFR BLD: 0.7 % (ref 0–1.9)
BILIRUB SERPL-MCNC: 0.6 MG/DL (ref 0.1–1)
BUN SERPL-MCNC: 25 MG/DL (ref 6–20)
CALCIUM SERPL-MCNC: 8.8 MG/DL (ref 8.7–10.5)
CHLORIDE SERPL-SCNC: 103 MMOL/L (ref 95–110)
CO2 SERPL-SCNC: 26 MMOL/L (ref 23–29)
CREAT SERPL-MCNC: 2 MG/DL (ref 0.5–1.4)
DIFFERENTIAL METHOD: ABNORMAL
EOSINOPHIL # BLD AUTO: 0.3 K/UL (ref 0–0.5)
EOSINOPHIL NFR BLD: 2.8 % (ref 0–8)
ERYTHROCYTE [DISTWIDTH] IN BLOOD BY AUTOMATED COUNT: 16 % (ref 11.5–14.5)
EST. GFR  (AFRICAN AMERICAN): 41.3 ML/MIN/1.73 M^2
EST. GFR  (NON AFRICAN AMERICAN): 35.7 ML/MIN/1.73 M^2
GLUCOSE SERPL-MCNC: 103 MG/DL (ref 70–110)
HCT VFR BLD AUTO: 35 % (ref 40–54)
HGB BLD-MCNC: 11.5 G/DL (ref 14–18)
IMM GRANULOCYTES # BLD AUTO: 0.03 K/UL (ref 0–0.04)
IMM GRANULOCYTES NFR BLD AUTO: 0.3 % (ref 0–0.5)
INR PPP: 1.2 (ref 0.8–1.2)
LYMPHOCYTES # BLD AUTO: 1.6 K/UL (ref 1–4.8)
LYMPHOCYTES NFR BLD: 14.9 % (ref 18–48)
MCH RBC QN AUTO: 32.7 PG (ref 27–31)
MCHC RBC AUTO-ENTMCNC: 32.9 G/DL (ref 32–36)
MCV RBC AUTO: 99 FL (ref 82–98)
MONOCYTES # BLD AUTO: 0.9 K/UL (ref 0.3–1)
MONOCYTES NFR BLD: 8 % (ref 4–15)
NEUTROPHILS # BLD AUTO: 7.9 K/UL (ref 1.8–7.7)
NEUTROPHILS NFR BLD: 73.3 % (ref 38–73)
NRBC BLD-RTO: 0 /100 WBC
PLATELET # BLD AUTO: 158 K/UL (ref 150–350)
PMV BLD AUTO: 11 FL (ref 9.2–12.9)
POTASSIUM SERPL-SCNC: 4.3 MMOL/L (ref 3.5–5.1)
PROT SERPL-MCNC: 5.6 G/DL (ref 6–8.4)
PROTHROMBIN TIME: 12.5 SEC (ref 9–12.5)
RBC # BLD AUTO: 3.52 M/UL (ref 4.6–6.2)
SODIUM SERPL-SCNC: 134 MMOL/L (ref 136–145)
WBC # BLD AUTO: 10.75 K/UL (ref 3.9–12.7)

## 2020-10-26 PROCEDURE — 80053 COMPREHEN METABOLIC PANEL: CPT | Mod: TXP

## 2020-10-26 PROCEDURE — 36415 COLL VENOUS BLD VENIPUNCTURE: CPT | Mod: NTX

## 2020-10-26 PROCEDURE — 85610 PROTHROMBIN TIME: CPT | Mod: NTX

## 2020-10-26 PROCEDURE — 85025 COMPLETE CBC W/AUTO DIFF WBC: CPT | Mod: NTX

## 2020-10-27 ENCOUNTER — TELEPHONE (OUTPATIENT)
Dept: TRANSPLANT | Facility: CLINIC | Age: 60
End: 2020-10-27

## 2020-10-27 DIAGNOSIS — I82.411 DEEP VEIN THROMBOSIS (DVT) OF FEMORAL VEIN OF RIGHT LOWER EXTREMITY, UNSPECIFIED CHRONICITY: Primary | ICD-10-CM

## 2020-10-27 DIAGNOSIS — K74.69 DECOMPENSATED LIVER DISEASE: ICD-10-CM

## 2020-10-27 DIAGNOSIS — Z01.818 PRE-TRANSPLANT EVALUATION FOR LIVER TRANSPLANT: ICD-10-CM

## 2020-10-27 NOTE — TELEPHONE ENCOUNTER
Per Dr. Madison, spoke with patient and his wife. They are agreeable to IVC filter.  IR consult entered and request to schedule for first available, sent to IR schedulers.

## 2020-10-29 ENCOUNTER — PATIENT MESSAGE (OUTPATIENT)
Dept: HEPATOLOGY | Facility: CLINIC | Age: 60
End: 2020-10-29

## 2020-10-29 DIAGNOSIS — I82.499 DEEP VEIN THROMBOSIS (DVT) OF OTHER VEIN OF LOWER EXTREMITY, UNSPECIFIED CHRONICITY, UNSPECIFIED LATERALITY: Primary | ICD-10-CM

## 2020-10-30 ENCOUNTER — PATIENT MESSAGE (OUTPATIENT)
Dept: HEPATOLOGY | Facility: CLINIC | Age: 60
End: 2020-10-30

## 2020-10-30 DIAGNOSIS — I82.411 DEEP VEIN THROMBOSIS (DVT) OF FEMORAL VEIN OF RIGHT LOWER EXTREMITY, UNSPECIFIED CHRONICITY: ICD-10-CM

## 2020-10-30 DIAGNOSIS — K70.31 ALCOHOLIC CIRRHOSIS OF LIVER WITH ASCITES: Primary | ICD-10-CM

## 2020-10-31 ENCOUNTER — PATIENT MESSAGE (OUTPATIENT)
Dept: HEPATOLOGY | Facility: CLINIC | Age: 60
End: 2020-10-31

## 2020-11-02 ENCOUNTER — PATIENT MESSAGE (OUTPATIENT)
Dept: TRANSPLANT | Facility: CLINIC | Age: 60
End: 2020-11-02

## 2020-11-02 ENCOUNTER — LAB VISIT (OUTPATIENT)
Dept: LAB | Facility: HOSPITAL | Age: 60
End: 2020-11-02
Attending: STUDENT IN AN ORGANIZED HEALTH CARE EDUCATION/TRAINING PROGRAM
Payer: MEDICAID

## 2020-11-02 DIAGNOSIS — K70.11 ALCOHOLIC HEPATITIS WITH ASCITES: ICD-10-CM

## 2020-11-02 LAB
ALBUMIN SERPL BCP-MCNC: 2.8 G/DL (ref 3.5–5.2)
ALP SERPL-CCNC: 173 U/L (ref 55–135)
ALT SERPL W/O P-5'-P-CCNC: 42 U/L (ref 10–44)
ANION GAP SERPL CALC-SCNC: 7 MMOL/L (ref 8–16)
AST SERPL-CCNC: 82 U/L (ref 10–40)
BASOPHILS # BLD AUTO: 0.08 K/UL (ref 0–0.2)
BASOPHILS NFR BLD: 0.8 % (ref 0–1.9)
BILIRUB SERPL-MCNC: 0.5 MG/DL (ref 0.1–1)
BUN SERPL-MCNC: 26 MG/DL (ref 6–20)
CALCIUM SERPL-MCNC: 9 MG/DL (ref 8.7–10.5)
CHLORIDE SERPL-SCNC: 99 MMOL/L (ref 95–110)
CO2 SERPL-SCNC: 26 MMOL/L (ref 23–29)
CREAT SERPL-MCNC: 2 MG/DL (ref 0.5–1.4)
DIFFERENTIAL METHOD: ABNORMAL
EOSINOPHIL # BLD AUTO: 0.3 K/UL (ref 0–0.5)
EOSINOPHIL NFR BLD: 3.3 % (ref 0–8)
ERYTHROCYTE [DISTWIDTH] IN BLOOD BY AUTOMATED COUNT: 15 % (ref 11.5–14.5)
EST. GFR  (AFRICAN AMERICAN): 41.3 ML/MIN/1.73 M^2
EST. GFR  (NON AFRICAN AMERICAN): 35.7 ML/MIN/1.73 M^2
GLUCOSE SERPL-MCNC: 102 MG/DL (ref 70–110)
HCT VFR BLD AUTO: 35 % (ref 40–54)
HGB BLD-MCNC: 11.5 G/DL (ref 14–18)
IMM GRANULOCYTES # BLD AUTO: 0.05 K/UL (ref 0–0.04)
IMM GRANULOCYTES NFR BLD AUTO: 0.5 % (ref 0–0.5)
INR PPP: 1.1 (ref 0.8–1.2)
LYMPHOCYTES # BLD AUTO: 1.6 K/UL (ref 1–4.8)
LYMPHOCYTES NFR BLD: 16.9 % (ref 18–48)
MCH RBC QN AUTO: 32.1 PG (ref 27–31)
MCHC RBC AUTO-ENTMCNC: 32.9 G/DL (ref 32–36)
MCV RBC AUTO: 98 FL (ref 82–98)
MONOCYTES # BLD AUTO: 0.7 K/UL (ref 0.3–1)
MONOCYTES NFR BLD: 7.5 % (ref 4–15)
NEUTROPHILS # BLD AUTO: 6.7 K/UL (ref 1.8–7.7)
NEUTROPHILS NFR BLD: 71 % (ref 38–73)
NRBC BLD-RTO: 0 /100 WBC
PLATELET # BLD AUTO: 190 K/UL (ref 150–350)
PMV BLD AUTO: 10.3 FL (ref 9.2–12.9)
POTASSIUM SERPL-SCNC: 4.3 MMOL/L (ref 3.5–5.1)
PROT SERPL-MCNC: 5.8 G/DL (ref 6–8.4)
PROTHROMBIN TIME: 11.7 SEC (ref 9–12.5)
RBC # BLD AUTO: 3.58 M/UL (ref 4.6–6.2)
SODIUM SERPL-SCNC: 132 MMOL/L (ref 136–145)
WBC # BLD AUTO: 9.47 K/UL (ref 3.9–12.7)

## 2020-11-02 PROCEDURE — 80053 COMPREHEN METABOLIC PANEL: CPT | Mod: TXP

## 2020-11-02 PROCEDURE — 36415 COLL VENOUS BLD VENIPUNCTURE: CPT | Mod: TXP

## 2020-11-02 PROCEDURE — 85610 PROTHROMBIN TIME: CPT | Mod: NTX

## 2020-11-02 PROCEDURE — 85025 COMPLETE CBC W/AUTO DIFF WBC: CPT | Mod: NTX

## 2020-11-03 ENCOUNTER — TELEPHONE (OUTPATIENT)
Dept: TRANSPLANT | Facility: CLINIC | Age: 60
End: 2020-11-03

## 2020-11-04 NOTE — TELEPHONE ENCOUNTER
11/3/2020    Called [facility] to speak w/ radiology department about scheduling standing paracentesis.  Requested first available.  All patient and provider information provided.  Para scheduled Tuesday, 11/10 at 1:30 pm.  Patient will need to have a corona test prior to the paracentesis on  Thursday, 11/5, between 8am and 2pm.  Both the paracentesis and the corona test [facility] and patient to check in on the first floor.  Patient informed of above.  Standing orders faxed and confirmation received.

## 2020-11-05 ENCOUNTER — TELEPHONE (OUTPATIENT)
Dept: TRANSPLANT | Facility: CLINIC | Age: 60
End: 2020-11-05

## 2020-11-05 ENCOUNTER — CLINICAL SUPPORT (OUTPATIENT)
Dept: INTERVENTIONAL RADIOLOGY/VASCULAR | Facility: CLINIC | Age: 60
End: 2020-11-05
Payer: MEDICAID

## 2020-11-05 DIAGNOSIS — I82.411 DEEP VEIN THROMBOSIS (DVT) OF FEMORAL VEIN OF RIGHT LOWER EXTREMITY, UNSPECIFIED CHRONICITY: ICD-10-CM

## 2020-11-05 DIAGNOSIS — K70.31 ALCOHOLIC CIRRHOSIS OF LIVER WITH ASCITES: Primary | ICD-10-CM

## 2020-11-05 PROCEDURE — 99203 PR OFFICE/OUTPT VISIT, NEW, LEVL III, 30-44 MIN: ICD-10-PCS | Mod: 95,TXP,, | Performed by: RADIOLOGY

## 2020-11-05 PROCEDURE — 99203 OFFICE O/P NEW LOW 30 MIN: CPT | Mod: 95,TXP,, | Performed by: RADIOLOGY

## 2020-11-05 NOTE — PROGRESS NOTES
Interventional Radiology Progress Note    Consult Requested By:   Dr. Madison    Reason for Consult: H/O DVT     SUBJECTIVE:     Chief Complaint: h/o DVT    History of Present Illness: Kenneth Pardo is a 60 y.o. male who presents for evaluation of prophylactic IVC filter placement. This 60-year-old gentleman with a h/o decompensated alcoholic cirrhosis and spontaneous bacterial peritonitis.  He is approved for listing for liver transplant.  He has no new complaints today.  He continues to recover from herpes zoster. He reports cough likely secondary to smoking.     Past Medical History:   Diagnosis Date    Hepatitis     Hypertension     Liver failure      Past Surgical History:   Procedure Laterality Date    COLONOSCOPY N/A 9/17/2020    Procedure: COLONOSCOPY;  Surgeon: Jhonathan Lieberman MD;  Location: [facility] ([address] [place] [address]);  Service: Endoscopy;  Laterality: N/A;    ESOPHAGOGASTRODUODENOSCOPY N/A 7/31/2020    Procedure: EGD (ESOPHAGOGASTRODUODENOSCOPY);  Surgeon: Jhonathan Lieberman MD;  Location: [address]);  Service: Endoscopy;  Laterality: N/A;     History reviewed. No pertinent family history.  Social History     Tobacco Use    Smoking status: Never Smoker    Smokeless tobacco: Never Used   Substance Use Topics    Alcohol use: Not Currently     Frequency: Never    Drug use: Not Currently       Review of patient's allergies indicates:  No Known Allergies     Review of Systems:  Review of Systems   Constitutional: Negative for activity change, appetite change and fever.   HENT: Negative for sore throat.    Eyes: Negative for visual disturbance.   Respiratory: Negative for chest tightness and shortness of breath.    Cardiovascular: Negative for chest pain.   Gastrointestinal: Negative for abdominal pain, anal bleeding, blood in stool, constipation, diarrhea, nausea and vomiting.   Genitourinary: Negative for dysuria.   Musculoskeletal: Negative for neck pain.   Skin: Negative for color change.   Allergic/Immunologic:  Negative for immunocompromised state.   Neurological: Negative for dizziness and syncope.   Hematological: Does not bruise/bleed easily.   Psychiatric/Behavioral: Negative for confusion and suicidal ideas. The patient is not nervous/anxious.         OBJECTIVE:     Vital Signs Range:  There were no vitals taken for this visit.      Physical Exam:  Physical Exam  Constitutional:       Appearance: Normal appearance. He is well-developed.   HENT:      Head: Normocephalic and atraumatic.   Eyes:      Conjunctiva/sclera: Conjunctivae normal.   Neck:      Musculoskeletal: Neck supple.   Pulmonary:      Effort: Pulmonary effort is normal.   Abdominal:      General: There is distension.   Musculoskeletal: Normal range of motion.   Skin:     Coloration: Skin is not jaundiced or pale.   Neurological:      Mental Status: He is alert and oriented to person, place, and time.   Psychiatric:         Behavior: Behavior normal.         Thought Content: Thought content normal.         Judgment: Judgment normal.         There is no height or weight on file to calculate BMI.     ASA Score: Class 3 - Systemic Illness with functional impairment.      Sedation History:  Patient/Family history of anesthesia or sedation complications: no history of anesthetic complications  Plan for Sedation:  Sedation Type: slightly aware of self/surroundings, usually somnolent, arouses easily with stimuli  Sedation Drugs: midazolam  Analgesia Drugs: fentanyl        ECO  Anticoagulation: Apixaban      Laboratory:  Lab Results   Component Value Date    INR 1.1 2020       Lab Results   Component Value Date    WBC 9.47 2020    HGB 11.5 (L) 2020    HCT 35.0 (L) 2020    MCV 98 2020     2020      Lab Results   Component Value Date     2020     (L) 2020    K 4.3 2020    CL 99 2020    CO2 26 2020    BUN 26 (H) 2020    CREATININE 2.0 (H) 2020    CALCIUM 9.0  11/02/2020    MG 1.4 (L) 10/19/2020    ALT 42 11/02/2020    AST 82 (H) 11/02/2020    ALBUMIN 2.8 (L) 11/02/2020    BILITOT 0.5 11/02/2020    BILIDIR >14.0 (H) 08/06/2020       Diagnostic Results:  CT: Reviewed  CT without contrast (9/8/20) and with contrast (7/21/20) reviewed which shows the origin of the renal veins. No accessory renal veins noted.      ASSESSMENT/PLAN:     59 yo male with decompensated liver failure listed for liver transplant her to discuss prophylactic IVC filter placement.  - Discussed IVC filter placement and retrieval after liver transplant. Risks, benefits, and alternatives of the procedure (IVC filter placement) as well as approach and sedation were explained. The pt and his wife agreed to proceed. Pt scheduled on 11/20/20. All questions answered.    The patient location is: [place]  The chief complaint leading to consultation is: DVT, prophylactic IVC filter     Visit type: audiovisual    Face to Face time with patient: 30  40 minutes of total time spent on the encounter, which includes face to face time and non-face to face time preparing to see the patient (eg, review of tests), Obtaining and/or reviewing separately obtained history, Documenting clinical information in the electronic or other health record, Independently interpreting results (not separately reported) and communicating results to the patient/family/caregiver, or Care coordination (not separately reported).         Each patient to whom he or she provides medical services by telemedicine is:  (1) informed of the relationship between the physician and patient and the respective role of any other health care provider with respect to management of the patient; and (2) notified that he or she may decline to receive medical services by telemedicine and may withdraw from such care at any time.    Notes:     Dipesh Flor M.D.  Diagnostic and Interventional Radiologist  Department of Radiology  Pager: [phone]

## 2020-11-05 NOTE — TELEPHONE ENCOUNTER
Call returned with no answer. Voice message left for Rossana, requesting a return call as needed. Contact number provided.     ----- Message from Nasima Tello sent at 11/5/2020  8:03 AM CST -----  Regarding: Speak with office  Contact: Rossana  Calling to speak with pt coordinator to see what labs need to be done by pt.      [phone]

## 2020-11-06 ENCOUNTER — TELEPHONE (OUTPATIENT)
Dept: TRANSPLANT | Facility: CLINIC | Age: 60
End: 2020-11-06

## 2020-11-06 ENCOUNTER — PATIENT MESSAGE (OUTPATIENT)
Dept: TRANSPLANT | Facility: CLINIC | Age: 60
End: 2020-11-06

## 2020-11-06 DIAGNOSIS — K76.7 HEPATORENAL SYNDROME: ICD-10-CM

## 2020-11-06 DIAGNOSIS — K74.69 DECOMPENSATED LIVER DISEASE: ICD-10-CM

## 2020-11-06 DIAGNOSIS — K70.31 ASCITES DUE TO ALCOHOLIC CIRRHOSIS: Primary | ICD-10-CM

## 2020-11-06 DIAGNOSIS — K76.82 HEPATIC ENCEPHALOPATHY: ICD-10-CM

## 2020-11-06 NOTE — TELEPHONE ENCOUNTER
Called Rossana silverio/ [facility] to clarify labs needed at the time of paracentesis.  Notified her of cultures, etc needed.  Will send written order with that info to fax number provided.

## 2020-11-07 ENCOUNTER — PATIENT MESSAGE (OUTPATIENT)
Dept: HEPATOLOGY | Facility: CLINIC | Age: 60
End: 2020-11-07

## 2020-11-09 ENCOUNTER — LAB VISIT (OUTPATIENT)
Dept: LAB | Facility: HOSPITAL | Age: 60
End: 2020-11-09
Attending: STUDENT IN AN ORGANIZED HEALTH CARE EDUCATION/TRAINING PROGRAM
Payer: MEDICAID

## 2020-11-09 DIAGNOSIS — F41.8 DEPRESSION WITH ANXIETY: Primary | ICD-10-CM

## 2020-11-09 DIAGNOSIS — K70.31 ALCOHOLIC CIRRHOSIS OF LIVER WITH ASCITES: ICD-10-CM

## 2020-11-09 DIAGNOSIS — K76.7 HEPATORENAL SYNDROME: ICD-10-CM

## 2020-11-09 DIAGNOSIS — K70.31 ASCITES DUE TO ALCOHOLIC CIRRHOSIS: ICD-10-CM

## 2020-11-09 DIAGNOSIS — Z76.82 ORGAN TRANSPLANT CANDIDATE: ICD-10-CM

## 2020-11-09 DIAGNOSIS — K74.69 DECOMPENSATED LIVER DISEASE: ICD-10-CM

## 2020-11-09 DIAGNOSIS — K76.82 HEPATIC ENCEPHALOPATHY: ICD-10-CM

## 2020-11-09 LAB
ALBUMIN SERPL BCP-MCNC: 2.7 G/DL (ref 3.5–5.2)
ALP SERPL-CCNC: 169 U/L (ref 55–135)
ALT SERPL W/O P-5'-P-CCNC: 33 U/L (ref 10–44)
ANION GAP SERPL CALC-SCNC: 8 MMOL/L (ref 8–16)
AST SERPL-CCNC: 51 U/L (ref 10–40)
BASOPHILS # BLD AUTO: 0.09 K/UL (ref 0–0.2)
BASOPHILS NFR BLD: 1 % (ref 0–1.9)
BILIRUB SERPL-MCNC: 0.5 MG/DL (ref 0.1–1)
BUN SERPL-MCNC: 21 MG/DL (ref 6–20)
CALCIUM SERPL-MCNC: 9.4 MG/DL (ref 8.7–10.5)
CHLORIDE SERPL-SCNC: 101 MMOL/L (ref 95–110)
CO2 SERPL-SCNC: 27 MMOL/L (ref 23–29)
CREAT SERPL-MCNC: 1.8 MG/DL (ref 0.5–1.4)
DIFFERENTIAL METHOD: ABNORMAL
EOSINOPHIL # BLD AUTO: 0.2 K/UL (ref 0–0.5)
EOSINOPHIL NFR BLD: 1.8 % (ref 0–8)
ERYTHROCYTE [DISTWIDTH] IN BLOOD BY AUTOMATED COUNT: 15.4 % (ref 11.5–14.5)
EST. GFR  (AFRICAN AMERICAN): 46.9 ML/MIN/1.73 M^2
EST. GFR  (NON AFRICAN AMERICAN): 40.6 ML/MIN/1.73 M^2
GLUCOSE SERPL-MCNC: 100 MG/DL (ref 70–110)
HCT VFR BLD AUTO: 38.8 % (ref 40–54)
HGB BLD-MCNC: 12.8 G/DL (ref 14–18)
IMM GRANULOCYTES # BLD AUTO: 0.03 K/UL (ref 0–0.04)
IMM GRANULOCYTES NFR BLD AUTO: 0.3 % (ref 0–0.5)
INR PPP: 1.1 (ref 0.8–1.2)
LYMPHOCYTES # BLD AUTO: 1.3 K/UL (ref 1–4.8)
LYMPHOCYTES NFR BLD: 13.4 % (ref 18–48)
MCH RBC QN AUTO: 32.6 PG (ref 27–31)
MCHC RBC AUTO-ENTMCNC: 33 G/DL (ref 32–36)
MCV RBC AUTO: 99 FL (ref 82–98)
MONOCYTES # BLD AUTO: 0.6 K/UL (ref 0.3–1)
MONOCYTES NFR BLD: 6.6 % (ref 4–15)
NEUTROPHILS # BLD AUTO: 7.3 K/UL (ref 1.8–7.7)
NEUTROPHILS NFR BLD: 76.9 % (ref 38–73)
NRBC BLD-RTO: 0 /100 WBC
PLATELET # BLD AUTO: 224 K/UL (ref 150–350)
PMV BLD AUTO: 10.3 FL (ref 9.2–12.9)
POTASSIUM SERPL-SCNC: 4.3 MMOL/L (ref 3.5–5.1)
PROT SERPL-MCNC: 6.2 G/DL (ref 6–8.4)
PROTHROMBIN TIME: 11.7 SEC (ref 9–12.5)
RBC # BLD AUTO: 3.93 M/UL (ref 4.6–6.2)
SODIUM SERPL-SCNC: 136 MMOL/L (ref 136–145)
WBC # BLD AUTO: 9.43 K/UL (ref 3.9–12.7)

## 2020-11-09 PROCEDURE — 80053 COMPREHEN METABOLIC PANEL: CPT | Mod: TXP

## 2020-11-09 PROCEDURE — 85610 PROTHROMBIN TIME: CPT | Mod: TXP

## 2020-11-09 PROCEDURE — 36415 COLL VENOUS BLD VENIPUNCTURE: CPT | Mod: TXP

## 2020-11-09 PROCEDURE — 85025 COMPLETE CBC W/AUTO DIFF WBC: CPT | Mod: TXP

## 2020-11-09 RX ORDER — TRAZODONE HYDROCHLORIDE 100 MG/1
100 TABLET ORAL NIGHTLY
Qty: 30 TABLET | Refills: 3 | Status: SHIPPED | OUTPATIENT
Start: 2020-11-09 | End: 2021-03-02 | Stop reason: SDUPTHER

## 2020-11-09 NOTE — TELEPHONE ENCOUNTER
Received message via [facility] portal w/ refill request for trazodone.  Request sent to MD for approval/ authorization.

## 2020-11-12 ENCOUNTER — TELEPHONE (OUTPATIENT)
Dept: TRANSPLANT | Facility: CLINIC | Age: 60
End: 2020-11-12

## 2020-11-12 NOTE — TELEPHONE ENCOUNTER
Phone call returned to Rossana regarding orders for dawson.  No answer.  Message left on VM informing her that lab orders for ascitic fluid (diagnostic and therapeutic para) will be faxed to number provided.  Return phon call requested if any further info, etc needed.  Contact info provided.    =============================================    ----- Message from Nasima Tello sent at 11/12/2020 12:49 PM CST -----  Regarding: Speak with office  Contact: Rossana  Calling to speak with pt nurse coordinator about orders and meds so he can be reschedule for paracentesis. Also want covid testing.        Fax# [phone] /Attn: IR        [phone] (Leave message if no one answer)

## 2020-11-13 ENCOUNTER — TELEPHONE (OUTPATIENT)
Dept: TRANSPLANT | Facility: CLINIC | Age: 60
End: 2020-11-13

## 2020-11-13 NOTE — TELEPHONE ENCOUNTER
Received phone call from Rossana silverio/ [facility] @ [facility].  Spoke w/ her regarding standing orders for paracentesis.  She is requesting that order includes the procedure, recommended albumin infusion, labs needed for diagnostic paracentesis, and covid test.  She is requesting that transplant faxes orders each time patient needs a paracentesis scheduled.  Above faxed as requested to numbers provided (scheduling--[phone], IR---[phone]).  Fax confirmations received.

## 2020-11-16 ENCOUNTER — LAB VISIT (OUTPATIENT)
Dept: LAB | Facility: HOSPITAL | Age: 60
End: 2020-11-16
Attending: STUDENT IN AN ORGANIZED HEALTH CARE EDUCATION/TRAINING PROGRAM
Payer: MEDICAID

## 2020-11-16 DIAGNOSIS — K70.31 ASCITES DUE TO ALCOHOLIC CIRRHOSIS: ICD-10-CM

## 2020-11-16 DIAGNOSIS — K74.69 DECOMPENSATED LIVER DISEASE: ICD-10-CM

## 2020-11-16 DIAGNOSIS — K76.7 HEPATORENAL SYNDROME: ICD-10-CM

## 2020-11-16 DIAGNOSIS — K76.82 HEPATIC ENCEPHALOPATHY: ICD-10-CM

## 2020-11-16 LAB
ALBUMIN SERPL BCP-MCNC: 2.5 G/DL (ref 3.5–5.2)
ALP SERPL-CCNC: 131 U/L (ref 55–135)
ALT SERPL W/O P-5'-P-CCNC: 22 U/L (ref 10–44)
ANION GAP SERPL CALC-SCNC: 7 MMOL/L (ref 8–16)
AST SERPL-CCNC: 40 U/L (ref 10–40)
BASOPHILS # BLD AUTO: 0.07 K/UL (ref 0–0.2)
BASOPHILS NFR BLD: 0.8 % (ref 0–1.9)
BILIRUB SERPL-MCNC: 0.6 MG/DL (ref 0.1–1)
BUN SERPL-MCNC: 19 MG/DL (ref 6–20)
CALCIUM SERPL-MCNC: 9.3 MG/DL (ref 8.7–10.5)
CHLORIDE SERPL-SCNC: 103 MMOL/L (ref 95–110)
CO2 SERPL-SCNC: 28 MMOL/L (ref 23–29)
CREAT SERPL-MCNC: 1.3 MG/DL (ref 0.5–1.4)
DIFFERENTIAL METHOD: ABNORMAL
EOSINOPHIL # BLD AUTO: 0.2 K/UL (ref 0–0.5)
EOSINOPHIL NFR BLD: 2.6 % (ref 0–8)
ERYTHROCYTE [DISTWIDTH] IN BLOOD BY AUTOMATED COUNT: 15 % (ref 11.5–14.5)
EST. GFR  (AFRICAN AMERICAN): >60 ML/MIN/1.73 M^2
EST. GFR  (NON AFRICAN AMERICAN): >60 ML/MIN/1.73 M^2
GLUCOSE SERPL-MCNC: 84 MG/DL (ref 70–110)
HCT VFR BLD AUTO: 40.3 % (ref 40–54)
HGB BLD-MCNC: 13 G/DL (ref 14–18)
IMM GRANULOCYTES # BLD AUTO: 0.03 K/UL (ref 0–0.04)
IMM GRANULOCYTES NFR BLD AUTO: 0.4 % (ref 0–0.5)
INR PPP: 1.2 (ref 0.8–1.2)
LYMPHOCYTES # BLD AUTO: 1.6 K/UL (ref 1–4.8)
LYMPHOCYTES NFR BLD: 19.2 % (ref 18–48)
MCH RBC QN AUTO: 32 PG (ref 27–31)
MCHC RBC AUTO-ENTMCNC: 32.3 G/DL (ref 32–36)
MCV RBC AUTO: 99 FL (ref 82–98)
MONOCYTES # BLD AUTO: 0.6 K/UL (ref 0.3–1)
MONOCYTES NFR BLD: 6.6 % (ref 4–15)
NEUTROPHILS # BLD AUTO: 6 K/UL (ref 1.8–7.7)
NEUTROPHILS NFR BLD: 70.4 % (ref 38–73)
NRBC BLD-RTO: 0 /100 WBC
PLATELET # BLD AUTO: 189 K/UL (ref 150–350)
PMV BLD AUTO: 10.3 FL (ref 9.2–12.9)
POTASSIUM SERPL-SCNC: 4.2 MMOL/L (ref 3.5–5.1)
PROT SERPL-MCNC: 5.7 G/DL (ref 6–8.4)
PROTHROMBIN TIME: 11.9 SEC (ref 9–12.5)
RBC # BLD AUTO: 4.06 M/UL (ref 4.6–6.2)
SODIUM SERPL-SCNC: 138 MMOL/L (ref 136–145)
WBC # BLD AUTO: 8.51 K/UL (ref 3.9–12.7)

## 2020-11-16 PROCEDURE — 80053 COMPREHEN METABOLIC PANEL: CPT | Mod: TXP

## 2020-11-16 PROCEDURE — 36415 COLL VENOUS BLD VENIPUNCTURE: CPT | Mod: TXP

## 2020-11-16 PROCEDURE — 85610 PROTHROMBIN TIME: CPT | Mod: TXP

## 2020-11-16 PROCEDURE — 85025 COMPLETE CBC W/AUTO DIFF WBC: CPT | Mod: TXP

## 2020-11-18 ENCOUNTER — PATIENT MESSAGE (OUTPATIENT)
Dept: HEPATOLOGY | Facility: CLINIC | Age: 60
End: 2020-11-18

## 2020-11-18 DIAGNOSIS — K74.60 CIRRHOSIS OF LIVER WITH ASCITES, UNSPECIFIED HEPATIC CIRRHOSIS TYPE: ICD-10-CM

## 2020-11-18 DIAGNOSIS — R18.8 CIRRHOSIS OF LIVER WITH ASCITES, UNSPECIFIED HEPATIC CIRRHOSIS TYPE: ICD-10-CM

## 2020-11-18 DIAGNOSIS — E87.20 METABOLIC ACIDOSIS: Primary | ICD-10-CM

## 2020-11-18 RX ORDER — SODIUM BICARBONATE 650 MG/1
650 TABLET ORAL 2 TIMES DAILY
Qty: 60 TABLET | Refills: 2 | Status: SHIPPED | OUTPATIENT
Start: 2020-11-18 | End: 2021-03-23

## 2020-11-19 RX ORDER — MIDAZOLAM HYDROCHLORIDE 1 MG/ML
1 INJECTION INTRAMUSCULAR; INTRAVENOUS
Status: CANCELLED | OUTPATIENT
Start: 2020-11-19

## 2020-11-19 RX ORDER — FENTANYL CITRATE 50 UG/ML
50 INJECTION, SOLUTION INTRAMUSCULAR; INTRAVENOUS
Status: CANCELLED | OUTPATIENT
Start: 2020-11-19

## 2020-11-20 ENCOUNTER — HOSPITAL ENCOUNTER (OUTPATIENT)
Dept: INTERVENTIONAL RADIOLOGY/VASCULAR | Facility: HOSPITAL | Age: 60
Discharge: HOME | End: 2020-11-20
Attending: FAMILY MEDICINE
Payer: MEDICAID

## 2020-11-20 VITALS
SYSTOLIC BLOOD PRESSURE: 104 MMHG | DIASTOLIC BLOOD PRESSURE: 62 MMHG | TEMPERATURE: 99 F | OXYGEN SATURATION: 99 % | HEIGHT: 63 IN | WEIGHT: 190 LBS | HEART RATE: 80 BPM | RESPIRATION RATE: 16 BRPM | BODY MASS INDEX: 33.66 KG/M2

## 2020-11-20 DIAGNOSIS — I82.411 DEEP VEIN THROMBOSIS (DVT) OF FEMORAL VEIN OF RIGHT LOWER EXTREMITY, UNSPECIFIED CHRONICITY: ICD-10-CM

## 2020-11-20 DIAGNOSIS — K70.31 ALCOHOLIC CIRRHOSIS OF LIVER WITH ASCITES: ICD-10-CM

## 2020-11-20 PROCEDURE — C1729 CATH, DRAINAGE: HCPCS | Mod: NTX

## 2020-11-20 PROCEDURE — C1880 VENA CAVA FILTER: HCPCS | Mod: TXP

## 2020-11-20 PROCEDURE — 63600175 PHARM REV CODE 636 W HCPCS: Mod: NTX | Performed by: STUDENT IN AN ORGANIZED HEALTH CARE EDUCATION/TRAINING PROGRAM

## 2020-11-20 PROCEDURE — 37191 INS ENDOVAS VENA CAVA FILTR: CPT | Mod: NTX,,, | Performed by: STUDENT IN AN ORGANIZED HEALTH CARE EDUCATION/TRAINING PROGRAM

## 2020-11-20 PROCEDURE — 99152 MOD SED SAME PHYS/QHP 5/>YRS: CPT | Mod: TXP

## 2020-11-20 PROCEDURE — 25000003 PHARM REV CODE 250: Mod: NTX | Performed by: PHYSICIAN ASSISTANT

## 2020-11-20 PROCEDURE — 25500020 PHARM REV CODE 255: Mod: TXP | Performed by: RADIOLOGY

## 2020-11-20 PROCEDURE — 49083 ABD PARACENTESIS W/IMAGING: CPT | Mod: NTX

## 2020-11-20 PROCEDURE — 37191 IR IVC FILTER PLACEMENT: ICD-10-PCS | Mod: NTX,,, | Performed by: STUDENT IN AN ORGANIZED HEALTH CARE EDUCATION/TRAINING PROGRAM

## 2020-11-20 RX ORDER — SODIUM CHLORIDE 9 MG/ML
INJECTION, SOLUTION INTRAVENOUS CONTINUOUS
Status: DISCONTINUED | OUTPATIENT
Start: 2020-11-20 | End: 2020-11-21 | Stop reason: HOSPADM

## 2020-11-20 RX ORDER — MIDAZOLAM HYDROCHLORIDE 1 MG/ML
INJECTION INTRAMUSCULAR; INTRAVENOUS CODE/TRAUMA/SEDATION MEDICATION
Status: COMPLETED | OUTPATIENT
Start: 2020-11-20 | End: 2020-11-20

## 2020-11-20 RX ORDER — LIDOCAINE HYDROCHLORIDE 5 MG/ML
INJECTION, SOLUTION INFILTRATION; PERINEURAL CODE/TRAUMA/SEDATION MEDICATION
Status: COMPLETED | OUTPATIENT
Start: 2020-11-20 | End: 2020-11-20

## 2020-11-20 RX ORDER — FENTANYL CITRATE 50 UG/ML
INJECTION, SOLUTION INTRAMUSCULAR; INTRAVENOUS CODE/TRAUMA/SEDATION MEDICATION
Status: COMPLETED | OUTPATIENT
Start: 2020-11-20 | End: 2020-11-20

## 2020-11-20 RX ADMIN — IOHEXOL 30 ML: 300 INJECTION, SOLUTION INTRAVENOUS at 03:11

## 2020-11-20 RX ADMIN — FENTANYL CITRATE 50 MCG: 50 INJECTION, SOLUTION INTRAMUSCULAR; INTRAVENOUS at 02:11

## 2020-11-20 RX ADMIN — MIDAZOLAM HYDROCHLORIDE 1 MG: 1 INJECTION, SOLUTION INTRAMUSCULAR; INTRAVENOUS at 03:11

## 2020-11-20 RX ADMIN — MIDAZOLAM HYDROCHLORIDE 1 MG: 1 INJECTION, SOLUTION INTRAMUSCULAR; INTRAVENOUS at 02:11

## 2020-11-20 RX ADMIN — LIDOCAINE HYDROCHLORIDE 5 ML: 5 INJECTION, SOLUTION INFILTRATION; PERINEURAL at 02:11

## 2020-11-20 RX ADMIN — SODIUM CHLORIDE: 0.9 INJECTION, SOLUTION INTRAVENOUS at 12:11

## 2020-11-20 NOTE — DISCHARGE SUMMARY
"Radiology Discharge Summary      Hospital Course: No complications    Admit Date: 11/20/2020  Discharge Date: 11/20/2020     Instructions Given to Patient: Yes  Diet: Resume prior diet  Activity: activity as tolerated and no driving for today    Description of Condition on Discharge: Stable  Vital Signs (Most Recent): Temp: 98.6 °F (37 °C) (11/20/20 1515)  Pulse: 80 (11/20/20 1628)  Resp: 16 (11/20/20 1628)  BP: 104/62 (11/20/20 1628)  SpO2: 99 % (11/20/20 1628)    Discharge Disposition: Home    Discharge Diagnosis: cirrhosis and history of DVT     Follow-up: as scheduled    Faisal Olmos MD ([place])  Interventional Radiology  [phone]        "

## 2020-11-20 NOTE — NURSING
Pt arrives to Ir for IVC filter placement. Orders, hx, labs, consent reviewed. Pt is refusing paracentesis.

## 2020-11-20 NOTE — PLAN OF CARE
Procedure completed. Pt tolerated well. NAD noted or reported. Site to R neck dressed, CDI. Per MD, pt to remain in ROCU for 1 hr with HOB elevated 15 degrees. Spouse called and given dc time.

## 2020-11-20 NOTE — PROGRESS NOTES
Patient arrived to rocu via stretcher a/o x 4, denies pain, site  Right neck CDI, HOB up 15 degrees.

## 2020-11-20 NOTE — PROGRESS NOTES
Patient Given D/C instructions written and verbal.  Patient verbalized understanding of D/C instructions.  PIV removed ,jelco intact no bleeding or hematoma noted. Pt wife (Dee Dee) notified of pending D/C.  Wife will meet pt at [facility] entrance pt awaits transport.

## 2020-11-20 NOTE — PROGRESS NOTES
Called IR and spoke with Laura regarding a update on estimated time of going to the procedure per pt's request. Laura states to inform the pt that someone should be coming within 30 minutes. I updated the pt and his wife with this information. Call light within reach, no apparent distress noted.

## 2020-11-20 NOTE — H&P
Radiology History & Physical      SUBJECTIVE:     Chief Complaint: Deep vein thrombosis (DVT)    History of Present Illness:  Kenneth Pardo is a 60 y.o. male who presents for IVC filter placement.    Past Medical History:   Diagnosis Date    Hepatitis     Hypertension     Liver failure      Past Surgical History:   Procedure Laterality Date    COLONOSCOPY N/A 9/17/2020    Procedure: COLONOSCOPY;  Surgeon: Jhonathan Lieberman MD;  Location: [address]);  Service: Endoscopy;  Laterality: N/A;    ESOPHAGOGASTRODUODENOSCOPY N/A 7/31/2020    Procedure: EGD (ESOPHAGOGASTRODUODENOSCOPY);  Surgeon: Jhonathan Lieberman MD;  Location: [facility] ([address] [place] [address]);  Service: Endoscopy;  Laterality: N/A;       Home Meds:   Prior to Admission medications    Medication Sig Start Date End Date Taking? Authorizing Provider   apixaban (ELIQUIS) 5 mg Tab Take 1 tablet (5 mg total) by mouth 2 (two) times daily. 10/29/20   Maionr Madison MD   ciprofloxacin HCl (CIPRO) 500 MG tablet Take 1 tablet (500 mg total) by mouth once daily. 9/18/20   Aicha Rojas MD   escitalopram oxalate (LEXAPRO) 5 MG Tab Take 1 tablet (5 mg total) by mouth once daily. 8/9/20 8/9/21  Aicha Rojas MD   midodrine (PROAMATINE) 5 MG Tab Take 3 tablets (15 mg total) by mouth every 8 (eight) hours. 8/9/20 10/21/20  Aicha Rojas MD   omeprazole (PRILOSEC) 40 MG capsule Take 1 capsule (40 mg total) by mouth every morning. 9/8/20 9/8/21  Mainor Madison MD   rOPINIRole (REQUIP) 0.25 MG tablet Take 1 tablet (0.25 mg total) by mouth every evening. 8/9/20 8/9/21  Aicha Rojas MD   sodium bicarbonate 650 MG tablet Take 1 tablet (650 mg total) by mouth 2 (two) times daily. 11/18/20   Mainor Madison MD   traZODone (DESYREL) 100 MG tablet Take 1 tablet (100 mg total) by mouth every evening. 11/9/20   Mainor Madison MD     Anticoagulants/Antiplatelets: Eliquis, last dose 11/19/20    Allergies: Review of patient's allergies indicates:  No Known  Allergies  Sedation History:  no adverse reactions    Review of Systems:   Hematological: negative  Respiratory: negative  Cardiovascular: negative  Gastrointestinal: negative  Genito-Urinary: negative  Musculoskeletal: negative  Neurological: negative         OBJECTIVE:     Vital Signs (Most Recent)   none     Physical Exam:  ASA: 2  Mallampati: 2    General: no acute distress  Mental Status: alert and oriented to person, place and time  HEENT: normocephalic, atraumatic  Chest: unlabored breathing  Heart: regular heart rate  Abdomen: nondistended  Extremity: moves all extremities    Laboratory  Lab Results   Component Value Date    INR 1.2 11/16/2020       Lab Results   Component Value Date    WBC 8.51 11/16/2020    HGB 13.0 (L) 11/16/2020    HCT 40.3 11/16/2020    MCV 99 (H) 11/16/2020     11/16/2020      Lab Results   Component Value Date    GLU 84 11/16/2020     11/16/2020    K 4.2 11/16/2020     11/16/2020    CO2 28 11/16/2020    BUN 19 11/16/2020    CREATININE 1.3 11/16/2020    CALCIUM 9.3 11/16/2020    MG 1.4 (L) 10/19/2020    ALT 22 11/16/2020    AST 40 11/16/2020    ALBUMIN 2.5 (L) 11/16/2020    BILITOT 0.6 11/16/2020    BILIDIR >14.0 (H) 08/06/2020       ASSESSMENT/PLAN:     Sedation Plan: up to moderate.    - Patient will undergo IVC filter placement.   - paracentesis will not be done today, per patient request.    Rodney Beck MD MSCR  PGY-2 Radiology Resident

## 2020-11-21 ENCOUNTER — PATIENT MESSAGE (OUTPATIENT)
Dept: HEPATOLOGY | Facility: CLINIC | Age: 60
End: 2020-11-21

## 2020-11-23 ENCOUNTER — TELEPHONE (OUTPATIENT)
Dept: TRANSPLANT | Facility: CLINIC | Age: 60
End: 2020-11-23

## 2020-11-23 ENCOUNTER — PATIENT MESSAGE (OUTPATIENT)
Dept: HEPATOLOGY | Facility: CLINIC | Age: 60
End: 2020-11-23

## 2020-11-23 ENCOUNTER — PATIENT MESSAGE (OUTPATIENT)
Dept: TRANSPLANT | Facility: CLINIC | Age: 60
End: 2020-11-23

## 2020-11-23 NOTE — TELEPHONE ENCOUNTER
Patient/ pt's wife notified that patient may resume Eliquis now.    ===============================================    ----- Message from Faisal Olmos MD sent at 11/23/2020 11:59 AM CST -----  He can resume eliquis now.   Rishi

## 2020-11-24 ENCOUNTER — PATIENT MESSAGE (OUTPATIENT)
Dept: HEPATOLOGY | Facility: CLINIC | Age: 60
End: 2020-11-24

## 2020-11-30 ENCOUNTER — PATIENT MESSAGE (OUTPATIENT)
Dept: HEPATOLOGY | Facility: CLINIC | Age: 60
End: 2020-11-30

## 2020-12-01 ENCOUNTER — PATIENT MESSAGE (OUTPATIENT)
Dept: HEPATOLOGY | Facility: CLINIC | Age: 60
End: 2020-12-01

## 2020-12-02 ENCOUNTER — PATIENT MESSAGE (OUTPATIENT)
Dept: HEPATOLOGY | Facility: CLINIC | Age: 60
End: 2020-12-02

## 2020-12-05 ENCOUNTER — PATIENT MESSAGE (OUTPATIENT)
Dept: TRANSPLANT | Facility: CLINIC | Age: 60
End: 2020-12-05

## 2020-12-07 DIAGNOSIS — I95.9 HYPOTENSION, UNSPECIFIED HYPOTENSION TYPE: Primary | ICD-10-CM

## 2020-12-07 RX ORDER — MIDODRINE HYDROCHLORIDE 5 MG/1
15 TABLET ORAL EVERY 8 HOURS
Qty: 270 TABLET | Refills: 3 | Status: SHIPPED | OUTPATIENT
Start: 2020-12-07 | End: 2020-12-17

## 2020-12-07 RX ORDER — ESCITALOPRAM OXALATE 5 MG/1
5 TABLET ORAL DAILY
Qty: 30 TABLET | Refills: 2 | Status: SHIPPED | OUTPATIENT
Start: 2020-12-07 | End: 2021-03-02

## 2020-12-07 RX ORDER — ROPINIROLE 0.25 MG/1
0.25 TABLET, FILM COATED ORAL NIGHTLY
Qty: 30 TABLET | Refills: 2 | Status: SHIPPED | OUTPATIENT
Start: 2020-12-07 | End: 2021-03-23

## 2020-12-08 ENCOUNTER — PATIENT MESSAGE (OUTPATIENT)
Dept: TRANSPLANT | Facility: CLINIC | Age: 60
End: 2020-12-08

## 2020-12-13 ENCOUNTER — PATIENT MESSAGE (OUTPATIENT)
Dept: TRANSPLANT | Facility: CLINIC | Age: 60
End: 2020-12-13

## 2020-12-14 DIAGNOSIS — R18.8 OTHER ASCITES: Primary | ICD-10-CM

## 2020-12-17 ENCOUNTER — LAB VISIT (OUTPATIENT)
Dept: LAB | Facility: HOSPITAL | Age: 60
End: 2020-12-17
Attending: STUDENT IN AN ORGANIZED HEALTH CARE EDUCATION/TRAINING PROGRAM
Payer: MEDICAID

## 2020-12-17 ENCOUNTER — OFFICE VISIT (OUTPATIENT)
Dept: TRANSPLANT | Facility: CLINIC | Age: 60
End: 2020-12-17
Payer: MEDICAID

## 2020-12-17 VITALS
HEART RATE: 83 BPM | DIASTOLIC BLOOD PRESSURE: 92 MMHG | TEMPERATURE: 98 F | BODY MASS INDEX: 34.13 KG/M2 | OXYGEN SATURATION: 95 % | SYSTOLIC BLOOD PRESSURE: 144 MMHG | WEIGHT: 192.69 LBS | RESPIRATION RATE: 16 BRPM

## 2020-12-17 DIAGNOSIS — K76.82 HEPATIC ENCEPHALOPATHY: ICD-10-CM

## 2020-12-17 DIAGNOSIS — I95.9 HYPOTENSION, UNSPECIFIED HYPOTENSION TYPE: ICD-10-CM

## 2020-12-17 DIAGNOSIS — K76.7 HEPATORENAL SYNDROME: ICD-10-CM

## 2020-12-17 DIAGNOSIS — K74.69 DECOMPENSATED LIVER DISEASE: ICD-10-CM

## 2020-12-17 DIAGNOSIS — K70.31 ASCITES DUE TO ALCOHOLIC CIRRHOSIS: ICD-10-CM

## 2020-12-17 DIAGNOSIS — K70.31 ALCOHOLIC CIRRHOSIS OF LIVER WITH ASCITES: Primary | ICD-10-CM

## 2020-12-17 LAB
ALBUMIN SERPL BCP-MCNC: 2.8 G/DL (ref 3.5–5.2)
ALP SERPL-CCNC: 153 U/L (ref 55–135)
ALT SERPL W/O P-5'-P-CCNC: 15 U/L (ref 10–44)
ANION GAP SERPL CALC-SCNC: 9 MMOL/L (ref 8–16)
AST SERPL-CCNC: 34 U/L (ref 10–40)
BASOPHILS # BLD AUTO: 0.06 K/UL (ref 0–0.2)
BASOPHILS NFR BLD: 0.7 % (ref 0–1.9)
BILIRUB SERPL-MCNC: 0.5 MG/DL (ref 0.1–1)
BUN SERPL-MCNC: 9 MG/DL (ref 6–20)
CALCIUM SERPL-MCNC: 9.5 MG/DL (ref 8.7–10.5)
CHLORIDE SERPL-SCNC: 103 MMOL/L (ref 95–110)
CO2 SERPL-SCNC: 26 MMOL/L (ref 23–29)
CREAT SERPL-MCNC: 1.1 MG/DL (ref 0.5–1.4)
DIFFERENTIAL METHOD: ABNORMAL
EOSINOPHIL # BLD AUTO: 0.1 K/UL (ref 0–0.5)
EOSINOPHIL NFR BLD: 1.5 % (ref 0–8)
ERYTHROCYTE [DISTWIDTH] IN BLOOD BY AUTOMATED COUNT: 14 % (ref 11.5–14.5)
EST. GFR  (AFRICAN AMERICAN): >60 ML/MIN/1.73 M^2
EST. GFR  (NON AFRICAN AMERICAN): >60 ML/MIN/1.73 M^2
GLUCOSE SERPL-MCNC: 111 MG/DL (ref 70–110)
HCT VFR BLD AUTO: 46.8 % (ref 40–54)
HGB BLD-MCNC: 14.4 G/DL (ref 14–18)
IMM GRANULOCYTES # BLD AUTO: 0.02 K/UL (ref 0–0.04)
IMM GRANULOCYTES NFR BLD AUTO: 0.2 % (ref 0–0.5)
INR PPP: 1 (ref 0.8–1.2)
LYMPHOCYTES # BLD AUTO: 1.3 K/UL (ref 1–4.8)
LYMPHOCYTES NFR BLD: 15 % (ref 18–48)
MCH RBC QN AUTO: 31 PG (ref 27–31)
MCHC RBC AUTO-ENTMCNC: 30.8 G/DL (ref 32–36)
MCV RBC AUTO: 101 FL (ref 82–98)
MONOCYTES # BLD AUTO: 0.5 K/UL (ref 0.3–1)
MONOCYTES NFR BLD: 5.1 % (ref 4–15)
NEUTROPHILS # BLD AUTO: 6.9 K/UL (ref 1.8–7.7)
NEUTROPHILS NFR BLD: 77.5 % (ref 38–73)
NRBC BLD-RTO: 0 /100 WBC
PLATELET # BLD AUTO: 210 K/UL (ref 150–350)
PMV BLD AUTO: 10.1 FL (ref 9.2–12.9)
POTASSIUM SERPL-SCNC: 4.4 MMOL/L (ref 3.5–5.1)
PROT SERPL-MCNC: 6 G/DL (ref 6–8.4)
PROTHROMBIN TIME: 11.5 SEC (ref 9–12.5)
RBC # BLD AUTO: 4.64 M/UL (ref 4.6–6.2)
SODIUM SERPL-SCNC: 138 MMOL/L (ref 136–145)
WBC # BLD AUTO: 8.87 K/UL (ref 3.9–12.7)

## 2020-12-17 PROCEDURE — 99215 PR OFFICE/OUTPT VISIT, EST, LEVL V, 40-54 MIN: ICD-10-PCS | Mod: S$PBB,TXP,, | Performed by: STUDENT IN AN ORGANIZED HEALTH CARE EDUCATION/TRAINING PROGRAM

## 2020-12-17 PROCEDURE — 99213 OFFICE O/P EST LOW 20 MIN: CPT | Mod: PBBFAC,TXP | Performed by: STUDENT IN AN ORGANIZED HEALTH CARE EDUCATION/TRAINING PROGRAM

## 2020-12-17 PROCEDURE — 99215 OFFICE O/P EST HI 40 MIN: CPT | Mod: S$PBB,TXP,, | Performed by: STUDENT IN AN ORGANIZED HEALTH CARE EDUCATION/TRAINING PROGRAM

## 2020-12-17 PROCEDURE — 80053 COMPREHEN METABOLIC PANEL: CPT | Mod: TXP

## 2020-12-17 PROCEDURE — 85610 PROTHROMBIN TIME: CPT | Mod: TXP

## 2020-12-17 PROCEDURE — 99999 PR PBB SHADOW E&M-EST. PATIENT-LVL III: ICD-10-PCS | Mod: PBBFAC,TXP,, | Performed by: STUDENT IN AN ORGANIZED HEALTH CARE EDUCATION/TRAINING PROGRAM

## 2020-12-17 PROCEDURE — 85025 COMPLETE CBC W/AUTO DIFF WBC: CPT | Mod: TXP

## 2020-12-17 PROCEDURE — 99999 PR PBB SHADOW E&M-EST. PATIENT-LVL III: CPT | Mod: PBBFAC,TXP,, | Performed by: STUDENT IN AN ORGANIZED HEALTH CARE EDUCATION/TRAINING PROGRAM

## 2020-12-17 PROCEDURE — 36415 COLL VENOUS BLD VENIPUNCTURE: CPT | Mod: TXP

## 2020-12-17 RX ORDER — FUROSEMIDE 20 MG/1
20 TABLET ORAL 2 TIMES DAILY
Qty: 60 TABLET | Refills: 11 | Status: SHIPPED | OUTPATIENT
Start: 2020-12-17 | End: 2022-09-12

## 2020-12-17 RX ORDER — MIDODRINE HYDROCHLORIDE 5 MG/1
10 TABLET ORAL EVERY 8 HOURS
Qty: 270 TABLET | Refills: 3 | Status: SHIPPED | OUTPATIENT
Start: 2020-12-17

## 2020-12-17 RX ORDER — SPIRONOLACTONE 50 MG/1
50 TABLET, FILM COATED ORAL DAILY
Qty: 30 TABLET | Refills: 11 | Status: SHIPPED | OUTPATIENT
Start: 2020-12-17 | End: 2022-09-13

## 2020-12-17 NOTE — Clinical Note
December 17, 2020                     [facility]  [address]  [facility] [address]  Phone: [phone]   Patient: Kenneth Pardo   MR Number: [id number]   YOB: 1960   Date of Visit: 12/17/2020       Dear      Thank you for referring Kenneth Pardo to me for evaluation. Attached you will find relevant portions of my assessment and plan of care.    If you have questions, please do not hesitate to call me. I look forward to following Kenneth Pardo along with you.    Sincerely,    Mainor Madison MD    Enclosure    If you would like to receive this communication electronically, please contact [email or web address] or [phone] to request [employer] access.    [employer] is a tool which provides read-only access to select patient information with whom you have a relationship. Its easy to use and provides real time access to review your patients record including encounter summaries, notes, results, and demographic information.    If you feel you have received this communication in error or would no longer like to receive these types of communications, please e-mail [email or web address]

## 2020-12-17 NOTE — PROGRESS NOTES
Transplant Hepatology   Transplant Evaluation Follow Up Note    Referring provider: No ref. provider found  PCP: Neva Nielsen MD    Chief complaint: follow up of alcohol related cirrhosis    HPI: Kenneth Pardo is a 60 y.o. male who presents to Trans[facility]ology Clinic for follow up of alcohol related cirrhosis. He is accompanied by his wife.    He reports ongoing abdominal distension and lower extremity edema.  He was previously taken off of diuretics due to renal dysfunction.  He also reports anxiety and depression despite taking Lexapro.  He denies suicidal ideation.  He reports compliance with lactulose without recent encephalopathy.  He denies other signs of decompensated cirrhosis including no recent jaundice or GI bleeding.    Past Medical History:   Diagnosis Date    Hepatitis     Hypertension     Liver failure        Past Surgical History:   Procedure Laterality Date    COLONOSCOPY N/A 9/17/2020    Procedure: COLONOSCOPY;  Surgeon: Jhonathan Lieberman MD;  Location: [facility] ([address] [place] [address]);  Service: Endoscopy;  Laterality: N/A;    ESOPHAGOGASTRODUODENOSCOPY N/A 7/31/2020    Procedure: EGD (ESOPHAGOGASTRODUODENOSCOPY);  Surgeon: Jhonathan Lieberman MD;  Location: [address]);  Service: Endoscopy;  Laterality: N/A;       History reviewed. No pertinent family history.    Social History     Tobacco Use    Smoking status: Current Some Day Smoker     Types: Cigarettes    Smokeless tobacco: Never Used    Tobacco comment: quarter pack every 2 days   Substance Use Topics    Alcohol use: Not Currently     Frequency: Never    Drug use: Not Currently       Current Outpatient Medications   Medication Sig Dispense Refill    apixaban (ELIQUIS) 5 mg Tab Take 1 tablet (5 mg total) by mouth 2 (two) times daily. 60 tablet 2    ciprofloxacin HCl (CIPRO) 500 MG tablet Take 1 tablet (500 mg total) by mouth once daily. 30 tablet 2    CONSTULOSE 10 gram/15 mL solution TAKE 15 MLS BY MOUTH 2 TIMES A DAY. TAKE ENOUGH TO HAVE  3 BOWEL MOVEMENTS PER  mL 0    escitalopram oxalate (LEXAPRO) 5 MG Tab Take 1 tablet (5 mg total) by mouth once daily. 30 tablet 2    midodrine (PROAMATINE) 5 MG Tab Take 2 tablets (10 mg total) by mouth every 8 (eight) hours. 270 tablet 3    omeprazole (PRILOSEC) 40 MG capsule Take 1 capsule (40 mg total) by mouth every morning. 30 capsule 11    rOPINIRole (REQUIP) 0.25 MG tablet Take 1 tablet (0.25 mg total) by mouth every evening. 30 tablet 2    sodium bicarbonate 650 MG tablet Take 1 tablet (650 mg total) by mouth 2 (two) times daily. 60 tablet 2    traZODone (DESYREL) 100 MG tablet Take 1 tablet (100 mg total) by mouth every evening. 30 tablet 3     No current facility-administered medications for this visit.        Review of patient's allergies indicates:  No Known Allergies    Review of Systems   Constitutional: Positive for weight loss. Negative for fever.   HENT: Negative for congestion and sore throat.    Eyes: Negative for blurred vision and double vision.   Respiratory: Negative for cough and shortness of breath.    Cardiovascular: Negative for chest pain and leg swelling.   Gastrointestinal: Negative for abdominal pain, blood in stool, constipation, diarrhea, melena, nausea and vomiting.   Genitourinary: Negative for dysuria and hematuria.   Musculoskeletal: Negative for joint pain and myalgias.   Skin: Negative for itching and rash.   Neurological: Negative for tremors, seizures and headaches.   Endo/Heme/Allergies: Does not bruise/bleed easily.   Psychiatric/Behavioral: Positive for depression. Negative for suicidal ideas. The patient is nervous/anxious.        Vitals:    12/17/20 0933   BP: (!) 144/92   Pulse: 83   Resp: 16   Temp: 97.6 °F (36.4 °C)   TempSrc: Oral   SpO2: 95%   Weight: 87.4 kg (192 lb 10.9 oz)       Physical Exam  Vitals signs reviewed.   Constitutional:       General: He is not in acute distress.     Appearance: He is well-developed.   HENT:      Head: Normocephalic  and atraumatic.   Eyes:      General: No scleral icterus.     Extraocular Movements: Extraocular movements intact.      Conjunctiva/sclera: Conjunctivae normal.   Cardiovascular:      Rate and Rhythm: Normal rate and regular rhythm.   Pulmonary:      Effort: Pulmonary effort is normal. No respiratory distress.      Breath sounds: Normal breath sounds. No wheezing, rhonchi or rales.   Abdominal:      General: Bowel sounds are normal. There is distension.      Palpations: Abdomen is soft.      Tenderness: There is no abdominal tenderness.   Musculoskeletal:         General: No swelling or deformity.      Right lower leg: Edema present.      Left lower leg: Edema present.   Skin:     General: Skin is warm and dry.      Coloration: Skin is not jaundiced.   Neurological:      General: No focal deficit present.      Mental Status: He is alert and oriented to person, place, and time.   Psychiatric:         Mood and Affect: Mood and affect normal.         Behavior: Behavior is cooperative.         LABS: I personally reviewed pertinent laboratory findings.    Lab Results   Component Value Date    ALT 15 12/17/2020    AST 34 12/17/2020     (H) 10/14/2020    ALKPHOS 153 (H) 12/17/2020    BILITOT 0.5 12/17/2020       Lab Results   Component Value Date    WBC 8.87 12/17/2020    HGB 14.4 12/17/2020    HCT 46.8 12/17/2020     (H) 12/17/2020     12/17/2020       Lab Results   Component Value Date     12/17/2020    K 4.4 12/17/2020     12/17/2020    CO2 26 12/17/2020    BUN 9 12/17/2020    CREATININE 1.1 12/17/2020    CALCIUM 9.5 12/17/2020    ANIONGAP 9 12/17/2020    ESTGFRAFRICA >60.0 12/17/2020    EGFRNONAA >60.0 12/17/2020       Lab Results   Component Value Date    INR 1.0 12/17/2020    INR 1.2 11/16/2020    INR 1.1 11/09/2020       No results found for: SMOOTHMUSCAB, MITOAB    Lab Results   Component Value Date    IRON 78 10/14/2020    TIBC 118 (L) 10/14/2020    FERRITIN 1,984 (H) 10/14/2020        Lab Results   Component Value Date    HEPAIGM Negative 08/01/2020    HEPBIGM Negative 08/01/2020    HEPBCAB Negative 09/10/2020    HEPCAB Negative 09/10/2020       Imaging:   I personally reviewed recent imaging studies available on the chart and from outside medical records.      Assessment:  60 y.o. male presenting with alcohol related cirrhosis. He is decompensated with ascites, HE.    MELD-Na score: 7 at 12/17/2020  8:30 AM  MELD score: 7 at 12/17/2020  8:30 AM  Calculated from:  Serum Creatinine: 1.1 mg/dL at 12/17/2020  8:30 AM  Serum Sodium: 138 mmol/L (Rounded to 137 mmol/L) at 12/17/2020  8:30 AM  Total Bilirubin: 0.5 mg/dL (Rounded to 1 mg/dL) at 12/17/2020  8:30 AM  INR(ratio): 1.0 at 12/17/2020  8:30 AM  Age: 60 years 4 months    UNOS Patient Status  Functional Status: 60% - Requires occasional assistance but is able to care for needs  Physical Capacity: No Limitations    Transplant Candidacy: Patient is a 60 y.o. male alcohol related cirrhosis with MELD-Na 7. Given significant improvement in his MELD score, he is not warrant transplant evaluation as it would not offer survival benefit.    Recommendations:  - Alcohol use disorder:  Patient congratulated on alcohol cessation counseling continued abstinence. Last drink 07/2020. PETH negative 10/2020.    - Ascites/Edema: 2 gm Na diet. Start Lasix 20 mg daily and spironolactone 50 mg daily.  Repeat BMP in 2 weeks.    - Encephalopathy: Continue lactulose. Titrate to maintain 3-4 bowel movements per day.    - Variceal screening: EGD in 07/2020 showed no varices. Repeat EGD in 07/2021.    - HCC screening: US in 10/2020 without HCC. Did visualize right hepatic lobe lesion characterized as focal fatty infiltration on prior MRI. AFP 1.8 in 09/2020. Repeat abdominal US and AFP every 6 months.    - Immunizations: Recommend HAV and HBV vaccinations if not immune    Return to clinic in 1 month.    A total of 40 minutes were spent face-to-face with the patient  during this encounter, and over half of that time was spent on counseling and coordination of care. We discussed in depth the nature of his liver disease and the management plan and liver transplant evaluation in details. I also educated him about lifestyle modifications which may stop or slow progression of liver disease. I have provided him with an opportunity to ask questions and have all questions answered to his satisfaction.    I have sent communication to the referring physician and/or primary care provider.    Mainor Madison MD  Staff Physician  Hepatology and Liver Transplant  [facility]  [facility] [place]

## 2020-12-17 NOTE — Clinical Note
MELD down to 7 but still has significant ascites. Restarting diuretics. Please repeat labs locally in 2 weeks. Return in 4 weeks. Let's keep in transplant clinic for now to ensure he remains stable.

## 2020-12-17 NOTE — PATIENT INSTRUCTIONS
Decrease midodrine to 10mg (2 pills) three times a day.  If kidney function stable, will start furosemide 20mg daily and spironolactone 50mg daily.  Labs in 2 weeks. Return in 1 month.

## 2020-12-22 ENCOUNTER — PATIENT MESSAGE (OUTPATIENT)
Dept: TRANSPLANT | Facility: CLINIC | Age: 60
End: 2020-12-22

## 2020-12-28 ENCOUNTER — LAB VISIT (OUTPATIENT)
Dept: LAB | Facility: HOSPITAL | Age: 60
End: 2020-12-28
Attending: STUDENT IN AN ORGANIZED HEALTH CARE EDUCATION/TRAINING PROGRAM
Payer: MEDICAID

## 2020-12-28 DIAGNOSIS — K74.69 DECOMPENSATED LIVER DISEASE: ICD-10-CM

## 2020-12-28 DIAGNOSIS — K76.7 HEPATORENAL SYNDROME: ICD-10-CM

## 2020-12-28 DIAGNOSIS — K76.82 HEPATIC ENCEPHALOPATHY: ICD-10-CM

## 2020-12-28 DIAGNOSIS — K70.31 ASCITES DUE TO ALCOHOLIC CIRRHOSIS: ICD-10-CM

## 2020-12-28 LAB
ALBUMIN SERPL BCP-MCNC: 2.6 G/DL (ref 3.5–5.2)
ALP SERPL-CCNC: 154 U/L (ref 55–135)
ALT SERPL W/O P-5'-P-CCNC: 29 U/L (ref 10–44)
ANION GAP SERPL CALC-SCNC: 6 MMOL/L (ref 8–16)
AST SERPL-CCNC: 58 U/L (ref 10–40)
BASOPHILS # BLD AUTO: 0.07 K/UL (ref 0–0.2)
BASOPHILS NFR BLD: 0.8 % (ref 0–1.9)
BILIRUB SERPL-MCNC: 0.2 MG/DL (ref 0.1–1)
BUN SERPL-MCNC: 11 MG/DL (ref 6–20)
CALCIUM SERPL-MCNC: 9.2 MG/DL (ref 8.7–10.5)
CHLORIDE SERPL-SCNC: 103 MMOL/L (ref 95–110)
CO2 SERPL-SCNC: 30 MMOL/L (ref 23–29)
CREAT SERPL-MCNC: 1.3 MG/DL (ref 0.5–1.4)
DIFFERENTIAL METHOD: ABNORMAL
EOSINOPHIL # BLD AUTO: 0.3 K/UL (ref 0–0.5)
EOSINOPHIL NFR BLD: 3.8 % (ref 0–8)
ERYTHROCYTE [DISTWIDTH] IN BLOOD BY AUTOMATED COUNT: 13.4 % (ref 11.5–14.5)
EST. GFR  (AFRICAN AMERICAN): >60 ML/MIN/1.73 M^2
EST. GFR  (NON AFRICAN AMERICAN): >60 ML/MIN/1.73 M^2
GLUCOSE SERPL-MCNC: 100 MG/DL (ref 70–110)
HCT VFR BLD AUTO: 44.4 % (ref 40–54)
HGB BLD-MCNC: 14 G/DL (ref 14–18)
IMM GRANULOCYTES # BLD AUTO: 0.02 K/UL (ref 0–0.04)
IMM GRANULOCYTES NFR BLD AUTO: 0.2 % (ref 0–0.5)
INR PPP: 0.8 (ref 0.8–1.2)
LYMPHOCYTES # BLD AUTO: 2 K/UL (ref 1–4.8)
LYMPHOCYTES NFR BLD: 22.9 % (ref 18–48)
MCH RBC QN AUTO: 31 PG (ref 27–31)
MCHC RBC AUTO-ENTMCNC: 31.5 G/DL (ref 32–36)
MCV RBC AUTO: 98 FL (ref 82–98)
MONOCYTES # BLD AUTO: 0.5 K/UL (ref 0.3–1)
MONOCYTES NFR BLD: 5.1 % (ref 4–15)
NEUTROPHILS # BLD AUTO: 5.9 K/UL (ref 1.8–7.7)
NEUTROPHILS NFR BLD: 67.2 % (ref 38–73)
NRBC BLD-RTO: 0 /100 WBC
PLATELET # BLD AUTO: 172 K/UL (ref 150–350)
PMV BLD AUTO: 10.8 FL (ref 9.2–12.9)
POTASSIUM SERPL-SCNC: 3.1 MMOL/L (ref 3.5–5.1)
PROT SERPL-MCNC: 5.8 G/DL (ref 6–8.4)
PROTHROMBIN TIME: 11.1 SEC (ref 9–12.5)
RBC # BLD AUTO: 4.52 M/UL (ref 4.6–6.2)
SODIUM SERPL-SCNC: 139 MMOL/L (ref 136–145)
WBC # BLD AUTO: 8.76 K/UL (ref 3.9–12.7)

## 2020-12-28 PROCEDURE — 85025 COMPLETE CBC W/AUTO DIFF WBC: CPT | Mod: TXP

## 2020-12-28 PROCEDURE — 85610 PROTHROMBIN TIME: CPT | Mod: TXP

## 2020-12-28 PROCEDURE — 36415 COLL VENOUS BLD VENIPUNCTURE: CPT | Mod: TXP

## 2020-12-28 PROCEDURE — 80053 COMPREHEN METABOLIC PANEL: CPT | Mod: TXP

## 2020-12-29 ENCOUNTER — PATIENT MESSAGE (OUTPATIENT)
Dept: TRANSPLANT | Facility: CLINIC | Age: 60
End: 2020-12-29

## 2020-12-30 ENCOUNTER — PATIENT MESSAGE (OUTPATIENT)
Dept: TRANSPLANT | Facility: CLINIC | Age: 60
End: 2020-12-30

## 2021-01-08 ENCOUNTER — PATIENT MESSAGE (OUTPATIENT)
Dept: TRANSPLANT | Facility: CLINIC | Age: 61
End: 2021-01-08

## 2021-01-26 ENCOUNTER — TELEPHONE (OUTPATIENT)
Dept: TRANSPLANT | Facility: CLINIC | Age: 61
End: 2021-01-26

## 2021-02-01 ENCOUNTER — TELEPHONE (OUTPATIENT)
Dept: TRANSPLANT | Facility: CLINIC | Age: 61
End: 2021-02-01

## 2021-02-01 DIAGNOSIS — I82.499 DEEP VEIN THROMBOSIS (DVT) OF OTHER VEIN OF LOWER EXTREMITY, UNSPECIFIED CHRONICITY, UNSPECIFIED LATERALITY: ICD-10-CM

## 2021-02-01 DIAGNOSIS — K21.9 GASTROESOPHAGEAL REFLUX DISEASE: ICD-10-CM

## 2021-02-01 DIAGNOSIS — K20.90 ESOPHAGITIS: Primary | ICD-10-CM

## 2021-02-01 RX ORDER — OMEPRAZOLE 40 MG/1
40 CAPSULE, DELAYED RELEASE ORAL EVERY MORNING
Qty: 30 CAPSULE | Refills: 11 | Status: SHIPPED | OUTPATIENT
Start: 2021-02-01 | End: 2021-03-23 | Stop reason: ALTCHOICE

## 2021-02-11 ENCOUNTER — HOSPITAL ENCOUNTER (EMERGENCY)
Facility: HOSPITAL | Age: 61
Discharge: HOME | End: 2021-02-11
Attending: EMERGENCY MEDICINE
Payer: MEDICAID

## 2021-02-11 VITALS
HEIGHT: 63 IN | SYSTOLIC BLOOD PRESSURE: 113 MMHG | OXYGEN SATURATION: 95 % | WEIGHT: 178.19 LBS | BODY MASS INDEX: 31.57 KG/M2 | TEMPERATURE: 99 F | DIASTOLIC BLOOD PRESSURE: 77 MMHG | RESPIRATION RATE: 18 BRPM | HEART RATE: 75 BPM

## 2021-02-11 DIAGNOSIS — R53.1 WEAKNESS: ICD-10-CM

## 2021-02-11 DIAGNOSIS — K74.60 HEPATIC CIRRHOSIS, UNSPECIFIED HEPATIC CIRRHOSIS TYPE, UNSPECIFIED WHETHER ASCITES PRESENT: ICD-10-CM

## 2021-02-11 DIAGNOSIS — R10.9 ABDOMINAL PAIN, UNSPECIFIED ABDOMINAL LOCATION: ICD-10-CM

## 2021-02-11 DIAGNOSIS — R11.10 NON-INTRACTABLE VOMITING, PRESENCE OF NAUSEA NOT SPECIFIED, UNSPECIFIED VOMITING TYPE: Primary | ICD-10-CM

## 2021-02-11 LAB
ALBUMIN SERPL BCP-MCNC: 3.1 G/DL (ref 3.5–5.2)
ALP SERPL-CCNC: 165 U/L (ref 55–135)
ALT SERPL W/O P-5'-P-CCNC: 19 U/L (ref 10–44)
AMMONIA PLAS-SCNC: <10 UMOL/L (ref 10–50)
ANION GAP SERPL CALC-SCNC: 12 MMOL/L (ref 8–16)
AST SERPL-CCNC: 26 U/L (ref 10–40)
BASOPHILS # BLD AUTO: 0.04 K/UL (ref 0–0.2)
BASOPHILS NFR BLD: 0.4 % (ref 0–1.9)
BILIRUB SERPL-MCNC: 0.8 MG/DL (ref 0.1–1)
BILIRUB UR QL STRIP: NEGATIVE
BUN SERPL-MCNC: 11 MG/DL (ref 6–20)
CALCIUM SERPL-MCNC: 9.8 MG/DL (ref 8.7–10.5)
CHLORIDE SERPL-SCNC: 100 MMOL/L (ref 95–110)
CLARITY UR: CLEAR
CO2 SERPL-SCNC: 26 MMOL/L (ref 23–29)
COLOR UR: YELLOW
CREAT SERPL-MCNC: 1.5 MG/DL (ref 0.5–1.4)
DIFFERENTIAL METHOD: ABNORMAL
EOSINOPHIL # BLD AUTO: 0 K/UL (ref 0–0.5)
EOSINOPHIL NFR BLD: 0.4 % (ref 0–8)
ERYTHROCYTE [DISTWIDTH] IN BLOOD BY AUTOMATED COUNT: 13.2 % (ref 11.5–14.5)
EST. GFR  (AFRICAN AMERICAN): 58.5 ML/MIN/1.73 M^2
EST. GFR  (NON AFRICAN AMERICAN): 50.6 ML/MIN/1.73 M^2
GLUCOSE SERPL-MCNC: 116 MG/DL (ref 70–110)
GLUCOSE UR QL STRIP: NEGATIVE
HCT VFR BLD AUTO: 51.2 % (ref 40–54)
HGB BLD-MCNC: 16.8 G/DL (ref 14–18)
HGB UR QL STRIP: NEGATIVE
IMM GRANULOCYTES # BLD AUTO: 0.03 K/UL (ref 0–0.04)
IMM GRANULOCYTES NFR BLD AUTO: 0.3 % (ref 0–0.5)
KETONES UR QL STRIP: ABNORMAL
LACTATE SERPL-SCNC: 4.6 MMOL/L (ref 0.5–2.2)
LEUKOCYTE ESTERASE UR QL STRIP: NEGATIVE
LIPASE SERPL-CCNC: 95 U/L (ref 23–300)
LYMPHOCYTES # BLD AUTO: 1 K/UL (ref 1–4.8)
LYMPHOCYTES NFR BLD: 10.7 % (ref 18–48)
MAGNESIUM SERPL-MCNC: 1.6 MG/DL (ref 1.6–2.6)
MCH RBC QN AUTO: 30.8 PG (ref 27–31)
MCHC RBC AUTO-ENTMCNC: 32.8 G/DL (ref 32–36)
MCV RBC AUTO: 94 FL (ref 82–98)
MONOCYTES # BLD AUTO: 0.3 K/UL (ref 0.3–1)
MONOCYTES NFR BLD: 3 % (ref 4–15)
NEUTROPHILS # BLD AUTO: 8 K/UL (ref 1.8–7.7)
NEUTROPHILS NFR BLD: 85.2 % (ref 38–73)
NITRITE UR QL STRIP: NEGATIVE
NRBC BLD-RTO: 0 /100 WBC
NT-PROBNP SERPL-MCNC: 512 PG/ML (ref 5–900)
PH UR STRIP: 8 [PH] (ref 5–8)
PLATELET # BLD AUTO: 177 K/UL (ref 150–350)
PMV BLD AUTO: 10.8 FL (ref 9.2–12.9)
POTASSIUM SERPL-SCNC: 4 MMOL/L (ref 3.5–5.1)
PROT SERPL-MCNC: 7 G/DL (ref 6–8.4)
PROT UR QL STRIP: NEGATIVE
RBC # BLD AUTO: 5.45 M/UL (ref 4.6–6.2)
SODIUM SERPL-SCNC: 138 MMOL/L (ref 136–145)
SP GR UR STRIP: 1.01 (ref 1–1.03)
TROPONIN I SERPL DL<=0.01 NG/ML-MCNC: <0.02 NG/ML (ref 0–0.03)
URN SPEC COLLECT METH UR: ABNORMAL
UROBILINOGEN UR STRIP-ACNC: NEGATIVE EU/DL
WBC # BLD AUTO: 9.36 K/UL (ref 3.9–12.7)

## 2021-02-11 PROCEDURE — 99284 EMERGENCY DEPT VISIT MOD MDM: CPT | Mod: 25,NTX

## 2021-02-11 PROCEDURE — 85025 COMPLETE CBC W/AUTO DIFF WBC: CPT | Mod: NTX

## 2021-02-11 PROCEDURE — 36415 COLL VENOUS BLD VENIPUNCTURE: CPT | Mod: NTX

## 2021-02-11 PROCEDURE — 80053 COMPREHEN METABOLIC PANEL: CPT | Mod: NTX

## 2021-02-11 PROCEDURE — 93005 ELECTROCARDIOGRAM TRACING: CPT | Mod: NTX

## 2021-02-11 PROCEDURE — 83735 ASSAY OF MAGNESIUM: CPT | Mod: NTX

## 2021-02-11 PROCEDURE — 82140 ASSAY OF AMMONIA: CPT | Mod: NTX

## 2021-02-11 PROCEDURE — 83880 ASSAY OF NATRIURETIC PEPTIDE: CPT | Mod: NTX

## 2021-02-11 PROCEDURE — 83605 ASSAY OF LACTIC ACID: CPT | Mod: NTX

## 2021-02-11 PROCEDURE — 83690 ASSAY OF LIPASE: CPT | Mod: NTX

## 2021-02-11 PROCEDURE — 63600175 PHARM REV CODE 636 W HCPCS: Mod: NTX | Performed by: EMERGENCY MEDICINE

## 2021-02-11 PROCEDURE — 81003 URINALYSIS AUTO W/O SCOPE: CPT | Mod: NTX

## 2021-02-11 PROCEDURE — 96375 TX/PRO/DX INJ NEW DRUG ADDON: CPT | Mod: NTX

## 2021-02-11 PROCEDURE — 96374 THER/PROPH/DIAG INJ IV PUSH: CPT | Mod: NTX

## 2021-02-11 PROCEDURE — 93010 ELECTROCARDIOGRAM REPORT: CPT | Mod: NTX,,, | Performed by: INTERNAL MEDICINE

## 2021-02-11 PROCEDURE — 84484 ASSAY OF TROPONIN QUANT: CPT | Mod: NTX

## 2021-02-11 PROCEDURE — 93010 EKG 12-LEAD: ICD-10-PCS | Mod: NTX,,, | Performed by: INTERNAL MEDICINE

## 2021-02-11 RX ORDER — ONDANSETRON 4 MG/1
4 TABLET, ORALLY DISINTEGRATING ORAL EVERY 6 HOURS PRN
Qty: 20 TABLET | Refills: 0 | Status: SHIPPED | OUTPATIENT
Start: 2021-02-11 | End: 2021-05-07 | Stop reason: SDUPTHER

## 2021-02-11 RX ORDER — ONDANSETRON 2 MG/ML
8 INJECTION INTRAMUSCULAR; INTRAVENOUS
Status: COMPLETED | OUTPATIENT
Start: 2021-02-11 | End: 2021-02-11

## 2021-02-11 RX ORDER — HYDROMORPHONE HYDROCHLORIDE 1 MG/ML
1 INJECTION, SOLUTION INTRAMUSCULAR; INTRAVENOUS; SUBCUTANEOUS
Status: COMPLETED | OUTPATIENT
Start: 2021-02-11 | End: 2021-02-11

## 2021-02-11 RX ADMIN — HYDROMORPHONE HYDROCHLORIDE 1 MG: 1 INJECTION, SOLUTION INTRAMUSCULAR; INTRAVENOUS; SUBCUTANEOUS at 07:02

## 2021-02-11 RX ADMIN — ONDANSETRON 8 MG: 2 INJECTION INTRAMUSCULAR; INTRAVENOUS at 07:02

## 2021-02-15 ENCOUNTER — TELEPHONE (OUTPATIENT)
Dept: TRANSPLANT | Facility: CLINIC | Age: 61
End: 2021-02-15

## 2021-02-17 ENCOUNTER — HOSPITAL ENCOUNTER (EMERGENCY)
Facility: HOSPITAL | Age: 61
Discharge: HOME | End: 2021-02-17
Attending: EMERGENCY MEDICINE
Payer: MEDICAID

## 2021-02-17 VITALS
SYSTOLIC BLOOD PRESSURE: 131 MMHG | TEMPERATURE: 98 F | BODY MASS INDEX: 31.04 KG/M2 | HEART RATE: 78 BPM | OXYGEN SATURATION: 98 % | WEIGHT: 175.19 LBS | RESPIRATION RATE: 16 BRPM | DIASTOLIC BLOOD PRESSURE: 90 MMHG | HEIGHT: 63 IN

## 2021-02-17 DIAGNOSIS — F41.9 ANXIETY: Primary | ICD-10-CM

## 2021-02-17 PROCEDURE — 99284 EMERGENCY DEPT VISIT MOD MDM: CPT | Mod: 25,NTX

## 2021-02-17 PROCEDURE — 63600175 PHARM REV CODE 636 W HCPCS: Mod: NTX | Performed by: CLINICAL NURSE SPECIALIST

## 2021-02-17 PROCEDURE — 96372 THER/PROPH/DIAG INJ SC/IM: CPT | Mod: 59,NTX

## 2021-02-17 RX ORDER — DICYCLOMINE HYDROCHLORIDE 10 MG/ML
20 INJECTION INTRAMUSCULAR
Status: COMPLETED | OUTPATIENT
Start: 2021-02-17 | End: 2021-02-17

## 2021-02-17 RX ORDER — DICYCLOMINE HYDROCHLORIDE 20 MG/1
20 TABLET ORAL 2 TIMES DAILY
Qty: 20 TABLET | Refills: 0 | Status: SHIPPED | OUTPATIENT
Start: 2021-02-17 | End: 2021-03-19

## 2021-02-17 RX ORDER — LORAZEPAM 2 MG/ML
1 INJECTION INTRAMUSCULAR
Status: COMPLETED | OUTPATIENT
Start: 2021-02-17 | End: 2021-02-17

## 2021-02-17 RX ADMIN — LORAZEPAM 1 MG: 2 INJECTION INTRAMUSCULAR; INTRAVENOUS at 02:02

## 2021-02-17 RX ADMIN — DICYCLOMINE HYDROCHLORIDE 20 MG: 20 INJECTION INTRAMUSCULAR at 02:02

## 2021-02-28 ENCOUNTER — PATIENT MESSAGE (OUTPATIENT)
Dept: TRANSPLANT | Facility: CLINIC | Age: 61
End: 2021-02-28

## 2021-02-28 ENCOUNTER — NURSE TRIAGE (OUTPATIENT)
Dept: ADMINISTRATIVE | Facility: CLINIC | Age: 61
End: 2021-02-28

## 2021-03-01 ENCOUNTER — LAB VISIT (OUTPATIENT)
Dept: LAB | Facility: HOSPITAL | Age: 61
End: 2021-03-01
Attending: STUDENT IN AN ORGANIZED HEALTH CARE EDUCATION/TRAINING PROGRAM
Payer: MEDICAID

## 2021-03-01 ENCOUNTER — TELEPHONE (OUTPATIENT)
Dept: TRANSPLANT | Facility: CLINIC | Age: 61
End: 2021-03-01

## 2021-03-01 ENCOUNTER — PATIENT MESSAGE (OUTPATIENT)
Dept: TRANSPLANT | Facility: CLINIC | Age: 61
End: 2021-03-01

## 2021-03-01 DIAGNOSIS — K70.31 ASCITES DUE TO ALCOHOLIC CIRRHOSIS: ICD-10-CM

## 2021-03-01 DIAGNOSIS — K76.7 HEPATORENAL SYNDROME: ICD-10-CM

## 2021-03-01 DIAGNOSIS — K74.69 DECOMPENSATED LIVER DISEASE: ICD-10-CM

## 2021-03-01 DIAGNOSIS — K76.82 HEPATIC ENCEPHALOPATHY: ICD-10-CM

## 2021-03-01 LAB
ALBUMIN SERPL BCP-MCNC: 3.3 G/DL (ref 3.5–5.2)
ALP SERPL-CCNC: 143 U/L (ref 55–135)
ALT SERPL W/O P-5'-P-CCNC: 16 U/L (ref 10–44)
ANION GAP SERPL CALC-SCNC: 3 MMOL/L (ref 8–16)
AST SERPL-CCNC: 21 U/L (ref 10–40)
BASOPHILS # BLD AUTO: 0.07 K/UL (ref 0–0.2)
BASOPHILS NFR BLD: 0.8 % (ref 0–1.9)
BILIRUB SERPL-MCNC: 0.3 MG/DL (ref 0.1–1)
BUN SERPL-MCNC: 17 MG/DL (ref 6–20)
CALCIUM SERPL-MCNC: 9.7 MG/DL (ref 8.7–10.5)
CHLORIDE SERPL-SCNC: 105 MMOL/L (ref 95–110)
CO2 SERPL-SCNC: 32 MMOL/L (ref 23–29)
CREAT SERPL-MCNC: 1.4 MG/DL (ref 0.5–1.4)
DIFFERENTIAL METHOD: NORMAL
EOSINOPHIL # BLD AUTO: 0.4 K/UL (ref 0–0.5)
EOSINOPHIL NFR BLD: 3.8 % (ref 0–8)
ERYTHROCYTE [DISTWIDTH] IN BLOOD BY AUTOMATED COUNT: 13.8 % (ref 11.5–14.5)
EST. GFR  (AFRICAN AMERICAN): >60 ML/MIN/1.73 M^2
EST. GFR  (NON AFRICAN AMERICAN): 55 ML/MIN/1.73 M^2
GLUCOSE SERPL-MCNC: 90 MG/DL (ref 70–110)
HCT VFR BLD AUTO: 46.1 % (ref 40–54)
HGB BLD-MCNC: 15.1 G/DL (ref 14–18)
IMM GRANULOCYTES # BLD AUTO: 0.02 K/UL (ref 0–0.04)
IMM GRANULOCYTES NFR BLD AUTO: 0.2 % (ref 0–0.5)
LYMPHOCYTES # BLD AUTO: 2 K/UL (ref 1–4.8)
LYMPHOCYTES NFR BLD: 21.5 % (ref 18–48)
MCH RBC QN AUTO: 30.7 PG (ref 27–31)
MCHC RBC AUTO-ENTMCNC: 32.8 G/DL (ref 32–36)
MCV RBC AUTO: 94 FL (ref 82–98)
MONOCYTES # BLD AUTO: 0.4 K/UL (ref 0.3–1)
MONOCYTES NFR BLD: 4.6 % (ref 4–15)
NEUTROPHILS # BLD AUTO: 6.3 K/UL (ref 1.8–7.7)
NEUTROPHILS NFR BLD: 69.1 % (ref 38–73)
NRBC BLD-RTO: 0 /100 WBC
PLATELET # BLD AUTO: 180 K/UL (ref 150–350)
PMV BLD AUTO: 10.3 FL (ref 9.2–12.9)
POTASSIUM SERPL-SCNC: 4.1 MMOL/L (ref 3.5–5.1)
PROT SERPL-MCNC: 6.9 G/DL (ref 6–8.4)
RBC # BLD AUTO: 4.92 M/UL (ref 4.6–6.2)
SODIUM SERPL-SCNC: 140 MMOL/L (ref 136–145)
WBC # BLD AUTO: 9.17 K/UL (ref 3.9–12.7)

## 2021-03-01 PROCEDURE — 85610 PROTHROMBIN TIME: CPT | Mod: TXP

## 2021-03-01 PROCEDURE — 85025 COMPLETE CBC W/AUTO DIFF WBC: CPT | Mod: NTX

## 2021-03-01 PROCEDURE — 80053 COMPREHEN METABOLIC PANEL: CPT | Mod: TXP

## 2021-03-01 PROCEDURE — 36415 COLL VENOUS BLD VENIPUNCTURE: CPT | Mod: TXP

## 2021-03-02 ENCOUNTER — OFFICE VISIT (OUTPATIENT)
Dept: TRANSPLANT | Facility: CLINIC | Age: 61
End: 2021-03-02
Payer: MEDICAID

## 2021-03-02 VITALS
BODY MASS INDEX: 30.54 KG/M2 | HEIGHT: 63 IN | SYSTOLIC BLOOD PRESSURE: 129 MMHG | DIASTOLIC BLOOD PRESSURE: 79 MMHG | RESPIRATION RATE: 16 BRPM | OXYGEN SATURATION: 100 % | HEART RATE: 67 BPM | WEIGHT: 172.38 LBS

## 2021-03-02 DIAGNOSIS — F41.9 ANXIETY: ICD-10-CM

## 2021-03-02 DIAGNOSIS — K70.31 ALCOHOLIC CIRRHOSIS OF LIVER WITH ASCITES: Primary | ICD-10-CM

## 2021-03-02 DIAGNOSIS — Z76.82 ORGAN TRANSPLANT CANDIDATE: ICD-10-CM

## 2021-03-02 DIAGNOSIS — F41.8 DEPRESSION WITH ANXIETY: ICD-10-CM

## 2021-03-02 DIAGNOSIS — K20.90 ESOPHAGITIS: ICD-10-CM

## 2021-03-02 LAB
INR PPP: 1.1 (ref 0.8–1.2)
PROTHROMBIN TIME: 12.3 SEC (ref 9–12.5)

## 2021-03-02 PROCEDURE — 99213 OFFICE O/P EST LOW 20 MIN: CPT | Mod: PBBFAC,TXP | Performed by: STUDENT IN AN ORGANIZED HEALTH CARE EDUCATION/TRAINING PROGRAM

## 2021-03-02 PROCEDURE — 99999 PR PBB SHADOW E&M-EST. PATIENT-LVL III: CPT | Mod: PBBFAC,TXP,, | Performed by: STUDENT IN AN ORGANIZED HEALTH CARE EDUCATION/TRAINING PROGRAM

## 2021-03-02 PROCEDURE — 99999 PR PBB SHADOW E&M-EST. PATIENT-LVL III: ICD-10-PCS | Mod: PBBFAC,TXP,, | Performed by: STUDENT IN AN ORGANIZED HEALTH CARE EDUCATION/TRAINING PROGRAM

## 2021-03-02 PROCEDURE — 99215 PR OFFICE/OUTPT VISIT, EST, LEVL V, 40-54 MIN: ICD-10-PCS | Mod: S$PBB,,, | Performed by: STUDENT IN AN ORGANIZED HEALTH CARE EDUCATION/TRAINING PROGRAM

## 2021-03-02 PROCEDURE — 99215 OFFICE O/P EST HI 40 MIN: CPT | Mod: S$PBB,,, | Performed by: STUDENT IN AN ORGANIZED HEALTH CARE EDUCATION/TRAINING PROGRAM

## 2021-03-02 RX ORDER — LACTULOSE 10 G/15ML
SOLUTION ORAL; RECTAL
Qty: 900 ML | Refills: 0 | Status: SHIPPED | OUTPATIENT
Start: 2021-03-02 | End: 2022-09-12

## 2021-03-02 RX ORDER — TRAZODONE HYDROCHLORIDE 100 MG/1
100 TABLET ORAL NIGHTLY
Qty: 30 TABLET | Refills: 3 | Status: SHIPPED | OUTPATIENT
Start: 2021-03-02

## 2021-03-02 RX ORDER — ESCITALOPRAM OXALATE 10 MG/1
10 TABLET ORAL DAILY
Qty: 30 TABLET | Refills: 11 | Status: SHIPPED | OUTPATIENT
Start: 2021-03-02 | End: 2021-09-08

## 2021-03-02 RX ORDER — PANTOPRAZOLE SODIUM 40 MG/1
40 TABLET, DELAYED RELEASE ORAL DAILY
Qty: 30 TABLET | Refills: 11 | Status: SHIPPED | OUTPATIENT
Start: 2021-03-02 | End: 2021-03-09 | Stop reason: SDUPTHER

## 2021-03-03 ENCOUNTER — COMMITTEE REVIEW (OUTPATIENT)
Dept: TRANSPLANT | Facility: CLINIC | Age: 61
End: 2021-03-03

## 2021-03-03 ENCOUNTER — TELEPHONE (OUTPATIENT)
Dept: TRANSPLANT | Facility: CLINIC | Age: 61
End: 2021-03-03

## 2021-03-05 ENCOUNTER — TELEPHONE (OUTPATIENT)
Dept: HEPATOLOGY | Facility: CLINIC | Age: 61
End: 2021-03-05

## 2021-03-05 ENCOUNTER — TELEPHONE (OUTPATIENT)
Dept: TRANSPLANT | Facility: CLINIC | Age: 61
End: 2021-03-05

## 2021-03-08 ENCOUNTER — PATIENT MESSAGE (OUTPATIENT)
Dept: TRANSPLANT | Facility: CLINIC | Age: 61
End: 2021-03-08

## 2021-03-09 ENCOUNTER — PATIENT MESSAGE (OUTPATIENT)
Dept: TRANSPLANT | Facility: CLINIC | Age: 61
End: 2021-03-09

## 2021-03-09 RX ORDER — PANTOPRAZOLE SODIUM 40 MG/1
40 TABLET, DELAYED RELEASE ORAL DAILY
Qty: 30 TABLET | Refills: 11 | Status: SHIPPED | OUTPATIENT
Start: 2021-03-09 | End: 2021-04-16 | Stop reason: SDUPTHER

## 2021-03-18 ENCOUNTER — PATIENT MESSAGE (OUTPATIENT)
Dept: TRANSPLANT | Facility: CLINIC | Age: 61
End: 2021-03-18

## 2021-03-22 ENCOUNTER — HOSPITAL ENCOUNTER (OUTPATIENT)
Dept: RADIOLOGY | Facility: HOSPITAL | Age: 61
Discharge: HOME | End: 2021-03-22
Attending: STUDENT IN AN ORGANIZED HEALTH CARE EDUCATION/TRAINING PROGRAM
Payer: MEDICAID

## 2021-03-22 DIAGNOSIS — K70.31 ALCOHOLIC CIRRHOSIS OF LIVER WITH ASCITES: ICD-10-CM

## 2021-03-22 PROCEDURE — 93975 VASCULAR STUDY: CPT | Mod: TC

## 2021-03-23 ENCOUNTER — OFFICE VISIT (OUTPATIENT)
Dept: INTERVENTIONAL RADIOLOGY/VASCULAR | Facility: CLINIC | Age: 61
End: 2021-03-23
Payer: MEDICAID

## 2021-03-23 DIAGNOSIS — Z95.828 PRESENCE OF IVC FILTER: ICD-10-CM

## 2021-03-23 DIAGNOSIS — I82.411 DEEP VEIN THROMBOSIS (DVT) OF FEMORAL VEIN OF RIGHT LOWER EXTREMITY, UNSPECIFIED CHRONICITY: Primary | ICD-10-CM

## 2021-03-23 PROCEDURE — 99203 PR OFFICE/OUTPT VISIT, NEW, LEVL III, 30-44 MIN: ICD-10-PCS | Mod: 95,,, | Performed by: FAMILY MEDICINE

## 2021-03-23 PROCEDURE — 99203 OFFICE O/P NEW LOW 30 MIN: CPT | Mod: 95,,, | Performed by: FAMILY MEDICINE

## 2021-04-04 ENCOUNTER — HOSPITAL ENCOUNTER (EMERGENCY)
Facility: HOSPITAL | Age: 61
Discharge: HOME | End: 2021-04-04
Attending: EMERGENCY MEDICINE
Payer: MEDICAID

## 2021-04-04 VITALS
TEMPERATURE: 98 F | SYSTOLIC BLOOD PRESSURE: 141 MMHG | HEART RATE: 71 BPM | RESPIRATION RATE: 18 BRPM | BODY MASS INDEX: 28.53 KG/M2 | WEIGHT: 161 LBS | OXYGEN SATURATION: 100 % | HEIGHT: 63 IN | DIASTOLIC BLOOD PRESSURE: 96 MMHG

## 2021-04-04 DIAGNOSIS — K52.9 GASTROENTERITIS: Primary | ICD-10-CM

## 2021-04-04 DIAGNOSIS — F41.9 ANXIETY: ICD-10-CM

## 2021-04-04 LAB
ALBUMIN SERPL BCP-MCNC: 3.9 G/DL (ref 3.5–5.2)
ALP SERPL-CCNC: 108 U/L (ref 55–135)
ALT SERPL W/O P-5'-P-CCNC: 17 U/L (ref 10–44)
AMMONIA PLAS-SCNC: <10 UMOL/L (ref 10–50)
ANION GAP SERPL CALC-SCNC: 8 MMOL/L (ref 8–16)
AST SERPL-CCNC: 21 U/L (ref 10–40)
BASOPHILS # BLD AUTO: 0.05 K/UL (ref 0–0.2)
BASOPHILS NFR BLD: 0.5 % (ref 0–1.9)
BILIRUB SERPL-MCNC: 1 MG/DL (ref 0.1–1)
BUN SERPL-MCNC: 18 MG/DL (ref 6–20)
CALCIUM SERPL-MCNC: 10.4 MG/DL (ref 8.7–10.5)
CHLORIDE SERPL-SCNC: 106 MMOL/L (ref 95–110)
CO2 SERPL-SCNC: 26 MMOL/L (ref 23–29)
CREAT SERPL-MCNC: 1.2 MG/DL (ref 0.5–1.4)
DIFFERENTIAL METHOD: ABNORMAL
EOSINOPHIL # BLD AUTO: 0.1 K/UL (ref 0–0.5)
EOSINOPHIL NFR BLD: 0.9 % (ref 0–8)
ERYTHROCYTE [DISTWIDTH] IN BLOOD BY AUTOMATED COUNT: 14.7 % (ref 11.5–14.5)
EST. GFR  (AFRICAN AMERICAN): >60 ML/MIN/1.73 M^2
EST. GFR  (NON AFRICAN AMERICAN): >60 ML/MIN/1.73 M^2
GLUCOSE SERPL-MCNC: 106 MG/DL (ref 70–110)
HCT VFR BLD AUTO: 48.7 % (ref 40–54)
HGB BLD-MCNC: 15.8 G/DL (ref 14–18)
IMM GRANULOCYTES # BLD AUTO: 0.03 K/UL (ref 0–0.04)
IMM GRANULOCYTES NFR BLD AUTO: 0.3 % (ref 0–0.5)
LIPASE SERPL-CCNC: 106 U/L (ref 23–300)
LYMPHOCYTES # BLD AUTO: 1.4 K/UL (ref 1–4.8)
LYMPHOCYTES NFR BLD: 12.8 % (ref 18–48)
MAGNESIUM SERPL-MCNC: 1.8 MG/DL (ref 1.6–2.6)
MCH RBC QN AUTO: 30.6 PG (ref 27–31)
MCHC RBC AUTO-ENTMCNC: 32.4 G/DL (ref 32–36)
MCV RBC AUTO: 94 FL (ref 82–98)
MONOCYTES # BLD AUTO: 0.4 K/UL (ref 0.3–1)
MONOCYTES NFR BLD: 3.9 % (ref 4–15)
NEUTROPHILS # BLD AUTO: 8.7 K/UL (ref 1.8–7.7)
NEUTROPHILS NFR BLD: 81.6 % (ref 38–73)
NRBC BLD-RTO: 0 /100 WBC
PLATELET # BLD AUTO: 188 K/UL (ref 150–450)
PMV BLD AUTO: 10.8 FL (ref 9.2–12.9)
POTASSIUM SERPL-SCNC: 3.6 MMOL/L (ref 3.5–5.1)
PROT SERPL-MCNC: 7.6 G/DL (ref 6–8.4)
RBC # BLD AUTO: 5.16 M/UL (ref 4.6–6.2)
SODIUM SERPL-SCNC: 140 MMOL/L (ref 136–145)
WBC # BLD AUTO: 10.66 K/UL (ref 3.9–12.7)

## 2021-04-04 PROCEDURE — 83690 ASSAY OF LIPASE: CPT | Performed by: EMERGENCY MEDICINE

## 2021-04-04 PROCEDURE — 25000003 PHARM REV CODE 250: Performed by: EMERGENCY MEDICINE

## 2021-04-04 PROCEDURE — 96374 THER/PROPH/DIAG INJ IV PUSH: CPT

## 2021-04-04 PROCEDURE — 82140 ASSAY OF AMMONIA: CPT | Performed by: EMERGENCY MEDICINE

## 2021-04-04 PROCEDURE — 96361 HYDRATE IV INFUSION ADD-ON: CPT

## 2021-04-04 PROCEDURE — 63600175 PHARM REV CODE 636 W HCPCS: Performed by: EMERGENCY MEDICINE

## 2021-04-04 PROCEDURE — 36415 COLL VENOUS BLD VENIPUNCTURE: CPT | Performed by: EMERGENCY MEDICINE

## 2021-04-04 PROCEDURE — 80053 COMPREHEN METABOLIC PANEL: CPT | Performed by: EMERGENCY MEDICINE

## 2021-04-04 PROCEDURE — 99284 EMERGENCY DEPT VISIT MOD MDM: CPT | Mod: 25

## 2021-04-04 PROCEDURE — 85025 COMPLETE CBC W/AUTO DIFF WBC: CPT | Performed by: EMERGENCY MEDICINE

## 2021-04-04 PROCEDURE — 83735 ASSAY OF MAGNESIUM: CPT | Performed by: EMERGENCY MEDICINE

## 2021-04-04 PROCEDURE — 96375 TX/PRO/DX INJ NEW DRUG ADDON: CPT

## 2021-04-04 RX ORDER — ONDANSETRON 4 MG/1
8 TABLET, FILM COATED ORAL 2 TIMES DAILY
COMMUNITY

## 2021-04-04 RX ORDER — LOPERAMIDE HYDROCHLORIDE 2 MG/1
2 CAPSULE ORAL 4 TIMES DAILY PRN
Qty: 12 CAPSULE | Refills: 0 | Status: SHIPPED | OUTPATIENT
Start: 2021-04-04 | End: 2021-04-14

## 2021-04-04 RX ORDER — LORAZEPAM 2 MG/ML
1 INJECTION INTRAMUSCULAR
Status: COMPLETED | OUTPATIENT
Start: 2021-04-04 | End: 2021-04-04

## 2021-04-04 RX ORDER — LOPERAMIDE HYDROCHLORIDE 2 MG/1
4 CAPSULE ORAL
Status: COMPLETED | OUTPATIENT
Start: 2021-04-04 | End: 2021-04-04

## 2021-04-04 RX ORDER — ONDANSETRON 4 MG/1
4 TABLET, ORALLY DISINTEGRATING ORAL EVERY 6 HOURS PRN
Qty: 20 TABLET | Refills: 0 | OUTPATIENT
Start: 2021-04-04 | End: 2021-05-07

## 2021-04-04 RX ORDER — ONDANSETRON 2 MG/ML
4 INJECTION INTRAMUSCULAR; INTRAVENOUS
Status: COMPLETED | OUTPATIENT
Start: 2021-04-04 | End: 2021-04-04

## 2021-04-04 RX ADMIN — LORAZEPAM 1 MG: 2 INJECTION INTRAMUSCULAR; INTRAVENOUS at 03:04

## 2021-04-04 RX ADMIN — LOPERAMIDE HYDROCHLORIDE 4 MG: 2 CAPSULE ORAL at 03:04

## 2021-04-04 RX ADMIN — SODIUM CHLORIDE 1000 ML: 0.9 INJECTION, SOLUTION INTRAVENOUS at 03:04

## 2021-04-04 RX ADMIN — ONDANSETRON 4 MG: 2 INJECTION INTRAMUSCULAR; INTRAVENOUS at 03:04

## 2021-04-06 ENCOUNTER — PATIENT MESSAGE (OUTPATIENT)
Dept: TRANSPLANT | Facility: CLINIC | Age: 61
End: 2021-04-06

## 2021-04-13 ENCOUNTER — TELEPHONE (OUTPATIENT)
Dept: HEPATOLOGY | Facility: CLINIC | Age: 61
End: 2021-04-13

## 2021-04-14 ENCOUNTER — PATIENT MESSAGE (OUTPATIENT)
Dept: HEPATOLOGY | Facility: CLINIC | Age: 61
End: 2021-04-14

## 2021-04-15 ENCOUNTER — PATIENT MESSAGE (OUTPATIENT)
Dept: HEPATOLOGY | Facility: CLINIC | Age: 61
End: 2021-04-15

## 2021-04-16 ENCOUNTER — PATIENT MESSAGE (OUTPATIENT)
Dept: HEPATOLOGY | Facility: CLINIC | Age: 61
End: 2021-04-16

## 2021-04-16 RX ORDER — PANTOPRAZOLE SODIUM 40 MG/1
40 TABLET, DELAYED RELEASE ORAL DAILY
Qty: 30 TABLET | Refills: 11 | Status: SHIPPED | OUTPATIENT
Start: 2021-04-16 | End: 2021-04-22 | Stop reason: SDUPTHER

## 2021-04-19 ENCOUNTER — PATIENT MESSAGE (OUTPATIENT)
Dept: HEPATOLOGY | Facility: CLINIC | Age: 61
End: 2021-04-19

## 2021-04-19 ENCOUNTER — HOSPITAL ENCOUNTER (OUTPATIENT)
Dept: RADIOLOGY | Facility: HOSPITAL | Age: 61
Discharge: HOME | End: 2021-04-19
Attending: FAMILY MEDICINE
Payer: MEDICAID

## 2021-04-19 ENCOUNTER — TELEPHONE (OUTPATIENT)
Dept: HEPATOLOGY | Facility: CLINIC | Age: 61
End: 2021-04-19

## 2021-04-19 DIAGNOSIS — Z95.828 PRESENCE OF IVC FILTER: ICD-10-CM

## 2021-04-19 DIAGNOSIS — I82.411 DEEP VEIN THROMBOSIS (DVT) OF FEMORAL VEIN OF RIGHT LOWER EXTREMITY, UNSPECIFIED CHRONICITY: ICD-10-CM

## 2021-04-19 PROCEDURE — 93970 EXTREMITY STUDY: CPT | Mod: TC

## 2021-04-22 RX ORDER — PANTOPRAZOLE SODIUM 40 MG/1
40 TABLET, DELAYED RELEASE ORAL DAILY
Qty: 30 TABLET | Refills: 11 | Status: SHIPPED | OUTPATIENT
Start: 2021-04-22 | End: 2021-05-19 | Stop reason: SDUPTHER

## 2021-05-07 ENCOUNTER — HOSPITAL ENCOUNTER (EMERGENCY)
Facility: HOSPITAL | Age: 61
Discharge: HOME | End: 2021-05-07
Attending: EMERGENCY MEDICINE
Payer: MEDICAID

## 2021-05-07 VITALS
TEMPERATURE: 98 F | RESPIRATION RATE: 19 BRPM | HEIGHT: 63 IN | HEART RATE: 68 BPM | DIASTOLIC BLOOD PRESSURE: 77 MMHG | WEIGHT: 168.63 LBS | OXYGEN SATURATION: 100 % | SYSTOLIC BLOOD PRESSURE: 166 MMHG | BODY MASS INDEX: 29.88 KG/M2

## 2021-05-07 DIAGNOSIS — R11.2 NAUSEA VOMITING AND DIARRHEA: Primary | ICD-10-CM

## 2021-05-07 DIAGNOSIS — R19.7 NAUSEA VOMITING AND DIARRHEA: Primary | ICD-10-CM

## 2021-05-07 LAB
ALBUMIN SERPL BCP-MCNC: 4.4 G/DL (ref 3.5–5.2)
ALP SERPL-CCNC: 117 U/L (ref 55–135)
ALT SERPL W/O P-5'-P-CCNC: 23 U/L (ref 10–44)
AMPHET+METHAMPHET UR QL: NEGATIVE
ANION GAP SERPL CALC-SCNC: 8 MMOL/L (ref 8–16)
AST SERPL-CCNC: 26 U/L (ref 10–40)
BARBITURATES UR QL SCN>200 NG/ML: NEGATIVE
BASOPHILS # BLD AUTO: 0.05 K/UL (ref 0–0.2)
BASOPHILS NFR BLD: 0.4 % (ref 0–1.9)
BENZODIAZ UR QL SCN>200 NG/ML: NEGATIVE
BILIRUB SERPL-MCNC: 0.7 MG/DL (ref 0.1–1)
BILIRUB UR QL STRIP: NEGATIVE
BUN SERPL-MCNC: 18 MG/DL (ref 6–20)
BZE UR QL SCN: NEGATIVE
CALCIUM SERPL-MCNC: 10.8 MG/DL (ref 8.7–10.5)
CANNABINOIDS UR QL SCN: NORMAL
CHLORIDE SERPL-SCNC: 107 MMOL/L (ref 95–110)
CLARITY UR: CLEAR
CO2 SERPL-SCNC: 25 MMOL/L (ref 23–29)
COLOR UR: YELLOW
CREAT SERPL-MCNC: 1.3 MG/DL (ref 0.5–1.4)
CREAT UR-MCNC: 51.7 MG/DL (ref 23–375)
CTP QC/QA: YES
DIFFERENTIAL METHOD: ABNORMAL
EOSINOPHIL # BLD AUTO: 0.1 K/UL (ref 0–0.5)
EOSINOPHIL NFR BLD: 0.4 % (ref 0–8)
ERYTHROCYTE [DISTWIDTH] IN BLOOD BY AUTOMATED COUNT: 13.8 % (ref 11.5–14.5)
EST. GFR  (AFRICAN AMERICAN): >60 ML/MIN/1.73 M^2
EST. GFR  (NON AFRICAN AMERICAN): >60 ML/MIN/1.73 M^2
GLUCOSE SERPL-MCNC: 124 MG/DL (ref 70–110)
GLUCOSE UR QL STRIP: NEGATIVE
HCT VFR BLD AUTO: 52 % (ref 40–54)
HGB BLD-MCNC: 17.5 G/DL (ref 14–18)
HGB UR QL STRIP: NEGATIVE
IMM GRANULOCYTES # BLD AUTO: 0.04 K/UL (ref 0–0.04)
IMM GRANULOCYTES NFR BLD AUTO: 0.3 % (ref 0–0.5)
KETONES UR QL STRIP: ABNORMAL
LEUKOCYTE ESTERASE UR QL STRIP: NEGATIVE
LIPASE SERPL-CCNC: 105 U/L (ref 23–300)
LYMPHOCYTES # BLD AUTO: 1.2 K/UL (ref 1–4.8)
LYMPHOCYTES NFR BLD: 9 % (ref 18–48)
MCH RBC QN AUTO: 31.7 PG (ref 27–31)
MCHC RBC AUTO-ENTMCNC: 33.7 G/DL (ref 32–36)
MCV RBC AUTO: 94 FL (ref 82–98)
METHADONE UR QL SCN>300 NG/ML: NEGATIVE
MONOCYTES # BLD AUTO: 0.4 K/UL (ref 0.3–1)
MONOCYTES NFR BLD: 2.8 % (ref 4–15)
NEUTROPHILS # BLD AUTO: 11.7 K/UL (ref 1.8–7.7)
NEUTROPHILS NFR BLD: 87.1 % (ref 38–73)
NITRITE UR QL STRIP: NEGATIVE
NRBC BLD-RTO: 0 /100 WBC
OPIATES UR QL SCN: NEGATIVE
PCP UR QL SCN>25 NG/ML: NEGATIVE
PH UR STRIP: 8 [PH] (ref 5–8)
PLATELET # BLD AUTO: 186 K/UL (ref 150–450)
PMV BLD AUTO: 11 FL (ref 9.2–12.9)
POTASSIUM SERPL-SCNC: 4.2 MMOL/L (ref 3.5–5.1)
PROT SERPL-MCNC: 8.4 G/DL (ref 6–8.4)
PROT UR QL STRIP: NEGATIVE
RBC # BLD AUTO: 5.52 M/UL (ref 4.6–6.2)
SARS-COV-2 RDRP RESP QL NAA+PROBE: NEGATIVE
SODIUM SERPL-SCNC: 140 MMOL/L (ref 136–145)
SP GR UR STRIP: 1.01 (ref 1–1.03)
TOXICOLOGY INFORMATION: NORMAL
URN SPEC COLLECT METH UR: ABNORMAL
UROBILINOGEN UR STRIP-ACNC: 1 EU/DL
WBC # BLD AUTO: 13.4 K/UL (ref 3.9–12.7)

## 2021-05-07 PROCEDURE — 81003 URINALYSIS AUTO W/O SCOPE: CPT | Mod: 59 | Performed by: NURSE PRACTITIONER

## 2021-05-07 PROCEDURE — 83690 ASSAY OF LIPASE: CPT | Performed by: NURSE PRACTITIONER

## 2021-05-07 PROCEDURE — C9113 INJ PANTOPRAZOLE SODIUM, VIA: HCPCS | Performed by: NURSE PRACTITIONER

## 2021-05-07 PROCEDURE — 80053 COMPREHEN METABOLIC PANEL: CPT | Performed by: NURSE PRACTITIONER

## 2021-05-07 PROCEDURE — 96374 THER/PROPH/DIAG INJ IV PUSH: CPT

## 2021-05-07 PROCEDURE — 85025 COMPLETE CBC W/AUTO DIFF WBC: CPT | Performed by: NURSE PRACTITIONER

## 2021-05-07 PROCEDURE — 36415 COLL VENOUS BLD VENIPUNCTURE: CPT | Performed by: NURSE PRACTITIONER

## 2021-05-07 PROCEDURE — 80307 DRUG TEST PRSMV CHEM ANLYZR: CPT | Performed by: NURSE PRACTITIONER

## 2021-05-07 PROCEDURE — 63600175 PHARM REV CODE 636 W HCPCS: Performed by: NURSE PRACTITIONER

## 2021-05-07 PROCEDURE — 96375 TX/PRO/DX INJ NEW DRUG ADDON: CPT

## 2021-05-07 PROCEDURE — 25000003 PHARM REV CODE 250: Performed by: NURSE PRACTITIONER

## 2021-05-07 PROCEDURE — 99284 EMERGENCY DEPT VISIT MOD MDM: CPT | Mod: 25

## 2021-05-07 PROCEDURE — U0002 COVID-19 LAB TEST NON-CDC: HCPCS | Performed by: NURSE PRACTITIONER

## 2021-05-07 RX ORDER — DICYCLOMINE HYDROCHLORIDE 20 MG/1
20 TABLET ORAL 2 TIMES DAILY
Qty: 6 TABLET | Refills: 0 | Status: SHIPPED | OUTPATIENT
Start: 2021-05-07 | End: 2021-05-10

## 2021-05-07 RX ORDER — PROCHLORPERAZINE EDISYLATE 5 MG/ML
5 INJECTION INTRAMUSCULAR; INTRAVENOUS
Status: COMPLETED | OUTPATIENT
Start: 2021-05-07 | End: 2021-05-07

## 2021-05-07 RX ORDER — LORAZEPAM 2 MG/ML
1 INJECTION INTRAMUSCULAR
Status: COMPLETED | OUTPATIENT
Start: 2021-05-07 | End: 2021-05-07

## 2021-05-07 RX ORDER — PANTOPRAZOLE SODIUM 40 MG/10ML
40 INJECTION, POWDER, LYOPHILIZED, FOR SOLUTION INTRAVENOUS
Status: COMPLETED | OUTPATIENT
Start: 2021-05-07 | End: 2021-05-07

## 2021-05-07 RX ORDER — ONDANSETRON 4 MG/1
4 TABLET, ORALLY DISINTEGRATING ORAL EVERY 8 HOURS PRN
Qty: 9 TABLET | Refills: 0 | Status: SHIPPED | OUTPATIENT
Start: 2021-05-07 | End: 2021-05-10

## 2021-05-07 RX ADMIN — LORAZEPAM 1 MG: 2 INJECTION INTRAMUSCULAR; INTRAVENOUS at 05:05

## 2021-05-07 RX ADMIN — SODIUM CHLORIDE 1000 ML: 0.9 INJECTION, SOLUTION INTRAVENOUS at 04:05

## 2021-05-07 RX ADMIN — PROCHLORPERAZINE EDISYLATE 5 MG: 5 INJECTION INTRAMUSCULAR; INTRAVENOUS at 04:05

## 2021-05-07 RX ADMIN — PANTOPRAZOLE SODIUM 40 MG: 40 INJECTION, POWDER, FOR SOLUTION INTRAVENOUS at 04:05

## 2021-05-17 ENCOUNTER — PATIENT MESSAGE (OUTPATIENT)
Dept: HEPATOLOGY | Facility: CLINIC | Age: 61
End: 2021-05-17

## 2021-05-19 ENCOUNTER — PATIENT MESSAGE (OUTPATIENT)
Dept: HEPATOLOGY | Facility: CLINIC | Age: 61
End: 2021-05-19

## 2021-05-19 RX ORDER — PANTOPRAZOLE SODIUM 40 MG/1
40 TABLET, DELAYED RELEASE ORAL DAILY
Qty: 30 TABLET | Refills: 11 | Status: SHIPPED | OUTPATIENT
Start: 2021-05-19 | End: 2021-09-08

## 2021-05-26 DIAGNOSIS — I82.411 DEEP VEIN THROMBOSIS (DVT) OF FEMORAL VEIN OF RIGHT LOWER EXTREMITY, UNSPECIFIED CHRONICITY: ICD-10-CM

## 2021-05-26 DIAGNOSIS — Z95.828 PRESENCE OF IVC FILTER: Primary | ICD-10-CM

## 2021-06-01 ENCOUNTER — LAB VISIT (OUTPATIENT)
Dept: LAB | Facility: HOSPITAL | Age: 61
End: 2021-06-01
Attending: STUDENT IN AN ORGANIZED HEALTH CARE EDUCATION/TRAINING PROGRAM
Payer: MEDICAID

## 2021-06-01 ENCOUNTER — TELEPHONE (OUTPATIENT)
Dept: HEPATOLOGY | Facility: CLINIC | Age: 61
End: 2021-06-01

## 2021-06-01 DIAGNOSIS — K70.31 ALCOHOLIC CIRRHOSIS OF LIVER WITH ASCITES: ICD-10-CM

## 2021-06-01 LAB
AFP SERPL-MCNC: 2.8 NG/ML (ref 0–8.4)
ALBUMIN SERPL BCP-MCNC: 3.7 G/DL (ref 3.5–5.2)
ALP SERPL-CCNC: 94 U/L (ref 55–135)
ALT SERPL W/O P-5'-P-CCNC: 25 U/L (ref 10–44)
ANION GAP SERPL CALC-SCNC: 4 MMOL/L (ref 8–16)
AST SERPL-CCNC: 22 U/L (ref 10–40)
BASOPHILS # BLD AUTO: 0.08 K/UL (ref 0–0.2)
BASOPHILS NFR BLD: 0.8 % (ref 0–1.9)
BILIRUB SERPL-MCNC: 0.3 MG/DL (ref 0.1–1)
BUN SERPL-MCNC: 42 MG/DL (ref 6–20)
CALCIUM SERPL-MCNC: 9.9 MG/DL (ref 8.7–10.5)
CHLORIDE SERPL-SCNC: 105 MMOL/L (ref 95–110)
CO2 SERPL-SCNC: 30 MMOL/L (ref 23–29)
CREAT SERPL-MCNC: 1.3 MG/DL (ref 0.5–1.4)
DIFFERENTIAL METHOD: ABNORMAL
EOSINOPHIL # BLD AUTO: 0.4 K/UL (ref 0–0.5)
EOSINOPHIL NFR BLD: 4.5 % (ref 0–8)
ERYTHROCYTE [DISTWIDTH] IN BLOOD BY AUTOMATED COUNT: 12.9 % (ref 11.5–14.5)
EST. GFR  (AFRICAN AMERICAN): >60 ML/MIN/1.73 M^2
EST. GFR  (NON AFRICAN AMERICAN): >60 ML/MIN/1.73 M^2
GLUCOSE SERPL-MCNC: 109 MG/DL (ref 70–110)
HCT VFR BLD AUTO: 46.6 % (ref 40–54)
HGB BLD-MCNC: 15.6 G/DL (ref 14–18)
IMM GRANULOCYTES # BLD AUTO: 0.02 K/UL (ref 0–0.04)
IMM GRANULOCYTES NFR BLD AUTO: 0.2 % (ref 0–0.5)
INR PPP: 1 (ref 0.8–1.2)
LYMPHOCYTES # BLD AUTO: 3 K/UL (ref 1–4.8)
LYMPHOCYTES NFR BLD: 31.3 % (ref 18–48)
MCH RBC QN AUTO: 32 PG (ref 27–31)
MCHC RBC AUTO-ENTMCNC: 33.5 G/DL (ref 32–36)
MCV RBC AUTO: 96 FL (ref 82–98)
MONOCYTES # BLD AUTO: 0.5 K/UL (ref 0.3–1)
MONOCYTES NFR BLD: 5.6 % (ref 4–15)
NEUTROPHILS # BLD AUTO: 5.6 K/UL (ref 1.8–7.7)
NEUTROPHILS NFR BLD: 57.6 % (ref 38–73)
NRBC BLD-RTO: 0 /100 WBC
PLATELET # BLD AUTO: 170 K/UL (ref 150–450)
PMV BLD AUTO: 10.9 FL (ref 9.2–12.9)
POTASSIUM SERPL-SCNC: 4.1 MMOL/L (ref 3.5–5.1)
PROT SERPL-MCNC: 7.4 G/DL (ref 6–8.4)
PROTHROMBIN TIME: 11.3 SEC (ref 9–12.5)
RBC # BLD AUTO: 4.87 M/UL (ref 4.6–6.2)
SODIUM SERPL-SCNC: 139 MMOL/L (ref 136–145)
WBC # BLD AUTO: 9.65 K/UL (ref 3.9–12.7)

## 2021-06-01 PROCEDURE — 36415 COLL VENOUS BLD VENIPUNCTURE: CPT | Performed by: STUDENT IN AN ORGANIZED HEALTH CARE EDUCATION/TRAINING PROGRAM

## 2021-06-01 PROCEDURE — 82105 ALPHA-FETOPROTEIN SERUM: CPT | Performed by: STUDENT IN AN ORGANIZED HEALTH CARE EDUCATION/TRAINING PROGRAM

## 2021-06-01 PROCEDURE — 80053 COMPREHEN METABOLIC PANEL: CPT | Performed by: STUDENT IN AN ORGANIZED HEALTH CARE EDUCATION/TRAINING PROGRAM

## 2021-06-01 PROCEDURE — 85610 PROTHROMBIN TIME: CPT | Performed by: STUDENT IN AN ORGANIZED HEALTH CARE EDUCATION/TRAINING PROGRAM

## 2021-06-01 PROCEDURE — 85025 COMPLETE CBC W/AUTO DIFF WBC: CPT | Performed by: STUDENT IN AN ORGANIZED HEALTH CARE EDUCATION/TRAINING PROGRAM

## 2021-06-04 ENCOUNTER — PATIENT MESSAGE (OUTPATIENT)
Dept: HEPATOLOGY | Facility: CLINIC | Age: 61
End: 2021-06-04

## 2021-06-08 ENCOUNTER — OFFICE VISIT (OUTPATIENT)
Dept: HEPATOLOGY | Facility: CLINIC | Age: 61
End: 2021-06-08
Payer: MEDICAID

## 2021-06-08 DIAGNOSIS — K70.31 ALCOHOLIC CIRRHOSIS OF LIVER WITH ASCITES: Primary | ICD-10-CM

## 2021-06-08 PROCEDURE — 99499 NO LOS: ICD-10-PCS | Mod: 95,,, | Performed by: STUDENT IN AN ORGANIZED HEALTH CARE EDUCATION/TRAINING PROGRAM

## 2021-06-08 PROCEDURE — 99499 UNLISTED E&M SERVICE: CPT | Mod: 95,,, | Performed by: STUDENT IN AN ORGANIZED HEALTH CARE EDUCATION/TRAINING PROGRAM

## 2021-06-17 ENCOUNTER — OFFICE VISIT (OUTPATIENT)
Dept: HEPATOLOGY | Facility: CLINIC | Age: 61
End: 2021-06-17
Payer: MEDICAID

## 2021-06-17 DIAGNOSIS — K70.31 ALCOHOLIC CIRRHOSIS OF LIVER WITH ASCITES: Primary | ICD-10-CM

## 2021-06-17 DIAGNOSIS — K76.82 HEPATIC ENCEPHALOPATHY: ICD-10-CM

## 2021-06-17 PROCEDURE — 99213 PR OFFICE/OUTPT VISIT, EST, LEVL III, 20-29 MIN: ICD-10-PCS | Mod: 95,,, | Performed by: STUDENT IN AN ORGANIZED HEALTH CARE EDUCATION/TRAINING PROGRAM

## 2021-06-17 PROCEDURE — 99213 OFFICE O/P EST LOW 20 MIN: CPT | Mod: 95,,, | Performed by: STUDENT IN AN ORGANIZED HEALTH CARE EDUCATION/TRAINING PROGRAM

## 2021-06-21 ENCOUNTER — TELEPHONE (OUTPATIENT)
Dept: HEPATOLOGY | Facility: CLINIC | Age: 61
End: 2021-06-21

## 2021-06-21 DIAGNOSIS — K70.31 ALCOHOLIC CIRRHOSIS OF LIVER WITH ASCITES: Primary | ICD-10-CM

## 2021-06-22 ENCOUNTER — TELEPHONE (OUTPATIENT)
Dept: HEPATOLOGY | Facility: CLINIC | Age: 61
End: 2021-06-22

## 2021-06-22 DIAGNOSIS — K70.31 ALCOHOLIC CIRRHOSIS OF LIVER WITH ASCITES: Primary | ICD-10-CM

## 2021-06-22 DIAGNOSIS — R18.8 OTHER ASCITES: ICD-10-CM

## 2021-07-01 ENCOUNTER — TELEPHONE (OUTPATIENT)
Dept: HEPATOLOGY | Facility: CLINIC | Age: 61
End: 2021-07-01

## 2021-07-06 DIAGNOSIS — Z95.828 PRESENCE OF IVC FILTER: ICD-10-CM

## 2021-07-06 DIAGNOSIS — I82.411 DEEP VEIN THROMBOSIS (DVT) OF FEMORAL VEIN OF RIGHT LOWER EXTREMITY, UNSPECIFIED CHRONICITY: Primary | ICD-10-CM

## 2021-07-06 RX ORDER — MIDAZOLAM HYDROCHLORIDE 1 MG/ML
1 INJECTION INTRAMUSCULAR; INTRAVENOUS
Status: CANCELLED | OUTPATIENT
Start: 2021-07-06

## 2021-07-06 RX ORDER — FENTANYL CITRATE 50 UG/ML
50 INJECTION, SOLUTION INTRAMUSCULAR; INTRAVENOUS
Status: CANCELLED | OUTPATIENT
Start: 2021-07-06

## 2021-07-12 ENCOUNTER — LAB VISIT (OUTPATIENT)
Dept: LAB | Facility: HOSPITAL | Age: 61
End: 2021-07-12
Payer: MEDICAID

## 2021-07-12 DIAGNOSIS — I82.411 DEEP VEIN THROMBOSIS (DVT) OF FEMORAL VEIN OF RIGHT LOWER EXTREMITY, UNSPECIFIED CHRONICITY: ICD-10-CM

## 2021-07-12 DIAGNOSIS — Z95.828 PRESENCE OF IVC FILTER: ICD-10-CM

## 2021-07-12 LAB
ALBUMIN SERPL BCP-MCNC: 4.1 G/DL (ref 3.5–5.2)
ALP SERPL-CCNC: 98 U/L (ref 55–135)
ALT SERPL W/O P-5'-P-CCNC: 23 U/L (ref 10–44)
ANION GAP SERPL CALC-SCNC: 6 MMOL/L (ref 8–16)
AST SERPL-CCNC: 18 U/L (ref 10–40)
BASOPHILS # BLD AUTO: 0.05 K/UL (ref 0–0.2)
BASOPHILS NFR BLD: 0.6 % (ref 0–1.9)
BILIRUB SERPL-MCNC: 0.3 MG/DL (ref 0.1–1)
BUN SERPL-MCNC: 30 MG/DL (ref 6–20)
CALCIUM SERPL-MCNC: 10 MG/DL (ref 8.7–10.5)
CHLORIDE SERPL-SCNC: 104 MMOL/L (ref 95–110)
CO2 SERPL-SCNC: 29 MMOL/L (ref 23–29)
CREAT SERPL-MCNC: 1.9 MG/DL (ref 0.5–1.4)
DIFFERENTIAL METHOD: ABNORMAL
EOSINOPHIL # BLD AUTO: 0.4 K/UL (ref 0–0.5)
EOSINOPHIL NFR BLD: 4.6 % (ref 0–8)
ERYTHROCYTE [DISTWIDTH] IN BLOOD BY AUTOMATED COUNT: 12.2 % (ref 11.5–14.5)
EST. GFR  (AFRICAN AMERICAN): 43.9 ML/MIN/1.73 M^2
EST. GFR  (NON AFRICAN AMERICAN): 38 ML/MIN/1.73 M^2
GLUCOSE SERPL-MCNC: 87 MG/DL (ref 70–110)
HCT VFR BLD AUTO: 51.2 % (ref 40–54)
HGB BLD-MCNC: 16.6 G/DL (ref 14–18)
IMM GRANULOCYTES # BLD AUTO: 0.02 K/UL (ref 0–0.04)
IMM GRANULOCYTES NFR BLD AUTO: 0.2 % (ref 0–0.5)
INR PPP: 0.9 (ref 0.8–1.2)
LYMPHOCYTES # BLD AUTO: 1.9 K/UL (ref 1–4.8)
LYMPHOCYTES NFR BLD: 23.3 % (ref 18–48)
MCH RBC QN AUTO: 31.7 PG (ref 27–31)
MCHC RBC AUTO-ENTMCNC: 32.4 G/DL (ref 32–36)
MCV RBC AUTO: 98 FL (ref 82–98)
MONOCYTES # BLD AUTO: 0.5 K/UL (ref 0.3–1)
MONOCYTES NFR BLD: 5.6 % (ref 4–15)
NEUTROPHILS # BLD AUTO: 5.3 K/UL (ref 1.8–7.7)
NEUTROPHILS NFR BLD: 65.7 % (ref 38–73)
NRBC BLD-RTO: 0 /100 WBC
PLATELET # BLD AUTO: 168 K/UL (ref 150–450)
PMV BLD AUTO: 11 FL (ref 9.2–12.9)
POTASSIUM SERPL-SCNC: 4.6 MMOL/L (ref 3.5–5.1)
PROT SERPL-MCNC: 8.1 G/DL (ref 6–8.4)
PROTHROMBIN TIME: 10.4 SEC (ref 9–12.5)
RBC # BLD AUTO: 5.23 M/UL (ref 4.6–6.2)
SODIUM SERPL-SCNC: 139 MMOL/L (ref 136–145)
WBC # BLD AUTO: 8.02 K/UL (ref 3.9–12.7)

## 2021-07-12 PROCEDURE — 85610 PROTHROMBIN TIME: CPT | Performed by: PHYSICIAN ASSISTANT

## 2021-07-12 PROCEDURE — 85025 COMPLETE CBC W/AUTO DIFF WBC: CPT | Performed by: PHYSICIAN ASSISTANT

## 2021-07-12 PROCEDURE — 36415 COLL VENOUS BLD VENIPUNCTURE: CPT | Performed by: PHYSICIAN ASSISTANT

## 2021-07-12 PROCEDURE — 80053 COMPREHEN METABOLIC PANEL: CPT | Performed by: PHYSICIAN ASSISTANT

## 2021-07-13 ENCOUNTER — HOSPITAL ENCOUNTER (OUTPATIENT)
Dept: INTERVENTIONAL RADIOLOGY/VASCULAR | Facility: HOSPITAL | Age: 61
Discharge: HOME | End: 2021-07-13
Attending: FAMILY MEDICINE
Payer: MEDICAID

## 2021-07-13 VITALS
OXYGEN SATURATION: 100 % | RESPIRATION RATE: 16 BRPM | HEART RATE: 67 BPM | WEIGHT: 160 LBS | HEIGHT: 63 IN | DIASTOLIC BLOOD PRESSURE: 73 MMHG | BODY MASS INDEX: 28.35 KG/M2 | SYSTOLIC BLOOD PRESSURE: 118 MMHG | TEMPERATURE: 98 F

## 2021-07-13 DIAGNOSIS — I82.411 DEEP VEIN THROMBOSIS (DVT) OF FEMORAL VEIN OF RIGHT LOWER EXTREMITY, UNSPECIFIED CHRONICITY: ICD-10-CM

## 2021-07-13 DIAGNOSIS — Z95.828 PRESENCE OF IVC FILTER: ICD-10-CM

## 2021-07-13 PROCEDURE — 37193 IR IVC FILTER RETRIEVAL: ICD-10-PCS | Mod: ,,, | Performed by: STUDENT IN AN ORGANIZED HEALTH CARE EDUCATION/TRAINING PROGRAM

## 2021-07-13 PROCEDURE — 25000003 PHARM REV CODE 250: Performed by: STUDENT IN AN ORGANIZED HEALTH CARE EDUCATION/TRAINING PROGRAM

## 2021-07-13 PROCEDURE — 63600175 PHARM REV CODE 636 W HCPCS: Performed by: STUDENT IN AN ORGANIZED HEALTH CARE EDUCATION/TRAINING PROGRAM

## 2021-07-13 PROCEDURE — 37193 REM ENDOVAS VENA CAVA FILTER: CPT | Performed by: STUDENT IN AN ORGANIZED HEALTH CARE EDUCATION/TRAINING PROGRAM

## 2021-07-13 PROCEDURE — A4215 STERILE NEEDLE: HCPCS

## 2021-07-13 PROCEDURE — 25500020 PHARM REV CODE 255: Performed by: FAMILY MEDICINE

## 2021-07-13 RX ORDER — LIDOCAINE HYDROCHLORIDE 10 MG/ML
INJECTION INFILTRATION; PERINEURAL CODE/TRAUMA/SEDATION MEDICATION
Status: COMPLETED | OUTPATIENT
Start: 2021-07-13 | End: 2021-07-13

## 2021-07-13 RX ORDER — SODIUM CHLORIDE 9 MG/ML
INJECTION, SOLUTION INTRAVENOUS CONTINUOUS
Status: DISCONTINUED | OUTPATIENT
Start: 2021-07-13 | End: 2021-07-14 | Stop reason: HOSPADM

## 2021-07-13 RX ORDER — FENTANYL CITRATE 50 UG/ML
INJECTION, SOLUTION INTRAMUSCULAR; INTRAVENOUS CODE/TRAUMA/SEDATION MEDICATION
Status: COMPLETED | OUTPATIENT
Start: 2021-07-13 | End: 2021-07-13

## 2021-07-13 RX ORDER — MIDAZOLAM HYDROCHLORIDE 1 MG/ML
INJECTION INTRAMUSCULAR; INTRAVENOUS CODE/TRAUMA/SEDATION MEDICATION
Status: COMPLETED | OUTPATIENT
Start: 2021-07-13 | End: 2021-07-13

## 2021-07-13 RX ORDER — LIDOCAINE HYDROCHLORIDE 10 MG/ML
1 INJECTION, SOLUTION EPIDURAL; INFILTRATION; INTRACAUDAL; PERINEURAL ONCE AS NEEDED
Status: DISCONTINUED | OUTPATIENT
Start: 2021-07-13 | End: 2021-07-14 | Stop reason: HOSPADM

## 2021-07-13 RX ORDER — ONDANSETRON 2 MG/ML
4 INJECTION INTRAMUSCULAR; INTRAVENOUS EVERY 6 HOURS PRN
Status: DISCONTINUED | OUTPATIENT
Start: 2021-07-13 | End: 2021-07-14 | Stop reason: HOSPADM

## 2021-07-13 RX ADMIN — IOHEXOL 20 ML: 300 INJECTION, SOLUTION INTRAVENOUS at 02:07

## 2021-07-13 RX ADMIN — LIDOCAINE HYDROCHLORIDE 4 ML: 10 INJECTION, SOLUTION INFILTRATION; PERINEURAL at 02:07

## 2021-07-13 RX ADMIN — FENTANYL CITRATE 50 MCG: 50 INJECTION, SOLUTION INTRAMUSCULAR; INTRAVENOUS at 02:07

## 2021-07-13 RX ADMIN — MIDAZOLAM HYDROCHLORIDE 2 MG: 1 INJECTION INTRAMUSCULAR; INTRAVENOUS at 02:07

## 2021-08-26 DIAGNOSIS — R13.10 DYSPHAGIA: ICD-10-CM

## 2021-08-26 DIAGNOSIS — R10.13 EPIGASTRIC PAIN: ICD-10-CM

## 2021-08-26 DIAGNOSIS — R13.10 DYSPHAGIA, UNSPECIFIED TYPE: Primary | ICD-10-CM

## 2021-08-26 RX ORDER — SODIUM CHLORIDE 9 MG/ML
INJECTION, SOLUTION INTRAVENOUS CONTINUOUS
Status: CANCELLED | OUTPATIENT
Start: 2021-08-26

## 2021-08-26 RX ORDER — SODIUM CHLORIDE 0.9 % (FLUSH) 0.9 %
10 SYRINGE (ML) INJECTION
Status: CANCELLED | OUTPATIENT
Start: 2021-08-26

## 2021-09-07 ENCOUNTER — TELEPHONE (OUTPATIENT)
Dept: HEPATOLOGY | Facility: CLINIC | Age: 61
End: 2021-09-07

## 2021-09-07 ENCOUNTER — PATIENT MESSAGE (OUTPATIENT)
Dept: HEPATOLOGY | Facility: CLINIC | Age: 61
End: 2021-09-07

## 2021-09-08 VITALS — HEIGHT: 63 IN | BODY MASS INDEX: 31.54 KG/M2 | WEIGHT: 178 LBS

## 2021-09-09 ENCOUNTER — ANESTHESIA EVENT (OUTPATIENT)
Dept: ENDOSCOPY | Facility: HOSPITAL | Age: 61
End: 2021-09-09
Payer: MEDICAID

## 2021-09-09 ENCOUNTER — HOSPITAL ENCOUNTER (OUTPATIENT)
Dept: PULMONOLOGY | Facility: HOSPITAL | Age: 61
Discharge: HOME | End: 2021-09-09
Attending: SURGERY
Payer: MEDICAID

## 2021-09-09 ENCOUNTER — HOSPITAL ENCOUNTER (OUTPATIENT)
Dept: PREADMISSION TESTING | Facility: HOSPITAL | Age: 61
Discharge: HOME | End: 2021-09-09
Attending: SURGERY
Payer: MEDICAID

## 2021-09-09 DIAGNOSIS — R10.13 EPIGASTRIC PAIN: ICD-10-CM

## 2021-09-09 DIAGNOSIS — Z01.818 PRE-OP TESTING: ICD-10-CM

## 2021-09-09 DIAGNOSIS — R13.10 DYSPHAGIA, UNSPECIFIED TYPE: ICD-10-CM

## 2021-09-09 LAB — SARS-COV-2 RNA RESP QL NAA+PROBE: NOT DETECTED

## 2021-09-09 PROCEDURE — 93005 ELECTROCARDIOGRAM TRACING: CPT

## 2021-09-09 PROCEDURE — 93010 ELECTROCARDIOGRAM REPORT: CPT | Mod: ,,, | Performed by: INTERNAL MEDICINE

## 2021-09-09 PROCEDURE — 93010 EKG 12-LEAD: ICD-10-PCS | Mod: ,,, | Performed by: INTERNAL MEDICINE

## 2021-09-09 PROCEDURE — U0002 COVID-19 LAB TEST NON-CDC: HCPCS | Performed by: SURGERY

## 2021-09-10 ENCOUNTER — HOSPITAL ENCOUNTER (OUTPATIENT)
Facility: HOSPITAL | Age: 61
Discharge: HOME | End: 2021-09-10
Attending: SURGERY | Admitting: SURGERY
Payer: MEDICAID

## 2021-09-10 ENCOUNTER — ANESTHESIA (OUTPATIENT)
Dept: ENDOSCOPY | Facility: HOSPITAL | Age: 61
End: 2021-09-10
Payer: MEDICAID

## 2021-09-10 VITALS
DIASTOLIC BLOOD PRESSURE: 55 MMHG | OXYGEN SATURATION: 96 % | HEART RATE: 71 BPM | RESPIRATION RATE: 16 BRPM | SYSTOLIC BLOOD PRESSURE: 105 MMHG | TEMPERATURE: 98 F

## 2021-09-10 DIAGNOSIS — K44.9 SLIDING HIATAL HERNIA: ICD-10-CM

## 2021-09-10 DIAGNOSIS — R10.13 EPIGASTRIC PAIN: Primary | ICD-10-CM

## 2021-09-10 DIAGNOSIS — K29.90 GASTRODUODENITIS WITHOUT BLEEDING: ICD-10-CM

## 2021-09-10 DIAGNOSIS — K20.90 ESOPHAGITIS DETERMINED BY ENDOSCOPY: ICD-10-CM

## 2021-09-10 DIAGNOSIS — R13.10 DYSPHAGIA: ICD-10-CM

## 2021-09-10 PROCEDURE — 43235 EGD DIAGNOSTIC BRUSH WASH: CPT | Performed by: SURGERY

## 2021-09-10 PROCEDURE — 00731 ANES UPR GI NDSC PX NOS: CPT | Performed by: SURGERY

## 2021-09-10 PROCEDURE — 25000003 PHARM REV CODE 250: Performed by: SURGERY

## 2021-09-10 PROCEDURE — 37000008 HC ANESTHESIA 1ST 15 MINUTES: Performed by: SURGERY

## 2021-09-10 PROCEDURE — 36415 COLL VENOUS BLD VENIPUNCTURE: CPT | Performed by: SURGERY

## 2021-09-10 PROCEDURE — 37000009 HC ANESTHESIA EA ADD 15 MINS: Performed by: SURGERY

## 2021-09-10 PROCEDURE — 86677 HELICOBACTER PYLORI ANTIBODY: CPT | Mod: 59 | Performed by: SURGERY

## 2021-09-10 PROCEDURE — 86677 HELICOBACTER PYLORI ANTIBODY: CPT | Performed by: SURGERY

## 2021-09-10 RX ORDER — SODIUM CHLORIDE 0.9 % (FLUSH) 0.9 %
10 SYRINGE (ML) INJECTION
Status: DISCONTINUED | OUTPATIENT
Start: 2021-09-10 | End: 2021-09-10 | Stop reason: HOSPADM

## 2021-09-10 RX ORDER — SODIUM CHLORIDE 9 MG/ML
INJECTION, SOLUTION INTRAVENOUS CONTINUOUS
Status: DISCONTINUED | OUTPATIENT
Start: 2021-09-10 | End: 2021-09-10 | Stop reason: HOSPADM

## 2021-09-10 RX ORDER — PANTOPRAZOLE SODIUM 40 MG/1
40 TABLET, DELAYED RELEASE ORAL DAILY
Qty: 30 TABLET | Refills: 11 | Status: SHIPPED | OUTPATIENT
Start: 2021-09-10 | End: 2022-09-12

## 2021-09-10 RX ORDER — PROPOFOL 10 MG/ML
VIAL (ML) INTRAVENOUS
Status: DISCONTINUED | OUTPATIENT
Start: 2021-09-10 | End: 2021-09-10

## 2021-09-10 RX ADMIN — Medication 50 MG: at 10:09

## 2021-09-10 RX ADMIN — SODIUM CHLORIDE: 0.9 INJECTION, SOLUTION INTRAVENOUS at 10:09

## 2021-09-10 RX ADMIN — TOPICAL ANESTHETIC 2 EACH: 200 SPRAY DENTAL; PERIODONTAL at 10:09

## 2021-09-14 LAB — H PYLORI IGG SERPL QL IA: NEGATIVE

## 2021-09-21 ENCOUNTER — HOSPITAL ENCOUNTER (OUTPATIENT)
Dept: RADIOLOGY | Facility: HOSPITAL | Age: 61
Discharge: HOME | End: 2021-09-21
Attending: STUDENT IN AN ORGANIZED HEALTH CARE EDUCATION/TRAINING PROGRAM
Payer: MEDICAID

## 2021-09-21 DIAGNOSIS — K70.31 ALCOHOLIC CIRRHOSIS OF LIVER WITH ASCITES: ICD-10-CM

## 2021-09-21 PROCEDURE — 76705 ECHO EXAM OF ABDOMEN: CPT | Mod: TC

## 2021-09-28 ENCOUNTER — PATIENT MESSAGE (OUTPATIENT)
Dept: HEPATOLOGY | Facility: CLINIC | Age: 61
End: 2021-09-28

## 2021-09-28 ENCOUNTER — OFFICE VISIT (OUTPATIENT)
Dept: HEPATOLOGY | Facility: CLINIC | Age: 61
End: 2021-09-28
Payer: MEDICAID

## 2021-09-28 DIAGNOSIS — K70.31 ALCOHOLIC CIRRHOSIS OF LIVER WITH ASCITES: Primary | ICD-10-CM

## 2021-09-28 DIAGNOSIS — K76.82 HEPATIC ENCEPHALOPATHY: ICD-10-CM

## 2021-09-28 PROCEDURE — 99214 OFFICE O/P EST MOD 30 MIN: CPT | Mod: 95,,, | Performed by: STUDENT IN AN ORGANIZED HEALTH CARE EDUCATION/TRAINING PROGRAM

## 2021-09-28 PROCEDURE — 99214 PR OFFICE/OUTPT VISIT, EST, LEVL IV, 30-39 MIN: ICD-10-PCS | Mod: 95,,, | Performed by: STUDENT IN AN ORGANIZED HEALTH CARE EDUCATION/TRAINING PROGRAM

## 2021-09-29 LAB
MISCELLANEOUS TEST NAME: NORMAL
REFERENCE LAB: NORMAL
SPECIMEN TYPE: NORMAL
TEST RESULT: NORMAL

## 2021-09-30 ENCOUNTER — TELEPHONE (OUTPATIENT)
Dept: HEPATOLOGY | Facility: CLINIC | Age: 61
End: 2021-09-30

## 2021-11-21 ENCOUNTER — PATIENT MESSAGE (OUTPATIENT)
Dept: HEPATOLOGY | Facility: CLINIC | Age: 61
End: 2021-11-21
Payer: MEDICAID

## 2021-12-02 DIAGNOSIS — K20.90 ESOPHAGITIS DETERMINED BY ENDOSCOPY: ICD-10-CM

## 2021-12-02 DIAGNOSIS — R11.0 NAUSEA: Primary | ICD-10-CM

## 2021-12-09 ENCOUNTER — HOSPITAL ENCOUNTER (OUTPATIENT)
Dept: RADIOLOGY | Facility: HOSPITAL | Age: 61
Discharge: HOME | End: 2021-12-09
Attending: SURGERY
Payer: MEDICAID

## 2021-12-09 DIAGNOSIS — K20.90 ESOPHAGITIS DETERMINED BY ENDOSCOPY: ICD-10-CM

## 2021-12-09 DIAGNOSIS — R11.0 NAUSEA: ICD-10-CM

## 2021-12-09 PROCEDURE — 78264 GASTRIC EMPTYING IMG STUDY: CPT | Mod: TC

## 2022-03-08 NOTE — PLAN OF CARE
Problem: Occupational Therapy Goal  Goal: Occupational Therapy Goal  Description: Goals to be met by: 9/20/2020     Patient will increase functional independence with ADLs by performing:    UE Dressing with [place].  LE Dressing with Supervision.  Grooming while standing at sink with [place].  Toileting from toilet with [place] for hygiene and clothing management.   Toilet transfer to toilet with [place].    Outcome: Ongoing, Progressing    Eduin Castillo OTR/L  9/9/2020        Wartpeel Counseling:  I discussed with the patient the risks of Wartpeel including but not limited to erythema, scaling, itching, weeping, crusting, and pain.

## 2022-04-04 ENCOUNTER — LAB VISIT (OUTPATIENT)
Dept: LAB | Facility: HOSPITAL | Age: 62
End: 2022-04-04
Attending: INTERNAL MEDICINE
Payer: MEDICAID

## 2022-04-04 DIAGNOSIS — I10 ESSENTIAL HYPERTENSION, BENIGN: ICD-10-CM

## 2022-04-04 DIAGNOSIS — K70.30 ALCOHOLIC CIRRHOSIS, UNSPECIFIED WHETHER ASCITES PRESENT: Primary | ICD-10-CM

## 2022-04-04 LAB
ALBUMIN SERPL BCP-MCNC: 3.5 G/DL (ref 3.5–5.2)
ALP SERPL-CCNC: 95 U/L (ref 55–135)
ALT SERPL W/O P-5'-P-CCNC: 26 U/L (ref 10–44)
ANION GAP SERPL CALC-SCNC: 2 MMOL/L (ref 8–16)
AST SERPL-CCNC: 23 U/L (ref 10–40)
BASOPHILS # BLD AUTO: 0.07 K/UL (ref 0–0.2)
BASOPHILS NFR BLD: 0.9 % (ref 0–1.9)
BILIRUB SERPL-MCNC: 0.3 MG/DL (ref 0.1–1)
BILIRUB UR QL STRIP: NEGATIVE
BUN SERPL-MCNC: 32 MG/DL (ref 6–20)
CALCIUM SERPL-MCNC: 9.3 MG/DL (ref 8.7–10.5)
CHLORIDE SERPL-SCNC: 109 MMOL/L (ref 95–110)
CHOLEST SERPL-MCNC: 199 MG/DL (ref 120–199)
CHOLEST/HDLC SERPL: 5.9 {RATIO} (ref 2–5)
CLARITY UR: CLEAR
CO2 SERPL-SCNC: 29 MMOL/L (ref 23–29)
COLOR UR: YELLOW
CREAT SERPL-MCNC: 1.7 MG/DL (ref 0.5–1.4)
DIFFERENTIAL METHOD: ABNORMAL
EOSINOPHIL # BLD AUTO: 0.3 K/UL (ref 0–0.5)
EOSINOPHIL NFR BLD: 3.9 % (ref 0–8)
ERYTHROCYTE [DISTWIDTH] IN BLOOD BY AUTOMATED COUNT: 13 % (ref 11.5–14.5)
EST. GFR  (AFRICAN AMERICAN): 49.9 ML/MIN/1.73 M^2
EST. GFR  (NON AFRICAN AMERICAN): 43.2 ML/MIN/1.73 M^2
ESTIMATED AVG GLUCOSE: 105 MG/DL (ref 68–131)
GLUCOSE SERPL-MCNC: 110 MG/DL (ref 70–110)
GLUCOSE UR QL STRIP: NEGATIVE
HBA1C MFR BLD: 5.3 % (ref 4–5.6)
HCT VFR BLD AUTO: 47.2 % (ref 40–54)
HDLC SERPL-MCNC: 34 MG/DL (ref 40–75)
HDLC SERPL: 17.1 % (ref 20–50)
HGB BLD-MCNC: 15.9 G/DL (ref 14–18)
HGB UR QL STRIP: NEGATIVE
IMM GRANULOCYTES # BLD AUTO: 0.02 K/UL (ref 0–0.04)
IMM GRANULOCYTES NFR BLD AUTO: 0.3 % (ref 0–0.5)
KETONES UR QL STRIP: NEGATIVE
LDLC SERPL CALC-MCNC: 97.4 MG/DL (ref 63–159)
LEUKOCYTE ESTERASE UR QL STRIP: NEGATIVE
LYMPHOCYTES # BLD AUTO: 1.9 K/UL (ref 1–4.8)
LYMPHOCYTES NFR BLD: 23.6 % (ref 18–48)
MCH RBC QN AUTO: 31.7 PG (ref 27–31)
MCHC RBC AUTO-ENTMCNC: 33.7 G/DL (ref 32–36)
MCV RBC AUTO: 94 FL (ref 82–98)
MONOCYTES # BLD AUTO: 0.4 K/UL (ref 0.3–1)
MONOCYTES NFR BLD: 5.4 % (ref 4–15)
NEUTROPHILS # BLD AUTO: 5.2 K/UL (ref 1.8–7.7)
NEUTROPHILS NFR BLD: 65.9 % (ref 38–73)
NITRITE UR QL STRIP: NEGATIVE
NONHDLC SERPL-MCNC: 165 MG/DL
NRBC BLD-RTO: 0 /100 WBC
PH UR STRIP: 6 [PH] (ref 5–8)
PLATELET # BLD AUTO: 167 K/UL (ref 150–450)
PMV BLD AUTO: 11.4 FL (ref 9.2–12.9)
POTASSIUM SERPL-SCNC: 4.3 MMOL/L (ref 3.5–5.1)
PROT SERPL-MCNC: 7.3 G/DL (ref 6–8.4)
PROT UR QL STRIP: NEGATIVE
RBC # BLD AUTO: 5.02 M/UL (ref 4.6–6.2)
SODIUM SERPL-SCNC: 140 MMOL/L (ref 136–145)
SP GR UR STRIP: 1.02 (ref 1–1.03)
TRIGL SERPL-MCNC: 338 MG/DL (ref 30–150)
TSH SERPL DL<=0.005 MIU/L-ACNC: 1.37 UIU/ML (ref 0.4–4)
URN SPEC COLLECT METH UR: NORMAL
UROBILINOGEN UR STRIP-ACNC: 1 EU/DL
WBC # BLD AUTO: 7.93 K/UL (ref 3.9–12.7)

## 2022-04-04 PROCEDURE — 85025 COMPLETE CBC W/AUTO DIFF WBC: CPT | Performed by: INTERNAL MEDICINE

## 2022-04-04 PROCEDURE — 84443 ASSAY THYROID STIM HORMONE: CPT | Performed by: INTERNAL MEDICINE

## 2022-04-04 PROCEDURE — 81003 URINALYSIS AUTO W/O SCOPE: CPT | Performed by: INTERNAL MEDICINE

## 2022-04-04 PROCEDURE — 36415 COLL VENOUS BLD VENIPUNCTURE: CPT | Performed by: INTERNAL MEDICINE

## 2022-04-04 PROCEDURE — 80053 COMPREHEN METABOLIC PANEL: CPT | Performed by: INTERNAL MEDICINE

## 2022-04-04 PROCEDURE — 80061 LIPID PANEL: CPT | Performed by: INTERNAL MEDICINE

## 2022-04-04 PROCEDURE — 83036 HEMOGLOBIN GLYCOSYLATED A1C: CPT | Performed by: INTERNAL MEDICINE

## 2022-05-18 ENCOUNTER — TELEPHONE (OUTPATIENT)
Dept: HEPATOLOGY | Facility: CLINIC | Age: 62
End: 2022-05-18
Payer: MEDICAID

## 2022-06-23 ENCOUNTER — HOSPITAL ENCOUNTER (OUTPATIENT)
Dept: RADIOLOGY | Facility: HOSPITAL | Age: 62
Discharge: HOME | End: 2022-06-23
Attending: STUDENT IN AN ORGANIZED HEALTH CARE EDUCATION/TRAINING PROGRAM
Payer: MEDICAID

## 2022-06-23 DIAGNOSIS — K70.31 ALCOHOLIC CIRRHOSIS OF LIVER WITH ASCITES: ICD-10-CM

## 2022-06-23 PROCEDURE — 76705 ECHO EXAM OF ABDOMEN: CPT | Mod: TC

## 2022-06-30 ENCOUNTER — OFFICE VISIT (OUTPATIENT)
Dept: HEPATOLOGY | Facility: CLINIC | Age: 62
End: 2022-06-30
Payer: MEDICAID

## 2022-06-30 VITALS
BODY MASS INDEX: 37.39 KG/M2 | TEMPERATURE: 97 F | OXYGEN SATURATION: 95 % | HEIGHT: 63 IN | WEIGHT: 211 LBS | RESPIRATION RATE: 18 BRPM | SYSTOLIC BLOOD PRESSURE: 131 MMHG | DIASTOLIC BLOOD PRESSURE: 79 MMHG | HEART RATE: 69 BPM

## 2022-06-30 DIAGNOSIS — K70.30 ALCOHOLIC CIRRHOSIS OF LIVER WITHOUT ASCITES: Primary | ICD-10-CM

## 2022-06-30 PROCEDURE — 3008F PR BODY MASS INDEX (BMI) DOCUMENTED: ICD-10-PCS | Mod: CPTII,,, | Performed by: STUDENT IN AN ORGANIZED HEALTH CARE EDUCATION/TRAINING PROGRAM

## 2022-06-30 PROCEDURE — 1160F RVW MEDS BY RX/DR IN RCRD: CPT | Mod: CPTII,,, | Performed by: STUDENT IN AN ORGANIZED HEALTH CARE EDUCATION/TRAINING PROGRAM

## 2022-06-30 PROCEDURE — 99999 PR PBB SHADOW E&M-EST. PATIENT-LVL III: ICD-10-PCS | Mod: PBBFAC,,, | Performed by: STUDENT IN AN ORGANIZED HEALTH CARE EDUCATION/TRAINING PROGRAM

## 2022-06-30 PROCEDURE — 99214 PR OFFICE/OUTPT VISIT, EST, LEVL IV, 30-39 MIN: ICD-10-PCS | Mod: S$PBB,,, | Performed by: STUDENT IN AN ORGANIZED HEALTH CARE EDUCATION/TRAINING PROGRAM

## 2022-06-30 PROCEDURE — 1159F PR MEDICATION LIST DOCUMENTED IN MEDICAL RECORD: ICD-10-PCS | Mod: CPTII,,, | Performed by: STUDENT IN AN ORGANIZED HEALTH CARE EDUCATION/TRAINING PROGRAM

## 2022-06-30 PROCEDURE — 99999 PR PBB SHADOW E&M-EST. PATIENT-LVL III: CPT | Mod: PBBFAC,,, | Performed by: STUDENT IN AN ORGANIZED HEALTH CARE EDUCATION/TRAINING PROGRAM

## 2022-06-30 PROCEDURE — 99213 OFFICE O/P EST LOW 20 MIN: CPT | Mod: PBBFAC | Performed by: STUDENT IN AN ORGANIZED HEALTH CARE EDUCATION/TRAINING PROGRAM

## 2022-06-30 PROCEDURE — 3075F SYST BP GE 130 - 139MM HG: CPT | Mod: CPTII,,, | Performed by: STUDENT IN AN ORGANIZED HEALTH CARE EDUCATION/TRAINING PROGRAM

## 2022-06-30 PROCEDURE — 3008F BODY MASS INDEX DOCD: CPT | Mod: CPTII,,, | Performed by: STUDENT IN AN ORGANIZED HEALTH CARE EDUCATION/TRAINING PROGRAM

## 2022-06-30 PROCEDURE — 3078F PR MOST RECENT DIASTOLIC BLOOD PRESSURE < 80 MM HG: ICD-10-PCS | Mod: CPTII,,, | Performed by: STUDENT IN AN ORGANIZED HEALTH CARE EDUCATION/TRAINING PROGRAM

## 2022-06-30 PROCEDURE — 3075F PR MOST RECENT SYSTOLIC BLOOD PRESS GE 130-139MM HG: ICD-10-PCS | Mod: CPTII,,, | Performed by: STUDENT IN AN ORGANIZED HEALTH CARE EDUCATION/TRAINING PROGRAM

## 2022-06-30 PROCEDURE — 3044F PR MOST RECENT HEMOGLOBIN A1C LEVEL <7.0%: ICD-10-PCS | Mod: CPTII,,, | Performed by: STUDENT IN AN ORGANIZED HEALTH CARE EDUCATION/TRAINING PROGRAM

## 2022-06-30 PROCEDURE — 1159F MED LIST DOCD IN RCRD: CPT | Mod: CPTII,,, | Performed by: STUDENT IN AN ORGANIZED HEALTH CARE EDUCATION/TRAINING PROGRAM

## 2022-06-30 PROCEDURE — 3078F DIAST BP <80 MM HG: CPT | Mod: CPTII,,, | Performed by: STUDENT IN AN ORGANIZED HEALTH CARE EDUCATION/TRAINING PROGRAM

## 2022-06-30 PROCEDURE — 99214 OFFICE O/P EST MOD 30 MIN: CPT | Mod: S$PBB,,, | Performed by: STUDENT IN AN ORGANIZED HEALTH CARE EDUCATION/TRAINING PROGRAM

## 2022-06-30 PROCEDURE — 3044F HG A1C LEVEL LT 7.0%: CPT | Mod: CPTII,,, | Performed by: STUDENT IN AN ORGANIZED HEALTH CARE EDUCATION/TRAINING PROGRAM

## 2022-06-30 PROCEDURE — 1160F PR REVIEW ALL MEDS BY PRESCRIBER/CLIN PHARMACIST DOCUMENTED: ICD-10-PCS | Mod: CPTII,,, | Performed by: STUDENT IN AN ORGANIZED HEALTH CARE EDUCATION/TRAINING PROGRAM

## 2022-06-30 NOTE — PROGRESS NOTES
Hepatology Follow Up Note    Referring provider: No ref. provider found  PCP: Neva Nielsen MD    Chief complaint: follow up of alcohol related cirrhosis    HPI: Kenneth Pardo is a 61 y.o. male with history of alcohol-related cirrhosis who presents for scheduled follow-up.    6/30/22:  He reports significant fatigue that limits his daily activities.  He is also having significant anxiety for which he is seeing a psychiatrist.  Otherwise without complaints today.  No recent hospitalizations or ED visits.  He denies signs of decompensated cirrhosis including no recent abdominal distention, encephalopathy, jaundice, GI bleeding.    9/28/21:  He is without complaints today.  No recent hospitalizations or ED visits.  Compliant with Lasix and spironolactone without recent abdominal distention or lower extremity edema.  He has stopped taking lactulose without recent encephalopathy.  He denies other signs of decompensated cirrhosis including no recent jaundice or GI bleeding.    6/17/21:  Since his last visit he has had 2 ED visits for nausea, diarrhea, abdominal pain.  His symptoms gradually resolved approximately 1 month ago.  He has otherwise not had recent hospitalizations or ED visits.  He reports lots of stress and anxiety.  He is planning to see a local psychiatrist.  He denies suicidal or homicidal ideation.  Compliant with Lasix and spironolactone but has noticed increased abdominal distention recently.  Takes lactulose without recent encephalopathy.  He denies other signs of decompensated cirrhosis including no recent jaundice or GI bleeding.    3/2/21:  He has not had recent hospitalizations but has had 2 recent ED visits.  One was for generalized weakness and another for anxiety.  He does report ongoing generalized weakness feeling like he has no energy.  He also reports ongoing anxiety that has not significantly improved since starting Lexapro.  He is otherwise without complaints today.  He does note some  abdominal distention but has not required paracentesis recently. He has not been taking lactulose recently but has not had recurrent encephalopathy.  He denies other signs of decompensated cirrhosis including no recent jaundice or GI bleeding.     12/17/20: He reports ongoing abdominal distension and lower extremity edema.  He was previously taken off of diuretics due to renal dysfunction.  He also reports anxiety and depression despite taking Lexapro.  He denies suicidal ideation.  He reports compliance with lactulose without recent encephalopathy.      9/8/20 Initial Visit: In July 2020 he presented to OSH with weakness.  He was found to be hypotensive requiring vasopressors and also had elevated LFTs.  He was transferred to [facility] after which he developed melena and worsening anemia.  EGD showed LA grade B esophagitis and portal hypertensive gastropathy.  He had ascites requiring paracentesis.  Studies were negative for SBP.  He was started on Lasix and spironolactone prior to discharge.  He also developed encephalopathy for which he was prescribed lactulose.     Prior to hospitalization he was drinking 1/5 gallon of vodka daily.  He had been drinking daily for several years but said that his alcohol intake increased significantly when he was laid off in April.  He attempted rehab for alcohol several years ago but started drinking again afterwards.  He has never had legal issues related to alcohol.     Since discharge he has noticed increasing lower extremity edema despite compliance with diuretics and sodium restricted diet.  He also reports compliance with lactulose and has had no encephalopathy recently.     Past Medical History:   Diagnosis Date    Anxiety     Arm vein blood clot, right 2020    ankle    Decompensated liver disease     Hepatitis     Hypertension     Liver cirrhosis     Liver failure     Presence of IVC filter     Wears glasses        Past Surgical History:   Procedure Laterality Date     COLONOSCOPY N/A 9/17/2020    Procedure: COLONOSCOPY;  Surgeon: Jhonathan Lieberman MD;  Location: [facility] ENDO (2ND FLR);  Service: Endoscopy;  Laterality: N/A;    ESOPHAGOGASTRODUODENOSCOPY N/A 7/31/2020    Procedure: EGD (ESOPHAGOGASTRODUODENOSCOPY);  Surgeon: Jhonathan Lieberman MD;  Location: [facility] ENDO (2ND FLR);  Service: Endoscopy;  Laterality: N/A;    ESOPHAGOGASTRODUODENOSCOPY N/A 9/10/2021    Procedure: EGD (ESOPHAGOGASTRODUODENOSCOPY);  Surgeon: Raiza Palma MD;  Location: [facility];  Service: General;  Laterality: N/A;  5th  CL/JL/SCAR/LR/PEGGY    IVC FILTER RETRIEVAL      PARACENTESIS         Family History   Problem Relation Age of Onset    Pacemaker/defibrilator Mother     Heart disease Father        Social History     Tobacco Use    Smoking status: Current Some Day Smoker     Packs/day: 1.00     Years: 20.00     Pack years: 20.00     Types: Cigarettes    Smokeless tobacco: Never Used    Tobacco comment: quarter pack every 2 days   Substance Use Topics    Alcohol use: Not Currently    Drug use: Not Currently     Types: Marijuana     Comment: last time 2 weeks ago       Current Outpatient Medications   Medication Sig Dispense Refill    ELIQUIS 5 mg Tab Take 1 tablet by mouth twice daily 60 tablet 0    furosemide (LASIX) 20 MG tablet Take 1 tablet (20 mg total) by mouth 2 (two) times daily. 60 tablet 11    lactulose (CONSTULOSE) 10 gram/15 mL solution TAKE 15 MLS BY MOUTH 2 TIMES A DAY. TAKE ENOUGH TO HAVE 3 BOWEL MOVEMENTS PER  mL 0    midodrine (PROAMATINE) 5 MG Tab Take 2 tablets (10 mg total) by mouth every 8 (eight) hours. 270 tablet 3    ondansetron (ZOFRAN) 4 MG tablet Take 8 mg by mouth 2 (two) times daily.      pantoprazole (PROTONIX) 40 MG tablet Take 1 tablet (40 mg total) by mouth once daily. 30 tablet 11    spironolactone (ALDACTONE) 50 MG tablet Take 1 tablet (50 mg total) by mouth once daily. 30 tablet 11    traZODone (DESYREL) 100 MG tablet Take 1 tablet (100 mg total) by  "mouth every evening. 30 tablet 3     No current facility-administered medications for this visit.       Review of patient's allergies indicates:  No Known Allergies    Review of Systems   Constitutional: Negative for fever and weight loss.   Cardiovascular: Negative for leg swelling.   Gastrointestinal: Negative for abdominal pain, blood in stool, constipation, diarrhea, heartburn, melena, nausea and vomiting.       Vitals:    06/30/22 1453   BP: 131/79   Pulse: 69   Resp: 18   Temp: 97.3 °F (36.3 °C)   TempSrc: Oral   SpO2: 95%   Weight: 95.7 kg (210 lb 15.7 oz)   Height: 5' 3" (1.6 m)       Physical Exam  Vitals reviewed.   Constitutional:       General: He is not in acute distress.  Eyes:      General: No scleral icterus.  Cardiovascular:      Rate and Rhythm: Normal rate and regular rhythm.   Pulmonary:      Effort: Pulmonary effort is normal. No respiratory distress.   Abdominal:      General: Bowel sounds are normal. There is no distension.      Palpations: Abdomen is soft.      Tenderness: There is no abdominal tenderness. There is no guarding or rebound.   Musculoskeletal:      Right lower leg: No edema.      Left lower leg: No edema.   Skin:     Coloration: Skin is not jaundiced.         LABS: I personally reviewed pertinent laboratory findings.    Lab Results   Component Value Date    ALT 33 06/23/2022    ALT 32 06/23/2022    AST 24 06/23/2022    AST 24 06/23/2022     (H) 10/14/2020    ALKPHOS 93 06/23/2022    ALKPHOS 93 06/23/2022    BILITOT 0.4 06/23/2022    BILITOT 0.4 06/23/2022       Lab Results   Component Value Date    WBC 9.82 06/23/2022    HGB 16.1 06/23/2022    HCT 49.2 06/23/2022    MCV 97 06/23/2022     06/23/2022       Lab Results   Component Value Date     06/23/2022    K 4.8 06/23/2022     06/23/2022    CO2 30 (H) 06/23/2022    BUN 28 (H) 06/23/2022    CREATININE 1.7 (H) 06/23/2022    CALCIUM 9.0 06/23/2022    ANIONGAP 1 (L) 06/23/2022    ESTGFRAFRICA 49.9 (A) " 06/23/2022    EGFRNONAA 43.2 (A) 06/23/2022       Lab Results   Component Value Date    INR 0.9 06/23/2022    INR 1.0 09/21/2021    INR 1.0 09/09/2021       Lab Results   Component Value Date    IRON 78 10/14/2020    TIBC 118 (L) 10/14/2020    FERRITIN 1,984 (H) 10/14/2020       Lab Results   Component Value Date    HEPAIGM Negative 08/01/2020    HEPBIGM Negative 08/01/2020    HEPBCAB Negative 09/10/2020    HEPCAB Negative 09/10/2020     MELD-Na score: 12 at 6/23/2022  9:33 AM  MELD score: 12 at 6/23/2022  9:33 AM  Calculated from:  Serum Creatinine: 1.7 mg/dL at 6/23/2022  9:33 AM  Serum Sodium: 140 mmol/L (Using max of 137 mmol/L) at 6/23/2022  9:33 AM  Total Bilirubin: 0.4 mg/dL (Using min of 1 mg/dL) at 6/23/2022  9:33 AM  INR(ratio): 0.9 (Using min of 1) at 6/23/2022  9:33 AM  Age: 61 years      Imaging:   I personally reviewed recent imaging studies available on the chart and from outside medical records.      Assessment:  61 y.o. male with alcohol related cirrhosis. MELD-Na 12 though driven by renal dysfunction. He is decompensated with ascites, HE though has re-compensated following alcohol cessation.    1. Alcoholic cirrhosis of liver without ascites        Recommendations:  1. Cirrhosis due to alcohol: Patient congratulated on alcohol cessation counseling continued abstinence. Last drink 07/2020.    2. Ascites/Edema: 2 gm Na diet.    3. Encephalopathy: Not an active issue. Previously on lactulose but has discontinued without recent encephalopathy. Ok to continue holding.    4. Transplant candidacy: Transplant evaluation not warranted given low MELD.    Cirrhosis HM  - HCC screening: US in 06/2023 without HCC. AFP 2.1. Repeat abdominal US and AFP every 6 months.    - Variceal screening: EGD in 09/2021 showed no varices. Continue screening EGDs with his local gastroenterologist.    - Immunizations: Recommend HAV and HBV vaccinations if not immune    Return to clinic in 6 months with labs and US.    I spent a  total of 30 minutes on the day of the visit. This includes face to face time and non-face to face time preparing to see the patient (eg, review of tests), obtaining and/or reviewing separately obtained history, documenting clinical information in the electronic or other health record, independently interpreting results, and communicating results to the patient/family/caregiver, or care coordination.    Mainor Madison MD  Staff Physician  Hepatology and Liver Transplant  [facility]  [facility]

## 2022-07-06 ENCOUNTER — TELEPHONE (OUTPATIENT)
Dept: HEPATOLOGY | Facility: CLINIC | Age: 62
End: 2022-07-06
Payer: MEDICAID

## 2022-07-06 NOTE — TELEPHONE ENCOUNTER
----- Message from Mainor Madison MD sent at 7/5/2022  7:40 PM CDT -----  Return 6 months with labs and US

## 2022-07-07 DIAGNOSIS — R10.9 FLANK PAIN: Primary | ICD-10-CM

## 2022-07-14 ENCOUNTER — HOSPITAL ENCOUNTER (OUTPATIENT)
Dept: RADIOLOGY | Facility: HOSPITAL | Age: 62
Discharge: HOME | End: 2022-07-14
Attending: SURGERY
Payer: MEDICAID

## 2022-07-14 DIAGNOSIS — R10.9 FLANK PAIN: ICD-10-CM

## 2022-07-14 PROCEDURE — 76770 US EXAM ABDO BACK WALL COMP: CPT | Mod: TC

## 2022-09-08 DIAGNOSIS — R13.10 DYSPHAGIA, UNSPECIFIED TYPE: ICD-10-CM

## 2022-09-08 DIAGNOSIS — Z87.19 HISTORY OF ESOPHAGITIS: Primary | ICD-10-CM

## 2022-09-08 DIAGNOSIS — Z01.818 PREOP TESTING: ICD-10-CM

## 2022-09-08 RX ORDER — SODIUM CHLORIDE 9 MG/ML
INJECTION, SOLUTION INTRAVENOUS CONTINUOUS
Status: CANCELLED | OUTPATIENT
Start: 2022-09-08

## 2022-09-08 RX ORDER — SODIUM CHLORIDE 0.9 % (FLUSH) 0.9 %
10 SYRINGE (ML) INJECTION
Status: CANCELLED | OUTPATIENT
Start: 2022-09-08

## 2022-09-09 ENCOUNTER — HOSPITAL ENCOUNTER (EMERGENCY)
Facility: HOSPITAL | Age: 62
Discharge: HOME | End: 2022-09-09
Attending: STUDENT IN AN ORGANIZED HEALTH CARE EDUCATION/TRAINING PROGRAM
Payer: MEDICAID

## 2022-09-09 VITALS
WEIGHT: 210 LBS | RESPIRATION RATE: 16 BRPM | HEART RATE: 65 BPM | SYSTOLIC BLOOD PRESSURE: 165 MMHG | BODY MASS INDEX: 37.21 KG/M2 | TEMPERATURE: 98 F | OXYGEN SATURATION: 100 % | DIASTOLIC BLOOD PRESSURE: 95 MMHG | HEIGHT: 63 IN

## 2022-09-09 DIAGNOSIS — R11.0 NAUSEA: Primary | ICD-10-CM

## 2022-09-09 DIAGNOSIS — J31.0 RHINITIS, UNSPECIFIED TYPE: ICD-10-CM

## 2022-09-09 LAB
ALBUMIN SERPL BCP-MCNC: 3.9 G/DL (ref 3.5–5.2)
ALP SERPL-CCNC: 96 U/L (ref 55–135)
ALT SERPL W/O P-5'-P-CCNC: 24 U/L (ref 10–44)
ANION GAP SERPL CALC-SCNC: 4 MMOL/L (ref 8–16)
AST SERPL-CCNC: 21 U/L (ref 10–40)
BASOPHILS # BLD AUTO: 0.03 K/UL (ref 0–0.2)
BASOPHILS NFR BLD: 0.3 % (ref 0–1.9)
BILIRUB SERPL-MCNC: 0.5 MG/DL (ref 0.1–1)
BUN SERPL-MCNC: 17 MG/DL (ref 6–20)
CALCIUM SERPL-MCNC: 9.7 MG/DL (ref 8.7–10.5)
CHLORIDE SERPL-SCNC: 108 MMOL/L (ref 95–110)
CO2 SERPL-SCNC: 26 MMOL/L (ref 23–29)
CREAT SERPL-MCNC: 1.5 MG/DL (ref 0.5–1.4)
DIFFERENTIAL METHOD: ABNORMAL
EOSINOPHIL # BLD AUTO: 0 K/UL (ref 0–0.5)
EOSINOPHIL NFR BLD: 0.3 % (ref 0–8)
ERYTHROCYTE [DISTWIDTH] IN BLOOD BY AUTOMATED COUNT: 12.4 % (ref 11.5–14.5)
EST. GFR  (NO RACE VARIABLE): 53 ML/MIN/1.73 M^2
GLUCOSE SERPL-MCNC: 147 MG/DL (ref 70–110)
HCT VFR BLD AUTO: 49.4 % (ref 40–54)
HGB BLD-MCNC: 16.7 G/DL (ref 14–18)
IMM GRANULOCYTES # BLD AUTO: 0.04 K/UL (ref 0–0.04)
IMM GRANULOCYTES NFR BLD AUTO: 0.4 % (ref 0–0.5)
LIPASE SERPL-CCNC: 65 U/L (ref 23–300)
LYMPHOCYTES # BLD AUTO: 0.9 K/UL (ref 1–4.8)
LYMPHOCYTES NFR BLD: 8.3 % (ref 18–48)
MCH RBC QN AUTO: 31.7 PG (ref 27–31)
MCHC RBC AUTO-ENTMCNC: 33.8 G/DL (ref 32–36)
MCV RBC AUTO: 94 FL (ref 82–98)
MONOCYTES # BLD AUTO: 0.3 K/UL (ref 0.3–1)
MONOCYTES NFR BLD: 2.9 % (ref 4–15)
NEUTROPHILS # BLD AUTO: 9.9 K/UL (ref 1.8–7.7)
NEUTROPHILS NFR BLD: 87.8 % (ref 38–73)
NRBC BLD-RTO: 0 /100 WBC
PLATELET # BLD AUTO: 158 K/UL (ref 150–450)
PMV BLD AUTO: 11.3 FL (ref 9.2–12.9)
POTASSIUM SERPL-SCNC: 4.2 MMOL/L (ref 3.5–5.1)
PROT SERPL-MCNC: 7.7 G/DL (ref 6–8.4)
RBC # BLD AUTO: 5.27 M/UL (ref 4.6–6.2)
SODIUM SERPL-SCNC: 138 MMOL/L (ref 136–145)
WBC # BLD AUTO: 11.3 K/UL (ref 3.9–12.7)

## 2022-09-09 PROCEDURE — C9113 INJ PANTOPRAZOLE SODIUM, VIA: HCPCS | Performed by: NURSE PRACTITIONER

## 2022-09-09 PROCEDURE — 96375 TX/PRO/DX INJ NEW DRUG ADDON: CPT

## 2022-09-09 PROCEDURE — 36415 COLL VENOUS BLD VENIPUNCTURE: CPT | Performed by: STUDENT IN AN ORGANIZED HEALTH CARE EDUCATION/TRAINING PROGRAM

## 2022-09-09 PROCEDURE — 80053 COMPREHEN METABOLIC PANEL: CPT | Performed by: NURSE PRACTITIONER

## 2022-09-09 PROCEDURE — 99284 EMERGENCY DEPT VISIT MOD MDM: CPT | Mod: 25

## 2022-09-09 PROCEDURE — 63600175 PHARM REV CODE 636 W HCPCS: Performed by: NURSE PRACTITIONER

## 2022-09-09 PROCEDURE — 83690 ASSAY OF LIPASE: CPT | Performed by: STUDENT IN AN ORGANIZED HEALTH CARE EDUCATION/TRAINING PROGRAM

## 2022-09-09 PROCEDURE — 96374 THER/PROPH/DIAG INJ IV PUSH: CPT

## 2022-09-09 PROCEDURE — 85025 COMPLETE CBC W/AUTO DIFF WBC: CPT | Performed by: NURSE PRACTITIONER

## 2022-09-09 RX ORDER — PROCHLORPERAZINE EDISYLATE 5 MG/ML
10 INJECTION INTRAMUSCULAR; INTRAVENOUS
Status: COMPLETED | OUTPATIENT
Start: 2022-09-09 | End: 2022-09-09

## 2022-09-09 RX ORDER — PANTOPRAZOLE SODIUM 40 MG/10ML
40 INJECTION, POWDER, LYOPHILIZED, FOR SOLUTION INTRAVENOUS
Status: COMPLETED | OUTPATIENT
Start: 2022-09-09 | End: 2022-09-09

## 2022-09-09 RX ORDER — ONDANSETRON 4 MG/1
4 TABLET, ORALLY DISINTEGRATING ORAL EVERY 8 HOURS PRN
Qty: 6 TABLET | Refills: 0 | Status: SHIPPED | OUTPATIENT
Start: 2022-09-09 | End: 2022-09-13

## 2022-09-09 RX ADMIN — PROCHLORPERAZINE EDISYLATE 10 MG: 5 INJECTION INTRAMUSCULAR; INTRAVENOUS at 03:09

## 2022-09-09 RX ADMIN — PANTOPRAZOLE SODIUM 40 MG: 40 INJECTION, POWDER, FOR SOLUTION INTRAVENOUS at 03:09

## 2022-09-09 NOTE — ED PROVIDER NOTES
"Encounter Date: 9/9/2022       History     Chief Complaint   Patient presents with    Nausea     Pt stated that he has been experiencing nausea for a few days along with runny nose beginning couple days ago. Increased anxiety since feeling ill as well.      This is a 62-year-old white male with a history hypertension and cirrhosis with recurrent abdominal pain who presents to the emergency department with complaints of upper abdominal cramping pain associated with nausea and "dry heaves" that began today. He states that he is unable to hold anything down which is triggering his anxiety. He reports similar episodes in the past. He denies fever, URI signs and symptoms, lower abdominal pain, diarrhea, constipation, or black/bloody bowel movements.    Review of patient's allergies indicates:  No Known Allergies  Past Medical History:   Diagnosis Date    Anxiety     Arm vein blood clot, right 2020    ankle    Decompensated liver disease     Hepatitis     Hypertension     Liver cirrhosis     Liver failure     Presence of IVC filter     Wears glasses      Past Surgical History:   Procedure Laterality Date    COLONOSCOPY N/A 9/17/2020    Procedure: COLONOSCOPY;  Surgeon: Jhonathan Lieberman MD;  Location: [address]);  Service: Endoscopy;  Laterality: N/A;    ESOPHAGOGASTRODUODENOSCOPY N/A 7/31/2020    Procedure: EGD (ESOPHAGOGASTRODUODENOSCOPY);  Surgeon: Jhonathan Lieberman MD;  Location: [address]);  Service: Endoscopy;  Laterality: N/A;    ESOPHAGOGASTRODUODENOSCOPY N/A 9/10/2021    Procedure: EGD (ESOPHAGOGASTRODUODENOSCOPY);  Surgeon: Raiza Palma MD;  Location: [facility];  Service: General;  Laterality: N/A;  5th  CL/JL/SCAR/LR/PEGGY    IVC FILTER RETRIEVAL      PARACENTESIS       Family History   Problem Relation Age of Onset    Pacemaker/defibrilator Mother     Heart disease Father      Social History     Tobacco Use    Smoking status: Some Days     Packs/day: 1.00     Years: 20.00     Pack years: 20.00     " Types: Cigarettes    Smokeless tobacco: Never    Tobacco comments:     quarter pack every 2 days   Substance Use Topics    Alcohol use: Not Currently    Drug use: Not Currently     Types: Marijuana     Comment: last time 2 weeks ago     Review of Systems   Constitutional:  Positive for appetite change and fatigue.   HENT: Negative.     Respiratory: Negative.     Cardiovascular: Negative.    Gastrointestinal:  Positive for abdominal pain and nausea. Negative for blood in stool, constipation, diarrhea and vomiting.   Musculoskeletal: Negative.    Psychiatric/Behavioral:  The patient is nervous/anxious.      Physical Exam     Initial Vitals [09/09/22 1504]   BP Pulse Resp Temp SpO2   (!) 186/101 67 16 98.4 °F (36.9 °C) 100 %      MAP       --         Physical Exam    Nursing note and vitals reviewed.  Constitutional: He appears well-developed and well-nourished. He is active. No distress.   HENT:   Head: Normocephalic and atraumatic.   Mouth/Throat: Oropharynx is clear and moist. No oropharyngeal exudate.   Eyes: EOM are normal. Pupils are equal, round, and reactive to light.   Neck: Neck supple.   Normal range of motion.  Cardiovascular:  Normal rate, regular rhythm and normal heart sounds.           Pulmonary/Chest: Breath sounds normal. No respiratory distress.   Abdominal: Abdomen is soft. Bowel sounds are normal. He exhibits no distension. There is no abdominal tenderness.   Musculoskeletal:         General: Normal range of motion.      Cervical back: Normal range of motion and neck supple.     Neurological: He is alert and oriented to person, place, and time. GCS score is 15. GCS eye subscore is 4. GCS verbal subscore is 5. GCS motor subscore is 6.   Skin: Skin is warm and dry. Capillary refill takes less than 2 seconds.   Psychiatric: Thought content normal.       ED Course   Procedures  Labs Reviewed   CBC W/ AUTO DIFFERENTIAL - Abnormal; Notable for the following components:       Result Value    MCH 31.7 (*)      Gran # (ANC) 9.9 (*)     Lymph # 0.9 (*)     Gran % 87.8 (*)     Lymph % 8.3 (*)     Mono % 2.9 (*)     All other components within normal limits   COMPREHENSIVE METABOLIC PANEL - Abnormal; Notable for the following components:    Glucose 147 (*)     Creatinine 1.5 (*)     Anion Gap 4 (*)     eGFR 53.0 (*)     All other components within normal limits   LIPASE          Imaging Results    None          Medications   pantoprazole injection 40 mg (40 mg Intravenous Given 9/9/22 1553)   prochlorperazine injection Soln 10 mg (10 mg Intravenous Given 9/9/22 1553)                 ED Course as of 09/09/22 1612   Fri Sep 09, 2022   1611 Labs relatively unremarkable, serum creatinine appears to be improved from baseline.  Patient reports improvement in symptoms and ready for discharge after receiving Compazine and Protonix. [CB]      ED Course User Index  [CB] Jennifer Alicea NP             Clinical Impression:   Final diagnoses:  [R11.0] Nausea (Primary)  [J31.0] Rhinitis, unspecified type      ED Disposition Condition    Discharge Stable          ED Prescriptions       Medication Sig Dispense Start Date End Date Auth. Provider    ondansetron (ZOFRAN-ODT) 4 MG TbDL Take 1 tablet (4 mg total) by mouth every 8 (eight) hours as needed. 6 tablet 9/9/2022 9/11/2022 Jennifer Alicea NP          Follow-up Information       Follow up With Specialties Details Why Contact Info    PCP Follow UP  Schedule an appointment as soon as possible for a visit in 2 days for follow-up, for re-evaluation of today's complaint              Jennifer Alicea NP  09/09/22 1612

## 2022-09-12 ENCOUNTER — HOSPITAL ENCOUNTER (OUTPATIENT)
Dept: PREADMISSION TESTING | Facility: HOSPITAL | Age: 62
Discharge: HOME | End: 2022-09-12
Attending: SURGERY
Payer: MEDICAID

## 2022-09-12 ENCOUNTER — ANESTHESIA EVENT (OUTPATIENT)
Dept: ENDOSCOPY | Facility: HOSPITAL | Age: 62
End: 2022-09-12
Payer: MEDICAID

## 2022-09-12 ENCOUNTER — HOSPITAL ENCOUNTER (OUTPATIENT)
Dept: PULMONOLOGY | Facility: HOSPITAL | Age: 62
Discharge: HOME | End: 2022-09-12
Attending: SURGERY
Payer: MEDICAID

## 2022-09-12 VITALS — BODY MASS INDEX: 37.56 KG/M2 | HEIGHT: 63 IN | WEIGHT: 212 LBS

## 2022-09-12 RX ORDER — PRUCALOPRIDE 2 MG/1
1 TABLET, FILM COATED ORAL DAILY
COMMUNITY
Start: 2022-09-07

## 2022-09-12 RX ORDER — ESCITALOPRAM OXALATE 10 MG/1
10 TABLET ORAL DAILY
COMMUNITY
Start: 2022-06-14

## 2022-09-12 NOTE — DISCHARGE INSTRUCTIONS
BEFORE THE PROCEDURE:    REPORT ANY CHANGE IN YOUR PHYSICAL CONDITION TO YOUR DOCTOR IMMEDIATELY.  SELF ISOLATE AND CHECK TEMPERATURE DAILY, IF TEMP OVER 100, CALL PHYSICIAN IMMEDIATELY.  TRY TO REFRAIN FROM SMOKING AND ALCOHOL 72 HOURS BEFORE YOUR PROCEDURE.   DO NOT EAT OR DRINK ANYTHING AFTER MIDNIGHT THE NIGHT BEFORE YOUR PROCEDURE.  NO MAKE UP, NAIL POLISH OR JEWELRY.          SOMEONE WILL CALL YOU THE DAY BEFORE YOUR PROCEDURE WITH A CHECK-IN TIME FOR YOUR PROCEDURE.    DAY OF YOUR PROCEDURE:    TAKE BLOOD PRESSURE MEDICATIONS THE MORNING OF YOUR PROCEDURE, WITH SMALL SIPS WATER, AS DIRECTED BY YOUR PHYSICIAN.   DO NOT TAKE ANY DIABETIC MEDICATIONS UNLESS DIRECTED TO DO SO BY YOUR PHYSICIAN.   CONTACT LENSES AND DENTURES MUST BE REMOVED.  A RESPONSIBLE ADULT MUST ACCOMPANY YOU HOME UPON DISCHARGE.   ONLY 1 VISITOR ALLOWED PER ROOM.             YOUR THOUGHTS AND OPINIONS HELP US TO BETTER SERVE YOU.     PLEASE PARTICIPATE IN SURVEYS ABOUT YOUR CARE.    THANK YOU FOR CHOOSING [facility].

## 2022-09-12 NOTE — ANESTHESIA PREPROCEDURE EVALUATION
09/12/2022  Kenneth Pardo is a 62 y.o., male.      Pre-op Assessment    I have reviewed the Patient Summary Reports.    I have reviewed the NPO Status.   I have reviewed the Medications.     Review of Systems  Anesthesia Hx:  No problems with previous Anesthesia  Denies Family Hx of Anesthesia complications.   Denies Personal Hx of Anesthesia complications.   Social:  Smoker  Alcohol Use:   Alcohol Abuse:   Cardiovascular:   Hypertension, poorly controlled ECG has been reviewed.    Pulmonary:  Pulmonary Normal    Renal/:   Chronic Renal Disease, CRI    Hepatic/GI:   Hiatal Hernia, Liver Disease, Hepatitis CIRRHOSIS, PORTAL HYPERTENSION   Neurological:  Neurology Normal RESTLESS LEG   Endocrine:  Endocrine Normal    Psych:   Psychiatric History anxiety depression        Lab Results   Component Value Date    WBC 11.30 09/09/2022    HGB 16.7 09/09/2022    HCT 49.4 09/09/2022    MCV 94 09/09/2022     09/09/2022     CMP  Sodium   Date Value Ref Range Status   09/09/2022 138 136 - 145 mmol/L Final     Potassium   Date Value Ref Range Status   09/09/2022 4.2 3.5 - 5.1 mmol/L Final     Chloride   Date Value Ref Range Status   09/09/2022 108 95 - 110 mmol/L Final     CO2   Date Value Ref Range Status   09/09/2022 26 23 - 29 mmol/L Final     Glucose   Date Value Ref Range Status   09/09/2022 147 (H) 70 - 110 mg/dL Final     BUN   Date Value Ref Range Status   09/09/2022 17 6 - 20 mg/dL Final     Creatinine   Date Value Ref Range Status   09/09/2022 1.5 (H) 0.5 - 1.4 mg/dL Final     Calcium   Date Value Ref Range Status   09/09/2022 9.7 8.7 - 10.5 mg/dL Final     Total Protein   Date Value Ref Range Status   09/09/2022 7.7 6.0 - 8.4 g/dL Final     Albumin   Date Value Ref Range Status   09/09/2022 3.9 3.5 - 5.2 g/dL Final     Total Bilirubin   Date Value Ref Range Status   09/09/2022 0.5 0.1 - 1.0 mg/dL Final      Comment:     For infants and newborns, interpretation of results should be based  on gestational age, weight and in agreement with clinical  observations.    Premature Infant recommended reference ranges:  Up to 24 hours.............<8.0 mg/dL  Up to 48 hours............<12.0 mg/dL  3-5 days..................<15.0 mg/dL  6-29 days.................<15.0 mg/dL    For patients on Eltrombopag therapy, use of Dimension [place] TBIL is   not   recommended.       Alkaline Phosphatase   Date Value Ref Range Status   09/09/2022 96 55 - 135 U/L Final     AST   Date Value Ref Range Status   09/09/2022 21 10 - 40 U/L Final     ALT   Date Value Ref Range Status   09/09/2022 24 10 - 44 U/L Final     Anion Gap   Date Value Ref Range Status   09/09/2022 4 (L) 8 - 16 mmol/L Final     eGFR if    Date Value Ref Range Status   06/23/2022 49.9 (A) >60 mL/min/1.73 m^2 Final     eGFR if non    Date Value Ref Range Status   06/23/2022 43.2 (A) >60 mL/min/1.73 m^2 Final     Comment:     Calculation used to obtain the estimated glomerular filtration  rate (eGFR) is the CKD-EPI equation.          Physical Exam  General: Well nourished    Airway:  Mallampati: III / II  Mouth Opening: Normal  TM Distance: Normal  Tongue: Normal  Neck ROM: Normal ROM    Dental:  Periodontal disease    Chest/Lungs:  Clear to auscultation    Heart:  Rate: Normal  Rhythm: Regular Rhythm  Sounds: Normal        Anesthesia Plan  Type of Anesthesia, risks & benefits discussed:    Anesthesia Type: MAC  Intra-op Monitoring Plan: Standard ASA Monitors  Post Op Pain Control Plan: multimodal analgesia  Induction:  IV  Airway Plan: Direct  Informed Consent: Informed consent signed with the Patient and all parties understand the risks and agree with anesthesia plan.  All questions answered.   ASA Score: 4  Day of Surgery Review of History & Physical: I have interviewed and examined the patient. I have reviewed the patient's H&P dated: There  are no significant changes.     Ready For Surgery From Anesthesia Perspective.     .

## 2022-09-13 ENCOUNTER — HOSPITAL ENCOUNTER (OUTPATIENT)
Facility: HOSPITAL | Age: 62
Discharge: HOME | End: 2022-09-13
Attending: SURGERY | Admitting: SURGERY
Payer: MEDICAID

## 2022-09-13 ENCOUNTER — ANESTHESIA (OUTPATIENT)
Dept: ENDOSCOPY | Facility: HOSPITAL | Age: 62
End: 2022-09-13
Payer: MEDICAID

## 2022-09-13 VITALS
OXYGEN SATURATION: 94 % | SYSTOLIC BLOOD PRESSURE: 202 MMHG | HEART RATE: 57 BPM | RESPIRATION RATE: 18 BRPM | TEMPERATURE: 98 F | DIASTOLIC BLOOD PRESSURE: 98 MMHG

## 2022-09-13 DIAGNOSIS — K21.00 GASTROESOPHAGEAL REFLUX DISEASE WITH ESOPHAGITIS WITHOUT HEMORRHAGE: ICD-10-CM

## 2022-09-13 DIAGNOSIS — Z87.19 HISTORY OF ESOPHAGITIS: Primary | ICD-10-CM

## 2022-09-13 DIAGNOSIS — K44.9 SLIDING HIATAL HERNIA: ICD-10-CM

## 2022-09-13 DIAGNOSIS — K29.70 GASTRITIS, PRESENCE OF BLEEDING UNSPECIFIED, UNSPECIFIED CHRONICITY, UNSPECIFIED GASTRITIS TYPE: ICD-10-CM

## 2022-09-13 DIAGNOSIS — R13.19 OTHER DYSPHAGIA: ICD-10-CM

## 2022-09-13 PROBLEM — K29.90 GASTRODUODENITIS WITHOUT BLEEDING: Status: RESOLVED | Noted: 2021-09-10 | Resolved: 2022-09-13

## 2022-09-13 PROCEDURE — 27201423 OPTIME MED/SURG SUP & DEVICES STERILE SUPPLY: Performed by: SURGERY

## 2022-09-13 PROCEDURE — 43239 EGD BIOPSY SINGLE/MULTIPLE: CPT | Performed by: SURGERY

## 2022-09-13 PROCEDURE — 37000008 HC ANESTHESIA 1ST 15 MINUTES: Performed by: SURGERY

## 2022-09-13 PROCEDURE — 25000003 PHARM REV CODE 250: Performed by: SURGERY

## 2022-09-13 PROCEDURE — 63600175 PHARM REV CODE 636 W HCPCS: Performed by: SURGERY

## 2022-09-13 PROCEDURE — 00731 ANES UPR GI NDSC PX NOS: CPT | Performed by: SURGERY

## 2022-09-13 RX ORDER — ONDANSETRON 2 MG/ML
8 INJECTION INTRAMUSCULAR; INTRAVENOUS ONCE
Status: COMPLETED | OUTPATIENT
Start: 2022-09-13 | End: 2022-09-13

## 2022-09-13 RX ORDER — LIDOCAINE HYDROCHLORIDE 10 MG/ML
INJECTION, SOLUTION INTRAVENOUS
Status: DISCONTINUED | OUTPATIENT
Start: 2022-09-13 | End: 2022-09-13

## 2022-09-13 RX ORDER — SODIUM CHLORIDE 9 MG/ML
INJECTION, SOLUTION INTRAVENOUS CONTINUOUS
Status: DISCONTINUED | OUTPATIENT
Start: 2022-09-13 | End: 2022-09-13 | Stop reason: HOSPADM

## 2022-09-13 RX ORDER — SUCRALFATE 1 G/1
1 TABLET ORAL 4 TIMES DAILY
Qty: 120 TABLET | Refills: 0 | Status: SHIPPED | OUTPATIENT
Start: 2022-09-13

## 2022-09-13 RX ORDER — PANTOPRAZOLE SODIUM 40 MG/1
40 TABLET, DELAYED RELEASE ORAL DAILY
Qty: 30 TABLET | Refills: 11 | Status: SHIPPED | OUTPATIENT
Start: 2022-09-13 | End: 2023-09-13

## 2022-09-13 RX ORDER — SODIUM CHLORIDE 0.9 % (FLUSH) 0.9 %
10 SYRINGE (ML) INJECTION
Status: DISCONTINUED | OUTPATIENT
Start: 2022-09-13 | End: 2022-09-13 | Stop reason: HOSPADM

## 2022-09-13 RX ORDER — PROCHLORPERAZINE EDISYLATE 5 MG/ML
2.5 INJECTION INTRAMUSCULAR; INTRAVENOUS EVERY 4 HOURS PRN
Status: DISCONTINUED | OUTPATIENT
Start: 2022-09-13 | End: 2022-09-13 | Stop reason: HOSPADM

## 2022-09-13 RX ORDER — PROPOFOL 10 MG/ML
VIAL (ML) INTRAVENOUS
Status: DISCONTINUED | OUTPATIENT
Start: 2022-09-13 | End: 2022-09-13

## 2022-09-13 RX ADMIN — Medication 40 MG: at 07:09

## 2022-09-13 RX ADMIN — LIDOCAINE HYDROCHLORIDE 20 MG: 10 INJECTION, SOLUTION INTRAVENOUS at 07:09

## 2022-09-13 RX ADMIN — Medication 50 MG: at 07:09

## 2022-09-13 RX ADMIN — Medication 100 MG: at 07:09

## 2022-09-13 RX ADMIN — SODIUM CHLORIDE: 0.9 INJECTION, SOLUTION INTRAVENOUS at 06:09

## 2022-09-13 RX ADMIN — PROCHLORPERAZINE EDISYLATE 2.5 MG: 5 INJECTION INTRAMUSCULAR; INTRAVENOUS at 08:09

## 2022-09-13 RX ADMIN — ONDANSETRON 8 MG: 2 INJECTION INTRAMUSCULAR; INTRAVENOUS at 08:09

## 2022-09-13 NOTE — ANESTHESIA POSTPROCEDURE EVALUATION
Anesthesia Post Evaluation    Patient: Kenneth Pardo    Procedure(s) Performed: Procedure(s) (LRB):  EGD, WITH CLOSED BIOPSY (N/A)    Final Anesthesia Type: MAC      Patient location during evaluation: OPS  Patient participation: Yes- Able to Participate  Level of consciousness: awake  Post-procedure vital signs: reviewed and stable  Pain management: adequate  Airway patency: patent    PONV status at discharge: No PONV  Anesthetic complications: no      Cardiovascular status: blood pressure returned to baseline  Respiratory status: spontaneous ventilation  Hydration status: euvolemic  Follow-up not needed.          Vitals Value Taken Time   /94 09/13/22 0800   Temp 36.7 °C (98 °F) 09/13/22 0800   Pulse 61 09/13/22 0800   Resp 16 09/13/22 0800   SpO2 96 % 09/13/22 0800         No case tracking events are documented in the log.      Pain/Nereida Score: Nereida Score: 10 (9/13/2022  7:47 AM)         oriented to person, place and time , normal sensation , short and long term memory intact

## 2022-09-13 NOTE — DISCHARGE SUMMARY
Discharge Summary  General Surgery      Admit Date: 9/13/2022    Discharge Date :9/13/2022    Attending Physician: Raiza Palma     Discharge Physician: Raiza Palma    Discharged Condition: good    Discharge Diagnosis: History of esophagitis [Z87.19]  Dysphagia, unspecified type [R13.10]  Gastritis   Reflux esophagitis   Sliding hiatal hernia    Treatments/Procedures: Procedure(s) (LRB):  EGD, WITH CLOSED BIOPSY (N/A)    Hospital Course: Uneventful; Discharged home from Recovery    Significant Diagnostic Studies: none    Disposition: Home or Self Care    Diet:  Low residue dysphagia diet    Follow up: Office 10-14 days    Activity: No restrictions.    Patient Instructions:   Current Discharge Medication List        START taking these medications    Details   pantoprazole (PROTONIX) 40 MG tablet Take 1 tablet (40 mg total) by mouth once daily.  Qty: 30 tablet, Refills: 11      sucralfate (CARAFATE) 1 gram tablet Take 1 tablet (1 g total) by mouth 4 (four) times daily.  Qty: 120 tablet, Refills: 0           CONTINUE these medications which have NOT CHANGED    Details   EScitalopram oxalate (LEXAPRO) 10 MG tablet Take 10 mg by mouth once daily.      midodrine (PROAMATINE) 5 MG Tab Take 2 tablets (10 mg total) by mouth every 8 (eight) hours.  Qty: 270 tablet, Refills: 3    Associated Diagnoses: Hypotension, unspecified hypotension type      ondansetron (ZOFRAN) 4 MG tablet Take 8 mg by mouth 2 (two) times daily.      ondansetron (ZOFRAN-ODT) 4 MG TbDL Take 1 tablet (4 mg total) by mouth every 8 (eight) hours as needed.  Qty: 6 tablet, Refills: 0      spironolactone (ALDACTONE) 50 MG tablet Take 1 tablet (50 mg total) by mouth once daily.  Qty: 30 tablet, Refills: 11    Comments: .  Associated Diagnoses: Alcoholic cirrhosis of liver with ascites      traZODone (DESYREL) 100 MG tablet Take 1 tablet (100 mg total) by mouth every evening.  Qty: 30 tablet, Refills: 3    Associated Diagnoses: Depression with anxiety;  Alcoholic cirrhosis of liver with ascites; Organ transplant candidate      MOTEGRITY 2 mg Tab Take 1 tablet by mouth once daily.             Discharge Procedure Orders   Diet general   Order Comments: Dysphagia diet     Call MD for:  temperature >100.4     Call MD for:  persistent nausea and vomiting     Call MD for:  difficulty breathing, headache or visual disturbances     Call MD for:  severe uncontrolled pain     Call MD for:  persistent dizziness or light-headedness     Call MD for:  extreme fatigue     Activity as tolerated

## 2022-09-13 NOTE — TRANSFER OF CARE
Anesthesia Transfer of Care Note    Patient: Kenneth Pardo    Procedure(s) Performed: Procedure(s) (LRB):  EGD (ESOPHAGOGASTRODUODENOSCOPY) (N/A)    Patient location: GI    Anesthesia Type: MAC    Transport from OR: Transported from OR on room air with adequate spontaneous ventilation    Post pain: adequate analgesia    Post assessment: no apparent anesthetic complications    Post vital signs: stable    Level of consciousness: awake    Nausea/Vomiting: no nausea/vomiting    Complications: none    Transfer of care protocol was followed      Last vitals:   HR 66  RR 16  SPO2 99  T 36.7  /60

## 2022-09-13 NOTE — OP NOTE
[facility] - Endoscopy  EGD Procedure  Operative Note    SUMMARY     Date of Procedure: 9/13/2022     Procedure: Procedure(s) (LRB):  EGD, WITH CLOSED BIOPSY (N/A)    Surgeon(s) and Role:     * Raiza Palma MD - Primary    Assisting Surgeon: None    Patient location: GI    Pre-Operative Diagnosis: History of esophagitis [Z87.19]  Dysphagia, unspecified type [R13.10]    Post-Operative Diagnosis: Post-Op Diagnosis Codes:     * History of esophagitis [Z87.19]     * Dysphagia, unspecified type [R13.10]  Sliding hiatal hernia   Reflux esophagitis   Gastritis     Indications:  Dysphagia with a history of severe ulcerative reflux esophagitis        Procedure:                  The patient was brought in to the endoscopy suite where the risks, benefits, and alternatives of the procedure were described.  The patient was given the opportunity to ask questions and then signed informed consent. Patient was positioned in the left lateral decubitus position, continuous monitoring was initiated, and supplemental oxygen was provided via nasal cannula.  Bite block was placed.  Adequate sedation was achieved with the above mentioned medications and then titrated during the entire procedure.  Under direct visualization the gastroscope was introduced through the oropharynx in to the esophagus.  The scope was then advanced in to the stomach and second portion of the duodenum.  Scope was then withdrawn and the mucosa was carefully examined.  The entire gastric mucosa was examined, including the fundus with retroflexion.  Air was evacuated from the stomach and the scope was withdrawn in to the esophagus.  The entire esophageal mucosa was examined.  The patient tolerated the procedure well and was able to be transferred to the recovery area in stable condition.    Findings:                 Esophagus:  GE junction noted at 40 cm from the incisors.  Z-line showed slight irregularity.  Biopsies taken with cold forceps.                       Stomach:  Irritation noted within the antrum of the stomach with an enlarged fold near the pylorus.  Biopsies taken from these inflamed areas with the cold forceps.  Retroflexion revealed sliding hiatal hernia although small                    Duodenum:  No mucosal abnormalities     Specimens:   Specimen (24h ago, onward)       Start     Ordered    09/13/22 0747  Specimen to Pathology, Surgery General Surgery  Once        Comments: Pre-op Diagnosis: History of esophagitis [Z87.19]Dysphagia, unspecified type [R13.10]Procedure(s):EGD, WITH CLOSED BIOPSY Number of specimens: 2Name of specimens: BX of antrum @ 0740, BX of esophagus @ 0741     References:    Click here for ordering Quick Tip   Question Answer Comment   Procedure Type: General Surgery    Which provider would you like to cc? DIANA PALMA    Release to patient Immediate        09/13/22 0747                      Estimated Blood Loss (EBL): * No values recorded between 9/13/2022  7:19 AM and 9/13/2022  7:49 AM *     Complications: No     Diagnostic Impression:  Reflux esophagitis, gastritis, sliding hiatal hernia     Recommendations: Discharge patient to home. Patient has a contact number available for emergencies. The signs and symptoms of potential delayed complications were discussed with the patient. Return to normal activities tomorrow. Written discharge instructions were provided to the patient. Resume previous diet. Continue present medications. Await pathology results.    Disposition:  Recovery unit stable     Attestation: I performed the procedure.        Follow Up:             No future appointments.        Diana Palma MD  9/13/2022

## 2022-09-13 NOTE — DISCHARGE INSTRUCTIONS
FOLLOW UP WITH DR DYER AS SCHEDULED.    REST TODAY RESUME ACTIVITY AS TOLERATED TOMORROW.    RESUME HOME MEDICATIONS.    NO DRIVING OR DRINKING ALCOHOL FOR 24 HOURS.    CALL DR DYER'S OFFICE FOR ANY QUESTIONS OR CONCERNS.  REPORT TO THE ER IF URGENT.    THANK YOU FOR CHOOSING [facility]!

## 2022-09-15 LAB — SPECIMEN TO PATHOLOGY - SURGICAL: NORMAL

## 2023-03-08 DIAGNOSIS — M54.12 CERVICAL RADICULOPATHY: Primary | ICD-10-CM

## 2023-03-09 ENCOUNTER — HOSPITAL ENCOUNTER (OUTPATIENT)
Dept: RADIOLOGY | Facility: HOSPITAL | Age: 63
Discharge: HOME | End: 2023-03-09
Attending: INTERNAL MEDICINE
Payer: MEDICARE

## 2023-03-09 DIAGNOSIS — M54.12 CERVICAL RADICULOPATHY: ICD-10-CM

## 2023-03-09 PROCEDURE — 72141 MRI NECK SPINE W/O DYE: CPT | Mod: TC

## 2023-04-27 DIAGNOSIS — K29.60 OTHER SPECIFIED GASTRITIS, PRESENCE OF BLEEDING UNSPECIFIED, UNSPECIFIED CHRONICITY: Primary | ICD-10-CM

## (undated) DEVICE — KIT BIOGUARD AIR WTR SUC VALVE

## (undated) DEVICE — SPONGE DRY VIA GREEN

## (undated) DEVICE — KIT VIA CUSTOM PROCEDURE

## (undated) DEVICE — UNDERPAD DISPOSABLE 30X30IN

## (undated) DEVICE — FORCEP ENDOJAW OVL NDL 2X1550

## (undated) DEVICE — TUBING OXYGEN CONNECT BUBBLE

## (undated) DEVICE — SYS AQUASHIELD WATTER BOTTLE

## (undated) DEVICE — TUBE SUC UNIVERSAL .25XIN 6FT

## (undated) DEVICE — SOL IRRI STRL WATER 1000ML

## (undated) DEVICE — BASIN EMESIS GRAPHITE 500ML

## (undated) DEVICE — SEE MEDLINE ITEM 153215

## (undated) DEVICE — TIP YANKAUERS BULB NO VENT

## (undated) DEVICE — ELECTRODE FOAM 535 TEARDROP

## (undated) DEVICE — SYR SLIP TIP 60 CC DISP

## (undated) DEVICE — BITE BLOCK ADULT JUMBO ENDO W/

## (undated) DEVICE — GOWN SURGICAL BRTHBL XL

## (undated) DEVICE — ELECTRODE REM PLYHSV RETURN 9

## (undated) DEVICE — CANNULA SUPERSOFT CO2 M AD 7FT

## (undated) DEVICE — LINER SUCTION CANNISTER REGUGA